# Patient Record
Sex: MALE | Race: WHITE | NOT HISPANIC OR LATINO | Employment: PART TIME | URBAN - METROPOLITAN AREA
[De-identification: names, ages, dates, MRNs, and addresses within clinical notes are randomized per-mention and may not be internally consistent; named-entity substitution may affect disease eponyms.]

---

## 2017-01-24 ENCOUNTER — OFFICE VISIT (OUTPATIENT)
Dept: URGENT CARE | Facility: URGENT CARE | Age: 62
End: 2017-01-24
Payer: OTHER MISCELLANEOUS

## 2017-01-24 ENCOUNTER — RADIANT APPOINTMENT (OUTPATIENT)
Dept: GENERAL RADIOLOGY | Facility: CLINIC | Age: 62
End: 2017-01-24
Attending: PHYSICIAN ASSISTANT
Payer: OTHER MISCELLANEOUS

## 2017-01-24 VITALS
SYSTOLIC BLOOD PRESSURE: 140 MMHG | DIASTOLIC BLOOD PRESSURE: 80 MMHG | BODY MASS INDEX: 30.55 KG/M2 | WEIGHT: 207 LBS | TEMPERATURE: 97.8 F | HEART RATE: 60 BPM | OXYGEN SATURATION: 98 %

## 2017-01-24 DIAGNOSIS — S61.432A: ICD-10-CM

## 2017-01-24 DIAGNOSIS — S69.92XA INJURY OF LEFT HAND, INITIAL ENCOUNTER: ICD-10-CM

## 2017-01-24 DIAGNOSIS — S69.92XA INJURY OF LEFT HAND, INITIAL ENCOUNTER: Primary | ICD-10-CM

## 2017-01-24 PROCEDURE — 99214 OFFICE O/P EST MOD 30 MIN: CPT | Performed by: PHYSICIAN ASSISTANT

## 2017-01-24 PROCEDURE — 73130 X-RAY EXAM OF HAND: CPT | Mod: LT

## 2017-01-24 RX ORDER — IBUPROFEN 600 MG/1
600 TABLET, FILM COATED ORAL EVERY 6 HOURS PRN
Qty: 30 TABLET | Refills: 1 | Status: SHIPPED | OUTPATIENT
Start: 2017-01-24 | End: 2017-11-16

## 2017-01-24 RX ORDER — CEPHALEXIN 500 MG/1
500 CAPSULE ORAL 3 TIMES DAILY
Qty: 30 CAPSULE | Refills: 0 | Status: SHIPPED | OUTPATIENT
Start: 2017-01-24 | End: 2017-11-16

## 2017-01-24 NOTE — Clinical Note
[address] [address]  [phone]        2017    REPORT OF WORK ABILITY    PATIENT DATA  Employee Name: Ronnie Lagos        : 1955   [id number]  Work related injury: YES  Today's date: 2017  Date of injury: 2017    PROVIDER EVALUATION: Please fill in as needed.  Please give copy to employee for employer.  1. Diagnosis: puncture wound left hand/hand pain  2. Treatment: wound cleaned and dressed in clinic.  Local wound care by patient daily.    3. Medication: Keflex as directed.  Ibuprofen as needed for pain.    NOTE: When ordering a medication, [place] Rules require Work Comp or WC on prescriptions.  4. Return to work date: 17 with the following: activities as tolerated by wound on left hand and wound dressing/compression dressing through 17  DURATION OF LIMITATIONS: as above      RESTRICTIONS: Unlimited unless listed.  Restrictions apply to home and leisure also.  If work within restrictions is not available, the employee is totally disabled.  Provider comments: follow up with Work Comp Clinic should symptoms persist or worsen after 17.  Medical Examiner: Shahla Yusuf      License or registration: [id number]    Next appointment: As needed    CC: Employer, Managed Care Plan/Payor, Patient

## 2017-01-24 NOTE — NURSING NOTE
"Chief Complaint   Patient presents with     Hand Injury     puncture to lt hand at work yesterday,sustained on a screwdriver     Work Comp       Initial /80 mmHg  Pulse 60  Temp(Src) 97.8  F (36.6  C) (Oral)  Wt 207 lb (93.895 kg)  SpO2 98% Estimated body mass index is 30.55 kg/(m^2) as calculated from the following:    Height as of 11/22/16: 5' 9\" (1.753 m).    Weight as of this encounter: 207 lb (93.895 kg).  BP completed using cuff size: regular    "

## 2017-01-24 NOTE — PROGRESS NOTES
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  The injury occurred 1 day(s) ago.   The injury happened while at work. How: as aboveimmediate pain, delayed swelling.  The patient complained of moderate pain  and has had decreased ROM.  Pain exacerbated by movement.  Relieved by nothing.  He treated it initially with washing it and icing it, and covering it with a bandage.  This is the first time this type of injury has occurred to this patient.   Denies any numbness or tingling in the extremity.    Past Medical History   Diagnosis Date     Essential hypertension, benign      abstracted 6/19/02     Unspecified condition of brain      abstracted 6/19/02     GERD (gastroesophageal reflux disease) 7/28/2008     Gastro-oesophageal reflux disease      Unspecified cerebral artery occlusion with cerebral infarction      Squamous cell carcinoma (H)      Patient Active Problem List   Diagnosis     Essential hypertension, benign     Impotence of organic origin     Lumbago     Cervicalgia     CARDIOVASCULAR SCREENING; LDL GOAL LESS THAN 160     Neck pain     Advance care planning     Hallux limitus     Injury of extensor tendon of hand     Right hand pain     Osteoarthritis of finger of right hand     Alcohol use     Gastroesophageal reflux disease without esophagitis     Social History   Substance Use Topics     Smoking status: Never Smoker      Smokeless tobacco: Never Used     Alcohol Use: Yes      Comment: occ       ROS:  CONSTITUTIONAL:NEGATIVE for fever, chills, change in weight  INTEGUMENTARY/SKIN: as per HPI  MUSCULOSKELETAL:  as per HPI  NEURO: negative    EXAM:   /80 mmHg  Pulse 60  Temp(Src) 97.8  F (36.6  C) (Oral)  Wt 207 lb (93.895 kg)  SpO2 98%  Gen: healthy,alert,no distress  Extremity: left hand: puncture wound between thumb and second finger in web space.  Tender. Erythematous with swelling.      There is not compromise to the distal circulation.  SKIN: puncture wound left hand in web space between 1st and 2nd digits.  Erythema and swelling present.    NEURO: Normal strength and tone, sensory exam grossly normal, mentation intact and speech normal    X-RAY was done. No bony involvement as per my interpretation during clinic visit.  Final radiology interpretation pending    (S69.92XA) Injury of left hand, initial encounter  (primary encounter diagnosis)  Comment: wound cleaned and dressed in clinic.  Wound ressing and ace wrap for compression.    PLAN:   Plan: XR Hand Left G/E 3 Views   ibuprofen  (ADVIL/MOTRIN) 600 MG tablet   Local wound care daily.  Leave open to air while sleeping.      (K41.427A) Puncture wound, hand, left, initial encounter  Comment:   Plan: cephALEXin (KEFLEX) 500 MG capsule        Up to date on tetanus.      F/U with work comp clinic should symptoms persist or worsen.  See workability letter.     Patient expresses understanding and agreement with the assessment and plan as above.

## 2017-03-21 ENCOUNTER — TELEPHONE (OUTPATIENT)
Dept: INTERNAL MEDICINE | Facility: CLINIC | Age: 62
End: 2017-03-21

## 2017-03-21 DIAGNOSIS — M79.643 PAIN OF HAND, UNSPECIFIED LATERALITY: Primary | ICD-10-CM

## 2017-03-21 NOTE — TELEPHONE ENCOUNTER
Patient is calling requesting a referral for hand specilaist for his thumb please call patient regarding this phone number [phone]

## 2017-03-24 ENCOUNTER — OFFICE VISIT (OUTPATIENT)
Dept: ORTHOPEDICS | Facility: CLINIC | Age: 62
End: 2017-03-24
Payer: COMMERCIAL

## 2017-03-24 VITALS
HEIGHT: 69 IN | SYSTOLIC BLOOD PRESSURE: 122 MMHG | BODY MASS INDEX: 29.62 KG/M2 | DIASTOLIC BLOOD PRESSURE: 80 MMHG | WEIGHT: 200 LBS

## 2017-03-24 DIAGNOSIS — M19.049 ARTHRITIS OF CARPOMETACARPAL JOINT: Primary | ICD-10-CM

## 2017-03-24 PROCEDURE — 99243 OFF/OP CNSLTJ NEW/EST LOW 30: CPT | Performed by: FAMILY MEDICINE

## 2017-03-24 NOTE — MR AVS SNAPSHOT
After Visit Summary   3/24/2017    Ronnie Lagos    MRN: [id number]           Patient Information     Date Of Birth          1955        Visit Information        Provider Department      3/24/2017 2:20 PM Emmy Gonzalez DO [facility] SP[facility]TS [facility]IC[facility]        Today's Diagnoses     Arthritis of carpometacarpal joint    -  1      Care Instructions    Thank you for allowing us to participate in your care today.  Please find below your visit diagnosis and the plan going forward.    1. Arthritis of carpometacarpal joint      Activity modification as discussed  Hand therapy: [facility] - [phone]  Dispensed CMC brace  If the above does not control your pain can consider cortisone injection int the future  Ok to use as needed Ibuprofen    Follow up after 4-6 visits of hand therapy  or sooner if needed. Call direct clinic number [[phone]] at any time with questions or concerns.    Emmy Gonzalez DO [facility]  Website: [email or web address]  Twitter: [email or web address] ([email or web address])          Follow-ups after your visit        Additional Services     [facility] PT, HAND, AND CHIROPRACTIC REFERRAL       **This order will print in the [facility] Scheduling Office**    Physical Therapy, Hand Therapy and Chiropractic Care are available through:    *[facility]  *[facility]  *[facility]    Call one number to schedule at any of the above locations: [phone].    Your provider has referred you to: Hand Therapy    Indication/Reason for Referral: Thumb pain - CMC  Onset of Illness: see chart  Therapy Orders: Evaluate and Treat  Special Programs: None  Special Request: please provide custom bracing as appropriate    Nash Cao     Additional Comments for the Therapist or Chiropractor:     Please be aware that coverage of these services is subject to the terms and limitations of your health insurance plan.   "Call member services at your health plan with any benefit or coverage questions.      Please bring the following to your appointment:    *Your personal calendar for scheduling future appointments  *Comfortable clothing                  Your next 10 appointments already scheduled     Apr 20, 2017  8:15 AM CDT   [facility]    [address] [id number] [phone]              Who to contact     If you have questions or need follow up information about today's clinic visit or your schedule please contact [facility] SP[facility]TS MEDICINE directly at [phone].  Normal or non-critical lab and imaging results will be communicated to you by MyChart, letter or phone within 4 business days after the clinic has received the results. If you do not hear from us within 7 days, please contact the clinic through [employer] or phone. If you have a critical or abnormal lab result, we will notify you by phone as soon as possible.  Submit refill requests through [employer] or call your pharmacy and they will forward the refill request to us. Please allow 3 business days for your refill to be completed.          Additional Information About Your Visit        [email or web address] Information     [employer] gives you secure access to your electronic health record. If you see a primary care provider, you can also send messages to your care team and make appointments. If you have questions, please call your primary care clinic.  If you do not have a primary care provider, please call [phone] and they will assist you.        Care EveryWhere ID     This is your Care EveryWhere ID. This could be used by other organizations to access your [place] medical records  [id number]        Your Vitals Were     Height BMI (Body Mass Index)                5' 9\" (1.753 m) 29.53 kg/m2           Blood Pressure from Last 3 Encounters: "   03/24/17 122/80   01/24/17 140/80   11/22/16 128/82    Weight from Last 3 Encounters:   03/24/17 200 lb (90.7 kg)   01/24/17 207 lb (93.9 kg)   11/22/16 209 lb 4.8 oz (94.9 kg)              We Performed the Following     [facility] PT, HAND, AND CHIROPRACTIC REFERRAL          Today's Medication Changes          These changes are accurate as of: 3/24/17  2:55 PM.  If you have any questions, ask your nurse or doctor.               Start taking these medicines.        Dose/Directions    order for DME   Used for:  Arthritis of carpometacarpal joint   Started by:  Emmy Gonzalez,         The following items were dispensed: CMC brace left   Quantity:  1 each   Refills:  0            Where to get your medicines      Some of these will need a paper prescription and others can be bought over the counter.  Ask your nurse if you have questions.     Bring a paper prescription for each of these medications     order for DME                Primary Care Provider Office Phone # Fax #    Abdiel Jones [phone]       [facility] [address] [place] [phone]        Thank you!     Thank you for choosing [facility] SPORTS MEDICINE  for your care. Our goal is always to provide you with excellent care. Hearing back from our patients is one way we can continue to improve our services. Please take a few minutes to complete the written survey that you may receive in the mail after your visit with us. Thank you!             Your Updated Medication List - Protect others around you: Learn how to safely use, store and throw away your medicines at www.disposemymeds.org.          This list is accurate as of: 3/24/17  2:55 PM.  Always use your most recent med list.                   Brand Name Dispense Instructions for use    amLODIPine 5 MG tablet    NORVASC    90 tablet    Take 1 tablet (5 mg) by mouth daily       atenolol 50 MG tablet    TENORMIN    90 tablet    Take 1 tablet (50 mg) by mouth  daily       cephALEXin 500 MG capsule    KEFLEX    30 capsule    Take 1 capsule (500 mg) by mouth 3 times daily       ibuprofen 600 MG tablet    ADVIL/MOTRIN    30 tablet    Take 1 tablet (600 mg) by mouth every 6 hours as needed for moderate pain       omeprazole 20 MG CR capsule    priLOSEC    90 capsule    Take 1 capsule (20 mg) by mouth daily       ondansetron 4 MG ODT tab    ZOFRAN ODT    15 tablet    Take 1 tablet (4 mg) by mouth every 6 hours as needed for nausea       order for DME     1 each    The following items were dispensed: CMC brace left       tadalafil 20 MG tablet    CIALIS    5 tablet    Take 1 tablet by mouth. TAKEN AT LEAST 30 MINUTES BEFORE INTERCOURSE

## 2017-03-24 NOTE — PATIENT INSTRUCTIONS
Thank you for allowing us to participate in your care today.  Please find below your visit diagnosis and the plan going forward.    1. Arthritis of carpometacarpal joint      Activity modification as discussed  Hand therapy: [facility] - [phone]  Dispensed CMC brace  If the above does not control your pain can consider cortisone injection int the future  Ok to use as needed Ibuprofen    Follow up after 4-6 visits of hand therapy  or sooner if needed. Call direct clinic number [[phone]] at any time with questions or concerns.    Emmy Gonzalez DO CAQSM  [facility]  Website: [email or web address]  Twitter: [email or web address]

## 2017-03-24 NOTE — Clinical Note
Ronnie Hernandez Dr. saw me at [facility] on Mar 24, 2017.  Please refer to the visit note at your convenience and feel free to contact me should you have any questions.  Thank you for the consult. Sincerely,  Emmy Gonzalez DO, [facility]

## 2017-03-24 NOTE — NURSING NOTE
"Chief Complaint   Patient presents with     Musculoskeletal Problem       Initial /80  Ht 5' 9\" (1.753 m)  Wt 200 lb (90.7 kg)  BMI 29.53 kg/m2 Estimated body mass index is 29.53 kg/(m^2) as calculated from the following:    Height as of this encounter: 5' 9\" (1.753 m).    Weight as of this encounter: 200 lb (90.7 kg).  Medication Reconciliation: complete     John Petit ATC    "

## 2017-03-24 NOTE — PROGRESS NOTES
"ASSESSMENT & PLAN    ICD-10-CM    1. Arthritis of carpometacarpal joint M19.90 [facility], HAND, AND CHIROPRACTIC REFERRAL     order for DME   Reviewed xrays taken previously - mild CMC narrowing.  No triggering on exam.  Hand therapy: [facility] - [phone]  Dispensed CMC brace  If the above does not control your pain can consider cortisone injection in the future  Ok to use as needed Ibuprofen    Follow up after 4-6 visits of hand therapy  or sooner if needed. Call direct clinic number [[phone]] at any time with questions or concerns. Instructed to call the office if the condition evolves or worsens.    -----    SUBJECTIVE  Ronnie Lagos is a/an 61 year old right hand dominant male who is seen in consultation at the request of Dr. Jones for evaluation of left thumb pain. He points to the CMC, MCP area as his location of pain. The patient is seen by themselves.    Onset: summer 2016. Reports insidious onset without acute precipitating event. Possibly repetitive use  Worsened by: gloves, reading newspaper  Better with: rest  Quality:  Short duration sharp pains, with some dull achy pain  Pain Scale (maximum/current)/10: 8/10 / 2/10  Treatments tried: ice, heat and ibuprofen  Orthopedic history: YES - Date: 1/2017 puncture wound to web space between thumb and index finger  Relevant surgical history: NO  Patient Social History: works as maintenance at apartments building    Patient's past medical, surgical, social, and family histories were reviewed today and no changes are noted.    REVIEW OF SYSTEMS:  10 point ROS is negative other than symptoms noted above in HPI, Past Medical History or as stated below  Constitutional: NEGATIVE for fever, chills, change in weight  Skin: NEGATIVE for worrisome rashes, moles or lesions  GI/: NEGATIVE for bowel or bladder changes  Neuro: NEGATIVE for weakness, dizziness or paresthesias    OBJECTIVE:  /80  Ht 5' 9\" (1.753 m)  Wt 200 lb (90.7 kg) "  BMI 29.53 kg/m2   General: healthy, alert and in no distress  HEENT: no scleral icterus or conjunctival erythema  Skin: no suspicious lesions or rash. No jaundice.  CV: regular rhythm by palpation  Resp: normal respiratory effort without conversational dyspnea   Psych: normal mood and affect  Gait: normal steady gait with appropriate coordination and balance  Neuro: normal light touch sensory exam of the bilateral hands.  Motor strength as noted below  MSK:  LEFT HAND  Inspection:    No swelling or obvious deformity or asymmetry  Palpation:   Tender over CMC  Range of Motion:    Limited with thumb opposition/abduction 2/2 pain  Strength:     limited by pain  Special Tests:    Positive: CMC grind    Negative: palpable triggering at 1st MCP    Independent visualization of the below image:  XR HAND LT G/E 3 VW 1/24/2017 10:22 AM      HISTORY: Trauma.         IMPRESSION: Negative left hand.     ELIESER INTERIANO MD    My comments: mild CMC joint narrowing.    Patient's conditions were thoroughly discussed during today's visit with greater than 50% of the visit spent counseling the patient with total time spent face-to-face with the patient being 15 minutes.    mEmy Gonzalez DO [facility]

## 2017-04-20 ENCOUNTER — OFFICE VISIT (OUTPATIENT)
Dept: DERMATOLOGY | Facility: CLINIC | Age: 62
End: 2017-04-20
Payer: COMMERCIAL

## 2017-04-20 VITALS — SYSTOLIC BLOOD PRESSURE: 160 MMHG | OXYGEN SATURATION: 98 % | DIASTOLIC BLOOD PRESSURE: 92 MMHG | HEART RATE: 55 BPM

## 2017-04-20 DIAGNOSIS — C44.42 SQUAMOUS CELL CARCINOMA, SCALP/NECK: ICD-10-CM

## 2017-04-20 DIAGNOSIS — L81.4 LENTIGO: ICD-10-CM

## 2017-04-20 DIAGNOSIS — L57.0 AK (ACTINIC KERATOSIS): ICD-10-CM

## 2017-04-20 DIAGNOSIS — C44.619 BASAL CELL CARCINOMA OF LEFT SHOULDER: ICD-10-CM

## 2017-04-20 DIAGNOSIS — L82.1 SK (SEBORRHEIC KERATOSIS): ICD-10-CM

## 2017-04-20 DIAGNOSIS — Z85.828 HISTORY OF SKIN CANCER: Primary | ICD-10-CM

## 2017-04-20 PROCEDURE — 11100 HC BIOPSY SKIN/SUBQ/MUC MEM, SINGLE LESION: CPT | Mod: 59 | Performed by: DERMATOLOGY

## 2017-04-20 PROCEDURE — 17000 DESTRUCT PREMALG LESION: CPT | Mod: 59 | Performed by: DERMATOLOGY

## 2017-04-20 PROCEDURE — 17311 MOHS 1 STAGE H/N/HF/G: CPT | Performed by: DERMATOLOGY

## 2017-04-20 PROCEDURE — 17003 DESTRUCT PREMALG LES 2-14: CPT | Performed by: DERMATOLOGY

## 2017-04-20 PROCEDURE — 88331 PATH CONSLTJ SURG 1 BLK 1SPC: CPT | Mod: 59 | Performed by: DERMATOLOGY

## 2017-04-20 PROCEDURE — 11602 EXC TR-EXT MAL+MARG 1.1-2 CM: CPT | Mod: 59 | Performed by: DERMATOLOGY

## 2017-04-20 PROCEDURE — 99215 OFFICE O/P EST HI 40 MIN: CPT | Mod: 25 | Performed by: DERMATOLOGY

## 2017-04-20 PROCEDURE — 11101 HC BIOPSY SKIN/SUBQ/MUC MEM, EACH ADDTL LESION: CPT | Performed by: DERMATOLOGY

## 2017-04-20 NOTE — MR AVS SNAPSHOT
After Visit Summary   2017    Ronnie Lagos    MRN: [id number]           Patient Information     Date Of Birth          1955        Visit Information        Provider Department      2017 8:15 AM Burton Park MD [facility]        Care Instructions    Open Wound Care     for _scalp and shoulder____________        ? No strenuous activity for 48 hours    ? Take Tylenol as needed for discomfort.                                                .         ? Do not drink alcoholic beverages for 48 hours.    ? Keep the pressure bandage in place for 24 hours. If the bandage becomes blood tinged or loose, reinforce it with gauze and tape.        (Refer to the reverse side of this page for management of bleeding).    ? Remove bandage in 24 hours and begin wound care as follows:     1. Clean area with tap water using a Q tip or gauze pad, (shower / bathe normally)  2. Dry wound with Q tip or gauze pad  3. Apply Aquaphor, Vaseline, Polysporin or Bacitracin Ointment with a Q tip    Do NOT use Neosporin Ointment *  4. Cover the wound with a band-aid or nonstick gauze pad and paper tape.  5. Repeat wound care once a day until wound is completely healed.    It is an old wives tale that a wound heals better when it is exposed to air and allowed to dry out. The wound will heal faster with a better cosmetic result if it is kept moist with ointment and covered with a bandage.  Do not let the wound dry out.      Supplies Needed:                Qtips or gauze pads                Polysporin or Bacitracin Ointment                Bandaids or nonstick gauze pads and paper tape    Wound care kits and brown paper tape are available for purchase at   the pharmacy.    BLEEDIN. Use tightly rolled up gauze or cloth to apply direct pressure over the bandage for 20   minutes.  2. Reapply pressure for an additional 20 minutes if necessary  3. Call the office or go to the nearest  emergency room if pressure fails to stop the bleeding.  4. Use additional gauze and tape to maintain pressure once the bleeding has stopped.  5. Begin wound care 24 hours after surgery as directed.                  WOUND HEALING    1. One week after surgery a pink / red halo will form around the outside of the wound.   This is new skin.  2. The center of the wound will appear yellowish white and produce some drainage.  3. The pink halo will slowly migrate in toward the center of the wound until the wound is covered with new shiny pink skin.  4. There will be no more drainage when the wound is completely healed.  5. It will take six months to one year for the redness to fade.  6. The scar may be itchy, tight and sensitive to extreme temperatures for a year after the surgery.  7. Massaging the area several times a day for several minutes after the wound is completely healed will help the scar soften and normalize faster. Begin massage only after healing is complete.      In case of emergency call: Dr Park: [phone]     [facility]: [phone]    [facility]: [phone]          Follow-ups after your visit        Who to contact     If you have questions or need follow up information about today's clinic visit or your schedule please contact [facility] directly at [phone].  Normal or non-critical lab and imaging results will be communicated to you by MyChart, letter or phone within 4 business days after the clinic has received the results. If you do not hear from us within 7 days, please contact the clinic through MyChart or phone. If you have a critical or abnormal lab result, we will notify you by phone as soon as possible.  Submit refill requests through [employer] or call your pharmacy and they will forward the refill request to us. Please allow 3 business days for your refill to be completed.          Additional Information About Your Visit        [employer]  Information     [employer] gives you secure access to your electronic health record. If you see a primary care provider, you can also send messages to your care team and make appointments. If you have questions, please call your primary care clinic.  If you do not have a primary care provider, please call [phone] and they will assist you.        Care EveryWhere ID     This is your Care EveryWhere ID. This could be used by other organizations to access your [place] medical records  [id number]        Your Vitals Were     Pulse Pulse Oximetry                55 98%           Blood Pressure from Last 3 Encounters:   04/20/17 (!) 160/92   03/24/17 122/80   01/24/17 140/80    Weight from Last 3 Encounters:   03/24/17 90.7 kg (200 lb)   01/24/17 93.9 kg (207 lb)   11/22/16 94.9 kg (209 lb 4.8 oz)              Today, you had the following     No orders found for display       Primary Care Provider Office Phone # Fax #    Abdiel Jones [phone]       [facility] [address] [place] [phone]        Thank you!     Thank you for choosing [facility]  for your care. Our goal is always to provide you with excellent care. Hearing back from our patients is one way we can continue to improve our services. Please take a few minutes to complete the written survey that you may receive in the mail after your visit with us. Thank you!             Your Updated Medication List - Protect others around you: Learn how to safely use, store and throw away your medicines at www.disposemymeds.org.          This list is accurate as of: 4/20/17  9:11 AM.  Always use your most recent med list.                   Brand Name Dispense Instructions for use    amLODIPine 5 MG tablet    NORVASC    90 tablet    Take 1 tablet (5 mg) by mouth daily       atenolol 50 MG tablet    TENORMIN    90 tablet    Take 1 tablet (50 mg) by mouth daily       cephALEXin 500 MG capsule    KEFLEX    30  capsule    Take 1 capsule (500 mg) by mouth 3 times daily       ibuprofen 600 MG tablet    ADVIL/MOTRIN    30 tablet    Take 1 tablet (600 mg) by mouth every 6 hours as needed for moderate pain       omeprazole 20 MG CR capsule    priLOSEC    90 capsule    Take 1 capsule (20 mg) by mouth daily       ondansetron 4 MG ODT tab    ZOFRAN ODT    15 tablet    Take 1 tablet (4 mg) by mouth every 6 hours as needed for nausea       order for DME     1 each    The following items were dispensed: CMC brace left       tadalafil 20 MG tablet    CIALIS    5 tablet    Take 1 tablet by mouth. TAKEN AT LEAST 30 MINUTES BEFORE INTERCOURSE

## 2017-04-20 NOTE — NURSING NOTE
"Initial BP (!) 160/92  Pulse 55  SpO2 98% Estimated body mass index is 29.53 kg/(m^2) as calculated from the following:    Height as of 3/24/17: 1.753 m (5' 9\").    Weight as of 3/24/17: 90.7 kg (200 lb). .      "

## 2017-11-08 DIAGNOSIS — I10 ESSENTIAL HYPERTENSION, BENIGN: ICD-10-CM

## 2017-11-08 DIAGNOSIS — K21.9 GASTROESOPHAGEAL REFLUX DISEASE WITHOUT ESOPHAGITIS: ICD-10-CM

## 2017-11-08 RX ORDER — AMLODIPINE BESYLATE 5 MG/1
TABLET ORAL
Qty: 90 TABLET | Refills: 2 | OUTPATIENT
Start: 2017-11-08

## 2017-11-08 RX ORDER — ATENOLOL 50 MG/1
TABLET ORAL
Qty: 90 TABLET | Refills: 2 | OUTPATIENT
Start: 2017-11-08

## 2017-11-09 RX ORDER — AMLODIPINE BESYLATE 5 MG/1
5 TABLET ORAL DAILY
Qty: 90 TABLET | Refills: 3 | Status: SHIPPED | OUTPATIENT
Start: 2017-11-09 | End: 2017-11-16

## 2017-11-09 RX ORDER — ATENOLOL 50 MG/1
50 TABLET ORAL DAILY
Qty: 90 TABLET | Refills: 3 | Status: SHIPPED | OUTPATIENT
Start: 2017-11-09 | End: 2018-11-03

## 2017-11-09 NOTE — TELEPHONE ENCOUNTER
Patient informed, appointment made for next Thursday. Requesting meds to get him threw until then, says he has 4 days worth left

## 2017-11-16 ENCOUNTER — OFFICE VISIT (OUTPATIENT)
Dept: INTERNAL MEDICINE | Facility: CLINIC | Age: 62
End: 2017-11-16
Payer: COMMERCIAL

## 2017-11-16 VITALS
HEIGHT: 69 IN | WEIGHT: 210.8 LBS | BODY MASS INDEX: 31.22 KG/M2 | SYSTOLIC BLOOD PRESSURE: 132 MMHG | TEMPERATURE: 97.6 F | HEART RATE: 57 BPM | OXYGEN SATURATION: 98 % | DIASTOLIC BLOOD PRESSURE: 80 MMHG

## 2017-11-16 DIAGNOSIS — Z71.89 ADVANCE CARE PLANNING: ICD-10-CM

## 2017-11-16 DIAGNOSIS — Z12.5 SPECIAL SCREENING FOR MALIGNANT NEOPLASM OF PROSTATE: ICD-10-CM

## 2017-11-16 DIAGNOSIS — I10 ESSENTIAL HYPERTENSION, BENIGN: Primary | ICD-10-CM

## 2017-11-16 DIAGNOSIS — Z11.59 ENCOUNTER FOR HCV SCREENING TEST FOR LOW RISK PATIENT: ICD-10-CM

## 2017-11-16 DIAGNOSIS — Z13.6 CARDIOVASCULAR SCREENING; LDL GOAL LESS THAN 160: ICD-10-CM

## 2017-11-16 PROCEDURE — 99213 OFFICE O/P EST LOW 20 MIN: CPT | Performed by: INTERNAL MEDICINE

## 2017-11-16 RX ORDER — AMLODIPINE BESYLATE 5 MG/1
1 TABLET ORAL DAILY
Refills: 3 | COMMUNITY
Start: 2017-11-14 | End: 2018-11-29

## 2017-11-16 NOTE — NURSING NOTE
"Chief Complaint   Patient presents with     Recheck Medication       Initial /80  Pulse 57  Temp 97.6  F (36.4  C) (Oral)  Ht 5' 9\" (1.753 m)  Wt 210 lb 12.8 oz (95.6 kg)  SpO2 98%  BMI 31.13 kg/m2 Estimated body mass index is 31.13 kg/(m^2) as calculated from the following:    Height as of this encounter: 5' 9\" (1.753 m).    Weight as of this encounter: 210 lb 12.8 oz (95.6 kg).  Medication Reconciliation: complete   Laura Amador, MISTY      "

## 2017-11-16 NOTE — MR AVS SNAPSHOT
After Visit Summary   11/16/2017    Ronnie Lagos    MRN: [id number]           Patient Information     Date Of Birth          1955        Visit Information        Provider Department      11/16/2017 2:40 PM Abdiel Jones MD [facility]        Today's Diagnoses     Essential hypertension, benign    -  1    CARDIOVASCULAR SCREENING; LDL GOAL LESS THAN 160        Special screening for malignant neoplasm of prostate        Encounter for HCV screening test for low risk patient        Advance care planning           Follow-ups after your visit        Your next 10 appointments already scheduled     Feb 01, 2018  9:00 AM CST   Return Visit with Burton Park MD   [facility] ([facility])    [address] [address] [phone]              Future tests that were ordered for you today     Open Future Orders        Priority Expected Expires Ordered    Comprehensive metabolic panel Routine 11/16/2017 12/31/2017 11/16/2017    Lipid Profile Routine 11/16/2017 12/31/2017 11/16/2017    PSA, screen Routine 11/16/2017 12/31/2017 11/16/2017    Hepatitis C antibody Routine 11/16/2017 12/31/2017 11/16/2017            Who to contact     If you have questions or need follow up information about today's clinic visit or your schedule please contact [facility] directly at [phone].  Normal or non-critical lab and imaging results will be communicated to you by MyChart, letter or phone within 4 business days after the clinic has received the results. If you do not hear from us within 7 days, please contact the clinic through MyChart or phone. If you have a critical or abnormal lab result, we will notify you by phone as soon as possible.  Submit refill requests through [employer] or call your pharmacy and they will forward the refill request to us. Please allow 3 business days for your  "refill to be completed.          Additional Information About Your Visit        MyChart Information     [employer] gives you secure access to your electronic health record. If you see a primary care provider, you can also send messages to your care team and make appointments. If you have questions, please call your primary care clinic.  If you do not have a primary care provider, please call [phone] and they will assist you.        Care EveryWhere ID     This is your Care EveryWhere ID. This could be used by other organizations to access your [place] medical records  [id number]        Your Vitals Were     Pulse Temperature Height Pulse Oximetry BMI (Body Mass Index)       57 97.6  F (36.4  C) (Oral) 5' 9\" (1.753 m) 98% 31.13 kg/m2        Blood Pressure from Last 3 Encounters:   11/16/17 132/80   04/20/17 (!) 160/92   03/24/17 122/80    Weight from Last 3 Encounters:   11/16/17 210 lb 12.8 oz (95.6 kg)   03/24/17 200 lb (90.7 kg)   01/24/17 207 lb (93.9 kg)               Primary Care Provider Office Phone # Fax #    Abdiel Jones [phone]       [address] [facility] [address]        Equal Access to Services     PHILL LLANOS AH: Hadii aad ku hadasho Soomaali, waaxda luqadaha, qaybta kaalmada adeegyada, waxay idiin hayaan adeeg kharash la'conchitan ah. So [facility] [phone].    ATENCIÓN: Si habla español, tiene a rosen disposición servicios gratuitos de asistencia lingüística. Llame al [phone].    We comply with applicable federal civil rights laws and [place] laws. We do not discriminate on the basis of race, color, national origin, age, disability, sex, sexual orientation, or gender identity.            Thank you!     Thank you for choosing [facility]  for your care. Our goal is always to provide you with excellent care. Hearing back from our patients is one way we can continue to improve our services. Please take a few minutes to complete the written survey that " you may receive in the mail after your visit with us. Thank you!             Your Updated Medication List - Protect others around you: Learn how to safely use, store and throw away your medicines at www.disposemymeds.org.          This list is accurate as of: 11/16/17  2:59 PM.  Always use your most recent med list.                   Brand Name Dispense Instructions for use Diagnosis    amLODIPine 5 MG tablet    NORVASC     Take 1 tablet by mouth daily        atenolol 50 MG tablet    TENORMIN    90 tablet    Take 1 tablet (50 mg) by mouth daily    Essential hypertension, benign       omeprazole 20 MG CR capsule    priLOSEC    90 capsule    Take 1 capsule (20 mg) by mouth daily    Gastroesophageal reflux disease without esophagitis

## 2017-11-16 NOTE — PROGRESS NOTES
SUBJECTIVE:   Ronnie Lagos is a 62 year old male who presents to clinic today for the following health issues:    Hypertension Follow-up      Outpatient blood pressures are not being checked.    Low Salt Diet: low salt    Amount of exercise or physical activity: None    Problems taking medications regularly: No    Medication side effects: none    Diet: regular (no restrictions)      Problem list and histories reviewed & adjusted, as indicated.  Additional history: as documented    Patient Active Problem List   Diagnosis     Essential hypertension, benign     Impotence of organic origin     Lumbago     Cervicalgia     CARDIOVASCULAR SCREENING; LDL GOAL LESS THAN 160     Neck pain     Advance care planning     Hallux limitus     Injury of extensor tendon of hand     Right hand pain     Osteoarthritis of finger of right hand     Alcohol use     Gastroesophageal reflux disease without esophagitis     Past Surgical History:   Procedure Laterality Date     ARTHRODESIS FOOT  2/5/2013    Procedure: ARTHRODESIS FOOT;  LEFT FIRST METATARSOPHALANGEAL JOINT ARTHRODESIS ;  Surgeon: Burton Loera DPM;  Location: [facility]     ELBOW SURGERY       LAPAROSCOPIC CHOLECYSTECTOMY N/A 5/1/2015    Procedure: LAPAROSCOPIC CHOLECYSTECTOMY;  Surgeon: Damien Galindo MD;  Location: [facility]     ORTHOPEDIC SURGERY      foot surgery , elbow, shoulder     SHOULDER SURGERY         Social History   Substance Use Topics     Smoking status: Never Smoker     Smokeless tobacco: Never Used     Alcohol use Yes      Comment: occ     Family History   Problem Relation Age of Onset     Hypertension Mother      Cancer - colorectal Mother      Hypertension Father      Melanoma No family hx of          Current Outpatient Prescriptions   Medication Sig Dispense Refill     amLODIPine (NORVASC) 5 MG tablet Take 1 tablet by mouth daily  3     atenolol (TENORMIN) 50 MG tablet Take 1 tablet (50 mg) by mouth daily 90 tablet 3     omeprazole (PRILOSEC) 20  "MG CR capsule Take 1 capsule (20 mg) by mouth daily 90 capsule 3     Allergies   Allergen Reactions     No Known Drug Allergies      BP Readings from Last 3 Encounters:   11/16/17 132/80   04/20/17 (!) 160/92   03/24/17 122/80    Wt Readings from Last 3 Encounters:   11/16/17 210 lb 12.8 oz (95.6 kg)   03/24/17 200 lb (90.7 kg)   01/24/17 207 lb (93.9 kg)            Reviewed and updated as needed this visit by clinical staffTobacco  Allergies  Problems  Med Hx  Surg Hx  Fam Hx  Soc Hx      Reviewed and updated as needed this visit by Provider         ROS:  C: NEGATIVE for fever, chills, change in weight  I: NEGATIVE for worrisome rashes, moles or lesions  E/M: NEGATIVE for ear, mouth and throat problems  R: NEGATIVE for significant cough or SOB  CV: NEGATIVE for chest pain, palpitations or peripheral edema  GI: NEGATIVE for nausea, abdominal pain, heartburn, or change in bowel habits  : NEGATIVE for frequency, dysuria, or hematuria  H: NEGATIVE for bleeding problems  P: NEGATIVE for changes in mood or affect    OBJECTIVE:                                                    /80  Pulse 57  Temp 97.6  F (36.4  C) (Oral)  Ht 5' 9\" (1.753 m)  Wt 210 lb 12.8 oz (95.6 kg)  SpO2 98%  BMI 31.13 kg/m2  Body mass index is 31.13 kg/(m^2).  GENERAL:  alert and no distress  EYES: Eyes grossly normal to inspection, extraocular movements - intact, and PERRL  HENT: ear canals- normal; TMs- normal; Nose- normal; Mouth- no ulcers, no lesions  NECK: no tenderness, no adenopathy, no asymmetry, no masses, no stiffness; thyroid- normal to palpation  RESP: lungs clear to auscultation - no rales, no rhonchi, no wheezes  CV: regular rates and rhythm, normal S1 S2, no S3 or S4 and no click or rub   MS: extremities- no gross deformities noted  Mild OA changes thumbs  PSYCH: Alert and oriented times 3; speech- coherent , normal rate and volume; able to articulate logical thoughts, able to abstract reason, no tangential " thoughts, no hallucinations or delusions, affect- normal     ASSESSMENT/PLAN:                                                      (I10) Essential hypertension, benign  (primary encounter diagnosis)  Comment: stable on therapy  Plan: Comprehensive metabolic panel            (Z13.6) CARDIOVASCULAR SCREENING; LDL GOAL LESS THAN 160  Comment: labs as fasting ordered  Plan: Comprehensive metabolic panel, Lipid Profile            (Z12.5) Special screening for malignant neoplasm of prostate  Comment: as screening  Plan: PSA, screen            (Z11.59) Encounter for HCV screening test for low risk patient  Comment: per screening guidelines   Plan: Hepatitis C antibody            (Z71.89) Advance care planning  Comment: advised  Plan:         See Patient Instructions    Abdiel Jones MD  [facility]    THE MEDICATION LIST HAS BEEN FULLY RECONCILED BY THE M.D. AND THE NURSING STAFF.

## 2017-11-28 DIAGNOSIS — Z12.5 SPECIAL SCREENING FOR MALIGNANT NEOPLASM OF PROSTATE: ICD-10-CM

## 2017-11-28 DIAGNOSIS — Z11.59 ENCOUNTER FOR HCV SCREENING TEST FOR LOW RISK PATIENT: ICD-10-CM

## 2017-11-28 DIAGNOSIS — I10 ESSENTIAL HYPERTENSION, BENIGN: ICD-10-CM

## 2017-11-28 DIAGNOSIS — Z13.6 CARDIOVASCULAR SCREENING; LDL GOAL LESS THAN 160: ICD-10-CM

## 2017-11-28 LAB
ALBUMIN SERPL-MCNC: 4.1 G/DL (ref 3.4–5)
ALP SERPL-CCNC: 87 U/L (ref 40–150)
ALT SERPL W P-5'-P-CCNC: 87 U/L (ref 0–70)
ANION GAP SERPL CALCULATED.3IONS-SCNC: 5 MMOL/L (ref 3–14)
AST SERPL W P-5'-P-CCNC: 44 U/L (ref 0–45)
BILIRUB SERPL-MCNC: 1.3 MG/DL (ref 0.2–1.3)
BUN SERPL-MCNC: 15 MG/DL (ref 7–30)
CALCIUM SERPL-MCNC: 9.1 MG/DL (ref 8.5–10.1)
CHLORIDE SERPL-SCNC: 104 MMOL/L (ref 94–109)
CHOLEST SERPL-MCNC: 164 MG/DL
CO2 SERPL-SCNC: 29 MMOL/L (ref 20–32)
CREAT SERPL-MCNC: 1.06 MG/DL (ref 0.66–1.25)
GFR SERPL CREATININE-BSD FRML MDRD: 71 ML/MIN/1.7M2
GLUCOSE SERPL-MCNC: 102 MG/DL (ref 70–99)
HCV AB SERPL QL IA: NONREACTIVE
HDLC SERPL-MCNC: 61 MG/DL
LDLC SERPL CALC-MCNC: 71 MG/DL
NONHDLC SERPL-MCNC: 103 MG/DL
POTASSIUM SERPL-SCNC: 4 MMOL/L (ref 3.4–5.3)
PROT SERPL-MCNC: 7.4 G/DL (ref 6.8–8.8)
PSA SERPL-ACNC: 0.96 UG/L (ref 0–4)
SODIUM SERPL-SCNC: 138 MMOL/L (ref 133–144)
TRIGL SERPL-MCNC: 161 MG/DL

## 2017-11-28 PROCEDURE — 80053 COMPREHEN METABOLIC PANEL: CPT | Performed by: INTERNAL MEDICINE

## 2017-11-28 PROCEDURE — G0103 PSA SCREENING: HCPCS | Performed by: INTERNAL MEDICINE

## 2017-11-28 PROCEDURE — 86803 HEPATITIS C AB TEST: CPT | Performed by: INTERNAL MEDICINE

## 2017-11-28 PROCEDURE — 36415 COLL VENOUS BLD VENIPUNCTURE: CPT | Performed by: INTERNAL MEDICINE

## 2017-11-28 PROCEDURE — 80061 LIPID PANEL: CPT | Performed by: INTERNAL MEDICINE

## 2017-12-27 ENCOUNTER — TELEPHONE (OUTPATIENT)
Dept: INTERNAL MEDICINE | Facility: CLINIC | Age: 62
End: 2017-12-27

## 2017-12-27 ENCOUNTER — APPOINTMENT (OUTPATIENT)
Dept: ULTRASOUND IMAGING | Facility: CLINIC | Age: 62
End: 2017-12-27
Attending: EMERGENCY MEDICINE
Payer: COMMERCIAL

## 2017-12-27 ENCOUNTER — HOSPITAL ENCOUNTER (EMERGENCY)
Facility: CLINIC | Age: 62
Discharge: HOME | End: 2017-12-27
Attending: EMERGENCY MEDICINE | Admitting: EMERGENCY MEDICINE
Payer: COMMERCIAL

## 2017-12-27 VITALS
OXYGEN SATURATION: 97 % | RESPIRATION RATE: 16 BRPM | DIASTOLIC BLOOD PRESSURE: 87 MMHG | TEMPERATURE: 97.4 F | HEIGHT: 69 IN | WEIGHT: 205 LBS | BODY MASS INDEX: 30.36 KG/M2 | SYSTOLIC BLOOD PRESSURE: 144 MMHG

## 2017-12-27 DIAGNOSIS — Z13.6 CARDIOVASCULAR SCREENING; LDL GOAL LESS THAN 160: ICD-10-CM

## 2017-12-27 DIAGNOSIS — B17.9 ACUTE HEPATITIS: ICD-10-CM

## 2017-12-27 DIAGNOSIS — R79.89 ABNORMAL LIVER FUNCTION TEST: Primary | ICD-10-CM

## 2017-12-27 LAB
ALBUMIN SERPL-MCNC: 3.7 G/DL (ref 3.4–5)
ALP SERPL-CCNC: 116 U/L (ref 40–150)
ALT SERPL W P-5'-P-CCNC: 539 U/L (ref 0–70)
ANION GAP SERPL CALCULATED.3IONS-SCNC: 6 MMOL/L (ref 3–14)
AST SERPL W P-5'-P-CCNC: 371 U/L (ref 0–45)
BASOPHILS # BLD AUTO: 0 10E9/L (ref 0–0.2)
BASOPHILS NFR BLD AUTO: 0.2 %
BILIRUB DIRECT SERPL-MCNC: 1 MG/DL (ref 0–0.2)
BILIRUB SERPL-MCNC: 2.3 MG/DL (ref 0.2–1.3)
BUN SERPL-MCNC: 14 MG/DL (ref 7–30)
CALCIUM SERPL-MCNC: 9 MG/DL (ref 8.5–10.1)
CHLORIDE SERPL-SCNC: 101 MMOL/L (ref 94–109)
CO2 SERPL-SCNC: 27 MMOL/L (ref 20–32)
CREAT SERPL-MCNC: 0.97 MG/DL (ref 0.66–1.25)
DIFFERENTIAL METHOD BLD: ABNORMAL
EOSINOPHIL # BLD AUTO: 0.1 10E9/L (ref 0–0.7)
EOSINOPHIL NFR BLD AUTO: 2.1 %
ERYTHROCYTE [DISTWIDTH] IN BLOOD BY AUTOMATED COUNT: 12.8 % (ref 10–15)
GFR SERPL CREATININE-BSD FRML MDRD: 79 ML/MIN/1.7M2
GLUCOSE SERPL-MCNC: 96 MG/DL (ref 70–99)
HCT VFR BLD AUTO: 45.2 % (ref 40–53)
HGB BLD-MCNC: 16.2 G/DL (ref 13.3–17.7)
IMM GRANULOCYTES # BLD: 0 10E9/L (ref 0–0.4)
IMM GRANULOCYTES NFR BLD: 0.2 %
INR PPP: 0.93 (ref 0.86–1.14)
LIPASE SERPL-CCNC: 179 U/L (ref 73–393)
LYMPHOCYTES # BLD AUTO: 0.5 10E9/L (ref 0.8–5.3)
LYMPHOCYTES NFR BLD AUTO: 11.9 %
MCH RBC QN AUTO: 32.5 PG (ref 26.5–33)
MCHC RBC AUTO-ENTMCNC: 35.8 G/DL (ref 31.5–36.5)
MCV RBC AUTO: 91 FL (ref 78–100)
MONOCYTES # BLD AUTO: 0.5 10E9/L (ref 0–1.3)
MONOCYTES NFR BLD AUTO: 10.7 %
NEUTROPHILS # BLD AUTO: 3.3 10E9/L (ref 1.6–8.3)
NEUTROPHILS NFR BLD AUTO: 74.9 %
NRBC # BLD AUTO: 0 10*3/UL
NRBC BLD AUTO-RTO: 0 /100
PLATELET # BLD AUTO: 142 10E9/L (ref 150–450)
POTASSIUM SERPL-SCNC: 4.2 MMOL/L (ref 3.4–5.3)
PROT SERPL-MCNC: 7.2 G/DL (ref 6.8–8.8)
RBC # BLD AUTO: 4.99 10E12/L (ref 4.4–5.9)
SODIUM SERPL-SCNC: 134 MMOL/L (ref 133–144)
WBC # BLD AUTO: 4.4 10E9/L (ref 4–11)

## 2017-12-27 PROCEDURE — 96360 HYDRATION IV INFUSION INIT: CPT

## 2017-12-27 PROCEDURE — 80048 BASIC METABOLIC PNL TOTAL CA: CPT | Performed by: EMERGENCY MEDICINE

## 2017-12-27 PROCEDURE — 25000128 H RX IP 250 OP 636: Performed by: EMERGENCY MEDICINE

## 2017-12-27 PROCEDURE — 85025 COMPLETE CBC W/AUTO DIFF WBC: CPT | Performed by: EMERGENCY MEDICINE

## 2017-12-27 PROCEDURE — 76705 ECHO EXAM OF ABDOMEN: CPT

## 2017-12-27 PROCEDURE — 86708 HEPATITIS A ANTIBODY: CPT | Performed by: EMERGENCY MEDICINE

## 2017-12-27 PROCEDURE — 86709 HEPATITIS A IGM ANTIBODY: CPT | Performed by: EMERGENCY MEDICINE

## 2017-12-27 PROCEDURE — 85610 PROTHROMBIN TIME: CPT | Performed by: EMERGENCY MEDICINE

## 2017-12-27 PROCEDURE — 80076 HEPATIC FUNCTION PANEL: CPT | Performed by: INTERNAL MEDICINE

## 2017-12-27 PROCEDURE — 83690 ASSAY OF LIPASE: CPT | Performed by: EMERGENCY MEDICINE

## 2017-12-27 PROCEDURE — 99284 EMERGENCY DEPT VISIT MOD MDM: CPT | Mod: 25

## 2017-12-27 PROCEDURE — 87340 HEPATITIS B SURFACE AG IA: CPT | Performed by: EMERGENCY MEDICINE

## 2017-12-27 PROCEDURE — 36415 COLL VENOUS BLD VENIPUNCTURE: CPT | Performed by: INTERNAL MEDICINE

## 2017-12-27 RX ORDER — ONDANSETRON 2 MG/ML
4 INJECTION INTRAMUSCULAR; INTRAVENOUS ONCE
Status: DISCONTINUED | OUTPATIENT
Start: 2017-12-27 | End: 2017-12-27 | Stop reason: HOSPADM

## 2017-12-27 RX ORDER — ONDANSETRON 4 MG/1
4 TABLET, ORALLY DISINTEGRATING ORAL EVERY 4 HOURS PRN
Qty: 15 TABLET | Refills: 0 | Status: SHIPPED | OUTPATIENT
Start: 2017-12-27 | End: 2018-06-07

## 2017-12-27 RX ADMIN — SODIUM CHLORIDE 1000 ML: 9 INJECTION, SOLUTION INTRAVENOUS at 14:25

## 2017-12-27 ASSESSMENT — ENCOUNTER SYMPTOMS
COUGH: 0
VOMITING: 1
ABDOMINAL PAIN: 1
SORE THROAT: 1
CHILLS: 1
NAUSEA: 1
SHORTNESS OF BREATH: 0
HEADACHES: 1
BACK PAIN: 1
DIARRHEA: 0
FEVER: 0

## 2017-12-27 NOTE — ED PROVIDER NOTES
"  History     Chief Complaint:  abnormal labs (elevated transaminase in clinic)    HPI   Ronnie Lagos is a 62 year old male who presents per PCP's recommendation for evaluation of abnormal labs. The patient had routine yearly checkup one month ago at which time he had labs drawn showing slightly elevated ALT at 87, as well as hypertriglyceridemia, negative hep C, and normal PSA. This was rechecked in clinic earlier today showing elevated transaminase, as noted below. PCP called patient and recommended he present for evaluation. The patient reports some nausea, vomiting, subjective chills, \"aching\" epigastric abdominal pain, and mild sore throat starting on [place] day (two days ago). He also notes 5-7 days of \"dark\" urine and mild diffuse headache. No measured fevers, diarrhea, pruritus, jaundice, rash, leg pain or swelling, chest pain, or dyspnea. He also endorses some mild chronic low back pain due to frequent lifting at work (), for which he has taken small quantities of ibuprofen. No recent tylenol abuse/overuse. These symptoms differ from his typical GERD and post-cholecystectomy pains with eating greasy/fatty foods. No further complaints voiced. Of note, patient's father  of liver cancer and also had prostate cancer.    Regarding his alcohol use, patient states, \"I probably drink too much beer... I like beer.\" He states he only drinks on days he does not work, \"about 4/day on weekdays and up to 8/day on weekends.\"      Performed in clinic earlier today:  LFTs: d-bili 1.0 (H), t-bili 2.3 (H),  (H),  (H), o/w WNL    Performed in clinic on 18:  CMP: d-bili 1.3 (H), ALT 87 (H), AST 44 (WNL), gluc 102 (H), o/w WNL  Lipid panel: triglycerides 161 (H), o/w WNL  Hepatitis C: negative  PSA: 0.96 (WNL)       Allergies:  no known drug allergies     Medications:    Amlodipine  Atenolol  omeprazole     Past Medical History:    Hypertension   GERD  Cerebral artery occlusion " "with cerebral infarction   Lumbago  Osteoarthritis     Past Surgical History:    Cholecystectomy, 2015  Orthopedic surgeries     Family History:    Ca, prostate (father)  Ca, liver (father  of)  Ca, colorectal (mother)  Hypertension (parents    Social History:  Passive tobacco exposure. Occasional drinker.  Marital Status:   [2]    Review of Systems   Constitutional: Positive for chills (subjective, resolved). Negative for fever.   HENT: Positive for sore throat.    Respiratory: Negative for cough and shortness of breath.    Cardiovascular: Negative for chest pain and leg swelling.   Gastrointestinal: Positive for abdominal pain, nausea and vomiting. Negative for diarrhea.   Musculoskeletal: Positive for back pain. Negative for gait problem.   Skin: Negative.    Neurological: Positive for headaches.   All other systems reviewed and are negative.        Physical Exam     Patient Vitals for the past 24 hrs:   BP Temp Temp src Heart Rate Resp SpO2 Height Weight   17 1605 144/87 - - 59 16 97 % - -   17 1522 141/79 - - 61 - 96 % - -   17 1402 (!) 157/103 97.4  F (36.3  C) Oral 67 18 97 % 1.753 m (5' 9\") 93 kg (205 lb)        Physical Exam  Nursing note and vitals reviewed.  Constitutional:  Appears well-developed and well-nourished.   HENT:   Head:    Atraumatic.   Mouth/Throat:   Oropharynx is clear and moist. No oropharyngeal exudate.   Eyes:    Pupils are equal, round, and reactive to light.   Neck:    Normal range of motion. Neck supple.      No tracheal deviation present. No thyromegaly present.   Cardiovascular:  Normal rate, regular rhythm, no murmur   Pulmonary/Chest: Breath sounds are clear and equal without wheezes or crackles.  Abdominal:   Soft. Bowel sounds are normal. Exhibits no distension and no mass. There is no tenderness. No hepatomegaly.     There is no rebound and no guarding.   Musculoskeletal:  Exhibits no edema.   Lymphadenopathy:  No cervical adenopathy. "   Neurological:   Alert and oriented to person, place, and time.   Skin:    Skin is warm and dry. No rash noted. No pallor.      Emergency Department Course   Imaging:  Radiographic findings were communicated with the patient and family who voiced understanding of the findings.  US Abdomen limited RUQ:  1. Liver parenchyma is hyperechoic, suggesting hepatic steatosis.  2. Postoperative changes of cholecystectomy. Results per Radiology.     Laboratory:  Laboratory findings were communicated with the patient and family who voiced understanding of the findings.  Lipase: 179 (WNL)   CBC w/diff: Plt 142 (L), ALC 0.5 (L), o/w WNL (WBC 4.4, Hgb 16.2)  BMP: specimen slightly hemolyzed, o/w WNL (Cr 0.97)  INR: 0.93   Hepatitis A Antibody IgG: pending  Hepatitis B surface antigen: pending  Hepatitis B surface jennifer immune: pending    Interventions:  1425: Normal Saline 0.9% 1L, IV     Emergency Department Course:  Past medical records, nursing notes, and vitals reviewed.   1405: I performed an exam of the patient as documented above.    Peripheral IV access established. Blood drawn and sent. The above labs were obtained. Results above.  The patient was given the above interventions with improvement.  1555: Discussed the case with Dr. Sorenson of Gastroenterology  1600: I rechecked patient. Clinical findings and plan explained to the Patient and spouse. Patient discharged home with instructions regarding supportive care, medications, and reasons to return as well as the importance of close follow-up were reviewed.     Impression & Plan    Medical Decision Making:  I found patient to have acute hepatitis of unclear etiology. I feel that this is likely alcohol-related or due to viral hepatitis, so viral hepatitis studies were sent and I consulted Dr. Sorenson of Gastroenterology who will see patient tomorrow or the next day in the afternoon for follow up, and follow up on the lab testing. His ultrasound was performed and showed signs  of a fatty liver but no sign of liver cancer or biliary obstruction. He appears well appearing here and I felt he could be safely discharged to home for outpatient management. He was instructed on sign and symptoms for which to return.    Diagnosis:    ICD-10-CM    1. Acute hepatitis B17.9        Disposition:  discharged to home    Discharge Medications:  New Prescriptions    ONDANSETRON (ZOFRAN ODT) 4 MG ODT TAB    Take 1 tablet (4 mg) by mouth every 4 hours as needed for nausea         Jt Wiggins  12/27/2017    EMERGENCY DEPARTMENT  I, Jt Wiggins, am serving as a scribe at 2:05 PM on 12/27/2017 to document services personally performed by Ladan Montes De Oca MD based on my observations and the provider's statements to me.         Ladan Montes De Oca MD  12/27/17 [id number]

## 2017-12-27 NOTE — ED AVS SNAPSHOT
Emergency Department    [address] [place] [address]    Phone:  [phone]    Fax:  [phone]                                       Ronnie Lagos   MRN: [id number]    Department:   Emergency Department   Date of Visit:  12/27/2017           After Visit Summary Signature Page     I have received my discharge instructions, and my questions have been answered. I have discussed any challenges I see with this plan with the nurse or doctor.    ..........................................................................................................................................  Patient/Patient Representative Signature      ..........................................................................................................................................  Patient Representative Print Name and Relationship to Patient    ..................................................               ................................................  Date                                            Time    ..........................................................................................................................................  Reviewed by Signature/Title    ...................................................              ..............................................  Date                                                            Time

## 2017-12-27 NOTE — TELEPHONE ENCOUNTER
I called and informed Mr. Lagos about the results of his liver function tests which have dramatically increased since his last blood check 4 weeks ago.  Arabella tells me that he has had increasing difficulty with back pain nausea upset stomach dehydration and dark urine for the last 5-7 days.  States his symptoms today are slightly worse than they have been and is just not overall feeling well.  Based on the dramatic change in his liver function profile with his ALT and AST ratio as well as a slightly elevated bilirubin I recommended that he seek care in the emergency room for potential imaging studies and further assessment to rule out any acute hepatic source for his symptoms.  I discussed this with the patient in depth he is agreeable and he will proceed to the emergency room for assessment

## 2017-12-27 NOTE — ED AVS SNAPSHOT
Emergency Department    [address] [facility] [address]    Phone:  [phone]    Fax:  [phone]                                       Ronnie Lagos   MRN: [id number]    Department:   Emergency Department   Date of Visit:  12/27/2017           Patient Information     Date Of Birth          1955        Your diagnoses for this visit were:     Acute hepatitis        You were seen by Ladan Montes De Oca MD.      Follow-up Information     Follow up with Christiano Sorenson MD.    Specialty:  Gastroenterology    Why:  call to schedule an appointment for tomorrow afternoon or the next day for follow-up    Contact information:    [facility]  [address] [place] [address] [place] [address] [phone]          Follow up with  Emergency Department.    Specialty:  EMERGENCY MEDICINE    Why:  As needed, If symptoms worsen or for fever or recurrent vomiting.    Contact information:    [address] [facility] [address] [phone]        Discharge Instructions       Stop drinking alcohol.    Do not take Acetaminophen/Tylenol/Ibuprofen/Advil    Discharge References/Attachments     HEPATITIS, CAUSE UNKNOWN (TEST PENDING) (ENGLISH)      Future Appointments        Provider Department Dept Phone Center    12/28/2017 3:40 PM Abdiel Jones MD [facility] [phone]     2/1/2018 9:00 AM Burton Park MD [facility] [phone]       24 Hour Appointment Hotline       To make an appointment at any [facility], call [phone] ([phone]). If you don't have a family doctor or clinic, we will help you find one. [facility] are conveniently located to serve the needs of you and your family.             Review of your medicines      START taking        Dose / Directions Last dose taken    ondansetron 4 MG ODT tab   Commonly known as:  ZOFRAN ODT   Dose:  4 mg   Quantity:  15 tablet        Take 1  tablet (4 mg) by mouth every 4 hours as needed for nausea   Refills:  0          Our records show that you are taking the medicines listed below. If these are incorrect, please call your family doctor or clinic.        Dose / Directions Last dose taken    amLODIPine 5 MG tablet   Commonly known as:  NORVASC   Dose:  1 tablet        Take 1 tablet by mouth daily   Refills:  3        atenolol 50 MG tablet   Commonly known as:  TENORMIN   Dose:  50 mg   Quantity:  90 tablet        Take 1 tablet (50 mg) by mouth daily   Refills:  3        omeprazole 20 MG CR capsule   Commonly known as:  priLOSEC   Dose:  20 mg   Quantity:  90 capsule        Take 1 capsule (20 mg) by mouth daily   Refills:  3                Prescriptions were sent or printed at these locations (1 Prescription)                   Other Prescriptions                Printed at Department/Unit printer (1 of 1)         ondansetron (ZOFRAN ODT) 4 MG ODT tab                Procedures and tests performed during your visit     Abdomen US, limited (RUQ only)    Basic metabolic panel    CBC with platelets + differential    Hepatitis A Antibody IgG    Hepatitis A antibody IgM    Hepatitis B surface jennifer immune s    Hepatitis B surface antigen    INR    Lipase      Orders Needing Specimen Collection     None      Pending Results     Date and Time Order Name Status Description    12/27/2017 1421 Hepatitis A antibody IgM In process     12/27/2017 1420 Hepatitis A Antibody IgG In process     12/27/2017 1420 Hepatitis B surface antigen In process             Pending Culture Results     No orders found from 12/25/2017 to 12/28/2017.            Pending Results Instructions     If you had any lab results that were not finalized at the time of your Discharge, you can call the ED Lab Result RN at [phone]. You will be contacted by this team for any positive Lab results or changes in treatment. The nurses are available 7 days a week from 10A to 6:30P.  You can leave a  message 24 hours per day and they will return your call.        Test Results From Your Hospital Stay        12/27/2017  3:47 PM      Narrative     ULTRASOUND ABDOMEN LIMITED   12/27/2017 3:19 PM     HISTORY: Check for biliary obstruction or liver mass or abscess or  hepatitis, elevated liver enzymes new from 1 month ago, and abdominal  discomfort and nausea.     COMPARISON: 4/18/2015.    FINDINGS: The liver parenchyma is hyperechoic, suggesting hepatic  steatosis. Postoperative changes of cholecystectomy. No intra or  extrahepatic bile duct dilatation. Common hepatic duct is normal in  diameter. Limited evaluation of the right kidney is unremarkable.  Pancreas is partially obscured by overlying bowel gas, but appears  normal where seen. The abdominal aorta and IVC are of normal caliber  where visualized.        Impression     IMPRESSION:   1. Liver parenchyma is hyperechoic, suggesting hepatic steatosis.  2. Postoperative changes of cholecystectomy.    WARD MAY,          12/27/2017  2:58 PM      Component Results     Component Value Ref Range & Units Status    Lipase 179 73 - 393 U/L Final         12/27/2017  2:34 PM         12/27/2017  2:34 PM         12/27/2017  2:42 PM      Component Results     Component Value Ref Range & Units Status    WBC 4.4 4.0 - 11.0 10e9/L Final    RBC Count 4.99 4.4 - 5.9 10e12/L Final    Hemoglobin 16.2 13.3 - 17.7 g/dL Final    Hematocrit 45.2 40.0 - 53.0 % Final    MCV 91 78 - 100 fl Final    MCH 32.5 26.5 - 33.0 pg Final    MCHC 35.8 31.5 - 36.5 g/dL Final    RDW 12.8 10.0 - 15.0 % Final    Platelet Count 142 (L) 150 - 450 10e9/L Final    Diff Method Automated Method  Final    % Neutrophils 74.9 % Final    % Lymphocytes 11.9 % Final    % Monocytes 10.7 % Final    % Eosinophils 2.1 % Final    % Basophils 0.2 % Final    % Immature Granulocytes 0.2 % Final    Nucleated RBCs 0 0 /100 Final    Absolute Neutrophil 3.3 1.6 - 8.3 10e9/L Final    Absolute Lymphocytes 0.5 (L) 0.8 -  5.3 10e9/L Final    Absolute Monocytes 0.5 0.0 - 1.3 10e9/L Final    Absolute Eosinophils 0.1 0.0 - 0.7 10e9/L Final    Absolute Basophils 0.0 0.0 - 0.2 10e9/L Final    Abs Immature Granulocytes 0.0 0 - 0.4 10e9/L Final    Absolute Nucleated RBC 0.0  Final         12/27/2017  2:58 PM      Component Results     Component Value Ref Range & Units Status    Sodium 134 133 - 144 mmol/L Final    Potassium 4.2 3.4 - 5.3 mmol/L Final    Specimen slightly hemolyzed, potassium may be falsely elevated    Chloride 101 94 - 109 mmol/L Final    Carbon Dioxide 27 20 - 32 mmol/L Final    Anion Gap 6 3 - 14 mmol/L Final    Glucose 96 70 - 99 mg/dL Final    Urea Nitrogen 14 7 - 30 mg/dL Final    Creatinine 0.97 0.66 - 1.25 mg/dL Final    GFR Estimate 79 >60 mL/min/1.7m2 Final    Non  GFR Calc    GFR Estimate If Black >90 >60 mL/min/1.7m2 Final    African American GFR Calc    Calcium 9.0 8.5 - 10.1 mg/dL Final         12/27/2017  3:09 PM      Component Results     Component Value Ref Range & Units Status    INR 0.93 0.86 - 1.14 Final         12/27/2017  4:09 PM                Clinical Quality Measure: Blood Pressure Screening     Your blood pressure was checked while you were in the emergency department today. The last reading we obtained was  BP: 144/87 . Please read the guidelines below about what these numbers mean and what you should do about them.  If your systolic blood pressure (the top number) is less than 120 and your diastolic blood pressure (the bottom number) is less than 80, then your blood pressure is normal. There is nothing more that you need to do about it.  If your systolic blood pressure (the top number) is 120-139 or your diastolic blood pressure (the bottom number) is 80-89, your blood pressure may be higher than it should be. You should have your blood pressure rechecked within a year by a primary care provider.  If your systolic blood pressure (the top number) is 140 or greater or your diastolic  blood pressure (the bottom number) is 90 or greater, you may have high blood pressure. High blood pressure is treatable, but if left untreated over time it can put you at risk for heart attack, stroke, or kidney failure. You should have your blood pressure rechecked by a primary care provider within the next 4 weeks.  If your provider in the emergency department today gave you specific instructions to follow-up with your doctor or provider even sooner than that, you should follow that instruction and not wait for up to 4 weeks for your follow-up visit.        Thank you for choosing [facility]       Thank you for choosing [facility] for your care. Our goal is always to provide you with excellent care. Hearing back from our patients is one way we can continue to improve our services. Please take a few minutes to complete the written survey that you may receive in the mail after you visit with us. Thank you!        [employer] Information     [employer] gives you secure access to your electronic health record. If you see a primary care provider, you can also send messages to your care team and make appointments. If you have questions, please call your primary care clinic.  If you do not have a primary care provider, please call [phone] and they will assist you.        Care EveryWhere ID     This is your Care EveryWhere ID. This could be used by other organizations to access your [facility] medical records  [id number]        Equal Access to Services     PHILL LLANOS : Isaac De La Torre, waaxda luqadaha, qaybta kaalmada adekatey, jaden rivera. So [facility] [place] [phone].    ATENCIÓN: Si habla español, tiene a rosen disposición servicios gratuitos de asistencia lingüística. Llame al [phone].    We comply with applicable federal civil rights laws and [place] laws. We do not discriminate on the basis of race, color, national origin, age, disability, sex, sexual orientation, or gender  identity.            After Visit Summary       This is your record. Keep this with you and show to your community pharmacist(s) and doctor(s) at your next visit.

## 2017-12-28 LAB
HAV IGG SER QL IA: NONREACTIVE
HAV IGM SERPL QL IA: NONREACTIVE
HBV SURFACE AG SERPL QL IA: NONREACTIVE

## 2018-01-12 ENCOUNTER — HOSPITAL ENCOUNTER (EMERGENCY)
Facility: CLINIC | Age: 63
Discharge: HOME | End: 2018-01-12
Attending: EMERGENCY MEDICINE | Admitting: EMERGENCY MEDICINE
Payer: COMMERCIAL

## 2018-01-12 ENCOUNTER — APPOINTMENT (OUTPATIENT)
Dept: GENERAL RADIOLOGY | Facility: CLINIC | Age: 63
End: 2018-01-12
Attending: EMERGENCY MEDICINE
Payer: COMMERCIAL

## 2018-01-12 ENCOUNTER — APPOINTMENT (OUTPATIENT)
Dept: ULTRASOUND IMAGING | Facility: CLINIC | Age: 63
End: 2018-01-12
Attending: EMERGENCY MEDICINE
Payer: COMMERCIAL

## 2018-01-12 VITALS
TEMPERATURE: 97.1 F | DIASTOLIC BLOOD PRESSURE: 77 MMHG | HEIGHT: 69 IN | OXYGEN SATURATION: 100 % | RESPIRATION RATE: 20 BRPM | SYSTOLIC BLOOD PRESSURE: 140 MMHG | WEIGHT: 205 LBS | BODY MASS INDEX: 30.36 KG/M2

## 2018-01-12 DIAGNOSIS — R10.13 EPIGASTRIC PAIN: ICD-10-CM

## 2018-01-12 DIAGNOSIS — R74.8 ELEVATED LIVER ENZYMES: ICD-10-CM

## 2018-01-12 LAB
ALBUMIN SERPL-MCNC: 3.8 G/DL (ref 3.4–5)
ALP SERPL-CCNC: 183 U/L (ref 40–150)
ALT SERPL W P-5'-P-CCNC: 556 U/L (ref 0–70)
ANION GAP SERPL CALCULATED.3IONS-SCNC: 6 MMOL/L (ref 3–14)
AST SERPL W P-5'-P-CCNC: 1030 U/L (ref 0–45)
BASOPHILS # BLD AUTO: 0 10E9/L (ref 0–0.2)
BASOPHILS NFR BLD AUTO: 0.2 %
BILIRUB SERPL-MCNC: 1.8 MG/DL (ref 0.2–1.3)
BUN SERPL-MCNC: 14 MG/DL (ref 7–30)
CALCIUM SERPL-MCNC: 8.6 MG/DL (ref 8.5–10.1)
CHLORIDE SERPL-SCNC: 102 MMOL/L (ref 94–109)
CO2 SERPL-SCNC: 30 MMOL/L (ref 20–32)
CREAT SERPL-MCNC: 1 MG/DL (ref 0.66–1.25)
DIFFERENTIAL METHOD BLD: ABNORMAL
EOSINOPHIL # BLD AUTO: 0.1 10E9/L (ref 0–0.7)
EOSINOPHIL NFR BLD AUTO: 0.8 %
ERYTHROCYTE [DISTWIDTH] IN BLOOD BY AUTOMATED COUNT: 12.2 % (ref 10–15)
GFR SERPL CREATININE-BSD FRML MDRD: 76 ML/MIN/1.7M2
GLUCOSE SERPL-MCNC: 127 MG/DL (ref 70–99)
HCT VFR BLD AUTO: 42 % (ref 40–53)
HGB BLD-MCNC: 15 G/DL (ref 13.3–17.7)
IMM GRANULOCYTES # BLD: 0 10E9/L (ref 0–0.4)
IMM GRANULOCYTES NFR BLD: 0.2 %
LIPASE SERPL-CCNC: 243 U/L (ref 73–393)
LYMPHOCYTES # BLD AUTO: 0.5 10E9/L (ref 0.8–5.3)
LYMPHOCYTES NFR BLD AUTO: 8.1 %
MCH RBC QN AUTO: 31.9 PG (ref 26.5–33)
MCHC RBC AUTO-ENTMCNC: 35.7 G/DL (ref 31.5–36.5)
MCV RBC AUTO: 89 FL (ref 78–100)
MONOCYTES # BLD AUTO: 0.3 10E9/L (ref 0–1.3)
MONOCYTES NFR BLD AUTO: 4.1 %
NEUTROPHILS # BLD AUTO: 5.2 10E9/L (ref 1.6–8.3)
NEUTROPHILS NFR BLD AUTO: 86.6 %
NRBC # BLD AUTO: 0 10*3/UL
NRBC BLD AUTO-RTO: 0 /100
PLATELET # BLD AUTO: 191 10E9/L (ref 150–450)
POTASSIUM SERPL-SCNC: 3.6 MMOL/L (ref 3.4–5.3)
PROT SERPL-MCNC: 6.9 G/DL (ref 6.8–8.8)
RBC # BLD AUTO: 4.7 10E12/L (ref 4.4–5.9)
SODIUM SERPL-SCNC: 138 MMOL/L (ref 133–144)
TROPONIN I SERPL-MCNC: <0.015 UG/L (ref 0–0.04)
WBC # BLD AUTO: 6 10E9/L (ref 4–11)

## 2018-01-12 PROCEDURE — 99285 EMERGENCY DEPT VISIT HI MDM: CPT | Mod: 25

## 2018-01-12 PROCEDURE — 96374 THER/PROPH/DIAG INJ IV PUSH: CPT

## 2018-01-12 PROCEDURE — 76705 ECHO EXAM OF ABDOMEN: CPT

## 2018-01-12 PROCEDURE — 25000125 ZZHC RX 250: Performed by: EMERGENCY MEDICINE

## 2018-01-12 PROCEDURE — 71046 X-RAY EXAM CHEST 2 VIEWS: CPT

## 2018-01-12 PROCEDURE — 93005 ELECTROCARDIOGRAM TRACING: CPT

## 2018-01-12 PROCEDURE — 80053 COMPREHEN METABOLIC PANEL: CPT | Performed by: EMERGENCY MEDICINE

## 2018-01-12 PROCEDURE — 83690 ASSAY OF LIPASE: CPT | Performed by: EMERGENCY MEDICINE

## 2018-01-12 PROCEDURE — 25000128 H RX IP 250 OP 636: Performed by: EMERGENCY MEDICINE

## 2018-01-12 PROCEDURE — 25000132 ZZH RX MED GY IP 250 OP 250 PS 637: Performed by: EMERGENCY MEDICINE

## 2018-01-12 PROCEDURE — 85025 COMPLETE CBC W/AUTO DIFF WBC: CPT | Performed by: EMERGENCY MEDICINE

## 2018-01-12 PROCEDURE — 84484 ASSAY OF TROPONIN QUANT: CPT | Performed by: EMERGENCY MEDICINE

## 2018-01-12 RX ORDER — ONDANSETRON 2 MG/ML
4 INJECTION INTRAMUSCULAR; INTRAVENOUS ONCE
Status: COMPLETED | OUTPATIENT
Start: 2018-01-12 | End: 2018-01-12

## 2018-01-12 RX ADMIN — LIDOCAINE HYDROCHLORIDE 30 ML: 20 SOLUTION ORAL; TOPICAL at 04:50

## 2018-01-12 RX ADMIN — ONDANSETRON 4 MG: 2 INJECTION INTRAMUSCULAR; INTRAVENOUS at 04:24

## 2018-01-12 ASSESSMENT — ENCOUNTER SYMPTOMS
BACK PAIN: 1
NAUSEA: 1
VOMITING: 1
ABDOMINAL PAIN: 1

## 2018-01-12 NOTE — DISCHARGE INSTRUCTIONS
Epigastric Pain (Uncertain Cause)     Epigastric pain can be a sign of disease in the upper abdomen. Common causes include:    Acid reflux (stomach acid flowing up into the esophagus)    Gastritis (irritation of the stomach lining)    Peptic Ulcer Disease    Inflammation of the pancreas    Gallstone    Infection in the gallbladder  Pain may be dull or burning. It may spread upward to the chest or to the back. There may be other symptoms such as belching, bloating, cramps or hunger pains. There may be weight loss or poor appetite, nausea or vomiting.  Since the diagnosis of your pain is not certain yet, further tests may sometimes be needed. Sometimes the doctor will treat you for the most likely condition to see if there is improvement before doing further tests.  Home care  Medicines    Antacids help neutralize the normal acids in your stomach. Examples are Maalox, Mylanta, Rolaids, and Tums. If you don t like the liquid, you can also try a chewable one. You may find one works better than another for you. Overuse can cause diarrhea or constipation.    Acid blockers (H2 blockers) decrease acid production. Examples are cimetidine (Tagamet), famotidine (Pepcid) and ranitidine (Zantac).    Acid inhibitors (PPIs) decrease acid production in a different way than the blockers. You may find they work better, but can take a little longer to take effect.  Examples are omeprazole (Prilosec), lansoprazole (Prevacid), pantoprazole (Protonix), rabeprazole (Aciphex), and esomeprazole (Nexium).    Take an antacid 30-60 minutes after eating and at bedtime, but not at the same time as an acid blocker.    Try not to take NSAIDs. Aspirin may also cause problems, but if taking it for your heart or other medical reasons, talk to your doctor before stopping it; you do not want to cause a worse problem, like a heart attack or stroke.  Diet    If certain foods seem to cause your spasm, try to avoid them.     Eat slowly and chew food well  before swallowing. Symptoms of gastritis can be worsened by certain foods. Limit or avoid fatty, fried, and spicy foods, as well as coffee, chocolate, mint, and foods with high acid content such as tomatoes and citrus fruit and juices (orange, grapefruit, lemon).    Avoid alcohol, caffeine, and tobacco, which can delay healing and worsen your problem.    Try eating smaller meals with snacks in between  Follow-up care  Follow up with your healthcare provider or as advised.  When to seek medical advice  Call your healthcare provider right away if any of the following occur:    Stomach pain worsens or moves to the right lower part of the abdomen    Chest pain appears, or if it worsens or spreads to the chest, back, neck, shoulder, or arm    Frequent vomiting (can t keep down liquids)    Blood in the stool or vomit (red or black color)    Feeling weak or dizzy, fainting, or having trouble breathing    Fever of 100.4 F (38 C) or higher, or as directed by your healthcare provider    Abdominal swelling  Date Last Reviewed: 9/25/2015 2000-2017 [employer]. [address] [place] [address], [place] [address]. All rights reserved. This information is not intended as a substitute for professional medical care. Always follow your healthcare professional's instructions.

## 2018-01-12 NOTE — ED NOTES
DATE:  1/12/2018   TIME OF RECEIPT FROM LAB:  0455  LAB TEST:  ALT  LAB VALUE:  556  LAB TEST:  AST   LAB VALUE: 1,030  RESULTS GIVEN WITH READ-BACK TO (PROVIDER):  Dr Juarez  TIME LAB VALUE REPORTED TO PROVIDER:   0450

## 2018-01-12 NOTE — ED AVS SNAPSHOT
Emergency Department    [address] [facility] [address]    Phone:  [phone]    Fax:  [phone]                                       Ronnie Lagos   MRN: [id number]    Department:   Emergency Department   Date of Visit:  1/12/2018           Patient Information     Date Of Birth          1955        Your diagnoses for this visit were:     Epigastric pain     Elevated liver enzymes        You were seen by Hunter Juarez MD.      Follow-up Information     Follow up with Christiano Sorenson MD Today.    Specialty:  Gastroenterology    Contact information:    [facility]  [address] [place] [id number] [phone]          Discharge Instructions         Epigastric Pain (Uncertain Cause)     Epigastric pain can be a sign of disease in the upper abdomen. Common causes include:    Acid reflux (stomach acid flowing up into the esophagus)    Gastritis (irritation of the stomach lining)    Peptic Ulcer Disease    Inflammation of the pancreas    Gallstone    Infection in the gallbladder  Pain may be dull or burning. It may spread upward to the chest or to the back. There may be other symptoms such as belching, bloating, cramps or hunger pains. There may be weight loss or poor appetite, nausea or vomiting.  Since the diagnosis of your pain is not certain yet, further tests may sometimes be needed. Sometimes the doctor will treat you for the most likely condition to see if there is improvement before doing further tests.  Home care  Medicines    Antacids help neutralize the normal acids in your stomach. Examples are Maalox, Mylanta, Rolaids, and Tums. If you don t like the liquid, you can also try a chewable one. You may find one works better than another for you. Overuse can cause diarrhea or constipation.    Acid blockers (H2 blockers) decrease acid production. Examples are cimetidine (Tagamet), famotidine (Pepcid) and ranitidine (Zantac).    Acid inhibitors (PPIs)  decrease acid production in a different way than the blockers. You may find they work better, but can take a little longer to take effect.  Examples are omeprazole (Prilosec), lansoprazole (Prevacid), pantoprazole (Protonix), rabeprazole (Aciphex), and esomeprazole (Nexium).    Take an antacid 30-60 minutes after eating and at bedtime, but not at the same time as an acid blocker.    Try not to take NSAIDs. Aspirin may also cause problems, but if taking it for your heart or other medical reasons, talk to your doctor before stopping it; you do not want to cause a worse problem, like a heart attack or stroke.  Diet    If certain foods seem to cause your spasm, try to avoid them.     Eat slowly and chew food well before swallowing. Symptoms of gastritis can be worsened by certain foods. Limit or avoid fatty, fried, and spicy foods, as well as coffee, chocolate, mint, and foods with high acid content such as tomatoes and citrus fruit and juices (orange, grapefruit, lemon).    Avoid alcohol, caffeine, and tobacco, which can delay healing and worsen your problem.    Try eating smaller meals with snacks in between  Follow-up care  Follow up with your healthcare provider or as advised.  When to seek medical advice  Call your healthcare provider right away if any of the following occur:    Stomach pain worsens or moves to the right lower part of the abdomen    Chest pain appears, or if it worsens or spreads to the chest, back, neck, shoulder, or arm    Frequent vomiting (can t keep down liquids)    Blood in the stool or vomit (red or black color)    Feeling weak or dizzy, fainting, or having trouble breathing    Fever of 100.4 F (38 C) or higher, or as directed by your healthcare provider    Abdominal swelling  Date Last Reviewed: 9/25/2015 2000-2017 [employer]. [address] [place] [address], [place] [address]. All rights reserved. This information is not intended as a substitute for professional medical care.  Always follow your healthcare professional's instructions.          Future Appointments        Provider Department Dept Phone Center    2/1/2018 9:00 AM Burton [place] MD [facility] [phone]       24 Hour Appointment Hotline       To make an appointment at any [facility] call [phone] ([phone]). If you don't have a family doctor or clinic, we will help you find one. [facility] are conveniently located to serve the needs of you and your family.             Review of your medicines      Our records show that you are taking the medicines listed below. If these are incorrect, please call your family doctor or clinic.        Dose / Directions Last dose taken    amLODIPine 5 MG tablet   Commonly known as:  NORVASC   Dose:  1 tablet        Take 1 tablet by mouth daily   Refills:  3        atenolol 50 MG tablet   Commonly known as:  TENORMIN   Dose:  50 mg   Quantity:  90 tablet        Take 1 tablet (50 mg) by mouth daily   Refills:  3        IBUPROFEN PO        Refills:  0        omeprazole 20 MG CR capsule   Commonly known as:  priLOSEC   Dose:  20 mg   Quantity:  90 capsule        Take 1 capsule (20 mg) by mouth daily   Refills:  3        ondansetron 4 MG ODT tab   Commonly known as:  ZOFRAN ODT   Dose:  4 mg   Quantity:  15 tablet        Take 1 tablet (4 mg) by mouth every 4 hours as needed for nausea   Refills:  0                Procedures and tests performed during your visit     CBC with platelets differential    Comprehensive metabolic panel    EKG 12-lead, tracing only    Lipase    Troponin I    US Abdomen Limited    XR Chest 2 Views      Orders Needing Specimen Collection     None      Pending Results     Date and Time Order Name Status Description    1/12/2018 0456 US Abdomen Limited Preliminary     1/12/2018 0338 EKG 12-lead, tracing only Preliminary             Pending Culture Results     No orders found from 1/10/2018 to 1/13/2018.             Pending Results Instructions     If you had any lab results that were not finalized at the time of your Discharge, you can call the ED Lab Result RN at [phone]. You will be contacted by this team for any positive Lab results or changes in treatment. The nurses are available 7 days a week from 10A to 6:30P.  You can leave a message 24 hours per day and they will return your call.        Test Results From Your Hospital Stay        1/12/2018  4:28 AM      Component Results     Component Value Ref Range & Units Status    WBC 6.0 4.0 - 11.0 10e9/L Final    RBC Count 4.70 4.4 - 5.9 10e12/L Final    Hemoglobin 15.0 13.3 - 17.7 g/dL Final    Hematocrit 42.0 40.0 - 53.0 % Final    MCV 89 78 - 100 fl Final    MCH 31.9 26.5 - 33.0 pg Final    MCHC 35.7 31.5 - 36.5 g/dL Final    RDW 12.2 10.0 - 15.0 % Final    Platelet Count 191 150 - 450 10e9/L Final    Diff Method Automated Method  Final    % Neutrophils 86.6 % Final    % Lymphocytes 8.1 % Final    % Monocytes 4.1 % Final    % Eosinophils 0.8 % Final    % Basophils 0.2 % Final    % Immature Granulocytes 0.2 % Final    Nucleated RBCs 0 0 /100 Final    Absolute Neutrophil 5.2 1.6 - 8.3 10e9/L Final    Absolute Lymphocytes 0.5 (L) 0.8 - 5.3 10e9/L Final    Absolute Monocytes 0.3 0.0 - 1.3 10e9/L Final    Absolute Eosinophils 0.1 0.0 - 0.7 10e9/L Final    Absolute Basophils 0.0 0.0 - 0.2 10e9/L Final    Abs Immature Granulocytes 0.0 0 - 0.4 10e9/L Final    Absolute Nucleated RBC 0.0  Final         1/12/2018  4:54 AM      Component Results     Component Value Ref Range & Units Status    Sodium 138 133 - 144 mmol/L Final    Potassium 3.6 3.4 - 5.3 mmol/L Final    Chloride 102 94 - 109 mmol/L Final    Carbon Dioxide 30 20 - 32 mmol/L Final    Anion Gap 6 3 - 14 mmol/L Final    Glucose 127 (H) 70 - 99 mg/dL Final    Urea Nitrogen 14 7 - 30 mg/dL Final    Creatinine 1.00 0.66 - 1.25 mg/dL Final    GFR Estimate 76 >60 mL/min/1.7m2 Final    Non  GFR Calc    GFR  Estimate If Black >90 >60 mL/min/1.7m2 Final    African American GFR Calc    Calcium 8.6 8.5 - 10.1 mg/dL Final    Bilirubin Total 1.8 (H) 0.2 - 1.3 mg/dL Final    Albumin 3.8 3.4 - 5.0 g/dL Final    Protein Total 6.9 6.8 - 8.8 g/dL Final    Alkaline Phosphatase 183 (H) 40 - 150 U/L Final     (HH) 0 - 70 U/L Final    Critical Value called to and read back by  SOO SCHILLING IN ED AT 0451 TJ      AST 1030 (HH) 0 - 45 U/L Final    Critical Value called to and read back by  SOO SCHILLING IN ED AT 0451 TJ           1/12/2018  4:42 AM      Component Results     Component Value Ref Range & Units Status    Lipase 243 73 - 393 U/L Final         1/12/2018  4:47 AM      Component Results     Component Value Ref Range & Units Status    Troponin I ES <0.015 0.000 - 0.045 ug/L Final    The 99th percentile for upper reference range is 0.045 ug/L.  Troponin values   in the range of 0.045 - 0.120 ug/L may be associated with risks of adverse   clinical events.           1/12/2018  5:35 AM      Narrative     CHEST 2 VIEWS  1/12/2018 5:15 AM     HISTORY: Chest pain and epigastric pain.    COMPARISON: 4/18/2015.    FINDINGS: The lungs are clear. Normal-sized cardiac silhouette.  Surgical clips in the upper right hemiabdomen likely related to prior  cholecystectomy.        Impression     IMPRESSION: No evidence of active cardiopulmonary disease.    KAY SIMPSON MD         1/12/2018  5:56 AM      Narrative     ULTRASOUND ABDOMEN LIMITED RIGHT UPPER QUADRANT  1/12/2018 5:46 AM     HISTORY: Abdominal pain and elevated transaminases. History of prior  cholecystectomy.    COMPARISON: 12/27/2017.    FINDINGS: Moderate diffuse increased hepatic echogenicity, consistent  with fatty infiltration. No hepatic masses. The gallbladder is not  visualized, consistent with the history of prior cholecystectomy. No  intra- or extrahepatic biliary dilatation. The common duct measures  0.8 cm in diameter. The right kidney has normal size and  echogenicity,  measuring 12.7 cm in length. No right intrarenal collecting system  dilatation, calculi or masses. No free fluid in the upper right  hemiabdomen.        Impression     IMPRESSION:  1. No biliary dilatation.  2. Moderate diffuse fatty infiltration of the liver.  3. Prior cholecystectomy.                Clinical Quality Measure: Blood Pressure Screening     Your blood pressure was checked while you were in the emergency department today. The last reading we obtained was  BP: 140/77 . Please read the guidelines below about what these numbers mean and what you should do about them.  If your systolic blood pressure (the top number) is less than 120 and your diastolic blood pressure (the bottom number) is less than 80, then your blood pressure is normal. There is nothing more that you need to do about it.  If your systolic blood pressure (the top number) is 120-139 or your diastolic blood pressure (the bottom number) is 80-89, your blood pressure may be higher than it should be. You should have your blood pressure rechecked within a year by a primary care provider.  If your systolic blood pressure (the top number) is 140 or greater or your diastolic blood pressure (the bottom number) is 90 or greater, you may have high blood pressure. High blood pressure is treatable, but if left untreated over time it can put you at risk for heart attack, stroke, or kidney failure. You should have your blood pressure rechecked by a primary care provider within the next 4 weeks.  If your provider in the emergency department today gave you specific instructions to follow-up with your doctor or provider even sooner than that, you should follow that instruction and not wait for up to 4 weeks for your follow-up visit.        Thank you for choosing [facility]       Thank you for choosing [facility] for your care. Our goal is always to provide you with excellent care. Hearing back from our patients is one way we can continue to  improve our services. Please take a few minutes to complete the written survey that you may receive in the mail after you visit with us. Thank you!        [email or web address] Information     [facility] gives you secure access to your electronic health record. If you see a primary care provider, you can also send messages to your care team and make appointments. If you have questions, please call your primary care clinic.  If you do not have a primary care provider, please call [phone] and they will assist you.        Care EveryWhere ID     This is your Care EveryWhere ID. This could be used by other organizations to access your [facility] medical records  [id number]        Equal Access to Services     PHILL LLANOS : Isaac De La Torre, chau manuel, jaden luu. So [facility] [phone].    ATENCIÓN: Si habla español, tiene a rosen disposición servicios gratuitos de asistencia lingüística. Llame al [phone].    We comply with applicable federal civil rights laws and [place] laws. We do not discriminate on the basis of race, color, national origin, age, disability, sex, sexual orientation, or gender identity.            After Visit Summary       This is your record. Keep this with you and show to your community pharmacist(s) and doctor(s) at your next visit.

## 2018-01-12 NOTE — ED AVS SNAPSHOT
Emergency Department    [address] [place] [address]    Phone:  [phone]    Fax:  [phone]                                       Ronnie Lagos   MRN: [id number]    Department:   Emergency Department   Date of Visit:  1/12/2018           After Visit Summary Signature Page     I have received my discharge instructions, and my questions have been answered. I have discussed any challenges I see with this plan with the nurse or doctor.    ..........................................................................................................................................  Patient/Patient Representative Signature      ..........................................................................................................................................  Patient Representative Print Name and Relationship to Patient    ..................................................               ................................................  Date                                            Time    ..........................................................................................................................................  Reviewed by Signature/Title    ...................................................              ..............................................  Date                                                            Time

## 2018-01-13 LAB — INTERPRETATION ECG - MUSE: NORMAL

## 2018-01-15 ENCOUNTER — MEDICAL CORRESPONDENCE (OUTPATIENT)
Dept: HEALTH INFORMATION MANAGEMENT | Facility: CLINIC | Age: 63
End: 2018-01-15

## 2018-01-16 DIAGNOSIS — R94.5 LIVER FUNCTION STUDY, ABNORMAL: ICD-10-CM

## 2018-01-16 DIAGNOSIS — R94.5 ABNORMAL RESULTS OF LIVER FUNCTION STUDIES: ICD-10-CM

## 2018-01-16 LAB — INR PPP: 0.93 (ref 0.86–1.14)

## 2018-01-16 PROCEDURE — 82104 ALPHA-1-ANTITRYPSIN PHENO: CPT | Mod: 90 | Performed by: INTERNAL MEDICINE

## 2018-01-16 PROCEDURE — 87340 HEPATITIS B SURFACE AG IA: CPT | Performed by: INTERNAL MEDICINE

## 2018-01-16 PROCEDURE — 86709 HEPATITIS A IGM ANTIBODY: CPT | Performed by: INTERNAL MEDICINE

## 2018-01-16 PROCEDURE — 99000 SPECIMEN HANDLING OFFICE-LAB: CPT | Performed by: INTERNAL MEDICINE

## 2018-01-16 PROCEDURE — 83516 IMMUNOASSAY NONANTIBODY: CPT | Mod: 90 | Performed by: INTERNAL MEDICINE

## 2018-01-16 PROCEDURE — 86038 ANTINUCLEAR ANTIBODIES: CPT | Performed by: INTERNAL MEDICINE

## 2018-01-16 PROCEDURE — 82390 ASSAY OF CERULOPLASMIN: CPT | Performed by: INTERNAL MEDICINE

## 2018-01-16 PROCEDURE — 86803 HEPATITIS C AB TEST: CPT | Performed by: INTERNAL MEDICINE

## 2018-01-16 PROCEDURE — 86706 HEP B SURFACE ANTIBODY: CPT | Performed by: INTERNAL MEDICINE

## 2018-01-16 PROCEDURE — 85610 PROTHROMBIN TIME: CPT | Performed by: INTERNAL MEDICINE

## 2018-01-16 PROCEDURE — 36415 COLL VENOUS BLD VENIPUNCTURE: CPT | Performed by: INTERNAL MEDICINE

## 2018-01-16 PROCEDURE — 82103 ALPHA-1-ANTITRYPSIN TOTAL: CPT | Mod: 90 | Performed by: INTERNAL MEDICINE

## 2018-01-16 NOTE — H&P (VIEW-ONLY)
History     Chief Complaint:  Abdominal Pain       HPI   Ronnie Lagos is a 62 year old male who presents to the emergency department today for evaluation of abdominal pain. Per chart review, the patient was seen and evaluated in the ED on 12/27/2018 for elevated transaminase. He obtained imaging and laboratory studies at that time, see results below. He was subsequently diagnosed with acute hepatitis and discharged to home. Patient returns to the ED today complaining of epigastric pain that radiates through his back. He reports the pain woke him up from sleep 2 hours PTA. Pain is similar to the pain he had prior to his previous cholecystectomy. Of note, patient saw seen by a GI specialist following his ED visit. He had labs drawn at that time showing AST of 73, ALT of 142, and a normal CRP. He is scheduled to have another blood drawn soon. No other concerns voiced at this time.     Imaging:  US Abdomen limited RUQ:  1. Liver parenchyma is hyperechoic, suggesting hepatic steatosis.  2. Postoperative changes of cholecystectomy. Results per Radiology.   Laboratory:  Performed in clinic earlier to ED presentation:  LFTs: d-bili 1.0 (H), t-bili 2.3 (H),  (H),  (H), o/w WNL  ED labs:  Lipase: 179 (WNL)   CBC w/diff: Plt 142 (L), ALC 0.5 (L), o/w WNL (WBC 4.4, Hgb 16.2)  BMP: specimen slightly hemolyzed, o/w WNL (Cr 0.97)  INR: 0.93   Hepatitis A Antibody IgG: Negative   Hepatitis B surface antigen: Negative   Hepatitis B surface jennifer immune: Negative      Allergies:  No Known Drug Allergies    Medications:    amLODIPine (NORVASC) 5 MG tablet  atenolol (TENORMIN) 50 MG tablet  omeprazole (PRILOSEC) 20 MG CR capsule  ondansetron (ZOFRAN ODT) 4 MG ODT tab     Past Medical History:    Essential hypertension, benign   GERD (gastroesophageal reflux disease)   Squamous cell carcinoma   Unspecified cerebral artery occlusion with cerebral infarction     Past Surgical History:    ARTHRODESIS FOOT   ELBOW  "SURGERY   LAPAROSCOPIC CHOLECYSTECTOMY   ORTHOPEDIC SURGERY-foot surgery , elbow, shoulder  SHOULDER SURGERY    Family History:    HTN  Colorectal cancer     Social History:  The patient presents alone.   Smoking Status: Never smoker  Smokeless Tobacco: Never used  Alcohol Use: Yes  Marital Status:   [2]     Review of Systems   Gastrointestinal: Positive for abdominal pain, nausea and vomiting.   Musculoskeletal: Positive for back pain.   All other systems reviewed and are negative.    Physical Exam     Physical Exam  Patient Vitals for the past 24 hrs:   BP Temp Temp src Heart Rate Resp SpO2 Height Weight   01/12/18 0336 163/75 97.1  F (36.2  C) Temporal 80 18 97 % 1.753 m (5' 9\") 93 kg (205 lb)     General: Alert and Interactive.   Head: No signs of trauma.   Mouth/Throat: Oropharynx is clear and moist.   Eyes: Conjunctivae are normal. Pupils are equal, round, and reactive to light.   Neck: Normal range of motion. No nuchal rigidity.   CV: Normal rate and regular rhythm.    Resp: Effort normal and breath sounds normal. No respiratory distress.   GI: Soft. There is no tenderness or guarding.   MSK: Normal range of motion. no edema.   Neuro: The patient is alert and oriented to person, place, and time.  PERRLA, EOMI, strength in upper/lower extremities normal and symmetrical.   Sensation normal. Speech normal.  GCS eye subscore is 4. GCS verbal subscore is 5. GCS motor subscore is 6.   Skin: Skin is warm and dry. No rash noted.   Psych: normal mood and affect. behavior is normal.     Emergency Department Course   ECG:  ECG taken at 0345, ECG read at 0345  Normal sinus rhythm  Minimal voltage criteria for LH, may be normal variant  Borderline ECG  Rate 72 bpm. IN interval 166. QRS duration 96. QT/QTc 370/405. P-R-T axes 45 -5 17.    Imaging:  Radiology findings were communicated with the patient who voiced understanding of the findings.    Chest XR 2 Views:  No evidence of active cardiopulmonary " disease.  Reading per radiology    US Abdomen Limited:  1. No biliary dilatation.  2. Moderate diffuse fatty infiltration of the liver.  3. Prior cholecystectomy.  Reading per radiology    Laboratory:  Laboratory findings were communicated with the patient who voiced understanding of the findings.    CBC: AWNL. (WBC 6.0, HGB 15.0, )     CMP: Glucose: 127(H), Bilirubin: 1.8(H), ALT: 556(HH), AST: 1030(HH), Alkphos: 183(H)WNL (Creatinine: 1.00)    Lipase: 243    Troponin (Collected 0350): <0.015    Interventions:  0424- Zofran 4 mg IV   0450- Xylocaine 30 mL Oral      Emergency Department Course:  Nursing notes and vitals reviewed.  I performed an exam of the patient as documented above.     IV was inserted and blood was drawn for laboratory testing, results above.    The patient was sent for a XR and US while in the emergency department, results above.     5:45 AM: I spoke with Dr. Sorenson of the GI service regarding patient's presentation, findings, and plan of care.     5:46AM: Patient is feeling improved and prefers to go home to follow up with Dr. Sorenson as an outpatient. Dr. Sorenson informed of this.      I discussed the treatment plan with the patient. They expressed understanding of this plan and consented to discharge.     Impression & Plan      Medical Decision Making:  This patient presented to the Emergency Department with epi-gastric abdominal Pain.  The clinical exam today is non-specific and non-focal and non-surgical.  The laboratory testing has returned remarkable for elevated liver enzymes which are continuing to increase when compared to previous values. His Lipase was 243. His did have improvement in condition after the GI cocktail and his pain was less than 3 out of 10.  The exact etiology of the abdominal pain is not clear.  The differential diagnosis of abdominal pain is: Appendicitis, Bowel Obstruction, Ulcer, Ischemia, Cholecystitis, Diverticulitis, Pancreatitis, UTI, Pyelonephritis,  Enteritis/Colitis, AAA amongst many other etiologies.     The history, physical exam, and results detect no life threatening cause at this time, nor do they indicate the patient is currently suffering from one of the previously mentioned conditions.  Unfortunately a clear exam and results, with the exception of LFTs, today do not ensure freedom from a severe disease process in the future-- even within hours, or the possibility that there is a dangerous process currently at work but currently undetected or undiagnosed.  For this reason the patient is advised to seek immediate re-evaluation in the the ED if there is a worsening of the condition but otherwise follow up with Dr. Sorenson as an outpatient. Patient in agreement with this plan.  All questions were answered,  will return for any change in condition, discharged in stable condition.     Diagnosis:    ICD-10-CM    1. Epigastric pain R10.13    2. Elevated liver enzymes R74.8          Disposition:   Findings and plan explained to the Patient. Patient discharged home with instructions regarding supportive care, medications, and reasons to return. The importance of close follow-up was reviewed.     Scribe Disclosure:  Debra GAYLE, am serving as a scribe at 3:48 AM on 1/12/2018 to document services personally performed by Hunter Juarez MD, based on my observations and the provider's statements to me.    1/12/2018    EMERGENCY DEPARTMENT       Hunter Juarez MD  01/12/18 2952

## 2018-01-17 LAB
ANA SER QL IF: NEGATIVE
HAV IGM SERPL QL IA: NONREACTIVE
HBV SURFACE AB SERPL IA-ACNC: 0.37 M[IU]/ML
HBV SURFACE AG SERPL QL IA: NONREACTIVE
HCV AB SERPL QL IA: NONREACTIVE

## 2018-01-18 ENCOUNTER — HOSPITAL ENCOUNTER (OUTPATIENT)
Facility: CLINIC | Age: 63
Discharge: HOME | End: 2018-01-18
Attending: INTERNAL MEDICINE | Admitting: INTERNAL MEDICINE
Payer: COMMERCIAL

## 2018-01-18 ENCOUNTER — SURGERY (OUTPATIENT)
Age: 63
End: 2018-01-18

## 2018-01-18 ENCOUNTER — ANESTHESIA EVENT (OUTPATIENT)
Dept: SURGERY | Facility: CLINIC | Age: 63
End: 2018-01-18
Payer: COMMERCIAL

## 2018-01-18 ENCOUNTER — ANESTHESIA (OUTPATIENT)
Dept: SURGERY | Facility: CLINIC | Age: 63
End: 2018-01-18
Payer: COMMERCIAL

## 2018-01-18 ENCOUNTER — APPOINTMENT (OUTPATIENT)
Dept: GENERAL RADIOLOGY | Facility: CLINIC | Age: 63
End: 2018-01-18
Attending: INTERNAL MEDICINE
Payer: COMMERCIAL

## 2018-01-18 VITALS
TEMPERATURE: 96.7 F | BODY MASS INDEX: 29.98 KG/M2 | DIASTOLIC BLOOD PRESSURE: 75 MMHG | OXYGEN SATURATION: 94 % | RESPIRATION RATE: 14 BRPM | WEIGHT: 203 LBS | SYSTOLIC BLOOD PRESSURE: 135 MMHG

## 2018-01-18 LAB
CERULOPLASMIN SERPL-MCNC: 36 MG/DL (ref 23–53)
ERCP: NORMAL
PLATELET # BLD AUTO: 233 10E9/L (ref 150–450)
SMA IGG SER-ACNC: 11 UNITS (ref 0–19)

## 2018-01-18 PROCEDURE — C1769 GUIDE WIRE: HCPCS | Performed by: INTERNAL MEDICINE

## 2018-01-18 PROCEDURE — 25000128 H RX IP 250 OP 636: Performed by: NURSE ANESTHETIST, CERTIFIED REGISTERED

## 2018-01-18 PROCEDURE — 36000056 ZZH SURGERY LEVEL 3 1ST 30 MIN: Performed by: INTERNAL MEDICINE

## 2018-01-18 PROCEDURE — 74330 X-RAY BILE/PANC ENDOSCOPY: CPT

## 2018-01-18 PROCEDURE — 71000013 ZZH RECOVERY PHASE 1 LEVEL 1 EA ADDTL HR: Performed by: INTERNAL MEDICINE

## 2018-01-18 PROCEDURE — 27210286 ZZH BALLOON ADDITIONAL: Performed by: INTERNAL MEDICINE

## 2018-01-18 PROCEDURE — 37000008 ZZH ANESTHESIA TECHNICAL FEE, 1ST 30 MIN: Performed by: INTERNAL MEDICINE

## 2018-01-18 PROCEDURE — 25000125 ZZHC RX 250: Performed by: NURSE ANESTHETIST, CERTIFIED REGISTERED

## 2018-01-18 PROCEDURE — 85049 AUTOMATED PLATELET COUNT: CPT | Performed by: INTERNAL MEDICINE

## 2018-01-18 PROCEDURE — C1887 CATHETER, GUIDING: HCPCS | Performed by: INTERNAL MEDICINE

## 2018-01-18 PROCEDURE — C2617 STENT, NON-COR, TEM W/O DEL: HCPCS | Performed by: INTERNAL MEDICINE

## 2018-01-18 PROCEDURE — 71000012 ZZH RECOVERY PHASE 1 LEVEL 1 FIRST HR: Performed by: INTERNAL MEDICINE

## 2018-01-18 PROCEDURE — 37000009 ZZH ANESTHESIA TECHNICAL FEE, EACH ADDTL 15 MIN: Performed by: INTERNAL MEDICINE

## 2018-01-18 PROCEDURE — 71000027 ZZH RECOVERY PHASE 2 EACH 15 MINS: Performed by: INTERNAL MEDICINE

## 2018-01-18 PROCEDURE — 36000058 ZZH SURGERY LEVEL 3 EA 15 ADDTL MIN: Performed by: INTERNAL MEDICINE

## 2018-01-18 PROCEDURE — 40000170 ZZH STATISTIC PRE-PROCEDURE ASSESSMENT II: Performed by: INTERNAL MEDICINE

## 2018-01-18 PROCEDURE — 36415 COLL VENOUS BLD VENIPUNCTURE: CPT | Performed by: INTERNAL MEDICINE

## 2018-01-18 DEVICE — IMPLANTABLE DEVICE
Type: IMPLANTABLE DEVICE | Site: BILE DUCT | Status: NON-FUNCTIONAL
Removed: 2018-05-10

## 2018-01-18 RX ORDER — ONDANSETRON 4 MG/1
4 TABLET, ORALLY DISINTEGRATING ORAL EVERY 30 MIN PRN
Status: DISCONTINUED | OUTPATIENT
Start: 2018-01-18 | End: 2018-01-18 | Stop reason: HOSPADM

## 2018-01-18 RX ORDER — INDOMETHACIN 50 MG/1
100 SUPPOSITORY RECTAL
Status: DISCONTINUED | OUTPATIENT
Start: 2018-01-18 | End: 2018-01-18 | Stop reason: HOSPADM

## 2018-01-18 RX ORDER — MEPERIDINE HYDROCHLORIDE 25 MG/ML
12.5 INJECTION INTRAMUSCULAR; INTRAVENOUS; SUBCUTANEOUS
Status: DISCONTINUED | OUTPATIENT
Start: 2018-01-18 | End: 2018-01-18 | Stop reason: HOSPADM

## 2018-01-18 RX ORDER — ONDANSETRON 2 MG/ML
INJECTION INTRAMUSCULAR; INTRAVENOUS PRN
Status: DISCONTINUED | OUTPATIENT
Start: 2018-01-18 | End: 2018-01-18

## 2018-01-18 RX ORDER — LIDOCAINE 40 MG/G
CREAM TOPICAL
Status: DISCONTINUED | OUTPATIENT
Start: 2018-01-18 | End: 2018-01-18 | Stop reason: HOSPADM

## 2018-01-18 RX ORDER — SODIUM CHLORIDE, SODIUM LACTATE, POTASSIUM CHLORIDE, CALCIUM CHLORIDE 600; 310; 30; 20 MG/100ML; MG/100ML; MG/100ML; MG/100ML
INJECTION, SOLUTION INTRAVENOUS CONTINUOUS
Status: DISCONTINUED | OUTPATIENT
Start: 2018-01-18 | End: 2018-01-18 | Stop reason: HOSPADM

## 2018-01-18 RX ORDER — HYDROMORPHONE HYDROCHLORIDE 1 MG/ML
.3-.5 INJECTION, SOLUTION INTRAMUSCULAR; INTRAVENOUS; SUBCUTANEOUS EVERY 10 MIN PRN
Status: DISCONTINUED | OUTPATIENT
Start: 2018-01-18 | End: 2018-01-18 | Stop reason: HOSPADM

## 2018-01-18 RX ORDER — EPHEDRINE SULFATE 50 MG/ML
INJECTION, SOLUTION INTRAMUSCULAR; INTRAVENOUS; SUBCUTANEOUS PRN
Status: DISCONTINUED | OUTPATIENT
Start: 2018-01-18 | End: 2018-01-18

## 2018-01-18 RX ORDER — FENTANYL CITRATE 50 UG/ML
25-50 INJECTION, SOLUTION INTRAMUSCULAR; INTRAVENOUS
Status: DISCONTINUED | OUTPATIENT
Start: 2018-01-18 | End: 2018-01-18 | Stop reason: HOSPADM

## 2018-01-18 RX ORDER — HYDRALAZINE HYDROCHLORIDE 20 MG/ML
2.5-5 INJECTION INTRAMUSCULAR; INTRAVENOUS EVERY 10 MIN PRN
Status: DISCONTINUED | OUTPATIENT
Start: 2018-01-18 | End: 2018-01-18 | Stop reason: HOSPADM

## 2018-01-18 RX ORDER — SODIUM CHLORIDE, SODIUM LACTATE, POTASSIUM CHLORIDE, CALCIUM CHLORIDE 600; 310; 30; 20 MG/100ML; MG/100ML; MG/100ML; MG/100ML
INJECTION, SOLUTION INTRAVENOUS CONTINUOUS PRN
Status: DISCONTINUED | OUTPATIENT
Start: 2018-01-18 | End: 2018-01-18

## 2018-01-18 RX ORDER — FENTANYL CITRATE 50 UG/ML
INJECTION, SOLUTION INTRAMUSCULAR; INTRAVENOUS PRN
Status: DISCONTINUED | OUTPATIENT
Start: 2018-01-18 | End: 2018-01-18

## 2018-01-18 RX ORDER — LABETALOL HYDROCHLORIDE 5 MG/ML
10 INJECTION, SOLUTION INTRAVENOUS
Status: DISCONTINUED | OUTPATIENT
Start: 2018-01-18 | End: 2018-01-18 | Stop reason: HOSPADM

## 2018-01-18 RX ORDER — NALOXONE HYDROCHLORIDE 0.4 MG/ML
.1-.4 INJECTION, SOLUTION INTRAMUSCULAR; INTRAVENOUS; SUBCUTANEOUS
Status: DISCONTINUED | OUTPATIENT
Start: 2018-01-18 | End: 2018-01-18 | Stop reason: HOSPADM

## 2018-01-18 RX ORDER — ONDANSETRON 2 MG/ML
4 INJECTION INTRAMUSCULAR; INTRAVENOUS EVERY 30 MIN PRN
Status: DISCONTINUED | OUTPATIENT
Start: 2018-01-18 | End: 2018-01-18 | Stop reason: HOSPADM

## 2018-01-18 RX ORDER — ALBUTEROL SULFATE 0.83 MG/ML
2.5 SOLUTION RESPIRATORY (INHALATION) EVERY 4 HOURS PRN
Status: DISCONTINUED | OUTPATIENT
Start: 2018-01-18 | End: 2018-01-18 | Stop reason: HOSPADM

## 2018-01-18 RX ORDER — LIDOCAINE HYDROCHLORIDE 20 MG/ML
INJECTION, SOLUTION INFILTRATION; PERINEURAL PRN
Status: DISCONTINUED | OUTPATIENT
Start: 2018-01-18 | End: 2018-01-18

## 2018-01-18 RX ORDER — DEXAMETHASONE SODIUM PHOSPHATE 4 MG/ML
INJECTION, SOLUTION INTRA-ARTICULAR; INTRALESIONAL; INTRAMUSCULAR; INTRAVENOUS; SOFT TISSUE PRN
Status: DISCONTINUED | OUTPATIENT
Start: 2018-01-18 | End: 2018-01-18

## 2018-01-18 RX ORDER — PROPOFOL 10 MG/ML
INJECTION, EMULSION INTRAVENOUS CONTINUOUS PRN
Status: DISCONTINUED | OUTPATIENT
Start: 2018-01-18 | End: 2018-01-18

## 2018-01-18 RX ADMIN — DEXMEDETOMIDINE HYDROCHLORIDE 12 MCG: 100 INJECTION, SOLUTION INTRAVENOUS at 12:01

## 2018-01-18 RX ADMIN — DEXMEDETOMIDINE HYDROCHLORIDE 8 MCG: 100 INJECTION, SOLUTION INTRAVENOUS at 12:26

## 2018-01-18 RX ADMIN — FENTANYL CITRATE 50 MCG: 50 INJECTION, SOLUTION INTRAMUSCULAR; INTRAVENOUS at 12:01

## 2018-01-18 RX ADMIN — Medication 5 MG: at 12:18

## 2018-01-18 RX ADMIN — DEXMEDETOMIDINE HYDROCHLORIDE 8 MCG: 100 INJECTION, SOLUTION INTRAVENOUS at 12:08

## 2018-01-18 RX ADMIN — MIDAZOLAM 1 MG: 1 INJECTION INTRAMUSCULAR; INTRAVENOUS at 12:11

## 2018-01-18 RX ADMIN — Medication 5 MG: at 12:37

## 2018-01-18 RX ADMIN — FENTANYL CITRATE 25 MCG: 50 INJECTION, SOLUTION INTRAMUSCULAR; INTRAVENOUS at 12:24

## 2018-01-18 RX ADMIN — ONDANSETRON 4 MG: 2 INJECTION INTRAMUSCULAR; INTRAVENOUS at 12:31

## 2018-01-18 RX ADMIN — MIDAZOLAM 2 MG: 1 INJECTION INTRAMUSCULAR; INTRAVENOUS at 12:01

## 2018-01-18 RX ADMIN — PROPOFOL 100 MCG/KG/MIN: 10 INJECTION, EMULSION INTRAVENOUS at 12:08

## 2018-01-18 RX ADMIN — DEXAMETHASONE SODIUM PHOSPHATE 4 MG: 4 INJECTION, SOLUTION INTRA-ARTICULAR; INTRALESIONAL; INTRAMUSCULAR; INTRAVENOUS; SOFT TISSUE at 12:12

## 2018-01-18 RX ADMIN — FENTANYL CITRATE 25 MCG: 50 INJECTION, SOLUTION INTRAMUSCULAR; INTRAVENOUS at 12:19

## 2018-01-18 RX ADMIN — LIDOCAINE HYDROCHLORIDE 40 MG: 20 INJECTION, SOLUTION INFILTRATION; PERINEURAL at 12:05

## 2018-01-18 RX ADMIN — LIDOCAINE HYDROCHLORIDE 60 MG: 20 INJECTION, SOLUTION INFILTRATION; PERINEURAL at 12:11

## 2018-01-18 RX ADMIN — Medication 10 MG: at 12:39

## 2018-01-18 RX ADMIN — SODIUM CHLORIDE, POTASSIUM CHLORIDE, SODIUM LACTATE AND CALCIUM CHLORIDE: 600; 310; 30; 20 INJECTION, SOLUTION INTRAVENOUS at 12:00

## 2018-01-18 RX ADMIN — DEXMEDETOMIDINE HYDROCHLORIDE 12 MCG: 100 INJECTION, SOLUTION INTRAVENOUS at 12:17

## 2018-01-18 ASSESSMENT — LIFESTYLE VARIABLES: TOBACCO_USE: 0

## 2018-01-18 ASSESSMENT — ENCOUNTER SYMPTOMS
DYSRHYTHMIAS: 0
SEIZURES: 0

## 2018-01-18 ASSESSMENT — COPD QUESTIONNAIRES: COPD: 0

## 2018-01-18 NOTE — DISCHARGE INSTRUCTIONS
Same Day Surgery Discharge Instructions for  Sedation and General Anesthesia       It's not unusual to feel dizzy, light-headed or faint for up to 24 hours after surgery or while taking pain medication.  If you have these symptoms: sit for a few minutes before standing and have someone assist you when you get up to walk or use the bathroom.      You should rest and relax for the next 24 hours. We recommend you make arrangements to have an adult stay with you for at least 24 hours after your discharge.  Avoid hazardous and strenuous activity.      DO NOT DRIVE any vehicle or operate mechanical equipment for 24 hours following the end of your surgery.  Even though you may feel normal, your reactions may be affected by the medication you have received.      Do not drink alcoholic beverages for 24 hours following surgery.       Slowly progress to your regular diet as you feel able. It's not unusual to feel nauseated and/or vomit after receiving anesthesia.  If you develop these symptoms, drink clear liquids (apple juice, ginger ale, broth, 7-up, etc. ) until you feel better.  If your nausea and vomiting persists for 24 hours, please notify your surgeon.        All narcotic pain medications, along with inactivity and anesthesia, can cause constipation. Drinking plenty of liquids and increasing fiber intake will help.      For any questions of a medical nature, call your surgeon.      Do not make important decisions for 24 hours.      If you had general anesthesia, you may have a sore throat for a couple of days related to the breathing tube used during surgery.  You may use Cepacol lozenges to help with this discomfort.  If it worsens or if you develop a fever, contact your surgeon.       If you feel your pain is not well managed with the pain medications prescribed by your surgeon, please contact your surgeon's office to let them know so they can address your concerns.         ** See report from endoscopy**  Clear  liquids today and advance diet as tolerated tomorrow.    **If you have questions or concerns about your procedure, call   Dr. Sorenson at [phone].**

## 2018-01-18 NOTE — ANESTHESIA POSTPROCEDURE EVALUATION
Patient: Ronnie Lagos    Procedure(s):  ENDOSCOPIC RETROGRADE CHOLANGIOPANCREATOGRAM (ERCP), SPHINCTEROTOMY, STONE REMOVAL, STENT PLACEMENT - Wound Class: II-Clean Contaminated    Diagnosis:STONES  Diagnosis Additional Information: No value filed.    Anesthesia Type:  MAC    Note:  Anesthesia Post Evaluation    Patient location during evaluation: PACU  Patient participation: Able to fully participate in evaluation  Level of consciousness: awake and alert  Pain management: adequate  Airway patency: patent  Cardiovascular status: acceptable  Respiratory status: acceptable  Hydration status: acceptable  PONV: none     Anesthetic complications: None          Last vitals:  Vitals:    01/18/18 1059 01/18/18 1303 01/18/18 1315   BP: 147/72 140/84 123/73   Resp: 16 20 18   Temp: 35.6  C (96  F) 35.9  C (96.7  F)    SpO2:  97% 95%         Electronically Signed By: Elida Moreno MD  January 18, 2018  1:32 PM

## 2018-01-18 NOTE — IP AVS SNAPSHOT
[facility] [place] [address]    [place] [address]    Phone:  [phone]                                       After Visit Summary   1/18/2018    Ronnie Lagos    MRN: [id number]           After Visit Summary Signature Page     I have received my discharge instructions, and my questions have been answered. I have discussed any challenges I see with this plan with the nurse or doctor.    ..........................................................................................................................................  Patient/Patient Representative Signature      ..........................................................................................................................................  Patient Representative Print Name and Relationship to Patient    ..................................................               ................................................  Date                                            Time    ..........................................................................................................................................  Reviewed by Signature/Title    ...................................................              ..............................................  Date                                                            Time

## 2018-01-18 NOTE — IP AVS SNAPSHOT
MRN:[id number]                      After Visit Summary   1/18/2018    Ronnie Lagos    MRN: [id number]           Thank you!     Thank you for choosing [facility] for your care. Our goal is always to provide you with excellent care. Hearing back from our patients is one way we can continue to improve our services. Please take a few minutes to complete the written survey that you may receive in the mail after you visit with us. Thank you!        Patient Information     Date Of Birth          1955        About your hospital stay     You were admitted on:  January 18, 2018 You last received care in the:  [facility] PACU    You were discharged on:  January 18, 2018       Who to Call     For medical emergencies, please call 911.  For non-urgent questions about your medical care, please call your primary care provider or clinic, [phone]  For questions related to your surgery, please call your surgery clinic        Attending Provider     Provider Specialty    Christiano Sorenson MD Gastroenterology       Primary Care Provider Office Phone # Fax #    Abdiel Jones [phone]      Your next 10 appointments already scheduled     Feb 01, 2018  9:00 AM CST   [facility] Visit with Burton Park MD   [facility]    [address] [place] [id number] [phone]              Further instructions from your care team       Same Day Surgery Discharge Instructions for  Sedation and General Anesthesia       It's not unusual to feel dizzy, light-headed or faint for up to 24 hours after surgery or while taking pain medication.  If you have these symptoms: sit for a few minutes before standing and have someone assist you when you get up to walk or use the bathroom.      You should rest and relax for the next 24 hours. We recommend you make arrangements to have an adult stay with you for at least 24  hours after your discharge.  Avoid hazardous and strenuous activity.      DO NOT DRIVE any vehicle or operate mechanical equipment for 24 hours following the end of your surgery.  Even though you may feel normal, your reactions may be affected by the medication you have received.      Do not drink alcoholic beverages for 24 hours following surgery.       Slowly progress to your regular diet as you feel able. It's not unusual to feel nauseated and/or vomit after receiving anesthesia.  If you develop these symptoms, drink clear liquids (apple juice, ginger ale, broth, 7-up, etc. ) until you feel better.  If your nausea and vomiting persists for 24 hours, please notify your surgeon.        All narcotic pain medications, along with inactivity and anesthesia, can cause constipation. Drinking plenty of liquids and increasing fiber intake will help.      For any questions of a medical nature, call your surgeon.      Do not make important decisions for 24 hours.      If you had general anesthesia, you may have a sore throat for a couple of days related to the breathing tube used during surgery.  You may use Cepacol lozenges to help with this discomfort.  If it worsens or if you develop a fever, contact your surgeon.       If you feel your pain is not well managed with the pain medications prescribed by your surgeon, please contact your surgeon's office to let them know so they can address your concerns.         ** See report from endoscopy**    **If you have questions or concerns about your procedure, call   Dr. Sorenson at [phone].**          Pending Results     Date and Time Order Name Status Description    1/18/2018 1258 XR ERCP In process     1/16/2018 1651 F ACTIN EIA WITH REFLEX In process     1/16/2018 1651 ALPHA 1 ANTITRYPSIN In process             Admission Information     Date & Time Provider Department Dept. Phone    1/18/2018 Christiano Sorenson MD [facility] PACU [phone]      Your Vitals Were      Blood Pressure Temperature Respirations Weight Pulse Oximetry BMI (Body Mass Index)    127/74 96.7  F (35.9  C) (Temporal) 16 92.1 kg (203 lb) 93% 29.98 kg/m2      [facility] Information     [facility] gives you secure access to your electronic health record. If you see a primary care provider, you can also send messages to your care team and make appointments. If you have questions, please call your primary care clinic.  If you do not have a primary care provider, please call [phone] and they will assist you.        Care EveryWhere ID     This is your Care EveryWhere ID. This could be used by other organizations to access your [facility] medical records  [id number]        Equal Access to Services     PHILL LLANOS : Isaac De La Torre, chau manuel, cadence cagle, jaden rivera. So [facility] [phone].    ATENCIÓN: Si habla español, tiene a rosen disposición servicios gratuitos de asistencia lingüística. Llame al [phone].    We comply with applicable federal civil rights laws and [place] laws. We do not discriminate on the basis of race, color, national origin, age, disability, sex, sexual orientation, or gender identity.               Review of your medicines      UNREVIEWED medicines. Ask your doctor about these medicines        Dose / Directions    amLODIPine 5 MG tablet   Commonly known as:  NORVASC        Dose:  1 tablet   Take 1 tablet by mouth daily   Refills:  3       atenolol 50 MG tablet   Commonly known as:  TENORMIN   Used for:  Essential hypertension, benign        Dose:  50 mg   Take 1 tablet (50 mg) by mouth daily   Quantity:  90 tablet   Refills:  3       IBUPROFEN PO        Refills:  0       omeprazole 20 MG CR capsule   Commonly known as:  priLOSEC   Used for:  Gastroesophageal reflux disease without esophagitis        Dose:  20 mg   Take 1 capsule (20 mg) by mouth daily   Quantity:  90 capsule   Refills:  3       ondansetron 4 MG ODT tab    Commonly known as:  ZOFRAN ODT        Dose:  4 mg   Take 1 tablet (4 mg) by mouth every 4 hours as needed for nausea   Quantity:  15 tablet   Refills:  0                Protect others around you: Learn how to safely use, store and throw away your medicines at www.disposemymeds.org.             Medication List: This is a list of all your medications and when to take them. Check marks below indicate your daily home schedule. Keep this list as a reference.      Medications           Morning Afternoon Evening Bedtime As Needed    amLODIPine 5 MG tablet   Commonly known as:  NORVASC   Take 1 tablet by mouth daily                                atenolol 50 MG tablet   Commonly known as:  TENORMIN   Take 1 tablet (50 mg) by mouth daily                                IBUPROFEN PO                                omeprazole 20 MG CR capsule   Commonly known as:  priLOSEC   Take 1 capsule (20 mg) by mouth daily                                ondansetron 4 MG ODT tab   Commonly known as:  ZOFRAN ODT   Take 1 tablet (4 mg) by mouth every 4 hours as needed for nausea

## 2018-01-18 NOTE — ANESTHESIA PREPROCEDURE EVALUATION
Procedure: Procedure(s):  ENDOSCOPIC RETROGRADE CHOLANGIOPANCREATOGRAM  Preop diagnosis: STONES    Allergies   Allergen Reactions     No Known Drug Allergies      Past Medical History:   Diagnosis Date     Abdominal pain      Elevated liver enzymes      Essential hypertension, benign     abstracted 6/19/02     Gastro-oesophageal reflux disease      GERD (gastroesophageal reflux disease) 7/28/2008     Squamous cell carcinoma      Unspecified cerebral artery occlusion with cerebral infarction      Unspecified condition of brain     abstracted 6/19/02     Past Surgical History:   Procedure Laterality Date     ARTHRODESIS FOOT  2/5/2013    Procedure: ARTHRODESIS FOOT;  LEFT FIRST METATARSOPHALANGEAL JOINT ARTHRODESIS ;  Surgeon: Burton Loera DPM;  Location: [facility]     LAPAROSCOPIC CHOLECYSTECTOMY N/A 5/1/2015    Procedure: LAPAROSCOPIC CHOLECYSTECTOMY;  Surgeon: Damien Galindo MD;  Location: [facility]     ORTHOPEDIC SURGERY      left elbow, right shoulder     Prior to Admission medications    Medication Sig Start Date End Date Taking? Authorizing Provider   IBUPROFEN PO     Reported, Patient   ondansetron (ZOFRAN ODT) 4 MG ODT tab Take 1 tablet (4 mg) by mouth every 4 hours as needed for nausea 12/27/17   Ladan Montes De Oca MD   amLODIPine (NORVASC) 5 MG tablet Take 1 tablet by mouth daily 11/14/17   Reported, Patient   atenolol (TENORMIN) 50 MG tablet Take 1 tablet (50 mg) by mouth daily 11/9/17   Abdiel Jones MD   omeprazole (PRILOSEC) 20 MG CR capsule Take 1 capsule (20 mg) by mouth daily 11/9/17   Abdiel Jones MD     No current Epic-ordered facility-administered medications on file.      Current Outpatient Prescriptions Ordered in Epic   Medication     IBUPROFEN PO     ondansetron (ZOFRAN ODT) 4 MG ODT tab     amLODIPine (NORVASC) 5 MG tablet     atenolol (TENORMIN) 50 MG tablet     omeprazole (PRILOSEC) 20 MG CR capsule     Wt Readings from Last 1 Encounters:   01/12/18 93 kg (205 lb)     Temp  Readings from Last 1 Encounters:   01/12/18 36.2  C (97.1  F) (Temporal)     BP Readings from Last 6 Encounters:   01/12/18 140/77   12/27/17 144/87   11/16/17 132/80   04/20/17 (!) 160/92   03/24/17 122/80   01/24/17 140/80     Pulse Readings from Last 4 Encounters:   11/16/17 57   04/20/17 55   01/24/17 60   11/22/16 55     Resp Readings from Last 1 Encounters:   01/12/18 20     SpO2 Readings from Last 1 Encounters:   01/12/18 100%     Recent Labs   Lab Test  01/12/18   0350  12/27/17   1421   NA  138  134   POTASSIUM  3.6  4.2   CHLORIDE  102  101   CO2  30  27   ANIONGAP  6  6   GLC  127*  96   BUN  14  14   CR  1.00  0.97   AV  8.6  9.0     Recent Labs   Lab Test  01/12/18   0350  12/27/17   1421   WBC  6.0  4.4   HGB  15.0  16.2   PLT  191  142*     Recent Labs   Lab Test  01/16/18   1648  12/27/17   1421   INR  0.93  0.93      ECG: Sinus rhythm  Minimal voltage criteria for LVH, may be normal variant  Borderline ECG  When compared with ECG of 17-APR-2015 21:23,  No significant change was found    Anesthesia Evaluation     .             ROS/MED HX    ENT/Pulmonary:      (-) tobacco use, asthma, COPD and sleep apnea   Neurologic:     (+)CVA    (-) seizures, TIA and migraines   Cardiovascular:     (+) hypertension----. : . . . :. .      (-) CAD, CASE, arrhythmias, valvular problems/murmurs and dyslipidemia   METS/Exercise Tolerance:     Hematologic:        (-) history of blood clots, anemia and other hematologic disorder   Musculoskeletal:  - neg musculoskeletal ROS      (-) arthritis   GI/Hepatic:     (+) GERD      (-) liver disease   Renal/Genitourinary:      (-) renal disease and nephrolithiasis   Endo:      (-) Type I DM, Type II DM and thyroid disease   Psychiatric:         Infectious Disease:        (-) Recent Fever   Malignancy:         Other:                     Physical Exam  Normal systems: cardiovascular, pulmonary and dental    Airway   Mallampati: II  TM distance: >3 FB  Neck ROM: full    Dental      Cardiovascular   Rhythm and rate: regular and normal  (-) no murmur    Pulmonary    breath sounds clear to auscultation                    Anesthesia Plan      History & Physical Review      ASA Status:  2 .    NPO Status:  > 8 hours    Plan for MAC Reason for MAC:  Deep or markedly invasive procedure (G8)  PONV prophylaxis:  Ondansetron (or other 5HT-3)  Propofol infusion  Precedex, versed, fentanyl as needed.       Postoperative Care  Postoperative pain management:  Multi-modal analgesia.      Consents  Anesthetic plan, risks, benefits and alternatives discussed with:  Patient..                          .

## 2018-01-19 LAB
A1AT PHENOTYP SERPL-IMP: NORMAL
A1AT SERPL-MCNC: 173 MG/DL (ref 90–200)

## 2018-02-01 ENCOUNTER — OFFICE VISIT (OUTPATIENT)
Dept: DERMATOLOGY | Facility: CLINIC | Age: 63
End: 2018-02-01
Payer: COMMERCIAL

## 2018-02-01 VITALS — HEART RATE: 61 BPM | DIASTOLIC BLOOD PRESSURE: 82 MMHG | OXYGEN SATURATION: 96 % | SYSTOLIC BLOOD PRESSURE: 130 MMHG

## 2018-02-01 DIAGNOSIS — L81.4 LENTIGO: ICD-10-CM

## 2018-02-01 DIAGNOSIS — Z85.828 HISTORY OF SKIN CANCER: ICD-10-CM

## 2018-02-01 DIAGNOSIS — D18.00 ANGIOMA: ICD-10-CM

## 2018-02-01 DIAGNOSIS — L57.0 AK (ACTINIC KERATOSIS): Primary | ICD-10-CM

## 2018-02-01 DIAGNOSIS — L82.0 INFLAMED SEBORRHEIC KERATOSIS: ICD-10-CM

## 2018-02-01 DIAGNOSIS — L82.1 SK (SEBORRHEIC KERATOSIS): ICD-10-CM

## 2018-02-01 PROCEDURE — 99213 OFFICE O/P EST LOW 20 MIN: CPT | Mod: 25 | Performed by: DERMATOLOGY

## 2018-02-01 PROCEDURE — 17110 DESTRUCTION B9 LES UP TO 14: CPT | Performed by: DERMATOLOGY

## 2018-02-01 PROCEDURE — 17003 DESTRUCT PREMALG LES 2-14: CPT | Performed by: DERMATOLOGY

## 2018-02-01 PROCEDURE — 17000 DESTRUCT PREMALG LESION: CPT | Mod: 59 | Performed by: DERMATOLOGY

## 2018-02-01 RX ORDER — FLUOROURACIL 50 MG/G
CREAM TOPICAL
Qty: 40 G | Refills: 1 | Status: SHIPPED | OUTPATIENT
Start: 2018-02-01 | End: 2018-11-29

## 2018-02-01 NOTE — NURSING NOTE
"Chief Complaint   Patient presents with     Skin Check       Initial /82  Pulse 61  SpO2 96% Estimated body mass index is 29.98 kg/(m^2) as calculated from the following:    Height as of 1/12/18: 1.753 m (5' 9\").    Weight as of 1/18/18: 92.1 kg (203 lb).  Medication Reconciliation: complete    Laura Santiago MA  "

## 2018-02-01 NOTE — PROGRESS NOTES
Ronnie Lagos is a 62 year old year old male patient here today for f/u hx of non-melanoma skin cancer.  Today he notes rough spot on penis and left cheek and scalp.   .  Patient states this has been present for a while.  Patient reports the following symptoms:  none .  Patient reports the following previous treatments none.  Patient reports the following modifying factors none.  Associated symptoms: none.  Patient has no other skin complaints today.  Remainder of the HPI, Meds, PMH, Allergies, FH, and SH was reviewed in chart.    Pertinent Hx:   Non-melanoma skin cancer   Past Medical History:   Diagnosis Date     Abdominal pain      Elevated liver enzymes      Essential hypertension, benign     abstracted 6/19/02     Gastro-oesophageal reflux disease      GERD (gastroesophageal reflux disease) 7/28/2008     Squamous cell carcinoma      Unspecified cerebral artery occlusion with cerebral infarction      Unspecified condition of brain     abstracted 6/19/02       Past Surgical History:   Procedure Laterality Date     ARTHRODESIS FOOT  2/5/2013    Procedure: ARTHRODESIS FOOT;  LEFT FIRST METATARSOPHALANGEAL JOINT ARTHRODESIS ;  Surgeon: Burton Loera DPM;  Location: [facility]     ENDOSCOPIC RETROGRADE CHOLANGIOPANCREATOGRAM N/A 1/18/2018    Procedure: COMBINED ENDOSCOPIC RETROGRADE CHOLANGIOPANCREATOGRAPHY, SPHINCTEROTOMY;  ENDOSCOPIC RETROGRADE CHOLANGIOPANCREATOGRAM (ERCP), SPHINCTEROTOMY, STONE REMOVAL, STENT PLACEMENT;  Surgeon: Christiano Sorenson MD;  Location:  [facility]     LAPAROSCOPIC CHOLECYSTECTOMY N/A 5/1/2015    Procedure: LAPAROSCOPIC CHOLECYSTECTOMY;  Surgeon: Damien Galindo MD;  Location: [facility]     ORTHOPEDIC SURGERY      left elbow, right shoulder        Family History   Problem Relation Age of Onset     Hypertension Mother      Cancer - colorectal Mother      Hypertension Father      Melanoma No family hx of        Social History     Social History     Marital status:      Spouse  name: N/A     Number of children: N/A     Years of education: N/A     Occupational History      [employer]     Social History Main Topics     Smoking status: Never Smoker     Smokeless tobacco: Never Used     Alcohol use Yes      Comment: occ     Drug use: No     Sexual activity: Yes     Partners: Female     Other Topics Concern     Not on file     Social History Narrative       Outpatient Encounter Prescriptions as of 2/1/2018   Medication Sig Dispense Refill     fluorouracil (EFUDEX) 5 % cream Apply to scalp twice daily for two weeks 40 g 1     IBUPROFEN PO        ondansetron (ZOFRAN ODT) 4 MG ODT tab Take 1 tablet (4 mg) by mouth every 4 hours as needed for nausea 15 tablet 0     amLODIPine (NORVASC) 5 MG tablet Take 1 tablet by mouth daily  3     atenolol (TENORMIN) 50 MG tablet Take 1 tablet (50 mg) by mouth daily 90 tablet 3     omeprazole (PRILOSEC) 20 MG CR capsule Take 1 capsule (20 mg) by mouth daily 90 capsule 3     No facility-administered encounter medications on file as of 2/1/2018.              Review Of Systems  Skin: As above  Eyes: negative  Ears/Nose/Throat: negative  Respiratory: No shortness of breath, dyspnea on exertion, cough, or hemoptysis  Cardiovascular: negative  Gastrointestinal: negative  Genitourinary: negative  Musculoskeletal: negative  Neurologic: negative  Psychiatric: negative  Hematologic/Lymphatic/Immunologic: negative  Endocrine: negative      O:   NAD, WDWN, Alert & Oriented, Mood & Affect wnl, Vitals stable   Here today alone   /82  Pulse 61  SpO2 96%   General appearance normal   Vitals stable   Alert, oriented and in no acute distress      Following lymph nodes palpated: Occipital, Cervical, Supraclavicular no lad   Stuck on papules and brown macules on trunk and ext    Red papules on trunk   Pubis with inflamed seborrheic keratosis    Gritty papules on scalp L SBa nd L temple gritty papule        The remainder of the full exam was unremarkable;  the following areas were examined:  conjunctiva/lids, oral mucosa, neck, peripheral vascular system, abdomen, lymph nodes, digits/nails, eccrine and apocrine glands, scalp/hair, face, neck, chest, abdomen, buttocks, back, RUE, LUE, RLE, LLE       Eyes: Conjunctivae/lids:Normal     ENT: Lips, buccal mucosa, tongue: normal    MSK:Normal    Cardiovascular: peripheral edema none    Pulm: Breathing Normal    Lymph Nodes: No Head and Neck Lymphadenopathy     Neuro/Psych: Orientation:Normal; Mood/Affect:Normal      A/P:  1. Hx of non-melanoma skin cancer, seborrheic keratosis, lentigo, angioma  2. Pubis inflamed seborrheic keratosis    LN2:  Treated with LN2 for 5s for 1-2 cycles. Warned risks of blistering, pain, pigment change, scarring, and incomplete resolution.  Advised patient to return if lesions do not completely resolve.  Wound care sheet given.  3. Actinic keratosis   L sideburn and L temple  LN2:  Treated with LN2 for 5s for 1-2 cycles. Warned risks of blistering, pain, pigment change, scarring, and incomplete resolution.  Advised patient to return if lesions do not completely resolve.  Wound care sheet given.    Efudex twice daily to scalp for two weeks  Irritation discussed with patient     BENIGN LESIONS DISCUSSED WITH PATIENT:  I discussed the specifics of tumor, prognosis, and genetics of benign lesions.  I explained that treatment of these lesions would be purely cosmetic and not medically neccessary.  I discussed with patient different removal options including excision, cautery and /or laser.      Nature and genetics of benign skin lesions dicussed with patient.  Signs and Symptoms of skin cancer discussed with patient.  ABCDEs of melanoma reviewed with patient.  Patient encouraged to perform monthly skin exams.  UV precautions reviewed with patient.  Skin care regimen reviewed with patient: Eliminate harsh soaps, i.e. Dial, zest, irsih spring; Mild soaps such as Cetaphil or Dove sensitive skin, avoid  hot or cold showers, aggressive use of emollients including vanicream, cetaphil or cerave discussed with patient.    Risks of non-melanoma skin cancer discussed with patient   Return to clinic 3 months

## 2018-02-01 NOTE — MR AVS SNAPSHOT
After Visit Summary   2/1/2018    Ronnie Lagos    MRN: [id number]           Patient Information     Date Of Birth          1955        Visit Information        Provider Department      2/1/2018 9:00 AM Burton Park MD [facility]        Today's Diagnoses     AK (actinic keratosis)    -  1    Lentigo        SK (seborrheic keratosis)        Inflamed seborrheic keratosis        History of skin cancer        Angioma           Follow-ups after your visit        Your next 10 appointments already scheduled     May 03, 2018  9:00 AM CDT   [facility] Visit with Burton Park MD   [facility]    [address] [address] [phone]              Who to contact     If you have questions or need follow up information about today's clinic visit or your schedule please contact [facility] directly at [phone].  Normal or non-critical lab and imaging results will be communicated to you by AtomShockwavehart, letter or phone within 4 business days after the clinic has received the results. If you do not hear from us within 7 days, please contact the clinic through [employer] or phone. If you have a critical or abnormal lab result, we will notify you by phone as soon as possible.  Submit refill requests through [employer] or call your pharmacy and they will forward the refill request to us. Please allow 3 business days for your refill to be completed.          Additional Information About Your Visit        MyChart Information     [employer] gives you secure access to your electronic health record. If you see a primary care provider, you can also send messages to your care team and make appointments. If you have questions, please call your primary care clinic.  If you do not have a primary care provider, please call [phone] and they will assist you.        Care EveryWhere  ID     This is your Care EveryWhere ID. This could be used by other organizations to access your [place] medical records  [id number]        Your Vitals Were     Pulse Pulse Oximetry                61 96%           Blood Pressure from Last 3 Encounters:   02/01/18 130/82   01/18/18 135/75   01/12/18 140/77    Weight from Last 3 Encounters:   01/18/18 92.1 kg (203 lb)   01/12/18 93 kg (205 lb)   12/27/17 93 kg (205 lb)              We Performed the Following     DESTRUCT BENIGN LESION, UP TO 14     DESTRUCT PREMALIGNANT LESION, 2-14     DESTRUCT PREMALIGNANT LESION, FIRST          Today's Medication Changes          These changes are accurate as of 2/1/18  9:39 AM.  If you have any questions, ask your nurse or doctor.               Start taking these medicines.        Dose/Directions    fluorouracil 5 % cream   Commonly known as:  EFUDEX   Used for:  AK (actinic keratosis), Lentigo, SK (seborrheic keratosis), Inflamed seborrheic keratosis, History of skin cancer, Angioma   Started by:  Burton Park MD        Apply to scalp twice daily for two weeks   Quantity:  40 g   Refills:  1            Where to get your medicines      These medications were sent to [id number] [place], [place] - [address] [facility]  [address] [place] [address]     Phone:  [phone]     fluorouracil 5 % cream                Primary Care Provider Office Phone # Fax #    Abdiel Jones [phone]       [address] [facility] [address]        Equal Access to Services     PHILL LLANOS : Hadii naseem ku hadasho Soomaali, waaxda luqadaha, qaybta kaalmada adeegyada, jaden iditenisha rivera. So [facility] [phone].    ATENCIÓN: Si habla español, tiene a rosen disposición servicios gratuitos de asistencia lingüística. Llame al [phone].    We comply with applicable federal civil rights laws and [place] laws. We do not discriminate on the basis of race, color, national  origin, age, disability, sex, sexual orientation, or gender identity.            Thank you!     Thank you for choosing [facility]  for your care. Our goal is always to provide you with excellent care. Hearing back from our patients is one way we can continue to improve our services. Please take a few minutes to complete the written survey that you may receive in the mail after your visit with us. Thank you!             Your Updated Medication List - Protect others around you: Learn how to safely use, store and throw away your medicines at www.disposemymeds.org.          This list is accurate as of 2/1/18  9:39 AM.  Always use your most recent med list.                   Brand Name Dispense Instructions for use Diagnosis    amLODIPine 5 MG tablet    NORVASC     Take 1 tablet by mouth daily        atenolol 50 MG tablet    TENORMIN    90 tablet    Take 1 tablet (50 mg) by mouth daily    Essential hypertension, benign       fluorouracil 5 % cream    EFUDEX    40 g    Apply to scalp twice daily for two weeks    AK (actinic keratosis), Lentigo, SK (seborrheic keratosis), Inflamed seborrheic keratosis, History of skin cancer, Angioma       IBUPROFEN PO           omeprazole 20 MG CR capsule    priLOSEC    90 capsule    Take 1 capsule (20 mg) by mouth daily    Gastroesophageal reflux disease without esophagitis       ondansetron 4 MG ODT tab    ZOFRAN ODT    15 tablet    Take 1 tablet (4 mg) by mouth every 4 hours as needed for nausea

## 2018-02-01 NOTE — LETTER
2/1/2018         RE: Ronnie Lagos  [address] [facility] [address]        Dear Colleague,    Thank you for referring your patient, Ronnie Lagos, to the [facility]. Please see a copy of my visit note below.    Ronnie Lagos is a 62 year old year old male patient here today for f/u hx of non-melanoma skin cancer.  Today he notes rough spot on penis and left cheek and scalp.   .  Patient states this has been present for a while.  Patient reports the following symptoms:  none .  Patient reports the following previous treatments none.  Patient reports the following modifying factors none.  Associated symptoms: none.  Patient has no other skin complaints today.  Remainder of the HPI, Meds, PMH, Allergies, FH, and SH was reviewed in chart.    Pertinent Hx:   Non-melanoma skin cancer   Past Medical History:   Diagnosis Date     Abdominal pain      Elevated liver enzymes      Essential hypertension, benign     abstracted 6/19/02     Gastro-oesophageal reflux disease      GERD (gastroesophageal reflux disease) 7/28/2008     Squamous cell carcinoma      Unspecified cerebral artery occlusion with cerebral infarction      Unspecified condition of brain     abstracted 6/19/02       Past Surgical History:   Procedure Laterality Date     ARTHRODESIS FOOT  2/5/2013    Procedure: ARTHRODESIS FOOT;  LEFT FIRST METATARSOPHALANGEAL JOINT ARTHRODESIS ;  Surgeon: Burton Loera DPM;  Location: [facility]     ENDOSCOPIC RETROGRADE CHOLANGIOPANCREATOGRAM N/A 1/18/2018    Procedure: COMBINED ENDOSCOPIC RETROGRADE CHOLANGIOPANCREATOGRAPHY, SPHINCTEROTOMY;  ENDOSCOPIC RETROGRADE CHOLANGIOPANCREATOGRAM (ERCP), SPHINCTEROTOMY, STONE REMOVAL, STENT PLACEMENT;  Surgeon: Christiano Sorenson MD;  Location:  OR     LAPAROSCOPIC CHOLECYSTECTOMY N/A 5/1/2015    Procedure: LAPAROSCOPIC CHOLECYSTECTOMY;  Surgeon: Damien Galindo MD;  Location: [facility]     ORTHOPEDIC SURGERY      left elbow,  right shoulder        Family History   Problem Relation Age of Onset     Hypertension Mother      Cancer - colorectal Mother      Hypertension Father      Melanoma No family hx of        Social History     Social History     Marital status:      Spouse name: N/A     Number of children: N/A     Years of education: N/A     Occupational History      [employer]     Social History Main Topics     Smoking status: Never Smoker     Smokeless tobacco: Never Used     Alcohol use Yes      Comment: occ     Drug use: No     Sexual activity: Yes     Partners: Female     Other Topics Concern     Not on file     Social History Narrative       Outpatient Encounter Prescriptions as of 2/1/2018   Medication Sig Dispense Refill     fluorouracil (EFUDEX) 5 % cream Apply to scalp twice daily for two weeks 40 g 1     IBUPROFEN PO        ondansetron (ZOFRAN ODT) 4 MG ODT tab Take 1 tablet (4 mg) by mouth every 4 hours as needed for nausea 15 tablet 0     amLODIPine (NORVASC) 5 MG tablet Take 1 tablet by mouth daily  3     atenolol (TENORMIN) 50 MG tablet Take 1 tablet (50 mg) by mouth daily 90 tablet 3     omeprazole (PRILOSEC) 20 MG CR capsule Take 1 capsule (20 mg) by mouth daily 90 capsule 3     No facility-administered encounter medications on file as of 2/1/2018.              Review Of Systems  Skin: As above  Eyes: negative  Ears/Nose/Throat: negative  Respiratory: No shortness of breath, dyspnea on exertion, cough, or hemoptysis  Cardiovascular: negative  Gastrointestinal: negative  Genitourinary: negative  Musculoskeletal: negative  Neurologic: negative  Psychiatric: negative  Hematologic/Lymphatic/Immunologic: negative  Endocrine: negative      O:   NAD, WDWN, Alert & Oriented, Mood & Affect wnl, Vitals stable   Here today alone   /82  Pulse 61  SpO2 96%   General appearance normal   Vitals stable   Alert, oriented and in no acute distress      Following lymph nodes palpated: Occipital, Cervical,  Supraclavicular no lad   Stuck on papules and brown macules on trunk and ext    Red papules on trunk   Pubis with inflamed seborrheic keratosis    Gritty papules on scalp L SBa nd L temple gritty papule        The remainder of the full exam was unremarkable; the following areas were examined:  conjunctiva/lids, oral mucosa, neck, peripheral vascular system, abdomen, lymph nodes, digits/nails, eccrine and apocrine glands, scalp/hair, face, neck, chest, abdomen, buttocks, back, RUE, LUE, RLE, LLE       Eyes: Conjunctivae/lids:Normal     ENT: Lips, buccal mucosa, tongue: normal    MSK:Normal    Cardiovascular: peripheral edema none    Pulm: Breathing Normal    Lymph Nodes: No Head and Neck Lymphadenopathy     Neuro/Psych: Orientation:Normal; Mood/Affect:Normal      A/P:  1. Hx of non-melanoma skin cancer, seborrheic keratosis, lentigo, angioma  2. Pubis inflamed seborrheic keratosis    LN2:  Treated with LN2 for 5s for 1-2 cycles. Warned risks of blistering, pain, pigment change, scarring, and incomplete resolution.  Advised patient to return if lesions do not completely resolve.  Wound care sheet given.  3. Actinic keratosis   L sideburn and L temple  LN2:  Treated with LN2 for 5s for 1-2 cycles. Warned risks of blistering, pain, pigment change, scarring, and incomplete resolution.  Advised patient to return if lesions do not completely resolve.  Wound care sheet given.    Efudex twice daily to scalp for two weeks  Irritation discussed with patient     BENIGN LESIONS DISCUSSED WITH PATIENT:  I discussed the specifics of tumor, prognosis, and genetics of benign lesions.  I explained that treatment of these lesions would be purely cosmetic and not medically neccessary.  I discussed with patient different removal options including excision, cautery and /or laser.      Nature and genetics of benign skin lesions dicussed with patient.  Signs and Symptoms of skin cancer discussed with patient.  ABCDEs of melanoma reviewed  with patient.  Patient encouraged to perform monthly skin exams.  UV precautions reviewed with patient.  Skin care regimen reviewed with patient: Eliminate harsh soaps, i.e. Dial, zest, irsih spring; Mild soaps such as Cetaphil or Dove sensitive skin, avoid hot or cold showers, aggressive use of emollients including vanicream, cetaphil or cerave discussed with patient.    Risks of non-melanoma skin cancer discussed with patient   Return to clinic 3 months      Again, thank you for allowing me to participate in the care of your patient.        Sincerely,        Burton Park MD

## 2018-02-15 ENCOUNTER — TRANSFERRED RECORDS (OUTPATIENT)
Dept: HEALTH INFORMATION MANAGEMENT | Facility: CLINIC | Age: 63
End: 2018-02-15

## 2018-04-12 ENCOUNTER — APPOINTMENT (OUTPATIENT)
Dept: GENERAL RADIOLOGY | Facility: CLINIC | Age: 63
End: 2018-04-12
Attending: INTERNAL MEDICINE
Payer: COMMERCIAL

## 2018-04-12 ENCOUNTER — HOSPITAL ENCOUNTER (OUTPATIENT)
Facility: CLINIC | Age: 63
Discharge: HOME | End: 2018-04-12
Attending: INTERNAL MEDICINE | Admitting: INTERNAL MEDICINE
Payer: COMMERCIAL

## 2018-04-12 ENCOUNTER — SURGERY (OUTPATIENT)
Age: 63
End: 2018-04-12

## 2018-04-12 ENCOUNTER — ANESTHESIA EVENT (OUTPATIENT)
Dept: SURGERY | Facility: CLINIC | Age: 63
End: 2018-04-12
Payer: COMMERCIAL

## 2018-04-12 ENCOUNTER — ANESTHESIA (OUTPATIENT)
Dept: SURGERY | Facility: CLINIC | Age: 63
End: 2018-04-12
Payer: COMMERCIAL

## 2018-04-12 VITALS
WEIGHT: 205 LBS | BODY MASS INDEX: 30.36 KG/M2 | TEMPERATURE: 97.8 F | SYSTOLIC BLOOD PRESSURE: 144 MMHG | OXYGEN SATURATION: 92 % | DIASTOLIC BLOOD PRESSURE: 95 MMHG | HEIGHT: 69 IN | RESPIRATION RATE: 12 BRPM

## 2018-04-12 LAB
ERCP: NORMAL
UPPER GI ENDOSCOPY: NORMAL

## 2018-04-12 PROCEDURE — 25000128 H RX IP 250 OP 636: Performed by: NURSE ANESTHETIST, CERTIFIED REGISTERED

## 2018-04-12 PROCEDURE — 36000056 ZZH SURGERY LEVEL 3 1ST 30 MIN: Performed by: INTERNAL MEDICINE

## 2018-04-12 PROCEDURE — 40000170 ZZH STATISTIC PRE-PROCEDURE ASSESSMENT II: Performed by: INTERNAL MEDICINE

## 2018-04-12 PROCEDURE — 25000128 H RX IP 250 OP 636: Performed by: ANESTHESIOLOGY

## 2018-04-12 PROCEDURE — 37000008 ZZH ANESTHESIA TECHNICAL FEE, 1ST 30 MIN: Performed by: INTERNAL MEDICINE

## 2018-04-12 PROCEDURE — 25000128 H RX IP 250 OP 636: Performed by: INTERNAL MEDICINE

## 2018-04-12 PROCEDURE — 71000013 ZZH RECOVERY PHASE 1 LEVEL 1 EA ADDTL HR: Performed by: INTERNAL MEDICINE

## 2018-04-12 PROCEDURE — 71000012 ZZH RECOVERY PHASE 1 LEVEL 1 FIRST HR: Performed by: INTERNAL MEDICINE

## 2018-04-12 PROCEDURE — 25000125 ZZHC RX 250: Performed by: NURSE ANESTHETIST, CERTIFIED REGISTERED

## 2018-04-12 PROCEDURE — 74018 RADEX ABDOMEN 1 VIEW: CPT

## 2018-04-12 PROCEDURE — 36000058 ZZH SURGERY LEVEL 3 EA 15 ADDTL MIN: Performed by: INTERNAL MEDICINE

## 2018-04-12 PROCEDURE — 43235 EGD DIAGNOSTIC BRUSH WASH: CPT | Performed by: INTERNAL MEDICINE

## 2018-04-12 PROCEDURE — 37000009 ZZH ANESTHESIA TECHNICAL FEE, EACH ADDTL 15 MIN: Performed by: INTERNAL MEDICINE

## 2018-04-12 PROCEDURE — 40000277 XR SURGERY CARM FLUORO LESS THAN 5 MIN W STILLS: Mod: TC

## 2018-04-12 PROCEDURE — 40000104 ZZH STATISTIC MODERATE SEDATION < 10 MIN: Performed by: INTERNAL MEDICINE

## 2018-04-12 RX ORDER — FENTANYL CITRATE 50 UG/ML
INJECTION, SOLUTION INTRAMUSCULAR; INTRAVENOUS PRN
Status: DISCONTINUED | OUTPATIENT
Start: 2018-04-12 | End: 2018-04-12

## 2018-04-12 RX ORDER — KETOROLAC TROMETHAMINE 30 MG/ML
30 INJECTION, SOLUTION INTRAMUSCULAR; INTRAVENOUS
Status: CANCELLED | OUTPATIENT
Start: 2018-04-12

## 2018-04-12 RX ORDER — SODIUM CHLORIDE, SODIUM LACTATE, POTASSIUM CHLORIDE, CALCIUM CHLORIDE 600; 310; 30; 20 MG/100ML; MG/100ML; MG/100ML; MG/100ML
INJECTION, SOLUTION INTRAVENOUS CONTINUOUS
Status: CANCELLED | OUTPATIENT
Start: 2018-04-12

## 2018-04-12 RX ORDER — SODIUM CHLORIDE, SODIUM LACTATE, POTASSIUM CHLORIDE, CALCIUM CHLORIDE 600; 310; 30; 20 MG/100ML; MG/100ML; MG/100ML; MG/100ML
INJECTION, SOLUTION INTRAVENOUS CONTINUOUS PRN
Status: DISCONTINUED | OUTPATIENT
Start: 2018-04-12 | End: 2018-04-12

## 2018-04-12 RX ORDER — INDOMETHACIN 50 MG/1
100 SUPPOSITORY RECTAL
Status: DISCONTINUED | OUTPATIENT
Start: 2018-04-12 | End: 2018-04-12 | Stop reason: HOSPADM

## 2018-04-12 RX ORDER — ONDANSETRON 4 MG/1
4 TABLET, ORALLY DISINTEGRATING ORAL EVERY 30 MIN PRN
Status: CANCELLED | OUTPATIENT
Start: 2018-04-12

## 2018-04-12 RX ORDER — FENTANYL CITRATE 50 UG/ML
INJECTION, SOLUTION INTRAMUSCULAR; INTRAVENOUS PRN
Status: DISCONTINUED | OUTPATIENT
Start: 2018-04-12 | End: 2018-04-12 | Stop reason: HOSPADM

## 2018-04-12 RX ORDER — PROPOFOL 10 MG/ML
INJECTION, EMULSION INTRAVENOUS CONTINUOUS PRN
Status: DISCONTINUED | OUTPATIENT
Start: 2018-04-12 | End: 2018-04-12

## 2018-04-12 RX ORDER — LIDOCAINE HYDROCHLORIDE 20 MG/ML
INJECTION, SOLUTION INFILTRATION; PERINEURAL PRN
Status: DISCONTINUED | OUTPATIENT
Start: 2018-04-12 | End: 2018-04-12

## 2018-04-12 RX ORDER — LIDOCAINE 40 MG/G
CREAM TOPICAL
Status: DISCONTINUED | OUTPATIENT
Start: 2018-04-12 | End: 2018-04-12 | Stop reason: HOSPADM

## 2018-04-12 RX ORDER — SODIUM CHLORIDE, SODIUM LACTATE, POTASSIUM CHLORIDE, CALCIUM CHLORIDE 600; 310; 30; 20 MG/100ML; MG/100ML; MG/100ML; MG/100ML
INJECTION, SOLUTION INTRAVENOUS CONTINUOUS
Status: DISCONTINUED | OUTPATIENT
Start: 2018-04-12 | End: 2018-04-30 | Stop reason: HOSPADM

## 2018-04-12 RX ORDER — FENTANYL CITRATE 50 UG/ML
25-50 INJECTION, SOLUTION INTRAMUSCULAR; INTRAVENOUS
Status: CANCELLED | OUTPATIENT
Start: 2018-04-12

## 2018-04-12 RX ORDER — DEXAMETHASONE SODIUM PHOSPHATE 4 MG/ML
4 INJECTION, SOLUTION INTRA-ARTICULAR; INTRALESIONAL; INTRAMUSCULAR; INTRAVENOUS; SOFT TISSUE
Status: CANCELLED | OUTPATIENT
Start: 2018-04-12

## 2018-04-12 RX ORDER — ONDANSETRON 2 MG/ML
4 INJECTION INTRAMUSCULAR; INTRAVENOUS EVERY 30 MIN PRN
Status: CANCELLED | OUTPATIENT
Start: 2018-04-12

## 2018-04-12 RX ORDER — ONDANSETRON 2 MG/ML
INJECTION INTRAMUSCULAR; INTRAVENOUS PRN
Status: DISCONTINUED | OUTPATIENT
Start: 2018-04-12 | End: 2018-04-12

## 2018-04-12 RX ORDER — NALOXONE HYDROCHLORIDE 0.4 MG/ML
.1-.4 INJECTION, SOLUTION INTRAMUSCULAR; INTRAVENOUS; SUBCUTANEOUS
Status: ACTIVE | OUTPATIENT
Start: 2018-04-12 | End: 2018-04-13

## 2018-04-12 RX ORDER — MEPERIDINE HYDROCHLORIDE 25 MG/ML
12.5 INJECTION INTRAMUSCULAR; INTRAVENOUS; SUBCUTANEOUS EVERY 5 MIN PRN
Status: CANCELLED | OUTPATIENT
Start: 2018-04-12

## 2018-04-12 RX ORDER — LABETALOL HYDROCHLORIDE 5 MG/ML
10 INJECTION, SOLUTION INTRAVENOUS
Status: CANCELLED | OUTPATIENT
Start: 2018-04-12

## 2018-04-12 RX ORDER — HYDROMORPHONE HYDROCHLORIDE 1 MG/ML
.3-.5 INJECTION, SOLUTION INTRAMUSCULAR; INTRAVENOUS; SUBCUTANEOUS EVERY 5 MIN PRN
Status: CANCELLED | OUTPATIENT
Start: 2018-04-12

## 2018-04-12 RX ADMIN — SODIUM CHLORIDE, POTASSIUM CHLORIDE, SODIUM LACTATE AND CALCIUM CHLORIDE: 600; 310; 30; 20 INJECTION, SOLUTION INTRAVENOUS at 17:35

## 2018-04-12 RX ADMIN — GLUCAGON HYDROCHLORIDE 0.5 MG: KIT at 15:58

## 2018-04-12 RX ADMIN — MIDAZOLAM 2 MG: 1 INJECTION INTRAMUSCULAR; INTRAVENOUS at 09:35

## 2018-04-12 RX ADMIN — PROPOFOL 75 MCG/KG/MIN: 10 INJECTION, EMULSION INTRAVENOUS at 15:39

## 2018-04-12 RX ADMIN — MIDAZOLAM 2 MG: 1 INJECTION INTRAMUSCULAR; INTRAVENOUS at 15:31

## 2018-04-12 RX ADMIN — DEXMEDETOMIDINE HYDROCHLORIDE 8 MCG: 100 INJECTION, SOLUTION INTRAVENOUS at 15:39

## 2018-04-12 RX ADMIN — FENTANYL CITRATE 100 MCG: 50 INJECTION, SOLUTION INTRAMUSCULAR; INTRAVENOUS at 09:33

## 2018-04-12 RX ADMIN — GLUCAGON HYDROCHLORIDE 0.5 MG: KIT at 15:55

## 2018-04-12 RX ADMIN — DEXMEDETOMIDINE HYDROCHLORIDE 8 MCG: 100 INJECTION, SOLUTION INTRAVENOUS at 15:31

## 2018-04-12 RX ADMIN — FENTANYL CITRATE 25 MCG: 50 INJECTION, SOLUTION INTRAMUSCULAR; INTRAVENOUS at 15:39

## 2018-04-12 RX ADMIN — ONDANSETRON 4 MG: 2 INJECTION INTRAMUSCULAR; INTRAVENOUS at 15:57

## 2018-04-12 RX ADMIN — LIDOCAINE HYDROCHLORIDE 80 MG: 20 INJECTION, SOLUTION INFILTRATION; PERINEURAL at 15:39

## 2018-04-12 RX ADMIN — SODIUM CHLORIDE, POTASSIUM CHLORIDE, SODIUM LACTATE AND CALCIUM CHLORIDE: 600; 310; 30; 20 INJECTION, SOLUTION INTRAVENOUS at 15:20

## 2018-04-12 RX ADMIN — MIDAZOLAM 2 MG: 1 INJECTION INTRAMUSCULAR; INTRAVENOUS at 09:31

## 2018-04-12 ASSESSMENT — LIFESTYLE VARIABLES: TOBACCO_USE: 0

## 2018-04-12 ASSESSMENT — ENCOUNTER SYMPTOMS
SEIZURES: 0
DYSRHYTHMIAS: 0

## 2018-04-12 ASSESSMENT — COPD QUESTIONNAIRES: COPD: 0

## 2018-04-12 NOTE — OR NURSING
ERCP note:  Sedation and monitoring per anesthesia.  Unable to retrieve stent.  See MD notes for details.  No specimens obtained.

## 2018-04-12 NOTE — ANESTHESIA CARE TRANSFER NOTE
Patient: Ronnie Lagos    Procedure(s):  ENDOSCOPIC RETROGRADE CHOLANIOPANCREALOGRAPHY AND ATTEMPTED STENT REMOVAL. - Wound Class: II-Clean Contaminated    Diagnosis: .  Diagnosis Additional Information: No value filed.    Anesthesia Type:   MAC     Note:  Airway :Nasal Cannula  Patient transferred to:PACU  Comments: At end of procedure, spontaneous respirations, patient alert to voice, able to follow commands. Oxygen via nasal cannula at 2 liters per minute to PACU. Oxygen tubing connected to wall O2 in PACU, SpO2, NiBP, and EKG monitors and alarms on and functioning, report on patient's clinical status given to PACU RN, RN questions answered.Handoff Report: Identifed the Patient, Identified the Reponsible Provider, Reviewed the pertinent medical history, Discussed the surgical course, Reviewed Intra-OP anesthesia mangement and issues during anesthesia, Set expectations for post-procedure period and Allowed opportunity for questions and acknowledgement of understanding      Vitals: (Last set prior to Anesthesia Care Transfer)    CRNA VITALS  4/12/2018 1540 - 4/12/2018 1618      4/12/2018             Pulse: 59    SpO2: 96 %    Resp Rate (set): 10                Electronically Signed By: VERONICA Irving CRNA  April 12, 2018  4:18 PM

## 2018-04-12 NOTE — ANESTHESIA POSTPROCEDURE EVALUATION
Patient: oRnnie Lagos    Procedure(s):  ENDOSCOPIC RETROGRADE CHOLANIOPANCREATOGRAPHY AND ATTEMPTED STENT REMOVAL. - Wound Class: II-Clean Contaminated    Diagnosis:.  Diagnosis Additional Information: No value filed.    Anesthesia Type:  MAC    Note:  Anesthesia Post Evaluation    Patient location during evaluation: PACU  Patient participation: Able to fully participate in evaluation  Level of consciousness: awake and alert  Pain management: adequate  Airway patency: patent  Cardiovascular status: acceptable  Respiratory status: acceptable and unassisted  Hydration status: acceptable  PONV: none             Last vitals:  Vitals:    04/12/18 1030 04/12/18 1035 04/12/18 1615   BP: 135/81 (!) 129/93 148/77   Resp: 16 18 9   Temp:   36.6  C (97.8  F)   SpO2: 96% 95%          Electronically Signed By: Duane Ramirez MD  April 12, 2018  4:34 PM

## 2018-04-12 NOTE — ANESTHESIA PREPROCEDURE EVALUATION
Anesthesia Evaluation     . Pt has had prior anesthetic. Type: MAC    No history of anesthetic complications          ROS/MED HX    ENT/Pulmonary:  - neg pulmonary ROS    (-) tobacco use, asthma, COPD and sleep apnea   Neurologic:     (+)CVA    (-) seizures, TIA and migraines   Cardiovascular:     (+) hypertension----. : . . . :. .      (-) CAD, CASE, arrhythmias, valvular problems/murmurs and dyslipidemia   METS/Exercise Tolerance:  >4 METS   Hematologic:  - neg hematologic  ROS      (-) history of blood clots, anemia and other hematologic disorder   Musculoskeletal:  - neg musculoskeletal ROS      (-) arthritis   GI/Hepatic:     (+) GERD Asymptomatic on medication,      (-) liver disease   Renal/Genitourinary:  - ROS Renal section negative    (-) renal disease and nephrolithiasis   Endo:  - neg endo ROS    (-) Type I DM, Type II DM and thyroid disease   Psychiatric:         Infectious Disease:  - neg infectious disease ROS      (-) Recent Fever   Malignancy:      - no malignancy   Other:    - neg other ROS                 Physical Exam  Normal systems: cardiovascular and pulmonary    Airway   Mallampati: II  TM distance: >3 FB  Neck ROM: full    Dental   (+) caps    Cardiovascular       Pulmonary                     Anesthesia Plan      History & Physical Review  History and physical reviewed and following examination; no interval change.    ASA Status:  2 .    NPO Status:  > 8 hours    Plan for MAC Reason for MAC:  Other - see comments  PONV prophylaxis:  Ondansetron (or other 5HT-3) and Dexamethasone or Solumedrol       Postoperative Care  Postoperative pain management:  Multi-modal analgesia.      Consents  Anesthetic plan, risks, benefits and alternatives discussed with:  Patient..                          .

## 2018-05-10 ENCOUNTER — APPOINTMENT (OUTPATIENT)
Dept: GENERAL RADIOLOGY | Facility: CLINIC | Age: 63
End: 2018-05-10
Attending: INTERNAL MEDICINE
Payer: COMMERCIAL

## 2018-05-10 ENCOUNTER — SURGERY (OUTPATIENT)
Age: 63
End: 2018-05-10

## 2018-05-10 ENCOUNTER — ANESTHESIA (OUTPATIENT)
Dept: SURGERY | Facility: CLINIC | Age: 63
End: 2018-05-10
Payer: COMMERCIAL

## 2018-05-10 ENCOUNTER — HOSPITAL ENCOUNTER (OUTPATIENT)
Facility: CLINIC | Age: 63
Discharge: HOME | End: 2018-05-10
Attending: INTERNAL MEDICINE | Admitting: INTERNAL MEDICINE
Payer: COMMERCIAL

## 2018-05-10 ENCOUNTER — ANESTHESIA EVENT (OUTPATIENT)
Dept: SURGERY | Facility: CLINIC | Age: 63
End: 2018-05-10
Payer: COMMERCIAL

## 2018-05-10 VITALS
BODY MASS INDEX: 31.67 KG/M2 | SYSTOLIC BLOOD PRESSURE: 148 MMHG | WEIGHT: 213.8 LBS | DIASTOLIC BLOOD PRESSURE: 87 MMHG | OXYGEN SATURATION: 95 % | RESPIRATION RATE: 17 BRPM | TEMPERATURE: 97.5 F | HEART RATE: 55 BPM | HEIGHT: 69 IN

## 2018-05-10 LAB — ERCP: NORMAL

## 2018-05-10 PROCEDURE — 71000013 ZZH RECOVERY PHASE 1 LEVEL 1 EA ADDTL HR: Performed by: INTERNAL MEDICINE

## 2018-05-10 PROCEDURE — 36000052 ZZH SURGERY LEVEL 2 EA 15 ADDTL MIN: Performed by: INTERNAL MEDICINE

## 2018-05-10 PROCEDURE — 74330 X-RAY BILE/PANC ENDOSCOPY: CPT

## 2018-05-10 PROCEDURE — 25000125 ZZHC RX 250: Performed by: NURSE ANESTHETIST, CERTIFIED REGISTERED

## 2018-05-10 PROCEDURE — 37000009 ZZH ANESTHESIA TECHNICAL FEE, EACH ADDTL 15 MIN: Performed by: INTERNAL MEDICINE

## 2018-05-10 PROCEDURE — 25000128 H RX IP 250 OP 636: Performed by: NURSE ANESTHETIST, CERTIFIED REGISTERED

## 2018-05-10 PROCEDURE — 36000050 ZZH SURGERY LEVEL 2 1ST 30 MIN: Performed by: INTERNAL MEDICINE

## 2018-05-10 PROCEDURE — 40000170 ZZH STATISTIC PRE-PROCEDURE ASSESSMENT II: Performed by: INTERNAL MEDICINE

## 2018-05-10 PROCEDURE — 37000008 ZZH ANESTHESIA TECHNICAL FEE, 1ST 30 MIN: Performed by: INTERNAL MEDICINE

## 2018-05-10 PROCEDURE — 71000027 ZZH RECOVERY PHASE 2 EACH 15 MINS: Performed by: INTERNAL MEDICINE

## 2018-05-10 PROCEDURE — 25000566 ZZH SEVOFLURANE, EA 15 MIN: Performed by: INTERNAL MEDICINE

## 2018-05-10 PROCEDURE — 71000012 ZZH RECOVERY PHASE 1 LEVEL 1 FIRST HR: Performed by: INTERNAL MEDICINE

## 2018-05-10 RX ORDER — SODIUM CHLORIDE, SODIUM LACTATE, POTASSIUM CHLORIDE, CALCIUM CHLORIDE 600; 310; 30; 20 MG/100ML; MG/100ML; MG/100ML; MG/100ML
INJECTION, SOLUTION INTRAVENOUS CONTINUOUS PRN
Status: DISCONTINUED | OUTPATIENT
Start: 2018-05-10 | End: 2018-05-10

## 2018-05-10 RX ORDER — SODIUM CHLORIDE, SODIUM LACTATE, POTASSIUM CHLORIDE, CALCIUM CHLORIDE 600; 310; 30; 20 MG/100ML; MG/100ML; MG/100ML; MG/100ML
INJECTION, SOLUTION INTRAVENOUS CONTINUOUS
Status: DISCONTINUED | OUTPATIENT
Start: 2018-05-10 | End: 2018-05-10 | Stop reason: HOSPADM

## 2018-05-10 RX ORDER — NALOXONE HYDROCHLORIDE 0.4 MG/ML
.1-.4 INJECTION, SOLUTION INTRAMUSCULAR; INTRAVENOUS; SUBCUTANEOUS
Status: DISCONTINUED | OUTPATIENT
Start: 2018-05-10 | End: 2018-05-10 | Stop reason: HOSPADM

## 2018-05-10 RX ORDER — DEXAMETHASONE SODIUM PHOSPHATE 4 MG/ML
INJECTION, SOLUTION INTRA-ARTICULAR; INTRALESIONAL; INTRAMUSCULAR; INTRAVENOUS; SOFT TISSUE PRN
Status: DISCONTINUED | OUTPATIENT
Start: 2018-05-10 | End: 2018-05-10

## 2018-05-10 RX ORDER — FENTANYL CITRATE 50 UG/ML
25-50 INJECTION, SOLUTION INTRAMUSCULAR; INTRAVENOUS
Status: DISCONTINUED | OUTPATIENT
Start: 2018-05-10 | End: 2018-05-10 | Stop reason: HOSPADM

## 2018-05-10 RX ORDER — LIDOCAINE 40 MG/G
CREAM TOPICAL
Status: DISCONTINUED | OUTPATIENT
Start: 2018-05-10 | End: 2018-05-10 | Stop reason: HOSPADM

## 2018-05-10 RX ORDER — HYDROMORPHONE HYDROCHLORIDE 1 MG/ML
.3-.5 INJECTION, SOLUTION INTRAMUSCULAR; INTRAVENOUS; SUBCUTANEOUS EVERY 10 MIN PRN
Status: DISCONTINUED | OUTPATIENT
Start: 2018-05-10 | End: 2018-05-10 | Stop reason: HOSPADM

## 2018-05-10 RX ORDER — ONDANSETRON 2 MG/ML
4 INJECTION INTRAMUSCULAR; INTRAVENOUS EVERY 30 MIN PRN
Status: DISCONTINUED | OUTPATIENT
Start: 2018-05-10 | End: 2018-05-10 | Stop reason: HOSPADM

## 2018-05-10 RX ORDER — PROPOFOL 10 MG/ML
INJECTION, EMULSION INTRAVENOUS PRN
Status: DISCONTINUED | OUTPATIENT
Start: 2018-05-10 | End: 2018-05-10

## 2018-05-10 RX ORDER — FENTANYL CITRATE 50 UG/ML
INJECTION, SOLUTION INTRAMUSCULAR; INTRAVENOUS PRN
Status: DISCONTINUED | OUTPATIENT
Start: 2018-05-10 | End: 2018-05-10

## 2018-05-10 RX ORDER — GLYCOPYRROLATE 0.2 MG/ML
INJECTION, SOLUTION INTRAMUSCULAR; INTRAVENOUS PRN
Status: DISCONTINUED | OUTPATIENT
Start: 2018-05-10 | End: 2018-05-10

## 2018-05-10 RX ORDER — MEPERIDINE HYDROCHLORIDE 25 MG/ML
12.5 INJECTION INTRAMUSCULAR; INTRAVENOUS; SUBCUTANEOUS
Status: DISCONTINUED | OUTPATIENT
Start: 2018-05-10 | End: 2018-05-10 | Stop reason: HOSPADM

## 2018-05-10 RX ORDER — FLUMAZENIL 0.1 MG/ML
0.2 INJECTION, SOLUTION INTRAVENOUS
Status: DISCONTINUED | OUTPATIENT
Start: 2018-05-10 | End: 2018-05-10 | Stop reason: HOSPADM

## 2018-05-10 RX ORDER — ONDANSETRON 2 MG/ML
INJECTION INTRAMUSCULAR; INTRAVENOUS PRN
Status: DISCONTINUED | OUTPATIENT
Start: 2018-05-10 | End: 2018-05-10

## 2018-05-10 RX ORDER — INDOMETHACIN 50 MG/1
100 SUPPOSITORY RECTAL
Status: DISCONTINUED | OUTPATIENT
Start: 2018-05-10 | End: 2018-05-10 | Stop reason: HOSPADM

## 2018-05-10 RX ORDER — ONDANSETRON 4 MG/1
4 TABLET, ORALLY DISINTEGRATING ORAL EVERY 30 MIN PRN
Status: DISCONTINUED | OUTPATIENT
Start: 2018-05-10 | End: 2018-05-10 | Stop reason: HOSPADM

## 2018-05-10 RX ORDER — LIDOCAINE HYDROCHLORIDE 20 MG/ML
INJECTION, SOLUTION INFILTRATION; PERINEURAL PRN
Status: DISCONTINUED | OUTPATIENT
Start: 2018-05-10 | End: 2018-05-10

## 2018-05-10 RX ADMIN — DEXMEDETOMIDINE HYDROCHLORIDE 8 MCG: 100 INJECTION, SOLUTION INTRAVENOUS at 12:21

## 2018-05-10 RX ADMIN — FENTANYL CITRATE 100 MCG: 50 INJECTION, SOLUTION INTRAMUSCULAR; INTRAVENOUS at 12:18

## 2018-05-10 RX ADMIN — PROPOFOL 30 MG: 10 INJECTION, EMULSION INTRAVENOUS at 12:44

## 2018-05-10 RX ADMIN — GLYCOPYRROLATE 0.2 MG: 0.2 INJECTION, SOLUTION INTRAMUSCULAR; INTRAVENOUS at 12:30

## 2018-05-10 RX ADMIN — SUCCINYLCHOLINE CHLORIDE 100 MG: 20 INJECTION, SOLUTION INTRAMUSCULAR; INTRAVENOUS; PARENTERAL at 12:18

## 2018-05-10 RX ADMIN — DEXAMETHASONE SODIUM PHOSPHATE 4 MG: 4 INJECTION, SOLUTION INTRA-ARTICULAR; INTRALESIONAL; INTRAMUSCULAR; INTRAVENOUS; SOFT TISSUE at 12:28

## 2018-05-10 RX ADMIN — ONDANSETRON 4 MG: 2 INJECTION INTRAMUSCULAR; INTRAVENOUS at 12:27

## 2018-05-10 RX ADMIN — LIDOCAINE HYDROCHLORIDE 80 MG: 20 INJECTION, SOLUTION INFILTRATION; PERINEURAL at 12:18

## 2018-05-10 RX ADMIN — DEXMEDETOMIDINE HYDROCHLORIDE 12 MCG: 100 INJECTION, SOLUTION INTRAVENOUS at 12:29

## 2018-05-10 RX ADMIN — PROPOFOL 200 MG: 10 INJECTION, EMULSION INTRAVENOUS at 12:18

## 2018-05-10 RX ADMIN — SODIUM CHLORIDE, POTASSIUM CHLORIDE, SODIUM LACTATE AND CALCIUM CHLORIDE: 600; 310; 30; 20 INJECTION, SOLUTION INTRAVENOUS at 12:09

## 2018-05-10 RX ADMIN — PROPOFOL 100 MG: 10 INJECTION, EMULSION INTRAVENOUS at 12:29

## 2018-05-10 ASSESSMENT — ENCOUNTER SYMPTOMS
DYSRHYTHMIAS: 0
SEIZURES: 0

## 2018-05-10 ASSESSMENT — COPD QUESTIONNAIRES: COPD: 0

## 2018-05-10 ASSESSMENT — LIFESTYLE VARIABLES: TOBACCO_USE: 0

## 2018-05-10 NOTE — ANESTHESIA POSTPROCEDURE EVALUATION
Patient: Ronnie Lagos    Procedure(s):  ENDOSCOPIC RETROGRADE CHOLANGIOPANCREATOGRAPHY AND STENT REMOVAL - Wound Class: II-Clean Contaminated    Diagnosis:ABDOMINAL PAIN AND COMMON BILE DUCT STONE  Diagnosis Additional Information: No value filed.    Anesthesia Type:  General    Note:  Anesthesia Post Evaluation    Patient location during evaluation: bedside  Patient participation: Able to fully participate in evaluation  Level of consciousness: awake  Pain management: adequate  Airway patency: patent  Cardiovascular status: acceptable  Respiratory status: acceptable  Hydration status: acceptable  PONV: none     Anesthetic complications: None    Comments: No anesthetic complications noted.         Last vitals:  Vitals:    05/10/18 1345 05/10/18 1400 05/10/18 1457   BP: 121/69 118/73 148/87   Pulse:   55   Resp: 10 17    Temp: 36.2  C (97.2  F) 36.4  C (97.5  F)    SpO2: 97% 93% 95%         Electronically Signed By: Víctor Bates DO, DO  May 10, 2018  3:57 PM

## 2018-05-10 NOTE — ANESTHESIA PREPROCEDURE EVALUATION
Anesthesia Evaluation     . Pt has had prior anesthetic. Type: MAC    No history of anesthetic complications          ROS/MED HX    ENT/Pulmonary:  - neg pulmonary ROS    (-) tobacco use, asthma, COPD and sleep apnea   Neurologic:     (+)CVA    (-) seizures, TIA and migraines   Cardiovascular:     (+) hypertension----. : . . . :. .      (-) CAD, CASE, arrhythmias, valvular problems/murmurs and dyslipidemia   METS/Exercise Tolerance:  >4 METS   Hematologic:  - neg hematologic  ROS      (-) history of blood clots, anemia and other hematologic disorder   Musculoskeletal:  - neg musculoskeletal ROS      (-) arthritis   GI/Hepatic:     (+) GERD Asymptomatic on medication,      (-) liver disease   Renal/Genitourinary:  - ROS Renal section negative    (-) renal disease and nephrolithiasis   Endo:  - neg endo ROS    (-) Type I DM, Type II DM and thyroid disease   Psychiatric:         Infectious Disease:  - neg infectious disease ROS      (-) Recent Fever   Malignancy:      - no malignancy   Other:    - neg other ROS                 Physical Exam  Normal systems: cardiovascular and pulmonary    Airway   Mallampati: II  TM distance: >3 FB  Neck ROM: full    Dental   (+) caps    Cardiovascular       Pulmonary                         Anesthesia Plan      History & Physical Review  History and physical reviewed and following examination; no interval change.    ASA Status:  2 .    NPO Status:  > 8 hours    Plan for General, RSI and ETT with Intravenous induction. Maintenance will be Balanced.    PONV prophylaxis:  Ondansetron (or other 5HT-3) and Dexamethasone or Solumedrol       Postoperative Care  Postoperative pain management:  Multi-modal analgesia.      Consents  Anesthetic plan, risks, benefits and alternatives discussed with:  Patient..                          .

## 2018-05-10 NOTE — IP AVS SNAPSHOT
MRN:[id number]                      After Visit Summary   5/10/2018    Ronnie Lagos    MRN: [id number]           Thank you!     Thank you for choosing [facility] for your care. Our goal is always to provide you with excellent care. Hearing back from our patients is one way we can continue to improve our services. Please take a few minutes to complete the written survey that you may receive in the mail after you visit with us. Thank you!        Patient Information     Date Of Birth          1955        About your hospital stay     You were admitted on:  May 10, 2018 You last received care in the:  [facility] PACU    You were discharged on:  May 10, 2018       Who to Call     For medical emergencies, please call 911.  For non-urgent questions about your medical care, please call your primary care provider or clinic, [phone]  For questions related to your surgery, please call your surgery clinic        Attending Provider     Provider Specialty    Christiano Sorenson MD Gastroenterology       Primary Care Provider Office Phone # Fax #    Abdiel Jones [phone]      After Care Instructions     Discharge Instructions       Resume pre procedure diet            Discharge Instructions       Restart home medications.                  Your next 10 appointments already scheduled     Jun 07, 2018  1:15 PM CDT   Return Visit with Burton Park MD   [facility] ([facility]    [address] [place] [id number] [phone]              Further instructions from your care team       Same Day Surgery Discharge Instructions for  Sedation and General Anesthesia       It's not unusual to feel dizzy, light-headed or faint for up to 24 hours after surgery or while taking pain medication.  If you have these symptoms: sit for a few minutes before standing and have someone assist you when you get up to walk  or use the bathroom.      You should rest and relax for the next 24 hours. We recommend you make arrangements to have an adult stay with you for at least 24 hours after your discharge.  Avoid hazardous and strenuous activity.      DO NOT DRIVE any vehicle or operate mechanical equipment for 24 hours following the end of your surgery.  Even though you may feel normal, your reactions may be affected by the medication you have received.      Do not drink alcoholic beverages for 24 hours following surgery.       Slowly progress to your regular diet as you feel able. It's not unusual to feel nauseated and/or vomit after receiving anesthesia.  If you develop these symptoms, drink clear liquids (apple juice, ginger ale, broth, 7-up, etc. ) until you feel better.  If your nausea and vomiting persists for 24 hours, please notify your surgeon.        All narcotic pain medications, along with inactivity and anesthesia, can cause constipation. Drinking plenty of liquids and increasing fiber intake will help.      For any questions of a medical nature, call your surgeon.      Do not make important decisions for 24 hours.      If you had general anesthesia, you may have a sore throat for a couple of days related to the breathing tube used during surgery.  You may use Cepacol lozenges to help with this discomfort.  If it worsens or if you develop a fever, contact your surgeon.       If you feel your pain is not well managed with the pain medications prescribed by your surgeon, please contact your surgeon's office to let them know so they can address your concerns.     **If you have questions or concerns about your procedure, call   Dr. Sorenson at [phone].**          Pending Results     Date and Time Order Name Status Description    5/10/2018 1304 XR ERCP In process             Admission Information     Date & Time Provider Department Dept. Phone    5/10/2018 Christiano Sorenson MD [facility] PACU [phone]     "  Your Vitals Were     Blood Pressure Pulse Temperature Respirations Height Weight    118/73 59 97.5  F (36.4  C) 17 1.753 m (5' 9\") 97 kg (213 lb 12.8 oz)    Pulse Oximetry BMI (Body Mass Index)                93% 31.57 kg/m2          MyChart Information     [employer] gives you secure access to your electronic health record. If you see a primary care provider, you can also send messages to your care team and make appointments. If you have questions, please call your primary care clinic.  If you do not have a primary care provider, please call [phone] and they will assist you.        Care EveryWhere ID     This is your Care EveryWhere ID. This could be used by other organizations to access your [facility] medical records  [id number]        Equal Access to Services     PHILL LLANOS : Isaac De La Torre, chau manuel, cadence cagle, jaden rivera. So [facility] [phone].    ATENCIÓN: Si habla español, tiene a rosen disposición servicios gratuitos de asistencia lingüística. Llame al [phone].    We comply with applicable federal civil rights laws and [place] laws. We do not discriminate on the basis of race, color, national origin, age, disability, sex, sexual orientation, or gender identity.               Review of your medicines      UNREVIEWED medicines. Ask your doctor about these medicines        Dose / Directions    amLODIPine 5 MG tablet   Commonly known as:  NORVASC        Dose:  1 tablet   Take 1 tablet by mouth daily   Refills:  3       atenolol 50 MG tablet   Commonly known as:  TENORMIN   Used for:  Essential hypertension, benign        Dose:  50 mg   Take 1 tablet (50 mg) by mouth daily   Quantity:  90 tablet   Refills:  3       fluorouracil 5 % cream   Commonly known as:  EFUDEX   Used for:  AK (actinic keratosis), Lentigo, SK (seborrheic keratosis), Inflamed seborrheic keratosis, History of skin cancer, Angioma        Apply to scalp twice daily " for two weeks   Quantity:  40 g   Refills:  1       IBUPROFEN PO        Refills:  0       omeprazole 20 MG CR capsule   Commonly known as:  priLOSEC   Used for:  Gastroesophageal reflux disease without esophagitis        Dose:  20 mg   Take 1 capsule (20 mg) by mouth daily   Quantity:  90 capsule   Refills:  3       ondansetron 4 MG ODT tab   Commonly known as:  ZOFRAN ODT        Dose:  4 mg   Take 1 tablet (4 mg) by mouth every 4 hours as needed for nausea   Quantity:  15 tablet   Refills:  0                Protect others around you: Learn how to safely use, store and throw away your medicines at www.disposemymeds.org.             Medication List: This is a list of all your medications and when to take them. Check marks below indicate your daily home schedule. Keep this list as a reference.      Medications           Morning Afternoon Evening Bedtime As Needed    amLODIPine 5 MG tablet   Commonly known as:  NORVASC   Take 1 tablet by mouth daily                                atenolol 50 MG tablet   Commonly known as:  TENORMIN   Take 1 tablet (50 mg) by mouth daily                                fluorouracil 5 % cream   Commonly known as:  EFUDEX   Apply to scalp twice daily for two weeks                                IBUPROFEN PO                                omeprazole 20 MG CR capsule   Commonly known as:  priLOSEC   Take 1 capsule (20 mg) by mouth daily                                ondansetron 4 MG ODT tab   Commonly known as:  ZOFRAN ODT   Take 1 tablet (4 mg) by mouth every 4 hours as needed for nausea                                          More Information        Discharge Instructions for ERCP (Endoscopic Retrograde Cholangiopancreatography)  You had a procedure known as an ERCP. Your healthcare provider performed the ERCP to look at your bile or pancreatic ducts, and to locate and treat blockages in the ducts. This procedure is used to diagnose diseases of the pancreas, bile ducts, and pancreatic  duct, liver, and gallbladder. Here s what you need to do after your ERCP.  Home care    Don t take aspirin or any other blood-thinning medicines (anticoagulants) until your healthcare provider says it s OK.    Your healthcare provider may prescribe an antibiotic, depending on what was done during the ERCP.    You may have a sore throat for 1 to 2 days after the procedure. Use lozenges or gargle with salt water for your sore throat. If you're not better in a few days, call your healthcare provider.    Rest, drink fluids, and eat light meals. If you feel bloated or have too much gas, use a heating pad on your belly to help reduce the discomfort. This should help you feel better. But if it doesn't, call your healthcare provider.    Don t drink alcohol for 2 days after the procedure.    Follow your healthcare provider's advice about when to return to your normal routine.  Follow-up care  Make a follow-up appointment as directed by our staff.  When to seek medical care  Call your healthcare provider right away if you have any of the following:    Black or tarry stools    Chest pain or severe belly or abdominal pain    Fever of 100.4 F (38 C) or higher, or as directed by your healthcare provider    Trouble swallowing or throat pain that gets worse     Upset stomach (nausea) and vomiting   Date Last Reviewed: 11/1/2017 2000-2017 [employer]. [address] [place] [address], [place] [address]. All rights reserved. This information is not intended as a substitute for professional medical care. Always follow your healthcare professional's instructions.

## 2018-05-10 NOTE — DISCHARGE INSTRUCTIONS
Same Day Surgery Discharge Instructions for  Sedation and General Anesthesia       It's not unusual to feel dizzy, light-headed or faint for up to 24 hours after surgery or while taking pain medication.  If you have these symptoms: sit for a few minutes before standing and have someone assist you when you get up to walk or use the bathroom.      You should rest and relax for the next 24 hours. We recommend you make arrangements to have an adult stay with you for at least 24 hours after your discharge.  Avoid hazardous and strenuous activity.      DO NOT DRIVE any vehicle or operate mechanical equipment for 24 hours following the end of your surgery.  Even though you may feel normal, your reactions may be affected by the medication you have received.      Do not drink alcoholic beverages for 24 hours following surgery.       Slowly progress to your regular diet as you feel able. It's not unusual to feel nauseated and/or vomit after receiving anesthesia.  If you develop these symptoms, drink clear liquids (apple juice, ginger ale, broth, 7-up, etc. ) until you feel better.  If your nausea and vomiting persists for 24 hours, please notify your surgeon.        All narcotic pain medications, along with inactivity and anesthesia, can cause constipation. Drinking plenty of liquids and increasing fiber intake will help.      For any questions of a medical nature, call your surgeon.      Do not make important decisions for 24 hours.      If you had general anesthesia, you may have a sore throat for a couple of days related to the breathing tube used during surgery.  You may use Cepacol lozenges to help with this discomfort.  If it worsens or if you develop a fever, contact your surgeon.       If you feel your pain is not well managed with the pain medications prescribed by your surgeon, please contact your surgeon's office to let them know so they can address your concerns.     **If you have questions or concerns about your  procedure, call   Dr. Sorenson at [phone].**

## 2018-05-10 NOTE — ANESTHESIA CARE TRANSFER NOTE
Patient: Ronnie Lagos    Procedure(s):  ENDOSCOPIC RETROGRADE CHOLANGIOPANCREATOGRAPHY AND STENT REMOVAL - Wound Class: II-Clean Contaminated    Diagnosis: ABDOMINAL PAIN AND COMMON BILE DUCT STONE  Diagnosis Additional Information: No value filed.    Anesthesia Type:   General     Note:  Airway :Nasal Cannula  Patient transferred to:Phase II  Comments:   Transferred to  RN. Patient awake and verbal. Spontaneous res. Monitors and alarms on. VSS. Report given.      Vitals: (Last set prior to Anesthesia Care Transfer)    CRNA VITALS  5/10/2018 1231 - 5/10/2018 1307      5/10/2018             Pulse: 70    SpO2: 90 %    Resp Rate (observed): 24                Electronically Signed By: VERONICA Valerio CRNA  May 10, 2018  1:07 PM

## 2018-05-10 NOTE — IP AVS SNAPSHOT
[place] [address]    [place] [address]    Phone:  [phone]                                       After Visit Summary   5/10/2018    Ronnie Lagos    MRN: [id number]           After Visit Summary Signature Page     I have received my discharge instructions, and my questions have been answered. I have discussed any challenges I see with this plan with the nurse or doctor.    ..........................................................................................................................................  Patient/Patient Representative Signature      ..........................................................................................................................................  Patient Representative Print Name and Relationship to Patient    ..................................................               ................................................  Date                                            Time    ..........................................................................................................................................  Reviewed by Signature/Title    ...................................................              ..............................................  Date                                                            Time

## 2018-06-07 ENCOUNTER — OFFICE VISIT (OUTPATIENT)
Dept: DERMATOLOGY | Facility: CLINIC | Age: 63
End: 2018-06-07
Payer: COMMERCIAL

## 2018-06-07 VITALS — OXYGEN SATURATION: 95 % | HEART RATE: 63 BPM | SYSTOLIC BLOOD PRESSURE: 136 MMHG | DIASTOLIC BLOOD PRESSURE: 84 MMHG

## 2018-06-07 DIAGNOSIS — L81.4 LENTIGO: ICD-10-CM

## 2018-06-07 DIAGNOSIS — L82.1 SK (SEBORRHEIC KERATOSIS): ICD-10-CM

## 2018-06-07 DIAGNOSIS — L82.0 INFLAMED SEBORRHEIC KERATOSIS: ICD-10-CM

## 2018-06-07 DIAGNOSIS — Z87.2 HISTORY OF ACTINIC KERATOSES: ICD-10-CM

## 2018-06-07 DIAGNOSIS — Z85.828 HISTORY OF SKIN CANCER: Primary | ICD-10-CM

## 2018-06-07 PROCEDURE — 99213 OFFICE O/P EST LOW 20 MIN: CPT | Mod: 25 | Performed by: DERMATOLOGY

## 2018-06-07 PROCEDURE — 17110 DESTRUCTION B9 LES UP TO 14: CPT | Performed by: DERMATOLOGY

## 2018-06-07 NOTE — LETTER
6/7/2018         RE: Ronnie Lagos  [address] [place] [address]        Dear Colleague,    Thank you for referring your patient, Ronnie Lagos, to the [facility]. Please see a copy of my visit note below.    Ronnie Lagos is a 62 year old year old male patient here today for f/u hx of non-melanoma skin cancer.   He got a good response with efudex on scalp.  He states spot almost gone on pubis.  Patient reports the following symptoms:  none .  Patient reports the following previous treatments none.  Patient reports the following modifying factors none.  Associated symptoms: none.  Patient has no other skin complaints today.  Remainder of the HPI, Meds, PMH, Allergies, FH, and SH was reviewed in chart.    Pertinent Hx:   Non-melanoma skin cancer   Past Medical History:   Diagnosis Date     Abdominal pain      Calculus of bile duct      Elevated liver enzymes      Essential hypertension, benign     abstracted 6/19/02     Gastro-oesophageal reflux disease      GERD (gastroesophageal reflux disease) 7/28/2008     Liver disease     elevated liver enzymes     Squamous cell carcinoma      Unspecified cerebral artery occlusion with cerebral infarction      Unspecified condition of brain     abstracted 6/19/02       Past Surgical History:   Procedure Laterality Date     ARTHRODESIS FOOT  2/5/2013    Procedure: ARTHRODESIS FOOT;  LEFT FIRST METATARSOPHALANGEAL JOINT ARTHRODESIS ;  Surgeon: Burton Loera DPM;  Location:  [facility]     ENDOSCOPIC RETROGRADE CHOLANGIOPANCREATOGRAM N/A 1/18/2018    Procedure: COMBINED ENDOSCOPIC RETROGRADE CHOLANGIOPANCREATOGRAPHY, SPHINCTEROTOMY;  ENDOSCOPIC RETROGRADE CHOLANGIOPANCREATOGRAM (ERCP), SPHINCTEROTOMY, STONE REMOVAL, STENT PLACEMENT;  Surgeon: Christiano Sorenson MD;  Location:  OR     ENDOSCOPIC RETROGRADE CHOLANGIOPANCREATOGRAM N/A 4/12/2018    Procedure: ENDOSCOPIC RETROGRADE CHOLANGIOPANCREATOGRAM;  ENDOSCOPIC RETROGRADE  CHOLANIOPANCREATOGRAPHY AND ATTEMPTED STENT REMOVAL.;  Surgeon: Christiano Sorenson MD;  Location:  OR     ENDOSCOPIC RETROGRADE CHOLANGIOPANCREATOGRAM N/A 5/10/2018    Procedure: COMBINED ENDOSCOPIC RETROGRADE CHOLANGIOPANCREATOGRAPHY, REMOVE FOREIGN BODY OR STENT/TUBE;  ENDOSCOPIC RETROGRADE CHOLANGIOPANCREATOGRAPHY AND STENT REMOVAL;  Surgeon: Christiano Sorenson MD;  Location:  OR     ESOPHAGOSCOPY, GASTROSCOPY, DUODENOSCOPY (EGD), COMBINED N/A 4/12/2018    Procedure: COMBINED ESOPHAGOSCOPY, GASTROSCOPY, DUODENOSCOPY (EGD);  EGD with stent removal;  Surgeon: Christiano Sorenson MD;  Location:  GI     LAPAROSCOPIC CHOLECYSTECTOMY N/A 5/1/2015    Procedure: LAPAROSCOPIC CHOLECYSTECTOMY;  Surgeon: Damien Galindo MD;  Location:  [facility]     ORTHOPEDIC SURGERY      left elbow, right shoulder        Family History   Problem Relation Age of Onset     Hypertension Mother      Cancer - colorectal Mother      Hypertension Father      Melanoma No family hx of        Social History     Social History     Marital status:      Spouse name: N/A     Number of children: N/A     Years of education: N/A     Occupational History      [employer]     Social History Main Topics     Smoking status: Never Smoker     Smokeless tobacco: Never Used     Alcohol use Yes      Comment: 1/day     Drug use: No     Sexual activity: Yes     Partners: Female     Other Topics Concern     Not on file     Social History Narrative       Outpatient Encounter Prescriptions as of 6/7/2018   Medication Sig Dispense Refill     amLODIPine (NORVASC) 5 MG tablet Take 1 tablet by mouth daily  3     atenolol (TENORMIN) 50 MG tablet Take 1 tablet (50 mg) by mouth daily 90 tablet 3     fluorouracil (EFUDEX) 5 % cream Apply to scalp twice daily for two weeks 40 g 1     IBUPROFEN PO        omeprazole (PRILOSEC) 20 MG CR capsule Take 1 capsule (20 mg) by mouth daily 90 capsule 3     ondansetron (ZOFRAN ODT) 4 MG ODT tab  Take 1 tablet (4 mg) by mouth every 4 hours as needed for nausea 15 tablet 0     No facility-administered encounter medications on file as of 6/7/2018.              Review Of Systems  Skin: As above  Eyes: negative  Ears/Nose/Throat: negative  Respiratory: No shortness of breath, dyspnea on exertion, cough, or hemoptysis  Cardiovascular: negative  Gastrointestinal: negative  Genitourinary: negative  Musculoskeletal: negative  Neurologic: negative  Psychiatric: negative  Hematologic/Lymphatic/Immunologic: negative  Endocrine: negative      O:   NAD, WDWN, Alert & Oriented, Mood & Affect wnl, Vitals stable   Here today alone   There were no vitals taken for this visit.   General appearance normal   Vitals stable   Alert, oriented and in no acute distress     Scalp clear  L pubis inflamed seborrheic keratosis    Stuck on papules and brown macules on trunk and ext         The remainder of expanded problem focused exam was unremarkable; the following areas were examined:  scalp/hair, conjunctiva/lids, face, neck, lips, chest, digits/nails, RUE, LUE.      Eyes: Conjunctivae/lids:Normal     ENT: Lips, buccal mucosa, tongue: normal    MSK:Normal    Cardiovascular: peripheral edema none    Pulm: Breathing Normal    Lymph Nodes: No Head and Neck Lymphadenopathy     Neuro/Psych: Orientation:Normal; Mood/Affect:Normal      A/P:  1. L pubis inflamed seborrheic keratosis  LN2:  Treated with LN2 for 5s for 1-2 cycles. Warned risks of blistering, pain, pigment change, scarring, and incomplete resolution.  Advised patient to return if lesions do not completely resolve.  Wound care sheet given.  2. Seborrheic keratosis, lentigo , hx of non-melanoma skin cancer   BENIGN LESIONS DISCUSSED WITH PATIENT:  I discussed the specifics of tumor, prognosis, and genetics of benign lesions.  I explained that treatment of these lesions would be purely cosmetic and not medically neccessary.  I discussed with patient different removal options  including excision, cautery and /or laser.      Nature and genetics of benign skin lesions dicussed with patient.  Signs and Symptoms of skin cancer discussed with patient.  Patient encouraged to perform monthly skin exams.  UV precautions reviewed with patient.  Patient to follow up with Primary Care provider regarding elevated blood pressure.  Skin care regimen reviewed with patient: Eliminate harsh soaps, i.e. Dial, zest, irsih spring; Mild soaps such as Cetaphil or Dove sensitive skin, avoid hot or cold showers, aggressive use of emollients including vanicream, cetaphil or cerave discussed with patient.   Risks of non-melanoma skin cancer discussed with patient   Return to clinic 12 months      Again, thank you for allowing me to participate in the care of your patient.        Sincerely,        Burton Park MD

## 2018-06-07 NOTE — PROGRESS NOTES
Ronnie Lagos is a 62 year old year old male patient here today for f/u hx of non-melanoma skin cancer.   He got a good response with efudex on scalp.  He states spot almost gone on pubis.  Patient reports the following symptoms:  none .  Patient reports the following previous treatments none.  Patient reports the following modifying factors none.  Associated symptoms: none.  Patient has no other skin complaints today.  Remainder of the HPI, Meds, PMH, Allergies, FH, and SH was reviewed in chart.    Pertinent Hx:   Non-melanoma skin cancer   Past Medical History:   Diagnosis Date     Abdominal pain      Calculus of bile duct      Elevated liver enzymes      Essential hypertension, benign     abstracted 6/19/02     Gastro-oesophageal reflux disease      GERD (gastroesophageal reflux disease) 7/28/2008     Liver disease     elevated liver enzymes     Squamous cell carcinoma      Unspecified cerebral artery occlusion with cerebral infarction      Unspecified condition of brain     abstracted 6/19/02       Past Surgical History:   Procedure Laterality Date     ARTHRODESIS FOOT  2/5/2013    Procedure: ARTHRODESIS FOOT;  LEFT FIRST METATARSOPHALANGEAL JOINT ARTHRODESIS ;  Surgeon: Burton Loera DPM;  Location: [facility]     ENDOSCOPIC RETROGRADE CHOLANGIOPANCREATOGRAM N/A 1/18/2018    Procedure: COMBINED ENDOSCOPIC RETROGRADE CHOLANGIOPANCREATOGRAPHY, SPHINCTEROTOMY;  ENDOSCOPIC RETROGRADE CHOLANGIOPANCREATOGRAM (ERCP), SPHINCTEROTOMY, STONE REMOVAL, STENT PLACEMENT;  Surgeon: Christiano Sorenson MD;  Location:  OR     ENDOSCOPIC RETROGRADE CHOLANGIOPANCREATOGRAM N/A 4/12/2018    Procedure: ENDOSCOPIC RETROGRADE CHOLANGIOPANCREATOGRAM;  ENDOSCOPIC RETROGRADE CHOLANIOPANCREATOGRAPHY AND ATTEMPTED STENT REMOVAL.;  Surgeon: Christiano Sorenson MD;  Location:  OR     ENDOSCOPIC RETROGRADE CHOLANGIOPANCREATOGRAM N/A 5/10/2018    Procedure: COMBINED ENDOSCOPIC RETROGRADE CHOLANGIOPANCREATOGRAPHY, REMOVE FOREIGN  BODY OR STENT/TUBE;  ENDOSCOPIC RETROGRADE CHOLANGIOPANCREATOGRAPHY AND STENT REMOVAL;  Surgeon: Christiano Sorenson MD;  Location:  OR     ESOPHAGOSCOPY, GASTROSCOPY, DUODENOSCOPY (EGD), COMBINED N/A 4/12/2018    Procedure: COMBINED ESOPHAGOSCOPY, GASTROSCOPY, DUODENOSCOPY (EGD);  EGD with stent removal;  Surgeon: Christiano Sorenson MD;  Location:  GI     LAPAROSCOPIC CHOLECYSTECTOMY N/A 5/1/2015    Procedure: LAPAROSCOPIC CHOLECYSTECTOMY;  Surgeon: Damien Galindo MD;  Location:  [facility]     ORTHOPEDIC SURGERY      left elbow, right shoulder        Family History   Problem Relation Age of Onset     Hypertension Mother      Cancer - colorectal Mother      Hypertension Father      Melanoma No family hx of        Social History     Social History     Marital status:      Spouse name: N/A     Number of children: N/A     Years of education: N/A     Occupational History      [employer]     Social History Main Topics     Smoking status: Never Smoker     Smokeless tobacco: Never Used     Alcohol use Yes      Comment: 1/day     Drug use: No     Sexual activity: Yes     Partners: Female     Other Topics Concern     Not on file     Social History Narrative       Outpatient Encounter Prescriptions as of 6/7/2018   Medication Sig Dispense Refill     amLODIPine (NORVASC) 5 MG tablet Take 1 tablet by mouth daily  3     atenolol (TENORMIN) 50 MG tablet Take 1 tablet (50 mg) by mouth daily 90 tablet 3     fluorouracil (EFUDEX) 5 % cream Apply to scalp twice daily for two weeks 40 g 1     IBUPROFEN PO        omeprazole (PRILOSEC) 20 MG CR capsule Take 1 capsule (20 mg) by mouth daily 90 capsule 3     ondansetron (ZOFRAN ODT) 4 MG ODT tab Take 1 tablet (4 mg) by mouth every 4 hours as needed for nausea 15 tablet 0     No facility-administered encounter medications on file as of 6/7/2018.              Review Of Systems  Skin: As above  Eyes: negative  Ears/Nose/Throat: negative  Respiratory:  No shortness of breath, dyspnea on exertion, cough, or hemoptysis  Cardiovascular: negative  Gastrointestinal: negative  Genitourinary: negative  Musculoskeletal: negative  Neurologic: negative  Psychiatric: negative  Hematologic/Lymphatic/Immunologic: negative  Endocrine: negative      O:   NAD, WDWN, Alert & Oriented, Mood & Affect wnl, Vitals stable   Here today alone   There were no vitals taken for this visit.   General appearance normal   Vitals stable   Alert, oriented and in no acute distress     Scalp clear  L pubis inflamed seborrheic keratosis    Stuck on papules and brown macules on trunk and ext         The remainder of expanded problem focused exam was unremarkable; the following areas were examined:  scalp/hair, conjunctiva/lids, face, neck, lips, chest, digits/nails, RUE, LUE.      Eyes: Conjunctivae/lids:Normal     ENT: Lips, buccal mucosa, tongue: normal    MSK:Normal    Cardiovascular: peripheral edema none    Pulm: Breathing Normal    Lymph Nodes: No Head and Neck Lymphadenopathy     Neuro/Psych: Orientation:Normal; Mood/Affect:Normal      A/P:  1. L pubis inflamed seborrheic keratosis  LN2:  Treated with LN2 for 5s for 1-2 cycles. Warned risks of blistering, pain, pigment change, scarring, and incomplete resolution.  Advised patient to return if lesions do not completely resolve.  Wound care sheet given.  2. Seborrheic keratosis, lentigo , hx of non-melanoma skin cancer   BENIGN LESIONS DISCUSSED WITH PATIENT:  I discussed the specifics of tumor, prognosis, and genetics of benign lesions.  I explained that treatment of these lesions would be purely cosmetic and not medically neccessary.  I discussed with patient different removal options including excision, cautery and /or laser.      Nature and genetics of benign skin lesions dicussed with patient.  Signs and Symptoms of skin cancer discussed with patient.  Patient encouraged to perform monthly skin exams.  UV precautions reviewed with  patient.  Patient to follow up with Primary Care provider regarding elevated blood pressure.  Skin care regimen reviewed with patient: Eliminate harsh soaps, i.e. Dial, zest, irsih spring; Mild soaps such as Cetaphil or Dove sensitive skin, avoid hot or cold showers, aggressive use of emollients including vanicream, cetaphil or cerave discussed with patient.   Risks of non-melanoma skin cancer discussed with patient   Return to clinic 12 months

## 2018-06-07 NOTE — MR AVS SNAPSHOT
After Visit Summary   6/7/2018    Ronnie Lagos    MRN: [id number]           Patient Information     Date Of Birth          1955        Visit Information        Provider Department      6/7/2018 1:15 PM Burton Park MD [facility]        Today's Diagnoses     History of skin cancer    -  1    History of actinic keratoses        Lentigo        SK (seborrheic keratosis)          Care Instructions    WOUND CARE INSTRUCTIONS   FOR CRYOSURGERY   This area treated with liquid nitrogen will form a blister. You do not need to bandage the area until after the blister forms and breaks (which may be a few days). When the blister breaks, begin daily dressing changes as follows:   1) Clean and dry the area with tap water using clean Q-tip or sterile gauze pad.   2) Apply Polysporin ointment or Bacitracin ointment over entire wound. Do NOT use Neosporin ointment.   3) Cover the wound with a band-aid or sterile non-stick gauze pad and micropore paper tape.   REPEAT THESE INSTRUCTIONS AT LEAST ONCE A DAY UNTIL THE WOUND HAS COMPLETELY HEALED.   It is an old wives tale that a wound heals better when it is exposed to air and allowed to dry out. The wound will heal faster with a better cosmetic result if it is kept moist with ointment and covered with a bandage.   Do not let the wound dry out.   IMPORTANT INFORMATION ON REVERSE SIDE   Supplies Needed:   *Cotton tipped applicators (Q-tips)   *Polysporin ointment or Bacitracin ointment (NOT NEOSPORIN)   *Band-aids, or non stick gauze pads and micropore paper tape   PATIENT INFORMATION   During the healing process you will notice a number of changes. All wounds develop a small halo of redness surrounding the wound. This means healing is occurring. Severe itching with extensive redness usually indicates sensitivity to the ointment or bandage tape used to dress the wound. You should call our office if this develops.   Swelling and/or  discoloration around your surgical site is common, particularly when performed around the eye.   All wounds normally drain. The larger the wound the more drainage there will be. After 7-10 days, you will notice the wound beginning to shrink and new skin will begin to grow. The wound is healed when you can see skin has formed over the entire area. A healed wound has a healthy, shiny look to the surface and is red to dark pink in color to normalize. Wounds may take approximately 4-6 weeks to heal. Larger wounds may take 6-8 weeks. After the wound is healed you may discontinue dressing changes.   You may experience a sensation of tightness as your wound heals. This is normal and will gradually subside.   Your healed wound may be sensitive to temperature changes. This sensitivity improves with time, but if you re having a lot of discomfort, try to avoid temperature extremes.   Patients frequently experience itching after their wound appears to have healed because of the continue healing under the skin. Plain Vaseline will help relieve the itching.                 Follow-ups after your visit        Who to contact     If you have questions or need follow up information about today's clinic visit or your schedule please contact [facility] directly at [phone].  Normal or non-critical lab and imaging results will be communicated to you by MyChart, letter or phone within 4 business days after the clinic has received the results. If you do not hear from us within 7 days, please contact the clinic through KongZhonghart or phone. If you have a critical or abnormal lab result, we will notify you by phone as soon as possible.  Submit refill requests through Shop[employer] or call your pharmacy and they will forward the refill request to us. Please allow 3 business days for your refill to be completed.          Additional Information About Your Visit        Shopcliq Information     [employer] gives you secure access to  your electronic health record. If you see a primary care provider, you can also send messages to your care team and make appointments. If you have questions, please call your primary care clinic.  If you do not have a primary care provider, please call [phone] and they will assist you.        Care EveryWhere ID     This is your Care EveryWhere ID. This could be used by other organizations to access your [place] medical records  [id number]        Your Vitals Were     Pulse Pulse Oximetry                63 95%           Blood Pressure from Last 3 Encounters:   06/07/18 136/84   05/10/18 148/87   04/12/18 (!) 144/95    Weight from Last 3 Encounters:   05/10/18 97 kg (213 lb 12.8 oz)   04/12/18 93 kg (205 lb)   01/18/18 92.1 kg (203 lb)              Today, you had the following     No orders found for display       Primary Care Provider Office Phone # Fax #    Abdiel Jones [phone]       [address] [place] [id number]        Equal Access to Services     [facility]: Hadii aad ku hadasho Soomaali, waaxda luqadaha, qaybta kaalmada adeegyada, waxay garry hayana wade . So [facility] [phone].    ATENCIÓN: Si habla español, tiene a rosen disposición servicios gratuitos de asistencia lingüística. Llame al [phone].    We comply with applicable federal civil rights laws and [place] laws. We do not discriminate on the basis of race, color, national origin, age, disability, sex, sexual orientation, or gender identity.            Thank you!     Thank you for choosing [facility]  for your care. Our goal is always to provide you with excellent care. Hearing back from our patients is one way we can continue to improve our services. Please take a few minutes to complete the written survey that you may receive in the mail after your visit with us. Thank you!             Your Updated Medication List - Protect others around you: Learn how to safely  use, store and throw away your medicines at www.disposemymeds.org.          This list is accurate as of 6/7/18  1:25 PM.  Always use your most recent med list.                   Brand Name Dispense Instructions for use Diagnosis    amLODIPine 5 MG tablet    NORVASC     Take 1 tablet by mouth daily        atenolol 50 MG tablet    TENORMIN    90 tablet    Take 1 tablet (50 mg) by mouth daily    Essential hypertension, benign       fluorouracil 5 % cream    EFUDEX    40 g    Apply to scalp twice daily for two weeks    AK (actinic keratosis), Lentigo, SK (seborrheic keratosis), Inflamed seborrheic keratosis, History of skin cancer, Angioma       IBUPROFEN PO           omeprazole 20 MG CR capsule    priLOSEC    90 capsule    Take 1 capsule (20 mg) by mouth daily    Gastroesophageal reflux disease without esophagitis

## 2018-10-29 ENCOUNTER — TELEPHONE (OUTPATIENT)
Dept: INTERNAL MEDICINE | Facility: CLINIC | Age: 63
End: 2018-10-29

## 2018-10-29 DIAGNOSIS — Z12.11 ENCOUNTER FOR SCREENING COLONOSCOPY: Primary | ICD-10-CM

## 2018-10-29 NOTE — TELEPHONE ENCOUNTER
Left message for patient to call back.  Is there a specific clinic he is hoping to be seen at?  Is patient experiencing any symptoms?

## 2018-10-29 NOTE — TELEPHONE ENCOUNTER
Pt is Due for colonoscopy in dec. His last one was 5 years ago. And he Wants to get it done before the end of the year because he has met his insurance deductible. Last colonoscopy was done at [facility] and Pt would like to go there again. Pt is not having any symptoms-just needs a routine screening. Order is pending for PCP to review and sign.

## 2018-11-03 DIAGNOSIS — I10 ESSENTIAL HYPERTENSION, BENIGN: ICD-10-CM

## 2018-11-03 DIAGNOSIS — K21.9 GASTROESOPHAGEAL REFLUX DISEASE WITHOUT ESOPHAGITIS: ICD-10-CM

## 2018-11-03 NOTE — TELEPHONE ENCOUNTER
"Requested Prescriptions   Pending Prescriptions Disp Refills     atenolol (TENORMIN) 50 MG tablet [Pharmacy Med Name: Atenolol Oral Tablet 50 MG] 90 tablet 2    Last Written Prescription Date:  11/9/2017  Last Fill Quantity: 90,  # refills: 3   Last office visit: 11/16/2017 with prescribing provider:  11/16/2017   Future Office Visit:     Sig: TAKE ONE TABLET BY MOUTH ONE TIME DAILY    Beta-Blockers Protocol Passed    11/3/2018  7:00 AM       Passed - Blood pressure under 140/90 in past 12 months    BP Readings from Last 3 Encounters:   06/07/18 136/84   05/10/18 148/87   04/12/18 (!) 144/95                Passed - Patient is age 6 or older       Passed - Recent (12 mo) or future (30 days) visit within the authorizing provider's specialty    Patient had office visit in the last 12 months or has a visit in the next 30 days with authorizing provider or within the authorizing provider's specialty.  See \"Patient Info\" tab in [email or web address], or \"Choose Columns\" in Meds & Orders section of the refill encounter.              omeprazole (PRILOSEC) 20 MG CR capsule [Pharmacy Med Name: Omeprazole Oral Capsule Delayed Release 20 MG] 90 capsule 2    Last Written Prescription Date:  11/9/2017  Last Fill Quantity: 90,  # refills: 3   Last office visit: 11/16/2017 with prescribing provider:  11/16/2017   Future Office Visit:     Sig: TAKE ONE CAPSULE BY MOUTH ONE TIME DAILY    PPI Protocol Passed    11/3/2018  7:00 AM       Passed - Not on Clopidogrel (unless Pantoprazole ordered)       Passed - No diagnosis of osteoporosis on record       Passed - Recent (12 mo) or future (30 days) visit within the authorizing provider's specialty    Patient had office visit in the last 12 months or has a visit in the next 30 days with authorizing provider or within the authorizing provider's specialty.  See \"Patient Info\" tab in [email or web address], or \"Choose Columns\" in Meds & Orders section of the refill encounter.             Passed - Patient is age 18 or " "older        amLODIPine (NORVASC) 5 MG tablet [Pharmacy Med Name: AmLODIPine Besylate Oral Tablet 5 MG] 90 tablet 2    Last Written Prescription Date:  11/14/2017  Last Fill Quantity: -,  # refills: 3   Last office visit: 11/16/2017 with prescribing provider:  11/16/2017   Future Office Visit:     Sig: TAKE ONE TABLET BY MOUTH ONE TIME DAILY    Calcium Channel Blockers Protocol  Passed    11/3/2018  7:00 AM       Passed - Blood pressure under 140/90 in past 12 months    BP Readings from Last 3 Encounters:   06/07/18 136/84   05/10/18 148/87   04/12/18 (!) 144/95                Passed - Recent (12 mo) or future (30 days) visit within the authorizing provider's specialty    Patient had office visit in the last 12 months or has a visit in the next 30 days with authorizing provider or within the authorizing provider's specialty.  See \"Patient Info\" tab in inbasket, or \"Choose Columns\" in Meds & Orders section of the refill encounter.             Passed - Patient is age 18 or older       Passed - Normal serum creatinine on file in past 12 months    Recent Labs   Lab Test  01/12/18   0350   CR  1.00               "

## 2018-11-03 NOTE — LETTER
[facility]  [address] [id number] [phone]            Ronnie Lagos  [address]  [place] [address]        November 6, 2018    Dear Ronnie,    While refilling your prescription today, we noticed that you are due for an appointment with your provider.  We will refill your prescription for 30 days, but a follow-up appointment must be made before any additional refills can be approved.     Taking care of your health is important to us and we look forward to seeing you in the near future.  Please call us at [phone] or [phone] (or use User Replay) to schedule an appointment.     Please disregard this notice if you have already made an appointment.    Sincerely,        [facility]

## 2018-11-06 RX ORDER — AMLODIPINE BESYLATE 5 MG/1
TABLET ORAL
Qty: 90 TABLET | Refills: 0 | Status: SHIPPED | OUTPATIENT
Start: 2018-11-06 | End: 2020-01-30

## 2018-11-06 RX ORDER — ATENOLOL 50 MG/1
TABLET ORAL
Qty: 90 TABLET | Refills: 0 | Status: SHIPPED | OUTPATIENT
Start: 2018-11-06 | End: 2018-11-29

## 2018-11-29 ENCOUNTER — OFFICE VISIT (OUTPATIENT)
Dept: INTERNAL MEDICINE | Facility: CLINIC | Age: 63
End: 2018-11-29
Payer: COMMERCIAL

## 2018-11-29 VITALS
SYSTOLIC BLOOD PRESSURE: 128 MMHG | HEIGHT: 69 IN | RESPIRATION RATE: 14 BRPM | TEMPERATURE: 97.5 F | BODY MASS INDEX: 31.73 KG/M2 | DIASTOLIC BLOOD PRESSURE: 80 MMHG | HEART RATE: 74 BPM | WEIGHT: 214.2 LBS | OXYGEN SATURATION: 96 %

## 2018-11-29 DIAGNOSIS — Z12.5 SCREENING PSA (PROSTATE SPECIFIC ANTIGEN): Primary | ICD-10-CM

## 2018-11-29 DIAGNOSIS — K21.9 GASTROESOPHAGEAL REFLUX DISEASE WITHOUT ESOPHAGITIS: ICD-10-CM

## 2018-11-29 DIAGNOSIS — I10 ESSENTIAL HYPERTENSION, BENIGN: ICD-10-CM

## 2018-11-29 DIAGNOSIS — Z11.4 SCREENING FOR HIV (HUMAN IMMUNODEFICIENCY VIRUS): ICD-10-CM

## 2018-11-29 DIAGNOSIS — Z12.11 SCREEN FOR COLON CANCER: ICD-10-CM

## 2018-11-29 DIAGNOSIS — Z13.6 CARDIOVASCULAR SCREENING; LDL GOAL LESS THAN 160: ICD-10-CM

## 2018-11-29 LAB
ALBUMIN SERPL-MCNC: 3.9 G/DL (ref 3.4–5)
ALP SERPL-CCNC: 51 U/L (ref 40–150)
ALT SERPL W P-5'-P-CCNC: 71 U/L (ref 0–70)
ANION GAP SERPL CALCULATED.3IONS-SCNC: 6 MMOL/L (ref 3–14)
AST SERPL W P-5'-P-CCNC: 63 U/L (ref 0–45)
BILIRUB SERPL-MCNC: 1 MG/DL (ref 0.2–1.3)
BUN SERPL-MCNC: 16 MG/DL (ref 7–30)
CALCIUM SERPL-MCNC: 9.1 MG/DL (ref 8.5–10.1)
CHLORIDE SERPL-SCNC: 105 MMOL/L (ref 94–109)
CHOLEST SERPL-MCNC: 167 MG/DL
CO2 SERPL-SCNC: 27 MMOL/L (ref 20–32)
CREAT SERPL-MCNC: 0.98 MG/DL (ref 0.66–1.25)
GFR SERPL CREATININE-BSD FRML MDRD: 78 ML/MIN/1.7M2
GLUCOSE SERPL-MCNC: 99 MG/DL (ref 70–99)
HDLC SERPL-MCNC: 52 MG/DL
LDLC SERPL CALC-MCNC: 65 MG/DL
NONHDLC SERPL-MCNC: 115 MG/DL
POTASSIUM SERPL-SCNC: 4.1 MMOL/L (ref 3.4–5.3)
PROT SERPL-MCNC: 7.3 G/DL (ref 6.8–8.8)
PSA SERPL-ACNC: 1.72 UG/L (ref 0–4)
SODIUM SERPL-SCNC: 138 MMOL/L (ref 133–144)
TRIGL SERPL-MCNC: 249 MG/DL

## 2018-11-29 PROCEDURE — 80053 COMPREHEN METABOLIC PANEL: CPT | Performed by: INTERNAL MEDICINE

## 2018-11-29 PROCEDURE — 99213 OFFICE O/P EST LOW 20 MIN: CPT | Performed by: INTERNAL MEDICINE

## 2018-11-29 PROCEDURE — 36415 COLL VENOUS BLD VENIPUNCTURE: CPT | Performed by: INTERNAL MEDICINE

## 2018-11-29 PROCEDURE — G0103 PSA SCREENING: HCPCS | Performed by: INTERNAL MEDICINE

## 2018-11-29 PROCEDURE — 80061 LIPID PANEL: CPT | Performed by: INTERNAL MEDICINE

## 2018-11-29 RX ORDER — ATENOLOL 50 MG/1
50 TABLET ORAL DAILY
Qty: 90 TABLET | Refills: 3 | Status: SHIPPED | OUTPATIENT
Start: 2018-11-29 | End: 2020-01-30

## 2018-11-29 RX ORDER — AMLODIPINE BESYLATE 5 MG/1
5 TABLET ORAL DAILY
Qty: 90 TABLET | Refills: 3 | Status: CANCELLED | OUTPATIENT
Start: 2018-11-29

## 2018-11-29 RX ORDER — AMLODIPINE BESYLATE 5 MG/1
5 TABLET ORAL DAILY
Qty: 90 TABLET | Refills: 3 | Status: SHIPPED | OUTPATIENT
Start: 2018-11-29 | End: 2020-01-30

## 2018-11-29 NOTE — PROGRESS NOTES
SUBJECTIVE:   Ronnie Lagos is a 63 year old male who presents to clinic today for the following health issues:    Hypertension Follow-up      Outpatient blood pressures are not being checked.    Low Salt Diet: low salt      Amount of exercise or physical activity: None    Problems taking medications regularly: No    Medication side effects: none    Diet: low salt    Problem list and histories reviewed & adjusted, as indicated.  Additional history: as documented    Patient Active Problem List   Diagnosis     Essential hypertension, benign     Impotence of organic origin     Lumbago     Cervicalgia     CARDIOVASCULAR SCREENING; LDL GOAL LESS THAN 160     Neck pain     Advance care planning     Hallux limitus     Injury of extensor tendon of hand     Right hand pain     Osteoarthritis of finger of right hand     Alcohol use     Gastroesophageal reflux disease without esophagitis     Past Surgical History:   Procedure Laterality Date     ARTHRODESIS FOOT  2/5/2013    Procedure: ARTHRODESIS FOOT;  LEFT FIRST METATARSOPHALANGEAL JOINT ARTHRODESIS ;  Surgeon: Burton Loera DPM;  Location: [facility]     ENDOSCOPIC RETROGRADE CHOLANGIOPANCREATOGRAM N/A 1/18/2018    Procedure: COMBINED ENDOSCOPIC RETROGRADE CHOLANGIOPANCREATOGRAPHY, SPHINCTEROTOMY;  ENDOSCOPIC RETROGRADE CHOLANGIOPANCREATOGRAM (ERCP), SPHINCTEROTOMY, STONE REMOVAL, STENT PLACEMENT;  Surgeon: Christiano Sorenson MD;  Location:  OR     ENDOSCOPIC RETROGRADE CHOLANGIOPANCREATOGRAM N/A 4/12/2018    Procedure: ENDOSCOPIC RETROGRADE CHOLANGIOPANCREATOGRAM;  ENDOSCOPIC RETROGRADE CHOLANIOPANCREATOGRAPHY AND ATTEMPTED STENT REMOVAL.;  Surgeon: Christiano Sorenson MD;  Location:  OR     ENDOSCOPIC RETROGRADE CHOLANGIOPANCREATOGRAM N/A 5/10/2018    Procedure: COMBINED ENDOSCOPIC RETROGRADE CHOLANGIOPANCREATOGRAPHY, REMOVE FOREIGN BODY OR STENT/TUBE;  ENDOSCOPIC RETROGRADE CHOLANGIOPANCREATOGRAPHY AND STENT REMOVAL;  Surgeon: Christiano Sorenson MD;   Location:  OR     ESOPHAGOSCOPY, GASTROSCOPY, DUODENOSCOPY (EGD), COMBINED N/A 4/12/2018    Procedure: COMBINED ESOPHAGOSCOPY, GASTROSCOPY, DUODENOSCOPY (EGD);  EGD with stent removal;  Surgeon: Christiano Sorenson MD;  Location:  GI     LAPAROSCOPIC CHOLECYSTECTOMY N/A 5/1/2015    Procedure: LAPAROSCOPIC CHOLECYSTECTOMY;  Surgeon: Damien Galindo MD;  Location:  [facility]     ORTHOPEDIC SURGERY      left elbow, right shoulder       Social History   Substance Use Topics     Smoking status: Never Smoker     Smokeless tobacco: Never Used     Alcohol use Yes      Comment: 1/day     Family History   Problem Relation Age of Onset     Hypertension Mother      Cancer - colorectal Mother      Hypertension Father      Melanoma No family hx of          Current Outpatient Prescriptions   Medication Sig Dispense Refill     amLODIPine (NORVASC) 5 MG tablet Take 1 tablet (5 mg) by mouth daily 90 tablet 3     amLODIPine (NORVASC) 5 MG tablet TAKE ONE TABLET BY MOUTH ONE TIME DAILY  90 tablet 0     atenolol (TENORMIN) 50 MG tablet Take 1 tablet (50 mg) by mouth daily 90 tablet 3     omeprazole (PRILOSEC) 20 MG DR capsule Take 1 capsule (20 mg) by mouth daily 90 capsule 3     IBUPROFEN PO        [DISCONTINUED] amLODIPine (NORVASC) 5 MG tablet Take 1 tablet by mouth daily  3     [DISCONTINUED] atenolol (TENORMIN) 50 MG tablet TAKE ONE TABLET BY MOUTH ONE TIME DAILY  90 tablet 0     No Known Allergies  BP Readings from Last 3 Encounters:   11/29/18 128/80   06/07/18 136/84   05/10/18 148/87    Wt Readings from Last 3 Encounters:   11/29/18 214 lb 3.2 oz (97.2 kg)   05/10/18 213 lb 12.8 oz (97 kg)   04/12/18 205 lb (93 kg)           Reviewed and updated as needed this visit by clinical staff  Tobacco  Allergies  Meds  Problems  Med Hx  Surg Hx  Fam Hx  Soc Hx        Reviewed and updated as needed this visit by Provider       ROS:  CONSTITUTIONAL: NEGATIVE for fever, chills, change in weight  ENT/MOUTH: NEGATIVE for  "ear, mouth and throat problems  RESP: NEGATIVE for significant cough or SOB  CV: NEGATIVE for chest pain, palpitations or peripheral edema  GI: NEGATIVE for nausea, abdominal pain, heartburn, or change in bowel habits  : NEGATIVE for frequency, dysuria, or hematuria  MUSCULOSKELETAL: NEGATIVE for significant arthralgias or myalgia  NEURO: NEGATIVE for weakness, dizziness or paresthesias  HEME: NEGATIVE for bleeding problems  PSYCHIATRIC: NEGATIVE for changes in mood or affect    OBJECTIVE:                                                    /80  Pulse 74  Temp 97.5  F (36.4  C) (Oral)  Resp 14  Ht 5' 9\" (1.753 m)  Wt 214 lb 3.2 oz (97.2 kg)  SpO2 96%  BMI 31.63 kg/m2  Body mass index is 31.63 kg/(m^2).  GENERAL: healthy, alert and no distress  EYES: Eyes grossly normal to inspection, extraocular movements - intact, and PERRL  HENT: ear canals- normal; TMs- normal; Nose- normal; Mouth- no ulcers, no lesions  NECK: no tenderness, no adenopathy, no asymmetry, no masses, no stiffness; thyroid- normal to palpation  RESP: lungs clear to auscultation - no rales, no rhonchi, no wheezes  CV: regular rates and rhythm, normal S1 S2, no S3 or S4 and  no click or rub   MS: extremities- no gross deformities noted, no edema  PSYCH: Alert and oriented times 3; speech- coherent , normal rate and volume; able to articulate logical thoughts, able to abstract reason, no tangential thoughts, no hallucinations or delusions, affect- normal     ASSESSMENT/PLAN:                                                      (Z12.5) Screening PSA (prostate specific antigen)  (primary encounter diagnosis)  Comment: Ordered as routine screen  Plan: PSA, screen            (I10) Essential hypertension, benign  Comment: Stable on current therapy continue with medical managed  Plan: atenolol (TENORMIN) 50 MG tablet, amLODIPine         (NORVASC) 5 MG tablet, Comprehensive metabolic         panel            (K21.9) Gastroesophageal reflux " disease without esophagitis  Comment: Refilled per request and stable on current therapy  Plan: omeprazole (PRILOSEC) 20 MG DR capsule            (Z12.11) Screen for colon cancer  Comment: Colonoscopy is scheduled upcoming.  Plan:     (Z11.4) Screening for HIV (human immunodeficiency virus)  Comment: Offered as routine screening but declined  Plan:     (Z13.6) CARDIOVASCULAR SCREENING; LDL GOAL LESS THAN 160  Comment: Labs ordered as fasting per routine  Plan: Lipid Profile              See Patient Instructions    Abdiel Jones MD  [facility]

## 2018-11-29 NOTE — LETTER
[facility]  [address] [address]  [phone]      11/29/2018       Ronnie Lagos  [address]  [facility] [address]        Dear Ronnie,    I am pleased to inform you that your routine blood work including your sodium, potassium, calcium and kidney function tests are all normal.    Your triglyceride level is slightly high and could be treated more aggressively.  Please follow-up with me to discuss your further options if you are interested.    In addition, your PSA or prostate screening antigen is normal but is just slightly higher than your baseline and should be repeated in 6 months for reassurance of stability.    Your liver function tests are still slightly abnormal but improved and should be rechecked here in the clinic in one month with a follow-up visit with me.  I will look forward to seeing you at that time and please call to make an appointment.    Sincerely,      Abdiel Jones MD  Internal Medicine

## 2018-11-29 NOTE — MR AVS SNAPSHOT
After Visit Summary   11/29/2018    Ronnie Lagos    MRN: [id number]           Patient Information     Date Of Birth          1955        Visit Information        Provider Department      11/29/2018 7:20 AM Abdiel Jones MD [facility]        Today's Diagnoses     Screening PSA (prostate specific antigen)    -  1    Essential hypertension, benign        Gastroesophageal reflux disease without esophagitis        Screen for colon cancer        Screening for HIV (human immunodeficiency virus)        CARDIOVASCULAR SCREENING; LDL GOAL LESS THAN 160           Follow-ups after your visit        Follow-up notes from your care team     Return if symptoms worsen or fail to improve, for f/u with routine sub-specialist, Lab Work appointment.      Your next 10 appointments already scheduled     Dec 07, 2018   Procedure with Blayne Mora MD   [facility] ([facility])    [address]  [place] [address] [phone]           [facility] is located at [address]              Who to contact     If you have questions or need follow up information about today's clinic visit or your schedule please contact [facility] directly at [phone].  Normal or non-critical lab and imaging results will be communicated to you by MyChart, letter or phone within 4 business days after the clinic has received the results. If you do not hear from us within 7 days, please contact the clinic through MyChart or phone. If you have a critical or abnormal lab result, we will notify you by phone as soon as possible.  Submit refill requests through [employer] or call your pharmacy and they will forward the refill request to us. Please allow 3 business days for your refill to be completed.          Additional Information About Your Visit        MyChart Information     [employer] gives you secure access to your  "electronic health record. If you see a primary care provider, you can also send messages to your care team and make appointments. If you have questions, please call your primary care clinic.  If you do not have a primary care provider, please call [phone] and they will assist you.        Care EveryWhere ID     This is your Care EveryWhere ID. This could be used by other organizations to access your [place] medical records  [id number]        Your Vitals Were     Pulse Temperature Respirations Height Pulse Oximetry BMI (Body Mass Index)    74 97.5  F (36.4  C) (Oral) 14 5' 9\" (1.753 m) 96% 31.63 kg/m2       Blood Pressure from Last 3 Encounters:   11/29/18 128/80   06/07/18 136/84   05/10/18 148/87    Weight from Last 3 Encounters:   11/29/18 214 lb 3.2 oz (97.2 kg)   05/10/18 213 lb 12.8 oz (97 kg)   04/12/18 205 lb (93 kg)              We Performed the Following     Comprehensive metabolic panel     Lipid Profile     PSA, screen          Today's Medication Changes          These changes are accurate as of 11/29/18  7:39 AM.  If you have any questions, ask your nurse or doctor.               These medicines have changed or have updated prescriptions.        Dose/Directions    atenolol 50 MG tablet   Commonly known as:  TENORMIN   This may have changed:  See the new instructions.   Used for:  Essential hypertension, benign   Changed by:  Abdiel Jones MD        Dose:  50 mg   Take 1 tablet (50 mg) by mouth daily   Quantity:  90 tablet   Refills:  3       omeprazole 20 MG DR capsule   Commonly known as:  priLOSEC   This may have changed:  See the new instructions.   Used for:  Gastroesophageal reflux disease without esophagitis   Changed by:  Abdiel Jones MD        Dose:  20 mg   Take 1 capsule (20 mg) by mouth daily   Quantity:  90 capsule   Refills:  3            Where to get your medicines      These medications were sent to [id number] - [facility] [address] [place] [facility] [id number]     Phone:  [phone]     amLODIPine 5 MG tablet    atenolol 50 MG tablet    omeprazole 20 MG DR capsule                Primary Care Provider Office Phone # Fax #    Abdiel Jones [phone] [address] [facility] [address]        Equal Access to Services     [facility]: Hadii aad ku hadasho Soomaali, waaxda luqadaha, qaybta kaalmada adeegyada, waxay idiin hayaan adeeg kharash la'aan . So [facility] [place] [phone].    ATENCIÓN: Si habla español, tiene a rosen disposición servicios gratuitos de asistencia lingüística. Llame al [phone].    We comply with applicable federal civil rights laws and [place] laws. We do not discriminate on the basis of race, color, national origin, age, disability, sex, sexual orientation, or gender identity.            Thank you!     Thank you for choosing [facility]  for your care. Our goal is always to provide you with excellent care. Hearing back from our patients is one way we can continue to improve our services. Please take a few minutes to complete the written survey that you may receive in the mail after your visit with us. Thank you!             Your Updated Medication List - Protect others around you: Learn how to safely use, store and throw away your medicines at www.disposemymeds.org.          This list is accurate as of 11/29/18  7:39 AM.  Always use your most recent med list.                   Brand Name Dispense Instructions for use Diagnosis    * amLODIPine 5 MG tablet    NORVASC    90 tablet    TAKE ONE TABLET BY MOUTH ONE TIME DAILY    Essential hypertension, benign       * amLODIPine 5 MG tablet    NORVASC    90 tablet    Take 1 tablet (5 mg) by mouth daily    Essential hypertension, benign       atenolol 50 MG tablet    TENORMIN    90 tablet    Take 1 tablet (50 mg) by mouth daily    Essential hypertension, benign       IBUPROFEN PO           omeprazole 20 MG DR capsule     priLOSEC    90 capsule    Take 1 capsule (20 mg) by mouth daily    Gastroesophageal reflux disease without esophagitis       * Notice:  This list has 2 medication(s) that are the same as other medications prescribed for you. Read the directions carefully, and ask your doctor or other care provider to review them with you.

## 2018-12-07 ENCOUNTER — APPOINTMENT (OUTPATIENT)
Dept: SURGERY | Facility: PHYSICIAN GROUP | Age: 63
End: 2018-12-07
Payer: COMMERCIAL

## 2018-12-07 ENCOUNTER — HOSPITAL ENCOUNTER (OUTPATIENT)
Facility: CLINIC | Age: 63
Discharge: HOME | End: 2018-12-07
Attending: SURGERY | Admitting: SURGERY
Payer: COMMERCIAL

## 2018-12-07 VITALS
HEART RATE: 65 BPM | RESPIRATION RATE: 12 BRPM | OXYGEN SATURATION: 94 % | SYSTOLIC BLOOD PRESSURE: 142 MMHG | DIASTOLIC BLOOD PRESSURE: 88 MMHG

## 2018-12-07 LAB — COLONOSCOPY: NORMAL

## 2018-12-07 PROCEDURE — 88305 TISSUE EXAM BY PATHOLOGIST: CPT | Performed by: SURGERY

## 2018-12-07 PROCEDURE — 25000128 H RX IP 250 OP 636: Performed by: SURGERY

## 2018-12-07 PROCEDURE — 45380 COLONOSCOPY AND BIOPSY: CPT | Performed by: SURGERY

## 2018-12-07 PROCEDURE — G0500 MOD SEDAT ENDO SERVICE >5YRS: HCPCS | Performed by: SURGERY

## 2018-12-07 PROCEDURE — 99153 MOD SED SAME PHYS/QHP EA: CPT

## 2018-12-07 PROCEDURE — 88305 TISSUE EXAM BY PATHOLOGIST: CPT | Mod: 26 | Performed by: SURGERY

## 2018-12-07 PROCEDURE — 99152 MOD SED SAME PHYS/QHP 5/>YRS: CPT | Mod: 59 | Performed by: SURGERY

## 2018-12-07 PROCEDURE — 45380 COLONOSCOPY AND BIOPSY: CPT | Mod: PT | Performed by: SURGERY

## 2018-12-07 PROCEDURE — 99153 MOD SED SAME PHYS/QHP EA: CPT | Mod: 59 | Performed by: SURGERY

## 2018-12-07 RX ORDER — ONDANSETRON 2 MG/ML
4 INJECTION INTRAMUSCULAR; INTRAVENOUS
Status: DISCONTINUED | OUTPATIENT
Start: 2018-12-07 | End: 2018-12-07 | Stop reason: HOSPADM

## 2018-12-07 RX ORDER — LIDOCAINE 40 MG/G
CREAM TOPICAL
Status: DISCONTINUED | OUTPATIENT
Start: 2018-12-07 | End: 2018-12-07 | Stop reason: HOSPADM

## 2018-12-07 RX ORDER — FENTANYL CITRATE 50 UG/ML
INJECTION, SOLUTION INTRAMUSCULAR; INTRAVENOUS PRN
Status: DISCONTINUED | OUTPATIENT
Start: 2018-12-07 | End: 2018-12-07 | Stop reason: HOSPADM

## 2018-12-10 LAB — COPATH REPORT: NORMAL

## 2018-12-12 ENCOUNTER — TELEPHONE (OUTPATIENT)
Dept: SURGERY | Facility: CLINIC | Age: 63
End: 2018-12-12

## 2018-12-12 NOTE — TELEPHONE ENCOUNTER
Called patient to discuss pathology from colonoscopy.    -tubular adenoma  -no evidence of malignancy  -repeat colonoscopy in 5 years to assure no new polyps are developing.    Left message for patient with this information.  Encouraged him to call with any questions or concerns.  Letter sent to him in the mail.    Call back number provided.    Monica Hardy RN

## 2019-02-06 ENCOUNTER — OFFICE VISIT (OUTPATIENT)
Dept: URGENT CARE | Facility: URGENT CARE | Age: 64
End: 2019-02-06
Payer: OTHER MISCELLANEOUS

## 2019-02-06 VITALS
OXYGEN SATURATION: 94 % | BODY MASS INDEX: 32.09 KG/M2 | HEART RATE: 61 BPM | TEMPERATURE: 97.9 F | DIASTOLIC BLOOD PRESSURE: 80 MMHG | WEIGHT: 217.3 LBS | SYSTOLIC BLOOD PRESSURE: 130 MMHG

## 2019-02-06 DIAGNOSIS — S19.9XXA NECK INJURY, INITIAL ENCOUNTER: Primary | ICD-10-CM

## 2019-02-06 DIAGNOSIS — S39.92XA BACK INJURY, INITIAL ENCOUNTER: ICD-10-CM

## 2019-02-06 PROCEDURE — 99214 OFFICE O/P EST MOD 30 MIN: CPT | Performed by: FAMILY MEDICINE

## 2019-02-06 RX ORDER — HYDROCODONE BITARTRATE AND ACETAMINOPHEN 5; 325 MG/1; MG/1
1 TABLET ORAL EVERY 6 HOURS PRN
Qty: 8 TABLET | Refills: 0 | Status: SHIPPED | OUTPATIENT
Start: 2019-02-06 | End: 2019-02-08

## 2019-02-06 RX ORDER — METHOCARBAMOL 750 MG/1
750 TABLET, FILM COATED ORAL 4 TIMES DAILY
Qty: 28 TABLET | Refills: 0 | Status: SHIPPED | OUTPATIENT
Start: 2019-02-06 | End: 2019-02-13

## 2019-02-06 RX ORDER — NAPROXEN 500 MG/1
500 TABLET ORAL 2 TIMES DAILY WITH MEALS
Qty: 14 TABLET | Refills: 0 | Status: SHIPPED | OUTPATIENT
Start: 2019-02-06 | End: 2019-02-13

## 2019-02-06 NOTE — PROGRESS NOTES
SUBJECTIVE:  Chief Complaint   Patient presents with     Work Comp     neck pain back pain. hip pain knee pain. ach all over body.      Urgent Care   .libby presents with a chief complaint of bilateral back and neck.  The injury occurred 1 day ago.   The injury happened while while walking.   How: fall  immediate pain  The patient complained of moderate pain and has had decreased ROM.    Pain exacerbated by movement    He treated it initially with no therapy.   This is the first time this type of injury has occurred to this patient.     Past Medical History:   Diagnosis Date     Abdominal pain      Basal cell carcinoma      Calculus of bile duct      Elevated liver enzymes      Essential hypertension, benign     abstracted 6/19/02     Gastro-oesophageal reflux disease      GERD (gastroesophageal reflux disease) 7/28/2008     Liver disease     elevated liver enzymes     Squamous cell carcinoma      Unspecified cerebral artery occlusion with cerebral infarction      Unspecified condition of brain     abstracted 6/19/02       Past Surgical History:   Procedure Laterality Date     ARTHRODESIS FOOT  2/5/2013    Procedure: ARTHRODESIS FOOT;  LEFT FIRST METATARSOPHALANGEAL JOINT ARTHRODESIS ;  Surgeon: Burton Loera DPM;  [facility]:  [facility]     COLONOSCOPY N/A 12/7/2018    Procedure: COMBINED COLONOSCOPY, SINGLE OR MULTIPLE BIOPSY/POLYPECTOMY BY BIOPSY;  Surgeon: Blayne Mora MD;  Location:  GI     ENDOSCOPIC RETROGRADE CHOLANGIOPANCREATOGRAM N/A 1/18/2018    Procedure: COMBINED ENDOSCOPIC RETROGRADE CHOLANGIOPANCREATOGRAPHY, SPHINCTEROTOMY;  ENDOSCOPIC RETROGRADE CHOLANGIOPANCREATOGRAM (ERCP), SPHINCTEROTOMY, STONE REMOVAL, STENT PLACEMENT;  Surgeon: Christiano Sorenson MD;  Location:  OR     ENDOSCOPIC RETROGRADE CHOLANGIOPANCREATOGRAM N/A 4/12/2018    Procedure: ENDOSCOPIC RETROGRADE CHOLANGIOPANCREATOGRAM;  ENDOSCOPIC RETROGRADE CHOLANIOPANCREATOGRAPHY AND ATTEMPTED STENT REMOVAL.;  Surgeon: Yas  Christiano Castellanos MD;  Location:  OR     ENDOSCOPIC RETROGRADE CHOLANGIOPANCREATOGRAM N/A 5/10/2018    Procedure: COMBINED ENDOSCOPIC RETROGRADE CHOLANGIOPANCREATOGRAPHY, REMOVE FOREIGN BODY OR STENT/TUBE;  ENDOSCOPIC RETROGRADE CHOLANGIOPANCREATOGRAPHY AND STENT REMOVAL;  Surgeon: Christiano Sorenson MD;  Location:  OR     ESOPHAGOSCOPY, GASTROSCOPY, DUODENOSCOPY (EGD), COMBINED N/A 4/12/2018    Procedure: COMBINED ESOPHAGOSCOPY, GASTROSCOPY, DUODENOSCOPY (EGD);  EGD with stent removal;  Surgeon: Christiano Sorenson MD;  Location:  GI     LAPAROSCOPIC CHOLECYSTECTOMY N/A 5/1/2015    Procedure: LAPAROSCOPIC CHOLECYSTECTOMY;  Surgeon: Damien Galindo MD;  Location:  [facility]     ORTHOPEDIC SURGERY      left elbow, right shoulder       Family History   Problem Relation Age of Onset     Hypertension Mother      Cancer - colorectal Mother      Hypertension Father      Melanoma No family hx of        Social History     Tobacco Use     Smoking status: Never Smoker     Smokeless tobacco: Never Used   Substance Use Topics     Alcohol use: Yes     Comment: 1/day      No Known Allergies    ROSINTEGUMENTARY/SKIN: NEGATIVE for open wound/bleeding and NEGATIVE for bruising  MUSCULOSKELETAL: NEGATIVE for joint swelling, paresthesias, radicular pain  NEURO: NEGATIVE for numbness, paresthesias, weakness    EXAM: /80   Pulse 61   Temp 97.9  F (36.6  C) (Tympanic)   Wt 98.6 kg (217 lb 4.8 oz)   SpO2 94%   BMI 32.09 kg/m  Gen: healthy,alert,no distress  Extremity: back and neck has pain with palpation and rom.   There is not compromise to the distal circulation.  Pulses are +2 and CRT is brisk.GENERAL APPEARANCE: healthy, alert and no distress  EXTREMITIES: peripheral pulses normal  SKIN: no suspicious lesions or rashes  NEURO: Normal strength and tone, sensory exam grossly normal, mentation intact and speech normal      ICD-10-CM    1. Neck injury, initial encounter S19.9XXA methocarbamol (ROBAXIN) 750 MG  tablet     naproxen (NAPROSYN) 500 MG tablet     HYDROcodone-acetaminophen (NORCO) 5-325 MG tablet   2. Back injury, initial encounter S39.92XA methocarbamol (ROBAXIN) 750 MG tablet     naproxen (NAPROSYN) 500 MG tablet     HYDROcodone-acetaminophen (NORCO) 5-325 MG tablet       RICE

## 2019-02-06 NOTE — LETTER
[employer] [facility]  [address] [address]  [phone]        2019    REPORT OF WORK ABILITY    PATIENT DATA  Employee Name: Ronnie Lagos        : 1955   [id number]  Work related injury: YES  Today's date: 2019  Date of injury: 2019     PROVIDER EVALUATION: Please fill in as needed.  Please give copy to employee for employer.  1. Diagnosis: Low back pain/strain  Neck strain  2. Treatment: ice/heat  3. Medication: Naprosyn/Robaxin/Vicodin  NOTE: When ordering a medication, [place] Rules require Work Comp or WC on prescriptions.  4. Return to work date: when improved      RESTRICTIONS: Unlimited unless listed.  Restrictions apply to home and leisure also.  If work within restrictions is not available, the employee is totally disabled.  Provider comments: none  Medical Examiner: Abdiel Whittaker      License or registration: [id number]    Next appointment: f/u Work Comp if needs recheck    CC: Employer, Managed Care Plan/Payor, Patient

## 2019-06-19 ENCOUNTER — OFFICE VISIT (OUTPATIENT)
Dept: DERMATOLOGY | Facility: CLINIC | Age: 64
End: 2019-06-19
Payer: COMMERCIAL

## 2019-06-19 VITALS — HEART RATE: 67 BPM | OXYGEN SATURATION: 93 % | DIASTOLIC BLOOD PRESSURE: 83 MMHG | SYSTOLIC BLOOD PRESSURE: 138 MMHG

## 2019-06-19 DIAGNOSIS — Z85.828 HISTORY OF SCC (SQUAMOUS CELL CARCINOMA) OF SKIN: ICD-10-CM

## 2019-06-19 DIAGNOSIS — D22.9 MULTIPLE NEVI: ICD-10-CM

## 2019-06-19 DIAGNOSIS — D48.5 NEOPLASM OF UNCERTAIN BEHAVIOR OF SKIN: ICD-10-CM

## 2019-06-19 DIAGNOSIS — L81.4 LENTIGINES: ICD-10-CM

## 2019-06-19 DIAGNOSIS — Z85.828 HISTORY OF BASAL CELL CARCINOMA (BCC): Primary | ICD-10-CM

## 2019-06-19 DIAGNOSIS — D18.01 CHERRY ANGIOMA: ICD-10-CM

## 2019-06-19 DIAGNOSIS — L82.1 SEBORRHEIC KERATOSES: ICD-10-CM

## 2019-06-19 PROCEDURE — 17003 DESTRUCT PREMALG LES 2-14: CPT | Performed by: PHYSICIAN ASSISTANT

## 2019-06-19 PROCEDURE — 99214 OFFICE O/P EST MOD 30 MIN: CPT | Mod: 25 | Performed by: PHYSICIAN ASSISTANT

## 2019-06-19 PROCEDURE — 17000 DESTRUCT PREMALG LESION: CPT | Performed by: PHYSICIAN ASSISTANT

## 2019-06-19 NOTE — PROGRESS NOTES
HPI:  Ronnie Lagos is a 64 year old male patient here today for scaly spots on temples .  Patient states this has been present for a while.  Patient reports the following symptoms: scaly and pink .  Patient reports the following previous treatments: cryo, pdt,to aks on face and and efudex to scalp .  Patient reports the following modifying factors: none.  Associated symptoms: none.  Patient has no other skin complaints today.  Remainder of the HPI, Meds, PMH, Allergies, FH, and SH was reviewed in chart.    Pertinent Hx:   SCC on scalp and BCC on left shoulder  Past Medical History:   Diagnosis Date     Abdominal pain      Basal cell carcinoma      Calculus of bile duct      Elevated liver enzymes      Essential hypertension, benign     abstracted 6/19/02     Gastro-oesophageal reflux disease      GERD (gastroesophageal reflux disease) 7/28/2008     Liver disease     elevated liver enzymes     Squamous cell carcinoma      Unspecified cerebral artery occlusion with cerebral infarction      Unspecified condition of brain     abstracted 6/19/02       Past Surgical History:   Procedure Laterality Date     ARTHRODESIS FOOT  2/5/2013    Procedure: ARTHRODESIS FOOT;  LEFT FIRST METATARSOPHALANGEAL JOINT ARTHRODESIS ;  Surgeon: Burton Leora DPM;  [facility]:  [facility]     COLONOSCOPY N/A 12/7/2018    Procedure: COMBINED COLONOSCOPY, SINGLE OR MULTIPLE BIOPSY/POLYPECTOMY BY BIOPSY;  Surgeon: Blayne Mora MD;  Location:  GI     ENDOSCOPIC RETROGRADE CHOLANGIOPANCREATOGRAM N/A 1/18/2018    Procedure: COMBINED ENDOSCOPIC RETROGRADE CHOLANGIOPANCREATOGRAPHY, SPHINCTEROTOMY;  ENDOSCOPIC RETROGRADE CHOLANGIOPANCREATOGRAM (ERCP), SPHINCTEROTOMY, STONE REMOVAL, STENT PLACEMENT;  Surgeon: Christiano Sorenson MD;  Location:  OR     ENDOSCOPIC RETROGRADE CHOLANGIOPANCREATOGRAM N/A 4/12/2018    Procedure: ENDOSCOPIC RETROGRADE CHOLANGIOPANCREATOGRAM;  ENDOSCOPIC RETROGRADE CHOLANIOPANCREATOGRAPHY AND ATTEMPTED STENT  REMOVAL.;  Surgeon: Christiano Sorenson MD;  Location:  OR     ENDOSCOPIC RETROGRADE CHOLANGIOPANCREATOGRAM N/A 5/10/2018    Procedure: COMBINED ENDOSCOPIC RETROGRADE CHOLANGIOPANCREATOGRAPHY, REMOVE FOREIGN BODY OR STENT/TUBE;  ENDOSCOPIC RETROGRADE CHOLANGIOPANCREATOGRAPHY AND STENT REMOVAL;  Surgeon: Christiano Sorenson MD;  Location:  OR     ESOPHAGOSCOPY, GASTROSCOPY, DUODENOSCOPY (EGD), COMBINED N/A 4/12/2018    Procedure: COMBINED ESOPHAGOSCOPY, GASTROSCOPY, DUODENOSCOPY (EGD);  EGD with stent removal;  Surgeon: Christiano Sorenson MD;  Location:  GI     LAPAROSCOPIC CHOLECYSTECTOMY N/A 5/1/2015    Procedure: LAPAROSCOPIC CHOLECYSTECTOMY;  Surgeon: Damien Galindo MD;  Location:  [facility]     ORTHOPEDIC SURGERY      left elbow, right shoulder        Family History   Problem Relation Age of Onset     Hypertension Mother      Cancer - colorectal Mother      Hypertension Father      Melanoma No family hx of        Social History     Socioeconomic History     Marital status:      Spouse name: Not on file     Number of children: Not on file     Years of education: Not on file     Highest education level: Not on file   Occupational History     Employer: [employer]   Social Needs     Financial resource strain: Not on file     Food insecurity:     Worry: Not on file     Inability: Not on file     Transportation needs:     Medical: Not on file     Non-medical: Not on file   Tobacco Use     Smoking status: Never Smoker     Smokeless tobacco: Never Used   Substance and Sexual Activity     Alcohol use: Yes     Comment: 1/day     Drug use: No     Sexual activity: Yes     Partners: Female   Lifestyle     Physical activity:     Days per week: Not on file     Minutes per session: Not on file     Stress: Not on file   Relationships     Social connections:     Talks on phone: Not on file     Gets together: Not on file     Attends Gnosticism service: Not on file     Active member of  club or organization: Not on file     Attends meetings of clubs or organizations: Not on file     Relationship status: Not on file     Intimate partner violence:     Fear of current or ex partner: Not on file     Emotionally abused: Not on file     Physically abused: Not on file     Forced sexual activity: Not on file   Other Topics Concern     Parent/sibling w/ CABG, MI or angioplasty before 65F 55M? Not Asked   Social History Narrative     Not on file       Outpatient Encounter Medications as of 2019   Medication Sig Dispense Refill     amLODIPine (NORVASC) 5 MG tablet Take 1 tablet (5 mg) by mouth daily 90 tablet 3     amLODIPine (NORVASC) 5 MG tablet TAKE ONE TABLET BY MOUTH ONE TIME DAILY  90 tablet 0     atenolol (TENORMIN) 50 MG tablet Take 1 tablet (50 mg) by mouth daily 90 tablet 3     omeprazole (PRILOSEC) 20 MG DR capsule Take 1 capsule (20 mg) by mouth daily 90 capsule 3     [] HYDROcodone-acetaminophen (NORCO) 5-325 MG tablet Take 1 tablet by mouth every 6 hours as needed for pain 8 tablet 0     IBUPROFEN PO        [] methocarbamol (ROBAXIN) 750 MG tablet Take 1 tablet (750 mg) by mouth 4 times daily for 7 days 28 tablet 0     [] naproxen (NAPROSYN) 500 MG tablet Take 1 tablet (500 mg) by mouth 2 times daily (with meals) for 7 days 14 tablet 0     No facility-administered encounter medications on file as of 2019.        Review Of Systems:  Skin: As above  Eyes: negative  Ears/Nose/Throat: negative  Respiratory: No shortness of breath, dyspnea on exertion, cough, or hemoptysis  Cardiovascular: negative  Gastrointestinal: negative  Genitourinary: negative  Musculoskeletal: negative  Neurologic: negative  Psychiatric: negative  Hematologic/Lymphatic/Immunologic: negative  Endocrine: negative      Objective:     /83   Pulse 67   SpO2 93%   Eyes: Conjunctivae/lids: Normal   ENT: Lips:  Normal  MSK: Normal  Cardiovascular: Peripheral edema none  Pulm: Breathing  Normal  Neuro/Psych: Orientation: Normal; Mood/Affect: Normal, NAD, WDWN  Pt accompanied by: self  Following areas examined: Scalp, face, eyelids, lips, neck, chest, abdomen, back, buttocks, and R&L upper and lower extremities.   Linares skin type:ii   Findings:  Red smooth well-defined macules on trunk and extremities.  Brown, stuck-on scaly appearing papules on trunk and extremities.  Well circumscribed macules with symmetric color distribution on trunk and extremities.  Tan WD smooth macules on face, neck, trunk, and extremities.  Smooth hypopigmented patches on scalp and left shoulder  Pink scaly macule/s on temples, upper back, and scalp x 8  Rhytides, hypo/hyperpigmentation, and atrophy  Pink hemecrusted papule on left chest    Assessment and Plan:     1) Cherry angiomas, Seborrheic keratoses, Benign nevi, Lentigines     I discussed the specifics of tumor, prognosis, and genetics of benign lesions.  I explained that treatment of these lesions would be purely cosmetic and not medically neccessary.  I discussed with patient different removal options including excision, cryotherapy, cautery and /or laser.  Lesion may recur and/or may not completely resolve. May need additional treatment.     2) Actinic keratoses x 8 and solar elastosis    LN2 for 5 seconds x 2. Discussed AE include hypopigmentation (white spot) and recurrence. Follow up in 2-3 months to recheck lesions. There is a 0.025%-20% chance that AKs can develop into a SCC.   Treatment options include LN2 vs PDT vs Efudex. Pt elected LN2    3) Neoplasm of uncertain behavior     bcc vs isk  Recommended biopsy today. Pt defer. Disc would like to rule out a skin cancer.   Follow up in 3-4 weeks.   4) history of scc and bcc  No evidence of recurence  Signs and Symptoms of non-melanoma skin cancer and ABCDEs of melanoma reviewed with patient. Patient encouraged to perform monthly self skin exams and educated on how to perform them. UV precautions reviewed  with patient. Patient was asked about new or changing moles/lesions on body.   Wear a sunscreen with at least SPF 30 on your face, ears, neck and V of the chest daily. Wear sunscreen on other areas of the body if those areas are exposed to the sun throughout the day. Sunscreens can contain physical and/or chemical blockers. Physical blockers are less likely to clog pores, these include zinc oxide and titanium dioxide. Reapply every two hour and after swimming. Sunscreen examples include Neutrogena, CeraVe, Blue Lizard, Elta MD and many others.    Proper skin care from [facility]:    -Eliminate harsh soaps as they strip the natural oils from the skin, often resulting in dry itchy skin ( i.e. Dial, Zest, María Spring)  -Use mild soaps such as Cetaphil or Dove Sensitive Skin in the shower. You do not need to use soap on arms, legs, and trunk every time you shower unless visibly soiled.   -Avoid hot or cold showers.  -After showering, lightly dry off and apply moisturizing within 2-3 minutes. This will help trap moisture in the skin.   -Aggressive use of a moisturizer at least 1-2 times a day to the entire body (including -Vanicream, Cetaphil, Aquaphor or Cerave) and moisturize hands after every washing.  -We recommend using moisturizers that come in a tub that needs to be scooped out, not a pump. This has more of an oil base. It will hold moisture in your skin much better than a water base moisturizer. The above recommended are non-pore clogging.               Follow up in 3-4 weeks to recheck chest, 2-3 months to treat aks, and yearly FBE.

## 2019-06-19 NOTE — LETTER
6/19/2019         RE: Ronnie Lagos  [address]  [facility] [address]        Dear Colleague,    Thank you for referring your patient, Ronnie Lagos, to the [facility]. Please see a copy of my visit note below.    HPI:  Ronnie Lagos is a 64 year old male patient here today for scaly spots on temples .  Patient states this has been present for a while.  Patient reports the following symptoms: scaly and pink .  Patient reports the following previous treatments: cryo, pdt,to aks on face and and efudex to scalp .  Patient reports the following modifying factors: none.  Associated symptoms: none.  Patient has no other skin complaints today.  Remainder of the HPI, Meds, PMH, Allergies, FH, and SH was reviewed in chart.    Pertinent Hx:   SCC on scalp and BCC on left shoulder  Past Medical History:   Diagnosis Date     Abdominal pain      Basal cell carcinoma      Calculus of bile duct      Elevated liver enzymes      Essential hypertension, benign     abstracted 6/19/02     Gastro-oesophageal reflux disease      GERD (gastroesophageal reflux disease) 7/28/2008     Liver disease     elevated liver enzymes     Squamous cell carcinoma      Unspecified cerebral artery occlusion with cerebral infarction      Unspecified condition of brain     abstracted 6/19/02       Past Surgical History:   Procedure Laterality Date     ARTHRODESIS FOOT  2/5/2013    Procedure: ARTHRODESIS FOOT;  LEFT FIRST METATARSOPHALANGEAL JOINT ARTHRODESIS ;  Surgeon: Burton Loera DPM;  Location:  [facility]     COLONOSCOPY N/A 12/7/2018    Procedure: COMBINED COLONOSCOPY, SINGLE OR MULTIPLE BIOPSY/POLYPECTOMY BY BIOPSY;  Surgeon: Blayne Mora MD;  Location:  GI     ENDOSCOPIC RETROGRADE CHOLANGIOPANCREATOGRAM N/A 1/18/2018    Procedure: COMBINED ENDOSCOPIC RETROGRADE CHOLANGIOPANCREATOGRAPHY, SPHINCTEROTOMY;  ENDOSCOPIC RETROGRADE CHOLANGIOPANCREATOGRAM (ERCP), SPHINCTEROTOMY, STONE REMOVAL,  STENT PLACEMENT;  Surgeon: Christiano Sorenson MD;  Location:  OR     ENDOSCOPIC RETROGRADE CHOLANGIOPANCREATOGRAM N/A 4/12/2018    Procedure: ENDOSCOPIC RETROGRADE CHOLANGIOPANCREATOGRAM;  ENDOSCOPIC RETROGRADE CHOLANIOPANCREATOGRAPHY AND ATTEMPTED STENT REMOVAL.;  Surgeon: Christiano Sorenson MD;  Location:  OR     ENDOSCOPIC RETROGRADE CHOLANGIOPANCREATOGRAM N/A 5/10/2018    Procedure: COMBINED ENDOSCOPIC RETROGRADE CHOLANGIOPANCREATOGRAPHY, REMOVE FOREIGN BODY OR STENT/TUBE;  ENDOSCOPIC RETROGRADE CHOLANGIOPANCREATOGRAPHY AND STENT REMOVAL;  Surgeon: Christiano Sorenson MD;  Location:  OR     ESOPHAGOSCOPY, GASTROSCOPY, DUODENOSCOPY (EGD), COMBINED N/A 4/12/2018    Procedure: COMBINED ESOPHAGOSCOPY, GASTROSCOPY, DUODENOSCOPY (EGD);  EGD with stent removal;  Surgeon: Christiano Sorenson MD;  Location:  GI     LAPAROSCOPIC CHOLECYSTECTOMY N/A 5/1/2015    Procedure: LAPAROSCOPIC CHOLECYSTECTOMY;  Surgeon: Damien Galindo MD;  Location:  [facility]     ORTHOPEDIC SURGERY      left elbow, right shoulder        Family History   Problem Relation Age of Onset     Hypertension Mother      Cancer - colorectal Mother      Hypertension Father      Melanoma No family hx of        Social History     Socioeconomic History     Marital status:      Spouse name: Not on file     Number of children: Not on file     Years of education: Not on file     Highest education level: Not on file   Occupational History     Employer: [employer]   Social Needs     Financial resource strain: Not on file     Food insecurity:     Worry: Not on file     Inability: Not on file     Transportation needs:     Medical: Not on file     Non-medical: Not on file   Tobacco Use     Smoking status: Never Smoker     Smokeless tobacco: Never Used   Substance and Sexual Activity     Alcohol use: Yes     Comment: 1/day     Drug use: No     Sexual activity: Yes     Partners: Female   Lifestyle     Physical  activity:     Days per week: Not on file     Minutes per session: Not on file     Stress: Not on file   Relationships     Social connections:     Talks on phone: Not on file     Gets together: Not on file     Attends Yazidism service: Not on file     Active member of club or organization: Not on file     Attends meetings of clubs or organizations: Not on file     Relationship status: Not on file     Intimate partner violence:     Fear of current or ex partner: Not on file     Emotionally abused: Not on file     Physically abused: Not on file     Forced sexual activity: Not on file   Other Topics Concern     Parent/sibling w/ CABG, MI or angioplasty before 65F 55M? Not Asked   Social History Narrative     Not on file       Outpatient Encounter Medications as of 2019   Medication Sig Dispense Refill     amLODIPine (NORVASC) 5 MG tablet Take 1 tablet (5 mg) by mouth daily 90 tablet 3     amLODIPine (NORVASC) 5 MG tablet TAKE ONE TABLET BY MOUTH ONE TIME DAILY  90 tablet 0     atenolol (TENORMIN) 50 MG tablet Take 1 tablet (50 mg) by mouth daily 90 tablet 3     omeprazole (PRILOSEC) 20 MG DR capsule Take 1 capsule (20 mg) by mouth daily 90 capsule 3     [] HYDROcodone-acetaminophen (NORCO) 5-325 MG tablet Take 1 tablet by mouth every 6 hours as needed for pain 8 tablet 0     IBUPROFEN PO        [] methocarbamol (ROBAXIN) 750 MG tablet Take 1 tablet (750 mg) by mouth 4 times daily for 7 days 28 tablet 0     [] naproxen (NAPROSYN) 500 MG tablet Take 1 tablet (500 mg) by mouth 2 times daily (with meals) for 7 days 14 tablet 0     No facility-administered encounter medications on file as of 2019.        Review Of Systems:  Skin: As above  Eyes: negative  Ears/Nose/Throat: negative  Respiratory: No shortness of breath, dyspnea on exertion, cough, or hemoptysis  Cardiovascular: negative  Gastrointestinal: negative  Genitourinary: negative  Musculoskeletal: negative  Neurologic:  negative  Psychiatric: negative  Hematologic/Lymphatic/Immunologic: negative  Endocrine: negative      Objective:     /83   Pulse 67   SpO2 93%   Eyes: Conjunctivae/lids: Normal   ENT: Lips:  Normal  MSK: Normal  Cardiovascular: Peripheral edema none  Pulm: Breathing Normal  Neuro/Psych: Orientation: Normal; Mood/Affect: Normal, NAD, WDWN  Pt accompanied by: self  Following areas examined: Scalp, face, eyelids, lips, neck, chest, abdomen, back, buttocks, and R&L upper and lower extremities.   Linares skin type:ii   Findings:  Red smooth well-defined macules on trunk and extremities.  Brown, stuck-on scaly appearing papules on trunk and extremities.  Well circumscribed macules with symmetric color distribution on trunk and extremities.  Tan WD smooth macules on face, neck, trunk, and extremities.  Smooth hypopigmented patches on scalp and left shoulder  Pink scaly macule/s on temples, upper back, and scalp x 8  Rhytides, hypo/hyperpigmentation, and atrophy  Pink hemecrusted papule on left chest    Assessment and Plan:     1) Cherry angiomas, Seborrheic keratoses, Benign nevi, Lentigines     I discussed the specifics of tumor, prognosis, and genetics of benign lesions.  I explained that treatment of these lesions would be purely cosmetic and not medically neccessary.  I discussed with patient different removal options including excision, cryotherapy, cautery and /or laser.  Lesion may recur and/or may not completely resolve. May need additional treatment.     2) Actinic keratoses x 8 and solar elastosis    LN2 for 5 seconds x 2. Discussed AE include hypopigmentation (white spot) and recurrence. Follow up in 2-3 months to recheck lesions. There is a 0.025%-20% chance that AKs can develop into a SCC.   Treatment options include LN2 vs PDT vs Efudex. Pt elected LN2    3) Neoplasm of uncertain behavior     bcc vs isk  Recommended biopsy today. Pt defer. Disc would like to rule out a skin cancer.   Follow up in  3-4 weeks.   4) history of scc and bcc  No evidence of recurence  Signs and Symptoms of non-melanoma skin cancer and ABCDEs of melanoma reviewed with patient. Patient encouraged to perform monthly self skin exams and educated on how to perform them. UV precautions reviewed with patient. Patient was asked about new or changing moles/lesions on body.   Wear a sunscreen with at least SPF 30 on your face, ears, neck and V of the chest daily. Wear sunscreen on other areas of the body if those areas are exposed to the sun throughout the day. Sunscreens can contain physical and/or chemical blockers. Physical blockers are less likely to clog pores, these include zinc oxide and titanium dioxide. Reapply every two hour and after swimming. Sunscreen examples include Neutrogena, CeraVe, Blue Lizard, Elta MD and many others.    Proper skin care from [facility]:    -Eliminate harsh soaps as they strip the natural oils from the skin, often resulting in dry itchy skin ( i.e. Dial, Zest, María Spring)  -Use mild soaps such as Cetaphil or Dove Sensitive Skin in the shower. You do not need to use soap on arms, legs, and trunk every time you shower unless visibly soiled.   -Avoid hot or cold showers.  -After showering, lightly dry off and apply moisturizing within 2-3 minutes. This will help trap moisture in the skin.   -Aggressive use of a moisturizer at least 1-2 times a day to the entire body (including -Vanicream, Cetaphil, Aquaphor or Cerave) and moisturize hands after every washing.  -We recommend using moisturizers that come in a tub that needs to be scooped out, not a pump. This has more of an oil base. It will hold moisture in your skin much better than a water base moisturizer. The above recommended are non-pore clogging.               Follow up in 3-4 weeks to recheck chest, 2-3 months to treat aks, and yearly FBE.      Again, thank you for allowing me to participate in the care of your patient.         Sincerely,        Zenaida Brown PA-C

## 2019-08-15 ENCOUNTER — TRANSFERRED RECORDS (OUTPATIENT)
Dept: HEALTH INFORMATION MANAGEMENT | Facility: CLINIC | Age: 64
End: 2019-08-15

## 2019-08-21 ENCOUNTER — OFFICE VISIT (OUTPATIENT)
Dept: DERMATOLOGY | Facility: CLINIC | Age: 64
End: 2019-08-21
Payer: COMMERCIAL

## 2019-08-21 VITALS — HEART RATE: 66 BPM | OXYGEN SATURATION: 96 % | SYSTOLIC BLOOD PRESSURE: 141 MMHG | DIASTOLIC BLOOD PRESSURE: 86 MMHG

## 2019-08-21 DIAGNOSIS — D48.5 NEOPLASM OF UNCERTAIN BEHAVIOR OF SKIN: Primary | ICD-10-CM

## 2019-08-21 DIAGNOSIS — Z85.828 HISTORY OF NONMELANOMA SKIN CANCER: ICD-10-CM

## 2019-08-21 DIAGNOSIS — L57.8 SOLAR ELASTOSIS: ICD-10-CM

## 2019-08-21 DIAGNOSIS — L81.4 LENTIGINES: ICD-10-CM

## 2019-08-21 DIAGNOSIS — L57.0 ACTINIC KERATOSIS: ICD-10-CM

## 2019-08-21 PROCEDURE — 99213 OFFICE O/P EST LOW 20 MIN: CPT | Mod: 25 | Performed by: PHYSICIAN ASSISTANT

## 2019-08-21 PROCEDURE — 11102 TANGNTL BX SKIN SINGLE LES: CPT | Performed by: PHYSICIAN ASSISTANT

## 2019-08-21 PROCEDURE — 88305 TISSUE EXAM BY PATHOLOGIST: CPT | Mod: TC | Performed by: PHYSICIAN ASSISTANT

## 2019-08-21 PROCEDURE — 17000 DESTRUCT PREMALG LESION: CPT | Mod: 59 | Performed by: PHYSICIAN ASSISTANT

## 2019-08-21 NOTE — LETTER
8/21/2019         RE: Ronnie Lagos  [address]  [facility] [address]        Dear Colleague,    Thank you for referring your patient, Ronnie Lagos, to the [facility]. Please see a copy of my visit note below.    HPI:  Ronnie Lagos is a 64 year old male patient here today for recheck aks on face, scalp, and back .  Patient states this has been present for a while.  Patient reports the following symptoms: improvement .  Patient reports the following previous treatments: ln2, pdt, to aks on face efudex to scalp.  Patient reports the following modifying factors: none.  Associated symptoms: none.  Had a spot on chest at last office visit that has since resolved-concern for bcc. Patient has no other skin complaints today.  Remainder of the HPI, Meds, PMH, Allergies, FH, and SH was reviewed in chart.    Pertinent Hx:   SCC on scalp and BCC on left shoulder  Past Medical History:   Diagnosis Date     Abdominal pain      Basal cell carcinoma      Calculus of bile duct      Elevated liver enzymes      Essential hypertension, benign     abstracted 6/19/02     Gastro-oesophageal reflux disease      GERD (gastroesophageal reflux disease) 7/28/2008     Liver disease     elevated liver enzymes     Squamous cell carcinoma      Unspecified cerebral artery occlusion with cerebral infarction      Unspecified condition of brain     abstracted 6/19/02       Past Surgical History:   Procedure Laterality Date     ARTHRODESIS FOOT  2/5/2013    Procedure: ARTHRODESIS FOOT;  LEFT FIRST METATARSOPHALANGEAL JOINT ARTHRODESIS ;  Surgeon: Burton Loera DPM;  Location:  [facility]     COLONOSCOPY N/A 12/7/2018    Procedure: COMBINED COLONOSCOPY, SINGLE OR MULTIPLE BIOPSY/POLYPECTOMY BY BIOPSY;  Surgeon: Blayne Mora MD;  Location:  GI     ENDOSCOPIC RETROGRADE CHOLANGIOPANCREATOGRAM N/A 1/18/2018    Procedure: COMBINED ENDOSCOPIC RETROGRADE CHOLANGIOPANCREATOGRAPHY, SPHINCTEROTOMY;   ENDOSCOPIC RETROGRADE CHOLANGIOPANCREATOGRAM (ERCP), SPHINCTEROTOMY, STONE REMOVAL, STENT PLACEMENT;  Surgeon: Christiano Sorenson MD;  Location:  OR     ENDOSCOPIC RETROGRADE CHOLANGIOPANCREATOGRAM N/A 4/12/2018    Procedure: ENDOSCOPIC RETROGRADE CHOLANGIOPANCREATOGRAM;  ENDOSCOPIC RETROGRADE CHOLANIOPANCREATOGRAPHY AND ATTEMPTED STENT REMOVAL.;  Surgeon: Christiano Sorenson MD;  Location:  OR     ENDOSCOPIC RETROGRADE CHOLANGIOPANCREATOGRAM N/A 5/10/2018    Procedure: COMBINED ENDOSCOPIC RETROGRADE CHOLANGIOPANCREATOGRAPHY, REMOVE FOREIGN BODY OR STENT/TUBE;  ENDOSCOPIC RETROGRADE CHOLANGIOPANCREATOGRAPHY AND STENT REMOVAL;  Surgeon: Christiano Sorenson MD;  Location:  OR     ESOPHAGOSCOPY, GASTROSCOPY, DUODENOSCOPY (EGD), COMBINED N/A 4/12/2018    Procedure: COMBINED ESOPHAGOSCOPY, GASTROSCOPY, DUODENOSCOPY (EGD);  EGD with stent removal;  Surgeon: Christiano Sorenson MD;  Location:  GI     LAPAROSCOPIC CHOLECYSTECTOMY N/A 5/1/2015    Procedure: LAPAROSCOPIC CHOLECYSTECTOMY;  Surgeon: Damien Galindo MD;  Location:  [facility]     ORTHOPEDIC SURGERY      left elbow, right shoulder        Family History   Problem Relation Age of Onset     Hypertension Mother      Cancer - colorectal Mother      Hypertension Father      Melanoma No family hx of        Social History     Socioeconomic History     Marital status:      Spouse name: Not on file     Number of children: Not on file     Years of education: Not on file     Highest education level: Not on file   Occupational History     Employer: [employer]   Social Needs     Financial resource strain: Not on file     Food insecurity:     Worry: Not on file     Inability: Not on file     Transportation needs:     Medical: Not on file     Non-medical: Not on file   Tobacco Use     Smoking status: Never Smoker     Smokeless tobacco: Never Used   Substance and Sexual Activity     Alcohol use: Yes     Comment: 1/day     Drug  use: No     Sexual activity: Yes     Partners: Female   Lifestyle     Physical activity:     Days per week: Not on file     Minutes per session: Not on file     Stress: Not on file   Relationships     Social connections:     Talks on phone: Not on file     Gets together: Not on file     Attends Adventism service: Not on file     Active member of club or organization: Not on file     Attends meetings of clubs or organizations: Not on file     Relationship status: Not on file     Intimate partner violence:     Fear of current or ex partner: Not on file     Emotionally abused: Not on file     Physically abused: Not on file     Forced sexual activity: Not on file   Other Topics Concern     Parent/sibling w/ CABG, MI or angioplasty before 65F 55M? Not Asked   Social History Narrative     Not on file       Outpatient Encounter Medications as of 8/21/2019   Medication Sig Dispense Refill     amLODIPine (NORVASC) 5 MG tablet Take 1 tablet (5 mg) by mouth daily 90 tablet 3     amLODIPine (NORVASC) 5 MG tablet TAKE ONE TABLET BY MOUTH ONE TIME DAILY  90 tablet 0     atenolol (TENORMIN) 50 MG tablet Take 1 tablet (50 mg) by mouth daily 90 tablet 3     IBUPROFEN PO        omeprazole (PRILOSEC) 20 MG DR capsule Take 1 capsule (20 mg) by mouth daily 90 capsule 3     No facility-administered encounter medications on file as of 8/21/2019.        Review Of Systems:.  Skin: As above  Eyes: negative  Ears/Nose/Throat: negative  Respiratory: No shortness of breath, dyspnea on exertion, cough, or hemoptysis  Cardiovascular: negative  Gastrointestinal: negative  Genitourinary: negative  Musculoskeletal: negative  Neurologic: negative  Psychiatric: negative  Hematologic/Lymphatic/Immunologic: negative  Endocrine: negative      Objective:     BP (!) 141/86   Pulse 66   SpO2 96%   Eyes: Conjunctivae/lids: Normal   ENT: Lips:  Normal  MSK: Normal  Cardiovascular: Peripheral edema none  Pulm: Breathing Normal  Neuro/Psych: Orientation:  Normal; Mood/Affect: Normal, NAD, WDWN  Pt accompanied by: self  Following areas examined: face, chest, back, neck, scalp  Linares skin type:ii   Findings:  Smooth tan macule on bilateral lateral 2nd distal digit  Pink scaly macule/s on scalp, temples, cheeks  Light brown/pink smooth macule x 1 on upper back  Rhytides, hypo/hyperpigmentation, and atrophy  Pink thickened scaly papule on left dorsal hand 0.3cm  Assessment and Plan:  1)  lentigines  Watch and monitor  I discussed the specifics of tumor, prognosis, and genetics of benign lesions.  I explained that treatment of these lesions would be purely cosmetic and not medically neccessary.  I discussed with patient different removal options. Some of these options include extraction, excision, cryotherapy, cautery and /or laser.  Lesion may recur and/or may not completely resolve. May need additional treatment.       2) Neoplasm of uncertain behavior on left dorsal hand 0.3cm  HAK vs SCC  TANGENTIAL BIOPSY:  After consent, anesthesia with LEC and prep, tangential biopsy performed.  No complications and routine wound care.  May grow back and will get a scar. Based on lesion type may need to completely remove lesion. Patient will be notified in 7-10 days of results. Wound care directions given.    3) actinic keratoses and solar elastosis    There is a risk of AKs developing into a SCC.   Treatment options include LN2 vs PDT vs Efudex. Pt elected LN2 and Efudex     LN2 x1 on back: Treated with LN2 for 5s for 1-2 cycles. Warned risks of blistering, pain, pigment change, scarring, and incomplete resolution.  Advised patient to return if lesions do not completely resolve within 2-3 months.  Wound care sheet given.      5 Fluorouracil (Efudex)     Apply a pea-sized amount two times a day for 2 weeks to face and scalp.    Efudex is a topical chemotherapy medication. Do not use if you are pregnant or have a hypersensitive to to the drug, class, or any components of the  medication. Very rare to have any systemic absorption.    Common reactions include and are not limited to:  Application site reaction, pruritis, pain, bleeding, edema, stinging, burning, scaling, crusting, erythema, photosensitivity, and allergic contact dermatitis. Do not let children get into medication and do not let pets eat medication.       Follow up in yearly FBE. 2-3 months after stopping efudex      Again, thank you for allowing me to participate in the care of your patient.        Sincerely,        Zenaida Brown PA-C

## 2019-08-21 NOTE — PATIENT INSTRUCTIONS
Wound Care Instructions     FOR SUPERFICIAL WOUNDS     [facility] [phone]    [facility] [phone]          AFTER 24 HOURS YOU SHOULD REMOVE THE BANDAGE AND BEGIN DAILY DRESSING CHANGES AS FOLLOWS:     1) Remove Dressing.     2) Clean and dry the area with tap water using a Q-tip or sterile gauze pad.     3) Apply Vaseline, Aquaphor, Polysporin ointment or Bacitracin ointment over entire wound.  Do NOT use Neosporin ointment.     4) Cover the wound with a band-aid, or a sterile non-stick gauze pad and micropore paper tape      REPEAT THESE INSTRUCTIONS AT LEAST ONCE A DAY UNTIL THE WOUND HAS COMPLETELY HEALED.    It is an old wives tale that a wound heals better when it is exposed to air and allowed to dry out. The wound will heal faster with a better cosmetic result if it is kept moist with ointment and covered with a bandage.    **Do not let the wound dry out.**      Supplies Needed:      *Cotton tipped applicators (Q-tips)    *Polysporin Ointment or Bacitracin Ointment (NOT NEOSPORIN)    *Band-aids or non-stick gauze pads and micropore paper tape.      PATIENT INFORMATION:    During the healing process you will notice a number of changes. All wounds develop a small halo of redness surrounding the wound.  This means healing is occurring. Severe itching with extensive redness usually indicates sensitivity to the ointment or bandage tape used to dress the wound.  You should call our office if this develops.      Swelling  and/or discoloration around your surgical site is common, particularly when performed around the eye.    All wounds normally drain.  The larger the wound the more drainage there will be.  After 7-10 days, you will notice the wound beginning to shrink and new skin will begin to grow.  The wound is healed when you can see skin has formed over the entire area.  A healed wound has a healthy, shiny look to the surface and is red to dark pink in color to  normalize.  Wounds may take approximately 4-6 weeks to heal.  Larger wounds may take 6-8 weeks.  After the wound is healed you may discontinue dressing changes.    You may experience a sensation of tightness as your wound heals. This is normal and will gradually subside.    Your healed wound may be sensitive to temperature changes. This sensitivity improves with time, but if you re having a lot of discomfort, try to avoid temperature extremes.    Patients frequently experience itching after their wound appears to have healed because of the continue healing under the skin.  Plain Vaseline will help relieve the itching.        POSSIBLE COMPLICATIONS    BLEEDIN. Leave the bandage in place.  2. Use tightly rolled up gauze or a cloth to apply direct pressure over the bandage for 30  minutes.  3. Reapply pressure for an additional 30 minutes if necessary  4. Use additional gauze and tape to maintain pressure once the bleeding has stopped.    WOUND CARE INSTRUCTIONS  FOR CRYOSURGERY        This area treated with liquid nitrogen will form a blister. You do not need to bandage the area until after the blister forms and breaks (which may be a few days).  When the blister breaks, begin daily dressing changes as follows:    1) Clean and dry the area with tap water using clean Q-tip or sterile gauze pad.    2) Apply Polysporin ointment or Bacitracin ointment over entire wound.  Do NOT use Neosporin ointment.    3) Cover the wound with a band-aid or sterile non-stick gauze pad and micropore paper tape.      REPEAT THESE INSTRUCTIONS AT LEAST ONCE A DAY UNTIL THE WOUND HAS COMPLETELY HEALED.        It is an old wives tale that a wound heals better when it is exposed to air and allowed to dry out. The wound will heal faster with a better cosmetic result if it is kept moist with ointment and covered with a bandage.  Do not let the wound dry out.      Supplies Needed:     *Cotton tipped applicators (Q-tips)   *Polysporin  ointment or Bacitracin ointment (NOT NEOSPORIN)   *Band-aids, or non stick gauze pads and micropore paper tape    PATIENT INFORMATION    During the healing process you will notice a number of changes. All wounds develop a small halo of redness surrounding the wound.  This means healing is occurring. Severe itching with extensive redness usually indicates sensitivity to the ointment or bandage tape used to dress the wound.  You should call our office if this develops.      Swelling and/or discoloration around your surgical site is common, particularly when performed around the eye.    All wounds normally drain.  The larger the wound the more drainage there will be.  After 7-10 days, you will notice the wound beginning to shrink and new skin will begin to grow.  The wound is healed when you can see skin has formed over the entire area.  A healed wound has a healthy, shiny look to the surface and is red to dark pink in color to normalize.  Wounds may take approximately 4-6 weeks to heal.  Larger wounds may take 6-8 weeks.  After the wound is healed you may discontinue dressing changes.    You may experience a sensation of tightness as your wound heals. This is normal and will gradually subside.    Your healed wound may be sensitive to temperature changes. This sensitivity improves with time, but if you re having a lot of discomfort, try to avoid temperature extremes.    Patients frequently experience itching after their wound appears to have healed because of the continue healing under the skin.  Plain Vaseline will help relieve the itching.             5 Fluorouracil (Efudex)     Apply a pea-sized amount two times a day for 2 weeks to face and scalp.    Efudex is a topical chemotherapy medication. Do not use if you are pregnant or have a hypersensitive to to the drug, class, or any components of the medication. Very rare to have any systemic absorption.    Common reactions include and are not limited to:  Application  site reaction, pruritis, pain, bleeding, edema, stinging, burning, scaling, crusting, erythema, photosensitivity, and allergic contact dermatitis. Do not let children get into medication and do not let pets eat medication.     Any questions or concerns please call or follow up in clinic.

## 2019-08-21 NOTE — PROGRESS NOTES
HPI:  Ronnie Lagos is a 64 year old male patient here today for recheck aks on face, scalp, and back .  Patient states this has been present for a while.  Patient reports the following symptoms: improvement .  Patient reports the following previous treatments: ln2, pdt, to aks on face efudex to scalp.  Patient reports the following modifying factors: none.  Associated symptoms: none.  Had a spot on chest at last office visit that has since resolved-concern for bcc. Patient has no other skin complaints today.  Remainder of the HPI, Meds, PMH, Allergies, FH, and SH was reviewed in chart.    Pertinent Hx:   SCC on scalp and BCC on left shoulder  Past Medical History:   Diagnosis Date     Abdominal pain      Basal cell carcinoma      Calculus of bile duct      Elevated liver enzymes      Essential hypertension, benign     abstracted 6/19/02     Gastro-oesophageal reflux disease      GERD (gastroesophageal reflux disease) 7/28/2008     Liver disease     elevated liver enzymes     Squamous cell carcinoma      Unspecified cerebral artery occlusion with cerebral infarction      Unspecified condition of brain     abstracted 6/19/02       Past Surgical History:   Procedure Laterality Date     ARTHRODESIS FOOT  2/5/2013    Procedure: ARTHRODESIS FOOT;  LEFT FIRST METATARSOPHALANGEAL JOINT ARTHRODESIS ;  Surgeon: Burton Loera DPM;  Location:  [facility]     COLONOSCOPY N/A 12/7/2018    Procedure: COMBINED COLONOSCOPY, SINGLE OR MULTIPLE BIOPSY/POLYPECTOMY BY BIOPSY;  Surgeon: Blayne Mora MD;  Location:  [facility]     ENDOSCOPIC RETROGRADE CHOLANGIOPANCREATOGRAM N/A 1/18/2018    Procedure: COMBINED ENDOSCOPIC RETROGRADE CHOLANGIOPANCREATOGRAPHY, SPHINCTEROTOMY;  ENDOSCOPIC RETROGRADE CHOLANGIOPANCREATOGRAM (ERCP), SPHINCTEROTOMY, STONE REMOVAL, STENT PLACEMENT;  Surgeon: Christiano Sorenson MD;  Location:  OR     ENDOSCOPIC RETROGRADE CHOLANGIOPANCREATOGRAM N/A 4/12/2018    Procedure: ENDOSCOPIC RETROGRADE  CHOLANGIOPANCREATOGRAM;  ENDOSCOPIC RETROGRADE CHOLANIOPANCREATOGRAPHY AND ATTEMPTED STENT REMOVAL.;  Surgeon: Christiano Sorenson MD;  Location:  OR     ENDOSCOPIC RETROGRADE CHOLANGIOPANCREATOGRAM N/A 5/10/2018    Procedure: COMBINED ENDOSCOPIC RETROGRADE CHOLANGIOPANCREATOGRAPHY, REMOVE FOREIGN BODY OR STENT/TUBE;  ENDOSCOPIC RETROGRADE CHOLANGIOPANCREATOGRAPHY AND STENT REMOVAL;  Surgeon: Christiano Sorenson MD;  Location:  OR     ESOPHAGOSCOPY, GASTROSCOPY, DUODENOSCOPY (EGD), COMBINED N/A 4/12/2018    Procedure: COMBINED ESOPHAGOSCOPY, GASTROSCOPY, DUODENOSCOPY (EGD);  EGD with stent removal;  Surgeon: Christiano Sorenson MD;  Location:  GI     LAPAROSCOPIC CHOLECYSTECTOMY N/A 5/1/2015    Procedure: LAPAROSCOPIC CHOLECYSTECTOMY;  Surgeon: Damien Galindo MD;  Location:  [facility]     ORTHOPEDIC SURGERY      left elbow, right shoulder        Family History   Problem Relation Age of Onset     Hypertension Mother      Cancer - colorectal Mother      Hypertension Father      Melanoma No family hx of        Social History     Socioeconomic History     Marital status:      Spouse name: Not on file     Number of children: Not on file     Years of education: Not on file     Highest education level: Not on file   Occupational History     Employer: [employer]   Social Needs     Financial resource strain: Not on file     Food insecurity:     Worry: Not on file     Inability: Not on file     Transportation needs:     Medical: Not on file     Non-medical: Not on file   Tobacco Use     Smoking status: Never Smoker     Smokeless tobacco: Never Used   Substance and Sexual Activity     Alcohol use: Yes     Comment: 1/day     Drug use: No     Sexual activity: Yes     Partners: Female   Lifestyle     Physical activity:     Days per week: Not on file     Minutes per session: Not on file     Stress: Not on file   Relationships     Social connections:     Talks on phone: Not on file      Gets together: Not on file     Attends Buddhism service: Not on file     Active member of club or organization: Not on file     Attends meetings of clubs or organizations: Not on file     Relationship status: Not on file     Intimate partner violence:     Fear of current or ex partner: Not on file     Emotionally abused: Not on file     Physically abused: Not on file     Forced sexual activity: Not on file   Other Topics Concern     Parent/sibling w/ CABG, MI or angioplasty before 65F 55M? Not Asked   Social History Narrative     Not on file       Outpatient Encounter Medications as of 8/21/2019   Medication Sig Dispense Refill     amLODIPine (NORVASC) 5 MG tablet Take 1 tablet (5 mg) by mouth daily 90 tablet 3     amLODIPine (NORVASC) 5 MG tablet TAKE ONE TABLET BY MOUTH ONE TIME DAILY  90 tablet 0     atenolol (TENORMIN) 50 MG tablet Take 1 tablet (50 mg) by mouth daily 90 tablet 3     IBUPROFEN PO        omeprazole (PRILOSEC) 20 MG DR capsule Take 1 capsule (20 mg) by mouth daily 90 capsule 3     No facility-administered encounter medications on file as of 8/21/2019.        Review Of Systems:.  Skin: As above  Eyes: negative  Ears/Nose/Throat: negative  Respiratory: No shortness of breath, dyspnea on exertion, cough, or hemoptysis  Cardiovascular: negative  Gastrointestinal: negative  Genitourinary: negative  Musculoskeletal: negative  Neurologic: negative  Psychiatric: negative  Hematologic/Lymphatic/Immunologic: negative  Endocrine: negative      Objective:     BP (!) 141/86   Pulse 66   SpO2 96%   Eyes: Conjunctivae/lids: Normal   ENT: Lips:  Normal  MSK: Normal  Cardiovascular: Peripheral edema none  Pulm: Breathing Normal  Neuro/Psych: Orientation: Normal; Mood/Affect: Normal, NAD, WDWN  Pt accompanied by: self  Following areas examined: face, chest, back, neck, scalp, shoulders  Linares skin type:ii   Findings:  Smooth tan macule on bilateral lateral 2nd distal digit  Pink scaly macule/s on scalp,  temples, cheeks  Light brown/pink smooth macule x 1 on upper back  Rhytides, hypo/hyperpigmentation, and atrophy  Pink thickened scaly papule on left dorsal hand 0.3cm  Smooth hypopigmented patch on scalp and left shoulder  Assessment and Plan:  1)  lentigines  Watch and monitor  I discussed the specifics of tumor, prognosis, and genetics of benign lesions.  I explained that treatment of these lesions would be purely cosmetic and not medically neccessary.  I discussed with patient different removal options. Some of these options include extraction, excision, cryotherapy, cautery and /or laser.  Lesion may recur and/or may not completely resolve. May need additional treatment.       2) Neoplasm of uncertain behavior on left dorsal hand 0.3cm  HAK vs SCC  TANGENTIAL BIOPSY:  After consent, anesthesia with LEC and prep, tangential biopsy performed.  No complications and routine wound care.  May grow back and will get a scar. Based on lesion type may need to completely remove lesion. Patient will be notified in 7-10 days of results. Wound care directions given.    3) actinic keratoses and solar elastosis    There is a risk of AKs developing into a SCC.   Treatment options include LN2 vs PDT vs Efudex. Pt elected LN2 and Efudex     LN2 x1 on back: Treated with LN2 for 5s for 1-2 cycles. Warned risks of blistering, pain, pigment change, scarring, and incomplete resolution.  Advised patient to return if lesions do not completely resolve within 2-3 months.  Wound care sheet given.      5 Fluorouracil (Efudex)     Apply a pea-sized amount two times a day for 2 weeks to face and scalp.    Efudex is a topical chemotherapy medication. Do not use if you are pregnant or have a hypersensitive to to the drug, class, or any components of the medication. Very rare to have any systemic absorption.    Common reactions include and are not limited to:  Application site reaction, pruritis, pain, bleeding, edema, stinging, burning,  scaling, crusting, erythema, photosensitivity, and allergic contact dermatitis. Do not let children get into medication and do not let pets eat medication.     4) history of NMSC  No evidence of recurrence  Signs and Symptoms of non-melanoma skin cancer and ABCDEs of melanoma reviewed with patient. Patient encouraged to perform monthly self skin exams and educated on how to perform them. UV precautions reviewed with patient. Patient was asked about new or changing moles/lesions on body.   Wear a sunscreen with at least SPF 30 on your face, ears, neck and V of the chest daily. Wear sunscreen on other areas of the body if those areas are exposed to the sun throughout the day. Sunscreens can contain physical and/or chemical blockers. Physical blockers are less likely to clog pores, these include zinc oxide and titanium dioxide. Reapply every two hour and after swimming. Sunscreen examples include Neutrogena, CeraVe, Blue Lizard, Elta MD and many others.    Ronnie to follow up with Primary Care provider regarding elevated blood pressure.      Follow up in yearly FBE. 2-3 months after stopping efudex

## 2019-08-26 ENCOUNTER — TELEPHONE (OUTPATIENT)
Dept: DERMATOLOGY | Facility: CLINIC | Age: 64
End: 2019-08-26

## 2019-08-26 LAB — COPATH REPORT: NORMAL

## 2019-08-26 NOTE — TELEPHONE ENCOUNTER
----- Message from Zenaida Brown PA-C sent at 8/26/2019 12:20 PM CDT -----  Left hand    actnic keratoses    Begin efudex BID x 2 weeks to the area.

## 2019-08-26 NOTE — TELEPHONE ENCOUNTER
Called and spoke to patient. Educated patient on biopsy results- AK. Advised patient to begin efudex BID x2 weeks to the affected areas. Patient voiced understanding.    BEAN Juarez-BSN-PHN  [facility]  [phone]

## 2019-09-16 ENCOUNTER — TRANSFERRED RECORDS (OUTPATIENT)
Dept: HEALTH INFORMATION MANAGEMENT | Facility: CLINIC | Age: 64
End: 2019-09-16

## 2019-09-29 ENCOUNTER — HEALTH MAINTENANCE LETTER (OUTPATIENT)
Age: 64
End: 2019-09-29

## 2019-11-25 ENCOUNTER — OFFICE VISIT (OUTPATIENT)
Dept: DERMATOLOGY | Facility: CLINIC | Age: 64
End: 2019-11-25
Payer: COMMERCIAL

## 2019-11-25 VITALS — OXYGEN SATURATION: 94 % | HEART RATE: 65 BPM | DIASTOLIC BLOOD PRESSURE: 93 MMHG | SYSTOLIC BLOOD PRESSURE: 150 MMHG

## 2019-11-25 DIAGNOSIS — Z85.828 HISTORY OF NONMELANOMA SKIN CANCER: ICD-10-CM

## 2019-11-25 DIAGNOSIS — L57.8 SOLAR ELASTOSIS: Primary | ICD-10-CM

## 2019-11-25 DIAGNOSIS — L57.0 ACTINIC KERATOSES: ICD-10-CM

## 2019-11-25 PROCEDURE — 99213 OFFICE O/P EST LOW 20 MIN: CPT | Mod: 25 | Performed by: PHYSICIAN ASSISTANT

## 2019-11-25 PROCEDURE — 17110 DESTRUCTION B9 LES UP TO 14: CPT | Performed by: PHYSICIAN ASSISTANT

## 2019-11-25 NOTE — PATIENT INSTRUCTIONS
WOUND CARE INSTRUCTIONS  FOR CRYOSURGERY        This area treated with liquid nitrogen will form a blister. You do not need to bandage the area until after the blister forms and breaks (which may be a few days).  When the blister breaks, begin daily dressing changes as follows:    1) Clean and dry the area with tap water using clean Q-tip or sterile gauze pad.    2) Apply Polysporin ointment or Bacitracin ointment over entire wound.  Do NOT use Neosporin ointment.    3) Cover the wound with a band-aid or sterile non-stick gauze pad and micropore paper tape.      REPEAT THESE INSTRUCTIONS AT LEAST ONCE A DAY UNTIL THE WOUND HAS COMPLETELY HEALED.        It is an old wives tale that a wound heals better when it is exposed to air and allowed to dry out. The wound will heal faster with a better cosmetic result if it is kept moist with ointment and covered with a bandage.  Do not let the wound dry out.      Supplies Needed:     *Cotton tipped applicators (Q-tips)   *Polysporin ointment or Bacitracin ointment (NOT NEOSPORIN)   *Band-aids, or non stick gauze pads and micropore paper tape    PATIENT INFORMATION    During the healing process you will notice a number of changes. All wounds develop a small halo of redness surrounding the wound.  This means healing is occurring. Severe itching with extensive redness usually indicates sensitivity to the ointment or bandage tape used to dress the wound.  You should call our office if this develops.      Swelling and/or discoloration around your surgical site is common, particularly when performed around the eye.    All wounds normally drain.  The larger the wound the more drainage there will be.  After 7-10 days, you will notice the wound beginning to shrink and new skin will begin to grow.  The wound is healed when you can see skin has formed over the entire area.  A healed wound has a healthy, shiny look to the surface and is red to dark pink in color to normalize.  Wounds may  take approximately 4-6 weeks to heal.  Larger wounds may take 6-8 weeks.  After the wound is healed you may discontinue dressing changes.    You may experience a sensation of tightness as your wound heals. This is normal and will gradually subside.    Your healed wound may be sensitive to temperature changes. This sensitivity improves with time, but if you re having a lot of discomfort, try to avoid temperature extremes.    Patients frequently experience itching after their wound appears to have healed because of the continue healing under the skin.  Plain Vaseline will help relieve the itching.         Proper skin care from [facility]:    -Eliminate harsh soaps as they strip the natural oils from the skin, often resulting in dry itchy skin ( i.e. Dial, Zest, [place] Spring)  -Use mild soaps such as Cetaphil or Dove Sensitive Skin in the shower. You do not need to use soap on arms, legs, and trunk every time you shower unless visibly soiled.   -Avoid hot or cold showers.  -After showering, lightly dry off and apply moisturizing within 2-3 minutes. This will help trap moisture in the skin.   -Aggressive use of a moisturizer at least 1-2 times a day to the entire body (including -Vanicream, Cetaphil, Aquaphor or Cerave) and moisturize hands after every washing.  -We recommend using moisturizers that come in a tub that needs to be scooped out, not a pump. This has more of an oil base. It will hold moisture in your skin much better than a water base moisturizer. The above recommended are non-pore clogging.      Wear a sunscreen with at least SPF 30 on your face, ears, neck and V of the chest daily. Wear sunscreen on other areas of the body if those areas are exposed to the sun throughout the day. Sunscreens can contain physical and/or chemical blockers. Physical blockers are less likely to clog pores, these include zinc oxide and titanium dioxide. Reapply every two hour and after swimming. Sunscreen examples  include Neutrogena, CeraVe, Blue Lizard, Elta MD and many others.    UV radiation  UVA radiation remains constant throughout the day and throughout the year. It is a longer wavelength than UVB and therefore penetrates deeper into the skin leading to immediate and delayed tanning, photoaging, and skin cancer. 70-80% of UVA and UVB radiation occurs between the hours of 10am-2pm.  UVB radiation  UVB radiation causes the most harmful effects and is more significant during the summer months. However, snow and ice can reflect UVB radiation leading to skin damage during the winter months as well. UVB radiation is responsible for tanning, burning, inflammation, delayed erythema (pinkness), pigmentation (brown spots), and skin cancer.     5 Fluorouracil (Efudex)     Apply a pea-sized amount two times a day for 3 weeks to scalp and upper forehead.  Apply 1/2 pea sized amount on back of hands ( not palms) and spot on left ear 2x a day for 2 weeks.       Efudex is a topical chemotherapy medication. Do not use if you are pregnant or have a hypersensitive to to the drug, class, or any components of the medication. Very rare to have any systemic absorption.    Common reactions include and are not limited to:  Application site reaction, pruritis, pain, bleeding, edema, stinging, burning, scaling, crusting, erythema, photosensitivity, and allergic contact dermatitis. Do not let children get into medication and do not let pets eat medication.     Any questions or concerns please call or follow up in clinic.     Follow up in spring 2020 for recheck aks and full body exam.

## 2019-11-25 NOTE — PROGRESS NOTES
HPI:  Ronnie Lagos is a 64 year old male patient here today for recheck aks on face, scalp, and back . recently finished 2 weeks of efudex to face and scalp. LOV ln2 to ak on back. Great improvement.  Patient states this has been present for a while.  Patient reports the following symptoms: improvement .  Patient reports the following previous treatments: ln2, pdt, to aks on face efudex to scalp in the past.   Patient reports the following modifying factors: none.  Associated symptoms: none. Patient has no other skin complaints today.  Remainder of the HPI, Meds, PMH, Allergies, FH, and SH was reviewed in chart.    Pertinent Hx:   SCC on scalp and BCC on left shoulder  Past Medical History:   Diagnosis Date     Abdominal pain      Basal cell carcinoma      Calculus of bile duct      Elevated liver enzymes      Essential hypertension, benign     abstracted 6/19/02     Gastro-oesophageal reflux disease      GERD (gastroesophageal reflux disease) 7/28/2008     Liver disease     elevated liver enzymes     Squamous cell carcinoma      Unspecified cerebral artery occlusion with cerebral infarction      Unspecified condition of brain     abstracted 6/19/02       Past Surgical History:   Procedure Laterality Date     ARTHRODESIS FOOT  2/5/2013    Procedure: ARTHRODESIS FOOT;  LEFT FIRST METATARSOPHALANGEAL JOINT ARTHRODESIS ;  Surgeon: Burton Loera DPM;  [facility]     COLONOSCOPY N/A 12/7/2018    Procedure: COMBINED COLONOSCOPY, SINGLE OR MULTIPLE BIOPSY/POLYPECTOMY BY BIOPSY;  Surgeon: Blayne Mora MD;  Location:  [facility]     ENDOSCOPIC RETROGRADE CHOLANGIOPANCREATOGRAM N/A 1/18/2018    Procedure: COMBINED ENDOSCOPIC RETROGRADE CHOLANGIOPANCREATOGRAPHY, SPHINCTEROTOMY;  ENDOSCOPIC RETROGRADE CHOLANGIOPANCREATOGRAM (ERCP), SPHINCTEROTOMY, STONE REMOVAL, STENT PLACEMENT;  Surgeon: Christiano Sorenson MD;  Location:  OR     ENDOSCOPIC RETROGRADE CHOLANGIOPANCREATOGRAM N/A 4/12/2018    Procedure:  ENDOSCOPIC RETROGRADE CHOLANGIOPANCREATOGRAM;  ENDOSCOPIC RETROGRADE CHOLANIOPANCREATOGRAPHY AND ATTEMPTED STENT REMOVAL.;  Surgeon: Christiano Sorenson MD;  Location:  OR     ENDOSCOPIC RETROGRADE CHOLANGIOPANCREATOGRAM N/A 5/10/2018    Procedure: COMBINED ENDOSCOPIC RETROGRADE CHOLANGIOPANCREATOGRAPHY, REMOVE FOREIGN BODY OR STENT/TUBE;  ENDOSCOPIC RETROGRADE CHOLANGIOPANCREATOGRAPHY AND STENT REMOVAL;  Surgeon: Christiano Sorenson MD;  Location:  OR     ESOPHAGOSCOPY, GASTROSCOPY, DUODENOSCOPY (EGD), COMBINED N/A 4/12/2018    Procedure: COMBINED ESOPHAGOSCOPY, GASTROSCOPY, DUODENOSCOPY (EGD);  EGD with stent removal;  Surgeon: Christiano Sorenson MD;  Location:  GI     LAPAROSCOPIC CHOLECYSTECTOMY N/A 5/1/2015    Procedure: LAPAROSCOPIC CHOLECYSTECTOMY;  Surgeon: Damien Galindo MD;  [facility]:  [facility]     ORTHOPEDIC SURGERY      left elbow, right shoulder        Family History   Problem Relation Age of Onset     Hypertension Mother      Cancer - colorectal Mother      Hypertension Father      Melanoma No family hx of        Social History     Socioeconomic History     Marital status:      Spouse name: Not on file     Number of children: Not on file     Years of education: Not on file     Highest education level: Not on file   Occupational History     Employer: [employer]   Social Needs     Financial resource strain: Not on file     Food insecurity:     Worry: Not on file     Inability: Not on file     Transportation needs:     Medical: Not on file     Non-medical: Not on file   Tobacco Use     Smoking status: Never Smoker     Smokeless tobacco: Never Used   Substance and Sexual Activity     Alcohol use: Yes     Comment: 1/day     Drug use: No     Sexual activity: Yes     Partners: Female   Lifestyle     Physical activity:     Days per week: Not on file     Minutes per session: Not on file     Stress: Not on file   Relationships     Social connections:     Talks on  phone: Not on file     Gets together: Not on file     Attends Yarsanism service: Not on file     Active member of club or organization: Not on file     Attends meetings of clubs or organizations: Not on file     Relationship status: Not on file     Intimate partner violence:     Fear of current or ex partner: Not on file     Emotionally abused: Not on file     Physically abused: Not on file     Forced sexual activity: Not on file   Other Topics Concern     Parent/sibling w/ CABG, MI or angioplasty before 65F 55M? Not Asked   Social History Narrative     Not on file       Outpatient Encounter Medications as of 11/25/2019   Medication Sig Dispense Refill     amLODIPine (NORVASC) 5 MG tablet Take 1 tablet (5 mg) by mouth daily 90 tablet 3     amLODIPine (NORVASC) 5 MG tablet TAKE ONE TABLET BY MOUTH ONE TIME DAILY  90 tablet 0     atenolol (TENORMIN) 50 MG tablet Take 1 tablet (50 mg) by mouth daily 90 tablet 3     IBUPROFEN PO        omeprazole (PRILOSEC) 20 MG DR capsule Take 1 capsule (20 mg) by mouth daily 90 capsule 3     No facility-administered encounter medications on file as of 11/25/2019.        Review Of Systems:  Skin: As above  Eyes: negative  Ears/Nose/Throat: negative  Respiratory: No shortness of breath, dyspnea on exertion, cough, or hemoptysis  Cardiovascular: negative  Gastrointestinal: negative  Genitourinary: negative  Musculoskeletal: negative  Neurologic: negative  Psychiatric: negative  Hematologic/Lymphatic/Immunologic: negative  Endocrine: negative      Objective:     BP (!) 142/87   Pulse 65   SpO2 94%   Eyes: Conjunctivae/lids: Normal   ENT: Lips:  Normal  MSK: Normal  Cardiovascular: Peripheral edema none  Pulm: Breathing Normal  Neuro/Psych: Orientation: Normal; Mood/Affect: Normal, NAD, WDWN  Pt accompanied by: self  Following areas examined: face, neck, chest, back, hands  Linares skin type:ii   Findings:  Rhytides, hypo/hyperpigmentation, and atrophy  Pink scaly macule/s on scalp  x 11, left helix, left lower lip x 1, left and right dorsal hands  Pink and brown scaly macule on right upper back x 1    Assessment and Plan:  1) Actinic keratoses of scalp, dorsal hands, and left helix and solar elastosis  Treatment options include LN2 vs PDT vs Efudex. Pt elected Efudex and LN2    Actinic keratosis -Biopsied on left lower lip 10/9/2014  Recurred about a month ago.   LN2: Treated with LN2 for 5s for 1-2 cycles. Warned risks of blistering, pain, pigment change, scarring, and incomplete resolution.  Advised patient to return if lesions do not completely resolve within 2-3 months.  Wound care sheet given.    5 Fluorouracil (Efudex)     Apply a pea-sized amount two times a day for 3 weeks to scalp and upper forehead.  Apply 1/2 pea sized amount on back of hands ( not palms) and spot on left ear 2x a day for 2 weeks.     Efudex is a topical chemotherapy medication. Do not use if you are pregnant or have a hypersensitive to to the drug, class, or any components of the medication. Very rare to have any systemic absorption.    Common reactions include and are not limited to:  Application site reaction, pruritis, pain, bleeding, edema, stinging, burning, scaling, crusting, erythema, photosensitivity, and allergic contact dermatitis. Do not let children get into medication and do not let pets eat medication.     2) ak vs scc  Disc biopsy. Pt defers.   LN2: Treated with LN2 for 5s for 1-2 cycles. Warned risks of blistering, pain, pigment change, scarring, and incomplete resolution.  Advised patient to return if lesions do not completely resolve within 2-3 months.  Wound care sheet given.    3) history of NMSC  Signs and Symptoms of non-melanoma skin cancer and ABCDEs. Patient was asked about new or changing moles/lesions on body.   Wear a sunscreen with at least SPF 30 on your face, ears, neck and V of the chest daily. Wear sunscreen on other areas of the body if those areas are exposed to the sun throughout  the day. Sunscreens can contain physical and/or chemical blockers. Physical blockers are less likely to clog pores, these include zinc oxide and titanium dioxide. Reapply every two hour and after swimming. Sunscreen examples include Neutrogena, CeraVe, Blue Lizard, Elta MD and many others.      Ronnie to follow up with Primary Care provider regarding elevated blood pressure.    Follow up in spring 2020 for recheck aks and full body exam.

## 2019-11-25 NOTE — LETTER
11/25/2019         RE: Ronnie Lagos  [address]  [facility] [address]        Dear Colleague,    [facility] Please see a copy of my visit note below.    HPI:  Ronnie Lagos is a 64 year old male patient here today for recheck aks on face, scalp, and back .  recently finished 2 weeks of efudex to face and scalp. LOV ln2 to ak on back. Great improvement.  Patient states this has been present for a while.  Patient reports the following symptoms: improvement .  Patient reports the following previous treatments: ln2, pdt, to aks on face efudex to scalp in the past.   Patient reports the following modifying factors: none.  Associated symptoms: none. Patient has no other skin complaints today.  Remainder of the HPI, Meds, PMH, Allergies, FH, and SH was reviewed in chart.    Pertinent Hx:   SCC on scalp and BCC on left shoulder  Past Medical History:   Diagnosis Date     Abdominal pain      Basal cell carcinoma      Calculus of bile duct      Elevated liver enzymes      Essential hypertension, benign     abstracted 6/19/02     Gastro-oesophageal reflux disease      GERD (gastroesophageal reflux disease) 7/28/2008     Liver disease     elevated liver enzymes     Squamous cell carcinoma      Unspecified cerebral artery occlusion with cerebral infarction      Unspecified condition of brain     abstracted 6/19/02       Past Surgical History:   Procedure Laterality Date     ARTHRODESIS FOOT  2/5/2013    Procedure: ARTHRODESIS FOOT;  LEFT FIRST METATARSOPHALANGEAL JOINT ARTHRODESIS ;  Surgeon: Burton Loera DPM;  [facility]     COLONOSCOPY N/A 12/7/2018    Procedure: COMBINED COLONOSCOPY, SINGLE OR MULTIPLE BIOPSY/POLYPECTOMY BY BIOPSY;  Surgeon: Balyne Mora MD;  Location:  [facility]     ENDOSCOPIC RETROGRADE CHOLANGIOPANCREATOGRAM N/A 1/18/2018    Procedure: COMBINED ENDOSCOPIC RETROGRADE  CHOLANGIOPANCREATOGRAPHY, SPHINCTEROTOMY;  ENDOSCOPIC RETROGRADE CHOLANGIOPANCREATOGRAM (ERCP), SPHINCTEROTOMY, STONE REMOVAL, STENT PLACEMENT;  Surgeon: Christiano Sorenson MD;  Location:  OR     ENDOSCOPIC RETROGRADE CHOLANGIOPANCREATOGRAM N/A 4/12/2018    Procedure: ENDOSCOPIC RETROGRADE CHOLANGIOPANCREATOGRAM;  ENDOSCOPIC RETROGRADE CHOLANIOPANCREATOGRAPHY AND ATTEMPTED STENT REMOVAL.;  Surgeon: Christiano Sorenson MD;  Location:  OR     ENDOSCOPIC RETROGRADE CHOLANGIOPANCREATOGRAM N/A 5/10/2018    Procedure: COMBINED ENDOSCOPIC RETROGRADE CHOLANGIOPANCREATOGRAPHY, REMOVE FOREIGN BODY OR STENT/TUBE;  ENDOSCOPIC RETROGRADE CHOLANGIOPANCREATOGRAPHY AND STENT REMOVAL;  Surgeon: Christiano Sorenson MD;  Location:  OR     ESOPHAGOSCOPY, GASTROSCOPY, DUODENOSCOPY (EGD), COMBINED N/A 4/12/2018    Procedure: COMBINED ESOPHAGOSCOPY, GASTROSCOPY, DUODENOSCOPY (EGD);  EGD with stent removal;  Surgeon: Christiano Sorenson MD;  Location:  GI     LAPAROSCOPIC CHOLECYSTECTOMY N/A 5/1/2015    Procedure: LAPAROSCOPIC CHOLECYSTECTOMY;  Surgeon: Damien Galindo MD;  [facility]:  [facility]     ORTHOPEDIC SURGERY      left elbow, right shoulder        Family History   Problem Relation Age of Onset     Hypertension Mother      Cancer - colorectal Mother      Hypertension Father      Melanoma No family hx of        Social History     Socioeconomic History     Marital status:      Spouse name: Not on file     Number of children: Not on file     Years of education: Not on file     Highest education level: Not on file   Occupational History     Employer: [employer]   Social Needs     Financial resource strain: Not on file     Food insecurity:     Worry: Not on file     Inability: Not on file     Transportation needs:     Medical: Not on file     Non-medical: Not on file   Tobacco Use     Smoking status: Never Smoker     Smokeless tobacco: Never Used   Substance and Sexual Activity      Alcohol use: Yes     Comment: 1/day     Drug use: No     Sexual activity: Yes     Partners: Female   Lifestyle     Physical activity:     Days per week: Not on file     Minutes per session: Not on file     Stress: Not on file   Relationships     Social connections:     Talks on phone: Not on file     Gets together: Not on file     Attends Rastafarian service: Not on file     Active member of club or organization: Not on file     Attends meetings of clubs or organizations: Not on file     Relationship status: Not on file     Intimate partner violence:     Fear of current or ex partner: Not on file     Emotionally abused: Not on file     Physically abused: Not on file     Forced sexual activity: Not on file   Other Topics Concern     Parent/sibling w/ CABG, MI or angioplasty before 65F 55M? Not Asked   Social History Narrative     Not on file       Outpatient Encounter Medications as of 11/25/2019   Medication Sig Dispense Refill     amLODIPine (NORVASC) 5 MG tablet Take 1 tablet (5 mg) by mouth daily 90 tablet 3     amLODIPine (NORVASC) 5 MG tablet TAKE ONE TABLET BY MOUTH ONE TIME DAILY  90 tablet 0     atenolol (TENORMIN) 50 MG tablet Take 1 tablet (50 mg) by mouth daily 90 tablet 3     IBUPROFEN PO        omeprazole (PRILOSEC) 20 MG DR capsule Take 1 capsule (20 mg) by mouth daily 90 capsule 3     No facility-administered encounter medications on file as of 11/25/2019.        Review Of Systems:  Skin: As above  Eyes: negative  Ears/Nose/Throat: negative  Respiratory: No shortness of breath, dyspnea on exertion, cough, or hemoptysis  Cardiovascular: negative  Gastrointestinal: negative  Genitourinary: negative  Musculoskeletal: negative  Neurologic: negative  Psychiatric: negative  Hematologic/Lymphatic/Immunologic: negative  Endocrine: negative      Objective:     BP (!) 142/87   Pulse 65   SpO2 94%   Eyes: Conjunctivae/lids: Normal   ENT: Lips:  Normal  MSK: Normal  Cardiovascular: Peripheral edema none  Pulm:  Breathing Normal  Neuro/Psych: Orientation: Normal; Mood/Affect: Normal, NAD, WDWN  Pt accompanied by: self  Following areas examined: face, neck, chest, back, hands  Linares skin type:ii   Findings:  Rhytides, hypo/hyperpigmentation, and atrophy  Pink scaly macule/s on scalp x 11, left helix, left lower lip x 1, left and right dorsal hands  Pink and brown scaly macule on right upper back x 1    Assessment and Plan:  1) Actinic keratoses of scalp, dorsal hands, and left helix and solar elastosis  Treatment options include LN2 vs PDT vs Efudex. Pt elected Efudex and LN2    Actinic keratosis -Biopsied on left lower lip 10/9/2014  Recurred about a month ago.   LN2: Treated with LN2 for 5s for 1-2 cycles. Warned risks of blistering, pain, pigment change, scarring, and incomplete resolution.  Advised patient to return if lesions do not completely resolve within 2-3 months.  Wound care sheet given.    5 Fluorouracil (Efudex)     Apply a pea-sized amount two times a day for 3 weeks to scalp and upper forehead.  Apply 1/2 pea sized amount on back of hands ( not palms) and spot on left ear 2x a day for 2 weeks.     Efudex is a topical chemotherapy medication. Do not use if you are pregnant or have a hypersensitive to to the drug, class, or any components of the medication. Very rare to have any systemic absorption.    Common reactions include and are not limited to:  Application site reaction, pruritis, pain, bleeding, edema, stinging, burning, scaling, crusting, erythema, photosensitivity, and allergic contact dermatitis. Do not let children get into medication and do not let pets eat medication.     2) ak vs scc  Disc biopsy. Pt defers.   LN2: Treated with LN2 for 5s for 1-2 cycles. Warned risks of blistering, pain, pigment change, scarring, and incomplete resolution.  Advised patient to return if lesions do not completely resolve within 2-3 months.  Wound care sheet given.    3) history of NMSC  Signs and Symptoms of  non-melanoma skin cancer and ABCDEs. Patient was asked about new or changing moles/lesions on body.   Wear a sunscreen with at least SPF 30 on your face, ears, neck and V of the chest daily. Wear sunscreen on other areas of the body if those areas are exposed to the sun throughout the day. Sunscreens can contain physical and/or chemical blockers. Physical blockers are less likely to clog pores, these include zinc oxide and titanium dioxide. Reapply every two hour and after swimming. Sunscreen examples include Neutrogena, CeraVe, Blue Lizard, Elta MD and many others.        Follow up in spring 2020 for recheck aks and full body exam.           Again, thank you for allowing me to participate in the care of your patient.        Sincerely,        Zenaida Brown PA-C

## 2019-12-23 ENCOUNTER — TRANSFERRED RECORDS (OUTPATIENT)
Dept: HEALTH INFORMATION MANAGEMENT | Facility: CLINIC | Age: 64
End: 2019-12-23

## 2020-01-30 ENCOUNTER — OFFICE VISIT (OUTPATIENT)
Dept: INTERNAL MEDICINE | Facility: CLINIC | Age: 65
End: 2020-01-30
Payer: COMMERCIAL

## 2020-01-30 VITALS
TEMPERATURE: 97.4 F | HEIGHT: 69 IN | OXYGEN SATURATION: 97 % | SYSTOLIC BLOOD PRESSURE: 130 MMHG | WEIGHT: 217.1 LBS | HEART RATE: 60 BPM | RESPIRATION RATE: 15 BRPM | DIASTOLIC BLOOD PRESSURE: 80 MMHG | BODY MASS INDEX: 32.15 KG/M2

## 2020-01-30 DIAGNOSIS — K21.9 GASTROESOPHAGEAL REFLUX DISEASE WITHOUT ESOPHAGITIS: ICD-10-CM

## 2020-01-30 DIAGNOSIS — Z13.6 CARDIOVASCULAR SCREENING; LDL GOAL LESS THAN 160: ICD-10-CM

## 2020-01-30 DIAGNOSIS — I10 ESSENTIAL HYPERTENSION, BENIGN: Primary | ICD-10-CM

## 2020-01-30 DIAGNOSIS — Z12.5 SCREENING PSA (PROSTATE SPECIFIC ANTIGEN): ICD-10-CM

## 2020-01-30 LAB
ALBUMIN SERPL-MCNC: 3.7 G/DL (ref 3.4–5)
ALP SERPL-CCNC: 53 U/L (ref 40–150)
ALT SERPL W P-5'-P-CCNC: 110 U/L (ref 0–70)
ANION GAP SERPL CALCULATED.3IONS-SCNC: 5 MMOL/L (ref 3–14)
AST SERPL W P-5'-P-CCNC: 108 U/L (ref 0–45)
BILIRUB SERPL-MCNC: 1.6 MG/DL (ref 0.2–1.3)
BUN SERPL-MCNC: 11 MG/DL (ref 7–30)
CALCIUM SERPL-MCNC: 9.6 MG/DL (ref 8.5–10.1)
CHLORIDE SERPL-SCNC: 103 MMOL/L (ref 94–109)
CHOLEST SERPL-MCNC: 199 MG/DL
CO2 SERPL-SCNC: 29 MMOL/L (ref 20–32)
CREAT SERPL-MCNC: 0.97 MG/DL (ref 0.66–1.25)
GFR SERPL CREATININE-BSD FRML MDRD: 83 ML/MIN/{1.73_M2}
GLUCOSE SERPL-MCNC: 111 MG/DL (ref 70–99)
HDLC SERPL-MCNC: 66 MG/DL
HGB BLD-MCNC: 17.2 G/DL (ref 13.3–17.7)
LDLC SERPL CALC-MCNC: 107 MG/DL
NONHDLC SERPL-MCNC: 133 MG/DL
POTASSIUM SERPL-SCNC: 3.9 MMOL/L (ref 3.4–5.3)
PROT SERPL-MCNC: 7.4 G/DL (ref 6.8–8.8)
PSA SERPL-ACNC: 2.08 UG/L (ref 0–4)
SODIUM SERPL-SCNC: 137 MMOL/L (ref 133–144)
TRIGL SERPL-MCNC: 128 MG/DL

## 2020-01-30 PROCEDURE — 99214 OFFICE O/P EST MOD 30 MIN: CPT | Performed by: INTERNAL MEDICINE

## 2020-01-30 PROCEDURE — 36415 COLL VENOUS BLD VENIPUNCTURE: CPT | Performed by: INTERNAL MEDICINE

## 2020-01-30 PROCEDURE — 85018 HEMOGLOBIN: CPT | Performed by: INTERNAL MEDICINE

## 2020-01-30 PROCEDURE — G0103 PSA SCREENING: HCPCS | Performed by: INTERNAL MEDICINE

## 2020-01-30 PROCEDURE — 80053 COMPREHEN METABOLIC PANEL: CPT | Performed by: INTERNAL MEDICINE

## 2020-01-30 PROCEDURE — 80061 LIPID PANEL: CPT | Performed by: INTERNAL MEDICINE

## 2020-01-30 RX ORDER — AMLODIPINE BESYLATE 5 MG/1
5 TABLET ORAL DAILY
Qty: 90 TABLET | Refills: 3 | Status: SHIPPED | OUTPATIENT
Start: 2020-01-30 | End: 2021-01-21

## 2020-01-30 RX ORDER — ATENOLOL 50 MG/1
50 TABLET ORAL DAILY
Qty: 90 TABLET | Refills: 3 | Status: SHIPPED | OUTPATIENT
Start: 2020-01-30 | End: 2021-01-21

## 2020-01-30 ASSESSMENT — MIFFLIN-ST. JEOR: SCORE: 1770.14

## 2020-01-30 NOTE — LETTER
[facility]  [address] [address]  [phone]      1/30/2020       Ronnie Lagos  [address]  [facility] [address]        Dear Ronnie,    I am pleased to inform you that your routine blood work including your hemoglobin, sodium, potassium, calcium and kidney function tests are all normal.    Your blood sugar function test remains slightly abnormal and elevated and should be rechecked here in the clinic in 6 months with a follow-up visit with me, fasting.  I will look forward to seeing you at that time and please call to make an appointment.  In the meantime, please work on your diet limiting your carbohydrates and getting some good, regular exercise.    Your liver function tests are also slowly on the rise again should probably be rechecked here in the clinic with a follow-up with me in about a month.    Your PSA or prostate-specific antigen test remains normal but is again just slightly higher than when last checked.  I would suggest you may benefit from repeating this in 6 months for reassurance.    Sincerely,      Abdiel Jones MD  Internal Medicine

## 2020-01-30 NOTE — PROGRESS NOTES
Subjective     Ronnie Lagos is a 64 year old male who presents to clinic today for the following health issues:    HPI   Hypertension Follow-up      Do you check your blood pressure regularly outside of the clinic? Yes     Are you following a low salt diet? Yes    Are your blood pressures ever more than 140 on the top number (systolic) OR more   than 90 on the bottom number (diastolic), for example 140/90? Yes      How many servings of fruits and vegetables do you eat daily?  0-1    On average, how many sweetened beverages do you drink each day (Examples: soda, juice, sweet tea, etc.  Do NOT count diet or artificially sweetened beverages)?   0    How many days per week do you exercise enough to make your heart beat faster? N/A     How many minutes a day do you exercise enough to make your heart beat faster? N/A    How many days per week do you miss taking your medication? 0      Other concerns:  1. Getting over URI- has runny nose, congestion, episode of L ear pain yesterday.     Patient Active Problem List   Diagnosis     Essential hypertension, benign     Impotence of organic origin     Lumbago     Cervicalgia     CARDIOVASCULAR SCREENING; LDL GOAL LESS THAN 160     Neck pain     Advance care planning     Hallux limitus     Injury of extensor tendon of hand     Right hand pain     Osteoarthritis of finger of right hand     Alcohol use     Gastroesophageal reflux disease without esophagitis     Past Surgical History:   Procedure Laterality Date     ARTHRODESIS FOOT  2/5/2013    Procedure: ARTHRODESIS FOOT;  LEFT FIRST METATARSOPHALANGEAL JOINT ARTHRODESIS ;  Surgeon: Burton Loera DPM;  [facility]     COLONOSCOPY N/A 12/7/2018    Procedure: COMBINED COLONOSCOPY, SINGLE OR MULTIPLE BIOPSY/POLYPECTOMY BY BIOPSY;  Surgeon: Blayne Mora MD;  Location:  [facility]     ENDOSCOPIC RETROGRADE CHOLANGIOPANCREATOGRAM N/A 1/18/2018    Procedure: COMBINED ENDOSCOPIC RETROGRADE CHOLANGIOPANCREATOGRAPHY,  SPHINCTEROTOMY;  ENDOSCOPIC RETROGRADE CHOLANGIOPANCREATOGRAM (ERCP), SPHINCTEROTOMY, STONE REMOVAL, STENT PLACEMENT;  Surgeon: Christiano Sorenson MD;  Location:  OR     ENDOSCOPIC RETROGRADE CHOLANGIOPANCREATOGRAM N/A 4/12/2018    Procedure: ENDOSCOPIC RETROGRADE CHOLANGIOPANCREATOGRAM;  ENDOSCOPIC RETROGRADE CHOLANIOPANCREATOGRAPHY AND ATTEMPTED STENT REMOVAL.;  Surgeon: Christiano Sorenson MD;  Location:  OR     ENDOSCOPIC RETROGRADE CHOLANGIOPANCREATOGRAM N/A 5/10/2018    Procedure: COMBINED ENDOSCOPIC RETROGRADE CHOLANGIOPANCREATOGRAPHY, REMOVE FOREIGN BODY OR STENT/TUBE;  ENDOSCOPIC RETROGRADE CHOLANGIOPANCREATOGRAPHY AND STENT REMOVAL;  Surgeon: Christiano Sorenson MD;  Location:  OR     ESOPHAGOSCOPY, GASTROSCOPY, DUODENOSCOPY (EGD), COMBINED N/A 4/12/2018    Procedure: COMBINED ESOPHAGOSCOPY, GASTROSCOPY, DUODENOSCOPY (EGD);  EGD with stent removal;  Surgeon: Christiano Sorenson MD;  Location:  GI     LAPAROSCOPIC CHOLECYSTECTOMY N/A 5/1/2015    Procedure: LAPAROSCOPIC CHOLECYSTECTOMY;  Surgeon: Damien Galindo MD;  [facility]:  [facility]     ORTHOPEDIC SURGERY      left elbow, right shoulder       Social History     Tobacco Use     Smoking status: Never Smoker     Smokeless tobacco: Never Used   Substance Use Topics     Alcohol use: Yes     Comment: 1/day     Family History   Problem Relation Age of Onset     Hypertension Mother      Cancer - colorectal Mother      Hypertension Father      Melanoma No family hx of          Current Outpatient Medications   Medication Sig Dispense Refill     amLODIPine (NORVASC) 5 MG tablet Take 1 tablet (5 mg) by mouth daily 90 tablet 3     amLODIPine (NORVASC) 5 MG tablet TAKE ONE TABLET BY MOUTH ONE TIME DAILY  90 tablet 0     atenolol (TENORMIN) 50 MG tablet Take 1 tablet (50 mg) by mouth daily 90 tablet 3     IBUPROFEN PO        omeprazole (PRILOSEC) 20 MG DR capsule Take 1 capsule (20 mg) by mouth daily 90 capsule 3     No Known Allergies  BP  "Readings from Last 3 Encounters:   11/25/19 (!) 150/93   08/21/19 (!) 141/86   06/19/19 138/83    Wt Readings from Last 3 Encounters:   02/06/19 98.6 kg (217 lb 4.8 oz)   11/29/18 97.2 kg (214 lb 3.2 oz)   05/10/18 97 kg (213 lb 12.8 oz)           Reviewed and updated as needed this visit by Provider         Review of Systems   ROS COMP: CONSTITUTIONAL: NEGATIVE for fever, chills, change in weight  EYES: NEGATIVE for vision changes or irritation  ENT/MOUTH: NEGATIVE for ear, mouth and throat problems  RESP: NEGATIVE for significant cough or SOB  CV: NEGATIVE for chest pain, palpitations or peripheral edema  GI: NEGATIVE for nausea, abdominal pain, heartburn, or change in bowel habits  : NEGATIVE for frequency, dysuria, or hematuria  MUSCULOSKELETAL: NEGATIVE for significant arthralgias or myalgia  NEURO: NEGATIVE for weakness, dizziness or paresthesias  ENDOCRINE: NEGATIVE for temperature intolerance, skin/hair changes  HEME: NEGATIVE for bleeding problems  PSYCHIATRIC: NEGATIVE for changes in mood or affect      Objective    /80   Pulse 60   Temp 97.4  F (36.3  C) (Oral)   Resp 15   Ht 1.753 m (5' 9\")   Wt 98.5 kg (217 lb 1.6 oz)   SpO2 97%   BMI 32.06 kg/m    Body mass index is 32.06 kg/m .  Physical Exam   GENERAL:  alert and no distress  EYES: Eyes grossly normal to inspection, PERRL and conjunctivae and sclerae normal  HENT: ear canals and TM's normal, nose and mouth without ulcers or lesions  NECK: no adenopathy, no asymmetry, masses, or scars and thyroid normal to palpation  RESP: lungs clear to auscultation - no rales, rhonchi or wheezes  CV: regular rate and rhythm, normal S1 S2, no S3 or S4, no click or rub, no peripheral edema and peripheral pulses strong  MS: no gross musculoskeletal defects noted, no edema  NEURO: Normal strength and tone, mentation intact and speech normal  PSYCH: mentation appears normal, affect normal/bright        Assessment & Plan     1. Essential hypertension, " benign  Stable on therapy continue with diet and exercise and recheck blood pressure in 6 months  - amLODIPine (NORVASC) 5 MG tablet; Take 1 tablet (5 mg) by mouth daily  Dispense: 90 tablet; Refill: 3  - atenolol (TENORMIN) 50 MG tablet; Take 1 tablet (50 mg) by mouth daily  Dispense: 90 tablet; Refill: 3  - Hemoglobin  - Comprehensive metabolic panel    2. CARDIOVASCULAR SCREENING; LDL GOAL LESS THAN 160  Labs ordered as fasting per routine.  - Lipid Profile    3. Gastroesophageal reflux disease without esophagitis  Stable with as needed use of proton pump inhibitor.  - omeprazole (PRILOSEC) 20 MG DR capsule; Take 1 capsule (20 mg) by mouth daily  Dispense: 90 capsule; Refill: 3    4. Screening PSA (prostate specific antigen)  It is routine screening based on age.  - PSA, screen     See Patient Instructions    Return in about 6 months (around 7/30/2020) for BP Recheck.    Abdiel Jones MD  [facility]

## 2020-02-20 ENCOUNTER — TRANSFERRED RECORDS (OUTPATIENT)
Dept: HEALTH INFORMATION MANAGEMENT | Facility: CLINIC | Age: 65
End: 2020-02-20

## 2020-05-27 ENCOUNTER — OFFICE VISIT (OUTPATIENT)
Dept: DERMATOLOGY | Facility: CLINIC | Age: 65
End: 2020-05-27
Payer: COMMERCIAL

## 2020-05-27 VITALS — DIASTOLIC BLOOD PRESSURE: 80 MMHG | OXYGEN SATURATION: 96 % | SYSTOLIC BLOOD PRESSURE: 151 MMHG | HEART RATE: 53 BPM

## 2020-05-27 DIAGNOSIS — D22.9 ATYPICAL NEVUS: ICD-10-CM

## 2020-05-27 DIAGNOSIS — D18.01 CHERRY ANGIOMA: ICD-10-CM

## 2020-05-27 DIAGNOSIS — L82.1 SEBORRHEIC KERATOSES: Primary | ICD-10-CM

## 2020-05-27 DIAGNOSIS — L57.8 SOLAR ELASTOSIS: ICD-10-CM

## 2020-05-27 DIAGNOSIS — L81.4 LENTIGINES: ICD-10-CM

## 2020-05-27 DIAGNOSIS — Z85.828 HX OF NONMELANOMA SKIN CANCER: ICD-10-CM

## 2020-05-27 DIAGNOSIS — L57.0 ACTINIC KERATOSES: ICD-10-CM

## 2020-05-27 DIAGNOSIS — D22.9 MULTIPLE BENIGN NEVI: ICD-10-CM

## 2020-05-27 PROCEDURE — 17003 DESTRUCT PREMALG LES 2-14: CPT | Performed by: PHYSICIAN ASSISTANT

## 2020-05-27 PROCEDURE — 17000 DESTRUCT PREMALG LESION: CPT | Performed by: PHYSICIAN ASSISTANT

## 2020-05-27 PROCEDURE — 99214 OFFICE O/P EST MOD 30 MIN: CPT | Mod: 25 | Performed by: PHYSICIAN ASSISTANT

## 2020-05-27 NOTE — LETTER
5/27/2020         RE: Ronnie Lagos  [address]  [facility] [id number]        Dear Colleague,    Thank you for referring your patient, Ronnie Lagos, [place] [facility] Please see a copy of my visit note below.    HPI:  Ronnie Lagos is a 64 year old male patient here today for FBE. Has some scaly spots on legs and scalp .  Patient states this has been present for a while.  Patient reports the following symptoms: irritated .  Patient reports the following previous treatments: ln2  Patient reports the following modifying factors: none.  Associated symptoms: none.  History of aks treated with ln2 and efudex with great improvement. Patient has no other skin complaints today.  Remainder of the HPI, Meds, PMH, Allergies, FH, and SH was reviewed in chart.    Pertinent Hx:   SCC on scalp and BCC on left shoulder  Past Medical History:   Diagnosis Date     Abdominal pain      Basal cell carcinoma      Calculus of bile duct      Elevated liver enzymes      Essential hypertension, benign     abstracted 6/19/02     Gastro-oesophageal reflux disease      GERD (gastroesophageal reflux disease) 7/28/2008     Liver disease     elevated liver enzymes     Squamous cell carcinoma      Unspecified cerebral artery occlusion with cerebral infarction      Unspecified condition of brain     abstracted 6/19/02       Past Surgical History:   Procedure Laterality Date     ARTHRODESIS FOOT  2/5/2013    Procedure: ARTHRODESIS FOOT;  LEFT FIRST METATARSOPHALANGEAL JOINT ARTHRODESIS ;  Surgeon: Burton Loera DPM;  [facility]     COLONOSCOPY N/A 12/7/2018    Procedure: COMBINED COLONOSCOPY, SINGLE OR MULTIPLE BIOPSY/POLYPECTOMY BY BIOPSY;  Surgeon: Blayne Mora MD;  Location:  [facility]     ENDOSCOPIC RETROGRADE CHOLANGIOPANCREATOGRAM N/A 1/18/2018    Procedure: COMBINED ENDOSCOPIC RETROGRADE CHOLANGIOPANCREATOGRAPHY, SPHINCTEROTOMY;  ENDOSCOPIC RETROGRADE CHOLANGIOPANCREATOGRAM  (ERCP), SPHINCTEROTOMY, STONE REMOVAL, STENT PLACEMENT;  Surgeon: Christiano Sorenson MD;  Location:  OR     ENDOSCOPIC RETROGRADE CHOLANGIOPANCREATOGRAM N/A 4/12/2018    Procedure: ENDOSCOPIC RETROGRADE CHOLANGIOPANCREATOGRAM;  ENDOSCOPIC RETROGRADE CHOLANIOPANCREATOGRAPHY AND ATTEMPTED STENT REMOVAL.;  Surgeon: Christiano Sorenson MD;  Location:  OR     ENDOSCOPIC RETROGRADE CHOLANGIOPANCREATOGRAM N/A 5/10/2018    Procedure: COMBINED ENDOSCOPIC RETROGRADE CHOLANGIOPANCREATOGRAPHY, REMOVE FOREIGN BODY OR STENT/TUBE;  ENDOSCOPIC RETROGRADE CHOLANGIOPANCREATOGRAPHY AND STENT REMOVAL;  Surgeon: Christiano Sorenson MD;  Location:  OR     ESOPHAGOSCOPY, GASTROSCOPY, DUODENOSCOPY (EGD), COMBINED N/A 4/12/2018    Procedure: COMBINED ESOPHAGOSCOPY, GASTROSCOPY, DUODENOSCOPY (EGD);  EGD with stent removal;  Surgeon: Christiano Sorenson MD;  Location:  [facility]     LAPAROSCOPIC CHOLECYSTECTOMY N/A 5/1/2015    Procedure: LAPAROSCOPIC CHOLECYSTECTOMY;  Surgeon: Damien Galindo MD;  [facility]:  [facility]     ORTHOPEDIC SURGERY      left elbow, right shoulder        Family History   Problem Relation Age of Onset     Hypertension Mother      Cancer - colorectal Mother      Skin Cancer Mother      Hypertension Father      Melanoma No family hx of        Social History     Socioeconomic History     Marital status:      Spouse name: Not on file     Number of children: Not on file     Years of education: Not on file     Highest education level: Not on file   Occupational History     Employer: [employer]   Social Needs     Financial resource strain: Not on file     Food insecurity     Worry: Not on file     Inability: Not on file     Transportation needs     Medical: Not on file     Non-medical: Not on file   Tobacco Use     Smoking status: Never Smoker     Smokeless tobacco: Never Used   Substance and Sexual Activity     Alcohol use: Yes     Comment: 1/day     Drug use: No     Sexual  activity: Yes     Partners: Female   Lifestyle     Physical activity     Days per week: Not on file     Minutes per session: Not on file     Stress: Not on file   Relationships     Social connections     Talks on phone: Not on file     Gets together: Not on file     Attends Religion service: Not on file     Active member of club or organization: Not on file     Attends meetings of clubs or organizations: Not on file     Relationship status: Not on file     Intimate partner violence     Fear of current or ex partner: Not on file     Emotionally abused: Not on file     Physically abused: Not on file     Forced sexual activity: Not on file   Other Topics Concern     Parent/sibling w/ CABG, MI or angioplasty before 65F 55M? Not Asked   Social History Narrative     Not on file       Outpatient Encounter Medications as of 5/27/2020   Medication Sig Dispense Refill     amLODIPine (NORVASC) 5 MG tablet Take 1 tablet (5 mg) by mouth daily 90 tablet 3     atenolol (TENORMIN) 50 MG tablet Take 1 tablet (50 mg) by mouth daily 90 tablet 3     IBUPROFEN PO        omeprazole (PRILOSEC) 20 MG DR capsule Take 1 capsule (20 mg) by mouth daily 90 capsule 3     No facility-administered encounter medications on file as of 5/27/2020.        Review Of Systems:  Skin: scaly splts  Eyes: negative  Ears/Nose/Throat: negative  Respiratory: No shortness of breath, dyspnea on exertion, cough, or hemoptysis  Cardiovascular: negative  Gastrointestinal: negative  Genitourinary: negative  Musculoskeletal: negative  Neurologic: negative  Psychiatric: negative  Hematologic/Lymphatic/Immunologic: negative  Endocrine: negative      Objective:     BP (!) 150/81   Pulse 53   SpO2 96%   Eyes: Conjunctivae/lids: Normal   ENT: Lips:  Normal  MSK: Normal  Cardiovascular: Peripheral edema none  Pulm: Breathing Normal  Neuro/Psych: Orientation: A/O x 3. Normal; Mood/Affect: Normal, NAD, WDWN  Pt accompanied by: self  Following areas examined: Scalp, face,  eyelids, lips, neck, chest, abdomen, back, buttocks, and R&L upper and lower extremities. Pt defers exam of groin, buttock, hips and genitals.   Linares skin type:i   Findings:  Red smooth well-defined macules on trunk and extremities.  Brown, stuck-on scaly appearing papules on trunk and extremities.  Well circumscribed macules with symmetric color distribution on trunk and extremities.  Tan WD smooth macules on face, neck, trunk, and extremities.  Pink gritty macule/s x 2 on vertex of scalp  Rhytides, hypo/hyperpigmentation, and atrophy  Dark brown and red smooth macule with symmetry throughout on left flank      Assessment and Plan:     1) Cherry angiomas, Seborrheic keratoses, Benign nevi, Lentigines, atypical nevus  Watch and monitor nevus on left flank-per pt has had for years with no change     I discussed the specifics of tumor, prognosis, and genetics of benign lesions.  I explained that treatment of these lesions would be purely cosmetic and not medically neccessary.  I discussed with patient different removal options including excision, cryotherapy, cautery and /or laser.  Lesion may recur and/or may not completely resolve. May need additional treatment.     2) Actinic keratoses x 2 and solar elastosis  Great improvement of aks on hands, ears, scalp    LN2 for 5 seconds x 2. Discussed AE include hypopigmentation (white spot) and recurrence. Follow up in 2-3 months to recheck lesions. There is a risk of AKs developing into a SCC.   Treatment options include LN2 vs PDT vs Efudex. Pt elected LN2  If no resolution please restart efudex bid x 3 weeks to aa on scalp.   Efudex is a topical chemotherapy medication. Do not use if you are pregnant or have a hypersensitive to to the drug, class, or any components of the medication. Very rare to have any systemic absorption.    Common reactions include and are not limited to:  Application site reaction, pruritis, pain, bleeding, edema, stinging, burning, scaling,  crusting, erythema, photosensitivity, and allergic contact dermatitis. Do not let children get into medication and do not let pets eat medication.     Any questions or concerns please call or follow up in clinic.     3) history of skin cancer  SCC on scalp and BCC on left shoulder  No evidence of recurrence  Signs and Symptoms of non-melanoma skin cancer and ABCDEs of melanoma reviewed with patient. Patient encouraged to perform monthly self skin exams and educated on how to perform them. UV precautions reviewed with patient. Patient was asked about new or changing moles/lesions on body.   Wear a sunscreen with at least SPF 30 on your face, ears, neck and V of the chest daily. Wear sunscreen on other areas of the body if those areas are exposed to the sun throughout the day. Sunscreens can contain physical and/or chemical blockers. Physical blockers are less likely to clog pores, these include zinc oxide and titanium dioxide. Reapply every two hour and after swimming. Sunscreen examples include Neutrogena, CeraVe, Blue Lizard, Elta MD and many others.    Proper skin care from [facility]:    -Eliminate harsh soaps as they strip the natural oils from the skin, often resulting in dry itchy skin ( i.e. Dial, Zest, [place]ander Spring)  -Use mild soaps such as Cetaphil or Dove Sensitive Skin in the shower. You do not need to use soap on arms, legs, and trunk every time you shower unless visibly soiled.   -Avoid hot or cold showers.  -After showering, lightly dry off and apply moisturizing within 2-3 minutes. This will help trap moisture in the skin.   -Aggressive use of a moisturizer at least 1-2 times a day to the entire body (including -Vanicream, Cetaphil, Aquaphor or Cerave) and moisturize hands after every washing.  -We recommend using moisturizers that come in a tub that needs to be scooped out, not a pump. This has more of an oil base. It will hold moisture in your skin much better than a water base  moisturizer. The above recommended are non-pore clogging.               Follow up in yearly FBE      Pt's BP was still elevated after second reading. Pt denied any symptoms such as headache, dizziness, or nausea. Instructed Pt that if any symptoms start to go to urgent care.    Олег Watson, MISTY     Again, thank you for allowing me to participate in the care of your patient.        Sincerely,        Zenaida Brown PA-C

## 2020-05-27 NOTE — PROGRESS NOTES
HPI:  Ronnie Lagos is a 64 year old male patient here today for FBE. Has some scaly spots on legs and scalp .  Patient states this has been present for a while.  Patient reports the following symptoms: irritated .  Patient reports the following previous treatments: ln2  Patient reports the following modifying factors: none.  Associated symptoms: none.  History of aks treated with ln2 and efudex with great improvement. Patient has no other skin complaints today.  Remainder of the HPI, Meds, PMH, Allergies, FH, and SH was reviewed in chart.    Pertinent Hx:   SCC on scalp and BCC on left shoulder  Past Medical History:   Diagnosis Date     Abdominal pain      Basal cell carcinoma      Calculus of bile duct      Elevated liver enzymes      Essential hypertension, benign     abstracted 6/19/02     Gastro-oesophageal reflux disease      GERD (gastroesophageal reflux disease) 7/28/2008     Liver disease     elevated liver enzymes     Squamous cell carcinoma      Unspecified cerebral artery occlusion with cerebral infarction      Unspecified condition of brain     abstracted 6/19/02       Past Surgical History:   Procedure Laterality Date     ARTHRODESIS FOOT  2/5/2013    Procedure: ARTHRODESIS FOOT;  LEFT FIRST METATARSOPHALANGEAL JOINT ARTHRODESIS ;  Surgeon: Burton Loera DPM;  Location:  [facility]     COLONOSCOPY N/A 12/7/2018    Procedure: COMBINED COLONOSCOPY, SINGLE OR MULTIPLE BIOPSY/POLYPECTOMY BY BIOPSY;  Surgeon: Blayne Mora MD;  Location:  [facility]     ENDOSCOPIC RETROGRADE CHOLANGIOPANCREATOGRAM N/A 1/18/2018    Procedure: COMBINED ENDOSCOPIC RETROGRADE CHOLANGIOPANCREATOGRAPHY, SPHINCTEROTOMY;  ENDOSCOPIC RETROGRADE CHOLANGIOPANCREATOGRAM (ERCP), SPHINCTEROTOMY, STONE REMOVAL, STENT PLACEMENT;  Surgeon: Christiano Sorenson MD;  Location:  OR     ENDOSCOPIC RETROGRADE CHOLANGIOPANCREATOGRAM N/A 4/12/2018    Procedure: ENDOSCOPIC RETROGRADE CHOLANGIOPANCREATOGRAM;  ENDOSCOPIC RETROGRADE  CHOLANIOPANCREATOGRAPHY AND ATTEMPTED STENT REMOVAL.;  Surgeon: Christiano Sorenson MD;  Location:  OR     ENDOSCOPIC RETROGRADE CHOLANGIOPANCREATOGRAM N/A 5/10/2018    Procedure: COMBINED ENDOSCOPIC RETROGRADE CHOLANGIOPANCREATOGRAPHY, REMOVE FOREIGN BODY OR STENT/TUBE;  ENDOSCOPIC RETROGRADE CHOLANGIOPANCREATOGRAPHY AND STENT REMOVAL;  Surgeon: Christiano Sorenson MD;  Location:  OR     ESOPHAGOSCOPY, GASTROSCOPY, DUODENOSCOPY (EGD), COMBINED N/A 4/12/2018    Procedure: COMBINED ESOPHAGOSCOPY, GASTROSCOPY, DUODENOSCOPY (EGD);  EGD with stent removal;  Surgeon: Christiano Sorenson MD;  Location:  GI     LAPAROSCOPIC CHOLECYSTECTOMY N/A 5/1/2015    Procedure: LAPAROSCOPIC CHOLECYSTECTOMY;  Surgeon: Damien Galindo MD;  Location:  [facility]     ORTHOPEDIC SURGERY      left elbow, right shoulder        Family History   Problem Relation Age of Onset     Hypertension Mother      Cancer - colorectal Mother      Skin Cancer Mother      Hypertension Father      Melanoma No family hx of        Social History     Socioeconomic History     Marital status:      Spouse name: Not on file     Number of children: Not on file     Years of education: Not on file     Highest education level: Not on file   Occupational History     Employer: [employer]   Social Needs     Financial resource strain: Not on file     Food insecurity     Worry: Not on file     Inability: Not on file     Transportation needs     Medical: Not on file     Non-medical: Not on file   Tobacco Use     Smoking status: Never Smoker     Smokeless tobacco: Never Used   Substance and Sexual Activity     Alcohol use: Yes     Comment: 1/day     Drug use: No     Sexual activity: Yes     Partners: Female   Lifestyle     Physical activity     Days per week: Not on file     Minutes per session: Not on file     Stress: Not on file   Relationships     Social connections     Talks on phone: Not on file     Gets together: Not on file      Attends Bahai service: Not on file     Active member of club or organization: Not on file     Attends meetings of clubs or organizations: Not on file     Relationship status: Not on file     Intimate partner violence     Fear of current or ex partner: Not on file     Emotionally abused: Not on file     Physically abused: Not on file     Forced sexual activity: Not on file   Other Topics Concern     Parent/sibling w/ CABG, MI or angioplasty before 65F 55M? Not Asked   Social History Narrative     Not on file       Outpatient Encounter Medications as of 5/27/2020   Medication Sig Dispense Refill     amLODIPine (NORVASC) 5 MG tablet Take 1 tablet (5 mg) by mouth daily 90 tablet 3     atenolol (TENORMIN) 50 MG tablet Take 1 tablet (50 mg) by mouth daily 90 tablet 3     IBUPROFEN PO        omeprazole (PRILOSEC) 20 MG DR capsule Take 1 capsule (20 mg) by mouth daily 90 capsule 3     No facility-administered encounter medications on file as of 5/27/2020.        Review Of Systems:  Skin: scaly splts  Eyes: negative  Ears/Nose/Throat: negative  Respiratory: No shortness of breath, dyspnea on exertion, cough, or hemoptysis  Cardiovascular: negative  Gastrointestinal: negative  Genitourinary: negative  Musculoskeletal: negative  Neurologic: negative  Psychiatric: negative  Hematologic/Lymphatic/Immunologic: negative  Endocrine: negative      Objective:     BP (!) 150/81   Pulse 53   SpO2 96%   Eyes: Conjunctivae/lids: Normal   ENT: Lips:  Normal  MSK: Normal  Cardiovascular: Peripheral edema none  Pulm: Breathing Normal  Neuro/Psych: Orientation: A/O x 3. Normal; Mood/Affect: Normal, NAD, WDWN  Pt accompanied by: self  Following areas examined: Scalp, face, eyelids, lips, neck, chest, abdomen, back, buttocks, and R&L upper and lower extremities. Pt defers exam of groin, buttock, hips and genitals.   Linares skin type:i   Findings:  Red smooth well-defined macules on trunk and extremities.  Brown, stuck-on scaly  appearing papules on trunk and extremities.  Well circumscribed macules with symmetric color distribution on trunk and extremities.  Tan WD smooth macules on face, neck, trunk, and extremities.  Pink gritty macule/s x 2 on vertex of scalp  Rhytides, hypo/hyperpigmentation, and atrophy  Dark brown and red smooth macule with symmetry throughout on left flank      Assessment and Plan:     1) Cherry angiomas, Seborrheic keratoses, Benign nevi, Lentigines, atypical nevus  Watch and monitor nevus on left flank-per pt has had for years with no change     I discussed the specifics of tumor, prognosis, and genetics of benign lesions.  I explained that treatment of these lesions would be purely cosmetic and not medically neccessary.  I discussed with patient different removal options including excision, cryotherapy, cautery and /or laser.  Lesion may recur and/or may not completely resolve. May need additional treatment.     2) Actinic keratoses x 2 and solar elastosis  Great improvement of aks on hands, ears, scalp    LN2 for 5 seconds x 2. Discussed AE include hypopigmentation (white spot) and recurrence. Follow up in 2-3 months to recheck lesions. There is a risk of AKs developing into a SCC.   Treatment options include LN2 vs PDT vs Efudex. Pt elected LN2  If no resolution please restart efudex bid x 3 weeks to aa on scalp.   Efudex is a topical chemotherapy medication. Do not use if you are pregnant or have a hypersensitive to to the drug, class, or any components of the medication. Very rare to have any systemic absorption.    Common reactions include and are not limited to:  Application site reaction, pruritis, pain, bleeding, edema, stinging, burning, scaling, crusting, erythema, photosensitivity, and allergic contact dermatitis. Do not let children get into medication and do not let pets eat medication.     Any questions or concerns please call or follow up in clinic.     3) history of skin cancer  SCC on scalp and  BCC on left shoulder  No evidence of recurrence  Signs and Symptoms of non-melanoma skin cancer and ABCDEs of melanoma reviewed with patient. Patient encouraged to perform monthly self skin exams and educated on how to perform them. UV precautions reviewed with patient. Patient was asked about new or changing moles/lesions on body.   Wear a sunscreen with at least SPF 30 on your face, ears, neck and V of the chest daily. Wear sunscreen on other areas of the body if those areas are exposed to the sun throughout the day. Sunscreens can contain physical and/or chemical blockers. Physical blockers are less likely to clog pores, these include zinc oxide and titanium dioxide. Reapply every two hour and after swimming. Sunscreen examples include Neutrogena, CeraVe, Blue Lizard, Elta MD and many others.    Proper skin care from [facility]:    -Eliminate harsh soaps as they strip the natural oils from the skin, often resulting in dry itchy skin ( i.e. Dial, Zest, [place] Spring)  -Use mild soaps such as Cetaphil or Dove Sensitive Skin in the shower. You do not need to use soap on arms, legs, and trunk every time you shower unless visibly soiled.   -Avoid hot or cold showers.  -After showering, lightly dry off and apply moisturizing within 2-3 minutes. This will help trap moisture in the skin.   -Aggressive use of a moisturizer at least 1-2 times a day to the entire body (including -Vanicream, Cetaphil, Aquaphor or Cerave) and moisturize hands after every washing.  -We recommend using moisturizers that come in a tub that needs to be scooped out, not a pump. This has more of an oil base. It will hold moisture in your skin much better than a water base moisturizer. The above recommended are non-pore clogging.     Ronnie to follow up with Primary Care provider regarding elevated blood pressure.            Follow up in yearly FBE

## 2020-05-27 NOTE — PATIENT INSTRUCTIONS
Proper skin care from [facility]:    -Eliminate harsh soaps as they strip the natural oils from the skin, often resulting in dry itchy skin ( i.e. Dial, Zest, [place] Spring)  -Use mild soaps such as Cetaphil or Dove Sensitive Skin in the shower. You do not need to use soap on arms, legs, and trunk every time you shower unless visibly soiled.   -Avoid hot or cold showers.  -After showering, lightly dry off and apply moisturizing within 2-3 minutes. This will help trap moisture in the skin.   -Aggressive use of a moisturizer at least 1-2 times a day to the entire body (including -Vanicream, Cetaphil, Aquaphor or Cerave) and moisturize hands after every washing.  -We recommend using moisturizers that come in a tub that needs to be scooped out, not a pump. This has more of an oil base. It will hold moisture in your skin much better than a water base moisturizer. The above recommended are non-pore clogging.      Wear a sunscreen with at least SPF 30 on your face, ears, neck and V of the chest daily. Wear sunscreen on other areas of the body if those areas are exposed to the sun throughout the day. Sunscreens can contain physical and/or chemical blockers. Physical blockers are less likely to clog pores, these include zinc oxide and titanium dioxide. Reapply every two hour and after swimming. Sunscreen examples include Neutrogena, CeraVe, Blue Lizard, Elta MD and many others.    UV radiation  UVA radiation remains constant throughout the day and throughout the year. It is a longer wavelength than UVB and therefore penetrates deeper into the skin leading to immediate and delayed tanning, photoaging, and skin cancer. 70-80% of UVA and UVB radiation occurs between the hours of 10am-2pm.  UVB radiation  UVB radiation causes the most harmful effects and is more significant during the summer months. However, snow and ice can reflect UVB radiation leading to skin damage during the winter months as well. UVB radiation is  responsible for tanning, burning, inflammation, delayed erythema (pinkness), pigmentation (brown spots), and skin cancer.   WOUND CARE INSTRUCTIONS  FOR CRYOSURGERY        This area treated with liquid nitrogen will form a blister. You do not need to bandage the area until after the blister forms and breaks (which may be a few days).  When the blister breaks, begin daily dressing changes as follows:    1) Clean and dry the area with tap water using clean Q-tip or sterile gauze pad.    2) Apply Polysporin ointment or Bacitracin ointment over entire wound.  Do NOT use Neosporin ointment.    3) Cover the wound with a band-aid or sterile non-stick gauze pad and micropore paper tape.      REPEAT THESE INSTRUCTIONS AT LEAST ONCE A DAY UNTIL THE WOUND HAS COMPLETELY HEALED.        It is an old wives tale that a wound heals better when it is exposed to air and allowed to dry out. The wound will heal faster with a better cosmetic result if it is kept moist with ointment and covered with a bandage.  Do not let the wound dry out.      Supplies Needed:     *Cotton tipped applicators (Q-tips)   *Polysporin ointment or Bacitracin ointment (NOT NEOSPORIN)   *Band-aids, or non stick gauze pads and micropore paper tape    PATIENT INFORMATION    During the healing process you will notice a number of changes. All wounds develop a small halo of redness surrounding the wound.  This means healing is occurring. Severe itching with extensive redness usually indicates sensitivity to the ointment or bandage tape used to dress the wound.  You should call our office if this develops.      Swelling and/or discoloration around your surgical site is common, particularly when performed around the eye.    All wounds normally drain.  The larger the wound the more drainage there will be.  After 7-10 days, you will notice the wound beginning to shrink and new skin will begin to grow.  The wound is healed when you can see skin has formed over the  entire area.  A healed wound has a healthy, shiny look to the surface and is red to dark pink in color to normalize.  Wounds may take approximately 4-6 weeks to heal.  Larger wounds may take 6-8 weeks.  After the wound is healed you may discontinue dressing changes.    You may experience a sensation of tightness as your wound heals. This is normal and will gradually subside.    Your healed wound may be sensitive to temperature changes. This sensitivity improves with time, but if you re having a lot of discomfort, try to avoid temperature extremes.    Patients frequently experience itching after their wound appears to have healed because of the continue healing under the skin.  Plain Vaseline will help relieve the itching.

## 2020-05-27 NOTE — PROGRESS NOTES
Pt's BP was still elevated after second reading. Pt denied any symptoms such as headache, dizziness, or nausea. Instructed Pt that if any symptoms start to go to urgent care.    Олег Watson CMA

## 2020-07-15 ENCOUNTER — VIRTUAL VISIT (OUTPATIENT)
Dept: FAMILY MEDICINE | Facility: OTHER | Age: 65
End: 2020-07-15
Payer: COMMERCIAL

## 2020-07-15 PROCEDURE — 99421 OL DIG E/M SVC 5-10 MIN: CPT | Performed by: PHYSICIAN ASSISTANT

## 2020-07-15 NOTE — PROGRESS NOTES
"Date: 07/15/2020 09:44:30  Clinician: Padmini Brooks  Clinician NPI: [id number]  Patient: tracy coombs  Patient : 1955  Patient Address: [address] [place] [address]  Patient Phone: [phone]  Visit Protocol: URI  Patient Summary:  tracy is a 65 year old ( : 1955 ) male who initiated a Visit for COVID-19 (Coronavirus) evaluation and screening. When asked the question \"Please sign me up to receive news, health information and promotions from [employer].\", tracy responded \"No\".    tracy states his symptoms started gradually 3-4 days ago.   His symptoms consist of malaise, a sore throat, facial pain or pressure, and a cough.   Symptom details     Cough: tracy coughs a few times an hour and his cough is not more bothersome at night. Phlegm comes into his throat when he coughs. He does not believe his cough is caused by post-nasal drip. The color of the phlegm is clear.     Sore throat: tracy reports having moderate throat pain (4-6 on a 10 point pain scale), does not have exudate on his tonsils, and can swallow liquids. He is not sure if the lymph nodes in his neck are enlarged. A rash has not appeared on the skin since the sore throat started.     Facial pain or pressure: The facial pain or pressure feels worse when bending over or leaning forward.      tracy denies having wheezing, nausea, teeth pain, ageusia, diarrhea, vomiting, rhinitis, ear pain, headache, chills, myalgias, anosmia, fever, and nasal congestion. He also denies having recent facial or sinus surgery in the past 60 days, taking antibiotic medication in the past month, and double sickening (worsening symptoms after initial improvement). He is not experiencing dyspnea.   Precipitating events  tracy is not sure if he has been exposed to someone with strep throat. He has not recently been exposed to someone with influenza. tracy has been in close contact with the following high risk individuals: adults 65 or older.   " Pertinent COVID-19 (Coronavirus) information  In the past 14 days, tracy has not worked in a congregate living setting.   He does not work or volunteer as healthcare worker or a  and does not work or volunteer in a healthcare facility.   tracy also has not lived in a congregate living setting in the past 14 days. He does not live with a healthcare worker.   tracy has not had a close contact with a laboratory-confirmed COVID-19 patient within 14 days of symptom onset.   Pertinent medical history  tracy does not need a return to work/school note.   Weight: 210 lbs   tracy does not smoke or use smokeless tobacco.   Weight: 210 lbs    MEDICATIONS: atenolol oral, amlodipine-benazepril oral, omeprazole oral, ALLERGIES: NKDA  Clinician Response:  Dear tracy,   Your symptoms show that you may have coronavirus (COVID-19). This illness can cause fever, cough and trouble breathing. Many people get a mild case and get better on their own. Some people can get very sick.  What should I do?  We would like to test you for this virus.   1. Please call [phone] to schedule your visit. Explain that you were referred by [facility] to have a COVID-19 test. Be ready to share your [facility] visit ID number.  The following will serve as your written order for this COVID Test, ordered by me, for the indication of suspected COVID [Z20.828]: The test will be ordered in [employer], our electronic health record, after you are scheduled. It will show as ordered and authorized by Jorge Muller MD.  Order: COVID-19 (Coronavirus) PCR for SYMPTOMATIC testing from [facility].      2. When it's time for your COVID test:  Stay at least 6 feet away from others. (If someone will drive you to your test, stay in the backseat, as far away from the  as you can.)   Cover your mouth and nose with a mask, tissue or washcloth.  Go straight to the testing site. Don't make any stops on the way there or back.      3.Starting now: Stay home and away from  "others (self-isolate) until:   You've had no fever---and no medicine that reduces fever---for 3 full days (72 hours). And...   Your other symptoms have gotten better. For example, your cough or breathing has improved. And...   At least 10 days have passed since your symptoms started.       During this time, don't leave the house except for testing or medical care.   Stay in your own room, even for meals. Use your own bathroom if you can.   Stay away from others in your home. No hugging, kissing or shaking hands. No visitors.  Don't go to work, school or anywhere else.    Clean \"high touch\" surfaces often (doorknobs, counters, handles, etc.). Use a household cleaning spray or wipes. You'll find a full list of  on the EPA website: www.epa.gov/pesticide-registration/list-n-disinfectants-use-against-sars-cov-2.   Cover your mouth and nose with a mask, tissue or washcloth to avoid spreading germs.  Wash your hands and face often. Use soap and water.  Caregivers in these groups are at risk for severe illness due to COVID-19:  o People 65 years and older  o People who live in a nursing home or long-term care facility  o People with chronic disease (lung, heart, cancer, diabetes, kidney, liver, immunologic)  o People who have a weakened immune system, including those who:   Are in cancer treatment  Take medicine that weakens the immune system, such as corticosteroids  Had a bone marrow or organ transplant  Have an immune deficiency  Have poorly controlled HIV or AIDS  Are obese (body mass index of 40 or higher)  Smoke regularly   o Caregivers should wear gloves while washing dishes, handling laundry and cleaning bedrooms and bathrooms.  o Use caution when washing and drying laundry: Don't shake dirty laundry, and use the warmest water setting that you can.  o For more tips, go to www.cdc.gov/coronavirus/2019-ncov/downloads/10Things.pdf.    4.Sign up for [employer]. We know it's scary to hear that you might have " COVID-19. We want to track your symptoms to make sure you're okay over the next 2 weeks. Please look for an email from [employer]---this is a free, online program that we'll use to keep in touch. To sign up, follow the link in the email. Learn more at [email or web address]  How can I take care of myself?   Get lots of rest. Drink extra fluids (unless a doctor has told you not to).   Take Tylenol (acetaminophen) for fever or pain. If you have liver or kidney problems, ask your family doctor if it's okay to take Tylenol.   Adults can take either:    650 mg (two 325 mg pills) every 4 to 6 hours, or...   1,000 mg (two 500 mg pills) every 8 hours as needed.    Note: Don't take more than 3,000 mg in one day. Acetaminophen is found in many medicines (both prescribed and over-the-counter medicines). Read all labels to be sure you don't take too much.   For children, check the Tylenol bottle for the right dose. The dose is based on the child's age or weight.    If you have other health problems (like cancer, heart failure, an organ transplant or severe kidney disease): Call your specialty clinic if you don't feel better in the next 2 days.       Know when to call 911. Emergency warning signs include:    Trouble breathing or shortness of breath Pain or pressure in the chest that doesn't go away Feeling confused like you haven't felt before, or not being able to wake up Bluish-colored lips or face.  Where can I get more information?    [facility] -- About COVID-19: [email or web address]   CDC -- What to Do If You're Sick: www.cdc.gov/coronavirus/2019-ncov/about/steps-when-sick.html   CDC -- Ending Home Isolation: www.cdc.gov/coronavirus/2019-ncov/hcp/disposition-in-home-patients.html   CDC -- Caring for Someone: www.cdc.gov/coronavirus/2019-ncov/if-you-are-sick/care-for-someone.html   [facility] -- Interim Guidance for Hospital Discharge to Home:  www.health.[facility].[place]/diseases/coronavirus/hcp/hospdischarge.pdf   [facility] clinical trials (COVID-19 research studies): [email or web address]/umn-[email or web address]-trials    Below are the COVID-19 hotlines at the [facility] ([facility]). Interpreters are available.    For health questions: Call 749-011-1976 or 1-800.712.4890 (7 a.m. to 7 p.m.) For questions about schools and childcare: Call 902-861-7260 or 1-767.812.9452 (7 a.m. to 7 p.m.)    Diagnosis: Cough  Diagnosis ICD: R05

## 2020-07-17 DIAGNOSIS — Z20.822 COVID-19 RULED OUT: Primary | ICD-10-CM

## 2020-07-17 PROCEDURE — U0003 INFECTIOUS AGENT DETECTION BY NUCLEIC ACID (DNA OR RNA); SEVERE ACUTE RESPIRATORY SYNDROME CORONAVIRUS 2 (SARS-COV-2) (CORONAVIRUS DISEASE [COVID-19]), AMPLIFIED PROBE TECHNIQUE, MAKING USE OF HIGH THROUGHPUT TECHNOLOGIES AS DESCRIBED BY CMS-2020-01-R: HCPCS | Performed by: FAMILY MEDICINE

## 2020-07-17 NOTE — LETTER
July 21, 2020        Ronnie Lagos  [address]  [facility] [address]    This letter provides a written record that you were tested for COVID-19 on 7/17/20.       Your result was negative. This means that we didn t find the virus that causes COVID-19 in your sample. A test may show negative when you do actually have the virus. This can happen when the virus is in the early stages of infection, before you feel illness symptoms.    If you have symptoms   Stay home and away from others (self-isolate) until you meet ALL of the guidelines below:    You ve had no fever--and no medicine that reduces fever--for 3 full days (72 hours). And      Your other symptoms have gotten better. For example, your cough or breathing has improved. And     At least 10 days have passed since your symptoms started.    During this time:    Stay home. Don t go to work, school or anywhere else.     Stay in your own room, including for meals. Use your own bathroom if you can.    Stay away from others in your home. No hugging, kissing or shaking hands. No visitors.    Clean  high touch  surfaces often (doorknobs, counters, handles, etc.). Use a household cleaning spray or wipes. You can find a full list on the EPA website at www.epa.gov/pesticide-registration/list-n-disinfectants-use-against-sars-cov-2.    Cover your mouth and nose with a mask, tissue or washcloth to avoid spreading germs.    Wash your hands and face often with soap and water.    Going back to work  Check with your employer for any guidelines to follow for going back to work.    Employers: This document serves as formal notice that your employee tested negative for COVID-19, as of the testing date shown above.

## 2020-07-18 LAB
SARS-COV-2 RNA SPEC QL NAA+PROBE: NOT DETECTED
SPECIMEN SOURCE: NORMAL

## 2020-08-10 ENCOUNTER — ANCILLARY PROCEDURE (OUTPATIENT)
Dept: GENERAL RADIOLOGY | Facility: CLINIC | Age: 65
End: 2020-08-10
Attending: FAMILY MEDICINE
Payer: OTHER MISCELLANEOUS

## 2020-08-10 ENCOUNTER — OFFICE VISIT (OUTPATIENT)
Dept: URGENT CARE | Facility: URGENT CARE | Age: 65
End: 2020-08-10
Payer: OTHER MISCELLANEOUS

## 2020-08-10 VITALS
TEMPERATURE: 98.2 F | BODY MASS INDEX: 32.05 KG/M2 | OXYGEN SATURATION: 98 % | RESPIRATION RATE: 20 BRPM | DIASTOLIC BLOOD PRESSURE: 87 MMHG | WEIGHT: 217 LBS | SYSTOLIC BLOOD PRESSURE: 170 MMHG | HEART RATE: 61 BPM

## 2020-08-10 DIAGNOSIS — S39.92XA BACK INJURY, INITIAL ENCOUNTER: ICD-10-CM

## 2020-08-10 DIAGNOSIS — S38.1XXA: ICD-10-CM

## 2020-08-10 DIAGNOSIS — S22.41XA CLOSED FRACTURE OF MULTIPLE RIBS OF RIGHT SIDE, INITIAL ENCOUNTER: Primary | ICD-10-CM

## 2020-08-10 PROCEDURE — 72170 X-RAY EXAM OF PELVIS: CPT

## 2020-08-10 PROCEDURE — 99214 OFFICE O/P EST MOD 30 MIN: CPT | Performed by: FAMILY MEDICINE

## 2020-08-10 PROCEDURE — 71101 X-RAY EXAM UNILAT RIBS/CHEST: CPT | Mod: RT

## 2020-08-10 RX ORDER — OXYCODONE AND ACETAMINOPHEN 5; 325 MG/1; MG/1
1 TABLET ORAL EVERY 6 HOURS PRN
Qty: 20 TABLET | Refills: 0 | Status: SHIPPED | OUTPATIENT
Start: 2020-08-10 | End: 2020-08-18

## 2020-08-10 NOTE — PROGRESS NOTES
SUBJECTIVE:  Chief Complaint   Patient presents with     Musculoskeletal Problem     Fell on ice at work( [employer]) about 3;30 today in aft.- having pain right torso  area, right ribs, right hip also since happened.   .zact presents with a chief complaint of right back and rib.  The injury occurred hours ago.   The injury happened while at work.   How: trauma: fall immediate pain  The patient complained of moderate pain and has had decreased ROM.    Pain exacerbated by movement    He treated it initially with no therapy.   This is the first time this type of injury has occurred to this patient.     Past Medical History:   Diagnosis Date     Abdominal pain      Basal cell carcinoma      Calculus of bile duct      Elevated liver enzymes      Essential hypertension, benign     abstracted 6/19/02     Gastro-oesophageal reflux disease      GERD (gastroesophageal reflux disease) 7/28/2008     Liver disease     elevated liver enzymes     Squamous cell carcinoma      Unspecified cerebral artery occlusion with cerebral infarction      Unspecified condition of brain     abstracted 6/19/02     No Known Allergies  Social History     Tobacco Use     Smoking status: Never Smoker     Smokeless tobacco: Never Used   Substance Use Topics     Alcohol use: Yes     Comment: 1/day       ROSINTEGUMENTARY/SKIN: NEGATIVE for open wound/bleeding and NEGATIVE for bruising    EXAM: BP (!) 170/87   Pulse 61   Temp 98.2  F (36.8  C)   Resp 20   Wt 98.4 kg (217 lb)   SpO2 98%   BMI 32.05 kg/m  Gen: healthy,alert,no distress  Extremity: rt rib has pain with palpation and rom.   There is not compromise to the distal circulation.  Pulses are +2 and CRT is brisk.GENERAL APPEARANCE: healthy, alert and no distress  NECK: supple, non-tender to palpation, FROM   CHEST: clear to auscultation  CV: regular rate and rhythm  EXTREMITIES: peripheral pulses normal  SKIN: no suspicious lesions or rashes  NEURO: Normal strength and tone, sensory  exam grossly normal, mentation intact and speech normal    X-RAY with fx      ICD-10-CM    1. Closed fracture of multiple ribs of right side, initial encounter  S22.41XA oxyCODONE-acetaminophen (PERCOCET) 5-325 MG tablet   2. Back injury, initial encounter  S39.92XA XR Ribs & Chest Rt 3vw   3. Pelvis, crush injury, initial encounter  S38.1XXA XR Pelvis 1/2 Views     RICE

## 2020-08-10 NOTE — PATIENT INSTRUCTIONS
Patient Education     Rib Fracture (Broken Rib)     A chest x-ray may be done.   Your ribs are curved bones in your chest. They help protect your lungs and expand and contract when you breathe. Children's ribs bend easily and can often withstand a blow or fall. But adult ribs are more likely to break (fracture) under stress. Even coughing or a hard sneeze can fracture a rib.  When to go to the Emergency Room (ER)  Although they can be painful, most rib fractures aren't serious. But they often make it hard to cough or breathe deeply. Get medical care right away if you have:    Trouble breathing.    Nausea, vomiting, or stomach pain with a sore or bruised rib.    Pain that worsens over time.    An injury to the chest or stomach.  What to expect in the ER  Here is what will happen in the ER:     A healthcare provider will ask about your injury and examine you carefully.    An X-ray of your chest will likely be taken to show any major damage to ribs and lungs. But ribs can have small breaks that don't show up on X-rays, even though they still hurt.    You may be given medicine to ease your discomfort.    In rare cases, rib fractures can cause a lung to collapse or lead to bleeding in the chest. In these cases, a tube will be inserted into the chest to reinflate the lung or drain the blood.  Follow-up  You are likely to heal in 6 to 8 weeks. Most rib fractures heal on their own with no lasting effects. Call your healthcare provider right away if you notice any of these symptoms:    Increased chest pain    Shortness of breath    Fever or chills    Coughing up blood  Date Last Reviewed: 4/1/2018 2000-2019 [employer]. [address] [place] [address], [place] [address]. All rights reserved. This information is not intended as a substitute for professional medical care. Always follow your healthcare professional's instructions.

## 2020-08-11 ENCOUNTER — NURSE TRIAGE (OUTPATIENT)
Dept: NURSING | Facility: CLINIC | Age: 65
End: 2020-08-11

## 2020-08-11 NOTE — TELEPHONE ENCOUNTER
He was seen yesterday urgent care.  Fell yesterday on ice at his work.  Now needs workability papers, or restrictions to be sent to his place of employment .      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    Fax to Keri in [phone]    Her Office phone [phone]    Patients cell [phone], or [employer] if you have any questions.    Michelle Eldridge RN  [facility] Nurse Advisor

## 2020-08-18 ENCOUNTER — OFFICE VISIT (OUTPATIENT)
Dept: INTERNAL MEDICINE | Facility: CLINIC | Age: 65
End: 2020-08-18
Payer: OTHER MISCELLANEOUS

## 2020-08-18 VITALS
RESPIRATION RATE: 15 BRPM | TEMPERATURE: 98.3 F | HEART RATE: 63 BPM | BODY MASS INDEX: 32.01 KG/M2 | WEIGHT: 216.1 LBS | HEIGHT: 69 IN | OXYGEN SATURATION: 97 % | DIASTOLIC BLOOD PRESSURE: 78 MMHG | SYSTOLIC BLOOD PRESSURE: 140 MMHG

## 2020-08-18 DIAGNOSIS — S22.41XD CLOSED FRACTURE OF MULTIPLE RIBS OF RIGHT SIDE WITH ROUTINE HEALING, SUBSEQUENT ENCOUNTER: Primary | ICD-10-CM

## 2020-08-18 PROCEDURE — 99214 OFFICE O/P EST MOD 30 MIN: CPT | Performed by: INTERNAL MEDICINE

## 2020-08-18 RX ORDER — OXYCODONE AND ACETAMINOPHEN 5; 325 MG/1; MG/1
1 TABLET ORAL EVERY 6 HOURS PRN
Qty: 20 TABLET | Refills: 0 | Status: SHIPPED | OUTPATIENT
Start: 2020-08-18 | End: 2020-08-31

## 2020-08-18 ASSESSMENT — MIFFLIN-ST. JEOR: SCORE: 1760.6

## 2020-08-18 NOTE — PROGRESS NOTES
Subjective     Ronnie Lagos is a 65 year old male who presents to clinic today for the following health issues:    HPI       ED/UC Followup:    Facility:  [facility]   Date of visit: 8/10/20  Reason for visit: Closed fracture of multiple ribs, back injury, pelvis crush injury   Current Status: Stable. Slightly improved, using ibuprofen as needed. Patient would like to discuss work-ability/ restriction letter for his employer     Letter to be sent to [facility] Fax [phone]        Patient Active Problem List   Diagnosis     Essential hypertension, benign     Impotence of organic origin     Lumbago     Cervicalgia     CARDIOVASCULAR SCREENING; LDL GOAL LESS THAN 160     Neck pain     Advance care planning     Hallux limitus     Injury of extensor tendon of hand     Right hand pain     Osteoarthritis of finger of right hand     Alcohol use     Gastroesophageal reflux disease without esophagitis     Past Surgical History:   Procedure Laterality Date     ARTHRODESIS FOOT  2/5/2013    Procedure: ARTHRODESIS FOOT;  LEFT FIRST METATARSOPHALANGEAL JOINT ARTHRODESIS ;  Surgeon: Burton Loera DPM;  Location:  [facility]     COLONOSCOPY N/A 12/7/2018    Procedure: COMBINED COLONOSCOPY, SINGLE OR MULTIPLE BIOPSY/POLYPECTOMY BY BIOPSY;  Surgeon: Blayne Mora MD;  Location:  GI     ENDOSCOPIC RETROGRADE CHOLANGIOPANCREATOGRAM N/A 1/18/2018    Procedure: COMBINED ENDOSCOPIC RETROGRADE CHOLANGIOPANCREATOGRAPHY, SPHINCTEROTOMY;  ENDOSCOPIC RETROGRADE CHOLANGIOPANCREATOGRAM (ERCP), SPHINCTEROTOMY, STONE REMOVAL, STENT PLACEMENT;  Surgeon: Christiano Sorenson MD;  Location:  OR     ENDOSCOPIC RETROGRADE CHOLANGIOPANCREATOGRAM N/A 4/12/2018    Procedure: ENDOSCOPIC RETROGRADE CHOLANGIOPANCREATOGRAM;  ENDOSCOPIC RETROGRADE CHOLANIOPANCREATOGRAPHY AND ATTEMPTED STENT REMOVAL.;  Surgeon: Christiano Sorenson MD;  Location:  OR     ENDOSCOPIC RETROGRADE CHOLANGIOPANCREATOGRAM N/A 5/10/2018    Procedure:  COMBINED ENDOSCOPIC RETROGRADE CHOLANGIOPANCREATOGRAPHY, REMOVE FOREIGN BODY OR STENT/TUBE;  ENDOSCOPIC RETROGRADE CHOLANGIOPANCREATOGRAPHY AND STENT REMOVAL;  Surgeon: Christiano Sorenson MD;  Location:  OR     ESOPHAGOSCOPY, GASTROSCOPY, DUODENOSCOPY (EGD), COMBINED N/A 4/12/2018    Procedure: COMBINED ESOPHAGOSCOPY, GASTROSCOPY, DUODENOSCOPY (EGD);  EGD with stent removal;  Surgeon: Christiano Sorenson MD;  Location:  GI     LAPAROSCOPIC CHOLECYSTECTOMY N/A 5/1/2015    Procedure: LAPAROSCOPIC CHOLECYSTECTOMY;  Surgeon: Damien Galindo MD;  Location:  [facility]     ORTHOPEDIC SURGERY      left elbow, right shoulder       Social History     Tobacco Use     Smoking status: Never Smoker     Smokeless tobacco: Never Used   Substance Use Topics     Alcohol use: Yes     Comment: 1/day     Family History   Problem Relation Age of Onset     Hypertension Mother      Cancer - colorectal Mother      Skin Cancer Mother      Hypertension Father      Melanoma No family hx of          Current Outpatient Medications   Medication Sig Dispense Refill     amLODIPine (NORVASC) 5 MG tablet Take 1 tablet (5 mg) by mouth daily 90 tablet 3     atenolol (TENORMIN) 50 MG tablet Take 1 tablet (50 mg) by mouth daily 90 tablet 3     IBUPROFEN PO Take by mouth every 8 hours as needed        omeprazole (PRILOSEC) 20 MG DR capsule Take 1 capsule (20 mg) by mouth daily 90 capsule 3     No Known Allergies  BP Readings from Last 3 Encounters:   08/18/20 (!) 160/88   08/10/20 (!) 170/87   05/27/20 (!) 151/80    Wt Readings from Last 3 Encounters:   08/18/20 98 kg (216 lb 1.6 oz)   08/10/20 98.4 kg (217 lb)   01/30/20 98.5 kg (217 lb 1.6 oz)         Reviewed and updated as needed this visit by Provider       Review of Systems   CONSTITUTIONAL: NEGATIVE for fever, chills, change in weight  EYES: NEGATIVE for vision changes or irritation  ENT/MOUTH: NEGATIVE for ear, mouth and throat problems  RESP: NEGATIVE for significant cough or  "SOB  GI: NEGATIVE for nausea, abdominal pain, heartburn, or change in bowel habits  : NEGATIVE for frequency, dysuria, or hematuria  NEURO: NEGATIVE for weakness, dizziness or paresthesias  HEME: NEGATIVE for bleeding problems  PSYCHIATRIC: NEGATIVE for changes in mood or affect      Objective    BP (!) 140/78   Pulse 63   Temp 98.3  F (36.8  C) (Temporal)   Resp 15   Ht 1.753 m (5' 9\")   Wt 98 kg (216 lb 1.6 oz)   SpO2 97%   BMI 31.91 kg/m    There is no height or weight on file to calculate BMI.  Physical Exam   GENERAL: healthy, alert and no distress  EYES: Eyes grossly normal to inspection, PERRL and conjunctivae and sclerae normal  HENT: ear canals and TM's normal, nose and mouth without ulcers or lesions  NECK: no adenopathy, no asymmetry, masses, or scars and thyroid normal to palpation  RESP: lungs clear to auscultation - no rales, rhonchi or wheezes  CV: regular rate and rhythm, normal S1 S2, no S3 or S4, no click or rub, no peripheral edema and peripheral pulses strong  Right sided chest wall pain 3+/5  MS: no gross musculoskeletal defects noted  NEURO: Normal strength and tone, mentation intact and speech normal  PSYCH: mentation appears normal, affect normal/bright          Assessment & Plan     1. Closed fracture of multiple ribs of right side with routine healing, subsequent encounter  Supportive care discussed, letter for work restrictions done through 9/1/2020  - oxyCODONE-acetaminophen (PERCOCET) 5-325 MG tablet; Take 1 tablet by mouth every 6 hours as needed for pain  Dispense: 20 tablet; Refill: 0     BMI:   Estimated body mass index is 31.91 kg/m  as calculated from the following:    Height as of this encounter: 1.753 m (5' 9\").    Weight as of this encounter: 98 kg (216 lb 1.6 oz).       See Patient Instructions    Return in about 2 weeks (around 9/1/2020) for if symptoms recur or worsen.    Abdiel Jones MD  [facility]  "

## 2020-08-18 NOTE — LETTER
[facility]  [address] [address]  [phone]      8/18/2020       Ronnie Lagos  [address]  [facility] [address]      To Whom it May Concern:    Ronnie Lagos missed work due to recent rib fracture injury.  Please excuse him from work during this time frame through 9/1/20.  Ronnie may not return to work without restriction until that time.    Please contact me for questions or concerns.    Sincerely,       Abdiel Jones MD  [facility] INTERNAL MEDICINE

## 2020-08-31 ENCOUNTER — OFFICE VISIT (OUTPATIENT)
Dept: INTERNAL MEDICINE | Facility: CLINIC | Age: 65
End: 2020-08-31
Payer: OTHER MISCELLANEOUS

## 2020-08-31 VITALS
RESPIRATION RATE: 13 BRPM | DIASTOLIC BLOOD PRESSURE: 78 MMHG | OXYGEN SATURATION: 98 % | SYSTOLIC BLOOD PRESSURE: 120 MMHG | HEART RATE: 78 BPM

## 2020-08-31 DIAGNOSIS — I10 ESSENTIAL HYPERTENSION, BENIGN: ICD-10-CM

## 2020-08-31 DIAGNOSIS — S22.41XD CLOSED FRACTURE OF MULTIPLE RIBS OF RIGHT SIDE WITH ROUTINE HEALING, SUBSEQUENT ENCOUNTER: Primary | ICD-10-CM

## 2020-08-31 PROCEDURE — 99214 OFFICE O/P EST MOD 30 MIN: CPT | Performed by: INTERNAL MEDICINE

## 2020-08-31 RX ORDER — OXYCODONE AND ACETAMINOPHEN 5; 325 MG/1; MG/1
1 TABLET ORAL EVERY 6 HOURS PRN
Qty: 10 TABLET | Refills: 0 | Status: SHIPPED | OUTPATIENT
Start: 2020-08-31 | End: 2021-01-28

## 2020-08-31 NOTE — LETTER
[facility]  [address] [address]  [phone]      8/31/2020       Ronnie Lagos  [address]  [facility] [address]      To Whom it May Concern:    Ronnie Lagos missed work due to recent illness/injury.  Please excuse him from work during this time frame.  Ronnie may not return to work without restriction until 9/15/20.    Please contact me for questions or concerns.    Sincerely,     Abdiel Jones MD  [facility] INTERNAL MEDICINE

## 2020-08-31 NOTE — PROGRESS NOTES
Subjective     Ronnie Lagos is a 65 year old male who presents to clinic today for the following health issues:    HPI     ED/UC Followup:     Facility:  [facility]   Date of visit: 8/10/20  Reason for visit: Closed fracture of multiple ribs, back injury, pelvis crush injury   Current Status: Stable. Slightly improved, using ibuprofen as needed. Patient would like to discuss work-ability/ restriction letter for his employer    Slightly better but still slow to go.    Review of Systems   CONSTITUTIONAL: NEGATIVE for fever, chills, change in weight  ENT/MOUTH: NEGATIVE for ear, mouth and throat problems  RESP: NEGATIVE for significant cough   GI: NEGATIVE for nausea, abdominal pain, heartburn, or change in bowel habits  : NEGATIVE for frequency, dysuria, or hematuria  MUSCULOSKELETAL: NEGATIVE for significant arthralgias or myalgia  NEURO: NEGATIVE for weakness, dizziness or paresthesias  ENDOCRINE: NEGATIVE for temperature intolerance, skin/hair changes  HEME: NEGATIVE for bleeding problems  PSYCHIATRIC: NEGATIVE for changes in mood or affect      Objective    /78 (BP Location: Left arm, Patient Position: Sitting)   Pulse 78   Resp 13   SpO2 98%   There is no height or weight on file to calculate BMI.  Physical Exam   GENERAL: healthy, alert and no distress  EYES: Eyes grossly normal to inspection, PERRL and conjunctivae and sclerae normal  HENT: ear canals and TM's normal, nose and mouth without ulcers or lesions  NECK: no adenopathy, no asymmetry, masses, or scars and thyroid normal to palpation  RESP: lungs clear to auscultation - no rales, rhonchi or wheezes  CV: regular rates and rhythm, normal S1 S2, no S3 or S4, no click or rub, peripheral pulses strong and no peripheral edema  + right sided lateral chest wall pain  NEURO: Normal strength and tone, mentation intact and speech normal  PSYCH: mentation appears normal, affect normal/bright        Assessment & Plan     Closed fracture of multiple ribs  of right side with routine healing, subsequent encounter  Slow continued improvement, discussed with patient   - oxyCODONE-acetaminophen (PERCOCET) 5-325 MG tablet; Take 1 tablet by mouth every 6 hours as needed for pain  Work excuse written for 9/15/20 RTW date    Essential hypertension, benign  Stable on therapy as noted    See Patient Instructions    Return for if symptoms recur or worsen.    Abdiel Jones MD  [facility]

## 2020-09-14 ENCOUNTER — OFFICE VISIT (OUTPATIENT)
Dept: INTERNAL MEDICINE | Facility: CLINIC | Age: 65
End: 2020-09-14
Payer: OTHER MISCELLANEOUS

## 2020-09-14 VITALS
WEIGHT: 218 LBS | RESPIRATION RATE: 15 BRPM | TEMPERATURE: 97.4 F | HEART RATE: 67 BPM | DIASTOLIC BLOOD PRESSURE: 80 MMHG | SYSTOLIC BLOOD PRESSURE: 128 MMHG | BODY MASS INDEX: 32.19 KG/M2 | OXYGEN SATURATION: 97 %

## 2020-09-14 DIAGNOSIS — I10 ESSENTIAL HYPERTENSION, BENIGN: ICD-10-CM

## 2020-09-14 DIAGNOSIS — S22.49XD CLOSED FRACTURE OF MULTIPLE RIBS WITH ROUTINE HEALING, UNSPECIFIED LATERALITY, SUBSEQUENT ENCOUNTER: Primary | ICD-10-CM

## 2020-09-14 PROCEDURE — 99213 OFFICE O/P EST LOW 20 MIN: CPT | Performed by: INTERNAL MEDICINE

## 2020-09-14 NOTE — LETTER
[facility]  [address] [address]  [phone]      9/14/2020       Ronnie Lagos  [address]  [facility] [address]      To Whom it May Concern:    Ronnie Lagos missed work due to recent rib injury.  Please excuse him from work during this time frame.  Ronnie may not return to work without restriction until 9/29/20.    Please contact me for questions or concerns.    Sincerely,     Abdiel Jones MD  [facility] INTERNAL MEDICINE

## 2020-09-14 NOTE — PROGRESS NOTES
Subjective     Ronnie Lagos is a 65 year old male who presents to clinic today for the following health issues:    HPI     Discuss return to work following closed fracture of multiple ribs. Work excuse previously written for 9/15/20 RTW date   He is here today to follow-up on his ability to return to work.  A return to work note was given pending his status for tomorrow.  He is still having some mild chest wall pain post to his multiple closed rib fractures as noted previously.  His prior chest x-ray has been reviewed.    Review of Systems   CONSTITUTIONAL: NEGATIVE for fever, chills, change in weight  ENT/MOUTH: NEGATIVE for ear, mouth and throat problems  RESP: NEGATIVE for significant cough or SOB  GI: NEGATIVE for nausea, abdominal pain, heartburn, or change in bowel habits  : NEGATIVE for frequency, dysuria, or hematuria  MUSCULOSKELETAL: NEGATIVE for significant arthralgias or myalgia  NEURO: NEGATIVE for weakness, dizziness or paresthesias  ENDOCRINE: NEGATIVE for temperature intolerance, skin/hair changes  HEME: NEGATIVE for bleeding problems  PSYCHIATRIC: NEGATIVE for changes in mood or affect      Objective    /80   Pulse 67   Temp 97.4  F (36.3  C) (Temporal)   Resp 15   Wt 98.9 kg (218 lb)   SpO2 97%   BMI 32.19 kg/m    Body mass index is 32.19 kg/m .  Physical Exam   GENERAL: alert and no distress  EYES: Eyes grossly normal to inspection, PERRL and conjunctivae and sclerae normal  HENT: ear canals and TM's normal, nose and mouth without ulcers or lesions  NECK: no adenopathy, no asymmetry, masses, or scars and thyroid normal to palpation  RESP: lungs clear to auscultation - no rales, rhonchi or wheezes  CV: regular rate and rhythm, normal S1 S2, no S3 or S4, no click or rub, no peripheral edema and peripheral pulses strong  MS: no gross musculoskeletal defects noted, no edema  NEURO: Normal strength and tone, mentation intact and speech normal  PSYCH: mentation appears normal, affect  normal/bright        Assessment & Plan     Closed fracture of multiple ribs with routine healing, unspecified laterality, subsequent encounter  Improved clinical course, RTW 9/29/20, letter done    Essential hypertension, benign  Stable on therapy    See Patient Instructions    Return for if symptoms recur or worsen.    Abdiel Jones MD  [facility]        Ronnie Lagos  [address] [facility] [address]      To Whom it May Concern:    Ronnie Lagos missed work due to recent illness/injury.  Please excuse him from work during this time frame.  Ronnie may not return to work without restriction until 9/29/20.    Please contact me for questions or concerns.    Sincerely,     Abdiel Jones MD  [facility] INTERNAL MEDICINE

## 2020-10-05 ENCOUNTER — TELEPHONE (OUTPATIENT)
Dept: INTERNAL MEDICINE | Facility: CLINIC | Age: 65
End: 2020-10-05

## 2020-10-05 NOTE — TELEPHONE ENCOUNTER
Reason for Call:  Form, our goal is to have forms completed with 72 hours, however, some forms may require a visit or additional information.    Type of letter, form or note:  worker's compensation    Who is the form from?: [facility] (if other please explain)    Where did the form come from: form was mailed in    What clinic location was the form placed at?: Internal Medicine    Where the form was placed: Pcp's Folder    What number is listed as a contact on the form?: [phone] Estefania Amador       Additional comments:     Call taken on 10/5/2020 at 11:38 AM by Erica Salguero

## 2021-01-14 ENCOUNTER — HEALTH MAINTENANCE LETTER (OUTPATIENT)
Age: 66
End: 2021-01-14

## 2021-01-20 DIAGNOSIS — K21.9 GASTROESOPHAGEAL REFLUX DISEASE WITHOUT ESOPHAGITIS: ICD-10-CM

## 2021-01-20 DIAGNOSIS — I10 ESSENTIAL HYPERTENSION, BENIGN: ICD-10-CM

## 2021-01-20 NOTE — LETTER
January 27, 2021    Ronnie Lagos  [address]  [facility] [address]        Dear Ronnie,    While refilling your prescription today, we noticed that you are due to have a IN PERSON visit with your Provider.  We will refill your prescription for 90 days, but that appointment must be made before any additional refills can be approved.     Taking care of your health is important to us and we look forward to seeing you in the near future.  Please call us at [phone] or [phone] (or use [employer]) to schedule.  Please disregard this notice if you have already made an appointment.        Sincerely,          [facility] Team

## 2021-01-21 RX ORDER — ATENOLOL 50 MG/1
50 TABLET ORAL DAILY
Qty: 90 TABLET | Refills: 0 | Status: SHIPPED | OUTPATIENT
Start: 2021-01-21 | End: 2021-01-28

## 2021-01-21 RX ORDER — AMLODIPINE BESYLATE 5 MG/1
5 TABLET ORAL DAILY
Qty: 90 TABLET | Refills: 0 | Status: SHIPPED | OUTPATIENT
Start: 2021-01-21 | End: 2021-01-28

## 2021-01-28 ENCOUNTER — OFFICE VISIT (OUTPATIENT)
Dept: INTERNAL MEDICINE | Facility: CLINIC | Age: 66
End: 2021-01-28
Payer: COMMERCIAL

## 2021-01-28 VITALS
DIASTOLIC BLOOD PRESSURE: 82 MMHG | TEMPERATURE: 97.6 F | HEIGHT: 69 IN | RESPIRATION RATE: 14 BRPM | SYSTOLIC BLOOD PRESSURE: 130 MMHG | HEART RATE: 63 BPM | WEIGHT: 217.6 LBS | OXYGEN SATURATION: 98 % | BODY MASS INDEX: 32.23 KG/M2

## 2021-01-28 DIAGNOSIS — Z12.5 SCREENING PSA (PROSTATE SPECIFIC ANTIGEN): ICD-10-CM

## 2021-01-28 DIAGNOSIS — K21.9 GASTROESOPHAGEAL REFLUX DISEASE WITHOUT ESOPHAGITIS: ICD-10-CM

## 2021-01-28 DIAGNOSIS — Z13.6 CARDIOVASCULAR SCREENING; LDL GOAL LESS THAN 160: ICD-10-CM

## 2021-01-28 DIAGNOSIS — I10 ESSENTIAL HYPERTENSION, BENIGN: Primary | ICD-10-CM

## 2021-01-28 DIAGNOSIS — M79.645 PAIN OF LEFT THUMB: ICD-10-CM

## 2021-01-28 PROCEDURE — 99214 OFFICE O/P EST MOD 30 MIN: CPT | Performed by: INTERNAL MEDICINE

## 2021-01-28 RX ORDER — ATENOLOL 50 MG/1
50 TABLET ORAL DAILY
Qty: 90 TABLET | Refills: 3 | Status: SHIPPED | OUTPATIENT
Start: 2021-01-28 | End: 2022-04-27

## 2021-01-28 RX ORDER — AMLODIPINE BESYLATE 5 MG/1
5 TABLET ORAL DAILY
Qty: 90 TABLET | Refills: 3 | Status: SHIPPED | OUTPATIENT
Start: 2021-01-28 | End: 2022-04-27

## 2021-01-28 ASSESSMENT — MIFFLIN-ST. JEOR: SCORE: 1767.41

## 2021-01-28 NOTE — PROGRESS NOTES
Assessment & Plan     Essential hypertension, benign  Stable on therapy and continue with current medical management as ordered.  Routine labs recommended  - amLODIPine (NORVASC) 5 MG tablet; Take 1 tablet (5 mg) by mouth daily  - atenolol (TENORMIN) 50 MG tablet; Take 1 tablet (50 mg) by mouth daily  - Hemoglobin; Future  - Comprehensive metabolic panel; Future    Gastroesophageal reflux disease without esophagitis  Refilled per patient request and continue with 1 tablet daily  - omeprazole (PRILOSEC) 20 MG DR capsule; Take 1 capsule (20 mg) by mouth daily    Screening PSA (prostate specific antigen)  Ordered as routine screening as labs do  - PSA, screen; Future    CARDIOVASCULAR SCREENING; LDL GOAL LESS THAN 160  Lipid panel ordered patient will schedule fasting lab appointment  - Lipid Profile; Future    Pain of left thumb  Mild DJD concerns left thumb.  Consider orthopedic assessment for potential injection therapy  - Orthopedic & Spine  Referral; Future    Discussed also with patient issues of prior rib fractures and healing rate.  Patient with slow continued improvement    30 minutes spent on the date of the encounter doing chart review, review of test results, interpretation of tests, patient visit and documentation        See Patient Instructions    Return in about 2 weeks (around 2/11/2021) for Lab Work appointment, f/u Orthopedics.    Abdiel Jones MD  [facility]    Blue Trujillo is a 65 year old who presents to clinic today for the following health issues     HPI       Hypertension Follow-up      Do you check your blood pressure regularly outside of the clinic? No     Are you following a low salt diet? No    Are your blood pressures ever more than 140 on the top number (systolic) OR more   than 90 on the bottom number (diastolic), for example 140/90? No      How many servings of fruits and vegetables do you eat daily?  0-1    On average, how many  "sweetened beverages do you drink each day (Examples: soda, juice, sweet tea, etc.  Do NOT count diet or artificially sweetened beverages)?   1    How many days per week do you exercise enough to make your heart beat faster? N/a     How many minutes a day do you exercise enough to make your heart beat faster? N/a     How many days per week do you miss taking your medication? 0    Other concerns:  1. Residual right side rib discomfort since fractures in August - discussed and reviewed XRAYs done prior    Review of Systems   CONSTITUTIONAL: NEGATIVE for fever, chills, change in weight  EYES: NEGATIVE for vision changes or irritation  ENT/MOUTH: NEGATIVE for ear, mouth and throat problems  RESP: NEGATIVE for significant cough or SOB  CV: NEGATIVE for chest pain, palpitations or peripheral edema  GI: NEGATIVE for nausea, abdominal pain, heartburn, or change in bowel habits  : NEGATIVE for frequency, dysuria, or hematuria  MUSCULOSKELETAL: + left thumb pain noted of significance  NEURO: NEGATIVE for weakness, dizziness or paresthesias  HEME: NEGATIVE for bleeding problems  PSYCHIATRIC: NEGATIVE for changes in mood or affect      Objective    /82   Pulse 63   Temp 97.6  F (36.4  C) (Temporal)   Resp 14   Ht 1.753 m (5' 9\")   Wt 98.7 kg (217 lb 9.6 oz)   SpO2 98%   BMI 32.13 kg/m    Body mass index is 32.13 kg/m .  Physical Exam   GENERAL: healthy, alert and no distress  EYES: Eyes grossly normal to inspection, PERRL and conjunctivae and sclerae normal  HENT: ear canals and TM's normal, nose and mouth without ulcers or lesions  NECK: no adenopathy, no asymmetry, masses, or scars and thyroid normal to palpation  Mild DJD changes left thumb base  RESP: lungs clear to auscultation - no rales, rhonchi or wheezes  CV: regular rate and rhythm, normal S1 S2, no S3 or S4, no click or rub, no peripheral edema and peripheral pulses strong  MS: no gross musculoskeletal defects notedwith mild DJD changes left thumb " base  NEURO: Normal strength and tone, mentation intact and speech normal  PSYCH: mentation appears normal, affect normal/bright

## 2021-02-03 ENCOUNTER — OFFICE VISIT (OUTPATIENT)
Dept: ORTHOPEDICS | Facility: CLINIC | Age: 66
End: 2021-02-03
Attending: INTERNAL MEDICINE
Payer: COMMERCIAL

## 2021-02-03 ENCOUNTER — ANCILLARY PROCEDURE (OUTPATIENT)
Dept: GENERAL RADIOLOGY | Facility: CLINIC | Age: 66
End: 2021-02-03
Attending: ORTHOPAEDIC SURGERY
Payer: COMMERCIAL

## 2021-02-03 VITALS
HEIGHT: 69 IN | SYSTOLIC BLOOD PRESSURE: 120 MMHG | WEIGHT: 217 LBS | BODY MASS INDEX: 32.14 KG/M2 | DIASTOLIC BLOOD PRESSURE: 82 MMHG

## 2021-02-03 DIAGNOSIS — M79.645 PAIN OF LEFT THUMB: ICD-10-CM

## 2021-02-03 PROCEDURE — 73130 X-RAY EXAM OF HAND: CPT | Mod: LT | Performed by: ORTHOPAEDIC SURGERY

## 2021-02-03 PROCEDURE — 99203 OFFICE O/P NEW LOW 30 MIN: CPT | Performed by: ORTHOPAEDIC SURGERY

## 2021-02-03 ASSESSMENT — MIFFLIN-ST. JEOR: SCORE: 1764.69

## 2021-02-03 NOTE — PROGRESS NOTES
HISTORY OF PRESENT ILLNESS:    Ronnie Lagos is a 65 year old male who is seen in consultation at the request of Dr. Jones for left thumb pain. Onset 3 years. Patient is right hand dominant.  Currently working parttime working, ice arena maintenance.     Present symptoms: left thumb pain located at the MPC joint. Pain comes and goes.  Increased pain with gripping, grasping and moving the thumb. No catching or locking.  Pain does not wake him at nighttime.   Treatments tried to this point: comfort cool splint, ice, occasional use of ibuprofen.   Orthopedic PMH: left CMC arthritis     Past Medical History:   Diagnosis Date     Abdominal pain      Basal cell carcinoma      Calculus of bile duct      Elevated liver enzymes      Essential hypertension, benign     abstracted 6/19/02     Gastro-oesophageal reflux disease      GERD (gastroesophageal reflux disease) 7/28/2008     Liver disease     elevated liver enzymes     Squamous cell carcinoma      Unspecified cerebral artery occlusion with cerebral infarction      Unspecified condition of brain     abstracted 6/19/02       Past Surgical History:   Procedure Laterality Date     ARTHRODESIS FOOT  2/5/2013    Procedure: ARTHRODESIS FOOT;  LEFT FIRST METATARSOPHALANGEAL JOINT ARTHRODESIS ;  Surgeon: Burton Loera DPM;  [facility]  [facility]     COLONOSCOPY N/A 12/7/2018    Procedure: COMBINED COLONOSCOPY, SINGLE OR MULTIPLE BIOPSY/POLYPECTOMY BY BIOPSY;  Surgeon: Blayne Mora MD;  Location:  [facility]     ENDOSCOPIC RETROGRADE CHOLANGIOPANCREATOGRAM N/A 1/18/2018    Procedure: COMBINED ENDOSCOPIC RETROGRADE CHOLANGIOPANCREATOGRAPHY, SPHINCTEROTOMY;  ENDOSCOPIC RETROGRADE CHOLANGIOPANCREATOGRAM (ERCP), SPHINCTEROTOMY, STONE REMOVAL, STENT PLACEMENT;  Surgeon: Christiano Sorenson MD;  Location:  OR     ENDOSCOPIC RETROGRADE CHOLANGIOPANCREATOGRAM N/A 4/12/2018    Procedure: ENDOSCOPIC RETROGRADE CHOLANGIOPANCREATOGRAM;  ENDOSCOPIC RETROGRADE  CHOLANIOPANCREATOGRAPHY AND ATTEMPTED STENT REMOVAL.;  Surgeon: Christiano Sorenson MD;  Location:  OR     ENDOSCOPIC RETROGRADE CHOLANGIOPANCREATOGRAM N/A 5/10/2018    Procedure: COMBINED ENDOSCOPIC RETROGRADE CHOLANGIOPANCREATOGRAPHY, REMOVE FOREIGN BODY OR STENT/TUBE;  ENDOSCOPIC RETROGRADE CHOLANGIOPANCREATOGRAPHY AND STENT REMOVAL;  Surgeon: Christiano Sorenson MD;  Location:  OR     ESOPHAGOSCOPY, GASTROSCOPY, DUODENOSCOPY (EGD), COMBINED N/A 4/12/2018    Procedure: COMBINED ESOPHAGOSCOPY, GASTROSCOPY, DUODENOSCOPY (EGD);  EGD with stent removal;  Surgeon: Christiano Sorenson MD;  Location:  GI     LAPAROSCOPIC CHOLECYSTECTOMY N/A 5/1/2015    Procedure: LAPAROSCOPIC CHOLECYSTECTOMY;  Surgeon: Damien Galindo MD;  Location:  [facility]     ORTHOPEDIC SURGERY      left elbow, right shoulder       Family History   Problem Relation Age of Onset     Hypertension Mother      Cancer - colorectal Mother      Skin Cancer Mother      Hypertension Father      Melanoma No family hx of        Social History     Socioeconomic History     Marital status:      Spouse name: Not on file     Number of children: Not on file     Years of education: Not on file     Highest education level: Not on file   Occupational History     Employer: [employer]   Social Needs     Financial resource strain: Not on file     Food insecurity     Worry: Not on file     Inability: Not on file     Transportation needs     Medical: Not on file     Non-medical: Not on file   Tobacco Use     Smoking status: Never Smoker     Smokeless tobacco: Never Used   Substance and Sexual Activity     Alcohol use: Yes     Comment: 1/day     Drug use: No     Sexual activity: Yes     Partners: Female   Lifestyle     Physical activity     Days per week: Not on file     Minutes per session: Not on file     Stress: Not on file   Relationships     Social connections     Talks on phone: Not on file     Gets together: Not on file  "    Attends Synagogue service: Not on file     Active member of club or organization: Not on file     Attends meetings of clubs or organizations: Not on file     Relationship status: Not on file     Intimate partner violence     Fear of current or ex partner: Not on file     Emotionally abused: Not on file     Physically abused: Not on file     Forced sexual activity: Not on file   Other Topics Concern     Parent/sibling w/ CABG, MI or angioplasty before 65F 55M? Not Asked   Social History Narrative     Not on file       Current Outpatient Medications   Medication Sig Dispense Refill     amLODIPine (NORVASC) 5 MG tablet Take 1 tablet (5 mg) by mouth daily 90 tablet 3     atenolol (TENORMIN) 50 MG tablet Take 1 tablet (50 mg) by mouth daily 90 tablet 3     IBUPROFEN PO Take by mouth every 8 hours as needed        omeprazole (PRILOSEC) 20 MG DR capsule Take 1 capsule (20 mg) by mouth daily 90 capsule 3       No Known Allergies    REVIEW OF SYSTEMS:  CONSTITUTIONAL:  NEGATIVE for fever, chills, change in weight  INTEGUMENTARY/SKIN:  NEGATIVE for worrisome rashes, moles or lesions  EYES:  NEGATIVE for vision changes or irritation  ENT/MOUTH:  NEGATIVE for ear, mouth and throat problems  RESP:  NEGATIVE for significant cough or SOB  BREAST:  NEGATIVE for masses, tenderness or discharge  CV:  hypertension  GI:  GERD  :  Negative   MUSCULOSKELETAL:  See HPI above  NEURO:  NEGATIVE for weakness, dizziness or paresthesias  ENDOCRINE:  NEGATIVE for temperature intolerance, skin/hair changes  HEME/ALLERGY/IMMUNE:  NEGATIVE for bleeding problems  PSYCHIATRIC:  NEGATIVE for changes in mood or affect      PHYSICAL EXAM:  /82 (BP Location: Right arm, Patient Position: Chair, Cuff Size: Adult Regular)   Ht 1.753 m (5' 9\")   Wt 98.4 kg (217 lb)   BMI 32.05 kg/m    Body mass index is 32.05 kg/m .   GENERAL APPEARANCE: healthy, alert and no distress   HEENT: No apparent thyroid megaly. Clear sclera with normal ocular " movement  RESPIRATORY: No labored breathing  SKIN: no suspicious lesions or rashes  NEURO: Normal strength and tone, mentation intact and speech normal  VASCULAR: Good pulses, and capillary refill   LYMPH: no lymphadenopathy   PSYCH:  mentation appears normal and affect normal/bright    MUSCULOSKELETAL:  Not in acute distress  Normal gait  Symmetrically appearing hands  Slight lack of abduction ability of the fingers with slight cupping of the palms  Prominence at the basal joint of the thumbs  Circumduction maneuver causes pain and crepitus on the left  On the right only pain without crepitus  Grossly pinching and  strength is within normal limits  Very mild tenderness at the first dorsal compartment of the wrist  No catching or locking of the thumb flexor tendon  Sensation is grossly intact circulation is intact       ASSESSMENT:  Progressive worsening thumb CMC joint DJD, left  Minimally symptomatic right thumb CMC joint DJD    PLAN:  We had multiple previous x-rays of the hands.  2013 x-rays of the right hand were noted to be unremarkable.  The x-rays of the left hand from 2017 showed minimal thumb basal joint DJD.  Today's x-rays were visualized and compared to the previous x-rays.  He has a significant subluxation of the first metacarpal and significant joint space narrowing at the metacarpal carpal joint of the thumb.  Scaphotrapezial joint is well maintained.    The nature of osteoarthritis at the thumb basal joint was explained.  Treatment options were discussed.  He is not interested in cortisone injection at this point but wants to consider possible surgical intervention which we went over in great detail.    The different choices for surgical intervention were explained.  I indicated to him that I would recommend cable fixation first before we consider interposition grafting with flexor carpi radialis tendon.    Potential risks were informed.  He decided to go home and discuss the situation with his  wife and will get back to us if he decided to go ahead with the surgery.    Imaging Interpretation:   Advanced left thumb CMC joint DJD.    Nathanael Chaves MD  Department of Orthopedic Surgery        Disclaimer: This note consists of symbols derived from keyboarding, dictation and/or voice recognition software. As a result, there may be errors in the script that have gone undetected. Please consider this when interpreting information found in this chart.

## 2021-02-03 NOTE — LETTER
2/3/2021         RE: Ronnie Lagos  [address] [place]  [place] [address]        Dear Colleague,    Thank you for referring your patient, Ronnie Lagos, to the [facility] [place]. Please see a copy of my visit note below.    HISTORY OF PRESENT ILLNESS:    Ronnie Lagos is a 65 year old male who is seen in consultation at the request of Dr. Jones for left thumb pain. Onset 3 years. Patient is right hand dominant.  Currently working parttime working, ice arena maintenance.     Present symptoms: left thumb pain located at the MPC joint. Pain comes and goes.  Increased pain with gripping, grasping and moving the thumb. No catching or locking.  Pain does not wake him at nighttime.   Treatments tried to this point: comfort cool splint, ice, occasional use of ibuprofen.   Orthopedic PMH: left CMC arthritis     Past Medical History:   Diagnosis Date     Abdominal pain      Basal cell carcinoma      Calculus of bile duct      Elevated liver enzymes      Essential hypertension, benign     abstracted 6/19/02     Gastro-oesophageal reflux disease      GERD (gastroesophageal reflux disease) 7/28/2008     Liver disease     elevated liver enzymes     Squamous cell carcinoma      Unspecified cerebral artery occlusion with cerebral infarction      Unspecified condition of brain     abstracted 6/19/02       Past Surgical History:   Procedure Laterality Date     ARTHRODESIS FOOT  2/5/2013    Procedure: ARTHRODESIS FOOT;  LEFT FIRST METATARSOPHALANGEAL JOINT ARTHRODESIS ;  Surgeon: Burton Loera DPM;  [facility]     COLONOSCOPY N/A 12/7/2018    Procedure: COMBINED COLONOSCOPY, SINGLE OR MULTIPLE BIOPSY/POLYPECTOMY BY BIOPSY;  Surgeon: Blayne Mora MD;  Location:  GI     ENDOSCOPIC RETROGRADE CHOLANGIOPANCREATOGRAM N/A 1/18/2018    Procedure: COMBINED ENDOSCOPIC RETROGRADE CHOLANGIOPANCREATOGRAPHY, SPHINCTEROTOMY;  ENDOSCOPIC RETROGRADE CHOLANGIOPANCREATOGRAM (ERCP),  SPHINCTEROTOMY, STONE REMOVAL, STENT PLACEMENT;  Surgeon: Christiano Sorenson MD;  Location:  OR     ENDOSCOPIC RETROGRADE CHOLANGIOPANCREATOGRAM N/A 4/12/2018    Procedure: ENDOSCOPIC RETROGRADE CHOLANGIOPANCREATOGRAM;  ENDOSCOPIC RETROGRADE CHOLANIOPANCREATOGRAPHY AND ATTEMPTED STENT REMOVAL.;  Surgeon: Christiano Sorenson MD;  Location:  OR     ENDOSCOPIC RETROGRADE CHOLANGIOPANCREATOGRAM N/A 5/10/2018    Procedure: COMBINED ENDOSCOPIC RETROGRADE CHOLANGIOPANCREATOGRAPHY, REMOVE FOREIGN BODY OR STENT/TUBE;  ENDOSCOPIC RETROGRADE CHOLANGIOPANCREATOGRAPHY AND STENT REMOVAL;  Surgeon: Christiano Sorenson MD;  Location:  OR     ESOPHAGOSCOPY, GASTROSCOPY, DUODENOSCOPY (EGD), COMBINED N/A 4/12/2018    Procedure: COMBINED ESOPHAGOSCOPY, GASTROSCOPY, DUODENOSCOPY (EGD);  EGD with stent removal;  Surgeon: Christiano Sorenson MD;  Location:  GI     LAPAROSCOPIC CHOLECYSTECTOMY N/A 5/1/2015    Procedure: LAPAROSCOPIC CHOLECYSTECTOMY;  Surgeon: Damien Galindo MD;  Location:  [facility]     ORTHOPEDIC SURGERY      left elbow, right shoulder       Family History   Problem Relation Age of Onset     Hypertension Mother      Cancer - colorectal Mother      Skin Cancer Mother      Hypertension Father      Melanoma No family hx of        Social History     Socioeconomic History     Marital status:      Spouse name: Not on file     Number of children: Not on file     Years of education: Not on file     Highest education level: Not on file   Occupational History     Employer: [employer]   Social Needs     Financial resource strain: Not on file     Food insecurity     Worry: Not on file     Inability: Not on file     Transportation needs     Medical: Not on file     Non-medical: Not on file   Tobacco Use     Smoking status: Never Smoker     Smokeless tobacco: Never Used   Substance and Sexual Activity     Alcohol use: Yes     Comment: 1/day     Drug use: No     Sexual activity: Yes      Partners: Female   Lifestyle     Physical activity     Days per week: Not on file     Minutes per session: Not on file     Stress: Not on file   Relationships     Social connections     Talks on phone: Not on file     Gets together: Not on file     Attends Mormon service: Not on file     Active member of club or organization: Not on file     Attends meetings of clubs or organizations: Not on file     Relationship status: Not on file     Intimate partner violence     Fear of current or ex partner: Not on file     Emotionally abused: Not on file     Physically abused: Not on file     Forced sexual activity: Not on file   Other Topics Concern     Parent/sibling w/ CABG, MI or angioplasty before 65F 55M? Not Asked   Social History Narrative     Not on file       Current Outpatient Medications   Medication Sig Dispense Refill     amLODIPine (NORVASC) 5 MG tablet Take 1 tablet (5 mg) by mouth daily 90 tablet 3     atenolol (TENORMIN) 50 MG tablet Take 1 tablet (50 mg) by mouth daily 90 tablet 3     IBUPROFEN PO Take by mouth every 8 hours as needed        omeprazole (PRILOSEC) 20 MG DR capsule Take 1 capsule (20 mg) by mouth daily 90 capsule 3       No Known Allergies    REVIEW OF SYSTEMS:  CONSTITUTIONAL:  NEGATIVE for fever, chills, change in weight  INTEGUMENTARY/SKIN:  NEGATIVE for worrisome rashes, moles or lesions  EYES:  NEGATIVE for vision changes or irritation  ENT/MOUTH:  NEGATIVE for ear, mouth and throat problems  RESP:  NEGATIVE for significant cough or SOB  BREAST:  NEGATIVE for masses, tenderness or discharge  CV:  hypertension  GI:  GERD  :  Negative   MUSCULOSKELETAL:  See HPI above  NEURO:  NEGATIVE for weakness, dizziness or paresthesias  ENDOCRINE:  NEGATIVE for temperature intolerance, skin/hair changes  HEME/ALLERGY/IMMUNE:  NEGATIVE for bleeding problems  PSYCHIATRIC:  NEGATIVE for changes in mood or affect      PHYSICAL EXAM:  /82 (BP Location: Right arm, Patient Position: Chair, Cuff  "Size: Adult Regular)   Ht 1.753 m (5' 9\")   Wt 98.4 kg (217 lb)   BMI 32.05 kg/m    Body mass index is 32.05 kg/m .   GENERAL APPEARANCE: healthy, alert and no distress   HEENT: No apparent thyroid megaly. Clear sclera with normal ocular movement  RESPIRATORY: No labored breathing  SKIN: no suspicious lesions or rashes  NEURO: Normal strength and tone, mentation intact and speech normal  VASCULAR: Good pulses, and capillary refill   LYMPH: no lymphadenopathy   PSYCH:  mentation appears normal and affect normal/bright    MUSCULOSKELETAL:  Not in acute distress  Normal gait  Symmetrically appearing hands  Slight lack of abduction ability of the fingers with slight cupping of the palms  Prominence at the basal joint of the thumbs  Circumduction maneuver causes pain and crepitus on the left  On the right only pain without crepitus  Grossly pinching and  strength is within normal limits  Very mild tenderness at the first dorsal compartment of the wrist  No catching or locking of the thumb flexor tendon  Sensation is grossly intact circulation is intact       ASSESSMENT:  Progressive worsening thumb CMC joint DJD, left  Minimally symptomatic right thumb CMC joint DJD    PLAN:  We had multiple previous x-rays of the hands.  2013 x-rays of the right hand were noted to be unremarkable.  The x-rays of the left hand from 2017 showed minimal thumb basal joint DJD.  Today's x-rays were visualized and compared to the previous x-rays.  He has a significant subluxation of the first metacarpal and significant joint space narrowing at the metacarpal carpal joint of the thumb.  Scaphotrapezial joint is well maintained.    The nature of osteoarthritis at the thumb basal joint was explained.  Treatment options were discussed.  He is not interested in cortisone injection at this point but wants to consider possible surgical intervention which we went over in great detail.    The different choices for surgical intervention were " explained.  I indicated to him that I would recommend cable fixation first before we consider interposition grafting with flexor carpi radialis tendon.    Potential risks were informed.  He decided to go home and discuss the situation with his wife and will get back to us if he decided to go ahead with the surgery.    Imaging Interpretation:   Advanced left thumb CMC joint DJD.    Nathanael Chaves MD  Department of Orthopedic Surgery        Disclaimer: This note consists of symbols derived from keyboarding, dictation and/or voice recognition software. As a result, there may be errors in the script that have gone undetected. Please consider this when interpreting information found in this chart.        Again, thank you for allowing me to participate in the care of your patient.        Sincerely,        Nathanael Chaves MD

## 2021-04-06 DIAGNOSIS — Z12.5 SCREENING PSA (PROSTATE SPECIFIC ANTIGEN): ICD-10-CM

## 2021-04-06 DIAGNOSIS — Z13.6 CARDIOVASCULAR SCREENING; LDL GOAL LESS THAN 160: ICD-10-CM

## 2021-04-06 DIAGNOSIS — I10 ESSENTIAL HYPERTENSION, BENIGN: ICD-10-CM

## 2021-04-06 LAB
ALBUMIN SERPL-MCNC: 3.7 G/DL (ref 3.4–5)
ALP SERPL-CCNC: 60 U/L (ref 40–150)
ALT SERPL W P-5'-P-CCNC: 70 U/L (ref 0–70)
ANION GAP SERPL CALCULATED.3IONS-SCNC: 1 MMOL/L (ref 3–14)
AST SERPL W P-5'-P-CCNC: 66 U/L (ref 0–45)
BILIRUB SERPL-MCNC: 2.3 MG/DL (ref 0.2–1.3)
BUN SERPL-MCNC: 8 MG/DL (ref 7–30)
CALCIUM SERPL-MCNC: 9.3 MG/DL (ref 8.5–10.1)
CHLORIDE SERPL-SCNC: 101 MMOL/L (ref 94–109)
CHOLEST SERPL-MCNC: 181 MG/DL
CO2 SERPL-SCNC: 31 MMOL/L (ref 20–32)
CREAT SERPL-MCNC: 0.92 MG/DL (ref 0.66–1.25)
GFR SERPL CREATININE-BSD FRML MDRD: 87 ML/MIN/{1.73_M2}
GLUCOSE SERPL-MCNC: 110 MG/DL (ref 70–99)
HDLC SERPL-MCNC: 88 MG/DL
HGB BLD-MCNC: 16.7 G/DL (ref 13.3–17.7)
LDLC SERPL CALC-MCNC: 77 MG/DL
NONHDLC SERPL-MCNC: 93 MG/DL
POTASSIUM SERPL-SCNC: 4.4 MMOL/L (ref 3.4–5.3)
PROT SERPL-MCNC: 7.3 G/DL (ref 6.8–8.8)
PSA SERPL-ACNC: 1.51 UG/L (ref 0–4)
SODIUM SERPL-SCNC: 133 MMOL/L (ref 133–144)
TRIGL SERPL-MCNC: 80 MG/DL

## 2021-04-06 PROCEDURE — 36415 COLL VENOUS BLD VENIPUNCTURE: CPT | Performed by: INTERNAL MEDICINE

## 2021-04-06 PROCEDURE — 85018 HEMOGLOBIN: CPT | Performed by: INTERNAL MEDICINE

## 2021-04-06 PROCEDURE — 80061 LIPID PANEL: CPT | Performed by: INTERNAL MEDICINE

## 2021-04-06 PROCEDURE — G0103 PSA SCREENING: HCPCS | Performed by: INTERNAL MEDICINE

## 2021-04-06 PROCEDURE — 80053 COMPREHEN METABOLIC PANEL: CPT | Performed by: INTERNAL MEDICINE

## 2021-06-15 ENCOUNTER — OFFICE VISIT (OUTPATIENT)
Dept: URGENT CARE | Facility: URGENT CARE | Age: 66
End: 2021-06-15
Payer: COMMERCIAL

## 2021-06-15 VITALS
OXYGEN SATURATION: 97 % | TEMPERATURE: 98.4 F | HEART RATE: 56 BPM | RESPIRATION RATE: 16 BRPM | WEIGHT: 214 LBS | BODY MASS INDEX: 31.6 KG/M2 | DIASTOLIC BLOOD PRESSURE: 85 MMHG | SYSTOLIC BLOOD PRESSURE: 159 MMHG

## 2021-06-15 DIAGNOSIS — M62.830 BACK MUSCLE SPASM: Primary | ICD-10-CM

## 2021-06-15 PROCEDURE — 99214 OFFICE O/P EST MOD 30 MIN: CPT | Performed by: PHYSICIAN ASSISTANT

## 2021-06-15 RX ORDER — TIZANIDINE 2 MG/1
2 TABLET ORAL 3 TIMES DAILY
Qty: 30 TABLET | Refills: 0 | Status: SHIPPED | OUTPATIENT
Start: 2021-06-15 | End: 2022-05-10

## 2021-06-15 RX ORDER — NAPROXEN 500 MG/1
500 TABLET ORAL 2 TIMES DAILY WITH MEALS
Qty: 60 TABLET | Refills: 0 | Status: SHIPPED | OUTPATIENT
Start: 2021-06-15 | End: 2022-05-10

## 2021-06-15 NOTE — PROGRESS NOTES
Patient presents with:  Urgent Care: lower back spasm pain that started yesterday morning. No known injury.      Differential Diagnosis:  Back Pain: myofascial low back strain and degenerative disc disease versus ?    (M62.830) Back muscle spasm  (primary encounter diagnosis)  Comment:   Plan: naproxen (NAPROSYN) 500 MG tablet, tiZANidine         (ZANAFLEX) 2 MG tablet, acetaminophen-codeine         (TYLENOL #3) 300-30 MG tablet        See handout on back spasm.    F/U with PCP should symptoms persist or worsen.      Patient expresses understanding and agreement with the assessment and plan as above.              SUBJECTIVE:   Ronnie Lagos is a 66 year old male who presents today with bilateral low back pain/spasms, onset yesterday morning.  Denies any particular injury, however he was power washing the boat on Saturday and getting up and down out of the boat quite a bit.        Pain worse with certain movements.      Denies any urinary symptoms.  Denies any abdominal pain.      Last BM was yesterday and was normal.      Has tried ibuprofen, ice, heat, to no avail.      Past Medical History:   Diagnosis Date     Abdominal pain      Basal cell carcinoma      Calculus of bile duct      Elevated liver enzymes      Essential hypertension, benign     abstracted 6/19/02     Gastro-oesophageal reflux disease      GERD (gastroesophageal reflux disease) 7/28/2008     Liver disease     elevated liver enzymes     Squamous cell carcinoma      Unspecified cerebral artery occlusion with cerebral infarction      Unspecified condition of brain     abstracted 6/19/02         Current Outpatient Medications   Medication Sig Dispense Refill     Multiple Vitamins-Iron (DAILY-JOHNNIE/IRON/BETA-CAROTENE) TABS TAKE 1 TABLET BY MOUTH DAILY. (Patient not taking: Reported on 10/19/2020) 30 tablet 7     Social History     Tobacco Use     Smoking status: Never Smoker     Smokeless tobacco: Never Used   Substance Use Topics     Alcohol use: Not  on file     Family History   Problem Relation Age of Onset     Diabetes Mother      Diabetes Father          ROS:    10 point ROS of systems including Constitutional, Eyes, Respiratory, Cardiovascular, Gastroenterology, Genitourinary, Integumentary, Muscularskeletal, Psychiatric ,neurological were all negative except for pertinent positives noted in my HPI       OBJECTIVE:  BP (!) 159/85 (BP Location: Left arm, Patient Position: Sitting, Cuff Size: Adult Regular)   Pulse 56   Temp 98.4  F (36.9  C) (Temporal)   Resp 16   Wt 97.1 kg (214 lb)   SpO2 97%   BMI 31.60 kg/m    Physical Exam:  GENERAL APPEARANCE: healthy, alert and no distress  RESP: lungs clear to auscultation - no rales, rhonchi or wheezes  CV: regular rates and rhythm, normal S1 S2, no murmur noted  ABDOMEN:  soft, nontender, no HSM or masses and bowel sounds normal  NEURO: Normal strength and tone, sensory exam grossly normal,  normal speech and mentation  SKIN: no suspicious lesions or rashes  BACK: no vertebral tenderness.  Paraspinous muscle tenderness in lumbar region bilaterally.  Negative straight leg raise.

## 2021-06-15 NOTE — PATIENT INSTRUCTIONS
Patient Education     Back Spasm (No Trauma)    Spasm of the back muscles can occur after a sudden forceful twisting or bending such as in a car accident. A spasm can also happen after a simple awkward movement, or after lifting something heavy with poor body positioning. In any case, muscle spasm adds to the pain. Sleeping in an awkward position or on a poor quality mattress can also cause this. Some people respond to emotional stress by tensing the muscles of their back.  Pain that continues may need further assessment or other types of treatment such as physical therapy.  You don't always need X-rays for the first assessment of back pain, unless you had a physical injury such as from a car accident or fall. If your pain continues and doesn't respond to medical treatment, X-rays and other tests may then be done.   Home care    As soon as possible, start sitting or walking again. This will help prevent problems from a long bed rest. These problems include muscle weakness, worsening back stiffness and pain, and blood clots in the legs.    When in bed, try to find a position of comfort. A firm mattress is best. Try lying flat on your back with pillows under your knees. You can also try lying on your side with your knees bent up toward your chest and a pillow between your knees.    Don't sit for long periods. Also limit car rides and travel. This puts more stress on the lower back than standing or walking.     During the first 24 to 72 hours after an injury or flare-up, put an ice pack on the painful area for 20 minutes, then remove it for 20 minutes. Do this over a period of 60 to 90 minutes, or several times a day. This will reduce swelling and pain. Always wrap ice packs in a thin towel.    You can start with ice, then switch to heat. Heat from a hot shower, hot bath, or heating pad reduces pain and works well for muscle spasms. Put heat on the painful area for 20 minutes, then remove it for 20 minutes. Do this  over a period of 60 to 90 minutes, or several times a day. Don't sleep on a heating pad. It can burn or damage skin.    Alternate using ice and heat.    Be aware of safe lifting methods. don't lift anything over 15 pounds until all the pain is gone.  Gentle stretching will help your back heal faster. Do this simple routine 2 to 3 times a day until your back is feeling better.    Lie on your back with your knees bent and both feet on the ground.    Slowly raise your left knee to your chest as you flatten your lower back against the floor. Hold for 20 to 30 seconds.    Relax and repeat the exercise with your right knee.    Do 2 to 3 of these exercises for each leg.    Repeat, hugging both knees to your chest at the same time.    Don't bounce, but use a gentle pull.  Medicines  Talk with your doctor before using medicine, especially if you have other medical problems or are taking other medicines.  You may use over-the-counter medicines such as acetaminophen, ibuprofen, or naprosyn to control pain, unless your healthcare provider prescribed another pain medicine. Talk with your healthcare provider if you have a chronic condition such as diabetes, liver or kidney disease, stomach ulcer, or digestive bleeding, or are taking blood thinners.  Be careful if you are given prescription pain medicine, opioids, or medicine for muscle spasm. They can cause drowsiness, and affect your coordination, reflexes, and judgment. Don't drive or operate heavy machinery when taking these medicines. Take pain medicine only as prescribed by your healthcare provider.  Follow-up care  Follow up with your doctor, or as advised. You may need physical therapy or more tests.  If X-rays were taken, they may be reviewed by a radiologist. You will be told of any new findings that may affect your care.  Call   Call if any of these occur:    Trouble breathing    Confusion    Drowsiness or trouble awakening    Fainting or loss of consciousness    Rapid  or very slow heart rate    Loss of bowel or bladder control  When to seek medical advice  Call your healthcare provider right away if any of these occur:    Pain becomes worse or spreads to your legs    Weakness or numbness in one or both legs    Numbness in the groin or genital area    Fever of 100.4 F (38 C) or higher , or as directed by your healthcare provider    Chills    Burning or pain when passing urine  [employer] last reviewed this educational content on 11/1/2018 2000-2021 [employer]. All rights reserved. This information is not intended as a substitute for professional medical care. Always follow your healthcare professional's instructions.

## 2021-08-29 ENCOUNTER — HEALTH MAINTENANCE LETTER (OUTPATIENT)
Age: 66
End: 2021-08-29

## 2021-09-22 ENCOUNTER — TELEPHONE (OUTPATIENT)
Dept: DERMATOLOGY | Facility: CLINIC | Age: 66
End: 2021-09-22

## 2021-09-22 NOTE — TELEPHONE ENCOUNTER
[facility] Call Center    Phone Message    May a detailed message be left on voicemail: yes     Reason for Call: Appointment Intake    Referring Provider Name: NA  Diagnosis and/or Symptoms: Pt is in Jury duty and is scheduled for 09/23. Pt is asked by  to see about rescheduling. Pt tried to reschedule and their is no openings until Dec. Can someone call Pt to discuss what to do? He is concern about the spots. Pt will have phone muted during court and has a lunch period from 12 - 1:30 today 09/22. If Pt does not answer leave a detail message. Pt can not go on "[employer] at the time only use phone. Thank you    Action Taken: Message routed to:  [facility] Derm    Travel Screening: Not Applicable

## 2021-09-22 NOTE — TELEPHONE ENCOUNTER
Patient called back.    Patient on jury duty and needed to reschedule 9/23/21 appointment- FSE/lesion on scalp.    Rescheduled appointment for 10/21/21.    Patient voiced understanding.    BEAN Juarez-BSN-N  [facility]  [phone]

## 2021-09-22 NOTE — TELEPHONE ENCOUNTER
Called and LM for patient to call back.    Larry RN-BSN-N  [facility]  [phone]

## 2021-10-21 ENCOUNTER — OFFICE VISIT (OUTPATIENT)
Dept: DERMATOLOGY | Facility: CLINIC | Age: 66
End: 2021-10-21
Payer: COMMERCIAL

## 2021-10-21 VITALS — DIASTOLIC BLOOD PRESSURE: 81 MMHG | OXYGEN SATURATION: 96 % | SYSTOLIC BLOOD PRESSURE: 141 MMHG | HEART RATE: 67 BPM

## 2021-10-21 DIAGNOSIS — L57.0 AK (ACTINIC KERATOSIS): ICD-10-CM

## 2021-10-21 DIAGNOSIS — L81.4 LENTIGO: ICD-10-CM

## 2021-10-21 DIAGNOSIS — D18.01 ANGIOMA OF SKIN: ICD-10-CM

## 2021-10-21 DIAGNOSIS — Z85.828 HISTORY OF SKIN CANCER: Primary | ICD-10-CM

## 2021-10-21 DIAGNOSIS — D23.9 DERMAL NEVUS: ICD-10-CM

## 2021-10-21 DIAGNOSIS — L82.1 SEBORRHEIC KERATOSIS: ICD-10-CM

## 2021-10-21 PROCEDURE — 99213 OFFICE O/P EST LOW 20 MIN: CPT | Mod: 25 | Performed by: DERMATOLOGY

## 2021-10-21 PROCEDURE — 17000 DESTRUCT PREMALG LESION: CPT | Performed by: DERMATOLOGY

## 2021-10-21 PROCEDURE — 17003 DESTRUCT PREMALG LES 2-14: CPT | Performed by: DERMATOLOGY

## 2021-10-21 NOTE — LETTER
10/21/2021         RE: Ronnie Lagos  [address]  [place] [facility] [place] [address]        Dear Colleague,    Thank you for referring your patient, Ronnie Lagos, to the [facility]. Please see a copy of my visit note below.    Ronnie Lagos is an extremely pleasant 66 year old year old male patient here today for rough spots on face and scalp and hand.   .   Patient states this has been present for a while.  Patient reports the following symptoms:  scale.  Patient reports the following previous treatments none.  These treatments did not work.  Patient reports the following modifying factors none.  Associated symptoms: none.  Patient has no other skin complaints today.  Remainder of the HPI, Meds, PMH, Allergies, FH, and SH was reviewed in chart.      Past Medical History:   Diagnosis Date     Abdominal pain      Basal cell carcinoma      Calculus of bile duct      Elevated liver enzymes      Essential hypertension, benign     abstracted 6/19/02     Gastro-oesophageal reflux disease      GERD (gastroesophageal reflux disease) 7/28/2008     Liver disease     elevated liver enzymes     Squamous cell carcinoma      Unspecified cerebral artery occlusion with cerebral infarction      Unspecified condition of brain     abstracted 6/19/02       Past Surgical History:   Procedure Laterality Date     ARTHRODESIS FOOT  2/5/2013    Procedure: ARTHRODESIS FOOT;  LEFT FIRST METATARSOPHALANGEAL JOINT ARTHRODESIS ;  Surgeon: Burton Loera DPM;  [facility]  [facility]     COLONOSCOPY N/A 12/7/2018    Procedure: COMBINED COLONOSCOPY, SINGLE OR MULTIPLE BIOPSY/POLYPECTOMY BY BIOPSY;  Surgeon: Blayne Mora MD;  Location:  GI     ENDOSCOPIC RETROGRADE CHOLANGIOPANCREATOGRAM N/A 1/18/2018    Procedure: COMBINED ENDOSCOPIC RETROGRADE CHOLANGIOPANCREATOGRAPHY, SPHINCTEROTOMY;  ENDOSCOPIC RETROGRADE CHOLANGIOPANCREATOGRAM (ERCP), SPHINCTEROTOMY, STONE REMOVAL, STENT PLACEMENT;  Surgeon:  Christiano Sorenson MD;  Location:  OR     ENDOSCOPIC RETROGRADE CHOLANGIOPANCREATOGRAM N/A 4/12/2018    Procedure: ENDOSCOPIC RETROGRADE CHOLANGIOPANCREATOGRAM;  ENDOSCOPIC RETROGRADE CHOLANIOPANCREATOGRAPHY AND ATTEMPTED STENT REMOVAL.;  Surgeon: Christiano Sorenson MD;  Location:  OR     ENDOSCOPIC RETROGRADE CHOLANGIOPANCREATOGRAM N/A 5/10/2018    Procedure: COMBINED ENDOSCOPIC RETROGRADE CHOLANGIOPANCREATOGRAPHY, REMOVE FOREIGN BODY OR STENT/TUBE;  ENDOSCOPIC RETROGRADE CHOLANGIOPANCREATOGRAPHY AND STENT REMOVAL;  Surgeon: Christiano Sorenson MD;  Location:  OR     ESOPHAGOSCOPY, GASTROSCOPY, DUODENOSCOPY (EGD), COMBINED N/A 4/12/2018    Procedure: COMBINED ESOPHAGOSCOPY, GASTROSCOPY, DUODENOSCOPY (EGD);  EGD with stent removal;  Surgeon: Christiano Sorenson MD;  Location:  GI     LAPAROSCOPIC CHOLECYSTECTOMY N/A 5/1/2015    Procedure: LAPAROSCOPIC CHOLECYSTECTOMY;  Surgeon: Damien Galindo MD;  [facility]:  [facility]     ORTHOPEDIC SURGERY      left elbow, right shoulder        Family History   Problem Relation Age of Onset     Hypertension Mother      Cancer - colorectal Mother      Skin Cancer Mother      Hypertension Father      Melanoma No family hx of        Social History     Socioeconomic History     Marital status:      Spouse name: Not on file     Number of children: Not on file     Years of education: Not on file     Highest education level: Not on file   Occupational History     Employer: [employer]   Tobacco Use     Smoking status: Never Smoker     Smokeless tobacco: Never Used   Substance and Sexual Activity     Alcohol use: Yes     Comment: 1/day     Drug use: No     Sexual activity: Yes     Partners: Female   Other Topics Concern     Parent/sibling w/ CABG, MI or angioplasty before 65F 55M? Not Asked   Social History Narrative     Not on file     Social Determinants of Health     Financial Resource Strain:      Difficulty of Paying Living Expenses:     Food Insecurity:      Worried About Running Out of Food in the Last Year:      Ran Out of Food in the Last Year:    Transportation Needs:      Lack of Transportation (Medical):      Lack of Transportation (Non-Medical):    Physical Activity:      Days of Exercise per Week:      Minutes of Exercise per Session:    Stress:      Feeling of Stress :    Social Connections:      Frequency of Communication with Friends and Family:      Frequency of Social Gatherings with Friends and Family:      Attends [employer] Services:      Active Member of Clubs or Organizations:      Attends Club or Organization Meetings:      Marital Status:    Intimate Partner Violence:      Fear of Current or Ex-Partner:      Emotionally Abused:      Physically Abused:      Sexually Abused:        Outpatient Encounter Medications as of 10/21/2021   Medication Sig Dispense Refill     amLODIPine (NORVASC) 5 MG tablet Take 1 tablet (5 mg) by mouth daily 90 tablet 3     atenolol (TENORMIN) 50 MG tablet Take 1 tablet (50 mg) by mouth daily 90 tablet 3     omeprazole (PRILOSEC) 20 MG DR capsule Take 1 capsule (20 mg) by mouth daily 90 capsule 3     IBUPROFEN PO Take 400 mg by mouth every 8 hours as needed  (Patient not taking: Reported on 10/21/2021)       naproxen (NAPROSYN) 500 MG tablet Take 1 tablet (500 mg) by mouth 2 times daily (with meals) (Patient not taking: Reported on 10/21/2021) 60 tablet 0     tiZANidine (ZANAFLEX) 2 MG tablet Take 1 tablet (2 mg) by mouth 3 times daily (Patient not taking: Reported on 10/21/2021) 30 tablet 0     No facility-administered encounter medications on file as of 10/21/2021.             O:   NAD, WDWN, Alert & Oriented, Mood & Affect wnl, Vitals stable   Here today alone   BP (!) 141/81   Pulse 67   SpO2 96%    General appearance normal   Vitals stable   Alert, oriented and in no acute distress      Following lymph nodes palpated: Occipital, Cervical, Supraclavicular no lad   Scalp gritty scaly papule , L  hand x3 gritty scaly papule , L temple x3 gritty scaly papule , R temple x1       Stuck on papules and brown macules on trunk and ext   Red papules on trunk  Flesh colored papules on trunk     The remainder of the full exam was normal; the following areas were examined:  conjunctiva/lids, oral mucosa, neck, peripheral vascular system, abdomen, lymph nodes, digits/nails, eccrine and apocrine glands, scalp/hair, face, neck, chest, abdomen, buttocks, back, RUE, LUE, RLE, LLE       Eyes: Conjunctivae/lids:Normal     ENT: Lips, buccal mucosa, tongue: normal    MSK:Normal    Cardiovascular: peripheral edema none    Pulm: Breathing Normal    Lymph Nodes: No Head and Neck Lymphadenopathy     Neuro/Psych: Orientation:Alert and Orientedx3 ; Mood/Affect:normal       A/P:  1. Actinic keratosis   Scalp gritty scaly papule , L hand x3 gritty scaly papule , L temple x3 gritty scaly papule , R temple x1  LN2:  Treated with LN2 for 5s for 1-2 cycles. Warned risks of blistering, pain, pigment change, scarring, and incomplete resolution.  Advised patient to return if lesions do not completely resolve.  Wound care sheet given.  2. Seborrheic keratosis, lentigo, angioma, dermal nevus, xh of non-melanoma skin cancer   It was a pleasure speaking to Ronnie Lagos today.  Previous clinic notes and pertinent laboratory tests were reviewed prior to Ronnie Lagos's visit.  Nature and genetics of benign skin lesions dicussed with patient.  Signs and Symptoms of skin cancer discussed with patient.  Patient encouraged to perform monthly skin exams.  UV precautions reviewed with patient.  Risks of non-melanoma skin cancer discussed with patient   Return to clinic 12 months        Again, thank you for allowing me to participate in the care of your patient.        Sincerely,        Burton Park MD

## 2021-10-21 NOTE — PROGRESS NOTES
Ronnie Lagos is an extremely pleasant 66 year old year old male patient here today for rough spots on face and scalp and hand.   .   Patient states this has been present for a while.  Patient reports the following symptoms:  scale.  Patient reports the following previous treatments none.  These treatments did not work.  Patient reports the following modifying factors none.  Associated symptoms: none.  Patient has no other skin complaints today.  Remainder of the HPI, Meds, PMH, Allergies, FH, and SH was reviewed in chart.      Past Medical History:   Diagnosis Date     Abdominal pain      Basal cell carcinoma      Calculus of bile duct      Elevated liver enzymes      Essential hypertension, benign     abstracted 6/19/02     Gastro-oesophageal reflux disease      GERD (gastroesophageal reflux disease) 7/28/2008     Liver disease     elevated liver enzymes     Squamous cell carcinoma      Unspecified cerebral artery occlusion with cerebral infarction      Unspecified condition of brain     abstracted 6/19/02       Past Surgical History:   Procedure Laterality Date     ARTHRODESIS FOOT  2/5/2013    Procedure: ARTHRODESIS FOOT;  LEFT FIRST METATARSOPHALANGEAL JOINT ARTHRODESIS ;  Surgeon: Burton Loera DPM;  [facility]  [facility]     COLONOSCOPY N/A 12/7/2018    Procedure: COMBINED COLONOSCOPY, SINGLE OR MULTIPLE BIOPSY/POLYPECTOMY BY BIOPSY;  Surgeon: Blayne Mora MD;  Location:  GI     ENDOSCOPIC RETROGRADE CHOLANGIOPANCREATOGRAM N/A 1/18/2018    Procedure: COMBINED ENDOSCOPIC RETROGRADE CHOLANGIOPANCREATOGRAPHY, SPHINCTEROTOMY;  ENDOSCOPIC RETROGRADE CHOLANGIOPANCREATOGRAM (ERCP), SPHINCTEROTOMY, STONE REMOVAL, STENT PLACEMENT;  Surgeon: Christiano Sorenson MD;  Location:  OR     ENDOSCOPIC RETROGRADE CHOLANGIOPANCREATOGRAM N/A 4/12/2018    Procedure: ENDOSCOPIC RETROGRADE CHOLANGIOPANCREATOGRAM;  ENDOSCOPIC RETROGRADE CHOLANIOPANCREATOGRAPHY AND ATTEMPTED STENT REMOVAL.;  Surgeon: Christiano Sorenson  MD Emanuel;  Location:  OR     ENDOSCOPIC RETROGRADE CHOLANGIOPANCREATOGRAM N/A 5/10/2018    Procedure: COMBINED ENDOSCOPIC RETROGRADE CHOLANGIOPANCREATOGRAPHY, REMOVE FOREIGN BODY OR STENT/TUBE;  ENDOSCOPIC RETROGRADE CHOLANGIOPANCREATOGRAPHY AND STENT REMOVAL;  Surgeon: Christiano Sorenson MD;  Location:  OR     ESOPHAGOSCOPY, GASTROSCOPY, DUODENOSCOPY (EGD), COMBINED N/A 4/12/2018    Procedure: COMBINED ESOPHAGOSCOPY, GASTROSCOPY, DUODENOSCOPY (EGD);  EGD with stent removal;  Surgeon: Christiano Sorenson MD;  Location:  GI     LAPAROSCOPIC CHOLECYSTECTOMY N/A 5/1/2015    Procedure: LAPAROSCOPIC CHOLECYSTECTOMY;  Surgeon: Damien Galindo MD;  Location:  [facility]     ORTHOPEDIC SURGERY      left elbow, right shoulder        Family History   Problem Relation Age of Onset     Hypertension Mother      Cancer - colorectal Mother      Skin Cancer Mother      Hypertension Father      Melanoma No family hx of        Social History     Socioeconomic History     Marital status:      Spouse name: Not on file     Number of children: Not on file     Years of education: Not on file     Highest education level: Not on file   Occupational History     Employer: [employer]   Tobacco Use     Smoking status: Never Smoker     Smokeless tobacco: Never Used   Substance and Sexual Activity     Alcohol use: Yes     Comment: 1/day     Drug use: No     Sexual activity: Yes     Partners: Female   Other Topics Concern     Parent/sibling w/ CABG, MI or angioplasty before 65F 55M? Not Asked   Social History Narrative     Not on file     Social Determinants of Health     Financial Resource Strain:      Difficulty of Paying Living Expenses:    Food Insecurity:      Worried About Running Out of Food in the Last Year:      Ran Out of Food in the Last Year:    Transportation Needs:      Lack of Transportation (Medical):      Lack of Transportation (Non-Medical):    Physical Activity:      Days of Exercise  per Week:      Minutes of Exercise per Session:    Stress:      Feeling of Stress :    Social Connections:      Frequency of Communication with Friends and Family:      Frequency of Social Gatherings with Friends and Family:      Attends [employer] Services:      Active Member of Clubs or Organizations:      Attends Club or Organization Meetings:      Marital Status:    Intimate Partner Violence:      Fear of Current or Ex-Partner:      Emotionally Abused:      Physically Abused:      Sexually Abused:        Outpatient Encounter Medications as of 10/21/2021   Medication Sig Dispense Refill     amLODIPine (NORVASC) 5 MG tablet Take 1 tablet (5 mg) by mouth daily 90 tablet 3     atenolol (TENORMIN) 50 MG tablet Take 1 tablet (50 mg) by mouth daily 90 tablet 3     omeprazole (PRILOSEC) 20 MG DR capsule Take 1 capsule (20 mg) by mouth daily 90 capsule 3     IBUPROFEN PO Take 400 mg by mouth every 8 hours as needed  (Patient not taking: Reported on 10/21/2021)       naproxen (NAPROSYN) 500 MG tablet Take 1 tablet (500 mg) by mouth 2 times daily (with meals) (Patient not taking: Reported on 10/21/2021) 60 tablet 0     tiZANidine (ZANAFLEX) 2 MG tablet Take 1 tablet (2 mg) by mouth 3 times daily (Patient not taking: Reported on 10/21/2021) 30 tablet 0     No facility-administered encounter medications on file as of 10/21/2021.             O:   NAD, WDWN, Alert & Oriented, Mood & Affect wnl, Vitals stable   Here today alone   BP (!) 141/81   Pulse 67   SpO2 96%    General appearance normal   Vitals stable   Alert, oriented and in no acute distress      Following lymph nodes palpated: Occipital, Cervical, Supraclavicular no lad   Scalp gritty scaly papule , L hand x3 gritty scaly papule , L temple x3 gritty scaly papule , R temple x1       Stuck on papules and brown macules on trunk and ext   Red papules on trunk  Flesh colored papules on trunk     The remainder of the full exam was normal; the following areas were  examined:  conjunctiva/lids, oral mucosa, neck, peripheral vascular system, abdomen, lymph nodes, digits/nails, eccrine and apocrine glands, scalp/hair, face, neck, chest, abdomen, buttocks, back, RUE, LUE, RLE, LLE       Eyes: Conjunctivae/lids:Normal     ENT: Lips, buccal mucosa, tongue: normal    MSK:Normal    Cardiovascular: peripheral edema none    Pulm: Breathing Normal    Lymph Nodes: No Head and Neck Lymphadenopathy     Neuro/Psych: Orientation:Alert and Orientedx3 ; Mood/Affect:normal       A/P:  1. Actinic keratosis   Scalp gritty scaly papule , L hand x3 gritty scaly papule , L temple x3 gritty scaly papule , R temple x1  LN2:  Treated with LN2 for 5s for 1-2 cycles. Warned risks of blistering, pain, pigment change, scarring, and incomplete resolution.  Advised patient to return if lesions do not completely resolve.  Wound care sheet given.  2. Seborrheic keratosis, lentigo, angioma, dermal nevus, xh of non-melanoma skin cancer   It was a pleasure speaking to Ronnie Lagos today.  Previous clinic notes and pertinent laboratory tests were reviewed prior to Ronnie Lagos's visit.  Nature and genetics of benign skin lesions dicussed with patient.  Signs and Symptoms of skin cancer discussed with patient.  Patient encouraged to perform monthly skin exams.  UV precautions reviewed with patient.  Risks of non-melanoma skin cancer discussed with patient   Return to clinic 12 months

## 2022-04-19 DIAGNOSIS — I10 ESSENTIAL HYPERTENSION, BENIGN: ICD-10-CM

## 2022-04-19 DIAGNOSIS — K21.9 GASTROESOPHAGEAL REFLUX DISEASE WITHOUT ESOPHAGITIS: ICD-10-CM

## 2022-04-20 NOTE — TELEPHONE ENCOUNTER
Routing refill request to provider for review/approval because:  Labs out of range:    BP Readings from Last 3 Encounters:   10/21/21 (!) 141/81   06/15/21 (!) 159/85   02/03/21 120/82       Labs not current:    Creatinine   Date Value Ref Range Status   04/06/2021 0.92 0.66 - 1.25 mg/dL Final       Patient has scheduled appt 5/10/22       Helen Salas RN

## 2022-04-21 RX ORDER — ATENOLOL 50 MG/1
50 TABLET ORAL DAILY
Qty: 90 TABLET | Refills: 0 | OUTPATIENT
Start: 2022-04-21

## 2022-04-21 RX ORDER — AMLODIPINE BESYLATE 5 MG/1
5 TABLET ORAL DAILY
Qty: 90 TABLET | Refills: 0 | OUTPATIENT
Start: 2022-04-21

## 2022-04-26 DIAGNOSIS — I10 ESSENTIAL HYPERTENSION, BENIGN: ICD-10-CM

## 2022-04-26 DIAGNOSIS — K21.9 GASTROESOPHAGEAL REFLUX DISEASE WITHOUT ESOPHAGITIS: ICD-10-CM

## 2022-04-27 RX ORDER — ATENOLOL 50 MG/1
50 TABLET ORAL DAILY
Qty: 30 TABLET | Refills: 0 | Status: SHIPPED | OUTPATIENT
Start: 2022-04-27 | End: 2022-05-10

## 2022-04-27 RX ORDER — AMLODIPINE BESYLATE 5 MG/1
5 TABLET ORAL DAILY
Qty: 30 TABLET | Refills: 0 | Status: SHIPPED | OUTPATIENT
Start: 2022-04-27 | End: 2022-05-10

## 2022-04-27 NOTE — TELEPHONE ENCOUNTER
Routing refill request to provider for review/approval because:  Failed protocol due to:   BP Readings from Last 3 Encounters:   10/21/21 (!) 141/81   06/15/21 (!) 159/85   02/03/21 120/82     Creatinine   Date Value Ref Range Status   04/06/2021 0.92 0.66 - 1.25 mg/dL Final     Pt requesting huyen refills until his appt on 5/10/22. Meds pended for one month.  Quynh Art RN

## 2022-05-10 ENCOUNTER — OFFICE VISIT (OUTPATIENT)
Dept: INTERNAL MEDICINE | Facility: CLINIC | Age: 67
End: 2022-05-10
Payer: COMMERCIAL

## 2022-05-10 VITALS
SYSTOLIC BLOOD PRESSURE: 136 MMHG | OXYGEN SATURATION: 95 % | WEIGHT: 216 LBS | BODY MASS INDEX: 31.99 KG/M2 | HEART RATE: 61 BPM | RESPIRATION RATE: 15 BRPM | DIASTOLIC BLOOD PRESSURE: 70 MMHG | HEIGHT: 69 IN | TEMPERATURE: 98.1 F

## 2022-05-10 DIAGNOSIS — K21.9 GASTROESOPHAGEAL REFLUX DISEASE WITHOUT ESOPHAGITIS: ICD-10-CM

## 2022-05-10 DIAGNOSIS — I10 ESSENTIAL HYPERTENSION, BENIGN: ICD-10-CM

## 2022-05-10 DIAGNOSIS — Z13.6 ENCOUNTER FOR ABDOMINAL AORTIC ANEURYSM (AAA) SCREENING: ICD-10-CM

## 2022-05-10 DIAGNOSIS — Z13.6 CARDIOVASCULAR SCREENING; LDL GOAL LESS THAN 160: ICD-10-CM

## 2022-05-10 DIAGNOSIS — Z00.00 ENCOUNTER FOR INITIAL ANNUAL WELLNESS VISIT IN MEDICARE PATIENT: Primary | ICD-10-CM

## 2022-05-10 DIAGNOSIS — Z12.11 COLON CANCER SCREENING: ICD-10-CM

## 2022-05-10 DIAGNOSIS — M25.512 LEFT SHOULDER PAIN, UNSPECIFIED CHRONICITY: ICD-10-CM

## 2022-05-10 DIAGNOSIS — Z12.5 SCREENING PSA (PROSTATE SPECIFIC ANTIGEN): ICD-10-CM

## 2022-05-10 LAB
ALBUMIN SERPL-MCNC: 4 G/DL (ref 3.4–5)
ALP SERPL-CCNC: 57 U/L (ref 40–150)
ALT SERPL W P-5'-P-CCNC: 72 U/L (ref 0–70)
ANION GAP SERPL CALCULATED.3IONS-SCNC: 4 MMOL/L (ref 3–14)
AST SERPL W P-5'-P-CCNC: 57 U/L (ref 0–45)
BILIRUB SERPL-MCNC: 2.8 MG/DL (ref 0.2–1.3)
BUN SERPL-MCNC: 10 MG/DL (ref 7–30)
CALCIUM SERPL-MCNC: 9.2 MG/DL (ref 8.5–10.1)
CHLORIDE BLD-SCNC: 99 MMOL/L (ref 94–109)
CHOLEST SERPL-MCNC: 169 MG/DL
CO2 SERPL-SCNC: 28 MMOL/L (ref 20–32)
CREAT SERPL-MCNC: 0.83 MG/DL (ref 0.66–1.25)
ERYTHROCYTE [DISTWIDTH] IN BLOOD BY AUTOMATED COUNT: 11.9 % (ref 10–15)
FASTING STATUS PATIENT QL REPORTED: YES
GFR SERPL CREATININE-BSD FRML MDRD: >90 ML/MIN/1.73M2
GLUCOSE BLD-MCNC: 112 MG/DL (ref 70–99)
HCT VFR BLD AUTO: 47.7 % (ref 40–53)
HDLC SERPL-MCNC: 90 MG/DL
HGB BLD-MCNC: 16.8 G/DL (ref 13.3–17.7)
LDLC SERPL CALC-MCNC: 65 MG/DL
MCH RBC QN AUTO: 32.2 PG (ref 26.5–33)
MCHC RBC AUTO-ENTMCNC: 35.2 G/DL (ref 31.5–36.5)
MCV RBC AUTO: 92 FL (ref 78–100)
NONHDLC SERPL-MCNC: 79 MG/DL
PLATELET # BLD AUTO: 165 10E3/UL (ref 150–450)
POTASSIUM BLD-SCNC: 4.6 MMOL/L (ref 3.4–5.3)
PROT SERPL-MCNC: 7.8 G/DL (ref 6.8–8.8)
PSA SERPL-MCNC: 3.44 UG/L (ref 0–4)
RBC # BLD AUTO: 5.21 10E6/UL (ref 4.4–5.9)
SODIUM SERPL-SCNC: 131 MMOL/L (ref 133–144)
TRIGL SERPL-MCNC: 71 MG/DL
WBC # BLD AUTO: 4.5 10E3/UL (ref 4–11)

## 2022-05-10 PROCEDURE — 80053 COMPREHEN METABOLIC PANEL: CPT | Performed by: INTERNAL MEDICINE

## 2022-05-10 PROCEDURE — G0402 INITIAL PREVENTIVE EXAM: HCPCS | Performed by: INTERNAL MEDICINE

## 2022-05-10 PROCEDURE — G0103 PSA SCREENING: HCPCS | Performed by: INTERNAL MEDICINE

## 2022-05-10 PROCEDURE — 85027 COMPLETE CBC AUTOMATED: CPT | Performed by: INTERNAL MEDICINE

## 2022-05-10 PROCEDURE — 99214 OFFICE O/P EST MOD 30 MIN: CPT | Mod: 25 | Performed by: INTERNAL MEDICINE

## 2022-05-10 PROCEDURE — 80061 LIPID PANEL: CPT | Performed by: INTERNAL MEDICINE

## 2022-05-10 PROCEDURE — 36415 COLL VENOUS BLD VENIPUNCTURE: CPT | Performed by: INTERNAL MEDICINE

## 2022-05-10 RX ORDER — AMLODIPINE BESYLATE 5 MG/1
5 TABLET ORAL DAILY
Qty: 90 TABLET | Refills: 3 | Status: SHIPPED | OUTPATIENT
Start: 2022-05-10 | End: 2023-05-11

## 2022-05-10 RX ORDER — ATENOLOL 50 MG/1
50 TABLET ORAL DAILY
Qty: 90 TABLET | Refills: 3 | Status: SHIPPED | OUTPATIENT
Start: 2022-05-10 | End: 2023-05-11

## 2022-05-10 ASSESSMENT — ACTIVITIES OF DAILY LIVING (ADL): CURRENT_FUNCTION: NO ASSISTANCE NEEDED

## 2022-05-10 NOTE — PROGRESS NOTES
"SUBJECTIVE:   Ronnie Lagos is a 66 year old male who presents for Preventive Visit.    Patient has been advised of split billing requirements and indicates understanding: Yes   Are you in the first 12 months of your Medicare coverage?  Yes,  Visual Acuity:  Right Eye: 20/20   Left Eye: 20/20  Both Eyes: 20/16- with corrective lens     Healthy Habits:     In general, how would you rate your overall health?  Good    Frequency of exercise:  2-3 days/week    Duration of exercise:  Other    Do you usually eat at least 4 servings of fruit and vegetables a day, include whole grains    & fiber and avoid regularly eating high fat or \"junk\" foods?  No    Taking medications regularly:  Yes    Medication side effects:  None    Ability to successfully perform activities of daily living:  No assistance needed    Home Safety:  No safety concerns identified    Hearing Impairment:  No hearing concerns    In the past 6 months, have you been bothered by leaking of urine?  No    In general, how would you rate your overall mental or emotional health?  Good      PHQ-2 Total Score: 0    Additional concerns today:  Yes    Do you feel safe in your environment? Yes    Have you ever done Advance Care Planning? (For example, a Health Directive, POLST, or a discussion with a medical provider or your loved ones about your wishes): No, advance care planning information given to patient to review.  Patient declined advance care planning discussion at this time.       Fall risk:  low    Cognitive Screening   1) Repeat 3 items (Leader, Season, Table)    2) Clock draw: NORMAL  3) 3 item recall: Recalls 3 objects  Results: 3 items recalled: COGNITIVE IMPAIRMENT LESS LIKELY    Mini-CogTM Copyright WARNER Azar. Licensed by the author for use in [facility]; reprinted with permission (ray@.[facility]). All rights reserved.      Do you have sleep apnea, excessive snoring or daytime drowsiness?: no    Reviewed and updated as needed this visit by " clinical staff                    Reviewed and updated as needed this visit by Provider                   Social History     Tobacco Use     Smoking status: Never Smoker     Smokeless tobacco: Never Used   Substance Use Topics     Alcohol use: Yes     Comment: 1/day         No flowsheet data found.      PROBLEMS TO ADD ON...  1. Left shoulder pain and radiates into neck, steadily getting worse and waking him at night     Current providers sharing in care for this patient include:   Patient Care Team:  Abdiel Jones MD as PCP - General  Abdiel Jones MD as Assigned PCP  Nathanael Chaves MD as Assigned Musculoskeletal Provider  Burton Park MD as MD (Dermatology)  Burton Park MD as Assigned Surgical Provider    The following health maintenance items are reviewed in Epic and correct as of today:  Health Maintenance Due   Topic Date Due     ANNUAL REVIEW OF HM ORDERS  Never done     MEDICARE ANNUAL WELLNESS VISIT  06/09/2021     FALL RISK ASSESSMENT  Never done     Pneumococcal Vaccine: 65+ Years (1 - PCV) Never done     AORTIC ANEURYSM SCREENING (SYSTEM ASSIGNED)  06/09/2021     PHQ-2 (once per calendar year)  01/01/2022     COVID-19 Vaccine (4 - Booster for Moderna series) 03/16/2022     BMP  04/06/2022       Immunization History   Administered Date(s) Administered     COVID-19,PF,Moderna 03/18/2021, 04/21/2021, 11/16/2021     Flu 65+ Years 10/28/2018     Influenza (IIV3) PF 11/01/2001, 01/11/2013, 09/30/2013, 11/02/2014, 01/08/2016, 10/01/2016, 10/01/2017, 09/11/2020     Influenza Quad, Recombinant, pf(RIV4) (Flublok) 09/27/2019     TD (ADULT, 7+) 05/15/2001     TDAP Vaccine (Adacel) 01/24/2013     Zoster vaccine recombinant adjuvanted (SHINGRIX) 03/22/2019, 06/04/2019              Review of Systems  CONSTITUTIONAL: NEGATIVE for fever, chills, change in weight  EYES: NEGATIVE for vision changes or irritation  ENT/MOUTH: NEGATIVE for ear, mouth and throat problems  RESP: NEGATIVE for  "significant cough or SOB  CV: NEGATIVE for chest pain, palpitations or peripheral edema  GI: NEGATIVE for nausea, abdominal pain, heartburn, or change in bowel habits  : NEGATIVE for frequency, dysuria, or hematuria  NEURO: NEGATIVE for weakness, dizziness or paresthesias  HEME: NEGATIVE for bleeding problems  PSYCHIATRIC: NEGATIVE for changes in mood or affect    OBJECTIVE:   /70   Pulse 61   Temp 98.1  F (36.7  C) (Temporal)   Resp 15   Ht 1.753 m (5' 9\")   Wt 98 kg (216 lb)   SpO2 95%   BMI 31.90 kg/m   Estimated body mass index is 31.9 kg/m  as calculated from the following:    Height as of this encounter: 1.753 m (5' 9\").    Weight as of this encounter: 98 kg (216 lb).     Physical Exam  GENERAL: healthy, alert and no distress  EYES: Eyes grossly normal to inspection, PERRL and conjunctivae and sclerae normal  HENT: ear canals and TM's normal, nose and mouth without ulcers or lesions  NECK: no adenopathy, no asymmetry, masses, or scars and thyroid normal to palpation  RESP: lungs clear to auscultation - no rales, rhonchi or wheezes  CV: regular rate and rhythm, normal S1 S2, no S3 or S4, no murmur, click or rub, no peripheral edema and peripheral pulses strong  ABDOMEN: soft, nontender, no hepatosplenomegaly, no masses and bowel sounds normal  RECTAL:  declined  MS: no gross musculoskeletal defects noted, no edema, ROM left shoulder relatively intact  NEURO: Normal strength and tone, mentation intact and speech normal  PSYCH: mentation appears normal, affect normal/bright    ASSESSMENT / PLAN:   (Z00.00) Encounter for initial annual wellness visit in Medicare patient  (primary encounter diagnosis)  Comment: Advised updated pneumococcal vaccine which patient will do at his pharmacy  Plan: CBC with platelets, Comprehensive metabolic         panel, Lipid Profile        Routine lab work as ordered    (I10) Essential hypertension, benign  Comment: Stable and continue with current medical " "management  Plan: Comprehensive metabolic panel, amLODIPine         (NORVASC) 5 MG tablet, atenolol (TENORMIN) 50         MG tablet            (K21.9) Gastroesophageal reflux disease without esophagitis  Comment: Refilled per patient request and continue with dietary control  Plan: omeprazole (PRILOSEC) 20 MG DR capsule            (Z12.5) Screening PSA (prostate specific antigen)  Comment: Ordered as routine screening, patient declined digital exam  Plan: Prostate Specific Antigen Screen            (Z12.11) Colon cancer screening  Comment: Prior colonoscopy reviewed and recommend annual FIT testing  Plan: Fecal colorectal cancer screen FIT            (Z13.6) CARDIOVASCULAR SCREENING; LDL GOAL LESS THAN 160  Comment: Labs ordered as fasting per routine  Plan: Lipid Profile            (Z13.6) Encounter for abdominal aortic aneurysm (AAA) screening  Comment: Ordered as routine screening based on age  Plan: US Aorta Medicare AAA Screening            (M25.512) Left shoulder pain, unspecified chronicity  Comment: Suggest starting with imaging for reassurance of bony abnormality.  Continue with rehab and if no response consider orthopedic assessment for potential injection therapy  Plan: Orthopedic  Referral, XR Shoulder Left        G/E 3 Views, Physical Therapy Referral,         OFFICE/OUTPT VISIT,EST,LEVL III              Patient has been advised of split billing requirements and indicates understanding: Yes    COUNSELING:  Reviewed preventive health counseling, as reflected in patient instructions       Regular exercise       Healthy diet/nutrition    Estimated body mass index is 31.6 kg/m  as calculated from the following:    Height as of 2/3/21: 1.753 m (5' 9\").    Weight as of 6/15/21: 97.1 kg (214 lb).        He reports that he has never smoked. He has never used smokeless tobacco.      Appropriate preventive services were discussed with this patient, including applicable screening as appropriate for " cardiovascular disease, diabetes, osteopenia/osteoporosis, and glaucoma.  As appropriate for age/gender, discussed screening for colorectal cancer, prostate cancer, breast cancer, and cervical cancer. Checklist reviewing preventive services available has been given to the patient.    Reviewed patients plan of care and provided an AVS. The Basic Care Plan (routine screening as documented in Health Maintenance) for Ronnie meets the Care Plan requirement. This Care Plan has been established and reviewed with the Patient.    Counseling Resources:  ATP IV Guidelines  Pooled Cohorts Equation Calculator  Breast Cancer Risk Calculator  Breast Cancer: Medication to Reduce Risk  FRAX Risk Assessment  ICSI Preventive Guidelines  Dietary Guidelines for Americans, 2010  USDA's MyPlate  ASA Prophylaxis  Lung CA Screening    Abdiel Jones MD  [facility]    Identified Health Risks:

## 2022-10-15 ENCOUNTER — HEALTH MAINTENANCE LETTER (OUTPATIENT)
Age: 67
End: 2022-10-15

## 2022-10-24 ENCOUNTER — OFFICE VISIT (OUTPATIENT)
Dept: DERMATOLOGY | Facility: CLINIC | Age: 67
End: 2022-10-24
Payer: COMMERCIAL

## 2022-10-24 DIAGNOSIS — L57.0 ACTINIC KERATOSIS: Primary | ICD-10-CM

## 2022-10-24 DIAGNOSIS — L57.8 ACTINIC SKIN DAMAGE: ICD-10-CM

## 2022-10-24 DIAGNOSIS — L81.4 LENTIGINES: ICD-10-CM

## 2022-10-24 PROCEDURE — 17003 DESTRUCT PREMALG LES 2-14: CPT | Performed by: STUDENT IN AN ORGANIZED HEALTH CARE EDUCATION/TRAINING PROGRAM

## 2022-10-24 PROCEDURE — 99213 OFFICE O/P EST LOW 20 MIN: CPT | Mod: 25 | Performed by: STUDENT IN AN ORGANIZED HEALTH CARE EDUCATION/TRAINING PROGRAM

## 2022-10-24 PROCEDURE — 17000 DESTRUCT PREMALG LESION: CPT | Performed by: STUDENT IN AN ORGANIZED HEALTH CARE EDUCATION/TRAINING PROGRAM

## 2022-10-24 NOTE — LETTER
10/24/2022         RE: Ronnie Lagos  [address]  [place] [address]        Dear Colleague,    Thank you for referring your patient, Ronnie Lagos, to the [facility]. Please see a copy of my visit note below.    [facility] Dermatology Note    Encounter Date: Oct 24, 2022    Dermatology Problem List:    Impression/Plan:  Ronnie was seen today for derm problem.    Diagnoses and all orders for this visit:    Actinic keratosis  -     DESTRUCT PREMALIGNANT LESION, FIRST  -     DESTRUCT PREMALIGNANT LESION, 2-14  - ln2x7 face and scalp     Actinic skin damage  - discussed sunprotective behaviors, clothing, and the use of sunscreen    Lentigines  - benign        Cryotherapy procedure note: After verbal consent and discussion of risks and benefits including but no limited to dyspigmentation/scar, blister, and pain, 7 aks on face and scalp was(were) treated with 1-2mm freeze border for 2 cycles with liquid nitrogen. Post cryotherapy instructions were provided.       Follow-up in 1 year.       Staff Involved:  Staff Only    Perico Murrieta MD   of Dermatology  Department of Dermatology  [facility]      CC:   Chief Complaint   Patient presents with     Derm Problem     Fbs       History of Present Illness:  Mr. Ronnie Lagos is a 67 year old male who presents as a return patient.    Scaly spots on scalp he'd like examined     Labs:      Physical exam:  Vitals: There were no vitals taken for this visit.  GEN: well developed, well-nourished, in no acute distress, in a pleasant mood.     SKIN: Linares phototype 2  - Full skin, which includes the head/face, both arms, chest, back, abdomen,both legs, genitalia and/or groin buttocks, digits and/or nails, was examined.  - gritty pink papules on face  - Flat brown macules and patches in a sun exposed areas on face and extremities  - No other lesions of  concern on areas examined.     Past Medical History:   Past Medical History:   Diagnosis Date     Abdominal pain      Basal cell carcinoma      Calculus of bile duct      Elevated liver enzymes      Essential hypertension, benign     abstracted 6/19/02     Gastro-oesophageal reflux disease      GERD (gastroesophageal reflux disease) 7/28/2008     Liver disease     elevated liver enzymes     Squamous cell carcinoma      Unspecified cerebral artery occlusion with cerebral infarction      Unspecified condition of brain     abstracted 6/19/02     Past Surgical History:   Procedure Laterality Date     ARTHRODESIS FOOT  2/5/2013    Procedure: ARTHRODESIS FOOT;  LEFT FIRST METATARSOPHALANGEAL JOINT ARTHRODESIS ;  Surgeon: Burton Loera DPM;  Location: [facility]     COLONOSCOPY N/A 12/7/2018    Procedure: COMBINED COLONOSCOPY, SINGLE OR MULTIPLE BIOPSY/POLYPECTOMY BY BIOPSY;  Surgeon: Blayne Mora MD;  Location:  GI     ENDOSCOPIC RETROGRADE CHOLANGIOPANCREATOGRAM N/A 1/18/2018    Procedure: COMBINED ENDOSCOPIC RETROGRADE CHOLANGIOPANCREATOGRAPHY, SPHINCTEROTOMY;  ENDOSCOPIC RETROGRADE CHOLANGIOPANCREATOGRAM (ERCP), SPHINCTEROTOMY, STONE REMOVAL, STENT PLACEMENT;  Surgeon: Christiano Sorenson MD;  Location:  OR     ENDOSCOPIC RETROGRADE CHOLANGIOPANCREATOGRAM N/A 4/12/2018    Procedure: ENDOSCOPIC RETROGRADE CHOLANGIOPANCREATOGRAM;  ENDOSCOPIC RETROGRADE CHOLANIOPANCREATOGRAPHY AND ATTEMPTED STENT REMOVAL.;  Surgeon: Christiano Sorenson MD;  Location:  OR     ENDOSCOPIC RETROGRADE CHOLANGIOPANCREATOGRAM N/A 5/10/2018    Procedure: COMBINED ENDOSCOPIC RETROGRADE CHOLANGIOPANCREATOGRAPHY, REMOVE FOREIGN BODY OR STENT/TUBE;  ENDOSCOPIC RETROGRADE CHOLANGIOPANCREATOGRAPHY AND STENT REMOVAL;  Surgeon: Christiano Sorenson MD;  Location:  OR     ESOPHAGOSCOPY, GASTROSCOPY, DUODENOSCOPY (EGD), COMBINED N/A 4/12/2018    Procedure: COMBINED ESOPHAGOSCOPY, GASTROSCOPY, DUODENOSCOPY (EGD);  EGD with stent  removal;  Surgeon: Christiano Sorenson MD;  Location:  GI     LAPAROSCOPIC CHOLECYSTECTOMY N/A 5/1/2015    Procedure: LAPAROSCOPIC CHOLECYSTECTOMY;  Surgeon: Damien Galindo MD;  Location:  SD     ORTHOPEDIC SURGERY      left elbow, right shoulder       Social History:   reports that he has never smoked. He has never used smokeless tobacco. He reports current alcohol use. He reports that he does not use drugs.    Family History:  Family History   Problem Relation Age of Onset     Hypertension Mother      Cancer - colorectal Mother      Skin Cancer Mother      Hypertension Father      Melanoma No family hx of        Medications:  Current Outpatient Medications   Medication Sig Dispense Refill     amLODIPine (NORVASC) 5 MG tablet Take 1 tablet (5 mg) by mouth daily 90 tablet 3     atenolol (TENORMIN) 50 MG tablet Take 1 tablet (50 mg) by mouth daily 90 tablet 3     omeprazole (PRILOSEC) 20 MG DR capsule Take 1 capsule (20 mg) by mouth daily 90 capsule 3     No Known Allergies                Again, thank you for allowing me to participate in the care of your patient.        Sincerely,        Perico Murrieta MD

## 2022-10-24 NOTE — PROGRESS NOTES
[facility] Dermatology Note    Encounter Date: Oct 24, 2022    Dermatology Problem List:    Impression/Plan:  Ronnie was seen today for derm problem.    Diagnoses and all orders for this visit:    Actinic keratosis  -     DESTRUCT PREMALIGNANT LESION, FIRST  -     DESTRUCT PREMALIGNANT LESION, 2-14  - ln2x7 face and scalp     Actinic skin damage  - discussed sunprotective behaviors, clothing, and the use of sunscreen    Lentigines  - benign        Cryotherapy procedure note: After verbal consent and discussion of risks and benefits including but no limited to dyspigmentation/scar, blister, and pain, 7 aks on face and scalp was(were) treated with 1-2mm freeze border for 2 cycles with liquid nitrogen. Post cryotherapy instructions were provided.       Follow-up in 1 year.       Staff Involved:  Staff Only    Perico Murrieta MD   of Dermatology  Department of Dermatology  [facility]      CC:   Chief Complaint   Patient presents with     Derm Problem     [facility]       History of Present Illness:  Mr. Ronnie Lagos is a 67 year old male who presents as a return patient.    Scaly spots on scalp he'd like examined     Labs:      Physical exam:  Vitals: There were no vitals taken for this visit.  GEN: well developed, well-nourished, in no acute distress, in a pleasant mood.     SKIN: Linares phototype 2  - Full skin, which includes the head/face, both arms, chest, back, abdomen,both legs, genitalia and/or groin buttocks, digits and/or nails, was examined.  - gritty pink papules on face  - Flat brown macules and patches in a sun exposed areas on face and extremities  - No other lesions of concern on areas examined.     Past Medical History:   Past Medical History:   Diagnosis Date     Abdominal pain      Basal cell carcinoma      Calculus of bile duct      Elevated liver enzymes      Essential hypertension, benign     abstracted 6/19/02      Gastro-oesophageal reflux disease      GERD (gastroesophageal reflux disease) 7/28/2008     Liver disease     elevated liver enzymes     Squamous cell carcinoma      Unspecified cerebral artery occlusion with cerebral infarction      Unspecified condition of brain     abstracted 6/19/02     Past Surgical History:   Procedure Laterality Date     ARTHRODESIS FOOT  2/5/2013    Procedure: ARTHRODESIS FOOT;  LEFT FIRST METATARSOPHALANGEAL JOINT ARTHRODESIS ;  Surgeon: Burton Loera DPM;  Location: [facility]     COLONOSCOPY N/A 12/7/2018    Procedure: COMBINED COLONOSCOPY, SINGLE OR MULTIPLE BIOPSY/POLYPECTOMY BY BIOPSY;  Surgeon: Blayne Mora MD;  Location:  GI     ENDOSCOPIC RETROGRADE CHOLANGIOPANCREATOGRAM N/A 1/18/2018    Procedure: COMBINED ENDOSCOPIC RETROGRADE CHOLANGIOPANCREATOGRAPHY, SPHINCTEROTOMY;  ENDOSCOPIC RETROGRADE CHOLANGIOPANCREATOGRAM (ERCP), SPHINCTEROTOMY, STONE REMOVAL, STENT PLACEMENT;  Surgeon: Christiano Sorenson MD;  Location:  OR     ENDOSCOPIC RETROGRADE CHOLANGIOPANCREATOGRAM N/A 4/12/2018    Procedure: ENDOSCOPIC RETROGRADE CHOLANGIOPANCREATOGRAM;  ENDOSCOPIC RETROGRADE CHOLANIOPANCREATOGRAPHY AND ATTEMPTED STENT REMOVAL.;  Surgeon: Christiano Sorenson MD;  Location:  OR     ENDOSCOPIC RETROGRADE CHOLANGIOPANCREATOGRAM N/A 5/10/2018    Procedure: COMBINED ENDOSCOPIC RETROGRADE CHOLANGIOPANCREATOGRAPHY, REMOVE FOREIGN BODY OR STENT/TUBE;  ENDOSCOPIC RETROGRADE CHOLANGIOPANCREATOGRAPHY AND STENT REMOVAL;  Surgeon: Christiano Sorenson MD;  Location:  OR     ESOPHAGOSCOPY, GASTROSCOPY, DUODENOSCOPY (EGD), COMBINED N/A 4/12/2018    Procedure: COMBINED ESOPHAGOSCOPY, GASTROSCOPY, DUODENOSCOPY (EGD);  EGD with stent removal;  Surgeon: Christiano Sorenson MD;  Location: [facility]     LAPAROSCOPIC CHOLECYSTECTOMY N/A 5/1/2015    Procedure: LAPAROSCOPIC CHOLECYSTECTOMY;  Surgeon: Damien Galindo MD;  Location: [facility]     ORTHOPEDIC SURGERY      left elbow, right  shoulder       Social History:   reports that he has never smoked. He has never used smokeless tobacco. He reports current alcohol use. He reports that he does not use drugs.    Family History:  Family History   Problem Relation Age of Onset     Hypertension Mother      Cancer - colorectal Mother      Skin Cancer Mother      Hypertension Father      Melanoma No family hx of        Medications:  Current Outpatient Medications   Medication Sig Dispense Refill     amLODIPine (NORVASC) 5 MG tablet Take 1 tablet (5 mg) by mouth daily 90 tablet 3     atenolol (TENORMIN) 50 MG tablet Take 1 tablet (50 mg) by mouth daily 90 tablet 3     omeprazole (PRILOSEC) 20 MG DR capsule Take 1 capsule (20 mg) by mouth daily 90 capsule 3     No Known Allergies

## 2023-05-11 ENCOUNTER — OFFICE VISIT (OUTPATIENT)
Dept: INTERNAL MEDICINE | Facility: CLINIC | Age: 68
End: 2023-05-11
Payer: COMMERCIAL

## 2023-05-11 VITALS
WEIGHT: 217.7 LBS | SYSTOLIC BLOOD PRESSURE: 136 MMHG | DIASTOLIC BLOOD PRESSURE: 74 MMHG | RESPIRATION RATE: 15 BRPM | BODY MASS INDEX: 32.24 KG/M2 | TEMPERATURE: 97.5 F | HEART RATE: 59 BPM | HEIGHT: 69 IN | OXYGEN SATURATION: 98 %

## 2023-05-11 DIAGNOSIS — Z12.11 COLON CANCER SCREENING: ICD-10-CM

## 2023-05-11 DIAGNOSIS — K21.9 GASTROESOPHAGEAL REFLUX DISEASE WITHOUT ESOPHAGITIS: ICD-10-CM

## 2023-05-11 DIAGNOSIS — Z13.6 CARDIOVASCULAR SCREENING; LDL GOAL LESS THAN 160: ICD-10-CM

## 2023-05-11 DIAGNOSIS — I10 ESSENTIAL HYPERTENSION, BENIGN: ICD-10-CM

## 2023-05-11 DIAGNOSIS — M25.512 ACUTE PAIN OF LEFT SHOULDER: ICD-10-CM

## 2023-05-11 DIAGNOSIS — Z23 NEED FOR DIPHTHERIA-TETANUS-PERTUSSIS (TDAP) VACCINE: ICD-10-CM

## 2023-05-11 DIAGNOSIS — Z12.5 SCREENING PSA (PROSTATE SPECIFIC ANTIGEN): ICD-10-CM

## 2023-05-11 DIAGNOSIS — Z00.00 ENCOUNTER FOR SUBSEQUENT ANNUAL WELLNESS VISIT IN MEDICARE PATIENT: Primary | ICD-10-CM

## 2023-05-11 DIAGNOSIS — Z13.6 ENCOUNTER FOR ABDOMINAL AORTIC ANEURYSM (AAA) SCREENING: ICD-10-CM

## 2023-05-11 LAB
ALBUMIN SERPL BCG-MCNC: 4.6 G/DL (ref 3.5–5.2)
ALP SERPL-CCNC: 55 U/L (ref 40–129)
ALT SERPL W P-5'-P-CCNC: 70 U/L (ref 10–50)
ANION GAP SERPL CALCULATED.3IONS-SCNC: 12 MMOL/L (ref 7–15)
AST SERPL W P-5'-P-CCNC: 70 U/L (ref 10–50)
BILIRUB SERPL-MCNC: 2.1 MG/DL
BUN SERPL-MCNC: 11.5 MG/DL (ref 8–23)
CALCIUM SERPL-MCNC: 9.6 MG/DL (ref 8.8–10.2)
CHLORIDE SERPL-SCNC: 100 MMOL/L (ref 98–107)
CHOLEST SERPL-MCNC: 179 MG/DL
CREAT SERPL-MCNC: 1.03 MG/DL (ref 0.67–1.17)
DEPRECATED HCO3 PLAS-SCNC: 26 MMOL/L (ref 22–29)
ERYTHROCYTE [DISTWIDTH] IN BLOOD BY AUTOMATED COUNT: 11.8 % (ref 10–15)
GFR SERPL CREATININE-BSD FRML MDRD: 80 ML/MIN/1.73M2
GLUCOSE SERPL-MCNC: 111 MG/DL (ref 70–99)
HCT VFR BLD AUTO: 47.5 % (ref 40–53)
HDLC SERPL-MCNC: 78 MG/DL
HGB BLD-MCNC: 17 G/DL (ref 13.3–17.7)
LDLC SERPL CALC-MCNC: 85 MG/DL
MCH RBC QN AUTO: 32.3 PG (ref 26.5–33)
MCHC RBC AUTO-ENTMCNC: 35.8 G/DL (ref 31.5–36.5)
MCV RBC AUTO: 90 FL (ref 78–100)
NONHDLC SERPL-MCNC: 101 MG/DL
PLATELET # BLD AUTO: 189 10E3/UL (ref 150–450)
POTASSIUM SERPL-SCNC: 4.4 MMOL/L (ref 3.4–5.3)
PROT SERPL-MCNC: 7.3 G/DL (ref 6.4–8.3)
PSA SERPL DL<=0.01 NG/ML-MCNC: 8.08 NG/ML (ref 0–4.5)
RBC # BLD AUTO: 5.26 10E6/UL (ref 4.4–5.9)
SODIUM SERPL-SCNC: 138 MMOL/L (ref 136–145)
TRIGL SERPL-MCNC: 81 MG/DL
WBC # BLD AUTO: 5.3 10E3/UL (ref 4–11)

## 2023-05-11 PROCEDURE — 99213 OFFICE O/P EST LOW 20 MIN: CPT | Mod: 25 | Performed by: INTERNAL MEDICINE

## 2023-05-11 PROCEDURE — 80061 LIPID PANEL: CPT | Performed by: INTERNAL MEDICINE

## 2023-05-11 PROCEDURE — G0438 PPPS, INITIAL VISIT: HCPCS | Performed by: INTERNAL MEDICINE

## 2023-05-11 PROCEDURE — 80053 COMPREHEN METABOLIC PANEL: CPT | Performed by: INTERNAL MEDICINE

## 2023-05-11 PROCEDURE — 85027 COMPLETE CBC AUTOMATED: CPT | Performed by: INTERNAL MEDICINE

## 2023-05-11 PROCEDURE — 36415 COLL VENOUS BLD VENIPUNCTURE: CPT | Performed by: INTERNAL MEDICINE

## 2023-05-11 PROCEDURE — G0103 PSA SCREENING: HCPCS | Performed by: INTERNAL MEDICINE

## 2023-05-11 RX ORDER — ATENOLOL 50 MG/1
50 TABLET ORAL DAILY
Qty: 90 TABLET | Refills: 3 | Status: SHIPPED | OUTPATIENT
Start: 2023-05-11 | End: 2024-05-17

## 2023-05-11 RX ORDER — AMLODIPINE BESYLATE 5 MG/1
5 TABLET ORAL DAILY
Qty: 90 TABLET | Refills: 3 | Status: SHIPPED | OUTPATIENT
Start: 2023-05-11 | End: 2024-05-17

## 2023-05-11 ASSESSMENT — ACTIVITIES OF DAILY LIVING (ADL): CURRENT_FUNCTION: NO ASSISTANCE NEEDED

## 2023-05-11 NOTE — PROGRESS NOTES
"SUBJECTIVE:   Ronnie is a 67 year old who presents for Preventive Visit.  Patient has been advised of split billing requirements and indicates understanding: Yes  Are you in the first 12 months of your Medicare coverage?  No    Healthy Habits:     In general, how would you rate your overall health?  Good    Frequency of exercise:  2-3 days/week    Duration of exercise:  15-30 minutes    Do you usually eat at least 4 servings of fruit and vegetables a day, include whole grains    & fiber and avoid regularly eating high fat or \"junk\" foods?  Yes    Taking medications regularly:  Yes    Medication side effects:  None    Ability to successfully perform activities of daily living:  No assistance needed    Home Safety:  No safety concerns identified    Hearing Impairment:  Difficulty following a conversation in a noisy restaurant or crowded room    In the past 6 months, have you been bothered by leaking of urine?  No    In general, how would you rate your overall mental or emotional health?  Good      PHQ-2 Total Score: 0    Additional concerns today:  Yes      Have you ever done Advance Care Planning? (For example, a Health Directive, POLST, or a discussion with a medical provider or your loved ones about your wishes): No, advance care planning information given to patient to review.  Advanced care planning was discussed at today's visit.       Fall risk  Fallen 2 or more times in the past year?: No  Any fall with injury in the past year?: No    Cognitive Screening   1) Repeat 3 items (Leader, Season, Table)    2) Clock draw: NORMAL  3) 3 item recall: Recalls 2 objects   Results: NORMAL clock, 1-2 items recalled: COGNITIVE IMPAIRMENT LESS LIKELY    Mini-CogTM Copyright WARNER Azar. Licensed by the author for use in [facility]; reprinted with permission (ray@.[facility]). All rights reserved.        Reviewed and updated as needed this visit by clinical staff                  Reviewed and updated as needed this visit " by Provider                 Social History     Tobacco Use     Smoking status: Never     Smokeless tobacco: Never   Vaping Use     Vaping status: Not on file   Substance Use Topics     Alcohol use: Yes     Comment: 1/day           5/11/2023    10:23 AM   Alcohol Use   Prescreen: >3 drinks/day or >7 drinks/week? Yes   AUDIT SCORE  7     Do you have a current opioid prescription? No  Do you use any other controlled substances or medications that are not prescribed by a provider? None      PROBLEMS TO ADD ON...  1. Left shoulder pain that radiates into upper arm, began last night after working in garden     Current providers sharing in care for this patient include:   Patient Care Team:  Abdiel Jones MD as PCP - General  Abdiel Jones MD as Assigned PCP  Burton Park MD as MD (Dermatology)  Perico Murrieta MD as MD (Dermatology)  Perico Murrieta MD as Assigned Surgical Provider    The following health maintenance items are reviewed in Epic and correct as of today:  Health Maintenance   Topic Date Due     Pneumococcal Vaccine: 65+ Years (1 - PCV) Never done     AORTIC ANEURYSM SCREENING (SYSTEM ASSIGNED)  06/09/2021     PHQ-2 (once per calendar year)  01/01/2023     DTAP/TDAP/TD IMMUNIZATION (2 - Td or Tdap) 01/24/2023     BMP  05/10/2023     ANNUAL REVIEW OF HM ORDERS  05/10/2023     FALL RISK ASSESSMENT  05/10/2023     MEDICARE ANNUAL WELLNESS VISIT  05/10/2023     COLORECTAL CANCER SCREENING  12/07/2023     LIPID  05/10/2027     ADVANCE CARE PLANNING  05/10/2027     HEPATITIS C SCREENING  Completed     INFLUENZA VACCINE  Completed     ZOSTER IMMUNIZATION  Completed     COVID-19 Vaccine  Completed     IPV IMMUNIZATION  Aged Out     MENINGITIS IMMUNIZATION  Aged Out     Immunization History   Administered Date(s) Administered     COVID-19 Bivalent 12+ (Pfizer) 09/21/2022     COVID-19 Monovalent 18+ (Moderna) 03/18/2021, 04/21/2021, 11/16/2021, 04/19/2022     Flu 65+ Years 10/28/2018     Influenza  "(IIV3) PF 11/01/2001, 01/11/2013, 09/30/2013, 11/02/2014, 01/08/2016, 10/01/2016, 10/01/2017, 09/11/2020     Influenza Vaccine 50-64 or 18-64 w/egg allergy (Flublok) 09/27/2019     Influenza Vaccine 65+ (Fluzone HD) 09/15/2021     TD,PF 7+ (Tenivac) 05/15/2001     TDAP Vaccine (Adacel) 01/24/2013     Zoster recombinant adjuvanted (SHINGRIX) 03/22/2019, 06/04/2019       Review of Systems  CONSTITUTIONAL: NEGATIVE for fever, chills, change in weight  EYES: NEGATIVE for vision changes or irritation  ENT/MOUTH: NEGATIVE for ear, mouth and throat problems  RESP: NEGATIVE for significant cough or SOB  CV: NEGATIVE for chest pain, palpitations or peripheral edema  GI: NEGATIVE for nausea, abdominal pain, heartburn, or change in bowel habits  : NEGATIVE for frequency, dysuria, or hematuria  NEURO: NEGATIVE for weakness, dizziness or paresthesias  HEME: NEGATIVE for bleeding problems  PSYCHIATRIC: NEGATIVE for changes in mood or affect    OBJECTIVE:   /74   Pulse 59   Temp 97.5  F (36.4  C) (Temporal)   Resp 15   Ht 1.753 m (5' 9\")   Wt 98.7 kg (217 lb 11.2 oz)   SpO2 98%   BMI 32.15 kg/m   Estimated body mass index is 32.15 kg/m  as calculated from the following:    Height as of this encounter: 1.753 m (5' 9\").    Weight as of this encounter: 98.7 kg (217 lb 11.2 oz).     Physical Exam  GENERAL: alert and no distress  EYES: Eyes grossly normal to inspection, PERRL and conjunctivae and sclerae normal  HENT: ear canals and TM's normal, nose and mouth without ulcers or lesions  NECK: no adenopathy, no asymmetry, masses, or scars and thyroid normal to palpation  RESP: lungs clear to auscultation - no rales, rhonchi or wheezes  CV: regular rate and rhythm, normal S1 S2, no S3 or S4, no murmur, click or rub  ABDOMEN: soft, nontender, no hepatosplenomegaly, no masses and bowel sounds normal  MS: Reduced range of motion to left shoulder with internal and external rotation  NEURO: No focal changes  PSYCH: mentation " appears normal, affect normal/bright    ASSESSMENT / PLAN:   (Z00.00) Encounter for subsequent annual wellness visit in Medicare patient  (primary encounter diagnosis)  Comment: Advised patient consider updating pneumococcal vaccine and tetanus booster when due.  We also discussed COVID boosters.  He will do these through his pharmacy  Plan: CBC with platelets, Comprehensive metabolic         panel, Lipid Profile            (I10) Essential hypertension, benign  Comment: Stable on therapy continue with current medical management.  Medication refill  Plan: amLODIPine (NORVASC) 5 MG tablet, atenolol         (TENORMIN) 50 MG tablet, Comprehensive         metabolic panel            (K21.9) Gastroesophageal reflux disease without esophagitis  Comment: Refilled PPI therapy for ongoing  Plan: omeprazole (PRILOSEC) 20 MG DR capsule            (Z13.6) CARDIOVASCULAR SCREENING; LDL GOAL LESS THAN 160  Comment: Labs ordered as fasting per routine  Plan: Lipid Profile            (Z23) Need for diphtheria-tetanus-pertussis (Tdap) vaccine  Comment: Advised updated booster as directed  Plan:     (Z12.11) Colon cancer screening  Comment: Discussed and reviewed colonoscopy which is due at the end of this year.  Patient will schedule as direct  Plan: Fecal colorectal cancer screen FIT            (Z12.5) Screening PSA (prostate specific antigen)  Comment: Did as routine screening.  Plan: Prostate Specific Antigen Screen            (Z13.6) Encounter for abdominal aortic aneurysm (AAA) screening  Comment: Ultrasound of abdominal aorta ordered  Plan: US Aorta Medicare AAA Screening            (M25.512) Acute pain of left shoulder  Comment: Discussed options available suggest physical therapy as well as orthopedic referral.  Suspect acute musculotendinous injury  Plan: OFFICE/OUTPT VISIT,EST,LEVL IV, Physical         Therapy Referral, Orthopedic  Referral              Patient has been advised of split billing requirements and  indicates understanding: Yes      COUNSELING:  Reviewed preventive health counseling, as reflected in patient instructions       Regular exercise       Healthy diet/nutrition        He reports that he has never smoked. He has never used smokeless tobacco.      Appropriate preventive services were discussed with this patient, including applicable screening as appropriate for cardiovascular disease, diabetes, osteopenia/osteoporosis, and glaucoma.  As appropriate for age/gender, discussed screening for colorectal cancer, prostate cancer, breast cancer, and cervical cancer. Checklist reviewing preventive services available has been given to the patient.    Reviewed patients plan of care and provided an AVS. The Basic Care Plan (routine screening as documented in Health Maintenance) for Ronnie meets the Care Plan requirement. This Care Plan has been established and reviewed with the Patient.    Abdiel Jones MD  [facility]

## 2023-05-11 NOTE — LETTER
May 16, 2023      Ronnie MENDOZA Yolis  [address] [facility] [address]        Dear ,    I am pleased to inform you that your basic panel including your routine electrolytes, sodium, potassium, calcium and kidney function test have returned normal.     Your blood sugar function tests are slightly abnormal and elevated and should be rechecked here in the clinic in 3 months with a follow-up visit with me, fasting.  I will look forward to seeing you at that time and please call to make an appointment.  In the meantime, please work on your diet limiting your carbohydrates and getting some good, regular exercise.     Your liver function tests are also slightly abnormal but stable and should be rechecked here in the clinic in 3 months with a follow-up visit with me.  I will look forward to seeing you at that time and please call to make an appointment.     Your cholesterol looks good and I would not change anything at this point but would repeat your labs in 12 months.     Your PSA test is elevated higher than what I would consider to be normal and I would ask you follow-up to discuss this further.       Resulted Orders   CBC with platelets   Result Value Ref Range    WBC Count 5.3 4.0 - 11.0 10e3/uL    RBC Count 5.26 4.40 - 5.90 10e6/uL    Hemoglobin 17.0 13.3 - 17.7 g/dL    Hematocrit 47.5 40.0 - 53.0 %    MCV 90 78 - 100 fL    MCH 32.3 26.5 - 33.0 pg    MCHC 35.8 31.5 - 36.5 g/dL    RDW 11.8 10.0 - 15.0 %    Platelet Count 189 150 - 450 10e3/uL   Comprehensive metabolic panel   Result Value Ref Range    Sodium 138 136 - 145 mmol/L    Potassium 4.4 3.4 - 5.3 mmol/L    Chloride 100 98 - 107 mmol/L    Carbon Dioxide (CO2) 26 22 - 29 mmol/L    Anion Gap 12 7 - 15 mmol/L    Urea Nitrogen 11.5 8.0 - 23.0 mg/dL    Creatinine 1.03 0.67 - 1.17 mg/dL    Calcium 9.6 8.8 - 10.2 mg/dL    Glucose 111 (H) 70 - 99 mg/dL    Alkaline Phosphatase 55 40 - 129 U/L    AST 70 (H) 10 - 50 U/L    ALT 70 (H) 10 - 50 U/L     Protein Total 7.3 6.4 - 8.3 g/dL    Albumin 4.6 3.5 - 5.2 g/dL    Bilirubin Total 2.1 (H) <=1.2 mg/dL    GFR Estimate 80 >60 mL/min/1.73m2      Comment:      eGFR calculated using 2021 CKD-EPI equation.   Lipid Profile   Result Value Ref Range    Cholesterol 179 <200 mg/dL    Triglycerides 81 <150 mg/dL    Direct Measure HDL 78 >=40 mg/dL    LDL Cholesterol Calculated 85 <=100 mg/dL    Non HDL Cholesterol 101 <130 mg/dL    Narrative    Cholesterol  Desirable:  <200 mg/dL    Triglycerides  Normal:  Less than 150 mg/dL  Borderline High:  150-199 mg/dL  High:  200-499 mg/dL  Very High:  Greater than or equal to 500 mg/dL    Direct Measure HDL  Female:  Greater than or equal to 50 mg/dL   Male:  Greater than or equal to 40 mg/dL    LDL Cholesterol  Desirable:  <100mg/dL  Above Desirable:  100-129 mg/dL   Borderline High:  130-159 mg/dL   High:  160-189 mg/dL   Very High:  >= 190 mg/dL    Non HDL Cholesterol  Desirable:  130 mg/dL  Above Desirable:  130-159 mg/dL  Borderline High:  160-189 mg/dL  High:  190-219 mg/dL  Very High:  Greater than or equal to 220 mg/dL   Prostate Specific Antigen Screen   Result Value Ref Range    Prostate Specific Antigen Screen 8.08 (H) 0.00 - 4.50 ng/mL    Narrative    This result is obtained using the Roche Elecsys total PSA method on the freda e801 immunoassay analyzer. Results obtained with different assay methods or kits cannot be used interchangeably.       If you have any questions or concerns, please call the clinic at the number listed above.       Sincerely,      Abdiel Jones MD

## 2023-05-22 ENCOUNTER — TELEPHONE (OUTPATIENT)
Dept: INTERNAL MEDICINE | Facility: CLINIC | Age: 68
End: 2023-05-22
Payer: COMMERCIAL

## 2023-05-22 DIAGNOSIS — R97.20 ELEVATED PROSTATE SPECIFIC ANTIGEN (PSA): Primary | ICD-10-CM

## 2023-05-22 NOTE — TELEPHONE ENCOUNTER
Called and discussed with patient issues in regards to lab work.  Liver function tests have remained stable.  Continuing to watch along with blood sugar and recheck in 6 months.  Noted change in PSA from baseline.  Suggest urology assessment and patient agreeable.  Referral sent

## 2023-05-24 ENCOUNTER — TRANSFERRED RECORDS (OUTPATIENT)
Dept: HEALTH INFORMATION MANAGEMENT | Facility: CLINIC | Age: 68
End: 2023-05-24

## 2023-06-01 NOTE — TELEPHONE ENCOUNTER
MEDICAL RECORDS REQUEST   [facility]  Urology Clinic  [address] [place] [address]  PHONE: [phone]  Fax: [phone]        FUTURE VISIT INFORMATION                                                   Mynor Miguel Yoder, : 1966 scheduled for future visit at [facility]    APPOINTMENT INFORMATION:    Date: 06/15/2023    Provider:  Sophia Ramirez MD    Reason for Visit/Diagnosis:  elevated PSA    REFERRAL INFORMATION:    Referring provider:  Abdiel Jones MD in  INTERNAL MEDICINE    RECORDS REQUESTED FOR VISIT                                                     NOTES  STATUS/DETAILS   OFFICE NOTE from referring provider  yes, 2023, 05/10/2022 -- Abdiel Jones MD in  INTERNAL MEDICINE   MEDICATION LIST  yes   LABS     PSA  yes     PRE-VISIT CHECKLIST      Joint diagnostic appointment coordinated correctly          (ensure right order & amount of time) Yes   RECORD COLLECTION COMPLETE Yes

## 2023-06-15 ENCOUNTER — OFFICE VISIT (OUTPATIENT)
Dept: ONCOLOGY | Facility: CLINIC | Age: 68
End: 2023-06-15
Attending: UROLOGY
Payer: COMMERCIAL

## 2023-06-15 ENCOUNTER — PRE VISIT (OUTPATIENT)
Dept: ONCOLOGY | Facility: CLINIC | Age: 68
End: 2023-06-15

## 2023-06-15 VITALS
DIASTOLIC BLOOD PRESSURE: 92 MMHG | OXYGEN SATURATION: 94 % | SYSTOLIC BLOOD PRESSURE: 193 MMHG | HEART RATE: 62 BPM | HEIGHT: 69 IN | RESPIRATION RATE: 16 BRPM | WEIGHT: 216.2 LBS | BODY MASS INDEX: 32.02 KG/M2 | TEMPERATURE: 96.9 F

## 2023-06-15 DIAGNOSIS — R97.20 ELEVATED PROSTATE SPECIFIC ANTIGEN (PSA): ICD-10-CM

## 2023-06-15 PROCEDURE — 99204 OFFICE O/P NEW MOD 45 MIN: CPT | Performed by: UROLOGY

## 2023-06-15 PROCEDURE — G0463 HOSPITAL OUTPT CLINIC VISIT: HCPCS | Performed by: UROLOGY

## 2023-06-15 ASSESSMENT — PAIN SCALES - GENERAL: PAINLEVEL: NO PAIN (0)

## 2023-06-15 NOTE — PROGRESS NOTES
"Oncology Rooming Note    Reyna 15, 2023 11:39 AM   Ronnie Lagos is a 68 year old male who presents for:    Chief Complaint   Patient presents with     Oncology Clinic Visit     Initial Vitals: Resp 16   Ht 1.753 m (5' 9\")   Wt 98.1 kg (216 lb 3.2 oz)   BMI 31.93 kg/m   Estimated body mass index is 31.93 kg/m  as calculated from the following:    Height as of this encounter: 1.753 m (5' 9\").    Weight as of this encounter: 98.1 kg (216 lb 3.2 oz). Body surface area is 2.19 meters squared.  No Pain (0) Comment: Data Unavailable   No LMP for male patient.  Allergies reviewed: Yes  Medications reviewed: Yes    Medications: Medication refills not needed today.  Pharmacy name entered into [employer]: [id number] - [facility] [address]    Clinical concerns: Darren Cesar                Chief Complaint:   Elevated PSA          Consult or Referral:     Mr. Ronnie Lagos is a 68 year old male seen in consultation from Dr. Jones.         History of Present Illness:    Ronnie Lagos is a very pleasant 68 year old male who presents with elevated PSA. He denies  lower urinary symptoms. He reports family history of prostate cancer in his father which was managed by prostatectomy which was apparently complicated by wound/abdominal infection requiring ex lap.  He eventually  of metastatic liver cancer.         Past Medical History:     Past Medical History:   Diagnosis Date     Abdominal pain      Basal cell carcinoma      Calculus of bile duct      Elevated liver enzymes      Essential hypertension, benign     abstracted 02     Gastro-oesophageal reflux disease      GERD (gastroesophageal reflux disease) 2008     Liver disease     elevated liver enzymes     Squamous cell carcinoma      Unspecified cerebral artery occlusion with cerebral infarction      Unspecified condition of brain     abstracted 02            Past Surgical History:     Past Surgical History: "   Procedure Laterality Date     ARTHRODESIS FOOT  2/5/2013    Procedure: ARTHRODESIS FOOT;  LEFT FIRST METATARSOPHALANGEAL JOINT ARTHRODESIS ;  Surgeon: Burton Loera DPM;  Location: [facility]     COLONOSCOPY N/A 12/7/2018    Procedure: COMBINED COLONOSCOPY, SINGLE OR MULTIPLE BIOPSY/POLYPECTOMY BY BIOPSY;  Surgeon: Blayne Mora MD;  Location: [facility]     ENDOSCOPIC RETROGRADE CHOLANGIOPANCREATOGRAM N/A 1/18/2018    Procedure: COMBINED ENDOSCOPIC RETROGRADE CHOLANGIOPANCREATOGRAPHY, SPHINCTEROTOMY;  ENDOSCOPIC RETROGRADE CHOLANGIOPANCREATOGRAM (ERCP), SPHINCTEROTOMY, STONE REMOVAL, STENT PLACEMENT;  Surgeon: Christiano Sorenson MD;  Location:  OR     ENDOSCOPIC RETROGRADE CHOLANGIOPANCREATOGRAM N/A 4/12/2018    Procedure: ENDOSCOPIC RETROGRADE CHOLANGIOPANCREATOGRAM;  ENDOSCOPIC RETROGRADE CHOLANIOPANCREATOGRAPHY AND ATTEMPTED STENT REMOVAL.;  Surgeon: Christiano Sorenson MD;  Location:  OR     ENDOSCOPIC RETROGRADE CHOLANGIOPANCREATOGRAM N/A 5/10/2018    Procedure: COMBINED ENDOSCOPIC RETROGRADE CHOLANGIOPANCREATOGRAPHY, REMOVE FOREIGN BODY OR STENT/TUBE;  ENDOSCOPIC RETROGRADE CHOLANGIOPANCREATOGRAPHY AND STENT REMOVAL;  Surgeon: Christiano Sorenson MD;  Location:  OR     ESOPHAGOSCOPY, GASTROSCOPY, DUODENOSCOPY (EGD), COMBINED N/A 4/12/2018    Procedure: COMBINED ESOPHAGOSCOPY, GASTROSCOPY, DUODENOSCOPY (EGD);  EGD with stent removal;  Surgeon: Christiano Sorenson MD;  Location: [facility]     LAPAROSCOPIC CHOLECYSTECTOMY N/A 5/1/2015    Procedure: LAPAROSCOPIC CHOLECYSTECTOMY;  Surgeon: Damien Galindo MD;  Location: [facility]     ORTHOPEDIC SURGERY      left elbow, right shoulder            Medications     Current Outpatient Medications   Medication     amLODIPine (NORVASC) 5 MG tablet     atenolol (TENORMIN) 50 MG tablet     omeprazole (PRILOSEC) 20 MG DR capsule     No current facility-administered medications for this visit.            Family History:     Family History   Problem  "Relation Age of Onset     Hypertension Mother      Cancer - colorectal Mother      Skin Cancer Mother      Hypertension Father      Melanoma No family hx of             Social History:     Social History     Socioeconomic History     Marital status:      Spouse name: Not on file     Number of children: Not on file     Years of education: Not on file     Highest education level: Not on file   Occupational History     Employer: [employer]   Tobacco Use     Smoking status: Never     Smokeless tobacco: Never   Vaping Use     Vaping status: Never Used   Substance and Sexual Activity     Alcohol use: Yes     Comment: 1/day     Drug use: No     Sexual activity: Yes     Partners: Female   Other Topics Concern     Parent/sibling w/ CABG, MI or angioplasty before 65F 55M? Not Asked   Social History Narrative     Not on file     Social Determinants of Health     Financial Resource Strain: Not on file   Food Insecurity: Not on file   Transportation Needs: Not on file   Physical Activity: Not on file   Stress: Not on file   Social Connections: Not on file   Intimate Partner Violence: Not At Risk (6/15/2023)    Humiliation, Afraid, Rape, and Kick questionnaire      Fear of Current or Ex-Partner: No      Emotionally Abused: No      Physically Abused: No      Sexually Abused: No   Housing Stability: Not on file            Allergies:   Patient has no known allergies.         Review of Systems     10 point ROS of systems including Constitutional, Eyes, Respiratory, Cardiovascular, Gastroenterology, Genitourinary, Integumentary, Muscularskeletal, Psychiatric were all negative except for pertinent positives noted in my HPI.          Physical Exam:     Patient is a 68 year old  male Body mass index is 31.93 kg/m .,  Vitals: Blood pressure (!) 193/92, pulse 62, temperature 96.9  F (36.1  C), temperature source Oral, resp. rate 16, height 1.753 m (5' 9\"), weight 98.1 kg (216 lb 3.2 oz), SpO2 94 %.  General " Appearance Adult: Alert, no acute distress, oriented  HENT: throat/mouth:normal, good dentition  Neck: No adenopathy,masses or thyromegaly  Lungs: no respiratory distress, or pursed lip breathing  Heart: No obvious jugular venous distension present  Abdomen: soft, nontender, no organomegaly or masses  Lymphatics: No cervical or supraclavicular adenopathy  Musculoskeltal: extremities normal, no peripheral edema  Skin: no suspicious lesions or rashes  Neuro: Alert, oriented, speech and mentation normal  Psych: affect and mood normal  Gait: Normal  : Deferred      Labs and Pathology:    I reviewed all applicable laboratory and pathology data and went over findings with patient  Significant for rising PSA    Lab Results   Component Value Date/Time    PSA 8.08 (H) 05/11/2023 10:39 AM    PSA 3.44 05/10/2022 11:35 AM    PSA 1.51 04/06/2021 09:23 AM    PSA 2.08 01/30/2020 08:31 AM    PSA 1.72 11/29/2018 07:42 AM    PSA 0.96 11/28/2017 07:42 AM    PSA 0.66 11/22/2016 07:16 AM    PSA 0.82 09/23/2015 09:03 AM    PSA 0.89 01/28/2013 08:28 AM    PSA 0.85 01/31/2012 12:15 PM    PSA 0.73 01/31/2011 08:17 AM    PSA 0.87 01/19/2010 07:41 AM               Imaging:    No relevant imaging to review today          Assessment and Plan:     Assessment:  68-year-old male with elevated PSA and family history of prostate cancer    We had a long discussion about the utility of PSA screening.  We talked about the Pros and Cons.  We discussed the findings of the PLCO and EORTC studies.  We discussed the results of the [place] trial of treatment vs. observation in clinically detected prostate cancer as well as the results of the PIVOT trial in the context of screen-detected cancer.  We discussed the recommendations by the AUA, and USPSTF. We also discussed the significant (2-6%) and rising risk of biopsy associated sepsis.      We also discussed the emerging role of MRI in the diagnosis of prostate cancer and the potential for performing  MRI-Ultrasound Fusion biopsy if suspicious lesions are noted. I would recommend a regular biopsy for him given if his MRI comes back unremarkable.      Plan:  Schedule for prostate MRI   Fusion versus regular prostate biopsy based on MRI results     45 total minutes spent on the date of the encounter including direct interaction with the patient, performing chart review, documentation and further activities as noted above.        Sophia Ramirez MD   Department of Urology   [facility]

## 2023-06-15 NOTE — LETTER
"    6/15/2023         RE: Ronnie Lagos  [address] [facility] [address]        Dear Colleague,    Thank you for referring your patient, Ronnie Lagos, to the [facility]. Please see a copy of my visit note below.    Oncology Rooming Note    Reyna 15, 2023 11:39 AM   Ronnie Lagos is a 68 year old male who presents for:    Chief Complaint   Patient presents with     Oncology Clinic Visit     Initial Vitals: Resp 16   Ht 1.753 m (5' 9\")   Wt 98.1 kg (216 lb 3.2 oz)   BMI 31.93 kg/m   Estimated body mass index is 31.93 kg/m  as calculated from the following:    Height as of this encounter: 1.753 m (5' 9\").    Weight as of this encounter: 98.1 kg (216 lb 3.2 oz). Body surface area is 2.19 meters squared.  No Pain (0) Comment: Data Unavailable   No LMP for male patient.  Allergies reviewed: Yes  Medications reviewed: Yes    Medications: Medication refills not needed today.  Pharmacy name entered into [employer] Text: [id number] - [facility] [address]    Clinical concerns: Darren Cesar                Chief Complaint:   Elevated PSA          Consult or Referral:     Mr. Ronnie Lagos is a 68 year old male seen in consultation from Dr. Jones.         History of Present Illness:    Ronnie Lagos is a very pleasant 68 year old male who presents with elevated PSA. He denies  lower urinary symptoms. He reports family history of prostate cancer in his father which was managed by prostatectomy which was apparently complicated by wound/abdominal infection requiring ex lap.  He eventually  of metastatic liver cancer.         Past Medical History:     Past Medical History:   Diagnosis Date     Abdominal pain      Basal cell carcinoma      Calculus of bile duct      Elevated liver enzymes      Essential hypertension, benign     abstracted 02     Gastro-oesophageal reflux disease      GERD (gastroesophageal reflux disease) 2008     " Liver disease     elevated liver enzymes     Squamous cell carcinoma      Unspecified cerebral artery occlusion with cerebral infarction      Unspecified condition of brain     abstracted 6/19/02            Past Surgical History:     Past Surgical History:   Procedure Laterality Date     ARTHRODESIS FOOT  2/5/2013    Procedure: ARTHRODESIS FOOT;  LEFT FIRST METATARSOPHALANGEAL JOINT ARTHRODESIS ;  Surgeon: Burton Loera DPM;  Location: [facility]     COLONOSCOPY N/A 12/7/2018    Procedure: COMBINED COLONOSCOPY, SINGLE OR MULTIPLE BIOPSY/POLYPECTOMY BY BIOPSY;  Surgeon: Blayne Mora MD;  Location:  GI     ENDOSCOPIC RETROGRADE CHOLANGIOPANCREATOGRAM N/A 1/18/2018    Procedure: COMBINED ENDOSCOPIC RETROGRADE CHOLANGIOPANCREATOGRAPHY, SPHINCTEROTOMY;  ENDOSCOPIC RETROGRADE CHOLANGIOPANCREATOGRAM (ERCP), SPHINCTEROTOMY, STONE REMOVAL, STENT PLACEMENT;  Surgeon: Christiano Sorenson MD;  Location:  OR     ENDOSCOPIC RETROGRADE CHOLANGIOPANCREATOGRAM N/A 4/12/2018    Procedure: ENDOSCOPIC RETROGRADE CHOLANGIOPANCREATOGRAM;  ENDOSCOPIC RETROGRADE CHOLANIOPANCREATOGRAPHY AND ATTEMPTED STENT REMOVAL.;  Surgeon: Christiano Sorenson MD;  Location:  OR     ENDOSCOPIC RETROGRADE CHOLANGIOPANCREATOGRAM N/A 5/10/2018    Procedure: COMBINED ENDOSCOPIC RETROGRADE CHOLANGIOPANCREATOGRAPHY, REMOVE FOREIGN BODY OR STENT/TUBE;  ENDOSCOPIC RETROGRADE CHOLANGIOPANCREATOGRAPHY AND STENT REMOVAL;  Surgeon: Christiano Sorenson MD;  Location:  OR     ESOPHAGOSCOPY, GASTROSCOPY, DUODENOSCOPY (EGD), COMBINED N/A 4/12/2018    Procedure: COMBINED ESOPHAGOSCOPY, GASTROSCOPY, DUODENOSCOPY (EGD);  EGD with stent removal;  Surgeon: Christiano Sorenson MD;  Location: [facility]     LAPAROSCOPIC CHOLECYSTECTOMY N/A 5/1/2015    Procedure: LAPAROSCOPIC CHOLECYSTECTOMY;  Surgeon: Damien Galindo MD;  Location: [facility]     ORTHOPEDIC SURGERY      left elbow, right shoulder            Medications     Current Outpatient  Medications   Medication     amLODIPine (NORVASC) 5 MG tablet     atenolol (TENORMIN) 50 MG tablet     omeprazole (PRILOSEC) 20 MG DR capsule     No current facility-administered medications for this visit.            Family History:     Family History   Problem Relation Age of Onset     Hypertension Mother      Cancer - colorectal Mother      Skin Cancer Mother      Hypertension Father      Melanoma No family hx of             Social History:     Social History     Socioeconomic History     Marital status:      Spouse name: Not on file     Number of children: Not on file     Years of education: Not on file     Highest education level: Not on file   Occupational History     Employer: [employer]   Tobacco Use     Smoking status: Never     Smokeless tobacco: Never   Vaping Use     Vaping status: Never Used   Substance and Sexual Activity     Alcohol use: Yes     Comment: 1/day     Drug use: No     Sexual activity: Yes     Partners: Female   Other Topics Concern     Parent/sibling w/ CABG, MI or angioplasty before 65F 55M? Not Asked   Social History Narrative     Not on file     Social Determinants of Health     Financial Resource Strain: Not on file   Food Insecurity: Not on file   Transportation Needs: Not on file   Physical Activity: Not on file   Stress: Not on file   Social Connections: Not on file   Intimate Partner Violence: Not At Risk (6/15/2023)    Humiliation, Afraid, Rape, and Kick questionnaire      Fear of Current or Ex-Partner: No      Emotionally Abused: No      Physically Abused: No      Sexually Abused: No   Housing Stability: Not on file            Allergies:   Patient has no known allergies.         Review of Systems     10 point ROS of systems including Constitutional, Eyes, Respiratory, Cardiovascular, Gastroenterology, Genitourinary, Integumentary, Muscularskeletal, Psychiatric were all negative except for pertinent positives noted in my HPI.          Physical Exam:  "    Patient is a 68 year old  male Body mass index is 31.93 kg/m .,  Vitals: Blood pressure (!) 193/92, pulse 62, temperature 96.9  F (36.1  C), temperature source Oral, resp. rate 16, height 1.753 m (5' 9\"), weight 98.1 kg (216 lb 3.2 oz), SpO2 94 %.  General Appearance Adult: Alert, no acute distress, oriented  HENT: throat/mouth:normal, good dentition  Neck: No adenopathy,masses or thyromegaly  Lungs: no respiratory distress, or pursed lip breathing  Heart: No obvious jugular venous distension present  Abdomen: soft, nontender, no organomegaly or masses  Lymphatics: No cervical or supraclavicular adenopathy  Musculoskeltal: extremities normal, no peripheral edema  Skin: no suspicious lesions or rashes  Neuro: Alert, oriented, speech and mentation normal  Psych: affect and mood normal  Gait: Normal  : Deferred      Labs and Pathology:    I reviewed all applicable laboratory and pathology data and went over findings with patient  Significant for rising PSA    Lab Results   Component Value Date/Time    PSA 8.08 (H) 05/11/2023 10:39 AM    PSA 3.44 05/10/2022 11:35 AM    PSA 1.51 04/06/2021 09:23 AM    PSA 2.08 01/30/2020 08:31 AM    PSA 1.72 11/29/2018 07:42 AM    PSA 0.96 11/28/2017 07:42 AM    PSA 0.66 11/22/2016 07:16 AM    PSA 0.82 09/23/2015 09:03 AM    PSA 0.89 01/28/2013 08:28 AM    PSA 0.85 01/31/2012 12:15 PM    PSA 0.73 01/31/2011 08:17 AM    PSA 0.87 01/19/2010 07:41 AM               Imaging:    No relevant imaging to review today          Assessment and Plan:     Assessment:  68-year-old male with elevated PSA and family history of prostate cancer    We had a long discussion about the utility of PSA screening.  We talked about the Pros and Cons.  We discussed the findings of the PLCO and EORTC studies.  We discussed the results of the [place] trial of treatment vs. observation in clinically detected prostate cancer as well as the results of the PIVOT trial in the context of screen-detected cancer.  " We discussed the recommendations by the AUA, and USPSTF. We also discussed the significant (2-6%) and rising risk of biopsy associated sepsis.      We also discussed the emerging role of MRI in the diagnosis of prostate cancer and the potential for performing MRI-Ultrasound Fusion biopsy if suspicious lesions are noted. I would recommend a regular biopsy for him given if his MRI comes back unremarkable.      Plan:  Schedule for prostate MRI   Fusion versus regular prostate biopsy based on MRI results     45 total minutes spent on the date of the encounter including direct interaction with the patient, performing chart review, documentation and further activities as noted above.        Sophia Ramirez MD   Department of Urology   [facility]         Again, thank you for allowing me to participate in the care of your patient.        Sincerely,        Sophia Ramirez MD

## 2023-06-29 ENCOUNTER — TRANSFERRED RECORDS (OUTPATIENT)
Dept: HEALTH INFORMATION MANAGEMENT | Facility: CLINIC | Age: 68
End: 2023-06-29
Payer: COMMERCIAL

## 2023-07-13 ENCOUNTER — PATIENT OUTREACH (OUTPATIENT)
Dept: ONCOLOGY | Facility: CLINIC | Age: 68
End: 2023-07-13
Payer: COMMERCIAL

## 2023-07-13 NOTE — PROGRESS NOTES
Patient calling to see if Dr. Ramirez has reviewed MR prostate done by [facility] [facility]. Writer informed patient report is in system but we were still waiting on images to be sent to [facility] system.     Writer reached out to [place] [facility] and these were pushed over at 9:11 am 7/13/23. Will update Dr. Ramirez to review for follow up recommendations.       Parish Barbosa, RN, BSN.  RN Care Coordinator     [facility]   [phone]

## 2023-07-14 NOTE — PROGRESS NOTES
Writer spoke with pt regarding MRI results and  recommendations moving forward. Writer explained to patient the lesion noted on the scan in the prostate carries a 60% risk of prostate cancer and will need to be biopsied to rule out cancer or confirm diagnosis. Patient understands results and will wait for scheduling to call to set up biopsy.     Pt had no further questions or concerns at this time. Will continue to follow as needed.     Parish Barbosa RN, BSN.  RN Care Coordinator     [facility]   [phone]

## 2023-08-22 ENCOUNTER — PRE VISIT (OUTPATIENT)
Dept: UROLOGY | Facility: CLINIC | Age: 68
End: 2023-08-22

## 2023-08-22 ENCOUNTER — HOSPITAL ENCOUNTER (INPATIENT)
Dept: GENERAL RADIOLOGY | Facility: CLINIC | Age: 68
Discharge: HOME | End: 2023-08-22
Attending: UROLOGY
Payer: COMMERCIAL

## 2023-08-22 DIAGNOSIS — R97.20 ELEVATED PROSTATE SPECIFIC ANTIGEN (PSA): Primary | ICD-10-CM

## 2023-08-22 DIAGNOSIS — R97.20 ELEVATED PROSTATE SPECIFIC ANTIGEN (PSA): ICD-10-CM

## 2023-08-22 PROCEDURE — 76377 3D RENDER W/INTRP POSTPROCES: CPT | Mod: 26 | Performed by: RADIOLOGY

## 2023-08-22 PROCEDURE — 72197 MRI PELVIS W/O & W/DYE: CPT | Mod: 26 | Performed by: RADIOLOGY

## 2023-08-22 RX ORDER — GENTAMICIN 40 MG/ML
80 INJECTION, SOLUTION INTRAMUSCULAR; INTRAVENOUS ONCE
Status: DISCONTINUED | OUTPATIENT
Start: 2023-08-30 | End: 2023-11-04

## 2023-08-22 RX ORDER — CIPROFLOXACIN 500 MG/1
500 TABLET, FILM COATED ORAL ONCE
Status: CANCELLED | OUTPATIENT
Start: 2023-08-22 | End: 2023-08-22

## 2023-08-22 RX ORDER — CIPROFLOXACIN 500 MG/1
500 TABLET, FILM COATED ORAL 2 TIMES DAILY
Qty: 6 TABLET | Refills: 0 | Status: SHIPPED | OUTPATIENT
Start: 2023-08-22 | End: 2023-08-25

## 2023-08-22 NOTE — CONFIDENTIAL NOTE
Reason for visit: fusion bx     Relevant information: elevated psa, pirads 4, 1 lesion     Records/imaging/labs/orders: MR outside read in process    At Rooming: verify procedure prep, give gent      Called patient to go over prep for biopsy including:    No blood thinning medications for 7 days before procedure, including aspirin, anticoagulants, ibuprofen and fish oil.     prophylactic antibiotics sent to primary pharmacy listed in chart:   -take the day before, the day of and the day after the procedure, one tablet, two times daily.    Complete a Fleets enema approximately 2 hours before the scheduled procedure.    Do not come to the appointment fasted.     Erendira Vargas  8/22/2023  10:13 AM

## 2023-08-24 DIAGNOSIS — R97.20 ELEVATED PROSTATE SPECIFIC ANTIGEN (PSA): ICD-10-CM

## 2023-08-28 ENCOUNTER — TELEPHONE (OUTPATIENT)
Dept: UROLOGY | Facility: CLINIC | Age: 68
End: 2023-08-28
Payer: COMMERCIAL

## 2023-08-28 NOTE — TELEPHONE ENCOUNTER
DATE: 07/01/2022    PREOPERATIVE DIAGNOSIS: breast cancer    POSTOPERATIVE DIAGNOSIS: same    PROCEDURE PERFORMED: right subclavian MediPort placement.     ATTENDING SURGEON: Beau Quiles    ANESTHESIA: Monitored anesthesia care plus local.     ESTIMATED BLOOD LOSS: 10 mL     FINDINGS: A right subclavian  port placed with tip at junction of superior   vena cava and right atrium.     SPECIMEN: None.     DRAINS: None.     COMPLICATIONS: None.        OPERATIVE PROCEDURE: The patient was identified in Preoperative Holding,   brought back to the Operating Room, and placed supine on the operating table   and padded appropriately. Monitors were applied. Monitored anesthesia care   was initiated. The left chest was prepped and draped in the standard sterile   surgical fashion. A timeout was performed and all team members present agreed   this was the correct procedure on the correct patient. We also confirmed   administration of appropriate preoperative antibiotics.     After administration of appropriate local anesthesia, the right subclavian vein was cannulated on the  with a hollow-bore needle. A guidewire was placed and confirmed to be in correct position by fluoroscopy. An appropriate area for the pocket was chosen, and the incision was anesthetized with lidocaine. A 4 cm transverse skin incision was made. Subcutaneous tissue was divided with Bovie electrocautery.  The   catheter was measured for length appropriately and cut. Additional local was   administered for the pocket for the port and then the port pocket was created   with a mixture of Bovie electrocautery and blunt dissection. The port was tunneled from the incision to the cannulation site with the tunneling device. The port was secured to the investing pectoral fascia with two 2-0 vicryl sutures. Under fluoroscopic guidance, the split sheath and dilator were placed over the guidewire, and the guidewire and dilator were then removed. The catheter was placed  Spoke to pt. Pt thought he was having an MRI done during biopsy. Discussed what to expect through the procedure and reassured no MRI is needed additional to what he had done. He states he has a sore left shoulder needing surgery and will not be able to tolerate laying on that side too long. Sending this update to the team so they are aware. Responded to the rest of pt's questions in regards to the procedure, prep, and post care. Pt verbalized understanding.  Will call clinic back as needed.     Maria Guadalupe Callahan RN MSN     down the split sheath and the sheath was split and peeled away. Fluoroscopy confirmed appropriate position of the catheter, which aspirated and flushed easily. Heparinized saline was instilled in the catheter. The skin incision was closed in layers with deep buried interrupted 3-0 Vicryl and  running subcuticular 4-0 Monocryl. The cannulation incision was closed with a buried interrupted 4-0 Monocryl stitch. A sterile dressing was applied. The patient was transported to the Recovery Room in stable condition. All sponge, instrument and needle counts were correct at the end of the procedure. I was present and scrubbed for the entire case.

## 2023-08-28 NOTE — TELEPHONE ENCOUNTER
Health Call Center    Phone Message    May a detailed message be left on voicemail: yes     Reason for Call: Other: Patient has prostate biopsy on Wednesday, 8/30/23.  Per patient, he would like to know how long the biopsy will take as he has a dentist appt at 1:30 p.m.  Patient has had a set back with his left shoulder and does not like to be inside the MRI machine.  Per patient, he might needs some meds to by in MRI machine w/his shoulder, he does not want to reschedule his appt.  Shoulder surgery is for 10/5/23     Action Taken: Message routed to:  [facility] Urology    Travel Screening: Not Applicable

## 2023-08-30 ENCOUNTER — OFFICE VISIT (OUTPATIENT)
Dept: UROLOGY | Facility: CLINIC | Age: 68
End: 2023-08-30
Payer: COMMERCIAL

## 2023-08-30 VITALS
HEART RATE: 92 BPM | SYSTOLIC BLOOD PRESSURE: 174 MMHG | WEIGHT: 212.8 LBS | DIASTOLIC BLOOD PRESSURE: 100 MMHG | HEIGHT: 69 IN | BODY MASS INDEX: 31.52 KG/M2

## 2023-08-30 DIAGNOSIS — R97.20 ELEVATED PROSTATE SPECIFIC ANTIGEN (PSA): Primary | ICD-10-CM

## 2023-08-30 PROCEDURE — G0416 PROSTATE BIOPSY, ANY MTHD: HCPCS | Mod: 26 | Performed by: PATHOLOGY

## 2023-08-30 PROCEDURE — 55700 PR BIOPSY OF PROSTATE,NEEDLE/PUNCH: CPT | Performed by: UROLOGY

## 2023-08-30 PROCEDURE — 76872 US TRANSRECTAL: CPT | Performed by: UROLOGY

## 2023-08-30 PROCEDURE — 76999 ECHO EXAMINATION PROCEDURE: CPT | Performed by: UROLOGY

## 2023-08-30 PROCEDURE — 88305 TISSUE EXAM BY PATHOLOGIST: CPT | Mod: TC | Performed by: UROLOGY

## 2023-08-30 ASSESSMENT — PAIN SCALES - GENERAL: PAINLEVEL: NO PAIN (0)

## 2023-08-30 NOTE — PROGRESS NOTES
PREPROCEDURE DIAGNOSIS: Elevated PSA   POSTPROCEDURE DIAGNOSIS: Elevated PSA  PROCEDURE: Transrectal ultrasound sizing and transrectal ultrasound guided prostatic needle biopsy, including MRI fusion targeting  for Ronnie Lagos who is a 68 year old male.   SURGEON: Sophia Ramirez MD   ANESTHESIA: 5 mL of 1% periprostatic block bilaterally   We previously obtained an MRI of the prostate and identified all PIRADS 3-5 lesions for targeting      DESCRIPTION OF PROCEDURE: The procedure, the outcome, the anesthesia, and the risks were discussed with the patient. Informed consent was obtained and signed and a timeout was completed prior to the procedure. Digital rectal examination was performed with the below findings noted. Anesthesia was administered as noted above and the transrectal ultrasound probe was inserted, sizing was performed, and the below findings were noted. 2 core biopsies were taken from each of the MRI targets, and 12 core biopsies were taken as described below. The probe was then withdrawn. Patient tolerated the procedure well.   FINDINGS: Digital rectal exam reveals normal prostate. US images were obtained and then fused with the MRI images.  The fused images were then used to guide the biopsy of the targeted lesions with 2 cores taken of each lesion.  We then performed random biopsies.  12 cores were taken with 6 on each side, base, mid and apex.      Sophia Ramirez MD   Department of Urology   [facility]

## 2023-08-30 NOTE — PATIENT INSTRUCTIONS
[place] [facility]  Precautions Following a Prostate Biopsy    There are four conditions that you should watch for after a prostate biopsy:    Excessive pain  Bleeding irregularities (passing numerous  dime sized  clots or if your urine looks like cranberry juice)  Fever of 100 degrees or more  If you are unable to urinate      If any of these occur, call the Urology Clinic during normal business hours (M-F, 8:00-4:30) at [phone].  If you experience a problem after normal business hours, call our 24-hour phone number at [phone] and ask for the Urology Resident on call to be paged.    If you experience any discomfort following the biopsy, you may take Tylenol.  DO NOT TAKE ASPIRIN unless specified by your physician.   If the discomfort becomes severe or uncontrolled by medication, contact the Urology Clinic or Urology Resident (after normal business hours).    Do not be alarmed if you have some blood in your stool, in your urine, or ejaculate (semen).  This occurrence is normal and may last up to three (3) or four (4) days, usually intermittently.  Blood in the ejaculate (semen) may last several weeks, up to about a dozen ejaculations.  The blood in your ejaculate may appear as brown streaks, blood tinged, and immediately following a biopsy, it may appear bright red.    If you run a fever above 100 degrees, call the Urology Clinic or Urology Resident (after normal business hours) immediately.  If you are unable to reach your physician or the Resident on call, go to the nearest emergency room.  Explain that you have had a transrectal biopsy of your prostate and what problems you are experiencing.      You should attempt to urinate following your biopsy before you leave the clinic.  If you are unable to urinate four (4) to six (6) hours after you leave the clinic, you will need to contact the Urology Clinic or the Resident on call.  If you are unable to reach your physician or the  Resident on call, go to the nearest emergency room.            If you have any questions or concerns after your biopsy, feel free to contact the Urology Clinic at [phone] during M-F, 8:00-5pm business hours.  If you need to speak with someone after normal business hours, call [phone] and ask for the Resident on call to be paged.

## 2023-08-30 NOTE — LETTER
8/30/2023       RE: Ronnie Lagos  [address]  [facility] [address]     Dear Colleague,    Thank you for referring your patient, Ronnie Lagos, to the [facility] [place] [facility]. Please see a copy of my visit note below.    PREPROCEDURE DIAGNOSIS: Elevated PSA   POSTPROCEDURE DIAGNOSIS: Elevated PSA  PROCEDURE: Transrectal ultrasound sizing and transrectal ultrasound guided prostatic needle biopsy, including MRI fusion targeting  for Ronnie Lagos who is a 68 year old male.   SURGEON: Sophia Ramirez MD   ANESTHESIA: 5 mL of 1% periprostatic block bilaterally   We previously obtained an MRI of the prostate and identified all PIRADS 3-5 lesions for targeting      DESCRIPTION OF PROCEDURE: The procedure, the outcome, the anesthesia, and the risks were discussed with the patient. Informed consent was obtained and signed and a timeout was completed prior to the procedure. Digital rectal examination was performed with the below findings noted. Anesthesia was administered as noted above and the transrectal ultrasound probe was inserted, sizing was performed, and the below findings were noted. 2 core biopsies were taken from each of the MRI targets, and 12 core biopsies were taken as described below. The probe was then withdrawn. Patient tolerated the procedure well.   FINDINGS: Digital rectal exam reveals normal prostate. US images were obtained and then fused with the MRI images.  The fused images were then used to guide the biopsy of the targeted lesions with 2 cores taken of each lesion.  We then performed random biopsies.  12 cores were taken with 6 on each side, base, mid and apex.      Sophia Ramirez MD   Department of Urology   [facility]

## 2023-08-30 NOTE — NURSING NOTE
"Chief Complaint   Patient presents with    Follow Up     Biopsy       Blood pressure (!) 174/100, pulse 92, height 1.753 m (5' 9\"), weight 96.5 kg (212 lb 12.8 oz). Body mass index is 31.43 kg/m .    Patient Active Problem List   Diagnosis    Essential hypertension, benign    Impotence of organic origin    Lumbago    Cervicalgia    CARDIOVASCULAR SCREENING; LDL GOAL LESS THAN 160    Neck pain    Advance care planning    Hallux limitus    Injury of extensor tendon of hand    Right hand pain    Osteoarthritis of finger of right hand    Alcohol use    Gastroesophageal reflux disease without esophagitis       No Known Allergies    Current Outpatient Medications   Medication Sig Dispense Refill    amLODIPine (NORVASC) 5 MG tablet Take 1 tablet (5 mg) by mouth daily 90 tablet 3    atenolol (TENORMIN) 50 MG tablet Take 1 tablet (50 mg) by mouth daily 90 tablet 3    omeprazole (PRILOSEC) 20 MG DR capsule Take 1 capsule (20 mg) by mouth daily 90 capsule 3       Social History     Tobacco Use    Smoking status: Never    Smokeless tobacco: Never   Vaping Use    Vaping Use: Never used   Substance Use Topics    Alcohol use: Yes     Comment: 1/day    Drug use: Elle Wynn  8/30/2023  11:15 AM     "

## 2023-08-31 LAB
PATH REPORT.COMMENTS IMP SPEC: ABNORMAL
PATH REPORT.COMMENTS IMP SPEC: YES
PATH REPORT.FINAL DX SPEC: ABNORMAL
PATH REPORT.GROSS SPEC: ABNORMAL
PATH REPORT.MICROSCOPIC SPEC OTHER STN: ABNORMAL
PATH REPORT.RELEVANT HX SPEC: ABNORMAL
PHOTO IMAGE: ABNORMAL

## 2023-09-04 RX ORDER — CIPROFLOXACIN 500 MG/1
500 TABLET, FILM COATED ORAL 2 TIMES DAILY
Qty: 6 TABLET | Refills: 0 | OUTPATIENT
Start: 2023-09-04 | End: 2023-09-07

## 2023-09-04 NOTE — TELEPHONE ENCOUNTER
Ciprofloxacin HCl Oral Tablet 500 MG    Take 1 tablet (500 mg) by mouth 2 times daily for 3 days   Last Written Prescription Date:  8/22/23- 8/25/23  Last Fill Quantity: 6,   # refills: 0  Last Office Visit : 8/30/23 ( biopsy)  Future Office visit:  9/6/23     Drug not active on patient's medication list. Medication was for  MRI Fusion Prostate Bx  prep.  BX done 8/30/23  Pharmacy contacted by phone/ LM

## 2023-09-06 ENCOUNTER — VIRTUAL VISIT (OUTPATIENT)
Dept: UROLOGY | Facility: CLINIC | Age: 68
End: 2023-09-06
Payer: COMMERCIAL

## 2023-09-06 ENCOUNTER — TELEPHONE (OUTPATIENT)
Dept: UROLOGY | Facility: CLINIC | Age: 68
End: 2023-09-06

## 2023-09-06 VITALS — WEIGHT: 212 LBS | BODY MASS INDEX: 31.4 KG/M2 | HEIGHT: 69 IN

## 2023-09-06 DIAGNOSIS — C61 PROSTATE CANCER (H): Primary | ICD-10-CM

## 2023-09-06 PROCEDURE — 99214 OFFICE O/P EST MOD 30 MIN: CPT | Mod: 93 | Performed by: UROLOGY

## 2023-09-06 RX ORDER — CEFAZOLIN SODIUM 2 G/50ML
2 SOLUTION INTRAVENOUS SEE ADMIN INSTRUCTIONS
Status: CANCELLED | OUTPATIENT
Start: 2023-09-06

## 2023-09-06 RX ORDER — HEPARIN SODIUM 5000 [USP'U]/.5ML
5000 INJECTION, SOLUTION INTRAVENOUS; SUBCUTANEOUS
Status: CANCELLED | OUTPATIENT
Start: 2023-09-06

## 2023-09-06 RX ORDER — CEFAZOLIN SODIUM 2 G/50ML
2 SOLUTION INTRAVENOUS
Status: CANCELLED | OUTPATIENT
Start: 2023-09-06

## 2023-09-06 ASSESSMENT — PAIN SCALES - GENERAL: PAINLEVEL: NO PAIN (0)

## 2023-09-06 NOTE — PROGRESS NOTES
Virtual Visit Details    Type of service:  Telephone Visit   Phone call duration: 40 minutes    Urology Clinic     HPI  Ronnie Lagos is a 68 year old male with newly diagnosed prostate cancer, here for follow-up after his prostate biopsy.      The patient denies any major issues after the biopsy     No changes to health, hospitalizations or new diagnoses in the interim    PHYSICAL EXAM    No vitals were obtained or physical exam was done today due to virtual visit.      Labs  Lab Results   Component Value Date    WBC 5.3 05/11/2023    WBC 6.0 01/12/2018     Lab Results   Component Value Date    RBC 5.26 05/11/2023    RBC 4.70 01/12/2018     Lab Results   Component Value Date    HGB 17.0 05/11/2023    HGB 16.7 04/06/2021     Lab Results   Component Value Date    HCT 47.5 05/11/2023    HCT 42.0 01/12/2018     No components found for: MCT  Lab Results   Component Value Date    MCV 90 05/11/2023    MCV 89 01/12/2018     Lab Results   Component Value Date    MCH 32.3 05/11/2023    MCH 31.9 01/12/2018     Lab Results   Component Value Date    MCHC 35.8 05/11/2023    MCHC 35.7 01/12/2018     Lab Results   Component Value Date    RDW 11.8 05/11/2023    RDW 12.2 01/12/2018     Lab Results   Component Value Date     05/11/2023     01/18/2018        Last Comprehensive Metabolic Panel:  Sodium   Date Value Ref Range Status   05/11/2023 138 136 - 145 mmol/L Final   04/06/2021 133 133 - 144 mmol/L Final     Potassium   Date Value Ref Range Status   05/11/2023 4.4 3.4 - 5.3 mmol/L Final   05/10/2022 4.6 3.4 - 5.3 mmol/L Final   04/06/2021 4.4 3.4 - 5.3 mmol/L Final     Chloride   Date Value Ref Range Status   05/11/2023 100 98 - 107 mmol/L Final   05/10/2022 99 94 - 109 mmol/L Final   04/06/2021 101 94 - 109 mmol/L Final     Carbon Dioxide   Date Value Ref Range Status   04/06/2021 31 20 - 32 mmol/L Final     Carbon Dioxide (CO2)   Date Value Ref Range Status   05/11/2023 26 22 - 29 mmol/L Final   05/10/2022 28 20  - 32 mmol/L Final     Anion Gap   Date Value Ref Range Status   05/11/2023 12 7 - 15 mmol/L Final   05/10/2022 4 3 - 14 mmol/L Final   04/06/2021 1 (L) 3 - 14 mmol/L Final     Glucose   Date Value Ref Range Status   05/11/2023 111 (H) 70 - 99 mg/dL Final   05/10/2022 112 (H) 70 - 99 mg/dL Final   04/06/2021 110 (H) 70 - 99 mg/dL Final     Urea Nitrogen   Date Value Ref Range Status   05/11/2023 11.5 8.0 - 23.0 mg/dL Final   05/10/2022 10 7 - 30 mg/dL Final   04/06/2021 8 7 - 30 mg/dL Final     Creatinine   Date Value Ref Range Status   05/11/2023 1.03 0.67 - 1.17 mg/dL Final   04/06/2021 0.92 0.66 - 1.25 mg/dL Final     GFR Estimate   Date Value Ref Range Status   05/11/2023 80 >60 mL/min/1.73m2 Final     Comment:     eGFR calculated using 2021 CKD-EPI equation.   04/06/2021 87 >60 mL/min/[1.73_m2] Final     Comment:     Non  GFR Calc  Starting 12/18/2018, serum creatinine based estimated GFR (eGFR) will be   calculated using the Chronic Kidney Disease Epidemiology Collaboration   (CKD-EPI) equation.       Calcium   Date Value Ref Range Status   05/11/2023 9.6 8.8 - 10.2 mg/dL Final   04/06/2021 9.3 8.5 - 10.1 mg/dL Final     Bilirubin Total   Date Value Ref Range Status   05/11/2023 2.1 (H) <=1.2 mg/dL Final   04/06/2021 2.3 (H) 0.2 - 1.3 mg/dL Final     Alkaline Phosphatase   Date Value Ref Range Status   05/11/2023 55 40 - 129 U/L Final   04/06/2021 60 40 - 150 U/L Final     ALT   Date Value Ref Range Status   05/11/2023 70 (H) 10 - 50 U/L Final   04/06/2021 70 0 - 70 U/L Final     AST   Date Value Ref Range Status   05/11/2023 70 (H) 10 - 50 U/L Final   04/06/2021 66 (H) 0 - 45 U/L Final       PSA   Date Value Ref Range Status   04/06/2021 1.51 0 - 4 ug/L Final     Comment:     Assay Method:  Chemiluminescence using Siemens Vista analyzer   01/30/2020 2.08 0 - 4 ug/L Final     Comment:     Assay Method:  Chemiluminescence using Siemens Vista analyzer   11/29/2018 1.72 0 - 4 ug/L Final      Comment:     Assay Method:  Chemiluminescence using Siemens Vista analyzer   11/28/2017 0.96 0 - 4 ug/L Final     Comment:     Assay Method:  Chemiluminescence using Siemens Vista analyzer   11/22/2016 0.66 0 - 4 ug/L Final     Comment:     Assay Method:  Chemiluminescence using Siemens Vista analyzer   09/23/2015 0.82 0 - 4 ug/L Final   01/28/2013 0.89 0 - 4 ug/L Final   01/31/2012 0.85 0 - 4 ug/L Final   01/31/2011 0.73 0 - 4 ug/L Final   01/19/2010 0.87 0 - 4 ug/L Final     Prostate Specific Antigen Screen   Date Value Ref Range Status   05/11/2023 8.08 (H) 0.00 - 4.50 ng/mL Final   05/10/2022 3.44 0.00 - 4.00 ug/L Final        Surgical pathology  Final Diagnosis   A(A). Target 1:  -Prostatic adenocarcinoma, grade group 2 ([place] score 3+4), pattern 4: 40%  Involving 2 of 2 cores, 70% of the tissue                                            B(B). Left base prostate:  -Prostatic adenocarcinoma, grade group 2 (Pia score 3+4), pattern 4: 30%  Involving 2 of 2 cores, 25% of the tissue     C(C). Left mid prostate:  -Prostatic adenocarcinoma, grade group 3 ([place] score 4+3), pattern 4: 60%  Involving 2 of 2 cores, discontinuously 80% of the tissue     D(D). Left apex prostate:  -Prostatic adenocarcinoma, grade group 2 (Pia score 3+4), pattern 4: 40%  Involving 2 of 2 cores, 80% of the tissue     E(E). Right base prostate:  -Prostatic adenocarcinoma, grade group 3 ([place] score 4+3), pattern 4: 70%  Involving 2 of 2 cores, 80% of the tissue     F(F). Right mid prostate:  -Prostatic adenocarcinoma, grade group 3 ([place] score 4+3), pattern 4: 60%  Involving 2 of 2 cores, 90% of the tissue     G(G). Right apex prostate:  -Prostatic adenocarcinoma, grade group 3 ([place]rey score 4+3), pattern 4: 60%  Involving 2 of 2 cores,  80% of the tissue     Imaging   MR prostate 8/22/2023     Size: 32 grams  Hemorrhage: Absent  Peripheral zone: Heterogeneous on T2-weighted images. Suspicious  lesions as detailed  below.  Transition zone: Enlarged with BPH changes. Transition zone nodules  which are circumscribed or mostly encapsulated without diffusion  restriction.  PI-RADS 2.  No highly suspicious nodules.     Lesion(s) in rank order of severity (highest score- to lowest score,  then by size)      Lesion 1:  Location: Right apex peripheral zone at the 6-8 o'clock position  relative to the urethra. Series 4 image 26.  Size: 14 mm  T2 description: Focal rounded T2 hypointensity  T2 numerical assessment: 4  DWI description: Markedly hypointense on ADC map and mildly  hyperintense on diffusion-weighted images  DWI numerical assessment: 3  DCE assessment: Positive    Prostate margin: Capsular abutment>15 mm with smooth contour  Lesion overall PI-RADS category: 4     Neurovascular bundles: No neurovascular bundle involvement by  malignancy.  Seminal vesicles: No seminal vesicle involvement by malignancy.  Lymph nodes: No lymph node involvement  Bones: No suspicious lesions. Small left hip joint effusion.  Other pelvic organs: No additional findings. Right gluteus medius  intramuscular lipoma.                                                                         IMPRESSION:  1. Based on the most suspicious abnormality, this exam is  characterized as PIRADS 4 - Clinically significant cancer is likely to  be present.  The most suspicious abnormality is located at the  posterior right apical peripheral zone and there is long segment  capsular abutment indicating moderate suspicion of minimal  extraprostatic extension.  2. No suspicious adenopathy or evidence of pelvic metastases.    ASSESSMENT AND PLAN  68 year old male with unfavorable intermediate risk prostate cancer     We had an extensive discussion about the significance of localized, prostate cancer. We discussed the options for treatment of a localized prostate cancer including active surveillance, brachytherapy, external beam radiation therapy, and surgical removal.       We discussed the relative merits of robotic assisted radical prostatectomy. We also discussed the advantages and disadvantages and roles of open surgery vs. laparoscopic (and Da Joey assisted) surgery. The anticipated post-operative course was explained, including an anticipated 1-2 day hospital stay. Catheter will remain in place 10-14 days.  The risks, benefits, alternatives, and personnel involved in the procedure were discussed. Specifically, we discussed that risks include but are not limited to anesthetic complications including stroke, MI, DVT/PE, as well as risk of bleeding, bowel injury, infection, lymphocele, urine leak and other potentially unforseen complications. Also discussed the risk of bladder neck contracture and other urinary symptoms after procedure. In addition, we discussed risk of urinary incontinence and erectile dysfunction following the procedure. Finally, we discussed risk for adverse pathologic features, including positive surgical margins and potential for post-surgical radiation, hormone ablation and long-term need for PSA monitoring,.  All questions were answered in detail.     We discussed that there was no clear evidence of advantage between surgery and radiation with regard to risk of recurrence. We discussed option for discussion with radiation oncology, but patient would prefer to defer this at this time.    Active surveillance could be offered to patients with intermediate-risk prostate cancer. However, there is a need for regular monitoring and the high possibility of discontinuation as a result of a higher rate of progression. Available data indicate that 5-year survival rates between intermediate- and low-risk patients do not differ but 10-year survival rates are worse (risk of progression, metastasis and death ~ 50% higher at 10 years).       We will also need bone scan to complete the staging       Plan   Bone scan for staging   Schedule for RALP   Patient to discuss  timing of his shoulder surgery with his wife and his orthopedic surgeon       30 total minutes spent on the date of the encounter including direct interaction with the patient, performing chart review, documentation and further activities as noted above    Sophia Ramirez MD   Department of Urology   [facility]

## 2023-09-06 NOTE — LETTER
9/6/2023       RE: Ronnie Lagos  [address]  [facility] [address]     Dear Colleague,    Thank you for referring your patient, Ronnie Lagos, to the [facility]OLOG[facility] [place] at [facility]. Please see a copy of my visit note below.    Virtual Visit Details    Type of service:  Telephone Visit   Phone call duration: 40 minutes    Urology Clinic     HPI  Ronnie Lagos is a 68 year old male with newly diagnosed prostate cancer, here for follow-up after his prostate biopsy.      The patient denies any major issues after the biopsy     No changes to health, hospitalizations or new diagnoses in the interim    PHYSICAL EXAM    No vitals were obtained or physical exam was done today due to virtual visit.      Labs  Lab Results   Component Value Date    WBC 5.3 05/11/2023    WBC 6.0 01/12/2018     Lab Results   Component Value Date    RBC 5.26 05/11/2023    RBC 4.70 01/12/2018     Lab Results   Component Value Date    HGB 17.0 05/11/2023    HGB 16.7 04/06/2021     Lab Results   Component Value Date    HCT 47.5 05/11/2023    HCT 42.0 01/12/2018     No components found for: MCT  Lab Results   Component Value Date    MCV 90 05/11/2023    MCV 89 01/12/2018     Lab Results   Component Value Date    MCH 32.3 05/11/2023    MCH 31.9 01/12/2018     Lab Results   Component Value Date    MCHC 35.8 05/11/2023    MCHC 35.7 01/12/2018     Lab Results   Component Value Date    RDW 11.8 05/11/2023    RDW 12.2 01/12/2018     Lab Results   Component Value Date     05/11/2023     01/18/2018        Last Comprehensive Metabolic Panel:  Sodium   Date Value Ref Range Status   05/11/2023 138 136 - 145 mmol/L Final   04/06/2021 133 133 - 144 mmol/L Final     Potassium   Date Value Ref Range Status   05/11/2023 4.4 3.4 - 5.3 mmol/L Final   05/10/2022 4.6 3.4 - 5.3 mmol/L Final   04/06/2021 4.4 3.4 - 5.3 mmol/L Final     Chloride   Date Value Ref Range  Status   05/11/2023 100 98 - 107 mmol/L Final   05/10/2022 99 94 - 109 mmol/L Final   04/06/2021 101 94 - 109 mmol/L Final     Carbon Dioxide   Date Value Ref Range Status   04/06/2021 31 20 - 32 mmol/L Final     Carbon Dioxide (CO2)   Date Value Ref Range Status   05/11/2023 26 22 - 29 mmol/L Final   05/10/2022 28 20 - 32 mmol/L Final     Anion Gap   Date Value Ref Range Status   05/11/2023 12 7 - 15 mmol/L Final   05/10/2022 4 3 - 14 mmol/L Final   04/06/2021 1 (L) 3 - 14 mmol/L Final     Glucose   Date Value Ref Range Status   05/11/2023 111 (H) 70 - 99 mg/dL Final   05/10/2022 112 (H) 70 - 99 mg/dL Final   04/06/2021 110 (H) 70 - 99 mg/dL Final     Urea Nitrogen   Date Value Ref Range Status   05/11/2023 11.5 8.0 - 23.0 mg/dL Final   05/10/2022 10 7 - 30 mg/dL Final   04/06/2021 8 7 - 30 mg/dL Final     Creatinine   Date Value Ref Range Status   05/11/2023 1.03 0.67 - 1.17 mg/dL Final   04/06/2021 0.92 0.66 - 1.25 mg/dL Final     GFR Estimate   Date Value Ref Range Status   05/11/2023 80 >60 mL/min/1.73m2 Final     Comment:     eGFR calculated using 2021 CKD-EPI equation.   04/06/2021 87 >60 mL/min/[1.73_m2] Final     Comment:     Non  GFR Calc  Starting 12/18/2018, serum creatinine based estimated GFR (eGFR) will be   calculated using the Chronic Kidney Disease Epidemiology Collaboration   (CKD-EPI) equation.       Calcium   Date Value Ref Range Status   05/11/2023 9.6 8.8 - 10.2 mg/dL Final   04/06/2021 9.3 8.5 - 10.1 mg/dL Final     Bilirubin Total   Date Value Ref Range Status   05/11/2023 2.1 (H) <=1.2 mg/dL Final   04/06/2021 2.3 (H) 0.2 - 1.3 mg/dL Final     Alkaline Phosphatase   Date Value Ref Range Status   05/11/2023 55 40 - 129 U/L Final   04/06/2021 60 40 - 150 U/L Final     ALT   Date Value Ref Range Status   05/11/2023 70 (H) 10 - 50 U/L Final   04/06/2021 70 0 - 70 U/L Final     AST   Date Value Ref Range Status   05/11/2023 70 (H) 10 - 50 U/L Final   04/06/2021 66 (H) 0 - 45 U/L  Final       PSA   Date Value Ref Range Status   04/06/2021 1.51 0 - 4 ug/L Final     Comment:     Assay Method:  Chemiluminescence using Siemens Vista analyzer   01/30/2020 2.08 0 - 4 ug/L Final     Comment:     Assay Method:  Chemiluminescence using Siemens Vista analyzer   11/29/2018 1.72 0 - 4 ug/L Final     Comment:     Assay Method:  Chemiluminescence using Siemens Vista analyzer   11/28/2017 0.96 0 - 4 ug/L Final     Comment:     Assay Method:  Chemiluminescence using Siemens Vista analyzer   11/22/2016 0.66 0 - 4 ug/L Final     Comment:     Assay Method:  Chemiluminescence using Siemens Vista analyzer   09/23/2015 0.82 0 - 4 ug/L Final   01/28/2013 0.89 0 - 4 ug/L Final   01/31/2012 0.85 0 - 4 ug/L Final   01/31/2011 0.73 0 - 4 ug/L Final   01/19/2010 0.87 0 - 4 ug/L Final     Prostate Specific Antigen Screen   Date Value Ref Range Status   05/11/2023 8.08 (H) 0.00 - 4.50 ng/mL Final   05/10/2022 3.44 0.00 - 4.00 ug/L Final        Surgical pathology  Final Diagnosis   A(A). Target 1:  -Prostatic adenocarcinoma, grade group 2 ([place] score 3+4), pattern 4: 40%  Involving 2 of 2 cores, 70% of the tissue                                            B(B). Left base prostate:  -Prostatic adenocarcinoma, grade group 2 ([place] score 3+4), pattern 4: 30%  Involving 2 of 2 cores, 25% of the tissue     C(C). Left mid prostate:  -Prostatic adenocarcinoma, grade group 3 ([place] score 4+3), pattern 4: 60%  Involving 2 of 2 cores, discontinuously 80% of the tissue     D(D). Left apex prostate:  -Prostatic adenocarcinoma, grade group 2 ([place] score 3+4), pattern 4: 40%  Involving 2 of 2 cores, 80% of the tissue     E(E). Right base prostate:  -Prostatic adenocarcinoma, grade group 3 ([place] score 4+3), pattern 4: 70%  Involving 2 of 2 cores, 80% of the tissue     F(F). Right mid prostate:  -Prostatic adenocarcinoma, grade group 3 ([place] score 4+3), pattern 4: 60%  Involving 2 of 2 cores, 90% of the tissue     G(G).  Right apex prostate:  -Prostatic adenocarcinoma, grade group 3 (Pia score 4+3), pattern 4: 60%  Involving 2 of 2 cores,  80% of the tissue     Imaging   MR prostate 8/22/2023     Size: 32 grams  Hemorrhage: Absent  Peripheral zone: Heterogeneous on T2-weighted images. Suspicious  lesions as detailed below.  Transition zone: Enlarged with BPH changes. Transition zone nodules  which are circumscribed or mostly encapsulated without diffusion  restriction.  PI-RADS 2.  No highly suspicious nodules.     Lesion(s) in rank order of severity (highest score- to lowest score,  then by size)      Lesion 1:  Location: Right apex peripheral zone at the 6-8 o'clock position  relative to the urethra. Series 4 image 26.  Size: 14 mm  T2 description: Focal rounded T2 hypointensity  T2 numerical assessment: 4  DWI description: Markedly hypointense on ADC map and mildly  hyperintense on diffusion-weighted images  DWI numerical assessment: 3  DCE assessment: Positive    Prostate margin: Capsular abutment>15 mm with smooth contour  Lesion overall PI-RADS category: 4     Neurovascular bundles: No neurovascular bundle involvement by  malignancy.  Seminal vesicles: No seminal vesicle involvement by malignancy.  Lymph nodes: No lymph node involvement  Bones: No suspicious lesions. Small left hip joint effusion.  Other pelvic organs: No additional findings. Right gluteus medius  intramuscular lipoma.                                                                         IMPRESSION:  1. Based on the most suspicious abnormality, this exam is  characterized as PIRADS 4 - Clinically significant cancer is likely to  be present.  The most suspicious abnormality is located at the  posterior right apical peripheral zone and there is long segment  capsular abutment indicating moderate suspicion of minimal  extraprostatic extension.  2. No suspicious adenopathy or evidence of pelvic metastases.    ASSESSMENT AND PLAN  68 year old male with  unfavorable intermediate risk prostate cancer     We had an extensive discussion about the significance of localized, prostate cancer. We discussed the options for treatment of a localized prostate cancer including active surveillance, brachytherapy, external beam radiation therapy, and surgical removal.      We discussed the relative merits of robotic assisted radical prostatectomy. We also discussed the advantages and disadvantages and roles of open surgery vs. laparoscopic (and Da Joey assisted) surgery. The anticipated post-operative course was explained, including an anticipated 1-2 day hospital stay. Catheter will remain in place 10-14 days.  The risks, benefits, alternatives, and personnel involved in the procedure were discussed. Specifically, we discussed that risks include but are not limited to anesthetic complications including stroke, MI, DVT/PE, as well as risk of bleeding, bowel injury, infection, lymphocele, urine leak and other potentially unforseen complications. Also discussed the risk of bladder neck contracture and other urinary symptoms after procedure. In addition, we discussed risk of urinary incontinence and erectile dysfunction following the procedure. Finally, we discussed risk for adverse pathologic features, including positive surgical margins and potential for post-surgical radiation, hormone ablation and long-term need for PSA monitoring,.  All questions were answered in detail.     We discussed that there was no clear evidence of advantage between surgery and radiation with regard to risk of recurrence. We discussed option for discussion with radiation oncology, but patient would prefer to defer this at this time.    Active surveillance could be offered to patients with intermediate-risk prostate cancer. However, there is a need for regular monitoring and the high possibility of discontinuation as a result of a higher rate of progression. Available data indicate that 5-year survival  rates between intermediate- and low-risk patients do not differ but 10-year survival rates are worse (risk of progression, metastasis and death ~ 50% higher at 10 years).       We will also need bone scan to complete the staging       Plan   Bone scan for staging   Schedule for RALP   Patient to discuss timing of his shoulder surgery with his wife and his orthopedic surgeon       30 total minutes spent on the date of the encounter including direct interaction with the patient, performing chart review, documentation and further activities as noted above    Sophia Ramirez MD   Department of Urology   [facility]

## 2023-09-06 NOTE — NURSING NOTE
Is the patient currently in the state of [place]? YES    Visit mode:TELEPHONE    If the visit is dropped, the patient can be reconnected by: VIDEO VISIT: Text to cell phone:   Telephone Information:   Mobile [phone]       Will anyone else be joining the visit? NO  (If patient encounters technical issues they should call [phone])    How would you like to obtain your AVS? MyChart    Are changes needed to the allergy or medication list? No    Reason for visit: Follow Up    Rebecca POLANCO

## 2023-09-06 NOTE — TELEPHONE ENCOUNTER
Called patient to schedule PROSTATECTOMY, ROBOT-ASSISTED, WITH PELVIC LYMPHADENECTOMY (N/A)  with Dr. Ramirez. Patient said he wants to talk to his wife before scheduling. Provided urology scheduling line for patient to call back tomorrow    Susanna Bahena Perioperative Coordinator 9/6/2023 at 3:45 PM

## 2023-09-07 ENCOUNTER — PATIENT OUTREACH (OUTPATIENT)
Dept: UROLOGY | Facility: CLINIC | Age: 68
End: 2023-09-07

## 2023-09-07 DIAGNOSIS — C61 PROSTATE CANCER (H): Primary | ICD-10-CM

## 2023-09-07 NOTE — TELEPHONE ENCOUNTER
Pt reached out to writer stating that he received a call from surgery scheduling.     Pt notes that he was supposed to have shoulder surgery on 10/5 but would like to have prostate surgery before shoulder surgery. Pt states that he has a call out to his ortho team for rescheduling. Writer informed him that with positioning during prostate surgery, it would be several months post shoulder surgery that he would be able to have urology procedure. Pt expressed understanding.     Pt will reach out once he knows a plan with his ortho team.     Will continue to follow as needed.

## 2023-09-07 NOTE — TELEPHONE ENCOUNTER
Scheduled surgery with Dr. Ramirez on 11/3    Spoke with: Patient    Surgery is located at [facility] OR    Patient will be seen for their H&P by:    P[facility] on 10/17 at 10am - Provided address & floor number. Patient is aware that they will receive their start time for surgery at this appointment    Anesthesia type: General      Requested Imaging required for surgery: Bone Scan before surgery - provided patient imaging phone number as he did not want to be transferred.    Patient is scheduled for their 10 days post op on 11/15 at 330pm    Patient will receive their packet via mail per their preference - Confirmed address on file is up to date    Additional comments: FAINA Bahena on 9/7/2023 at 9:23 AM

## 2023-09-08 NOTE — TELEPHONE ENCOUNTER
FUTURE VISIT INFORMATION      SURGERY INFORMATION:  Date: 11/3/23  Location:  or  Surgeon:  Sophia Ramirez MD   Anesthesia Type:  general  Procedure: PROSTATECTOMY, ROBOT-ASSISTED, WITH PELVIC LYMPHADENECTOMY   Consult: virtual visit 23    RECORDS REQUESTED FROM:       Primary Care Provider: Abdiel Jones MD - [facility]    Pertinent Medical History: hypertension    Most recent EKG+ [id number]

## 2023-09-13 NOTE — TELEPHONE ENCOUNTER
Patient previously had shoulder surgery scheduled prior to surgery with Dr. Ramirez however patient was informed that shoulder surgery needed to be rescheduled past his surgery with James on 11/03/23.  Patient has since rescheduled shoulder surgery 3 weeks following 11/03/23 and asked for a call back to confirm that timeframe will be okay with recovery.  Patient is also scheduled for a post op appointment with James on 11/15/23 at [facility] [place] and patient wants to see if possible to be seen for post op at [facility].    Eileen Cunha on 9/13/2023 at 10:38 AM

## 2023-09-14 ENCOUNTER — PATIENT OUTREACH (OUTPATIENT)
Dept: UROLOGY | Facility: CLINIC | Age: 68
End: 2023-09-14
Payer: COMMERCIAL

## 2023-09-14 NOTE — TELEPHONE ENCOUNTER
Writer reached out to inform pt that per provider, he is okay to have scheduled shoulder surgery 3 weeks post op from prostate surgery. Pt expressed understanding.     Pt also [place] [facility] [place] rescheduled appointment; pt agreeable to date and time.     Will continue to follow as needed.

## 2023-10-02 ENCOUNTER — HOSPITAL ENCOUNTER (OUTPATIENT)
Dept: NUCLEAR MEDICINE | Facility: CLINIC | Age: 68
Setting detail: NUCLEAR MEDICINE
Discharge: HOME | End: 2023-10-02
Attending: UROLOGY
Payer: COMMERCIAL

## 2023-10-02 DIAGNOSIS — C61 PROSTATE CANCER (H): ICD-10-CM

## 2023-10-02 PROCEDURE — 343N000001 HC RX 343: Performed by: UROLOGY

## 2023-10-02 PROCEDURE — 78306 BONE IMAGING WHOLE BODY: CPT

## 2023-10-02 PROCEDURE — A9561 TC99M OXIDRONATE: HCPCS | Performed by: UROLOGY

## 2023-10-02 RX ADMIN — Medication 26 MILLICURIE: at 09:57

## 2023-10-17 ENCOUNTER — PATIENT OUTREACH (OUTPATIENT)
Dept: UROLOGY | Facility: CLINIC | Age: 68
End: 2023-10-17
Payer: COMMERCIAL

## 2023-10-17 NOTE — TELEPHONE ENCOUNTER
Pt reached out to writer stating that he has his PAC visit scheduled for 10/19 and he woke up with a cold yesterday morning. Pt states he took a covid test that was negative.     Writer informed pt that he could keep the appt as scheduled but he should plan to wear a mask while in the clinic; pt expressed understanding. Pt notes that if he isn't showing any improvement by Thursday, he will reach out to the writer to assist with rescheduling PAC appt.     Will continue to follow as needed

## 2023-10-18 LAB
ABO/RH(D): NORMAL
ANTIBODY SCREEN: NEGATIVE
SPECIMEN EXPIRATION DATE: NORMAL

## 2023-10-19 ENCOUNTER — LAB (OUTPATIENT)
Dept: LAB | Facility: CLINIC | Age: 68
End: 2023-10-19
Payer: COMMERCIAL

## 2023-10-19 ENCOUNTER — OFFICE VISIT (OUTPATIENT)
Dept: SURGERY | Facility: CLINIC | Age: 68
End: 2023-10-19
Payer: COMMERCIAL

## 2023-10-19 ENCOUNTER — ANESTHESIA EVENT (OUTPATIENT)
Dept: SURGERY | Facility: CLINIC | Age: 68
End: 2023-10-19
Payer: COMMERCIAL

## 2023-10-19 ENCOUNTER — PRE VISIT (OUTPATIENT)
Dept: SURGERY | Facility: CLINIC | Age: 68
End: 2023-10-19

## 2023-10-19 VITALS
HEART RATE: 63 BPM | TEMPERATURE: 97.5 F | WEIGHT: 212 LBS | RESPIRATION RATE: 16 BRPM | BODY MASS INDEX: 31.4 KG/M2 | DIASTOLIC BLOOD PRESSURE: 84 MMHG | OXYGEN SATURATION: 96 % | SYSTOLIC BLOOD PRESSURE: 156 MMHG | HEIGHT: 69 IN

## 2023-10-19 DIAGNOSIS — Z01.818 PRE-OP EVALUATION: Primary | ICD-10-CM

## 2023-10-19 DIAGNOSIS — C61 PROSTATE CANCER (H): ICD-10-CM

## 2023-10-19 DIAGNOSIS — Z01.818 PRE-OP EVALUATION: ICD-10-CM

## 2023-10-19 LAB
ALBUMIN SERPL BCG-MCNC: 4.7 G/DL (ref 3.5–5.2)
ALP SERPL-CCNC: 72 U/L (ref 40–129)
ALT SERPL W P-5'-P-CCNC: 79 U/L (ref 0–70)
ANION GAP SERPL CALCULATED.3IONS-SCNC: 10 MMOL/L (ref 7–15)
AST SERPL W P-5'-P-CCNC: 71 U/L (ref 0–45)
BASO+EOS+MONOS # BLD AUTO: ABNORMAL 10*3/UL
BASO+EOS+MONOS NFR BLD AUTO: ABNORMAL %
BASOPHILS # BLD AUTO: 0.1 10E3/UL (ref 0–0.2)
BASOPHILS NFR BLD AUTO: 1 %
BILIRUB SERPL-MCNC: 2.1 MG/DL
BUN SERPL-MCNC: 10.1 MG/DL (ref 8–23)
CALCIUM SERPL-MCNC: 9.8 MG/DL (ref 8.8–10.2)
CHLORIDE SERPL-SCNC: 96 MMOL/L (ref 98–107)
CREAT SERPL-MCNC: 0.93 MG/DL (ref 0.67–1.17)
DEPRECATED HCO3 PLAS-SCNC: 28 MMOL/L (ref 22–29)
EGFRCR SERPLBLD CKD-EPI 2021: 90 ML/MIN/1.73M2
EOSINOPHIL # BLD AUTO: 0.3 10E3/UL (ref 0–0.7)
EOSINOPHIL NFR BLD AUTO: 4 %
ERYTHROCYTE [DISTWIDTH] IN BLOOD BY AUTOMATED COUNT: 11.6 % (ref 10–15)
GLUCOSE SERPL-MCNC: 107 MG/DL (ref 70–99)
HCT VFR BLD AUTO: 49.6 % (ref 40–53)
HGB BLD-MCNC: 17.8 G/DL (ref 13.3–17.7)
IMM GRANULOCYTES # BLD: 0 10E3/UL
IMM GRANULOCYTES NFR BLD: 0 %
LYMPHOCYTES # BLD AUTO: 1.2 10E3/UL (ref 0.8–5.3)
LYMPHOCYTES NFR BLD AUTO: 16 %
MCH RBC QN AUTO: 32.3 PG (ref 26.5–33)
MCHC RBC AUTO-ENTMCNC: 35.9 G/DL (ref 31.5–36.5)
MCV RBC AUTO: 90 FL (ref 78–100)
MONOCYTES # BLD AUTO: 0.8 10E3/UL (ref 0–1.3)
MONOCYTES NFR BLD AUTO: 10 %
NEUTROPHILS # BLD AUTO: 5.1 10E3/UL (ref 1.6–8.3)
NEUTROPHILS NFR BLD AUTO: 69 %
NRBC # BLD AUTO: 0 10E3/UL
NRBC BLD AUTO-RTO: 0 /100
PLATELET # BLD AUTO: 206 10E3/UL (ref 150–450)
POTASSIUM SERPL-SCNC: 4.6 MMOL/L (ref 3.4–5.3)
PROT SERPL-MCNC: 8 G/DL (ref 6.4–8.3)
RBC # BLD AUTO: 5.51 10E6/UL (ref 4.4–5.9)
SODIUM SERPL-SCNC: 134 MMOL/L (ref 135–145)
WBC # BLD AUTO: 7.4 10E3/UL (ref 4–11)

## 2023-10-19 PROCEDURE — 86901 BLOOD TYPING SEROLOGIC RH(D): CPT | Performed by: PHYSICIAN ASSISTANT

## 2023-10-19 PROCEDURE — 99000 SPECIMEN HANDLING OFFICE-LAB: CPT | Performed by: PATHOLOGY

## 2023-10-19 PROCEDURE — 80053 COMPREHEN METABOLIC PANEL: CPT | Performed by: PATHOLOGY

## 2023-10-19 PROCEDURE — 36415 COLL VENOUS BLD VENIPUNCTURE: CPT | Performed by: PATHOLOGY

## 2023-10-19 PROCEDURE — 87088 URINE BACTERIA CULTURE: CPT | Performed by: UROLOGY

## 2023-10-19 PROCEDURE — 85025 COMPLETE CBC W/AUTO DIFF WBC: CPT | Performed by: PATHOLOGY

## 2023-10-19 PROCEDURE — 99203 OFFICE O/P NEW LOW 30 MIN: CPT | Performed by: PHYSICIAN ASSISTANT

## 2023-10-19 PROCEDURE — 86850 RBC ANTIBODY SCREEN: CPT | Performed by: PHYSICIAN ASSISTANT

## 2023-10-19 ASSESSMENT — PAIN SCALES - GENERAL: PAINLEVEL: MILD PAIN (3)

## 2023-10-19 ASSESSMENT — LIFESTYLE VARIABLES: TOBACCO_USE: 0

## 2023-10-19 ASSESSMENT — ENCOUNTER SYMPTOMS: SEIZURES: 0

## 2023-10-19 NOTE — PATIENT INSTRUCTIONS
Name:  Ronnie Lagos   MRN:  [id number]   :  1955   Today's Date:  10/19/2023         You were seen today for a pre-operative assessment in the:    Pre-operative Anesthesia Assessment Center(PAC)  [facility]  [address] [place] [id number]  phone [phone]      You will be receiving a call with location, date, arrival time and diet instructions from Preadmission Nursing at your surgical site:    -[facility]: [phone]   -[facility]: [phone]  -[facility]: [phone]  -[facility]: [phone]  -[facility]: [phone]  -[facility]: [phone]        Anesthesia recommendations for medications:    Hold Aspirin for 7 days before procedure.  Hold Multivitamins for 7 days before procedure.   Hold Herbal medications and Supplements for 7 days before procedure.  Hold Ibuprofen for 1 day before procedure.   Hold Naproxen for 4 days before procedure.     No alcohol or cannabis products for 24 hours before your procedure     Please DO NOT take the following medications the day of procedure:  None    Please take these medications the day of procedure:  Amlodipine, Atenolol, Prilosec.         How do I prepare myself?  - Please take 2 showers (one the night prior to surgery and one the morning of surgery) using Scrubcare or Hibiclens soap.    Use this soap only from the neck to your toes.     Leave the soap on your skin for one minute--then rinse thoroughly.      You may use your own shampoo and conditioner. No other hair products.   - Please remove all jewelry and body piercings.  - No lotions, deodorants or fragrance.  - No makeup or fingernail polish.   - Bring your ID and insurance card.    -If you have a Deep Brain Stimulator, a Spinal Cord Stimulator, or any implanted Neuro Device, you must bring the remote to your appointment                 For further questions regarding your surgery please call your surgeon's  office.

## 2023-10-19 NOTE — H&P
Pre-Operative H & P     CC:  Preoperative exam to assess for increased cardiopulmonary risk while undergoing surgery and anesthesia.    Date of Encounter: 10/19/2023  Primary Care Physician:  Abdiel Jones     Reason for visit:   Encounter Diagnoses   Name Primary?    Pre-op evaluation Yes    Prostate cancer (H)        HPI  Ronnie Lagos is a 68 year old male who presents for pre-operative H & P in preparation for  Procedure Information       Case: [id number] Date/Time: 11/03/23 0730    Procedure: PROSTATECTOMY, ROBOT-ASSISTED, WITH PELVIC LYMPHADENECTOMY (Pelvis)    Anesthesia type: General    Diagnosis: Prostate cancer (H) [C61]    Pre-op diagnosis: Prostate cancer (H) [C61]    Location:  OR [address] OR    Providers: Sophia Ramirez MD            Patient is being evaluated for comorbid conditions of HTN, cerebral artery occlusion with infarction, obesity, GERD.    He was found to have an elevated PSA during his annual physical exam in May of this year. He completed a consultation with urology in June and a MRI of the prostate was obtained. This showed a nodule in the right posterolateral peripheral zone. He was then seen in follow up in August and underwent biopsy of the prostate. This was positive for prostate cancer. He was again seen by urology in September where treatment options were discussed and a plan was made to proceed with surgery as scheduled above.    History is obtained from the patient and chart review    Hx of abnormal bleeding or anti-platelet use: None      Past Medical History  Past Medical History:   Diagnosis Date    Abdominal pain     Basal cell carcinoma     Calculus of bile duct     Elevated liver enzymes     Essential hypertension, benign     abstracted 6/19/02    Gastro-oesophageal reflux disease     GERD (gastroesophageal reflux disease) 7/28/2008    Liver disease     elevated liver enzymes    Squamous cell carcinoma     Unspecified cerebral artery occlusion with cerebral infarction      Unspecified condition of brain     abstracted 6/19/02       Past Surgical History  Past Surgical History:   Procedure Laterality Date    ARTHRODESIS FOOT  2/5/2013    Procedure: ARTHRODESIS FOOT;  LEFT FIRST METATARSOPHALANGEAL JOINT ARTHRODESIS ;  Surgeon: Burton Loera DPM;  Location: [facility]    COLONOSCOPY N/A 12/7/2018    Procedure: COMBINED COLONOSCOPY, SINGLE OR MULTIPLE BIOPSY/POLYPECTOMY BY BIOPSY;  Surgeon: Blayne Mora MD;  Location:  GI    ENDOSCOPIC RETROGRADE CHOLANGIOPANCREATOGRAM N/A 1/18/2018    Procedure: COMBINED ENDOSCOPIC RETROGRADE CHOLANGIOPANCREATOGRAPHY, SPHINCTEROTOMY;  ENDOSCOPIC RETROGRADE CHOLANGIOPANCREATOGRAM (ERCP), SPHINCTEROTOMY, STONE REMOVAL, STENT PLACEMENT;  Surgeon: Christiano Sorenson MD;  Location:  OR    ENDOSCOPIC RETROGRADE CHOLANGIOPANCREATOGRAM N/A 4/12/2018    Procedure: ENDOSCOPIC RETROGRADE CHOLANGIOPANCREATOGRAM;  ENDOSCOPIC RETROGRADE CHOLANIOPANCREATOGRAPHY AND ATTEMPTED STENT REMOVAL.;  Surgeon: Christiano Sorenson MD;  Location:  OR    ENDOSCOPIC RETROGRADE CHOLANGIOPANCREATOGRAM N/A 5/10/2018    Procedure: COMBINED ENDOSCOPIC RETROGRADE CHOLANGIOPANCREATOGRAPHY, REMOVE FOREIGN BODY OR STENT/TUBE;  ENDOSCOPIC RETROGRADE CHOLANGIOPANCREATOGRAPHY AND STENT REMOVAL;  Surgeon: Christiano Sorenson MD;  Location:  OR    ESOPHAGOSCOPY, GASTROSCOPY, DUODENOSCOPY (EGD), COMBINED N/A 4/12/2018    Procedure: COMBINED ESOPHAGOSCOPY, GASTROSCOPY, DUODENOSCOPY (EGD);  EGD with stent removal;  Surgeon: Christiano Sorenson MD;  Location: [facility]    LAPAROSCOPIC CHOLECYSTECTOMY N/A 5/1/2015    Procedure: LAPAROSCOPIC CHOLECYSTECTOMY;  Surgeon: Damien Galindo MD;  Location: [facility]    ORTHOPEDIC SURGERY      left elbow, right shoulder       Prior to Admission Medications  Current Outpatient Medications   Medication Sig Dispense Refill    amLODIPine (NORVASC) 5 MG tablet Take 1 tablet (5 mg) by mouth daily (Patient taking differently: Take 5  mg by mouth every morning) 90 tablet 3    atenolol (TENORMIN) 50 MG tablet Take 1 tablet (50 mg) by mouth daily (Patient taking differently: Take 50 mg by mouth every morning) 90 tablet 3    melatonin 5 MG tablet Take 5 mg by mouth nightly as needed for sleep      omeprazole (PRILOSEC) 20 MG DR capsule Take 1 capsule (20 mg) by mouth daily (Patient taking differently: Take 20 mg by mouth every morning) 90 capsule 3       Allergies  No Known Allergies    Social History  Social History     Socioeconomic History    Marital status:      Spouse name: Not on file    Number of children: Not on file    Years of education: Not on file    Highest education level: Not on file   Occupational History     Employer: [employer]   Tobacco Use    Smoking status: Never    Smokeless tobacco: Never   Vaping Use    Vaping Use: Never used   Substance and Sexual Activity    Alcohol use: Yes     Alcohol/week: 2.0 standard drinks of alcohol     Types: 2 Standard drinks or equivalent per week     Comment: 3-4 times a week, light beer    Drug use: Never    Sexual activity: Yes     Partners: Female   Other Topics Concern    Parent/sibling w/ CABG, MI or angioplasty before 65F 55M? Not Asked   Social History Narrative    Not on file     Social Determinants of Health     Financial Resource Strain: Not on file   Food Insecurity: Not on file   Transportation Needs: Not on file   Physical Activity: Not on file   Stress: Not on file   Social Connections: Not on file   Interpersonal Safety: Not At Risk (6/15/2023)    Humiliation, Afraid, Rape, and Kick questionnaire     Fear of Current or Ex-Partner: No     Emotionally Abused: No     Physically Abused: No     Sexually Abused: No   Housing Stability: Not on file       Family History  Family History   Problem Relation Age of Onset    Hypertension Mother     Cancer - colorectal Mother     Skin Cancer Mother     Hypertension Father     Melanoma No family hx of     Anesthesia  Reaction No family hx of     Venous thrombosis No family hx of     Bleeding Disorder No family hx of        Review of Systems  The complete review of systems is negative other than noted in the HPI or here.   Anesthesia Evaluation   Pt has had prior anesthetic.     No history of anesthetic complications       ROS/MED HX  ENT/Pulmonary:     (+)     JESSE risk factors,  hypertension,  daytime somnolence,                    recent URI, resolved,      (-) tobacco use and asthma   Neurologic: Comment: Cerebral artery occlusion with infarction 1992, sports related injury    (+)    no peripheral neuropathy                         (-) no seizures and migraines   Cardiovascular: Comment: Denies chest pain/pressure, CASE, orthopnea, dizziness, palpitations, edema.      (+)  hypertension- -   -  - -                                 Previous cardiac testing   Echo: Date: Results:    Stress Test:  Date: 2008 Results:  Interpretation Summary   This was a normal stress echocardiogram.   The study was technically difficult.   This test indicates a low probability of severe occlusive coronary artery   disease.   Images in this study were not ideal, but there is no evidence of ischemia.     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 Able to ascend 2 flights of stairs. Does own yard work.    Hematologic:  - neg hematologic  ROS     Musculoskeletal: Comment: Bilateral shoulder pain  Arthritis in neck  History of toe fusion  (+)  arthritis,             GI/Hepatic: Comment: History of cholelithiasis s/p cholecystectomy  History of choledocholithiasis s/p ERCP    (+) GERD, Asymptomatic on medication,           liver disease (fatty liver disease),       Renal/Genitourinary:  - neg Renal ROS     Endo:     (+)               Obesity,    (-) Type II DM and thyroid disease   Psychiatric/Substance Use:     (+) psychiatric  "history other (comment) (Insomnia)       Infectious Disease:  - neg infectious disease ROS     Malignancy:   (+) Malignancy, History of Prostate and Skin.Prostate CA Active status post.  Skin CA Remission status post Surgery.      Other:  - neg other ROS          BP (!) 156/84 (BP Location: Right arm, Patient Position: Sitting, Cuff Size: Adult Large)   Pulse 63   Temp 97.5  F (36.4  C) (Oral)   Resp 16   Ht 1.753 m (5' 9\")   Wt 96.2 kg (212 lb)   SpO2 96%   BMI 31.31 kg/m      Physical Exam   Constitutional: Pleasant, well-appearing male, no apparent distress, and appears stated age.  Eyes: Pupils equal, round and reactive to light, extra ocular muscles intact, sclera clear, conjunctiva normal.  HENT: Normocephalic and atraumatic, oral pharynx with moist mucus membranes, poor dentition. No goiter appreciated.   Respiratory: Clear to auscultation bilaterally, no crackles or wheezing.  Cardiovascular: Regular rate and rhythm, normal S1 and S2, and no murmur noted.  Carotids +2, no bruits. No edema. Palpable pulses to radial  DP and PT arteries.   GI: Normal bowel sounds, soft, non-distended, non-tender, no masses palpated, no hepatosplenomegaly.  Lymph/Hematologic: No cervical lymphadenopathy and no supraclavicular lymphadenopathy.  Genitourinary:  Deferred  Skin: Warm and dry.  No rashes on exposed skin   Musculoskeletal: Limited ROM of neck. There is no redness, warmth, or swelling of the visible joints. Gross motor strength is normal.    Neurologic: Awake, alert, oriented to name, place and time. Cranial nerves II-XII are grossly intact. Gait is normal.   Neuropsychiatric: Calm, cooperative. Normal affect.       Prior Labs/Diagnostic Studies   All labs and imaging personally reviewed     EKG/ stress test - if available please see in ROS above   No results found.    The patient's records and results personally reviewed by this provider.     Outside records reviewed from: Care Everywhere    LAB/DIAGNOSTIC " STUDIES TODAY:  T&S    Assessment  Ronnie Lagos is a 68 year old male seen as a PAC referral for risk assessment and optimization for anesthesia.    Plan/Recommendations  Pt will be optimized for the proposed procedure.  See below for details on the assessment, risk, and preoperative recommendations    NEUROLOGY  - History of cerebral artery occlusion with infarction in 1992 after a sports injury    -Post Op delirium risk factors:  No risk identified    HEENT  - Patient has limited neck extension secondary to arthritis.  Airway will need to be reassessed day of surgery.  Mallampati: II  TM: > 3    CARDIAC  - No history of CAD and Afib  - Hypertension  Managed with amlodipine and atenolol. Blood pressure elevated at today's exam - initially 162/100 then 156/84 on recheck at the end of the visit.  Recommended checking over the next two weeks and if remains >160/90, instructed to follow up with PCP for further evaluation and treatment. Message sent to PCP to see if adjustments can be made to patient's BP medications prior to surgery.     [ADDENDUM] PAC RNCC spoke with patient this week, he did turn in his BP readings to his PCP but has not yet heard back about next steps. He did not make a copy of these records for himself but BP remains elevated around 150-160/70-80. With time sensitive nature of cancer surgery, would recommend proceeding as scheduled and having patient follow-up with PCP after surgery for further management.  Love Fuentes PA-C on 11/2/2023 at 2:15 PM    - METS (Metabolic Equivalents)  Patient performs 4 or more METS exercise without symptoms            Total Score: 0      RCRI-Low risk: Class 2 0.9% complication rate            Total Score: 1    RCRI: Cerebrovascular Disease         PULMONARY    JESSE Medium Risk            Total Score: 4    JESSE: Often tired    JESSE: Hypertension    JESES: Over 50 ys old    JESSE: Male      - Denies asthma or inhaler use  - Tobacco History    History   Smoking Status     "Never   Smokeless Tobacco    Never       GI  - GERD  Controlled on medications: Proton Pump Inhibitor  PONV Medium Risk  Total Score: 2           1 AN PONV: Patient is not a current smoker    1 AN PONV: Intended Post Op Opioids      - History of cholelithiasis s/p cholecystectomy  - History of choledocholithiasis s/p ERCP    /RENAL  - Baseline Creatinine  1.03    ENDOCRINE    - BMI: Estimated body mass index is 31.31 kg/m  as calculated from the following:    Height as of this encounter: 1.753 m (5' 9\").    Weight as of this encounter: 96.2 kg (212 lb).  Obesity (BMI >30)  - No history of Diabetes Mellitus    HEME  VTE High Risk 3%            Total Score: 8    VTE: Greater than 59 yrs old    VTE: Male    VTE: Current cancer      - No history of abnormal bleeding or antiplatelet use.  - A type and screen has been ordered for this patient    MSK  - Patient reports bilateral shoulder pain and neck pain. Recommend consideration for careful positioning to limit patient discomfort.      PSYCH  - Insomnia. PRN melatonin.     Different anesthesia methods/types have been discussed with the patient, but they are aware that the final plan will be decided by the assigned anesthesia provider on the date of service.    The patient is optimized for their procedure. AVS with information on surgery time/arrival time, meds and NPO status given by nursing staff. No further diagnostic testing indicated.      On the day of service:     Prep time: 10 minutes  Visit time: 15 minutes  Documentation time: 10 minutes  ------------------------------------------  Total time: 35 minutes      Love Fuentes PA-C  Preoperative Assessment Center  [facility]  Clinic and Surgery Center  Phone: [phone]  Fax: [phone]    "

## 2023-10-21 LAB — BACTERIA UR CULT: ABNORMAL

## 2023-10-22 DIAGNOSIS — N30.00 ACUTE CYSTITIS WITHOUT HEMATURIA: Primary | ICD-10-CM

## 2023-10-22 RX ORDER — NITROFURANTOIN 25; 75 MG/1; MG/1
100 CAPSULE ORAL 2 TIMES DAILY
Qty: 20 CAPSULE | Refills: 0 | Status: SHIPPED | OUTPATIENT
Start: 2023-10-22 | End: 2023-11-09

## 2023-10-23 ENCOUNTER — PATIENT OUTREACH (OUTPATIENT)
Dept: UROLOGY | Facility: CLINIC | Age: 68
End: 2023-10-23
Payer: COMMERCIAL

## 2023-10-23 DIAGNOSIS — N39.0 UTI (URINARY TRACT INFECTION): Primary | ICD-10-CM

## 2023-10-23 NOTE — TELEPHONE ENCOUNTER
Writer reached out to pt at the request of the provider to inform him of the need to start course of abx with a repeat UA 1 week prior to surgery.     Pt stated he was not aware of the antibiotics as he generally receives a phone call when they are ready; pt will follow up.     Labs for UA have been ordered. Pt will schedule at [facility] lab. Writer indicated that the repeat lab needs to be scheduled for 10/27.     Will continue to follow as needed

## 2023-10-27 ENCOUNTER — APPOINTMENT (OUTPATIENT)
Dept: LAB | Facility: CLINIC | Age: 68
End: 2023-10-27
Payer: COMMERCIAL

## 2023-10-27 LAB
ALBUMIN UR-MCNC: NEGATIVE MG/DL
APPEARANCE UR: CLEAR
BACTERIA #/AREA URNS HPF: ABNORMAL /HPF
BILIRUB UR QL STRIP: NEGATIVE
COLOR UR AUTO: YELLOW
GLUCOSE UR STRIP-MCNC: NEGATIVE MG/DL
HGB UR QL STRIP: NEGATIVE
KETONES UR STRIP-MCNC: NEGATIVE MG/DL
LEUKOCYTE ESTERASE UR QL STRIP: NEGATIVE
NITRATE UR QL: NEGATIVE
PH UR STRIP: 6 [PH] (ref 5–7)
RBC #/AREA URNS AUTO: ABNORMAL /HPF
SP GR UR STRIP: 1.01 (ref 1–1.03)
SQUAMOUS #/AREA URNS AUTO: ABNORMAL /LPF
UROBILINOGEN UR STRIP-ACNC: 0.2 E.U./DL
WBC #/AREA URNS AUTO: ABNORMAL /HPF

## 2023-10-27 PROCEDURE — 87086 URINE CULTURE/COLONY COUNT: CPT | Performed by: UROLOGY

## 2023-10-27 PROCEDURE — 81001 URINALYSIS AUTO W/SCOPE: CPT

## 2023-10-27 PROCEDURE — 99000 SPECIMEN HANDLING OFFICE-LAB: CPT | Performed by: PATHOLOGY

## 2023-10-28 LAB — BACTERIA UR CULT: NO GROWTH

## 2023-10-30 ENCOUNTER — OFFICE VISIT (OUTPATIENT)
Dept: DERMATOLOGY | Facility: CLINIC | Age: 68
End: 2023-10-30
Payer: COMMERCIAL

## 2023-10-30 DIAGNOSIS — L81.4 LENTIGINES: ICD-10-CM

## 2023-10-30 DIAGNOSIS — L57.8 ACTINIC SKIN DAMAGE: ICD-10-CM

## 2023-10-30 DIAGNOSIS — D22.9 MULTIPLE BENIGN NEVI: ICD-10-CM

## 2023-10-30 DIAGNOSIS — L57.0 ACTINIC KERATOSIS: Primary | ICD-10-CM

## 2023-10-30 PROCEDURE — 99214 OFFICE O/P EST MOD 30 MIN: CPT | Mod: 25 | Performed by: STUDENT IN AN ORGANIZED HEALTH CARE EDUCATION/TRAINING PROGRAM

## 2023-10-30 PROCEDURE — 17004 DESTROY PREMAL LESIONS 15/>: CPT | Performed by: STUDENT IN AN ORGANIZED HEALTH CARE EDUCATION/TRAINING PROGRAM

## 2023-10-30 RX ORDER — FLUOROURACIL 50 MG/G
CREAM TOPICAL 2 TIMES DAILY
Qty: 40 G | Refills: 3 | Status: SHIPPED | OUTPATIENT
Start: 2023-10-30 | End: 2024-04-29

## 2023-10-30 NOTE — PROGRESS NOTES
[facility] Dermatology Note    Encounter Date: Oct 30, 2023    Dermatology Problem List:    ______________________________________    Impression/Plan:  Ronnie was seen today for derm problem.    Diagnoses and all orders for this visit:    Actinic keratosis (chronic flaring)  -     fluorouracil (EFUDEX) 5 % external cream; Apply topically 2 times daily Using an amount sufficient to cover the affected areas on the scalp and temple  -     DESTRUCT PREMALIGNANT LESION, 15 OR MORE  -17 AK's on face and scalp frozen today  - After 1 week and start applying Efudex twice daily for total of 14 days  - Discussed proper use and side effects and the importance of sun protection during this treatment.    Multiple benign nevi  Lentigines  Actinic skin damage  - Reviewed the compounding benefits of incremental changes to sun protective clothing behaviors including increased frequency of sunscreen and sun protective clothing like broad brimmed hats and longsleeved UPF containing clothing        Cryotherapy procedure note: After verbal consent and discussion of risks and benefits including but no limited to dyspigmentation/scar, blister, and pain, 17 aks on face and scalp was(were) treated with 1-2mm freeze border for 2 cycles with liquid nitrogen. Post cryotherapy instructions were provided.     Follow-up in 6 mo .       Staff Involved:  Staff Only    Perico Murrieta MD   of Dermatology  Department of Dermatology  [facility]      CC:   Chief Complaint   Patient presents with    Derm Problem     Skin check        History of Present Illness:  Mr. Ronnie Lagos is a 68 year old male who presents as a return patient.    Pt presents today for concerns about spots on trunk and extremities. Has some scaly spots on hands and scalp     Labs:      Physical exam:  Vitals: There were no vitals taken for this visit.  GEN: well developed, well-nourished, in no acute distress,  in a pleasant mood.     SKIN: Linares phototype 1  - Full skin, which includes the head/face, both arms, chest, back, abdomen,both legs, genitalia and/or groin buttocks, digits and/or nails, was examined.  - Flat brown macules and patches in a sun exposed areas on face and extremities  - scattered brown papules on trunk and extremities   - gritty pink papules no scalp   - No other lesions of concern on areas examined.     Past Medical History:   Past Medical History:   Diagnosis Date    Abdominal pain     Basal cell carcinoma     Calculus of bile duct     Elevated liver enzymes     Essential hypertension, benign     abstracted 6/19/02    Gastro-oesophageal reflux disease     GERD (gastroesophageal reflux disease) 7/28/2008    Liver disease     elevated liver enzymes    Squamous cell carcinoma     Unspecified cerebral artery occlusion with cerebral infarction     Unspecified condition of brain     abstracted 6/19/02     Past Surgical History:   Procedure Laterality Date    ARTHRODESIS FOOT  2/5/2013    Procedure: ARTHRODESIS FOOT;  LEFT FIRST METATARSOPHALANGEAL JOINT ARTHRODESIS ;  Surgeon: Burton Loera DPM;  Location: [facility]    COLONOSCOPY N/A 12/7/2018    Procedure: COMBINED COLONOSCOPY, SINGLE OR MULTIPLE BIOPSY/POLYPECTOMY BY BIOPSY;  Surgeon: Blayne Mora MD;  Location:  GI    ENDOSCOPIC RETROGRADE CHOLANGIOPANCREATOGRAM N/A 1/18/2018    Procedure: COMBINED ENDOSCOPIC RETROGRADE CHOLANGIOPANCREATOGRAPHY, SPHINCTEROTOMY;  ENDOSCOPIC RETROGRADE CHOLANGIOPANCREATOGRAM (ERCP), SPHINCTEROTOMY, STONE REMOVAL, STENT PLACEMENT;  Surgeon: Christaino Sorenson MD;  Location:  OR    ENDOSCOPIC RETROGRADE CHOLANGIOPANCREATOGRAM N/A 4/12/2018    Procedure: ENDOSCOPIC RETROGRADE CHOLANGIOPANCREATOGRAM;  ENDOSCOPIC RETROGRADE CHOLANIOPANCREATOGRAPHY AND ATTEMPTED STENT REMOVAL.;  Surgeon: Christiano Sorenson MD;  Location:  OR    ENDOSCOPIC RETROGRADE CHOLANGIOPANCREATOGRAM N/A 5/10/2018     Procedure: COMBINED ENDOSCOPIC RETROGRADE CHOLANGIOPANCREATOGRAPHY, REMOVE FOREIGN BODY OR STENT/TUBE;  ENDOSCOPIC RETROGRADE CHOLANGIOPANCREATOGRAPHY AND STENT REMOVAL;  Surgeon: Christiano Sorenson MD;  Location:  OR    ESOPHAGOSCOPY, GASTROSCOPY, DUODENOSCOPY (EGD), COMBINED N/A 4/12/2018    Procedure: COMBINED ESOPHAGOSCOPY, GASTROSCOPY, DUODENOSCOPY (EGD);  EGD with stent removal;  Surgeon: Christiano Sorenson MD;  Location:  GI    LAPAROSCOPIC CHOLECYSTECTOMY N/A 5/1/2015    Procedure: LAPAROSCOPIC CHOLECYSTECTOMY;  Surgeon: Damien Galindo MD;  Location:  [facility]    ORTHOPEDIC SURGERY      left elbow, right shoulder       Social History:   reports that he has never smoked. He has never used smokeless tobacco. He reports current alcohol use of about 2.0 standard drinks of alcohol per week. He reports that he does not use drugs.    Family History:  Family History   Problem Relation Age of Onset    Hypertension Mother     Cancer - colorectal Mother     Skin Cancer Mother     Hypertension Father     Melanoma No family hx of     Anesthesia Reaction No family hx of     Venous thrombosis No family hx of     Bleeding Disorder No family hx of        Medications:  Current Outpatient Medications   Medication Sig Dispense Refill    amLODIPine (NORVASC) 5 MG tablet Take 1 tablet (5 mg) by mouth daily (Patient taking differently: Take 5 mg by mouth every morning) 90 tablet 3    atenolol (TENORMIN) 50 MG tablet Take 1 tablet (50 mg) by mouth daily (Patient taking differently: Take 50 mg by mouth every morning) 90 tablet 3    fluorouracil (EFUDEX) 5 % external cream Apply topically 2 times daily Using an amount sufficient to cover the affected areas on the scalp and temple 40 g 3    melatonin 5 MG tablet Take 5 mg by mouth nightly as needed for sleep      nitroFURantoin macrocrystal-monohydrate (MACROBID) 100 MG capsule Take 1 capsule (100 mg) by mouth 2 times daily 20 capsule 0    omeprazole (PRILOSEC) 20 MG  DR capsule Take 1 capsule (20 mg) by mouth daily (Patient taking differently: Take 20 mg by mouth every morning) 90 capsule 3     No Known Allergies

## 2023-10-30 NOTE — LETTER
10/30/2023         RE: Ronnie Lagos  [address]  [facility] [address]        Dear Colleague,    Thank you for referring your patient, Ronnie Lagos, to the [facility]. Please see a copy of my visit note below.    [facility] Dermatology Note    Encounter Date: Oct 30, 2023    Dermatology Problem List:    ______________________________________    Impression/Plan:  Ronnie was seen today for derm problem.    Diagnoses and all orders for this visit:    Actinic keratosis (chronic flaring)  -     fluorouracil (EFUDEX) 5 % external cream; Apply topically 2 times daily Using an amount sufficient to cover the affected areas on the scalp and temple  -     DESTRUCT PREMALIGNANT LESION, 15 OR MORE  -17 AK's on face and scalp frozen today  - After 1 week and start applying Efudex twice daily for total of 14 days  - Discussed proper use and side effects and the importance of sun protection during this treatment.    Multiple benign nevi  Lentigines  Actinic skin damage  - Reviewed the compounding benefits of incremental changes to sun protective clothing behaviors including increased frequency of sunscreen and sun protective clothing like broad brimmed hats and longsleeved UPF containing clothing        Cryotherapy procedure note: After verbal consent and discussion of risks and benefits including but no limited to dyspigmentation/scar, blister, and pain, 17 aks on face and scalp was(were) treated with 1-2mm freeze border for 2 cycles with liquid nitrogen. Post cryotherapy instructions were provided.     Follow-up in 6 mo .       Staff Involved:  Staff Only    Perico Murrieta MD   of Dermatology  Department of Dermatology  [facility]      CC:   Chief Complaint   Patient presents with     Derm Problem     Skin check        History of Present Illness:  Mr. Ronnie Lagos is a 68 year old male who presents as a  return patient.    Pt presents today for concerns about spots on trunk and extremities. Has some scaly spots on hands and scalp     Labs:      Physical exam:  Vitals: There were no vitals taken for this visit.  GEN: well developed, well-nourished, in no acute distress, in a pleasant mood.     SKIN: Linares phototype 1  - Full skin, which includes the head/face, both arms, chest, back, abdomen,both legs, genitalia and/or groin buttocks, digits and/or nails, was examined.  - Flat brown macules and patches in a sun exposed areas on face and extremities  - scattered brown papules on trunk and extremities   - gritty pink papules no scalp   - No other lesions of concern on areas examined.     Past Medical History:   Past Medical History:   Diagnosis Date     Abdominal pain      Basal cell carcinoma      Calculus of bile duct      Elevated liver enzymes      Essential hypertension, benign     abstracted 6/19/02     Gastro-oesophageal reflux disease      GERD (gastroesophageal reflux disease) 7/28/2008     Liver disease     elevated liver enzymes     Squamous cell carcinoma      Unspecified cerebral artery occlusion with cerebral infarction      Unspecified condition of brain     abstracted 6/19/02     Past Surgical History:   Procedure Laterality Date     ARTHRODESIS FOOT  2/5/2013    Procedure: ARTHRODESIS FOOT;  LEFT FIRST METATARSOPHALANGEAL JOINT ARTHRODESIS ;  Surgeon: Burton Loera DPM;  Location:  [facility]     COLONOSCOPY N/A 12/7/2018    Procedure: COMBINED COLONOSCOPY, SINGLE OR MULTIPLE BIOPSY/POLYPECTOMY BY BIOPSY;  Surgeon: Blayne Mora MD;  Location:  GI     ENDOSCOPIC RETROGRADE CHOLANGIOPANCREATOGRAM N/A 1/18/2018    Procedure: COMBINED ENDOSCOPIC RETROGRADE CHOLANGIOPANCREATOGRAPHY, SPHINCTEROTOMY;  ENDOSCOPIC RETROGRADE CHOLANGIOPANCREATOGRAM (ERCP), SPHINCTEROTOMY, STONE REMOVAL, STENT PLACEMENT;  Surgeon: Christiano Sorenson MD;  Location:  OR     ENDOSCOPIC RETROGRADE  CHOLANGIOPANCREATOGRAM N/A 4/12/2018    Procedure: ENDOSCOPIC RETROGRADE CHOLANGIOPANCREATOGRAM;  ENDOSCOPIC RETROGRADE CHOLANIOPANCREATOGRAPHY AND ATTEMPTED STENT REMOVAL.;  Surgeon: Christiano Sorenson MD;  Location:  OR     ENDOSCOPIC RETROGRADE CHOLANGIOPANCREATOGRAM N/A 5/10/2018    Procedure: COMBINED ENDOSCOPIC RETROGRADE CHOLANGIOPANCREATOGRAPHY, REMOVE FOREIGN BODY OR STENT/TUBE;  ENDOSCOPIC RETROGRADE CHOLANGIOPANCREATOGRAPHY AND STENT REMOVAL;  Surgeon: Christiano Sorenson MD;  Location:  OR     ESOPHAGOSCOPY, GASTROSCOPY, DUODENOSCOPY (EGD), COMBINED N/A 4/12/2018    Procedure: COMBINED ESOPHAGOSCOPY, GASTROSCOPY, DUODENOSCOPY (EGD);  EGD with stent removal;  Surgeon: Christiano Sorenson MD;  Location:  GI     LAPAROSCOPIC CHOLECYSTECTOMY N/A 5/1/2015    Procedure: LAPAROSCOPIC CHOLECYSTECTOMY;  Surgeon: Damien Galindo MD;  Location:  [facility]     ORTHOPEDIC SURGERY      left elbow, right shoulder       Social History:   reports that he has never smoked. He has never used smokeless tobacco. He reports current alcohol use of about 2.0 standard drinks of alcohol per week. He reports that he does not use drugs.    Family History:  Family History   Problem Relation Age of Onset     Hypertension Mother      Cancer - colorectal Mother      Skin Cancer Mother      Hypertension Father      Melanoma No family hx of      Anesthesia Reaction No family hx of      Venous thrombosis No family hx of      Bleeding Disorder No family hx of        Medications:  Current Outpatient Medications   Medication Sig Dispense Refill     amLODIPine (NORVASC) 5 MG tablet Take 1 tablet (5 mg) by mouth daily (Patient taking differently: Take 5 mg by mouth every morning) 90 tablet 3     atenolol (TENORMIN) 50 MG tablet Take 1 tablet (50 mg) by mouth daily (Patient taking differently: Take 50 mg by mouth every morning) 90 tablet 3     fluorouracil (EFUDEX) 5 % external cream Apply topically 2 times daily Using an  amount sufficient to cover the affected areas on the scalp and temple 40 g 3     melatonin 5 MG tablet Take 5 mg by mouth nightly as needed for sleep       nitroFURantoin macrocrystal-monohydrate (MACROBID) 100 MG capsule Take 1 capsule (100 mg) by mouth 2 times daily 20 capsule 0     omeprazole (PRILOSEC) 20 MG DR capsule Take 1 capsule (20 mg) by mouth daily (Patient taking differently: Take 20 mg by mouth every morning) 90 capsule 3     No Known Allergies              Again, thank you for allowing me to participate in the care of your patient.        Sincerely,        Perico Murrieta MD

## 2023-10-30 NOTE — PATIENT INSTRUCTIONS
Use efudex twice daily for 2 weeks. Strictly avoid sun exposure during this treatment period    5-Fluorouracil Topical Cream (Efudex , Carac , Fluoroplex )    What is the cream used for?  Sun damage and skin pre-cancers (actinic keratoses)  Specific types of skin cancers that are only in the top layer of the skin    How do I use this cream?  Use cream on skin as instructed.  Treatment usually lasts from two to four weeks for actinic keratoses; somewhat longer for specific skin cancers.  Do not use this cream inside the eyes, nose or mouth.  Apply a thin layer to affected area, gently rub in.  Wash your hands after using the cream.  You can use moisturizer and/or sunscreen two hours after you apply the cream.  Use a sunscreen with an SPF of at least 30 every day and wear protective clothing.    What should I avoid while using this cream?  Do not use if you are or may become pregnant.  Stay out of the sun as much as you can; do not use sunlamps or tanning beds.   Do not cover treated areas with a dressing.  Keep the cream away from children and pets.  It will harm them if eaten.     What can I expect when using this cream?  Skin irritation during and for up to two or more weeks after treatment is finished  Redness  Dryness  Burning  Open areas  Mild swelling  Regular use of a moisturizer will help with healing and improve irritation.  Do not use antibiotic ointments.    Who should I call with questions?  [facility]: [phone]   [facility]: [phone]  For urgent needs outside of business hours call the [facility] at [phone] and ask for the dermatology resident on call      CRYOTHERAPY    What is it?  Use of a very cold liquid, such as liquid nitrogen, to freeze and destroy abnormal skin cells that need to be removed    What should I expect?  Tenderness and redness  A small blister that might grow and fill with dark purple blood.  More  than one treatment may be needed if the lesions do not go away.    How do I care for the treated area?  Gently wash the area with your hands when bathing.  Use a thin layer of VaselineÆ to help with healing.   The area should heal within 7-10 days.  Do not use an antibiotic or NeosporinÆ ointment.   You may take acetaminophen (TylenolÆ) for pain.     Call your Doctor if you have:  Severe pain  Signs of infection (warmth, redness, cloudy yellow drainage, and or a bad smell)  Questions or concerns

## 2023-11-02 ENCOUNTER — TELEPHONE (OUTPATIENT)
Dept: INTERNAL MEDICINE | Facility: CLINIC | Age: 68
End: 2023-11-02
Payer: COMMERCIAL

## 2023-11-02 RX ORDER — IBUPROFEN 200 MG
200-400 TABLET ORAL EVERY 4 HOURS PRN
COMMUNITY
End: 2023-11-21

## 2023-11-02 NOTE — TELEPHONE ENCOUNTER
I have had no knowledge of the patient's upcoming surgical procedure as it appears a preoperative surgery assessment was already performed by another provider on October 19 thus usually issues in regards to blood pressure and preoperative recommendations are addressed at that time.  Suggest that questions in regards to preoperative management due to timing of surgery being tomorrow be directed to the provider who recently saw the patient as I have not seen the patient for about 6 months.

## 2023-11-02 NOTE — TELEPHONE ENCOUNTER
Louisa Jones please see reply from Love Fuentes below       Thanks for your message Bairon. Below is the communication Dr. Jones and I had two weeks ago regarding Ronnie's BP. My understanding is that the patient dropped off a record of his BP readings sometime last week. We typically do not make adjustments to patient's long term medications in PAC.    Thanks,  Love        RE: Hypertension, surgery 11/3  Received: 2 weeks ago  Abdiel Jones MD Bakke, Lvoe Hollins, TALON  I would be happy to see him at any time.  Perhaps you can touch base with him and have him record his blood pressures over the next 3 to 4 days and then report them to me to discuss medication options.    Dr. Jones       Previous Messages       I did find patients BP readings they were dropped off but not given to you and sent for scanning they can be found under the media tab from today 7:37am     Please advise

## 2023-11-02 NOTE — TELEPHONE ENCOUNTER
I have not seen patient for almost 6 months.  Obviously with his surgery being scheduled tomorrow it is challenging to try to make significant changes having not seen him in the clinic.  I did locate his blood pressures that were scanned into the chart and not reviewed.  Our only option at this point would probably be to have him start taking his amlodipine 5 mg tablets but dose 2 tablets in the a.m. instead of 1 for a total of 10 mg and continue with his atenolol.  He could also take an additional amlodipine today to start the 10 mg dosing.  Dependent on the time of his surgery in the morning he could dose these medications in the early a.m. prior to the procedure in order to help his blood pressure be under better control unfortunately I do not know the details of his surgery as the preop was not performed by me.  If this is not adequate to urology patient will have to set up an appointment and potentially delay his surgery.

## 2023-11-02 NOTE — TELEPHONE ENCOUNTER
"TO PCP    BEAN Estevez calling stating, \"He has a prostectomy surgery tomorrow. In order to give the final clearance, we are wondering if Dr. Littlejohn has a plan for the blood pressure. The patient dropped them off with the primary last week and we are just wondering if there is a plan before surgery?\" She states they did the pre-op in their office last week and noticed a high BP and asked patient to take for one week and report to PCP.    Please advise.     Lo Michel RN on 11/2/2023 at 9:36 AM    "

## 2023-11-02 NOTE — PROGRESS NOTES
PTA medications updated by Medication Scribe prior to surgery via phone call with patient (last doses completed by Nurse)     Medication history sources: Patient, Surescripts, and H&P  In the past week, patient estimated taking medication this percent of the time: Greater than 90%      Significant changes made to the medication list:  None      Additional medication history information:   None    Medication reconciliation completed by provider prior to medication history? No    Time spent in this activity: 28 minutes    The information provided in this note is only as accurate as the sources available at the time of update(s)      Prior to Admission medications    Medication Sig Last Dose Taking? Auth Provider Long Term End Date   amLODIPine (NORVASC) 5 MG tablet Take 1 tablet (5 mg) by mouth daily  Patient taking differently: Take 5 mg by mouth every morning  at AM Yes Abdiel Jones MD Yes    atenolol (TENORMIN) 50 MG tablet Take 1 tablet (50 mg) by mouth daily  at AM Yes Abdiel Jones MD Yes    ibuprofen (ADVIL/MOTRIN) 200 MG tablet Take 200-400 mg by mouth every 4 hours as needed for pain 10/29/2023 at prn Yes Reported, Patient     melatonin 5 MG tablet Take 5 mg by mouth nightly as needed for sleep 10/31/2023 at PM Yes Reported, Patient     nitroFURantoin macrocrystal-monohydrate (MACROBID) 100 MG capsule Take 1 capsule (100 mg) by mouth 2 times daily 11/2/2023 at PM Yes Sophia Ramirez MD     omeprazole (PRILOSEC) 20 MG DR capsule Take 1 capsule (20 mg) by mouth daily  at AM Yes Abdiel Jones MD     fluorouracil (EFUDEX) 5 % external cream Apply topically 2 times daily Using an amount sufficient to cover the affected areas on the scalp and temple has not started  Perico Murrieta MD

## 2023-11-02 NOTE — TELEPHONE ENCOUNTER
"Patient Contact    Attempt # 1    Was call answered?  Yes.  Pt verified name and date of birth.     Relayed provider message. Pt verbalizes understanding and agrees to plan of care, pt able to verbalize med dose change teach back correctly.  Pt reporting \"white coat syndrome\". Instructed pt to check BP prior to taking additional dose of amlodipine and contact the clinic with any questions. Pt will follow up with surgery next.   "

## 2023-11-03 ENCOUNTER — HOSPITAL ENCOUNTER (OUTPATIENT)
Facility: CLINIC | Age: 68
Discharge: HOME | End: 2023-11-04
Attending: UROLOGY | Admitting: UROLOGY
Payer: COMMERCIAL

## 2023-11-03 ENCOUNTER — ANESTHESIA (OUTPATIENT)
Dept: SURGERY | Facility: CLINIC | Age: 68
End: 2023-11-03
Payer: COMMERCIAL

## 2023-11-03 DIAGNOSIS — C61 PROSTATE CANCER (H): Primary | ICD-10-CM

## 2023-11-03 PROBLEM — Z98.890 POST-OPERATIVE STATE: Status: ACTIVE | Noted: 2023-11-03

## 2023-11-03 LAB
ABO/RH(D): NORMAL
ANTIBODY SCREEN: NEGATIVE
GLUCOSE SERPL-MCNC: 110 MG/DL (ref 70–99)
POTASSIUM SERPL-SCNC: 4 MMOL/L (ref 3.4–5.3)
SPECIMEN EXPIRATION DATE: NORMAL

## 2023-11-03 PROCEDURE — 258N000003 HC RX IP 258 OP 636: Performed by: NURSE ANESTHETIST, CERTIFIED REGISTERED

## 2023-11-03 PROCEDURE — 82947 ASSAY GLUCOSE BLOOD QUANT: CPT | Performed by: ANESTHESIOLOGY

## 2023-11-03 PROCEDURE — 38571 LAPAROSCOPY LYMPHADENECTOMY: CPT | Mod: GC | Performed by: UROLOGY

## 2023-11-03 PROCEDURE — 999N000141 HC STATISTIC PRE-PROCEDURE NURSING ASSESSMENT: Performed by: UROLOGY

## 2023-11-03 PROCEDURE — 88304 TISSUE EXAM BY PATHOLOGIST: CPT | Mod: TC | Performed by: UROLOGY

## 2023-11-03 PROCEDURE — 360N000080 HC SURGERY LEVEL 7, PER MIN: Performed by: UROLOGY

## 2023-11-03 PROCEDURE — 258N000001 HC RX 258: Performed by: UROLOGY

## 2023-11-03 PROCEDURE — 250N000013 HC RX MED GY IP 250 OP 250 PS 637: Performed by: STUDENT IN AN ORGANIZED HEALTH CARE EDUCATION/TRAINING PROGRAM

## 2023-11-03 PROCEDURE — 250N000013 HC RX MED GY IP 250 OP 250 PS 637: Performed by: UROLOGY

## 2023-11-03 PROCEDURE — 55866 LAPS SURG PRST8ECT RPBIC RAD: CPT | Mod: GC | Performed by: UROLOGY

## 2023-11-03 PROCEDURE — 370N000017 HC ANESTHESIA TECHNICAL FEE, PER MIN: Performed by: UROLOGY

## 2023-11-03 PROCEDURE — 258N000003 HC RX IP 258 OP 636: Performed by: ANESTHESIOLOGY

## 2023-11-03 PROCEDURE — 250N000011 HC RX IP 250 OP 636: Mod: JZ | Performed by: UROLOGY

## 2023-11-03 PROCEDURE — 84132 ASSAY OF SERUM POTASSIUM: CPT | Performed by: ANESTHESIOLOGY

## 2023-11-03 PROCEDURE — 36415 COLL VENOUS BLD VENIPUNCTURE: CPT | Performed by: ANESTHESIOLOGY

## 2023-11-03 PROCEDURE — 250N000009 HC RX 250: Performed by: UROLOGY

## 2023-11-03 PROCEDURE — 250N000011 HC RX IP 250 OP 636: Mod: JZ | Performed by: NURSE ANESTHETIST, CERTIFIED REGISTERED

## 2023-11-03 PROCEDURE — 250N000013 HC RX MED GY IP 250 OP 250 PS 637: Performed by: ANESTHESIOLOGY

## 2023-11-03 PROCEDURE — 272N000001 HC OR GENERAL SUPPLY STERILE: Performed by: UROLOGY

## 2023-11-03 PROCEDURE — 250N000009 HC RX 250: Performed by: NURSE ANESTHETIST, CERTIFIED REGISTERED

## 2023-11-03 PROCEDURE — 250N000011 HC RX IP 250 OP 636: Performed by: UROLOGY

## 2023-11-03 PROCEDURE — 258N000003 HC RX IP 258 OP 636: Performed by: UROLOGY

## 2023-11-03 PROCEDURE — 710N000009 HC RECOVERY PHASE 1, LEVEL 1, PER MIN: Performed by: UROLOGY

## 2023-11-03 PROCEDURE — 250N000011 HC RX IP 250 OP 636: Mod: JZ | Performed by: ANESTHESIOLOGY

## 2023-11-03 PROCEDURE — 86901 BLOOD TYPING SEROLOGIC RH(D): CPT | Performed by: ANESTHESIOLOGY

## 2023-11-03 RX ORDER — NALOXONE HYDROCHLORIDE 0.4 MG/ML
0.4 INJECTION, SOLUTION INTRAMUSCULAR; INTRAVENOUS; SUBCUTANEOUS
Status: DISCONTINUED | OUTPATIENT
Start: 2023-11-03 | End: 2023-11-04 | Stop reason: HOSPADM

## 2023-11-03 RX ORDER — PANTOPRAZOLE SODIUM 40 MG/1
40 TABLET, DELAYED RELEASE ORAL DAILY
Status: DISCONTINUED | OUTPATIENT
Start: 2023-11-04 | End: 2023-11-04 | Stop reason: HOSPADM

## 2023-11-03 RX ORDER — PROPOFOL 10 MG/ML
INJECTION, EMULSION INTRAVENOUS CONTINUOUS PRN
Status: DISCONTINUED | OUTPATIENT
Start: 2023-11-03 | End: 2023-11-03

## 2023-11-03 RX ORDER — ACETAMINOPHEN 500 MG
1000 TABLET ORAL ONCE
Status: COMPLETED | OUTPATIENT
Start: 2023-11-03 | End: 2023-11-03

## 2023-11-03 RX ORDER — ONDANSETRON 2 MG/ML
4 INJECTION INTRAMUSCULAR; INTRAVENOUS EVERY 6 HOURS PRN
Status: DISCONTINUED | OUTPATIENT
Start: 2023-11-03 | End: 2023-11-04 | Stop reason: HOSPADM

## 2023-11-03 RX ORDER — ONDANSETRON 4 MG/1
4 TABLET, ORALLY DISINTEGRATING ORAL EVERY 6 HOURS PRN
Status: DISCONTINUED | OUTPATIENT
Start: 2023-11-03 | End: 2023-11-04 | Stop reason: HOSPADM

## 2023-11-03 RX ORDER — HYDROMORPHONE HCL IN WATER/PF 6 MG/30 ML
0.2 PATIENT CONTROLLED ANALGESIA SYRINGE INTRAVENOUS EVERY 5 MIN PRN
Status: DISCONTINUED | OUTPATIENT
Start: 2023-11-03 | End: 2023-11-03 | Stop reason: HOSPADM

## 2023-11-03 RX ORDER — ONDANSETRON 2 MG/ML
4 INJECTION INTRAMUSCULAR; INTRAVENOUS EVERY 30 MIN PRN
Status: DISCONTINUED | OUTPATIENT
Start: 2023-11-03 | End: 2023-11-03 | Stop reason: HOSPADM

## 2023-11-03 RX ORDER — DEXAMETHASONE SODIUM PHOSPHATE 4 MG/ML
INJECTION, SOLUTION INTRA-ARTICULAR; INTRALESIONAL; INTRAMUSCULAR; INTRAVENOUS; SOFT TISSUE PRN
Status: DISCONTINUED | OUTPATIENT
Start: 2023-11-03 | End: 2023-11-03

## 2023-11-03 RX ORDER — HYDROMORPHONE HCL IN WATER/PF 6 MG/30 ML
0.4 PATIENT CONTROLLED ANALGESIA SYRINGE INTRAVENOUS EVERY 5 MIN PRN
Status: DISCONTINUED | OUTPATIENT
Start: 2023-11-03 | End: 2023-11-03 | Stop reason: HOSPADM

## 2023-11-03 RX ORDER — ONDANSETRON 2 MG/ML
INJECTION INTRAMUSCULAR; INTRAVENOUS PRN
Status: DISCONTINUED | OUTPATIENT
Start: 2023-11-03 | End: 2023-11-03

## 2023-11-03 RX ORDER — AMOXICILLIN 250 MG
1 CAPSULE ORAL 2 TIMES DAILY
Qty: 30 TABLET | Refills: 0 | Status: SHIPPED | OUTPATIENT
Start: 2023-11-03 | End: 2023-11-21

## 2023-11-03 RX ORDER — CEFAZOLIN SODIUM/WATER 2 G/20 ML
2 SYRINGE (ML) INTRAVENOUS SEE ADMIN INSTRUCTIONS
Status: DISCONTINUED | OUTPATIENT
Start: 2023-11-03 | End: 2023-11-03 | Stop reason: HOSPADM

## 2023-11-03 RX ORDER — AMOXICILLIN 250 MG
1 CAPSULE ORAL 2 TIMES DAILY
Status: DISCONTINUED | OUTPATIENT
Start: 2023-11-03 | End: 2023-11-04 | Stop reason: HOSPADM

## 2023-11-03 RX ORDER — OXYCODONE HYDROCHLORIDE 5 MG/1
10 TABLET ORAL EVERY 4 HOURS PRN
Status: DISCONTINUED | OUTPATIENT
Start: 2023-11-03 | End: 2023-11-04 | Stop reason: HOSPADM

## 2023-11-03 RX ORDER — SODIUM CHLORIDE, SODIUM LACTATE, POTASSIUM CHLORIDE, CALCIUM CHLORIDE 600; 310; 30; 20 MG/100ML; MG/100ML; MG/100ML; MG/100ML
INJECTION, SOLUTION INTRAVENOUS CONTINUOUS PRN
Status: DISCONTINUED | OUTPATIENT
Start: 2023-11-03 | End: 2023-11-03

## 2023-11-03 RX ORDER — ONDANSETRON 4 MG/1
4 TABLET, ORALLY DISINTEGRATING ORAL EVERY 30 MIN PRN
Status: DISCONTINUED | OUTPATIENT
Start: 2023-11-03 | End: 2023-11-03 | Stop reason: HOSPADM

## 2023-11-03 RX ORDER — EPHEDRINE SULFATE 50 MG/ML
INJECTION, SOLUTION INTRAMUSCULAR; INTRAVENOUS; SUBCUTANEOUS PRN
Status: DISCONTINUED | OUTPATIENT
Start: 2023-11-03 | End: 2023-11-03

## 2023-11-03 RX ORDER — FENTANYL CITRATE 50 UG/ML
INJECTION, SOLUTION INTRAMUSCULAR; INTRAVENOUS PRN
Status: DISCONTINUED | OUTPATIENT
Start: 2023-11-03 | End: 2023-11-03

## 2023-11-03 RX ORDER — CEFAZOLIN SODIUM/WATER 2 G/20 ML
2 SYRINGE (ML) INTRAVENOUS
Status: COMPLETED | OUTPATIENT
Start: 2023-11-03 | End: 2023-11-03

## 2023-11-03 RX ORDER — LIDOCAINE 40 MG/G
CREAM TOPICAL
Status: DISCONTINUED | OUTPATIENT
Start: 2023-11-03 | End: 2023-11-04 | Stop reason: HOSPADM

## 2023-11-03 RX ORDER — HYDROMORPHONE HCL IN WATER/PF 6 MG/30 ML
0.2 PATIENT CONTROLLED ANALGESIA SYRINGE INTRAVENOUS
Status: DISCONTINUED | OUTPATIENT
Start: 2023-11-03 | End: 2023-11-04 | Stop reason: HOSPADM

## 2023-11-03 RX ORDER — PROPOFOL 10 MG/ML
INJECTION, EMULSION INTRAVENOUS PRN
Status: DISCONTINUED | OUTPATIENT
Start: 2023-11-03 | End: 2023-11-03

## 2023-11-03 RX ORDER — OXYCODONE HYDROCHLORIDE 5 MG/1
5 TABLET ORAL EVERY 4 HOURS PRN
Status: DISCONTINUED | OUTPATIENT
Start: 2023-11-03 | End: 2023-11-04 | Stop reason: HOSPADM

## 2023-11-03 RX ORDER — ACETAMINOPHEN 325 MG/1
975 TABLET ORAL 3 TIMES DAILY
Status: DISCONTINUED | OUTPATIENT
Start: 2023-11-03 | End: 2023-11-04 | Stop reason: HOSPADM

## 2023-11-03 RX ORDER — HYDROMORPHONE HCL IN WATER/PF 6 MG/30 ML
0.4 PATIENT CONTROLLED ANALGESIA SYRINGE INTRAVENOUS
Status: DISCONTINUED | OUTPATIENT
Start: 2023-11-03 | End: 2023-11-04 | Stop reason: HOSPADM

## 2023-11-03 RX ORDER — MAGNESIUM HYDROXIDE 1200 MG/15ML
LIQUID ORAL PRN
Status: DISCONTINUED | OUTPATIENT
Start: 2023-11-03 | End: 2023-11-03 | Stop reason: HOSPADM

## 2023-11-03 RX ORDER — NALOXONE HYDROCHLORIDE 0.4 MG/ML
0.2 INJECTION, SOLUTION INTRAMUSCULAR; INTRAVENOUS; SUBCUTANEOUS
Status: DISCONTINUED | OUTPATIENT
Start: 2023-11-03 | End: 2023-11-04 | Stop reason: HOSPADM

## 2023-11-03 RX ORDER — SODIUM CHLORIDE 9 MG/ML
INJECTION, SOLUTION INTRAVENOUS CONTINUOUS
Status: DISCONTINUED | OUTPATIENT
Start: 2023-11-03 | End: 2023-11-04 | Stop reason: HOSPADM

## 2023-11-03 RX ORDER — ATENOLOL 50 MG/1
50 TABLET ORAL DAILY
Status: DISCONTINUED | OUTPATIENT
Start: 2023-11-04 | End: 2023-11-04 | Stop reason: HOSPADM

## 2023-11-03 RX ORDER — SODIUM CHLORIDE, SODIUM LACTATE, POTASSIUM CHLORIDE, CALCIUM CHLORIDE 600; 310; 30; 20 MG/100ML; MG/100ML; MG/100ML; MG/100ML
INJECTION, SOLUTION INTRAVENOUS CONTINUOUS
Status: DISCONTINUED | OUTPATIENT
Start: 2023-11-03 | End: 2023-11-03 | Stop reason: HOSPADM

## 2023-11-03 RX ORDER — LIDOCAINE HYDROCHLORIDE 20 MG/ML
INJECTION, SOLUTION INFILTRATION; PERINEURAL PRN
Status: DISCONTINUED | OUTPATIENT
Start: 2023-11-03 | End: 2023-11-03

## 2023-11-03 RX ORDER — FENTANYL CITRATE 0.05 MG/ML
50 INJECTION, SOLUTION INTRAMUSCULAR; INTRAVENOUS EVERY 5 MIN PRN
Status: DISCONTINUED | OUTPATIENT
Start: 2023-11-03 | End: 2023-11-03 | Stop reason: HOSPADM

## 2023-11-03 RX ORDER — HEPARIN SODIUM 5000 [USP'U]/.5ML
5000 INJECTION, SOLUTION INTRAVENOUS; SUBCUTANEOUS
Status: COMPLETED | OUTPATIENT
Start: 2023-11-03 | End: 2023-11-03

## 2023-11-03 RX ORDER — OXYCODONE HYDROCHLORIDE 5 MG/1
5 TABLET ORAL EVERY 6 HOURS PRN
Qty: 12 TABLET | Refills: 0 | Status: SHIPPED | OUTPATIENT
Start: 2023-11-03 | End: 2023-11-06

## 2023-11-03 RX ORDER — BUPIVACAINE HYDROCHLORIDE 2.5 MG/ML
INJECTION, SOLUTION INFILTRATION; PERINEURAL PRN
Status: DISCONTINUED | OUTPATIENT
Start: 2023-11-03 | End: 2023-11-03 | Stop reason: HOSPADM

## 2023-11-03 RX ORDER — OXYBUTYNIN CHLORIDE 5 MG/1
5 TABLET, EXTENDED RELEASE ORAL AT BEDTIME
Status: DISCONTINUED | OUTPATIENT
Start: 2023-11-03 | End: 2023-11-04 | Stop reason: HOSPADM

## 2023-11-03 RX ORDER — POLYETHYLENE GLYCOL 3350 17 G/17G
17 POWDER, FOR SOLUTION ORAL DAILY
Status: DISCONTINUED | OUTPATIENT
Start: 2023-11-04 | End: 2023-11-04 | Stop reason: HOSPADM

## 2023-11-03 RX ORDER — FENTANYL CITRATE 0.05 MG/ML
25 INJECTION, SOLUTION INTRAMUSCULAR; INTRAVENOUS EVERY 5 MIN PRN
Status: DISCONTINUED | OUTPATIENT
Start: 2023-11-03 | End: 2023-11-03 | Stop reason: HOSPADM

## 2023-11-03 RX ADMIN — ROCURONIUM BROMIDE 50 MG: 50 INJECTION, SOLUTION INTRAVENOUS at 07:41

## 2023-11-03 RX ADMIN — ACETAMINOPHEN 1000 MG: 500 TABLET, FILM COATED ORAL at 07:25

## 2023-11-03 RX ADMIN — SODIUM CHLORIDE, POTASSIUM CHLORIDE, SODIUM LACTATE AND CALCIUM CHLORIDE: 600; 310; 30; 20 INJECTION, SOLUTION INTRAVENOUS at 07:30

## 2023-11-03 RX ADMIN — SODIUM CHLORIDE, POTASSIUM CHLORIDE, SODIUM LACTATE AND CALCIUM CHLORIDE: 600; 310; 30; 20 INJECTION, SOLUTION INTRAVENOUS at 11:41

## 2023-11-03 RX ADMIN — ROCURONIUM BROMIDE 10 MG: 50 INJECTION, SOLUTION INTRAVENOUS at 10:17

## 2023-11-03 RX ADMIN — PROPOFOL 200 MG: 10 INJECTION, EMULSION INTRAVENOUS at 07:40

## 2023-11-03 RX ADMIN — ROCURONIUM BROMIDE 20 MG: 50 INJECTION, SOLUTION INTRAVENOUS at 08:06

## 2023-11-03 RX ADMIN — ROCURONIUM BROMIDE 10 MG: 50 INJECTION, SOLUTION INTRAVENOUS at 09:52

## 2023-11-03 RX ADMIN — FENTANYL CITRATE 100 MCG: 50 INJECTION INTRAMUSCULAR; INTRAVENOUS at 07:30

## 2023-11-03 RX ADMIN — HYDROMORPHONE HYDROCHLORIDE 0.2 MG: 0.2 INJECTION, SOLUTION INTRAMUSCULAR; INTRAVENOUS; SUBCUTANEOUS at 13:07

## 2023-11-03 RX ADMIN — ROCURONIUM BROMIDE 20 MG: 50 INJECTION, SOLUTION INTRAVENOUS at 08:37

## 2023-11-03 RX ADMIN — Medication 5 MG: at 09:33

## 2023-11-03 RX ADMIN — PHENYLEPHRINE HYDROCHLORIDE 100 MCG: 10 INJECTION INTRAVENOUS at 09:52

## 2023-11-03 RX ADMIN — HEPARIN SODIUM 5000 UNITS: 5000 INJECTION, SOLUTION INTRAVENOUS; SUBCUTANEOUS at 06:32

## 2023-11-03 RX ADMIN — MIDAZOLAM 2 MG: 1 INJECTION INTRAMUSCULAR; INTRAVENOUS at 07:30

## 2023-11-03 RX ADMIN — PROPOFOL 120 MCG/KG/MIN: 10 INJECTION, EMULSION INTRAVENOUS at 11:19

## 2023-11-03 RX ADMIN — HYDROMORPHONE HYDROCHLORIDE 0.5 MG: 1 INJECTION, SOLUTION INTRAMUSCULAR; INTRAVENOUS; SUBCUTANEOUS at 10:04

## 2023-11-03 RX ADMIN — HYDROMORPHONE HYDROCHLORIDE 0.2 MG: 0.2 INJECTION, SOLUTION INTRAMUSCULAR; INTRAVENOUS; SUBCUTANEOUS at 14:51

## 2023-11-03 RX ADMIN — FENTANYL CITRATE 50 MCG: 50 INJECTION, SOLUTION INTRAMUSCULAR; INTRAVENOUS at 12:20

## 2023-11-03 RX ADMIN — PHENYLEPHRINE HYDROCHLORIDE 100 MCG: 10 INJECTION INTRAVENOUS at 08:13

## 2023-11-03 RX ADMIN — PHENYLEPHRINE HYDROCHLORIDE 100 MCG: 10 INJECTION INTRAVENOUS at 09:08

## 2023-11-03 RX ADMIN — PROPOFOL 150 MCG/KG/MIN: 10 INJECTION, EMULSION INTRAVENOUS at 07:42

## 2023-11-03 RX ADMIN — HYDROMORPHONE HYDROCHLORIDE 0.2 MG: 0.2 INJECTION, SOLUTION INTRAMUSCULAR; INTRAVENOUS; SUBCUTANEOUS at 17:01

## 2023-11-03 RX ADMIN — ACETAMINOPHEN 975 MG: 325 TABLET, FILM COATED ORAL at 16:43

## 2023-11-03 RX ADMIN — PHENYLEPHRINE HYDROCHLORIDE 100 MCG: 10 INJECTION INTRAVENOUS at 08:18

## 2023-11-03 RX ADMIN — Medication 2 G: at 07:45

## 2023-11-03 RX ADMIN — LIDOCAINE HYDROCHLORIDE 100 MG: 20 INJECTION, SOLUTION INFILTRATION; PERINEURAL at 07:40

## 2023-11-03 RX ADMIN — Medication 10 MG: at 08:13

## 2023-11-03 RX ADMIN — PHENYLEPHRINE HYDROCHLORIDE 100 MCG: 10 INJECTION INTRAVENOUS at 08:38

## 2023-11-03 RX ADMIN — Medication 5 MG: at 08:34

## 2023-11-03 RX ADMIN — SODIUM CHLORIDE, POTASSIUM CHLORIDE, SODIUM LACTATE AND CALCIUM CHLORIDE: 600; 310; 30; 20 INJECTION, SOLUTION INTRAVENOUS at 06:31

## 2023-11-03 RX ADMIN — SUGAMMADEX 200 MG: 100 INJECTION, SOLUTION INTRAVENOUS at 11:38

## 2023-11-03 RX ADMIN — Medication 2 G: at 11:45

## 2023-11-03 RX ADMIN — ROCURONIUM BROMIDE 10 MG: 50 INJECTION, SOLUTION INTRAVENOUS at 11:21

## 2023-11-03 RX ADMIN — OXYCODONE HYDROCHLORIDE 10 MG: 5 TABLET ORAL at 20:14

## 2023-11-03 RX ADMIN — ROCURONIUM BROMIDE 10 MG: 50 INJECTION, SOLUTION INTRAVENOUS at 09:06

## 2023-11-03 RX ADMIN — ROCURONIUM BROMIDE 10 MG: 50 INJECTION, SOLUTION INTRAVENOUS at 09:31

## 2023-11-03 RX ADMIN — SODIUM CHLORIDE: 9 INJECTION, SOLUTION INTRAVENOUS at 14:46

## 2023-11-03 RX ADMIN — SODIUM CHLORIDE: 9 INJECTION, SOLUTION INTRAVENOUS at 23:11

## 2023-11-03 RX ADMIN — PHENYLEPHRINE HYDROCHLORIDE 100 MCG: 10 INJECTION INTRAVENOUS at 10:21

## 2023-11-03 RX ADMIN — Medication 5 MG: at 10:17

## 2023-11-03 RX ADMIN — ONDANSETRON 4 MG: 2 INJECTION INTRAMUSCULAR; INTRAVENOUS at 08:10

## 2023-11-03 RX ADMIN — OXYBUTYNIN CHLORIDE 5 MG: 5 TABLET, EXTENDED RELEASE ORAL at 17:01

## 2023-11-03 RX ADMIN — DEXAMETHASONE SODIUM PHOSPHATE 4 MG: 4 INJECTION, SOLUTION INTRA-ARTICULAR; INTRALESIONAL; INTRAMUSCULAR; INTRAVENOUS; SOFT TISSUE at 08:10

## 2023-11-03 RX ADMIN — PHENYLEPHRINE HYDROCHLORIDE 100 MCG: 10 INJECTION INTRAVENOUS at 10:10

## 2023-11-03 RX ADMIN — ACETAMINOPHEN 975 MG: 325 TABLET, FILM COATED ORAL at 22:31

## 2023-11-03 RX ADMIN — ROCURONIUM BROMIDE 10 MG: 50 INJECTION, SOLUTION INTRAVENOUS at 10:45

## 2023-11-03 ASSESSMENT — ACTIVITIES OF DAILY LIVING (ADL)
ADLS_ACUITY_SCORE: 20
ADLS_ACUITY_SCORE: 22
ADLS_ACUITY_SCORE: 20

## 2023-11-03 ASSESSMENT — LIFESTYLE VARIABLES: TOBACCO_USE: 0

## 2023-11-03 ASSESSMENT — ENCOUNTER SYMPTOMS: SEIZURES: 0

## 2023-11-03 NOTE — ANESTHESIA POSTPROCEDURE EVALUATION
Patient: Ronnie Lagos    Procedure: Procedure(s):  PROSTATECTOMY, ROBOT-ASSISTED, WITH PELVIC LYMPHADENECTOMY       Anesthesia Type:  General    Note:  Disposition: Inpatient   Postop Pain Control: Uneventful            Sign Out: Well controlled pain   PONV: No   Neuro/Psych: Uneventful            Sign Out: Acceptable/Baseline neuro status   Airway/Respiratory: Uneventful            Sign Out: Acceptable/Baseline resp. status   CV/Hemodynamics: Uneventful            Sign Out: Acceptable CV status   Other NRE: NONE   DID A NON-ROUTINE EVENT OCCUR? No           Last vitals:  Vitals Value Taken Time   /83 11/03/23 1315   Temp 36.2  C (97.2  F) 11/03/23 1157   Pulse 70 11/03/23 1325   Resp 17 11/03/23 1325   SpO2 95 % 11/03/23 1330   Vitals shown include unfiled device data.    Electronically Signed By: Sukumar Arellano MD  November 3, 2023  3:33 PM

## 2023-11-03 NOTE — PROGRESS NOTES
Received patient from PACU at approximately 1.45 pm. VSS on 3 L3 port sites and one small midline WNL, no drainage observed , SHARRON on L side of abdomen with minimal bright red output, Mendoza with light pink output,  Dilaudid .2 x 1 for abd pain rated at 5/10. Main concern of the patient right now is the urge to void. Bladder scanned at 7 ml.

## 2023-11-03 NOTE — ANESTHESIA PROCEDURE NOTES
Airway       Patient location during procedure: OR       Procedure Start/Stop Times: 11/3/2023 7:43 AM  Staff -        CRNA: Liliana Sotelo APRN CRNA       Performed By: CRNA  Consent for Airway        Urgency: elective  Indications and Patient Condition       Indications for airway management: kaushik-procedural       Induction type:intravenous       Mask difficulty assessment: 1 - vent by mask    Final Airway Details       Final airway type: endotracheal airway       Successful airway: ETT - single  Endotracheal Airway Details        ETT size (mm): 7.0       Successful intubation technique: direct laryngoscopy       DL Blade Type: Slaughter 2       Grade View of Cords: 1       Adjucts: stylet       Position: Center       Measured from: gums/teeth       Secured at (cm): 23       Bite block used: None    Post intubation assessment        Placement verified by: capnometry and equal breath sounds        Number of attempts at approach: 1       Secured with: plastic tape       Ease of procedure: easy       Dentition: Intact and Unchanged    Medication(s) Administered   Medication Administration Time: 11/3/2023 7:43 AM

## 2023-11-03 NOTE — PLAN OF CARE
1200-0255 shift. Vitals stable. A&O. C/o abdominal discomfort- improved w/ IV Dilaudid x1. C/o bladder spasms/urge to void- improved w/ Oxybutynin. Dangled and stood at bedside w/ 1 assist. Tolerating liquids so far. Mendoza patent w/ darell out put. R abd SHARRON w/ bloody out put. Abd incision sites x4 w/ liquid bandage. Discharge pending post op recovery.

## 2023-11-03 NOTE — ANESTHESIA PREPROCEDURE EVALUATION
Anesthesia Pre-Procedure Evaluation    Patient: Ronnie Lagos   MRN: [id number] : 1955        Procedure : Procedure(s):  PROSTATECTOMY, ROBOT-ASSISTED, WITH PELVIC LYMPHADENECTOMY          Past Medical History:   Diagnosis Date    Abdominal pain     Basal cell carcinoma     Calculus of bile duct     Elevated liver enzymes     Essential hypertension, benign     abstracted 02    Gastro-oesophageal reflux disease     GERD (gastroesophageal reflux disease) 2008    Liver disease     elevated liver enzymes    Squamous cell carcinoma     Unspecified cerebral artery occlusion with cerebral infarction     Unspecified condition of brain     abstracted 02      Past Surgical History:   Procedure Laterality Date    ARTHRODESIS FOOT  2013    Procedure: ARTHRODESIS FOOT;  LEFT FIRST METATARSOPHALANGEAL JOINT ARTHRODESIS ;  Surgeon: Burton Loera DPM;  Location: [facility]    COLONOSCOPY N/A 2018    Procedure: COMBINED COLONOSCOPY, SINGLE OR MULTIPLE BIOPSY/POLYPECTOMY BY BIOPSY;  Surgeon: Blayne Mora MD;  Location:  GI    ENDOSCOPIC RETROGRADE CHOLANGIOPANCREATOGRAM N/A 2018    Procedure: COMBINED ENDOSCOPIC RETROGRADE CHOLANGIOPANCREATOGRAPHY, SPHINCTEROTOMY;  ENDOSCOPIC RETROGRADE CHOLANGIOPANCREATOGRAM (ERCP), SPHINCTEROTOMY, STONE REMOVAL, STENT PLACEMENT;  Surgeon: Christiano Sorenson MD;  Location:  OR    ENDOSCOPIC RETROGRADE CHOLANGIOPANCREATOGRAM N/A 2018    Procedure: ENDOSCOPIC RETROGRADE CHOLANGIOPANCREATOGRAM;  ENDOSCOPIC RETROGRADE CHOLANIOPANCREATOGRAPHY AND ATTEMPTED STENT REMOVAL.;  Surgeon: Christiano Sorenson MD;  Location:  OR    ENDOSCOPIC RETROGRADE CHOLANGIOPANCREATOGRAM N/A 5/10/2018    Procedure: COMBINED ENDOSCOPIC RETROGRADE CHOLANGIOPANCREATOGRAPHY, REMOVE FOREIGN BODY OR STENT/TUBE;  ENDOSCOPIC RETROGRADE CHOLANGIOPANCREATOGRAPHY AND STENT REMOVAL;  Surgeon: Christiano Sorenson MD;  Location:  OR    ESOPHAGOSCOPY, GASTROSCOPY,  DUODENOSCOPY (EGD), COMBINED N/A 4/12/2018    Procedure: COMBINED ESOPHAGOSCOPY, GASTROSCOPY, DUODENOSCOPY (EGD);  EGD with stent removal;  Surgeon: Christiano Sorenson MD;  Location:  GI    LAPAROSCOPIC CHOLECYSTECTOMY N/A 5/1/2015    Procedure: LAPAROSCOPIC CHOLECYSTECTOMY;  Surgeon: Damien Galindo MD;  Location: [facility]    ORTHOPEDIC SURGERY      left elbow, right shoulder      No Known Allergies   Social History     Tobacco Use    Smoking status: Never    Smokeless tobacco: Never   Substance Use Topics    Alcohol use: Yes     Alcohol/week: 2.0 standard drinks of alcohol     Types: 2 Standard drinks or equivalent per week     Comment: 3-4 times a week, light beer      Wt Readings from Last 1 Encounters:   11/03/23 97.1 kg (214 lb 1.6 oz)        Anesthesia Evaluation   Pt has had prior anesthetic.     No history of anesthetic complications       ROS/MED HX  ENT/Pulmonary:     (+)     JESSE risk factors,  hypertension,  daytime somnolence,                    recent URI, resolved,      (-) tobacco use and asthma   Neurologic: Comment: Cerebral artery occlusion with infarction 1992, sports related injury    (+)    no peripheral neuropathy      CVA, date: 1992,                  (-) no seizures and migraines   Cardiovascular: Comment: Denies chest pain/pressure, CASE, orthopnea, dizziness, palpitations, edema.      (+)  hypertension- -   -  - -                                 Previous cardiac testing   Echo: Date: Results:    Stress Test:  Date: 2008 Results:  Interpretation Summary   This was a normal stress echocardiogram.   The study was technically difficult.   This test indicates a low probability of severe occlusive coronary artery   disease.   Images in this study were not ideal, but there is no evidence of ischemia.     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 Able to ascend 2 flights of  "stairs. Does own yard work.    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  Skin CA Remission status post Surgery.      Other:  - neg other ROS          Physical Exam    Airway        Mallampati: II   TM distance: > 3 FB   Neck ROM: full   Mouth opening: > 3 cm    Respiratory Devices and Support         Dental  no notable dental history     (+) Modest Abnormalities - crowns, retainers, 1 or 2 missing teeth      Cardiovascular          Rhythm and rate: regular and normal     Pulmonary   pulmonary exam normal                OUTSIDE LABS:  CBC:   Lab Results   Component Value Date    WBC 7.4 10/19/2023    WBC 5.3 05/11/2023    HGB 17.8 (H) 10/19/2023    HGB 17.0 05/11/2023    HCT 49.6 10/19/2023    HCT 47.5 05/11/2023     10/19/2023     05/11/2023     BMP:   Lab Results   Component Value Date     (L) 10/19/2023     05/11/2023    POTASSIUM 4.6 10/19/2023    POTASSIUM 4.4 05/11/2023    CHLORIDE 96 (L) 10/19/2023    CHLORIDE 100 05/11/2023    CO2 28 10/19/2023    CO2 26 05/11/2023    BUN 10.1 10/19/2023    BUN 11.5 05/11/2023    CR 0.93 10/19/2023    CR 1.03 05/11/2023     (H) 10/19/2023     (H) 05/11/2023     COAGS:   Lab Results   Component Value Date    INR 0.93 01/16/2018     POC: No results found for: \"BGM\", \"HCG\", \"HCGS\"  HEPATIC:   Lab Results "   Component Value Date    ALBUMIN 4.7 10/19/2023    PROTTOTAL 8.0 10/19/2023    ALT 79 (H) 10/19/2023    AST 71 (H) 10/19/2023    ALKPHOS 72 10/19/2023    BILITOTAL 2.1 (H) 10/19/2023     OTHER:   Lab Results   Component Value Date    AV 9.8 10/19/2023    LIPASE 243 01/12/2018    AMYLASE 60 12/16/2014    SED 7 09/30/2013       Anesthesia Plan    ASA Status:  2    NPO Status:  NPO Appropriate    Anesthesia Type: General.     - Airway: ETT   Induction: Intravenous, Propofol.   Maintenance: Balanced.        Consents    Anesthesia Plan(s) and associated risks, benefits, and realistic alternatives discussed. Questions answered and patient/representative(s) expressed understanding.     - Discussed:     - Discussed with:  Patient            Postoperative Care    Pain management: IV analgesics, Multi-modal analgesia.   PONV prophylaxis: Ondansetron (or other 5HT-3), Dexamethasone or Solumedrol, Background Propofol Infusion     Comments:                Sukumar Arellano MD

## 2023-11-03 NOTE — ANESTHESIA CARE TRANSFER NOTE
Patient: Ronnie Lagos    Procedure: Procedure(s):  PROSTATECTOMY, ROBOT-ASSISTED, WITH PELVIC LYMPHADENECTOMY       Diagnosis: Prostate cancer (H) [C61]  Diagnosis Additional Information: No value filed.    Anesthesia Type:   General     Note:    Oropharynx: oropharynx clear of all foreign objects  Level of Consciousness: awake  Oxygen Supplementation: face mask  Level of Supplemental Oxygen (L/min / FiO2): 8  Independent Airway: airway patency satisfactory and stable    Vital Signs Stable: post-procedure vital signs reviewed and stable  Report to RN Given: handoff report given  Patient transferred to: PACU    Handoff Report: Identifed the Patient, Identified the Reponsible Provider, Reviewed the pertinent medical history, Discussed the surgical course, Reviewed Intra-OP anesthesia mangement and issues during anesthesia, Set expectations for post-procedure period and Allowed opportunity for questions and acknowledgement of understanding    Vitals:  Vitals Value Taken Time   /81 11/03/23 1157   Temp     Pulse 72 11/03/23 1159   Resp 24 11/03/23 1159   SpO2 97 % 11/03/23 1159   Vitals shown include unfiled device data.    Electronically Signed By: VERONICA Ng CRNA  November 3, 2023  12:01 PM

## 2023-11-03 NOTE — OR NURSING
"Pt in and out of sleep s/p anesthesia and IV fentanyl given in PACU, but pt continues to repeat \"I feel like I have to pee\". Pt reoriented and explained that he has a moses catheter in place draining his bladder, prostate was removed, and CO2 gas all give the feeling of abd pressure and feeling like he has to pee. Pt's moses is draining bloody to now like cherry color urine in moses bag.     Pt sleepy, but easily awaken. Pt was offered ice chips, but declined. Denies N/V.  "

## 2023-11-03 NOTE — OR NURSING
RAFFAELE Arellano gave OK for pt to leave PACU for room 2209. Aware pt feels like he has to pee due to his surgery and moses cath. Pt tolerating ice chips.

## 2023-11-03 NOTE — OP NOTE
OPERATIVE REPORT  DATE OF PROCEDURE: 11/3/23  PRE-PROCEDURE DIAGNOSIS: Prostate cancer.   POST-PROCEDURE DIAGNOSIS: Prostate cancer.   PROCEDURES PERFORMED:   1) Robotic-assisted laparoscopic radical prostatectomy  2) Bilateral pelvic lymphadenectomy  SURGEON: Sophia Ramirez MD - Present for the entire procedure  ASSISTANT: Noe Sma MD  SECOND ASSISTANT: Candido Larry MD  ANESTHESIA: General with endotracheal intubation.   INDICATIONS FOR PROCEDURE: 68M with a history of [place] 4+3 = 7 adenocarcinoma of the prostate. He has been counseled regarding treatment options and presents to undergo surgery.   Findings: Prostate and seminal vesicles removed without complication. Lymph nodes grossly negative. Water-tight anastomosis. Full bilateral nerve sparing. No evidence of extracapsular extension.   Specimens:    Prostate and seminal vesicles  Periprostatic fat  Right pelvic lymph nodes  Left pelvic lymph nodes  Estimated blood loss: 75 ml  DESCRIPTION OF PROCEDURE: After the patient was brought to the operating room and induction of general anesthesia, he was prepped and draped in the supine position.  All pressure points were padded.  He was prepped and draped in the usual sterile fashion for transperitoneal pelvic laparoscopy.  Initial access was obtained using a 1.5 cm supraumbilical incision through which we deployed a Veress needle to obtain pneumoperitoneum to a pressure of 20 mmHg.  Subsequently we placed laparoscopic and robotic ports so that there was a total of three robotic 8 mm ports, one 12 mm Air seal port. Alvin-Geraldo device was used to preplace 0-Vicryl fascial stitch at the airseal port. At this point, we docked the robot and initially mobilized some adhesions of the sigmoid colon out of the pelvic cavity.  We then dropped the bladder from the anterior abdominal wall to enter the retropubic space of Retzius.  The pubic arch was defined all the way around to the anterior surface of the prostate  and the anterior surface was defatted to expose the prostatovesical junction. This was sent off to pathology. Subsequently, the endopelvic fascia was incised laterally from the base to the apex.   Subsequently we divided the anterior bladder neck to enter the lumen of the bladder neck. I then incised the posterior bladder neck and entered the retrovesical space.  The dissection was deepened to identify the vasa deferentia and the seminal vesicles on either side.  The vas was freed up and divided and the seminal vesicles were also freed up from the surrounding tissues.  I then incised the posterior layer of Denonvilliers fascia to enter the prerectal space.  Both the lateral pedicles were now sequentially clipped and divided nicely preserving the neurovascular complex bilaterally.  After the posterior dissection was completed, I divided the anterior dorsal venous complex and then the membranous urethra was divided and the rectourethralis was also divided as well.  The prostate was then completely freed and placed in the EndoCatch bag. We used a 3-0 V-loc stitch to over sew the deep vein complex. Resection bed was then irrigated and hemostasis was obtained.    At this point, we performed standard bilateral pelvic lymph node dissection.  All the lymphoid tissue in the space was sent to Pathology separately as left and right pelvic lymph nodes.  At this point, we performed a Cliff stitch to approximate the rectourethralis to the posterior detrusor and performed a running urethrovesical anastomosis using two combined V-Lock sutures.  The vesicourethra anastomosis was performed with two running 3-0 V-loc sutures starting posteriorly and coming up anteriorly on either side. After completing the anastomosis, an indwelling 18-Wallisian Mendoza catheter was placed and the anastomosis was found to be completely watertight after 100 cc was instilled into the bladder.   At this point, we placed a Richard drain lateral to the bladder  and laparoscopic exit was completed in the usual fashion without any incidents or complications. Prostate and Svs were then removed through the camera port and the incision was then closed with a 0-Vicryl suture. The preplaced fascial stitch at the Airseal port was tied down. The 8 mm port skin incisions were closed with 4-0 Monocryl sutures.  Skin glue was applied.  The patient was awakened, extubated, and taken to the recovery room in stable condition.  The estimated blood loss was 75 cc.   The patient tolerated the procedure extremely well.  Sponge, needle, and instrument counts were correct x2. He did not receive any blood transfusions, was awakened and taken to the recovery room in stable condition.   Noe Sam MD  Urology Resident

## 2023-11-04 VITALS
HEIGHT: 69 IN | OXYGEN SATURATION: 95 % | TEMPERATURE: 97.9 F | DIASTOLIC BLOOD PRESSURE: 82 MMHG | BODY MASS INDEX: 31.71 KG/M2 | HEART RATE: 65 BPM | SYSTOLIC BLOOD PRESSURE: 150 MMHG | WEIGHT: 214.1 LBS | RESPIRATION RATE: 17 BRPM

## 2023-11-04 PROBLEM — Z98.890 POST-OPERATIVE STATE: Status: RESOLVED | Noted: 2023-11-03 | Resolved: 2023-11-04

## 2023-11-04 LAB
ANION GAP SERPL CALCULATED.3IONS-SCNC: 9 MMOL/L (ref 7–15)
BUN SERPL-MCNC: 8.4 MG/DL (ref 8–23)
CALCIUM SERPL-MCNC: 9.2 MG/DL (ref 8.8–10.2)
CHLORIDE SERPL-SCNC: 100 MMOL/L (ref 98–107)
CREAT SERPL-MCNC: 0.85 MG/DL (ref 0.67–1.17)
DEPRECATED HCO3 PLAS-SCNC: 27 MMOL/L (ref 22–29)
EGFRCR SERPLBLD CKD-EPI 2021: >90 ML/MIN/1.73M2
ERYTHROCYTE [DISTWIDTH] IN BLOOD BY AUTOMATED COUNT: 11.3 % (ref 10–15)
GLUCOSE SERPL-MCNC: 100 MG/DL (ref 70–99)
HCT VFR BLD AUTO: 41.3 % (ref 40–53)
HGB BLD-MCNC: 14.3 G/DL (ref 13.3–17.7)
MCH RBC QN AUTO: 32 PG (ref 26.5–33)
MCHC RBC AUTO-ENTMCNC: 34.6 G/DL (ref 31.5–36.5)
MCV RBC AUTO: 92 FL (ref 78–100)
PLATELET # BLD AUTO: 201 10E3/UL (ref 150–450)
POTASSIUM SERPL-SCNC: 4.7 MMOL/L (ref 3.4–5.3)
RBC # BLD AUTO: 4.47 10E6/UL (ref 4.4–5.9)
SODIUM SERPL-SCNC: 136 MMOL/L (ref 135–145)
WBC # BLD AUTO: 11.1 10E3/UL (ref 4–11)

## 2023-11-04 PROCEDURE — 36415 COLL VENOUS BLD VENIPUNCTURE: CPT | Performed by: UROLOGY

## 2023-11-04 PROCEDURE — 250N000013 HC RX MED GY IP 250 OP 250 PS 637: Performed by: UROLOGY

## 2023-11-04 PROCEDURE — 85014 HEMATOCRIT: CPT | Performed by: UROLOGY

## 2023-11-04 PROCEDURE — 80048 BASIC METABOLIC PNL TOTAL CA: CPT | Performed by: UROLOGY

## 2023-11-04 RX ADMIN — PANTOPRAZOLE SODIUM 40 MG: 40 TABLET, DELAYED RELEASE ORAL at 08:54

## 2023-11-04 RX ADMIN — OXYCODONE HYDROCHLORIDE 10 MG: 5 TABLET ORAL at 10:14

## 2023-11-04 RX ADMIN — ACETAMINOPHEN 975 MG: 325 TABLET, FILM COATED ORAL at 08:53

## 2023-11-04 RX ADMIN — OXYCODONE HYDROCHLORIDE 10 MG: 5 TABLET ORAL at 02:25

## 2023-11-04 RX ADMIN — DOCUSATE SODIUM 50 MG AND SENNOSIDES 8.6 MG 1 TABLET: 8.6; 5 TABLET, FILM COATED ORAL at 08:53

## 2023-11-04 RX ADMIN — ATENOLOL 50 MG: 50 TABLET ORAL at 08:54

## 2023-11-04 ASSESSMENT — ACTIVITIES OF DAILY LIVING (ADL)
ADLS_ACUITY_SCORE: 22

## 2023-11-04 NOTE — DISCHARGE SUMMARY
[facility] Discharge Summary    Ronnie Lagos MRN# [id number]   Age: 68 year old YOB: 1955     Date of Admission:  11/3/2023  Date of Discharge::  11/4/2023  Admitting Physician:  Sophia Ramirez MD  Discharge Physician:  Luis Carrington MD             Admission Diagnoses:   Prostate cancer (H) [C61]  Post-operative state [Z98.890]          Discharge Diagnosis:     Prostate cancer          Procedures:     Procedure(s): 1) Robotic-assisted laparoscopic radical prostatectomy  2) Bilateral pelvic lymphadenectomy       No procedures performed during this admission           Medications Prior to Admission:     Facility-Administered Medications Prior to Admission   Medication Dose Route Frequency Provider Last Rate Last Admin    [DISCONTINUED] gentamicin (GARAMYCIN) injection 80 mg  80 mg Intramuscular Once Sophia Ramirez MD         Medications Prior to Admission   Medication Sig Dispense Refill Last Dose    amLODIPine (NORVASC) 5 MG tablet Take 1 tablet (5 mg) by mouth daily (Patient taking differently: Take 5 mg by mouth every morning) 90 tablet 3  at AM    atenolol (TENORMIN) 50 MG tablet Take 1 tablet (50 mg) by mouth daily 90 tablet 3  at AM    ibuprofen (ADVIL/MOTRIN) 200 MG tablet Take 200-400 mg by mouth every 4 hours as needed for pain   10/29/2023 at prn    melatonin 5 MG tablet Take 5 mg by mouth nightly as needed for sleep   10/31/2023 at PM    nitroFURantoin macrocrystal-monohydrate (MACROBID) 100 MG capsule Take 1 capsule (100 mg) by mouth 2 times daily 20 capsule 0 11/2/2023 at PM    omeprazole (PRILOSEC) 20 MG DR capsule Take 1 capsule (20 mg) by mouth daily 90 capsule 3  at AM    fluorouracil (EFUDEX) 5 % external cream Apply topically 2 times daily Using an amount sufficient to cover the affected areas on the scalp and temple 40 g 3 has not started             Discharge Medications:     Current Discharge Medication List        START taking these medications    Details    oxyCODONE (ROXICODONE) 5 MG tablet Take 1 tablet (5 mg) by mouth every 6 hours as needed for pain  Qty: 12 tablet, Refills: 0    Associated Diagnoses: Prostate cancer (H)      senna-docusate (SENOKOT-S/PERICOLACE) 8.6-50 MG tablet Take 1 tablet by mouth 2 times daily Take while on oral narcotics to prevent or treat constipation.  Qty: 30 tablet, Refills: 0    Comments: While taking narcotics  Associated Diagnoses: Prostate cancer (H)           CONTINUE these medications which have NOT CHANGED    Details   amLODIPine (NORVASC) 5 MG tablet Take 1 tablet (5 mg) by mouth daily  Qty: 90 tablet, Refills: 3    Associated Diagnoses: Essential hypertension, benign      atenolol (TENORMIN) 50 MG tablet Take 1 tablet (50 mg) by mouth daily  Qty: 90 tablet, Refills: 3    Associated Diagnoses: Essential hypertension, benign      ibuprofen (ADVIL/MOTRIN) 200 MG tablet Take 200-400 mg by mouth every 4 hours as needed for pain      melatonin 5 MG tablet Take 5 mg by mouth nightly as needed for sleep      nitroFURantoin macrocrystal-monohydrate (MACROBID) 100 MG capsule Take 1 capsule (100 mg) by mouth 2 times daily  Qty: 20 capsule, Refills: 0    Associated Diagnoses: Acute cystitis without hematuria      omeprazole (PRILOSEC) 20 MG DR capsule Take 1 capsule (20 mg) by mouth daily  Qty: 90 capsule, Refills: 3    Associated Diagnoses: Gastroesophageal reflux disease without esophagitis      fluorouracil (EFUDEX) 5 % external cream Apply topically 2 times daily Using an amount sufficient to cover the affected areas on the scalp and temple  Qty: 40 g, Refills: 3    Associated Diagnoses: Actinic keratosis                   Consultations:   No consultations were requested during this admission          Brief History of Illness:   Reason for admission requiring a surgical or invasive procedure:   Prostate cancer   The patient underwent the following procedure(s):   RALP + BPLND   There were no immediate complications during this  procedure.    Please refer to the full operative summary for details.             Hospital Course:   The patient's hospital course was unremarkable.  He recovered as anticipated and experienced no post-operative complications. SHARRON drain was removed and he was discharged home with Mendoza catheter in place          Discharge Instructions and Follow-Up:     Discharge diet: Regular   Discharge activity: No heavy lifting, pushing, pulling for 6 week(s)   Discharge follow-up: Follow up with Dr. Ramirez in 11/16/2023   Wound care: Keep wound clean and dry           Discharge Disposition:     Discharged to home      Attestation:  I have reviewed today's vital signs, notes, medications, labs and imaging.  Amount of time performed on this discharge summary: 15 minutes.    Luis Carrington MD

## 2023-11-04 NOTE — PLAN OF CARE
Patient discharging home with family. AVS gone over with patient in room and prescriptions given to patient. SHARRON drain and PIV removed. Mendoza teaching done with demonstrated understanding. All questions answered.

## 2023-11-04 NOTE — PROGRESS NOTES
[facility] Urology Post-Op / Progress Note         Assessment and Plan:    Assessment:   Post-operative day #1  1) Robotic-assisted laparoscopic radical prostatectomy  2) Bilateral pelvic lymphadenectomy     Doing well, no acute issues. Tolerating clear liquids      Plan:   ADAT  AM labs pending  Likely drain out and discharge home later today if pain controlled, tolerating a diet, and ambulating well    Luis Carrington MD   [facility]rology  [phone] clinic phone             Interval History:   No acute events. Poor pain control last night but last 6 hours much better. Feeling well. Tolerating sips w/o nausea or vomiting          Significant Problems:      Past Medical History:   Diagnosis Date    Abdominal pain     Basal cell carcinoma     Calculus of bile duct     Elevated liver enzymes     Essential hypertension, benign     abstracted 6/19/02    Gastro-oesophageal reflux disease     GERD (gastroesophageal reflux disease) 7/28/2008    Liver disease     elevated liver enzymes    Squamous cell carcinoma     Unspecified cerebral artery occlusion with cerebral infarction     Unspecified condition of brain     abstracted 6/19/02             Review of Systems:    The patient denies any chest pain, shortness of breath, excessive pain, fever, chills, purulent drainage from the wound, nausea or vomiting.          Medications:   All medications related to the patient's surgery have been reviewed  Current Facility-Administered Medications   Medication    acetaminophen (TYLENOL) tablet 975 mg    atenolol (TENORMIN) tablet 50 mg    HYDROmorphone (DILAUDID) injection 0.2 mg    Or    HYDROmorphone (DILAUDID) injection 0.4 mg    lidocaine (LMX4) cream    lidocaine 1 % 0.1-1 mL    naloxone (NARCAN) injection 0.2 mg    Or    naloxone (NARCAN) injection 0.4 mg    Or    naloxone (NARCAN) injection 0.2 mg    Or    naloxone (NARCAN) injection 0.4 mg    ondansetron (ZOFRAN ODT) ODT tab 4 mg    Or    ondansetron (ZOFRAN)  injection 4 mg    oxyBUTYnin ER (DITROPAN XL) 24 hr tablet 5 mg    oxyCODONE (ROXICODONE) tablet 5 mg    Or    oxyCODONE (ROXICODONE) tablet 10 mg    pantoprazole (PROTONIX) EC tablet 40 mg    polyethylene glycol (MIRALAX) Packet 17 g    senna-docusate (SENOKOT-S/PERICOLACE) 8.6-50 MG per tablet 1 tablet    sodium chloride (PF) 0.9% PF flush 3 mL    sodium chloride (PF) 0.9% PF flush 3 mL    sodium chloride 0.9 % infusion             Physical Exam:   All vitals stable  Temp: 98  F (36.7  C) Temp src: Oral BP: (!) 144/79 Pulse: 85   Resp: 17 SpO2: 95 % O2 Device: None (Room air) Oxygen Delivery: 1 LPM  NAD  Nonlabored breathing on o2 nc  Awake, alert  CARDENAS  Abd soft, nt, nd  Incisions c/d/I  HSARRON serosang, 110 ml since yesterday with 80 ml charted overnight  Mendoza clear yellow          Data:   All laboratory data related to this surgery reviewed  No results found for this or any previous visit (from the past 24 hour(s)).  No imaging ordered    Luis Carrington MD

## 2023-11-04 NOTE — PLAN OF CARE
Pt is AOx4, VSS on RA, pain managed with oxy and tylenol, lap sites wnl, abdomen soft, tender, BS hypo, SHARRON wnl, Mendoza clear yellow urine, adequate output, tolerating diet, ambulated in samuels SBA. Possible discharge today.

## 2023-11-07 ENCOUNTER — PATIENT OUTREACH (OUTPATIENT)
Dept: UROLOGY | Facility: CLINIC | Age: 68
End: 2023-11-07
Payer: COMMERCIAL

## 2023-11-07 LAB
PATH REPORT.COMMENTS IMP SPEC: ABNORMAL
PATH REPORT.COMMENTS IMP SPEC: ABNORMAL
PATH REPORT.COMMENTS IMP SPEC: YES
PATH REPORT.FINAL DX SPEC: ABNORMAL
PATH REPORT.GROSS SPEC: ABNORMAL
PATH REPORT.MICROSCOPIC SPEC OTHER STN: ABNORMAL
PATH REPORT.RELEVANT HX SPEC: ABNORMAL
PATHOLOGY SYNOPTIC REPORT: ABNORMAL
PHOTO IMAGE: ABNORMAL

## 2023-11-07 PROCEDURE — 88305 TISSUE EXAM BY PATHOLOGIST: CPT | Mod: 26 | Performed by: PATHOLOGY

## 2023-11-07 PROCEDURE — 88309 TISSUE EXAM BY PATHOLOGIST: CPT | Mod: 26 | Performed by: PATHOLOGY

## 2023-11-07 PROCEDURE — 88304 TISSUE EXAM BY PATHOLOGIST: CPT | Mod: 26 | Performed by: PATHOLOGY

## 2023-11-07 NOTE — TELEPHONE ENCOUNTER
Pt reached out to writer questioning if he could have a sooner appt as he would really like to have his catheter removed.     Writer will discuss with the provider if pt can do 11/9 vs 11/16. Pt also stating that he has had some trouble with having a BM. Pt noted that he finally had a BM yesterday and this morning; some pushing noted.     Writer encouraged pt to stay well hydrated, use stool softeners, and wean off oxycodone as tolerated. Writer encouraged pt to use tylenol for pain control as needed.     Writer will reschedule per providers recommendations. Pt agreeable to plan.    Will continue to follow as needed.

## 2023-11-08 ENCOUNTER — PATIENT OUTREACH (OUTPATIENT)
Dept: UROLOGY | Facility: CLINIC | Age: 68
End: 2023-11-08
Payer: COMMERCIAL

## 2023-11-08 NOTE — TELEPHONE ENCOUNTER
Writer reached out to pt to inform him that the provider is okay with a sooner appt.     Pt has been scheduled for 11/9 at 12:00. Pt agreeable to date and time.     Will continue to follow as needed

## 2023-11-09 ENCOUNTER — OFFICE VISIT (OUTPATIENT)
Dept: ONCOLOGY | Facility: CLINIC | Age: 68
End: 2023-11-09
Attending: UROLOGY
Payer: COMMERCIAL

## 2023-11-09 ENCOUNTER — PATIENT OUTREACH (OUTPATIENT)
Dept: ONCOLOGY | Facility: CLINIC | Age: 68
End: 2023-11-09

## 2023-11-09 VITALS
BODY MASS INDEX: 30.8 KG/M2 | RESPIRATION RATE: 18 BRPM | TEMPERATURE: 98.3 F | HEART RATE: 63 BPM | WEIGHT: 208.6 LBS | OXYGEN SATURATION: 97 % | SYSTOLIC BLOOD PRESSURE: 152 MMHG | DIASTOLIC BLOOD PRESSURE: 83 MMHG

## 2023-11-09 DIAGNOSIS — C61 PROSTATE CANCER (H): Primary | ICD-10-CM

## 2023-11-09 PROCEDURE — G0463 HOSPITAL OUTPT CLINIC VISIT: HCPCS | Performed by: UROLOGY

## 2023-11-09 PROCEDURE — 99214 OFFICE O/P EST MOD 30 MIN: CPT | Mod: 24 | Performed by: UROLOGY

## 2023-11-09 RX ORDER — ACETAMINOPHEN 325 MG/1
325-650 TABLET ORAL EVERY 6 HOURS PRN
COMMUNITY
End: 2024-04-29

## 2023-11-09 ASSESSMENT — PAIN SCALES - GENERAL: PAINLEVEL: MILD PAIN (3)

## 2023-11-09 NOTE — PROGRESS NOTES
"Nursing Note:  Ronnie Lagos presents today for catheter removal.  Patient seen by provider today: Yes: .   present during visit today: Not Applicable.    Note: N/A.    Labs:  Not Applicable.     Procedure:  Urology Procedure: catheter removal.    Verified:  Time out included verbal and active participation of all team members: Yes.      Post Procedure:  Patient tolerated the procedure without incident..    Post Pain Assessment: 0 out of 10.    Discharge Plan:   Departure Mode: Ambulatory.    Eileen Finnegan [facility]      Urology Clinic     HPI  Ronnie Lagos is a 68 year old male with history of prostate cancer sp RALP and PLND, here for follow-up.      The patient denies any major issues with recovery after the surgery.     No changes to health, hospitalizations or new diagnoses in the interim    PHYSICAL EXAM  BP (!) 152/83   Pulse 63   Temp 98.3  F (36.8  C) (Tympanic)   Resp 18   Wt 94.6 kg (208 lb 9.6 oz)   SpO2 97%   BMI 30.80 kg/m     Constitutional: AO, pleasant, NAD  Resp: Non-labored breathing on room air  Abd: Soft, NT, ND  GIULIANA:Deferred   Ext WWP     Labs  Lab Results   Component Value Date    WBC 11.1 11/04/2023    WBC 6.0 01/12/2018     Lab Results   Component Value Date    RBC 4.47 11/04/2023    RBC 4.70 01/12/2018     Lab Results   Component Value Date    HGB 14.3 11/04/2023    HGB 16.7 04/06/2021     Lab Results   Component Value Date    HCT 41.3 11/04/2023    HCT 42.0 01/12/2018     No components found for: \"MCT\"  Lab Results   Component Value Date    MCV 92 11/04/2023    MCV 89 01/12/2018     Lab Results   Component Value Date    MCH 32.0 11/04/2023    MCH 31.9 01/12/2018     Lab Results   Component Value Date    MCHC 34.6 11/04/2023    MCHC 35.7 01/12/2018     Lab Results   Component Value Date    RDW 11.3 11/04/2023    RDW 12.2 01/12/2018     Lab Results   Component Value Date     11/04/2023     01/18/2018        Last Comprehensive Metabolic " Panel:  Sodium   Date Value Ref Range Status   11/04/2023 136 135 - 145 mmol/L Final     Comment:     Reference intervals for this test were updated on 09/26/2023 to more accurately reflect our healthy population. There may be differences in the flagging of prior results with similar values performed with this method. Interpretation of those prior results can be made in the context of the updated reference intervals.    04/06/2021 133 133 - 144 mmol/L Final     Potassium   Date Value Ref Range Status   11/04/2023 4.7 3.4 - 5.3 mmol/L Final   05/10/2022 4.6 3.4 - 5.3 mmol/L Final   04/06/2021 4.4 3.4 - 5.3 mmol/L Final     Chloride   Date Value Ref Range Status   11/04/2023 100 98 - 107 mmol/L Final   05/10/2022 99 94 - 109 mmol/L Final   04/06/2021 101 94 - 109 mmol/L Final     Carbon Dioxide   Date Value Ref Range Status   04/06/2021 31 20 - 32 mmol/L Final     Carbon Dioxide (CO2)   Date Value Ref Range Status   11/04/2023 27 22 - 29 mmol/L Final   05/10/2022 28 20 - 32 mmol/L Final     Anion Gap   Date Value Ref Range Status   11/04/2023 9 7 - 15 mmol/L Final   05/10/2022 4 3 - 14 mmol/L Final   04/06/2021 1 (L) 3 - 14 mmol/L Final     Glucose   Date Value Ref Range Status   11/04/2023 100 (H) 70 - 99 mg/dL Final   05/10/2022 112 (H) 70 - 99 mg/dL Final   04/06/2021 110 (H) 70 - 99 mg/dL Final     Urea Nitrogen   Date Value Ref Range Status   11/04/2023 8.4 8.0 - 23.0 mg/dL Final   05/10/2022 10 7 - 30 mg/dL Final   04/06/2021 8 7 - 30 mg/dL Final     Creatinine   Date Value Ref Range Status   11/04/2023 0.85 0.67 - 1.17 mg/dL Final   04/06/2021 0.92 0.66 - 1.25 mg/dL Final     GFR Estimate   Date Value Ref Range Status   11/04/2023 >90 >60 mL/min/1.73m2 Final   04/06/2021 87 >60 mL/min/[1.73_m2] Final     Comment:     Non  GFR Calc  Starting 12/18/2018, serum creatinine based estimated GFR (eGFR) will be   calculated using the Chronic Kidney Disease Epidemiology Collaboration   (CKD-EPI)  equation.       Calcium   Date Value Ref Range Status   11/04/2023 9.2 8.8 - 10.2 mg/dL Final   04/06/2021 9.3 8.5 - 10.1 mg/dL Final     Bilirubin Total   Date Value Ref Range Status   10/19/2023 2.1 (H) <=1.2 mg/dL Final   04/06/2021 2.3 (H) 0.2 - 1.3 mg/dL Final     Alkaline Phosphatase   Date Value Ref Range Status   10/19/2023 72 40 - 129 U/L Final   04/06/2021 60 40 - 150 U/L Final     ALT   Date Value Ref Range Status   10/19/2023 79 (H) 0 - 70 U/L Final     Comment:     Reference intervals for this test were updated on 6/12/2023 to more accurately reflect our healthy population. There may be differences in the flagging of prior results with similar values performed with this method. Interpretation of those prior results can be made in the context of the updated reference intervals.     04/06/2021 70 0 - 70 U/L Final     AST   Date Value Ref Range Status   10/19/2023 71 (H) 0 - 45 U/L Final     Comment:     Reference intervals for this test were updated on 6/12/2023 to more accurately reflect our healthy population. There may be differences in the flagging of prior results with similar values performed with this method. Interpretation of those prior results can be made in the context of the updated reference intervals.   04/06/2021 66 (H) 0 - 45 U/L Final       PSA   Date Value Ref Range Status   04/06/2021 1.51 0 - 4 ug/L Final     Comment:     Assay Method:  Chemiluminescence using Siemens Vista analyzer   01/30/2020 2.08 0 - 4 ug/L Final     Comment:     Assay Method:  Chemiluminescence using Siemens Vista analyzer   11/29/2018 1.72 0 - 4 ug/L Final     Comment:     Assay Method:  Chemiluminescence using Siemens Vista analyzer   11/28/2017 0.96 0 - 4 ug/L Final     Comment:     Assay Method:  Chemiluminescence using Siemens Vista analyzer   11/22/2016 0.66 0 - 4 ug/L Final     Comment:     Assay Method:  Chemiluminescence using Siemens Vista analyzer   09/23/2015 0.82 0 - 4 ug/L Final   01/28/2013 0.89 0 -  4 ug/L Final   01/31/2012 0.85 0 - 4 ug/L Final   01/31/2011 0.73 0 - 4 ug/L Final   01/19/2010 0.87 0 - 4 ug/L Final     Prostate Specific Antigen Screen   Date Value Ref Range Status   05/11/2023 8.08 (H) 0.00 - 4.50 ng/mL Final   05/10/2022 3.44 0.00 - 4.00 ug/L Final      Surgical pathology     A.  Prostate, periprostatic fat, excision-  -Negative for malignancy     B.  Lymph nodes, left pelvic, excision-  -One lymph node negative for metastatic carcinoma (0/1)        C.  Lymph nodes, right pelvic, excision-  -One lymph node negative for metastatic carcinoma (0/1)        D.Prostate, radical prostatectomy-  -Acinar adenocarcinoma, [place]'s grade 4+5 = 9, involving ~50 % of prostate volume, grade group 5 ,pT3a pN0  -Margins are widely positive for malignancy  -Please see detailed tumor synopsis below    Imaging   No new imaging to review     ASSESSMENT AND PLAN  68 year old male with GG4 prostate cancer pT3aN0 with widely positive margins       I told Ronnie that this is a significant upstaging from his biopsy and given the aggressive nature of his disease, I would recommend a referral to radiation oncology for consideration of adjuvant radiation.       Plan   Referral to radiation oncology   Start Ira exercise     30 total minutes spent on the date of the encounter including direct interaction with the patient, performing chart review, documentation and further activities as noted above    Sophia Ramirez MD   Department of Urology   [facility]

## 2023-11-09 NOTE — LETTER
"    11/9/2023         RE: Ronnie Lagos  [address]  [facility] [address]        Dear Colleague,    Thank you for referring your patient, Ronnie Lagos, to the [facility]. Please see a copy of my visit note below.    Nursing Note:  Ronnie Lagos presents today for catheter removal.  Patient seen by provider today: Yes: .   present during visit today: Not Applicable.    Note: N/A.    Labs:  Not Applicable.     Procedure:  Urology Procedure: catheter removal.    Verified:  Time out included verbal and active participation of all team members: Yes.      Post Procedure:  Patient tolerated the procedure without incident..    Post Pain Assessment: 0 out of 10.    Discharge Plan:   Departure Mode: Ambulatory.    Eileen Finnegan, [facility]      Urology Clinic     HPI  Ronnie Lagos is a 68 year old male with history of prostate cancer sp RALP and PLND, here for follow-up.      The patient denies any major issues with recovery after the surgery.     No changes to health, hospitalizations or new diagnoses in the interim    PHYSICAL EXAM  BP (!) 152/83   Pulse 63   Temp 98.3  F (36.8  C) (Tympanic)   Resp 18   Wt 94.6 kg (208 lb 9.6 oz)   SpO2 97%   BMI 30.80 kg/m     Constitutional: AO, pleasant, NAD  Resp: Non-labored breathing on room air  Abd: Soft, NT, ND  GIULIANA:Deferred   Ext WWP     Labs  Lab Results   Component Value Date    WBC 11.1 11/04/2023    WBC 6.0 01/12/2018     Lab Results   Component Value Date    RBC 4.47 11/04/2023    RBC 4.70 01/12/2018     Lab Results   Component Value Date    HGB 14.3 11/04/2023    HGB 16.7 04/06/2021     Lab Results   Component Value Date    HCT 41.3 11/04/2023    HCT 42.0 01/12/2018     No components found for: \"MCT\"  Lab Results   Component Value Date    MCV 92 11/04/2023    MCV 89 01/12/2018     Lab Results   Component Value Date    MCH 32.0 11/04/2023    MCH 31.9 01/12/2018     Lab Results   Component Value Date "    MCHC 34.6 11/04/2023    MCHC 35.7 01/12/2018     Lab Results   Component Value Date    RDW 11.3 11/04/2023    RDW 12.2 01/12/2018     Lab Results   Component Value Date     11/04/2023     01/18/2018        Last Comprehensive Metabolic Panel:  Sodium   Date Value Ref Range Status   11/04/2023 136 135 - 145 mmol/L Final     Comment:     Reference intervals for this test were updated on 09/26/2023 to more accurately reflect our healthy population. There may be differences in the flagging of prior results with similar values performed with this method. Interpretation of those prior results can be made in the context of the updated reference intervals.    04/06/2021 133 133 - 144 mmol/L Final     Potassium   Date Value Ref Range Status   11/04/2023 4.7 3.4 - 5.3 mmol/L Final   05/10/2022 4.6 3.4 - 5.3 mmol/L Final   04/06/2021 4.4 3.4 - 5.3 mmol/L Final     Chloride   Date Value Ref Range Status   11/04/2023 100 98 - 107 mmol/L Final   05/10/2022 99 94 - 109 mmol/L Final   04/06/2021 101 94 - 109 mmol/L Final     Carbon Dioxide   Date Value Ref Range Status   04/06/2021 31 20 - 32 mmol/L Final     Carbon Dioxide (CO2)   Date Value Ref Range Status   11/04/2023 27 22 - 29 mmol/L Final   05/10/2022 28 20 - 32 mmol/L Final     Anion Gap   Date Value Ref Range Status   11/04/2023 9 7 - 15 mmol/L Final   05/10/2022 4 3 - 14 mmol/L Final   04/06/2021 1 (L) 3 - 14 mmol/L Final     Glucose   Date Value Ref Range Status   11/04/2023 100 (H) 70 - 99 mg/dL Final   05/10/2022 112 (H) 70 - 99 mg/dL Final   04/06/2021 110 (H) 70 - 99 mg/dL Final     Urea Nitrogen   Date Value Ref Range Status   11/04/2023 8.4 8.0 - 23.0 mg/dL Final   05/10/2022 10 7 - 30 mg/dL Final   04/06/2021 8 7 - 30 mg/dL Final     Creatinine   Date Value Ref Range Status   11/04/2023 0.85 0.67 - 1.17 mg/dL Final   04/06/2021 0.92 0.66 - 1.25 mg/dL Final     GFR Estimate   Date Value Ref Range Status   11/04/2023 >90 >60 mL/min/1.73m2 Final    04/06/2021 87 >60 mL/min/[1.73_m2] Final     Comment:     Non  GFR Calc  Starting 12/18/2018, serum creatinine based estimated GFR (eGFR) will be   calculated using the Chronic Kidney Disease Epidemiology Collaboration   (CKD-EPI) equation.       Calcium   Date Value Ref Range Status   11/04/2023 9.2 8.8 - 10.2 mg/dL Final   04/06/2021 9.3 8.5 - 10.1 mg/dL Final     Bilirubin Total   Date Value Ref Range Status   10/19/2023 2.1 (H) <=1.2 mg/dL Final   04/06/2021 2.3 (H) 0.2 - 1.3 mg/dL Final     Alkaline Phosphatase   Date Value Ref Range Status   10/19/2023 72 40 - 129 U/L Final   04/06/2021 60 40 - 150 U/L Final     ALT   Date Value Ref Range Status   10/19/2023 79 (H) 0 - 70 U/L Final     Comment:     Reference intervals for this test were updated on 6/12/2023 to more accurately reflect our healthy population. There may be differences in the flagging of prior results with similar values performed with this method. Interpretation of those prior results can be made in the context of the updated reference intervals.     04/06/2021 70 0 - 70 U/L Final     AST   Date Value Ref Range Status   10/19/2023 71 (H) 0 - 45 U/L Final     Comment:     Reference intervals for this test were updated on 6/12/2023 to more accurately reflect our healthy population. There may be differences in the flagging of prior results with similar values performed with this method. Interpretation of those prior results can be made in the context of the updated reference intervals.   04/06/2021 66 (H) 0 - 45 U/L Final       PSA   Date Value Ref Range Status   04/06/2021 1.51 0 - 4 ug/L Final     Comment:     Assay Method:  Chemiluminescence using Siemens Vista analyzer   01/30/2020 2.08 0 - 4 ug/L Final     Comment:     Assay Method:  Chemiluminescence using Siemens Vista analyzer   11/29/2018 1.72 0 - 4 ug/L Final     Comment:     Assay Method:  Chemiluminescence using Siemens Vista analyzer   11/28/2017 0.96 0 - 4 ug/L Final      Comment:     Assay Method:  Chemiluminescence using Siemens Vista analyzer   11/22/2016 0.66 0 - 4 ug/L Final     Comment:     Assay Method:  Chemiluminescence using Siemens Vista analyzer   09/23/2015 0.82 0 - 4 ug/L Final   01/28/2013 0.89 0 - 4 ug/L Final   01/31/2012 0.85 0 - 4 ug/L Final   01/31/2011 0.73 0 - 4 ug/L Final   01/19/2010 0.87 0 - 4 ug/L Final     Prostate Specific Antigen Screen   Date Value Ref Range Status   05/11/2023 8.08 (H) 0.00 - 4.50 ng/mL Final   05/10/2022 3.44 0.00 - 4.00 ug/L Final      Surgical pathology     A.  Prostate, periprostatic fat, excision-  -Negative for malignancy     B.  Lymph nodes, left pelvic, excision-  -One lymph node negative for metastatic carcinoma (0/1)        C.  Lymph nodes, right pelvic, excision-  -One lymph node negative for metastatic carcinoma (0/1)        D.Prostate, radical prostatectomy-  -Acinar adenocarcinoma, [place]'s grade 4+5 = 9, involving ~50 % of prostate volume, grade group 5 ,pT3a pN0  -Margins are widely positive for malignancy  -Please see detailed tumor synopsis below    Imaging   No new imaging to review     ASSESSMENT AND PLAN  68 year old male with GG4 prostate cancer pT3aN0 with widely positive margins       I told Ronnie that this is a significant upstaging from his biopsy and given the aggressive nature of his disease, I would recommend a referral to radiation oncology for consideration of adjuvant radiation.       Plan   Referral to radiation oncology   Start Lupillogel exercise     30 total minutes spent on the date of the encounter including direct interaction with the patient, performing chart review, documentation and further activities as noted above    Sophia Ramirez MD   Department of Urology   [facility]                   Again, thank you for allowing me to participate in the care of your patient.        Sincerely,        Sophia Ramirez MD

## 2023-11-09 NOTE — PROGRESS NOTES
New Patient Radiation Oncology Nurse Navigator Note     Referring provider:   Sophia Ramirez MD  [facility] [phone]        Referred to (specialty): Radiation Oncology    Requested provider (if applicable):   [facility] [place]: [phone]     Date Referral Received:   11/9/23     Evaluation for :   sp prostatectomy 4+5 positive margins, consider adjuvant radiation     Clinical History (per Nurse review of records provided):    Patient of Dr. Ramirez s/p 11/03/2023 prostatectomy - GG4 prostate cancer pT3aN0 with widely positive margins.  Referral to radiation oncology for consideration of adjuvant radiation.      Pertinent urology notes, pathology/labs & imaging bookmarked**        Records Location (Care Everywhere, Media, etc.): Epic     Previous radiation treatment: NONE       I called to discuss radiation oncology referral with Ronnie.  He indicates none of our locations are convenient and he would like to go close to home.  I will inform Dr. Ramirez that patient is seeking radiation in [place] at [facility].  I did encourage Ronnie to call his insurance to inquire if they are covered under his plan.

## 2023-11-09 NOTE — NURSING NOTE
"Oncology Rooming Note    November 9, 2023 12:04 PM   Ronnie Lagos is a 68 year old male who presents for:    Chief Complaint   Patient presents with    Oncology Clinic Visit     Initial Vitals: BP (!) 152/83   Pulse 63   Temp 98.3  F (36.8  C) (Tympanic)   Resp 18   Wt 94.6 kg (208 lb 9.6 oz)   SpO2 97%   BMI 30.80 kg/m   Estimated body mass index is 30.8 kg/m  as calculated from the following:    Height as of 11/3/23: 1.753 m (5' 9\").    Weight as of this encounter: 94.6 kg (208 lb 9.6 oz). Body surface area is 2.15 meters squared.  Mild Pain (3) Comment: Data Unavailable   No LMP for male patient.  Allergies reviewed: Yes  Medications reviewed: Yes    Medications: Medication refills not needed today.  Pharmacy name entered into [employer]: [id number] - [place], [place] - [address] [facility]    Clinical concerns: f/u       Eileen Finnegan CMA              "

## 2023-11-16 ENCOUNTER — TRANSFERRED RECORDS (OUTPATIENT)
Dept: HEALTH INFORMATION MANAGEMENT | Facility: CLINIC | Age: 68
End: 2023-11-16
Payer: COMMERCIAL

## 2023-11-21 ENCOUNTER — OFFICE VISIT (OUTPATIENT)
Dept: INTERNAL MEDICINE | Facility: CLINIC | Age: 68
End: 2023-11-21
Payer: COMMERCIAL

## 2023-11-21 VITALS
OXYGEN SATURATION: 99 % | WEIGHT: 209.9 LBS | RESPIRATION RATE: 14 BRPM | TEMPERATURE: 97.3 F | HEART RATE: 66 BPM | DIASTOLIC BLOOD PRESSURE: 82 MMHG | SYSTOLIC BLOOD PRESSURE: 136 MMHG | BODY MASS INDEX: 31 KG/M2

## 2023-11-21 DIAGNOSIS — C61 PROSTATE CANCER (H): ICD-10-CM

## 2023-11-21 DIAGNOSIS — K21.9 GASTROESOPHAGEAL REFLUX DISEASE WITHOUT ESOPHAGITIS: ICD-10-CM

## 2023-11-21 DIAGNOSIS — Z12.11 SCREEN FOR COLON CANCER: ICD-10-CM

## 2023-11-21 DIAGNOSIS — Z01.818 PRE-OPERATIVE GENERAL PHYSICAL EXAMINATION: Primary | ICD-10-CM

## 2023-11-21 DIAGNOSIS — I10 ESSENTIAL HYPERTENSION, BENIGN: ICD-10-CM

## 2023-11-21 DIAGNOSIS — M25.519 SHOULDER PAIN, UNSPECIFIED CHRONICITY, UNSPECIFIED LATERALITY: ICD-10-CM

## 2023-11-21 DIAGNOSIS — Z13.6 ENCOUNTER FOR ABDOMINAL AORTIC ANEURYSM (AAA) SCREENING: ICD-10-CM

## 2023-11-21 PROCEDURE — 99214 OFFICE O/P EST MOD 30 MIN: CPT | Mod: 25 | Performed by: INTERNAL MEDICINE

## 2023-11-21 PROCEDURE — 93000 ELECTROCARDIOGRAM COMPLETE: CPT | Performed by: INTERNAL MEDICINE

## 2023-11-21 NOTE — PROGRESS NOTES
[address] [address]  Phone: [phone]  Primary Provider: Renata Jones  Pre-op Performing Provider: RENATA JONES    PREOPERATIVE EVALUATION:  Today's date: 11/21/2023    Ronnie is a 68 year old, presenting for the following:  Pre-Op Exam      Surgical Information:  Surgery/Procedure: Left shoulder arthroscopy   Surgery Location: [facility]  Surgeon: Dr. Carbone   Surgery Date: 11/27/23  Time of Surgery: 12:30 pm   Where patient plans to recover: At home with family  Fax number for surgical facility: [phone]    Assessment & Plan     The proposed surgical procedure is considered mild risk.    Pre-operative general physical examination  Surgery to proceed as scheduled.  - EKG 12-lead complete w/read - Clinics    Shoulder pain, unspecified chronicity, unspecified laterality  Routine postoperative course per orthopedics as directed    Essential hypertension, benign  Stable on therapy and discussed a.m. dosing of medicine with patient    Gastroesophageal reflux disease without esophagitis  Continue with current medical management.    Screen for colon cancer  ADVISED COLONOSCOPY FOR ROUTINE PREVENTATIVE CARE.  - Colonoscopy Screening  Referral; Future    Encounter for abdominal aortic aneurysm (AAA) screening  Repeat order placed for abdominal aortic aneurysm screening  - US Aorta Medicare AAA Screening; Future    (C61) Prostate cancer (H)  Comment: Noted as prior history  Plan:      - No identified additional risk factors other than previously addressed    Antiplatelet or Anticoagulation Medication Instructions:   - aspirin: Discontinue aspirin 7-10 days prior to procedure to reduce bleeding risk. It should be resumed postoperatively.     Additional Medication Instructions:  Discussed and dosing of medication and also elimination of traditional anti-inflammatories and other herbal supplements prior to  procedure.    RECOMMENDATION:  APPROVAL GIVEN to proceed with proposed procedure, without further diagnostic evaluation.      Subjective       HPI related to upcoming procedure: Repair left shoulder          11/21/2023     2:09 PM   Preop Questions   1. Have you ever had a heart attack or stroke? YES -    2. Have you ever had surgery on your heart or blood vessels, such as a stent placement, a coronary artery bypass, or surgery on an artery in your head, neck, heart, or legs? No   3. Do you have chest pain with activity? No   4. Do you have a history of  heart failure? No   5. Do you currently have a cold, bronchitis or symptoms of other infection? No   6. Do you have a cough, shortness of breath, or wheezing? No   7. Do you or anyone in your family have previous history of blood clots? No   8. Do you or does anyone in your family have a serious bleeding problem such as prolonged bleeding following surgeries or cuts? No   9. Have you ever had problems with anemia or been told to take iron pills? No   10. Have you had any abnormal blood loss such as black, tarry or bloody stools? No   11. Have you ever had a blood transfusion? No   12. Are you willing to have a blood transfusion if it is medically needed before, during, or after your surgery? Yes   13. Have you or any of your relatives ever had problems with anesthesia? No   14. Do you have sleep apnea, excessive snoring or daytime drowsiness? No   15. Do you have any artifical heart valves or other implanted medical devices like a pacemaker, defibrillator, or continuous glucose monitor? No   16. Do you have artificial joints? No   17. Are you allergic to latex? No       Health Care Directive:  Patient does not have a Health Care Directive or Living Will: Advance Directive received and scanned. Click on Code in the patient header to view.      Status of Chronic Conditions:  See problem list for active medical problems.  Problems all longstanding and stable, except as  noted/documented.  See ROS for pertinent symptoms related to these conditions.    Review of Systems  CONSTITUTIONAL: NEGATIVE for fever, chills, change in weight  EYES: NEGATIVE for vision changes or irritation  ENT/MOUTH: NEGATIVE for ear, mouth and throat problems  RESP: NEGATIVE for significant cough or SOB  CV: NEGATIVE for chest pain, palpitations or peripheral edema  GI: NEGATIVE for nausea, abdominal pain, heartburn, or change in bowel habits  : NEGATIVE for frequency, dysuria, or hematuria  NEURO: NEGATIVE for weakness, dizziness or paresthesias  ENDOCRINE: NEGATIVE for temperature intolerance, skin/hair changes  HEME: NEGATIVE for bleeding problems  PSYCHIATRIC: NEGATIVE for changes in mood or affect    Patient Active Problem List    Diagnosis Date Noted    Gastroesophageal reflux disease without esophagitis 09/23/2015     Priority: Medium    Alcohol use 04/22/2015     Priority: Medium    Osteoarthritis of finger of right hand 12/23/2014     Priority: Medium    Right hand pain 12/15/2014     Priority: Medium    Injury of extensor tendon of hand 10/07/2013     Priority: Medium    Hallux limitus 01/28/2013     Priority: Medium    Advance care planning 01/31/2012     Priority: Medium     Advance Care Planning 11/16/2017: ACP Review of Chart / Resources Provided:  Reviewed chart for advance care plan.  Ronnie MENDOZA Yolis has been provided information and resources to begin or update their advance care plan.  Added by Laura Amador              Neck pain 06/16/2011     Priority: Medium    CARDIOVASCULAR SCREENING; LDL GOAL LESS THAN 160 10/31/2010     Priority: Medium    Lumbago 10/27/2010     Priority: Medium    Cervicalgia 10/27/2010     Priority: Medium    Essential hypertension, benign 11/12/2003     Priority: Medium    Impotence of organic origin 11/12/2003     Priority: Medium      Past Medical History:   Diagnosis Date    Abdominal pain     Basal cell carcinoma     Calculus of bile duct     Elevated  liver enzymes     Essential hypertension, benign     abstracted 6/19/02    Gastro-oesophageal reflux disease     GERD (gastroesophageal reflux disease) 7/28/2008    Liver disease     elevated liver enzymes    Squamous cell carcinoma     Unspecified cerebral artery occlusion with cerebral infarction     Unspecified condition of brain     abstracted 6/19/02     Past Surgical History:   Procedure Laterality Date    ARTHRODESIS FOOT  2/5/2013    Procedure: ARTHRODESIS FOOT;  LEFT FIRST METATARSOPHALANGEAL JOINT ARTHRODESIS ;  Surgeon: Burton Loera DPM;  Location: [facility]    COLONOSCOPY N/A 12/7/2018    Procedure: COMBINED COLONOSCOPY, SINGLE OR MULTIPLE BIOPSY/POLYPECTOMY BY BIOPSY;  Surgeon: Blayne Mora MD;  Location:  GI    DAVINCI PROSTATECTOMY, LYMPHADENECTOMY N/A 11/3/2023    Procedure: PROSTATECTOMY, ROBOT-ASSISTED, WITH PELVIC LYMPHADENECTOMY;  Surgeon: Sophia Ramirez MD;  Location:  OR    ENDOSCOPIC RETROGRADE CHOLANGIOPANCREATOGRAM N/A 1/18/2018    Procedure: COMBINED ENDOSCOPIC RETROGRADE CHOLANGIOPANCREATOGRAPHY, SPHINCTEROTOMY;  ENDOSCOPIC RETROGRADE CHOLANGIOPANCREATOGRAM (ERCP), SPHINCTEROTOMY, STONE REMOVAL, STENT PLACEMENT;  Surgeon: Christiano Sorenson MD;  Location:  OR    ENDOSCOPIC RETROGRADE CHOLANGIOPANCREATOGRAM N/A 4/12/2018    Procedure: ENDOSCOPIC RETROGRADE CHOLANGIOPANCREATOGRAM;  ENDOSCOPIC RETROGRADE CHOLANIOPANCREATOGRAPHY AND ATTEMPTED STENT REMOVAL.;  Surgeon: Christiano Sorenson MD;  Location:  OR    ENDOSCOPIC RETROGRADE CHOLANGIOPANCREATOGRAM N/A 5/10/2018    Procedure: COMBINED ENDOSCOPIC RETROGRADE CHOLANGIOPANCREATOGRAPHY, REMOVE FOREIGN BODY OR STENT/TUBE;  ENDOSCOPIC RETROGRADE CHOLANGIOPANCREATOGRAPHY AND STENT REMOVAL;  Surgeon: Christiano Sorenson MD;  Location:  OR    ESOPHAGOSCOPY, GASTROSCOPY, DUODENOSCOPY (EGD), COMBINED N/A 4/12/2018    Procedure: COMBINED ESOPHAGOSCOPY, GASTROSCOPY, DUODENOSCOPY (EGD);  EGD with stent removal;  Surgeon:  Christiano Sorenson MD;  Location:  GI    LAPAROSCOPIC CHOLECYSTECTOMY N/A 5/1/2015    Procedure: LAPAROSCOPIC CHOLECYSTECTOMY;  Surgeon: Damien Galindo MD;  Location: [facility]    ORTHOPEDIC SURGERY      left elbow, right shoulder     Current Outpatient Medications   Medication Sig Dispense Refill    acetaminophen (TYLENOL) 325 MG tablet Take 325-650 mg by mouth every 6 hours as needed for mild pain      amLODIPine (NORVASC) 5 MG tablet Take 1 tablet (5 mg) by mouth daily (Patient taking differently: Take 5 mg by mouth every morning) 90 tablet 3    atenolol (TENORMIN) 50 MG tablet Take 1 tablet (50 mg) by mouth daily 90 tablet 3    omeprazole (PRILOSEC) 20 MG DR capsule Take 1 capsule (20 mg) by mouth daily 90 capsule 3    fluorouracil (EFUDEX) 5 % external cream Apply topically 2 times daily Using an amount sufficient to cover the affected areas on the scalp and temple (Patient not taking: Reported on 11/21/2023) 40 g 3       No Known Allergies     Social History     Tobacco Use    Smoking status: Never    Smokeless tobacco: Never   Substance Use Topics    Alcohol use: Yes     Alcohol/week: 2.0 standard drinks of alcohol     Types: 2 Standard drinks or equivalent per week     Comment: 3-4 times a week, light beer       History   Drug Use Unknown         Objective     /82 (BP Location: Left arm, Patient Position: Chair)   Pulse 66   Temp 97.3  F (36.3  C)   Resp 14   Wt 95.2 kg (209 lb 14.4 oz)   SpO2 99%   BMI 31.00 kg/m      Physical Exam    GENERAL APPEARANCE: alert and no distress     EYES: EOMI,  PERRL     HENT: ear canals and TM's normal and nose and mouth without ulcers or lesions     RESP: lungs clear to auscultation - no rales, rhonchi or wheezes     CV: regular rates and rhythm, normal S1 S2, no S3 or S4 and no murmur, click or rub     ABDOMEN:  soft, nontender, no HSM or masses and bowel sounds normal     MS: extremities normal- no gross deformities noted, no evidence of  inflammation in joints, FROM in all extremities less left shoulder with decreased ROM internal rotation.     NEURO: No focal changes     PSYCH: mentation appears normal and affect normal/bright    Recent Labs   Lab Test 11/04/23  0652 11/03/23  0608 10/19/23  1055   HGB 14.3  --  17.8*     --  206     --  134*   POTASSIUM 4.7 4.0 4.6   CR 0.85  --  0.93        Diagnostics:  No labs were ordered during this visit.   EKG: EKG demonstrates normal sinus rhythm with a heart rate of 61 bpm.  There is an RS R pattern noted in V1 which is relatively unchanged from prior EKG.  There is also voltage criteria for LVH with nonspecific ST changes again not appreciably changed from prior EKGs.    Revised Cardiac Risk Index (RCRI):  The patient has the following serious cardiovascular risks for perioperative complications:   - No serious cardiac risks = 0 points     RCRI Interpretation: 1 point: Class II (low risk - 0.9% complication rate)       Signed Electronically by: Abdiel Jones MD  Copy of this evaluation report is provided to requesting physician, Dr. Carbone.

## 2023-11-22 NOTE — PROGRESS NOTES
Pre op physical and EKG manually faxed to [facility] [place] at [phone].

## 2023-11-30 ENCOUNTER — HOSPITAL ENCOUNTER (OUTPATIENT)
Facility: CLINIC | Age: 68
End: 2023-11-30
Attending: INTERNAL MEDICINE | Admitting: INTERNAL MEDICINE
Payer: COMMERCIAL

## 2023-11-30 ENCOUNTER — TELEPHONE (OUTPATIENT)
Dept: GASTROENTEROLOGY | Facility: CLINIC | Age: 68
End: 2023-11-30
Payer: COMMERCIAL

## 2023-11-30 NOTE — TELEPHONE ENCOUNTER
"Endoscopy Scheduling Screen    Have you had a positive Covid test in the last 14 days?  No    Are you active on MyChart?   Yes    What insurance is in the chart?  Other:      Ordering/Referring Provider: RENATA FRANK    (If ordering provider performs procedure, schedule with ordering provider unless otherwise instructed. )    BMI: Estimated body mass index is 31 kg/m  as calculated from the following:    Height as of 11/3/23: 1.753 m (5' 9\").    Weight as of 11/21/23: 95.2 kg (209 lb 14.4 oz).     Sedation Ordered  moderate sedation.   If patient BMI > 50 do not schedule in [facility].    If patient BMI > 45 do not schedule at [facility].    Are you taking methadone or Suboxone?  No    Are you taking any prescription medications for pain 3 or more times per week?   No    Do you have a history of malignant hyperthermia or adverse reaction to anesthesia?  No    (Females) Are you currently pregnant?        Have you been diagnosed or told you have pulmonary hypertension?   No    Do you have an LVAD?  No    Have you been told you have moderate to severe sleep apnea?  No    Have you been told you have COPD, asthma, or any other lung disease?  No    Do you have any heart conditions?  No     Have you ever had an organ transplant?   No    Have you ever had or are you awaiting a heart or lung transplant?   No    Have you had a stroke or transient ischemic attack (TIA aka \"mini stroke\" in the last 6 months?   No    Have you been diagnosed with or been told you have cirrhosis of the liver?   No    Are you currently on dialysis?   No    Do you need assistance transferring?   No    BMI: Estimated body mass index is 31 kg/m  as calculated from the following:    Height as of 11/3/23: 1.753 m (5' 9\").    Weight as of 11/21/23: 95.2 kg (209 lb 14.4 oz).     Is patients BMI > 40 and scheduling location [facility]?  No    Do you take an injectable medication for weight loss or diabetes (excluding insulin)?  No    Do you take the medication " Naltrexone?  No    Do you take blood thinners?  No       Prep   Are you currently on dialysis or do you have chronic kidney disease?  No    Do you have a diagnosis of diabetes?  No    Do you have a diagnosis of cystic fibrosis (CF)?  No    On a regular basis do you go 3 -5 days between bowel movements?  No    BMI > 40?  No    Preferred Pharmacy:    [id number] [address] [facility]  [address] [facility] [address]  Phone: [phone] Fax: [phone]      Final Scheduling Details   Colonoscopy prep sent?  Standard MiraLAX    Procedure scheduled  Colonoscopy    Surgeon:  CANDIDO     Date of procedure:  3/22     Pre-OP / PAC:   No - Not required for this site.    Location  [facility] - Per order.    Sedation   Moderate Sedation - Per order.      Patient Reminders:   You will receive a call from a Nurse to review instructions and health history.  This assessment must be completed prior to your procedure.  Failure to complete the Nurse assessment may result in the procedure being cancelled.      On the day of your procedure, please designate an adult(s) who can drive you home stay with you for the next 24 hours. The medicines used in the exam will make you sleepy. You will not be able to drive.      You cannot take public transportation, ride share services, or non-medical taxi service without a responsible caregiver.  Medical transport services are allowed with the requirement that a responsible caregiver will receive you at your destination.  We require that drivers and caregivers are confirmed prior to your procedure.

## 2023-12-04 ENCOUNTER — TRANSFERRED RECORDS (OUTPATIENT)
Dept: HEALTH INFORMATION MANAGEMENT | Facility: CLINIC | Age: 68
End: 2023-12-04
Payer: COMMERCIAL

## 2023-12-29 ENCOUNTER — PATIENT OUTREACH (OUTPATIENT)
Dept: UROLOGY | Facility: CLINIC | Age: 68
End: 2023-12-29
Payer: COMMERCIAL

## 2023-12-29 DIAGNOSIS — N39.0 UTI (URINARY TRACT INFECTION): Primary | ICD-10-CM

## 2023-12-29 NOTE — TELEPHONE ENCOUNTER
Pt reached out to writer stating he believes he has a UTI.    Pt states that he has been having urgency and frequency along with burning during urination. Pt states that he has been having these symptoms since his surgery. No other symptoms noted.     Writer placed orders for urine sample. Pt will schedule at [facility].     Will continue to follow for results/treatment

## 2023-12-30 ENCOUNTER — APPOINTMENT (OUTPATIENT)
Dept: LAB | Facility: CLINIC | Age: 68
End: 2023-12-30
Attending: UROLOGY
Payer: COMMERCIAL

## 2023-12-30 PROCEDURE — 99000 SPECIMEN HANDLING OFFICE-LAB: CPT | Performed by: PATHOLOGY

## 2023-12-30 PROCEDURE — 81001 URINALYSIS AUTO W/SCOPE: CPT

## 2023-12-30 PROCEDURE — 87086 URINE CULTURE/COLONY COUNT: CPT | Performed by: UROLOGY

## 2023-12-31 LAB — BACTERIA UR CULT: NORMAL

## 2024-01-03 ENCOUNTER — TRANSFERRED RECORDS (OUTPATIENT)
Dept: HEALTH INFORMATION MANAGEMENT | Facility: CLINIC | Age: 69
End: 2024-01-03
Payer: COMMERCIAL

## 2024-01-05 DIAGNOSIS — N39.0 UTI (URINARY TRACT INFECTION): Primary | ICD-10-CM

## 2024-01-05 RX ORDER — NITROFURANTOIN 25; 75 MG/1; MG/1
100 CAPSULE ORAL 2 TIMES DAILY
Qty: 10 CAPSULE | Refills: 0 | Status: SHIPPED | OUTPATIENT
Start: 2024-01-05 | End: 2024-01-10

## 2024-02-05 DIAGNOSIS — C61 PROSTATE CANCER (H): Primary | ICD-10-CM

## 2024-02-05 NOTE — PROGRESS NOTES
Writer reached out to pt to inform him that orders were placed for recheck on PSA.     No answer, writer left a detailed VM with call back name and number.     Will continue to follow as needed.

## 2024-02-07 ENCOUNTER — TRANSFERRED RECORDS (OUTPATIENT)
Dept: HEALTH INFORMATION MANAGEMENT | Facility: CLINIC | Age: 69
End: 2024-02-07

## 2024-02-07 ENCOUNTER — LAB (OUTPATIENT)
Dept: LAB | Facility: CLINIC | Age: 69
End: 2024-02-07
Payer: COMMERCIAL

## 2024-02-07 DIAGNOSIS — C61 PROSTATE CANCER (H): ICD-10-CM

## 2024-02-07 PROCEDURE — 36415 COLL VENOUS BLD VENIPUNCTURE: CPT

## 2024-02-07 PROCEDURE — 84153 ASSAY OF PSA TOTAL: CPT

## 2024-02-08 LAB — PSA SERPL DL<=0.01 NG/ML-MCNC: 4.06 NG/ML (ref 0–4.5)

## 2024-02-09 ENCOUNTER — PATIENT OUTREACH (OUTPATIENT)
Dept: UROLOGY | Facility: CLINIC | Age: 69
End: 2024-02-09
Payer: COMMERCIAL

## 2024-02-09 DIAGNOSIS — C61 PROSTATE CANCER (H): Primary | ICD-10-CM

## 2024-02-09 NOTE — TELEPHONE ENCOUNTER
Pt returned writers call regarding the need for a repeat PSA.     Pt understands the plan and will schedule at the [facility].     Will continue to follow as needed.

## 2024-02-09 NOTE — TELEPHONE ENCOUNTER
Writer reached out to the pt to inform him of the need to repeat his PSA per the providers recommendations.     No answer, writer left a detailed VM with call back name and number.     Will continue to follow as needed.

## 2024-02-15 ENCOUNTER — APPOINTMENT (OUTPATIENT)
Dept: LAB | Facility: CLINIC | Age: 69
End: 2024-02-15
Payer: COMMERCIAL

## 2024-02-15 LAB — PSA SERPL DL<=0.01 NG/ML-MCNC: 5.26 NG/ML (ref 0–4.5)

## 2024-02-15 PROCEDURE — 36415 COLL VENOUS BLD VENIPUNCTURE: CPT

## 2024-02-15 PROCEDURE — 84153 ASSAY OF PSA TOTAL: CPT | Performed by: UROLOGY

## 2024-02-15 PROCEDURE — 99000 SPECIMEN HANDLING OFFICE-LAB: CPT | Performed by: PATHOLOGY

## 2024-02-16 DIAGNOSIS — C61 PROSTATE CANCER (H): Primary | ICD-10-CM

## 2024-02-28 ENCOUNTER — HOSPITAL ENCOUNTER (OUTPATIENT)
Dept: PET IMAGING | Facility: CLINIC | Age: 69
Setting detail: NUCLEAR MEDICINE
Discharge: HOME | End: 2024-02-28
Attending: UROLOGY | Admitting: UROLOGY
Payer: COMMERCIAL

## 2024-02-28 DIAGNOSIS — C61 PROSTATE CANCER (H): ICD-10-CM

## 2024-02-28 LAB
CREAT BLD-MCNC: 0.9 MG/DL (ref 0.7–1.3)
EGFRCR SERPLBLD CKD-EPI 2021: >60 ML/MIN/1.73M2

## 2024-02-28 PROCEDURE — 343N000001 HC RX 343: Performed by: UROLOGY

## 2024-02-28 PROCEDURE — 71260 CT THORAX DX C+: CPT | Mod: 26 | Performed by: RADIOLOGY

## 2024-02-28 PROCEDURE — 82565 ASSAY OF CREATININE: CPT

## 2024-02-28 PROCEDURE — 78815 PET IMAGE W/CT SKULL-THIGH: CPT | Mod: 26 | Performed by: RADIOLOGY

## 2024-02-28 PROCEDURE — 74177 CT ABD & PELVIS W/CONTRAST: CPT | Mod: 26 | Performed by: RADIOLOGY

## 2024-02-28 PROCEDURE — A9596 HC RX 343: HCPCS | Performed by: UROLOGY

## 2024-02-28 PROCEDURE — 71260 CT THORAX DX C+: CPT

## 2024-02-28 PROCEDURE — 250N000011 HC RX IP 250 OP 636: Performed by: UROLOGY

## 2024-02-28 PROCEDURE — 78815 PET IMAGE W/CT SKULL-THIGH: CPT | Mod: PS

## 2024-02-28 RX ORDER — IOPAMIDOL 755 MG/ML
10-135 INJECTION, SOLUTION INTRAVASCULAR ONCE
Status: COMPLETED | OUTPATIENT
Start: 2024-02-28 | End: 2024-02-28

## 2024-02-28 RX ADMIN — KIT FOR THE PREPARATION OF GALLIUM GA 68 GOZETOTIDE INJECTION 5.3 MILLICURIE: KIT INTRAVENOUS at 08:37

## 2024-02-28 RX ADMIN — IOPAMIDOL 128 ML: 755 INJECTION, SOLUTION INTRAVENOUS at 09:36

## 2024-02-29 ENCOUNTER — PATIENT OUTREACH (OUTPATIENT)
Dept: ONCOLOGY | Facility: CLINIC | Age: 69
End: 2024-02-29
Payer: COMMERCIAL

## 2024-02-29 ENCOUNTER — PATIENT OUTREACH (OUTPATIENT)
Dept: UROLOGY | Facility: CLINIC | Age: 69
End: 2024-02-29
Payer: COMMERCIAL

## 2024-02-29 DIAGNOSIS — C79.51 PROSTATE CANCER METASTATIC TO BONE (H): Primary | ICD-10-CM

## 2024-02-29 DIAGNOSIS — C61 PROSTATE CANCER METASTATIC TO BONE (H): Primary | ICD-10-CM

## 2024-02-29 NOTE — PROGRESS NOTES
New Patient Oncology Nurse Navigator Note     Referring provider:   Sophia Ramirez MD  [facility]  [facility]  [phone]     Referred to (specialty): Medical Oncology    Date Referral Received:   2/29/24     Evaluation for :   Recurrent metastatic prostate cancer sp prostatectomy     Clinical History (per Nurse review of records provided):    See urology notes and prior navigation notes for full history.  Patient had been referred to radiation oncology last November and chose to go to [facility].  A plan was in place based on future PSA levels but most recently he had a PET scan and referred to medical oncology. No radiation was ever started.    Combined Report of: PET and CT on 2/28/2024 10:05 AM:   IMPRESSION:  In this patient with history of recent prostatectomy presents for  follow-up of increasing prostate-specific antigen levels: Overall,  there is metastatic prostate carcinoma with suspicious tumor  recurrence/residual tumor in the prostatectomy bed.     1. Prostatectomy bed: Focal uptake in the left side of the  prostatectomy bed with enhancement on corresponding CT is suspicious  for recurrent disease.      2. Amna disease: Multiple pelvic and few retroperitoneal lymph nodes  demonstrate radiotracer uptake suspicious for amna disease     3. Bones: Sclerotic osseous lesions with radiotracer uptake (>10)  representing osseous metastatic disease.       Pertinent urology notes, pathology/labs & imaging bookmarked**    Records Location (Care Everywhere, Media, etc.):   [facility] Media section      I called Ronnie  to discuss referral to oncology.  I introduced my role and reviewed what this consult visit will entail/what to expect.  I reviewed the location and gave contact numbers including new patient scheduling and clinic phone numbers.   Ronnie, has no other questions at this time.    I forwarded on referral with scheduling instructions for the following PLAN: HOLD:   Daren, [facility], 3/11/24, 11-12, NEW, in person    I transferred call to our new patient scheduling to finalize appointment.    Leisa Will, RN, BSN  Oncology New Patient Nurse Navigator   [facility]  [phone]

## 2024-02-29 NOTE — TELEPHONE ENCOUNTER
Pt reached out to writer stating that he got a message from Dr Ramirez indicating the need for a medical oncology referral.     Pt states he is very anxious to get things going and would like to schedule ASAP. Writer expressed understanding. Writer also provided pt with a contact number if he does not hear about scheduling within the next 2 days.     Will continue to follow as needed.

## 2024-03-07 NOTE — PROGRESS NOTES
[facility]    Hematology/Oncology New Patient Note      Today's Date: 3/11/24    Reason for Consult: Metastatic prostate cancer.    HISTORY OF PRESENT ILLNESS: Ronnie Lagos is a 68 year old male with hypertension, GERD who presents with the following oncologic history:  1.  5/11/2023: PSA elevated at 8.08 (prior 3.44 from 5/10/22).  2. 6/29/2023: MRI pelvis -- PI-RADS 4 - 1.4 x 1 x 0.9 cm nodule abutting posterolateral capsule of prostate; no pelvic adenopathy or bone lesions.  3.  8/30/2023: Prostate biopsy - [place] 4+3=7 prostate adenocarcinoma.  4. 10/2/2023: NM bone scan negative.  5.  11/3/2023: Radical prostatectomy, pelvic lymph node excision -- [place] 4+5=9 adenocarcinoma, [id number], margins widely positive for malignancy.  6.  2/7/2024: PSA = 4.06.  7.  2/15/2024: PSA elevated at 5.26.  8.  2/28/2024: PSMA PET showed focal uptake in left side of prostatectomy bed suspicious for recurrent disease; multiple pelvic and few retroperitoneal lymph nodes with uptake; >10 sclerotic osseous lesions with uptake indicative of osseous metastatic disease.    Ronnie reports left shoulder pain s/p left shoulder surgery in 11/2023. Denies bone, abdominal, or pelvic pain.    REVIEW OF SYSTEMS:   14 point ROS was reviewed and is negative other than as noted above in HPI.       HOME MEDICATIONS:  Current Outpatient Medications   Medication Sig Dispense Refill    acetaminophen (TYLENOL) 325 MG tablet Take 325-650 mg by mouth every 6 hours as needed for mild pain      amLODIPine (NORVASC) 5 MG tablet Take 1 tablet (5 mg) by mouth daily (Patient taking differently: Take 5 mg by mouth every morning) 90 tablet 3    atenolol (TENORMIN) 50 MG tablet Take 1 tablet (50 mg) by mouth daily 90 tablet 3    fluorouracil (EFUDEX) 5 % external cream Apply topically 2 times daily Using an amount sufficient to cover the affected areas on the scalp and temple (Patient not taking: Reported on 11/21/2023) 40 g 3     omeprazole (PRILOSEC) 20 MG DR capsule Take 1 capsule (20 mg) by mouth daily 90 capsule 3         ALLERGIES:  No Known Allergies      PAST MEDICAL HISTORY:  Past Medical History:   Diagnosis Date    Abdominal pain     Basal cell carcinoma     Calculus of bile duct     Elevated liver enzymes     Essential hypertension, benign     abstracted 6/19/02    Gastro-oesophageal reflux disease     GERD (gastroesophageal reflux disease) 7/28/2008    Liver disease     elevated liver enzymes    Squamous cell carcinoma     Unspecified cerebral artery occlusion with cerebral infarction     Unspecified condition of brain     abstracted 6/19/02         PAST SURGICAL HISTORY:  Past Surgical History:   Procedure Laterality Date    ARTHRODESIS FOOT  2/5/2013    Procedure: ARTHRODESIS FOOT;  LEFT FIRST METATARSOPHALANGEAL JOINT ARTHRODESIS ;  Surgeon: Burton Loera DPM;  Location: [facility]    COLONOSCOPY N/A 12/7/2018    Procedure: COMBINED COLONOSCOPY, SINGLE OR MULTIPLE BIOPSY/POLYPECTOMY BY BIOPSY;  Surgeon: Blayne Mora MD;  Location:  GI    DAVINCI PROSTATECTOMY, LYMPHADENECTOMY N/A 11/3/2023    Procedure: PROSTATECTOMY, ROBOT-ASSISTED, WITH PELVIC LYMPHADENECTOMY;  Surgeon: Sophia Ramirez MD;  Location:  OR    ENDOSCOPIC RETROGRADE CHOLANGIOPANCREATOGRAM N/A 1/18/2018    Procedure: COMBINED ENDOSCOPIC RETROGRADE CHOLANGIOPANCREATOGRAPHY, SPHINCTEROTOMY;  ENDOSCOPIC RETROGRADE CHOLANGIOPANCREATOGRAM (ERCP), SPHINCTEROTOMY, STONE REMOVAL, STENT PLACEMENT;  Surgeon: Christiano Sorenson MD;  Location:  OR    ENDOSCOPIC RETROGRADE CHOLANGIOPANCREATOGRAM N/A 4/12/2018    Procedure: ENDOSCOPIC RETROGRADE CHOLANGIOPANCREATOGRAM;  ENDOSCOPIC RETROGRADE CHOLANIOPANCREATOGRAPHY AND ATTEMPTED STENT REMOVAL.;  Surgeon: Christiano Sorenson MD;  Location:  OR    ENDOSCOPIC RETROGRADE CHOLANGIOPANCREATOGRAM N/A 5/10/2018    Procedure: COMBINED ENDOSCOPIC RETROGRADE CHOLANGIOPANCREATOGRAPHY, REMOVE FOREIGN BODY OR  STENT/TUBE;  ENDOSCOPIC RETROGRADE CHOLANGIOPANCREATOGRAPHY AND STENT REMOVAL;  Surgeon: Christiano Sorenson MD;  Location:  OR    ESOPHAGOSCOPY, GASTROSCOPY, DUODENOSCOPY (EGD), COMBINED N/A 4/12/2018    Procedure: COMBINED ESOPHAGOSCOPY, GASTROSCOPY, DUODENOSCOPY (EGD);  EGD with stent removal;  Surgeon: Christiano Sorenson MD;  Location:  GI    LAPAROSCOPIC CHOLECYSTECTOMY N/A 5/1/2015    Procedure: LAPAROSCOPIC CHOLECYSTECTOMY;  Surgeon: Damien Galindo MD;  Location:  [facility]    ORTHOPEDIC SURGERY      left elbow, right shoulder         SOCIAL HISTORY:  Social History     Socioeconomic History    Marital status:      Spouse name: Not on file    Number of children: Not on file    Years of education: Not on file    Highest education level: Not on file   Occupational History     Employer: [employer]   Tobacco Use    Smoking status: Never    Smokeless tobacco: Never   Vaping Use    Vaping Use: Never used   Substance and Sexual Activity    Alcohol use: Yes     Alcohol/week: 2.0 standard drinks of alcohol     Types: 2 Standard drinks or equivalent per week     Comment: 3-4 times a week, light beer    Drug use: Never    Sexual activity: Yes     Partners: Female   Other Topics Concern    Parent/sibling w/ CABG, MI or angioplasty before 65F 55M? Not Asked   Social History Narrative    Not on file     Social Determinants of Health     Financial Resource Strain: Low Risk  (11/21/2023)    Financial Resource Strain     Within the past 12 months, have you or your family members you live with been unable to get utilities (heat, electricity) when it was really needed?: No   Food Insecurity: Low Risk  (11/21/2023)    Food Insecurity     Within the past 12 months, did you worry that your food would run out before you got money to buy more?: No     Within the past 12 months, did the food you bought just not last and you didn t have money to get more?: No   Transportation Needs: Low Risk   "(2023)    Transportation Needs     Within the past 12 months, has lack of transportation kept you from medical appointments, getting your medicines, non-medical meetings or appointments, work, or from getting things that you need?: No   Physical Activity: Not on file   Stress: Not on file   Social Connections: Not on file   Interpersonal Safety: Not At Risk (6/15/2023)    Humiliation, Afraid, Rape, and Kick questionnaire     Fear of Current or Ex-Partner: No     Emotionally Abused: No     Physically Abused: No     Sexually Abused: No   Housing Stability: Low Risk  (2023)    Housing Stability     Do you have housing? : Yes     Are you worried about losing your housing?: No         FAMILY HISTORY:  Family History   Problem Relation Age of Onset    Hypertension Mother     Cancer - colorectal Mother     Skin Cancer Mother     Hypertension Father     Melanoma No family hx of     Anesthesia Reaction No family hx of     Venous thrombosis No family hx of     Bleeding Disorder No family hx of    Father had prostate, liver, and skin cancer. Mother with colorectal and skin cancer.      PHYSICAL EXAM:  Vital signs:  BP (!) 151/85   Pulse 68   Resp 16   Ht 1.753 m (5' 9\")   Wt 99.6 kg (219 lb 9.6 oz)   SpO2 98%   BMI 32.43 kg/m     ECO  GENERAL: No acute distress.  EYES: No scleral icterus. No overt erythema.  RESPIRATORY: No audible cough, wheezing, or labored breathing.  MUSCULOSKELETAL: Range of motion in the neck, shoulders, and arms appear normal.  SKIN: No overt rashes, discolorations, or lesions over the face and neck.  NEUROLOGIC: Alert.  No overt tremors.  PSYCHIATRIC: Normal affect and mood.  Does not appear anxious.       LABS:  CBC RESULTS:   Recent Labs   Lab Test 23  0652   WBC 11.1*   RBC 4.47   HGB 14.3   HCT 41.3   MCV 92   MCH 32.0   MCHC 34.6   RDW 11.3          Recent Labs   Lab Test 24  0830 23  0652 23  0608 10/19/23  1055   NA  --  136  --  134* "   POTASSIUM  --  4.7 4.0 4.6   CHLORIDE  --  100  --  96*   CO2  --  27  --  28   ANIONGAP  --  9  --  10   GLC  --  100* 110* 107*   BUN  --  8.4  --  10.1   CR 0.9 0.85  --  0.93   AV  --  9.2  --  9.8         PATHOLOGY:  Reviewed as per HPI.    IMAGING:  Reviewed as per HPI.    ASSESSMENT/PLAN:  Ronnie Lagos is a 68 year old male with the following issues:  1. Metastatic non-castrate prostate adenocarcinoma, [place] 4+5=9  2. 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 Hypertension  --I discussed with Ronnie that review of his PSMA PET shows metastatic disease to the pelvic and retroperitoneal lymph nodes as well as greater than 10 sites of bony metastatic disease.  --Discussed that he has incurable but treatable metastatic prostate cancer and that treatment can palliative symptoms and prolong survival.  --He had been previously evaluated by Dr. Azar Alvarado (radiation oncology) -- given current distant metastatic disease, he did not advised localized radiation at the present time and I would concur with this as Rnonie would benefit from initiating systemic therapy upfront.  --Given high volume disease, I recommended starting Casodex 50 mg daily for first month, Lupron every 3 months, darolutamide 600 mg orally BID, and Taxotere 75 mg/m2 chemotherapy every 3 weeks x 6 cycles.  --Discussed potential adverse effects of Casodex and Lupron, including but not limited to: hot flashes, fatigue, hot flashes, pain (including general, back, pelvic and abdominal), asthenia, constipation, infection, nausea, peripheral edema, dyspnea, diarrhea, hematuria, nocturia, and anemia.  --Discussed potential adverse effects of darolutamide (Nubeqa), including but not limited to: fatigue, extremity pain, rash. He needs to take these pills with food.  --Discussed potential adverse effects of Taxotere, including but not limited to: peripheral neuropathy, nausea, cytopenias, increased risk for  infection, hair loss, infusion reaction, fatigue.  --Advised starting denosumab (Xgeva) every 4 weeks. Discussed potential adverse effects of hypocalcemia, rash, and small risk of osteonecrosis of jaw.  He will forego a tooth extraction and bone graft of his top incisor.  --He agrees with proceeding with the above outlined treatment regimen.  --Will draw CBC, CMP, and PSA today.  --He would like to try Taxotere infusions via peripheral IV -- if there is difficulty with his vein access, will arrange for port.    5. Strong family history of prostate cancer  --Referral to genetics. Discussed that if he has a BRCA gene mutation, that I would recommend adding a PARP inhibitor to his treatment program.    6. Left shoulder pain  --Related to post-arthroplasty pain.  Continue Tylenol as needed.    Anabell Mercedes MD  Hematology/Oncology  [facility]    Total time spent today: 60 minutes in chart review, patient evaluation, counseling, documentation, test and/or medication/prescription orders, and coordination of care.

## 2024-03-08 NOTE — TELEPHONE ENCOUNTER
Pre Visit March 8, 2024  12:40 PM   Referring Provider/Location:  Sophia Ramirez MD   Dx and Code: Recurrent metastatic prostate cancer sp prostatectomy   Appt Date:  3.11.24  Provider: Daren    Internal Referral, No outside records needed

## 2024-03-10 ENCOUNTER — TELEPHONE (OUTPATIENT)
Dept: GASTROENTEROLOGY | Facility: CLINIC | Age: 69
End: 2024-03-10

## 2024-03-10 NOTE — TELEPHONE ENCOUNTER
Pre visit planning completed.      Procedure details:    Patient scheduled for Colonoscopy  on 3.22.2024.     Arrival time: 0800. Procedure time 0845    Pre op exam needed? N/A    Facility location: [facility]; [address] [place] [address] [place] [address]    Sedation type: Conscious sedation - per chart review patient has tolerated CS with past colonoscopies.     Indication for procedure: screening colonoscopy      Chart review:     Electronic implanted devices? No    Recent diagnosis of diverticulitis within the last 6 weeks? No    Diabetic? No    Diabetic medication HOLDING recommendations: (if applicable)  Oral diabetic medications: N/A  Diabetic injectables: N/A  Insulin: N/A      Medication review:    Anticoagulants? No    NSAIDS? No NSAID medications per patient's medication list.  RN will verify with pre-assessment call.    Other medication HOLDING recommendations:  N/A      Prep for procedure:     Bowel prep recommendation: Standard Miralax  Due to: standard bowel prep.    Prep instructions sent via [employer]         Eileen Bravo RN  Endoscopy Procedure Pre Assessment RN  [phone] option 4

## 2024-03-11 ENCOUNTER — ONCOLOGY VISIT (OUTPATIENT)
Dept: ONCOLOGY | Facility: CLINIC | Age: 69
End: 2024-03-11
Attending: UROLOGY
Payer: COMMERCIAL

## 2024-03-11 ENCOUNTER — PRE VISIT (OUTPATIENT)
Dept: ONCOLOGY | Facility: CLINIC | Age: 69
End: 2024-03-11
Payer: COMMERCIAL

## 2024-03-11 ENCOUNTER — TELEPHONE (OUTPATIENT)
Dept: GASTROENTEROLOGY | Facility: CLINIC | Age: 69
End: 2024-03-11

## 2024-03-11 ENCOUNTER — PATIENT OUTREACH (OUTPATIENT)
Dept: ONCOLOGY | Facility: CLINIC | Age: 69
End: 2024-03-11

## 2024-03-11 VITALS
DIASTOLIC BLOOD PRESSURE: 85 MMHG | HEART RATE: 68 BPM | WEIGHT: 219.6 LBS | SYSTOLIC BLOOD PRESSURE: 151 MMHG | OXYGEN SATURATION: 98 % | HEIGHT: 69 IN | BODY MASS INDEX: 32.53 KG/M2 | RESPIRATION RATE: 16 BRPM

## 2024-03-11 DIAGNOSIS — M25.512 CHRONIC LEFT SHOULDER PAIN: ICD-10-CM

## 2024-03-11 DIAGNOSIS — C79.51 PROSTATE CANCER METASTATIC TO BONE (H): ICD-10-CM

## 2024-03-11 DIAGNOSIS — C77.5 MALIGNANT NEOPLASM METASTATIC TO INTRAPELVIC LYMPH NODE (H): ICD-10-CM

## 2024-03-11 DIAGNOSIS — C61 PROSTATE CANCER METASTATIC TO BONE (H): ICD-10-CM

## 2024-03-11 DIAGNOSIS — C61 PROSTATE CANCER METASTATIC TO MULTIPLE SITES (H): Primary | ICD-10-CM

## 2024-03-11 DIAGNOSIS — G89.29 CHRONIC LEFT SHOULDER PAIN: ICD-10-CM

## 2024-03-11 LAB
ALBUMIN SERPL BCG-MCNC: 4.5 G/DL (ref 3.5–5.2)
ALP SERPL-CCNC: 72 U/L (ref 40–150)
ALT SERPL W P-5'-P-CCNC: 42 U/L (ref 0–70)
ANION GAP SERPL CALCULATED.3IONS-SCNC: 11 MMOL/L (ref 7–15)
AST SERPL W P-5'-P-CCNC: ABNORMAL U/L
BASOPHILS # BLD AUTO: 0 10E3/UL (ref 0–0.2)
BASOPHILS NFR BLD AUTO: 1 %
BILIRUB SERPL-MCNC: 1.9 MG/DL
BUN SERPL-MCNC: 10.5 MG/DL (ref 8–23)
CALCIUM SERPL-MCNC: 9.6 MG/DL (ref 8.8–10.2)
CHLORIDE SERPL-SCNC: 97 MMOL/L (ref 98–107)
CREAT SERPL-MCNC: 1.1 MG/DL (ref 0.67–1.17)
DEPRECATED HCO3 PLAS-SCNC: 27 MMOL/L (ref 22–29)
EGFRCR SERPLBLD CKD-EPI 2021: 74 ML/MIN/1.73M2
EOSINOPHIL # BLD AUTO: 0.2 10E3/UL (ref 0–0.7)
EOSINOPHIL NFR BLD AUTO: 3 %
ERYTHROCYTE [DISTWIDTH] IN BLOOD BY AUTOMATED COUNT: 12.6 % (ref 10–15)
GLUCOSE SERPL-MCNC: 104 MG/DL (ref 70–99)
HCT VFR BLD AUTO: 48.8 % (ref 40–53)
HGB BLD-MCNC: 17.5 G/DL (ref 13.3–17.7)
IMM GRANULOCYTES # BLD: 0 10E3/UL
IMM GRANULOCYTES NFR BLD: 0 %
LYMPHOCYTES # BLD AUTO: 1 10E3/UL (ref 0.8–5.3)
LYMPHOCYTES NFR BLD AUTO: 15 %
MCH RBC QN AUTO: 31.6 PG (ref 26.5–33)
MCHC RBC AUTO-ENTMCNC: 35.9 G/DL (ref 31.5–36.5)
MCV RBC AUTO: 88 FL (ref 78–100)
MONOCYTES # BLD AUTO: 0.7 10E3/UL (ref 0–1.3)
MONOCYTES NFR BLD AUTO: 11 %
NEUTROPHILS # BLD AUTO: 4.6 10E3/UL (ref 1.6–8.3)
NEUTROPHILS NFR BLD AUTO: 70 %
NRBC # BLD AUTO: 0 10E3/UL
NRBC BLD AUTO-RTO: 0 /100
PLATELET # BLD AUTO: 207 10E3/UL (ref 150–450)
POTASSIUM SERPL-SCNC: 5.4 MMOL/L (ref 3.4–5.3)
PROT SERPL-MCNC: 7.9 G/DL (ref 6.4–8.3)
PSA SERPL DL<=0.01 NG/ML-MCNC: 8.75 NG/ML (ref 0–4.5)
RBC # BLD AUTO: 5.53 10E6/UL (ref 4.4–5.9)
SODIUM SERPL-SCNC: 135 MMOL/L (ref 135–145)
WBC # BLD AUTO: 6.6 10E3/UL (ref 4–11)

## 2024-03-11 PROCEDURE — 99205 OFFICE O/P NEW HI 60 MIN: CPT | Performed by: INTERNAL MEDICINE

## 2024-03-11 PROCEDURE — 84153 ASSAY OF PSA TOTAL: CPT | Performed by: INTERNAL MEDICINE

## 2024-03-11 PROCEDURE — 84155 ASSAY OF PROTEIN SERUM: CPT | Performed by: INTERNAL MEDICINE

## 2024-03-11 PROCEDURE — 82247 BILIRUBIN TOTAL: CPT | Performed by: INTERNAL MEDICINE

## 2024-03-11 PROCEDURE — G0463 HOSPITAL OUTPT CLINIC VISIT: HCPCS | Performed by: INTERNAL MEDICINE

## 2024-03-11 PROCEDURE — 36415 COLL VENOUS BLD VENIPUNCTURE: CPT | Performed by: INTERNAL MEDICINE

## 2024-03-11 PROCEDURE — 85025 COMPLETE CBC W/AUTO DIFF WBC: CPT | Performed by: INTERNAL MEDICINE

## 2024-03-11 RX ORDER — HEPARIN SODIUM (PORCINE) LOCK FLUSH IV SOLN 100 UNIT/ML 100 UNIT/ML
5 SOLUTION INTRAVENOUS
Status: CANCELLED | OUTPATIENT
Start: 2024-03-20

## 2024-03-11 RX ORDER — EPINEPHRINE 1 MG/ML
0.3 INJECTION, SOLUTION INTRAMUSCULAR; SUBCUTANEOUS EVERY 5 MIN PRN
Status: CANCELLED | OUTPATIENT
Start: 2024-03-20

## 2024-03-11 RX ORDER — MEPERIDINE HYDROCHLORIDE 25 MG/ML
25 INJECTION INTRAMUSCULAR; INTRAVENOUS; SUBCUTANEOUS EVERY 30 MIN PRN
Status: CANCELLED | OUTPATIENT
Start: 2024-03-20

## 2024-03-11 RX ORDER — ALBUTEROL SULFATE 90 UG/1
1-2 AEROSOL, METERED RESPIRATORY (INHALATION)
Status: CANCELLED
Start: 2024-03-20

## 2024-03-11 RX ORDER — MEPERIDINE HYDROCHLORIDE 25 MG/ML
25 INJECTION INTRAMUSCULAR; INTRAVENOUS; SUBCUTANEOUS EVERY 30 MIN PRN
Status: CANCELLED | OUTPATIENT
Start: 2024-04-19

## 2024-03-11 RX ORDER — DIPHENHYDRAMINE HYDROCHLORIDE 50 MG/ML
50 INJECTION INTRAMUSCULAR; INTRAVENOUS
Status: CANCELLED
Start: 2024-03-20

## 2024-03-11 RX ORDER — ALBUTEROL SULFATE 0.83 MG/ML
2.5 SOLUTION RESPIRATORY (INHALATION)
Status: CANCELLED | OUTPATIENT
Start: 2024-03-20

## 2024-03-11 RX ORDER — HEPARIN SODIUM,PORCINE 10 UNIT/ML
5-20 VIAL (ML) INTRAVENOUS DAILY PRN
Status: CANCELLED | OUTPATIENT
Start: 2024-04-19

## 2024-03-11 RX ORDER — HEPARIN SODIUM (PORCINE) LOCK FLUSH IV SOLN 100 UNIT/ML 100 UNIT/ML
5 SOLUTION INTRAVENOUS
Status: CANCELLED | OUTPATIENT
Start: 2024-04-19

## 2024-03-11 RX ORDER — DIPHENHYDRAMINE HYDROCHLORIDE 50 MG/ML
50 INJECTION INTRAMUSCULAR; INTRAVENOUS
Status: CANCELLED
Start: 2024-04-19

## 2024-03-11 RX ORDER — METHYLPREDNISOLONE SODIUM SUCCINATE 125 MG/2ML
125 INJECTION, POWDER, LYOPHILIZED, FOR SOLUTION INTRAMUSCULAR; INTRAVENOUS
Status: CANCELLED
Start: 2024-03-20

## 2024-03-11 RX ORDER — EPINEPHRINE 1 MG/ML
0.3 INJECTION, SOLUTION INTRAMUSCULAR; SUBCUTANEOUS EVERY 5 MIN PRN
Status: CANCELLED | OUTPATIENT
Start: 2024-04-19

## 2024-03-11 RX ORDER — BICALUTAMIDE 50 MG/1
50 TABLET, FILM COATED ORAL DAILY
Qty: 30 TABLET | Refills: 1 | Status: SHIPPED | OUTPATIENT
Start: 2024-03-11 | End: 2024-04-29

## 2024-03-11 RX ORDER — ALBUTEROL SULFATE 90 UG/1
1-2 AEROSOL, METERED RESPIRATORY (INHALATION)
Status: CANCELLED
Start: 2024-04-19

## 2024-03-11 RX ORDER — ALBUTEROL SULFATE 0.83 MG/ML
2.5 SOLUTION RESPIRATORY (INHALATION)
Status: CANCELLED | OUTPATIENT
Start: 2024-04-19

## 2024-03-11 RX ORDER — HEPARIN SODIUM,PORCINE 10 UNIT/ML
5-20 VIAL (ML) INTRAVENOUS DAILY PRN
Status: CANCELLED | OUTPATIENT
Start: 2024-03-20

## 2024-03-11 RX ORDER — METHYLPREDNISOLONE SODIUM SUCCINATE 125 MG/2ML
125 INJECTION, POWDER, LYOPHILIZED, FOR SOLUTION INTRAMUSCULAR; INTRAVENOUS
Status: CANCELLED
Start: 2024-04-19

## 2024-03-11 RX ORDER — LORAZEPAM 2 MG/ML
0.5 INJECTION INTRAMUSCULAR EVERY 4 HOURS PRN
Status: CANCELLED | OUTPATIENT
Start: 2024-03-20

## 2024-03-11 RX ORDER — LORAZEPAM 2 MG/ML
0.5 INJECTION INTRAMUSCULAR EVERY 4 HOURS PRN
Status: CANCELLED | OUTPATIENT
Start: 2024-04-19

## 2024-03-11 ASSESSMENT — PAIN SCALES - GENERAL: PAINLEVEL: MILD PAIN (3)

## 2024-03-11 NOTE — PATIENT INSTRUCTIONS
Referral to genetics.  Arrange for lab draw and Taxotere every 3 weeks x 6 cycles.  Arrange for Lupron every 3 months.  Arrange for Xgeva every 4 weeks.  RTC NP alternating with MD every 3 weeks with each Taxotere chemo.  Start Casodex 50 mg daily for 30 days.

## 2024-03-11 NOTE — NURSING NOTE
"Oncology Rooming Note    March 11, 2024 10:58 AM   Ronnie Lagos is a 68 year old male who presents for:    Chief Complaint   Patient presents with    Oncology Clinic Visit     Initial Vitals: BP (!) 151/85   Pulse 68   Resp 16   Ht 1.753 m (5' 9\")   Wt 99.6 kg (219 lb 9.6 oz)   SpO2 98%   BMI 32.43 kg/m   Estimated body mass index is 32.43 kg/m  as calculated from the following:    Height as of this encounter: 1.753 m (5' 9\").    Weight as of this encounter: 99.6 kg (219 lb 9.6 oz). Body surface area is 2.2 meters squared.  Mild Pain (3) Comment: Data Unavailable   No LMP for male patient.  Allergies reviewed: Yes  Medications reviewed: Yes    Medications: Medication refills not needed today.  Pharmacy name entered into [facility]: [id number] - [place], [place] - [address] [facility]    Frailty Screening:   Is the patient here for a new oncology consult visit in cancer care? 2. No      Clinical concerns: Darren Cesar"

## 2024-03-11 NOTE — PROGRESS NOTES
Writer received Cancer Risk Management Program referral, referred for:    metastatic prostate cancer, strong family history of prostate cancer; genetic testing will change treatment recommendations      Reviewed for appropriate plan, and sent to New Patient Scheduling for completion.

## 2024-03-11 NOTE — TELEPHONE ENCOUNTER
Attempted to contact patient in order to complete pre assessment questions.     No answer. Left message to return call to [phone] option 4    Missed call communication sent via [employer].    Eileen Bravo RN

## 2024-03-11 NOTE — LETTER
3/11/2024         RE: Ronnie Lagos  [address]  [facility] [address]        Dear Colleague,    Thank you for referring your patient, Ronnie Lagos, to the [facility] CANCER [facility]. Please see a copy of my visit note below.    [facility]    Hematology/Oncology New Patient Note      Today's Date: 3/11/24    Reason for Consult: Metastatic prostate cancer.    HISTORY OF PRESENT ILLNESS: Ronnie Lagos is a 68 year old male with hypertension, GERD who presents with the following oncologic history:  1.  5/11/2023: PSA elevated at 8.08 (prior 3.44 from 5/10/22).  2. 6/29/2023: MRI pelvis -- PI-RADS 4 - 1.4 x 1 x 0.9 cm nodule abutting posterolateral capsule of prostate; no pelvic adenopathy or bone lesions.  3.  8/30/2023: Prostate biopsy - [place] 4+3=7 prostate adenocarcinoma.  4. 10/2/2023: NM bone scan negative.  5.  11/3/2023: Radical prostatectomy, pelvic lymph node excision -- [place] 4+5=9 adenocarcinoma, [id number], margins widely positive for malignancy.  6.  2/7/2024: PSA = 4.06.  7.  2/15/2024: PSA elevated at 5.26.  8.  2/28/2024: PSMA PET showed focal uptake in left side of prostatectomy bed suspicious for recurrent disease; multiple pelvic and few retroperitoneal lymph nodes with uptake; >10 sclerotic osseous lesions with uptake indicative of osseous metastatic disease.    Ronnie reports left shoulder pain s/p left shoulder surgery in 11/2023. Denies bone, abdominal, or pelvic pain.    REVIEW OF SYSTEMS:   14 point ROS was reviewed and is negative other than as noted above in HPI.       HOME MEDICATIONS:  Current Outpatient Medications   Medication Sig Dispense Refill     acetaminophen (TYLENOL) 325 MG tablet Take 325-650 mg by mouth every 6 hours as needed for mild pain       amLODIPine (NORVASC) 5 MG tablet Take 1 tablet (5 mg) by mouth daily (Patient taking differently: Take 5 mg by mouth every morning) 90 tablet 3     atenolol (TENORMIN) 50 MG tablet  Take 1 tablet (50 mg) by mouth daily 90 tablet 3     fluorouracil (EFUDEX) 5 % external cream Apply topically 2 times daily Using an amount sufficient to cover the affected areas on the scalp and temple (Patient not taking: Reported on 11/21/2023) 40 g 3     omeprazole (PRILOSEC) 20 MG DR capsule Take 1 capsule (20 mg) by mouth daily 90 capsule 3         ALLERGIES:  No Known Allergies      PAST MEDICAL HISTORY:  Past Medical History:   Diagnosis Date     Abdominal pain      Basal cell carcinoma      Calculus of bile duct      Elevated liver enzymes      Essential hypertension, benign     abstracted 6/19/02     Gastro-oesophageal reflux disease      GERD (gastroesophageal reflux disease) 7/28/2008     Liver disease     elevated liver enzymes     Squamous cell carcinoma      Unspecified cerebral artery occlusion with cerebral infarction      Unspecified condition of brain     abstracted 6/19/02         PAST SURGICAL HISTORY:  Past Surgical History:   Procedure Laterality Date     ARTHRODESIS FOOT  2/5/2013    Procedure: ARTHRODESIS FOOT;  LEFT FIRST METATARSOPHALANGEAL JOINT ARTHRODESIS ;  Surgeon: Burton Loera DPM;  Location:  [facility]     COLONOSCOPY N/A 12/7/2018    Procedure: COMBINED COLONOSCOPY, SINGLE OR MULTIPLE BIOPSY/POLYPECTOMY BY BIOPSY;  Surgeon: Blayne Mora MD;  Location: [facility]     DAVINCI PROSTATECTOMY, LYMPHADENECTOMY N/A 11/3/2023    Procedure: PROSTATECTOMY, ROBOT-ASSISTED, WITH PELVIC LYMPHADENECTOMY;  Surgeon: Sophia Ramirez MD;  Location:  OR     ENDOSCOPIC RETROGRADE CHOLANGIOPANCREATOGRAM N/A 1/18/2018    Procedure: COMBINED ENDOSCOPIC RETROGRADE CHOLANGIOPANCREATOGRAPHY, SPHINCTEROTOMY;  ENDOSCOPIC RETROGRADE CHOLANGIOPANCREATOGRAM (ERCP), SPHINCTEROTOMY, STONE REMOVAL, STENT PLACEMENT;  Surgeon: Christiano Sorenson MD;  Location:  OR     ENDOSCOPIC RETROGRADE CHOLANGIOPANCREATOGRAM N/A 4/12/2018    Procedure: ENDOSCOPIC RETROGRADE CHOLANGIOPANCREATOGRAM;  ENDOSCOPIC  RETROGRADE CHOLANIOPANCREATOGRAPHY AND ATTEMPTED STENT REMOVAL.;  Surgeon: Christiano Sorenson MD;  Location:  OR     ENDOSCOPIC RETROGRADE CHOLANGIOPANCREATOGRAM N/A 5/10/2018    Procedure: COMBINED ENDOSCOPIC RETROGRADE CHOLANGIOPANCREATOGRAPHY, REMOVE FOREIGN BODY OR STENT/TUBE;  ENDOSCOPIC RETROGRADE CHOLANGIOPANCREATOGRAPHY AND STENT REMOVAL;  Surgeon: Christiano Sorenson MD;  Location:  OR     ESOPHAGOSCOPY, GASTROSCOPY, DUODENOSCOPY (EGD), COMBINED N/A 4/12/2018    Procedure: COMBINED ESOPHAGOSCOPY, GASTROSCOPY, DUODENOSCOPY (EGD);  EGD with stent removal;  Surgeon: Christiano Sorenson MD;  Location:  GI     LAPAROSCOPIC CHOLECYSTECTOMY N/A 5/1/2015    Procedure: LAPAROSCOPIC CHOLECYSTECTOMY;  Surgeon: Damien Galindo MD;  Location:  [facility]     ORTHOPEDIC SURGERY      left elbow, right shoulder         SOCIAL HISTORY:  Social History     Socioeconomic History     Marital status:      Spouse name: Not on file     Number of children: Not on file     Years of education: Not on file     Highest education level: Not on file   Occupational History     Employer: [employer]   Tobacco Use     Smoking status: Never     Smokeless tobacco: Never   Vaping Use     Vaping Use: Never used   Substance and Sexual Activity     Alcohol use: Yes     Alcohol/week: 2.0 standard drinks of alcohol     Types: 2 Standard drinks or equivalent per week     Comment: 3-4 times a week, light beer     Drug use: Never     Sexual activity: Yes     Partners: Female   Other Topics Concern     Parent/sibling w/ CABG, MI or angioplasty before 65F 55M? Not Asked   Social History Narrative     Not on file     Social Determinants of Health     Financial Resource Strain: Low Risk  (11/21/2023)    Financial Resource Strain      Within the past 12 months, have you or your family members you live with been unable to get utilities (heat, electricity) when it was really needed?: No   Food Insecurity: Low Risk  " (2023)    Food Insecurity      Within the past 12 months, did you worry that your food would run out before you got money to buy more?: No      Within the past 12 months, did the food you bought just not last and you didn t have money to get more?: No   Transportation Needs: Low Risk  (2023)    Transportation Needs      Within the past 12 months, has lack of transportation kept you from medical appointments, getting your medicines, non-medical meetings or appointments, work, or from getting things that you need?: No   Physical Activity: Not on file   Stress: Not on file   Social Connections: Not on file   Interpersonal Safety: Not At Risk (6/15/2023)    Humiliation, Afraid, Rape, and Kick questionnaire      Fear of Current or Ex-Partner: No      Emotionally Abused: No      Physically Abused: No      Sexually Abused: No   Housing Stability: Low Risk  (2023)    Housing Stability      Do you have housing? : Yes      Are you worried about losing your housing?: No         FAMILY HISTORY:  Family History   Problem Relation Age of Onset     Hypertension Mother      Cancer - colorectal Mother      Skin Cancer Mother      Hypertension Father      Melanoma No family hx of      Anesthesia Reaction No family hx of      Venous thrombosis No family hx of      Bleeding Disorder No family hx of    Father had prostate, liver, and skin cancer. Mother with colorectal and skin cancer.      PHYSICAL EXAM:  Vital signs:  BP (!) 151/85   Pulse 68   Resp 16   Ht 1.753 m (5' 9\")   Wt 99.6 kg (219 lb 9.6 oz)   SpO2 98%   BMI 32.43 kg/m     ECO  GENERAL: No acute distress.  EYES: No scleral icterus. No overt erythema.  RESPIRATORY: No audible cough, wheezing, or labored breathing.  MUSCULOSKELETAL: Range of motion in the neck, shoulders, and arms appear normal.  SKIN: No overt rashes, discolorations, or lesions over the face and neck.  NEUROLOGIC: Alert.  No overt tremors.  PSYCHIATRIC: Normal affect and mood.  " Does not appear anxious.       LABS:  CBC RESULTS:   Recent Labs   Lab Test 11/04/23  0652   WBC 11.1*   RBC 4.47   HGB 14.3   HCT 41.3   MCV 92   MCH 32.0   MCHC 34.6   RDW 11.3          Recent Labs   Lab Test 02/28/24  0830 11/04/23  0652 11/03/23  0608 10/19/23  1055   NA  --  136  --  134*   POTASSIUM  --  4.7 4.0 4.6   CHLORIDE  --  100  --  96*   CO2  --  27  --  28   ANIONGAP  --  9  --  10   GLC  --  100* 110* 107*   BUN  --  8.4  --  10.1   CR 0.9 0.85  --  0.93   AV  --  9.2  --  9.8         PATHOLOGY:  Reviewed as per HPI.    IMAGING:  Reviewed as per HPI.    ASSESSMENT/PLAN:  Ronnei Lagos is a 68 year old male with the following issues:  1. Metastatic non-castrate prostate adenocarcinoma, [place] 4+5=9  2. 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 Hypertension  --I discussed with Ronnie that review of his PSMA PET shows metastatic disease to the pelvic and retroperitoneal lymph nodes as well as greater than 10 sites of bony metastatic disease.  --Discussed that he has incurable but treatable metastatic prostate cancer and that treatment can palliative symptoms and prolong survival.  --He had been previously evaluated by Dr. Azar Alvarado (radiation oncology) -- given current distant metastatic disease, he did not advised localized radiation at the present time and I would concur with this as Ronnie would benefit from initiating systemic therapy upfront.  --Given high volume disease, I recommended starting Casodex 50 mg daily for first month, Lupron every 3 months, darolutamide 600 mg orally BID, and Taxotere 75 mg/m2 chemotherapy every 3 weeks x 6 cycles.  --Discussed potential adverse effects of Casodex and Lupron, including but not limited to: hot flashes, fatigue, hot flashes, pain (including general, back, pelvic and abdominal), asthenia, constipation, infection, nausea, peripheral edema, dyspnea, diarrhea, hematuria, nocturia, and anemia.  --Discussed  potential adverse effects of darolutamide (Nubeqa), including but not limited to: fatigue, extremity pain, rash. He needs to take these pills with food.  --Discussed potential adverse effects of Taxotere, including but not limited to: peripheral neuropathy, nausea, cytopenias, increased risk for infection, hair loss, infusion reaction, fatigue.  --Advised starting denosumab (Xgeva) every 4 weeks. Discussed potential adverse effects of hypocalcemia, rash, and small risk of osteonecrosis of jaw.  He will forego a tooth extraction and bone graft of his top incisor.  --He agrees with proceeding with the above outlined treatment regimen.  --Will draw CBC, CMP, and PSA today.  --He would like to try Taxotere infusions via peripheral IV -- if there is difficulty with his vein access, will arrange for port.    5. Strong family history of prostate cancer  --Referral to genetics. Discussed that if he has a BRCA gene mutation, that I would recommend adding a PARP inhibitor to his treatment program.    6. Left shoulder pain  --Related to post-arthroplasty pain.  Continue Tylenol as needed.    Anabell Mercedes MD  Hematology/Oncology  [facility]    Total time spent today: 60 minutes in chart review, patient evaluation, counseling, documentation, test and/or medication/prescription orders, and coordination of care.        Again, thank you for allowing me to participate in the care of your patient.        Sincerely,        Anabell Mercedes MD

## 2024-03-11 NOTE — TELEPHONE ENCOUNTER
Caller:     Reason for Reschedule/Cancellation   (please be detailed, any staff messages or encounters to note?): FOUND OUT HAS CANCER AND NEEDS TO CANCEL FOR NOW PER DOCTOR      Prior to reschedule please review:  Ordering Provider:     RENATA FRANK     Sedation Determined: CS  Does patient have any ASC Exclusions, please identify?:       Notes on Cancelled Procedure:  Procedure: Lower Endoscopy [Colonoscopy]   Date: 3/22  Location: [facility]; [facility] [address] [place] [address]   Surgeon: CANDIDO      Rescheduled: No,         Did you cancel or rescheduled an EUS procedure? No.

## 2024-03-13 ENCOUNTER — DOCUMENTATION ONLY (OUTPATIENT)
Dept: ONCOLOGY | Facility: CLINIC | Age: 69
End: 2024-03-13
Payer: COMMERCIAL

## 2024-03-13 NOTE — PROGRESS NOTES
Oral Chemotherapy Monitoring Program    Lab Monitoring Plan  CMP and CBC via infusion every 3 weeks then once done with Docetaxel will likely transition to monthly labs if counts remain stable or per Dr. Mercedes.   Subjective/Objective:  Ronnie Lagos is a 68 year old male contacted by phone for an initial visit for oral chemotherapy education. I discussed dosing (2 x 300 mg tablets twice daily), possible side effects (changes in liver function, fatigue, decreased white blood cells and possible rare side effects seizure and ischemic heart disease), and the need for lab monitoring (CMP and CBC) every 3 weeks during the Docetaxel infusion part of the treatment regimen and then spaced out monthly if stable counts once off Docetaxel infusion. He is planning to start his Bicalutamide medication today as well. All questions answered.       3/11/2024     2:00 PM 3/11/2024     5:00 PM 3/13/2024    11:00 AM   ORAL CHEMOTHERAPY   Assessment Type Chart Review;Initial Work up Chart Review;Initial Work up Chart Review;Initial Work up;New Teach;Lab Monitoring   Diagnosis Code Prostate Cancer Prostate Cancer Prostate Cancer   Providers Dr. Daren Mercedes   Clinic Name/Location [facility]   Drug Name Nubeqa (darolutamide) Nubeqa (darolutamide) Nubeqa (darolutamide)   Dose 600 mg 600 mg 600 mg   Current Schedule BID BID BID   Cycle Details Continuous Continuous Continuous   Planned next cycle start date 4/11/2024 3/15/2024 3/18/2024   Any new drug interactions? No No No   Is the dose as ordered appropriate for the patient? Yes Yes Yes       Last PHQ-2 Score on record:       10/19/2023    10:08 AM 9/6/2023    11:41 AM   PHQ-2 ( 1999 [employer])   Q1: Little interest or pleasure in doing things 0 0   Q2: Feeling down, depressed or hopeless 0 0   PHQ-2 Score 0 0       Vitals:  BP:   BP Readings from Last 1 Encounters:   03/11/24 (!) 151/85     Wt Readings from Last 1 Encounters:   03/11/24 99.6 kg (219 lb 9.6  "oz)     Estimated body surface area is 2.2 meters squared as calculated from the following:    Height as of 3/11/24: 1.753 m (5' 9\").    Weight as of 3/11/24: 99.6 kg (219 lb 9.6 oz).    Labs:  _  Result Component Current Result Ref Range   Sodium 135 (3/11/2024) 135 - 145 mmol/L     _  Result Component Current Result Ref Range   Potassium 5.4 (H) (3/11/2024) 3.4 - 5.3 mmol/L     _  Result Component Current Result Ref Range   Calcium 9.6 (3/11/2024) 8.8 - 10.2 mg/dL     No results found for Mag within last 30 days.     No results found for Phos within last 30 days.     _  Result Component Current Result Ref Range   Albumin 4.5 (3/11/2024) 3.5 - 5.2 g/dL     _  Result Component Current Result Ref Range   Urea Nitrogen 10.5 (3/11/2024) 8.0 - 23.0 mg/dL     _  Result Component Current Result Ref Range   Creatinine 1.10 (3/11/2024) 0.67 - 1.17 mg/dL     No results found for AST within last 30 days.     _  Result Component Current Result Ref Range   ALT 42 (3/11/2024) 0 - 70 U/L     _  Result Component Current Result Ref Range   Bilirubin Total 1.9 (H) (3/11/2024) <=1.2 mg/dL     _  Result Component Current Result Ref Range   WBC Count 6.6 (3/11/2024) 4.0 - 11.0 10e3/uL     _  Result Component Current Result Ref Range   Hemoglobin 17.5 (3/11/2024) 13.3 - 17.7 g/dL     _  Result Component Current Result Ref Range   Platelet Count 207 (3/11/2024) 150 - 450 10e3/uL     No results found for ANC within last 30 days.     _  Result Component Current Result Ref Range   Absolute Neutrophils 4.6 (3/11/2024) 1.6 - 8.3 10e3/uL        Assessment:  Patient is appropriate to start therapy.    Plan:  Basic chemotherapy teaching was reviewed with the patient including indication, start date of therapy, dose, administration, adverse effects, missed doses, food and drug interactions, monitoring, side effect management, office contact information, and safe handling. Written materials were provided and all questions " answered.    Follow-Up:  3/15: check on access. Then set up 1 week assessment phone call once start date known.      Marcus BrockD  [facility]  [phone]  March 13, 2024

## 2024-03-15 ENCOUNTER — TELEPHONE (OUTPATIENT)
Dept: PHARMACY | Facility: CLINIC | Age: 69
End: 2024-03-15
Payer: COMMERCIAL

## 2024-03-15 DIAGNOSIS — C61 PROSTATE CANCER METASTATIC TO MULTIPLE SITES (H): Primary | ICD-10-CM

## 2024-03-15 RX ORDER — DEXAMETHASONE 4 MG/1
8 TABLET ORAL 2 TIMES DAILY WITH MEALS
Qty: 6 TABLET | Refills: 5 | Status: SHIPPED | OUTPATIENT
Start: 2024-03-19 | End: 2024-04-29

## 2024-03-15 RX ORDER — PROCHLORPERAZINE MALEATE 10 MG
10 TABLET ORAL EVERY 6 HOURS PRN
Qty: 30 TABLET | Refills: 2 | Status: SHIPPED | OUTPATIENT
Start: 2024-03-19 | End: 2024-04-29

## 2024-03-15 NOTE — TELEPHONE ENCOUNTER
Prior Authorization Not Needed per Insurance    Medication: NUBEQA 300 MG PO TABS  Insurance Company: [employer] - Phone [phone] Fax [phone]  Expected CoPay: $ 3,303.39  [facility] - [place], [place] - [address] [place] [address] [facility]  [facility]armacy Notified: yes  Patient Notified: yes, pt expressed financial need for the copay. He is unable to afford this. We will apply for the  patient assistance sohail. Pt will try to email his income documents asap.      Patient also had questions on the efficacy of the drug and length of therapy. I told him I would send a msg to the care team to address this.

## 2024-03-18 ENCOUNTER — PATIENT OUTREACH (OUTPATIENT)
Dept: ONCOLOGY | Facility: CLINIC | Age: 69
End: 2024-03-18
Payer: COMMERCIAL

## 2024-03-18 NOTE — TELEPHONE ENCOUNTER
[facility]: Cancer Care Initial Note                                    Discussion with Patient:                                                      Intro to RNCC role     Education concerns: Cancer care service line, chemo education        Assessment:                                                      Initial  Current living arrangement:: I live in a private home with spouse  Type of residence:: Private home - stairs  Informal Support system:: Spouse  Bed or wheelchair confined:: No  Mobility Status: Independent  Transportation means:: Regular car  Medication adherence problem (GOAL):: No  Knowledgeable about how to use meds:: Yes  Medication side effects suspected:: No  Advanced Care Plans/Directives on file:: No  Patient Reported Pain?: No    Plan of Care Education Review:   Assessment completed with:: Patient    Plan of Care Education   Yearly learning assessment completed?: Yes (see Education tab)  Diagnosis:: prostate cancer with mets  Does patient understand diagnosis?: Yes  Tx plan/regimen:: taxotere, Xgeva, Lupron, casodex, nubeqa  Does patient understand treatment plan/regimen?: Yes  Preparing for treatment:: Reviewed treatment preparation information with patient (vascular access, day of chemo, visitor policy, what to bring, etc.)  Vascular access education provided for:: Port  Side effect education:: Diarrhea/Constipation;Fatigue;Hair loss;Infection;Lab value monitoring (anemia, neutropenia, thrombocytopenia);Nausea/Vomiting;Neuropathy  Supportive services education provided for:: Social work  Safety/self care at home reviewed with patient:: Yes  Coping - concerns/fears reviewed with patient:: Yes  Plan of Care:: Treatment schedule;VERA follow-up appointment;Lab appointment;MD follow-up appointment  When to call provider:: Bleeding;Increased shortness of breath;New/worsening pain;Shaking chills;Temperature >100.4F;Uncontrolled diarrhea/constipation;Uncontrolled nausea/vomiting  Reasons for  deferring treatment reviewed with patient:: Yes  Additional education provided for: : Neutropenic precautions;Bleeding precautions    Evaluation of Learning  Patient Education Provided: Yes  Readiness:: Acceptance  Method:: Booklet/Handout;Literature;Explanation  Response:: Verbalizes understanding           Intervention/Education provided during outreach:                                                       PHI form completed  Patient to follow up as scheduled at next appt  Patient to call/Emme E2MShart message with updates  Confirmed patient has clinic and triage numbers    Signature:  Halina Sprague RN

## 2024-03-18 NOTE — TELEPHONE ENCOUNTER
ANIYAH APPROVED    Medication: NUBEQA 300 MG PO TABS  Amount: $    Foundation Name: [facility] Effective Date: 3/18/2024  Foundation Expiration Date: 12/31/2024  Additional Information:   Patient Notified: yes, left a voice message for the patient.

## 2024-03-18 NOTE — TELEPHONE ENCOUNTER
ANIYAH INITIATED    Medication: NUBEQA 300 MG PO TABS  Foundation Name: [facility]  Date submitted: 3/18/2024 12:02 PM   Foundation Phone:    [facility] Fax:    Sent request today with copy of federal tax return to [facility] leadership

## 2024-03-19 ENCOUNTER — VIRTUAL VISIT (OUTPATIENT)
Dept: ONCOLOGY | Facility: CLINIC | Age: 69
End: 2024-03-19
Attending: INTERNAL MEDICINE
Payer: COMMERCIAL

## 2024-03-19 DIAGNOSIS — C61 PROSTATE CANCER METASTATIC TO MULTIPLE SITES (H): Primary | ICD-10-CM

## 2024-03-19 DIAGNOSIS — Z80.42 FAMILY HISTORY OF PROSTATE CANCER: ICD-10-CM

## 2024-03-19 DIAGNOSIS — Z80.0 FAMILY HISTORY OF COLON CANCER: ICD-10-CM

## 2024-03-19 PROCEDURE — 96040 HC GENETIC COUNSELING, EACH 30 MINUTES: CPT | Mod: TEL,95 | Performed by: GENETIC COUNSELOR, MS

## 2024-03-19 NOTE — PROGRESS NOTES
3/19/2024    Virtual Visit Details  Type of service:  Telephone Visit   Phone call duration: 40 minutes   Originating Location (pt. Location): Home  Distant Location (provider location):  Off-site    Referring Provider: Anabell Mercedes MD    Presenting Information:   Today Ronnie elected for a virtual genetic counseling visit through the Cancer Risk Management Program to discuss his personal and family history of cancer. We reviewed this history, cancer screening recommendations, and available genetic testing options.    Personal History:  Ronnie is a 68 year old male. He was diagnosed with prostate adenocarcinoma ([place] 4+5) at age 68; recent imaging has confirmed metastasis. Treatment originally included a prostatectomy and he is now scheduled to start chemotherapy tomorrow. Per Dr. Mercedes, Ronnie's genetic testing results may inform chemotherapy decisions. 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    His most recent colonoscopy in December 2018 identified one 6mm tubular adenoma and follow-up was recommended in five years. His prior colonoscopy in December 2013 removed one 5mm tubular adenoma. He does not regularly do any other cancer screening at this time.    Family History: (Please see scanned pedigree for detailed family history information)  Ronnie's mother was diagnosed with colon cancer in her 60's and passed away at age 82. She also had skin cancer.  Ronnie's maternal uncle is 90 and was diagnosed with prostate cancer at an unknown age.  One of his sons is in his 60's and was diagnosed with prostate cancer in his 60's.  One of his daughters was diagnosed with an unknown type of leukemia as a teenager.  Ronnie's father was diagnosed with skin cancer, prostate cancer around age 50, vocal cord cancer in his 50's, liver cancer in his 60's (unknown if primary versus metastasis), and passed away at age 64.  Ronnie's paternal grandfather passed away at age 65 and had an unknown  blood disorder.  His maternal ethnicity is [place] and Citizen of [place]. His paternal ethnicity is French and [place]. There is no known Ashkenazi Yazidism ancestry on either side of his family.    Discussion:  We reviewed the features of sporadic, familial, and hereditary cancers. In looking at Ronnie's family history, it is possible that a cancer susceptibility gene is present as Ronnie and relatives on each side of his family have been diagnosed with related cancers in two generations; Ronnie has also been diagnosed with metastatic prostate cancer, which has a higher chance to have a hereditary explanation.  We discussed the natural history and genetics of hereditary prostate cancer, including hereditary breast and ovarian cancer (HBOC) syndrome.   We reviewed that one of the most common causes of hereditary prostate cancer is HBOC syndrome, which is caused by mutations in the BRCA1 and BRCA2 genes. Individuals with HBOC syndrome are at increased risk for several different cancers, including prostate, male breast, ovarian, female breast, melanoma, and pancreatic cancer.  We discussed that there are additional genes that could cause increased risk for prostate and related cancers. As many of these genes present with overlapping features in a family and accurate cancer risk cannot always be established based upon the pedigree analysis alone, it would be reasonable for Ronnie to consider panel genetic testing to analyze multiple genes at once.  Based on his personal and family history, Ronnie meets current National Comprehensive Cancer Network (NCCN) criteria for genetic testing of high penetrance prostate cancer genes (i.e. BRCA1, BRCA2, JAVI, PALB2, CHEK2, HOXB13, MLH1, MSH2, MSH6, PMS2, etc.).  A detailed handout regarding these genes/syndromes and the information we discussed was provided to Ronnie at the end of our appointment today and can be found in the after visit summary. Topics included: inheritance pattern, cancer  risks, cancer screening recommendations, and also risks, benefits and limitations of testing.  We reviewed genetic testing options for hereditary prostate and related cancers: targeted prostate cancer panel and expanded pan-cancer panels (such as the Comprehensive Hereditary Cancer Panel or Expanded Panel).  Ronnie shared several concerns regarding this genetic testing, including the privacy of the information. We discussed that genetic information is protected like other forms of medical information per the federal law HIPAA and he can opt out of his sample being used for research purposes, if that would make him most comfortable. Ronnie verbalized understanding.  He then shared that he will need a few days to consider the above information before making a decision and requested that I contact him later this week. If Ronnie elects to proceed with genetic testing at that time, we will discuss sample collection and review the consent form. Ronnie verbalized agreement with this plan.  Medical Management: For Ronnie, we reviewed that the information from genetic testing may determine:  additional cancer screening for which Ronnie may qualify (i.e. regular clinical breast exams, more frequent colonoscopies, more frequent dermatologic exams, etc.),  and targeted chemotherapies for Ronnie's active cancer, or if he were to develop certain cancers in the future (i.e. immunotherapy for individuals with Ervin syndrome, PARP inhibitors, etc.).   These recommendations and possible targeted chemotherapies will be discussed in detail once genetic testing is completed.     Plan:  1) Today Ronnie elected to take some time to consider the above information before making a decision regarding genetic testing.  2) I will contact him later this week and if he wishes to proceed with testing at that time, we will coordinate sample collection.  3) Ronnie is encouraged to contact me with any questions or concerns.    Shyanne Thomas MS,  [facility] [place]  Licensed, Certified Genetic Counselor  Office: [phone]  [email or web address]

## 2024-03-19 NOTE — NURSING NOTE
Is the patient currently in the state of [place]? YES    Visit mode:TELEPHONE    If the visit is dropped, the patient can be reconnected by: TELEPHONE VISIT: Phone number:   Telephone Information:   Mobile [phone]       Will anyone else be joining the visit? NO  (If patient encounters technical issues they should call [phone])    How would you like to obtain your AVS? MyChart    Are changes needed to the allergy or medication list? N/A    Reason for visit: Consult    Shannon POLANCO

## 2024-03-19 NOTE — LETTER
3/19/2024         RE: Ronnie Lagos  [address]  [place] [place] [address]        Dear Colleague,    Thank you for referring your patient, Ronnie Lagos, to the [facility]. Please see a copy of my visit note below.    3/19/2024    Virtual Visit Details  Type of service:  Telephone Visit   Phone call duration: 40 minutes   Originating Location (pt. Location): Home  Distant Location (provider location):  Off-site    Referring Provider: Anabell Mercedes MD    Presenting Information:   Today Ronnie elected for a virtual genetic counseling visit through the Cancer Risk Management Program to discuss his personal and family history of cancer. We reviewed this history, cancer screening recommendations, and available genetic testing options.    Personal History:  Ronnie is a 68 year old male. He was diagnosed with prostate adenocarcinoma ([place] 4+5) at age 68; recent imaging has confirmed metastasis. Treatment originally included a prostatectomy and he is now scheduled to start chemotherapy tomorrow. Per Dr. Mercedes, Ronnie's genetic testing results may inform chemotherapy decisions. 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    His most recent colonoscopy in December 2018 identified one 6mm tubular adenoma and follow-up was recommended in five years. His prior colonoscopy in December 2013 removed one 5mm tubular adenoma. He does not regularly do any other cancer screening at this time.    Family History: (Please see scanned pedigree for detailed family history information)  Ronnie's mother was diagnosed with colon cancer in her 60's and passed away at age 82. She also had skin cancer.  Ronnie's maternal uncle is 90 and was diagnosed with prostate cancer at an unknown age.  One of his sons is in his 60's and was diagnosed with prostate cancer in his 60's.  One of his daughters was diagnosed with an unknown type of leukemia as a teenager.  [place]  father was diagnosed with skin cancer, prostate cancer around age 50, vocal cord cancer in his 50's, liver cancer in his 60's (unknown if primary versus metastasis), and passed away at age 64.  Ronnie's paternal grandfather passed away at age 65 and had an unknown blood disorder.  His maternal ethnicity is [place] and [place]. His paternal ethnicity is [place]ian and [place]ian. There is no known Ashkenazi Orthodoxy ancestry on either side of his family.    Discussion:  We reviewed the features of sporadic, familial, and hereditary cancers. In looking at Ronnie's family history, it is possible that a cancer susceptibility gene is present as Ronnie and relatives on each side of his family have been diagnosed with related cancers in two generations; Ronnie has also been diagnosed with metastatic prostate cancer, which has a higher chance to have a hereditary explanation.  We discussed the natural history and genetics of hereditary prostate cancer, including hereditary breast and ovarian cancer (HBOC) syndrome.   We reviewed that one of the most common causes of hereditary prostate cancer is HBOC syndrome, which is caused by mutations in the BRCA1 and BRCA2 genes. Individuals with HBOC syndrome are at increased risk for several different cancers, including prostate, male breast, ovarian, female breast, melanoma, and pancreatic cancer.  We discussed that there are additional genes that could cause increased risk for prostate and related cancers. As many of these genes present with overlapping features in a family and accurate cancer risk cannot always be established based upon the pedigree analysis alone, it would be reasonable for Ronnie to consider panel genetic testing to analyze multiple genes at once.  Based on his personal and family history, Ronnie meets current National Comprehensive Cancer Network (NCCN) criteria for genetic testing of high penetrance prostate cancer genes (i.e. BRCA1, BRCA2, JAVI, PALB2, CHEK2, HOXB13,  MLH1, MSH2, MSH6, PMS2, etc.).  A detailed handout regarding these genes/syndromes and the information we discussed was provided to Ronnie at the end of our appointment today and can be found in the after visit summary. Topics included: inheritance pattern, cancer risks, cancer screening recommendations, and also risks, benefits and limitations of testing.  We reviewed genetic testing options for hereditary prostate and related cancers: targeted prostate cancer panel and expanded pan-cancer panels (such as the Comprehensive Hereditary Cancer Panel or Expanded Panel).  Ronnie shared several concerns regarding this genetic testing, including the privacy of the information. We discussed that genetic information is protected like other forms of medical information per the federal law HIPAA and he can opt out of his sample being used for research purposes, if that would make him most comfortable. Ronnie verbalized understanding.  He then shared that he will need a few days to consider the above information before making a decision and requested that I contact him later this week. If Ronnie elects to proceed with genetic testing at that time, we will discuss sample collection and review the consent form. Ronnie verbalized agreement with this plan.  Medical Management: For Ronnie, we reviewed that the information from genetic testing may determine:  additional cancer screening for which Ronnie may qualify (i.e. regular clinical breast exams, more frequent colonoscopies, more frequent dermatologic exams, etc.),  and targeted chemotherapies for Ronnie's active cancer, or if he were to develop certain cancers in the future (i.e. immunotherapy for individuals with Ervin syndrome, PARP inhibitors, etc.).   These recommendations and possible targeted chemotherapies will be discussed in detail once genetic testing is completed.     Plan:  1) Today Ronnie elected to take some time to consider the above information before making a decision  regarding genetic testing.  2) I will contact him later this week and if he wishes to proceed with testing at that time, we will coordinate sample collection.  3) Ronnie is encouraged to contact me with any questions or concerns.    Shyanne Thomas MS, [facility] [place]  Licensed, Certified Genetic Counselor  Office: [phone]  [email or web address]

## 2024-03-20 ENCOUNTER — TRANSFERRED RECORDS (OUTPATIENT)
Dept: HEALTH INFORMATION MANAGEMENT | Facility: CLINIC | Age: 69
End: 2024-03-20

## 2024-03-20 ENCOUNTER — INFUSION THERAPY VISIT (OUTPATIENT)
Dept: INFUSION THERAPY | Facility: CLINIC | Age: 69
End: 2024-03-20
Attending: INTERNAL MEDICINE
Payer: COMMERCIAL

## 2024-03-20 VITALS
OXYGEN SATURATION: 96 % | BODY MASS INDEX: 31.99 KG/M2 | RESPIRATION RATE: 16 BRPM | HEIGHT: 69 IN | HEART RATE: 65 BPM | TEMPERATURE: 97.8 F | DIASTOLIC BLOOD PRESSURE: 84 MMHG | SYSTOLIC BLOOD PRESSURE: 181 MMHG | WEIGHT: 216 LBS

## 2024-03-20 DIAGNOSIS — C61 PROSTATE CANCER METASTATIC TO BONE (H): Primary | ICD-10-CM

## 2024-03-20 DIAGNOSIS — C61 PROSTATE CANCER METASTATIC TO BONE (H): ICD-10-CM

## 2024-03-20 DIAGNOSIS — C79.51 PROSTATE CANCER METASTATIC TO BONE (H): ICD-10-CM

## 2024-03-20 DIAGNOSIS — C79.51 PROSTATE CANCER METASTATIC TO BONE (H): Primary | ICD-10-CM

## 2024-03-20 DIAGNOSIS — C61 PROSTATE CANCER METASTATIC TO MULTIPLE SITES (H): Primary | ICD-10-CM

## 2024-03-20 LAB
ALBUMIN SERPL BCG-MCNC: 4.4 G/DL (ref 3.5–5.2)
ALP SERPL-CCNC: 73 U/L (ref 40–150)
ALT SERPL W P-5'-P-CCNC: 57 U/L (ref 0–70)
ANION GAP SERPL CALCULATED.3IONS-SCNC: 11 MMOL/L (ref 7–15)
AST SERPL W P-5'-P-CCNC: 52 U/L (ref 0–45)
BASOPHILS # BLD AUTO: 0 10E3/UL (ref 0–0.2)
BASOPHILS NFR BLD AUTO: 1 %
BILIRUB SERPL-MCNC: 1.5 MG/DL
BUN SERPL-MCNC: 9.7 MG/DL (ref 8–23)
CALCIUM SERPL-MCNC: 9.2 MG/DL (ref 8.8–10.2)
CHLORIDE SERPL-SCNC: 100 MMOL/L (ref 98–107)
CREAT SERPL-MCNC: 0.94 MG/DL (ref 0.67–1.17)
DEPRECATED HCO3 PLAS-SCNC: 25 MMOL/L (ref 22–29)
EGFRCR SERPLBLD CKD-EPI 2021: 89 ML/MIN/1.73M2
EOSINOPHIL # BLD AUTO: 0.2 10E3/UL (ref 0–0.7)
EOSINOPHIL NFR BLD AUTO: 3 %
ERYTHROCYTE [DISTWIDTH] IN BLOOD BY AUTOMATED COUNT: 12.5 % (ref 10–15)
GLUCOSE SERPL-MCNC: 118 MG/DL (ref 70–99)
HCT VFR BLD AUTO: 49.6 % (ref 40–53)
HGB BLD-MCNC: 17.8 G/DL (ref 13.3–17.7)
IMM GRANULOCYTES # BLD: 0 10E3/UL
IMM GRANULOCYTES NFR BLD: 0 %
LYMPHOCYTES # BLD AUTO: 1.2 10E3/UL (ref 0.8–5.3)
LYMPHOCYTES NFR BLD AUTO: 20 %
MCH RBC QN AUTO: 32.4 PG (ref 26.5–33)
MCHC RBC AUTO-ENTMCNC: 35.9 G/DL (ref 31.5–36.5)
MCV RBC AUTO: 90 FL (ref 78–100)
MONOCYTES # BLD AUTO: 0.6 10E3/UL (ref 0–1.3)
MONOCYTES NFR BLD AUTO: 9 %
NEUTROPHILS # BLD AUTO: 3.9 10E3/UL (ref 1.6–8.3)
NEUTROPHILS NFR BLD AUTO: 67 %
NRBC # BLD AUTO: 0 10E3/UL
NRBC BLD AUTO-RTO: 0 /100
PHOSPHATE SERPL-MCNC: 3.9 MG/DL (ref 2.5–4.5)
PLATELET # BLD AUTO: 195 10E3/UL (ref 150–450)
POTASSIUM SERPL-SCNC: 4 MMOL/L (ref 3.4–5.3)
PROT SERPL-MCNC: 7.5 G/DL (ref 6.4–8.3)
RBC # BLD AUTO: 5.5 10E6/UL (ref 4.4–5.9)
SODIUM SERPL-SCNC: 136 MMOL/L (ref 135–145)
WBC # BLD AUTO: 5.9 10E3/UL (ref 4–11)

## 2024-03-20 PROCEDURE — 84153 ASSAY OF PSA TOTAL: CPT | Performed by: INTERNAL MEDICINE

## 2024-03-20 PROCEDURE — 84100 ASSAY OF PHOSPHORUS: CPT | Performed by: INTERNAL MEDICINE

## 2024-03-20 PROCEDURE — 96367 TX/PROPH/DG ADDL SEQ IV INF: CPT

## 2024-03-20 PROCEDURE — 96409 CHEMO IV PUSH SNGL DRUG: CPT

## 2024-03-20 PROCEDURE — 250N000011 HC RX IP 250 OP 636: Mod: JZ | Performed by: NURSE PRACTITIONER

## 2024-03-20 PROCEDURE — 250N000011 HC RX IP 250 OP 636: Performed by: INTERNAL MEDICINE

## 2024-03-20 PROCEDURE — 96372 THER/PROPH/DIAG INJ SC/IM: CPT | Mod: XS | Performed by: NURSE PRACTITIONER

## 2024-03-20 PROCEDURE — 36415 COLL VENOUS BLD VENIPUNCTURE: CPT | Performed by: INTERNAL MEDICINE

## 2024-03-20 PROCEDURE — 96375 TX/PRO/DX INJ NEW DRUG ADDON: CPT

## 2024-03-20 PROCEDURE — 96402 CHEMO HORMON ANTINEOPL SQ/IM: CPT

## 2024-03-20 PROCEDURE — 258N000003 HC RX IP 258 OP 636: Performed by: INTERNAL MEDICINE

## 2024-03-20 PROCEDURE — 82040 ASSAY OF SERUM ALBUMIN: CPT | Performed by: INTERNAL MEDICINE

## 2024-03-20 PROCEDURE — 85025 COMPLETE CBC W/AUTO DIFF WBC: CPT | Performed by: INTERNAL MEDICINE

## 2024-03-20 RX ORDER — ALBUTEROL SULFATE 0.83 MG/ML
2.5 SOLUTION RESPIRATORY (INHALATION)
Status: CANCELLED | OUTPATIENT
Start: 2024-04-19

## 2024-03-20 RX ORDER — ALBUTEROL SULFATE 90 UG/1
1-2 AEROSOL, METERED RESPIRATORY (INHALATION)
Status: DISCONTINUED | OUTPATIENT
Start: 2024-03-20 | End: 2024-03-20 | Stop reason: HOSPADM

## 2024-03-20 RX ORDER — ALBUTEROL SULFATE 90 UG/1
1-2 AEROSOL, METERED RESPIRATORY (INHALATION)
Status: CANCELLED
Start: 2024-03-20

## 2024-03-20 RX ORDER — MEPERIDINE HYDROCHLORIDE 25 MG/ML
25 INJECTION INTRAMUSCULAR; INTRAVENOUS; SUBCUTANEOUS EVERY 30 MIN PRN
Status: CANCELLED | OUTPATIENT
Start: 2024-04-19

## 2024-03-20 RX ORDER — METHYLPREDNISOLONE SODIUM SUCCINATE 125 MG/2ML
125 INJECTION, POWDER, LYOPHILIZED, FOR SOLUTION INTRAMUSCULAR; INTRAVENOUS
Status: CANCELLED
Start: 2024-03-20

## 2024-03-20 RX ORDER — MEPERIDINE HYDROCHLORIDE 25 MG/ML
25 INJECTION INTRAMUSCULAR; INTRAVENOUS; SUBCUTANEOUS EVERY 30 MIN PRN
Status: CANCELLED | OUTPATIENT
Start: 2024-03-20

## 2024-03-20 RX ORDER — EPINEPHRINE 1 MG/ML
0.3 INJECTION, SOLUTION INTRAMUSCULAR; SUBCUTANEOUS EVERY 5 MIN PRN
Status: CANCELLED | OUTPATIENT
Start: 2024-04-19

## 2024-03-20 RX ORDER — ALBUTEROL SULFATE 0.83 MG/ML
2.5 SOLUTION RESPIRATORY (INHALATION)
Status: CANCELLED | OUTPATIENT
Start: 2024-03-20

## 2024-03-20 RX ORDER — MEPERIDINE HYDROCHLORIDE 25 MG/ML
25 INJECTION INTRAMUSCULAR; INTRAVENOUS; SUBCUTANEOUS EVERY 30 MIN PRN
Status: DISCONTINUED | OUTPATIENT
Start: 2024-03-20 | End: 2024-03-20 | Stop reason: HOSPADM

## 2024-03-20 RX ORDER — DIPHENHYDRAMINE HYDROCHLORIDE 50 MG/ML
50 INJECTION INTRAMUSCULAR; INTRAVENOUS
Status: CANCELLED
Start: 2024-04-19

## 2024-03-20 RX ORDER — METHYLPREDNISOLONE SODIUM SUCCINATE 125 MG/2ML
125 INJECTION, POWDER, LYOPHILIZED, FOR SOLUTION INTRAMUSCULAR; INTRAVENOUS
Status: CANCELLED
Start: 2024-04-19

## 2024-03-20 RX ORDER — EPINEPHRINE 1 MG/ML
0.3 INJECTION, SOLUTION INTRAMUSCULAR; SUBCUTANEOUS EVERY 5 MIN PRN
Status: CANCELLED | OUTPATIENT
Start: 2024-03-20

## 2024-03-20 RX ORDER — DIPHENHYDRAMINE HYDROCHLORIDE 50 MG/ML
50 INJECTION INTRAMUSCULAR; INTRAVENOUS
Status: COMPLETED | OUTPATIENT
Start: 2024-03-20 | End: 2024-03-20

## 2024-03-20 RX ORDER — ALBUTEROL SULFATE 0.83 MG/ML
2.5 SOLUTION RESPIRATORY (INHALATION)
Status: DISCONTINUED | OUTPATIENT
Start: 2024-03-20 | End: 2024-03-20 | Stop reason: HOSPADM

## 2024-03-20 RX ORDER — DIPHENHYDRAMINE HYDROCHLORIDE 50 MG/ML
50 INJECTION INTRAMUSCULAR; INTRAVENOUS
Status: CANCELLED
Start: 2024-03-20

## 2024-03-20 RX ORDER — EPINEPHRINE 1 MG/ML
0.3 INJECTION, SOLUTION INTRAMUSCULAR; SUBCUTANEOUS EVERY 5 MIN PRN
Status: DISCONTINUED | OUTPATIENT
Start: 2024-03-20 | End: 2024-03-20 | Stop reason: HOSPADM

## 2024-03-20 RX ORDER — ALBUTEROL SULFATE 90 UG/1
1-2 AEROSOL, METERED RESPIRATORY (INHALATION)
Status: CANCELLED
Start: 2024-04-19

## 2024-03-20 RX ORDER — METHYLPREDNISOLONE SODIUM SUCCINATE 125 MG/2ML
125 INJECTION, POWDER, LYOPHILIZED, FOR SOLUTION INTRAMUSCULAR; INTRAVENOUS
Status: DISCONTINUED | OUTPATIENT
Start: 2024-03-20 | End: 2024-03-20 | Stop reason: HOSPADM

## 2024-03-20 RX ADMIN — DENOSUMAB 120 MG: 120 INJECTION SUBCUTANEOUS at 15:53

## 2024-03-20 RX ADMIN — LEUPROLIDE ACETATE 22.5 MG: KIT at 15:56

## 2024-03-20 RX ADMIN — SODIUM CHLORIDE 250 ML: 9 INJECTION, SOLUTION INTRAVENOUS at 13:50

## 2024-03-20 RX ADMIN — SODIUM CHLORIDE 166 MG: 9 INJECTION, SOLUTION INTRAVENOUS at 14:17

## 2024-03-20 RX ADMIN — DEXAMETHASONE SODIUM PHOSPHATE: 10 INJECTION, SOLUTION INTRAMUSCULAR; INTRAVENOUS at 13:55

## 2024-03-20 RX ADMIN — DIPHENHYDRAMINE HYDROCHLORIDE 50 MG: 50 INJECTION INTRAMUSCULAR; INTRAVENOUS at 14:27

## 2024-03-20 RX ADMIN — METHYLPREDNISOLONE SODIUM SUCCINATE 125 MG: 125 INJECTION, POWDER, FOR SOLUTION INTRAMUSCULAR; INTRAVENOUS at 14:32

## 2024-03-20 RX ADMIN — FAMOTIDINE 20 MG: 10 INJECTION, SOLUTION INTRAVENOUS at 14:29

## 2024-03-20 NOTE — PROGRESS NOTES
Infusion Nursing Note:  Ronnie Lagos presents today for Labs + C1D1 Taxotere (REACTION, not re-challenged) + Lupron + Xgeva.    Patient seen by provider today: No   present during visit today: Not Applicable.    Note: Patient oriented to infusion center, room and call bell system.  Reinforced education on Taxotere, Lupron, and Xgeva, including potential side effects, signs/symptoms to monitor for, and when to seek medical attention.  Written material on Taxotere, Lupron, and Xgeva given to patient.  All questions answered.  Patient verbalized understanding and is in agreement with this plan.    Ronnie denies any recent or upcoming dental work.  (He states he has a crown that fell off, but due to his cancer treatment, his dentist has recommended putting that off for now.)  Patient denies tooth, mouth, or jaw pain.  He confirms that he will start taking Calcium and Vitamin D supplement.    --------    About 5 minutes into Taxotere infusion, patient was noted to be very flushed.  Patient reported feeling hot, chest heaviness, nausea.  Denied difficulty breathing.  Taxotere stopped immediately and NS open to gravity.  Oxygen applied.  Emergency meds administered, including IV Bendaryl, IV Pepcid, and IV Solu-Medrol.  Hypertensive up to to 195/105.  JENNIFER Ortiz came to beside to evaluate patient.  Patient reported that he felt better when Taxotere was stopped and gradually symptoms went away.  Flushing noticeably improved after IV Benadryl administration.    3/20/2024  2:31 PM  SUZANNA Mata PA-C/Eloy Contreras RN:  Do not rechallenge Taxotere today.  OK to give Lupron and Xgeva today after 1 hour of observation in clinic.  OK for patient to start oral Darolutaminde tomorrow.    Message sent to Dr. Mercedes to notify her of reaction and request she advise how to proceed; also sent to [facility] Halina to follow-up with patient once plan is made.    BP continued to be elevated post-reaction--169/90, 166/102,  181/84.  Patient reported feeling fine.  Per JENNIFER Ortiz, likely due to all the medications/steroids that patient received, OK to discharge if patient feeling ok and has BP cuff at home.  Reviewed recommendations with patient.  He stated he has a BP cuff at home.  Advised him to check BP later today once home and if it remains elevated above 160/100 to contact triage for further recommendations.  He verbalized understanding and is in agreement with this plan.    Intravenous Access:  Labs drawn without difficulty.  Peripheral IV placed.    Treatment Conditions:  Lab Results   Component Value Date    HGB 17.8 (H) 03/20/2024    WBC 5.9 03/20/2024    ANEU 5.2 01/12/2018    ANEUTAUTO 3.9 03/20/2024     03/20/2024        Lab Results   Component Value Date     03/20/2024    POTASSIUM 4.0 03/20/2024    CR 0.94 03/20/2024    AV 9.2 03/20/2024    BILITOTAL 1.5 (H) 03/20/2024    ALBUMIN 4.4 03/20/2024    ALT 57 03/20/2024    AST 52 (H) 03/20/2024       Results reviewed, labs MET treatment parameters, ok to proceed with treatment.    Post Infusion Assessment:  Patient tolerated infusion poorly due to Hypersensitivity:   Did patient have a hypersensitivity reaction? : Yes  Drug or Product name: Taxotere  Were pre-meds administered?: Yes  What pre-meds were administered?: Dexamethasone (decadron;Ondansetron (Zofran)  First or Subsequent treatment: First time receiving  Rate of infusion when patient had hypersensitivity reaction: 283 ml/hr  Time the hypersensitivity reaction was first recognized: 1426  Symptoms observed or reported (select all that apply): Flushing;Chest tightness;Nausea;Hypertension  Interventions/treatment following reaction: Infusion stopped;Hypersensitivity medications administered;Oxygen applied;Therapy discontinued;Additional observation period added  What hypersensitivity medications were administered?: DiphendydrAMINE (benadryl);Famotidine(Pepcid);Methylprednisolone  Name of provider notified:  JENNIFER Salamanca  Time provider notified: 0682  Type of notification (select all that apply): Provider at bedside  Was the patient re-challenged today?: No - no re-challenge today    Patient tolerated injections without incident.  Blood return noted pre and post infusion.  Site patent and intact, free from redness, edema or discomfort.  No evidence of extravasations.  Access discontinued per protocol.     Discharge Plan:   Discharge instructions reviewed with: Patient.  Patient and/or family verbalized understanding of discharge instructions and all questions answered.  AVS to patient via GraphOn.  Follow-up pending Dr. Mercedes's response regarding reaction.  Patient is aware that he should get a call from Dr. Mercedes's nurse Halina once plan is finalized.  Patient discharged in stable condition accompanied by: self.  Departure Mode: Ambulatory.      Eloy Contreras RN

## 2024-03-20 NOTE — PATIENT INSTRUCTIONS
Conditions discussed today:  BRCA1 and BRCA2 gene mutations    Assessing Cancer Risk  Only about 5-10% of cancers are thought to be due to an inherited cancer susceptibility gene.    These families often have:  Several people with the same or related types of cancer  Cancers diagnosed at a young age (before age 50)  Individuals with more than one primary cancer  Multiple generations of the family affected with cancer    Genetic Testing  Genetic testing involves a blood or saliva test and will look at the genetic information in select genes for any harmful mutations that are associated with increased cancer risk.  If possible, it is recommended that the person(s) who has had cancer be tested before other family members.  That person will give us the most useful information about whether or not a specific gene is associated with the cancer in the family.     Results  There are three possible results of genetic testing:  Positive--a harmful mutation was identified  Negative--no mutation was identified  Variant of unknown significance--a variation in one of the genes was identified, but it is unclear how this impacts cancer risk in the family    Advantages and Disadvantages  There are advantages and disadvantages to genetic testing.    Advantages  May clarify your cancer risk  Can help you make medical decisions  May explain the cancers in your family  May give useful information to your family members (if you share your results)    Disadvantages  Possible negative emotional impact of learning about inherited cancer risk  Uncertainty in interpreting a negative test result in some situations  Possible genetic discrimination concerns (see below)    Inheritance   Mutations in most cancer risk genes are inherited in an autosomal dominant pattern.  This means that if a parent has a mutation, each of their children will have a 50% chance of inheriting that same mutation.  Therefore, each child would have a 50% chance of  being at increased risk for developing cancer.                                              Image obtained from Genetics Home Reference, 2013     Genetic Information Nondiscrimination Act (JORGE)  JORGE is a federal law that protects individuals from health insurance or employment discrimination based on a genetic test result alone.  Although rare, there are currently no legal protections in terms of life insurance, long term care, or disability insurances.  Visit the National Human Genome Research [place] at Genome.gov/24041891 to learn more.    Reducing Cancer Risk  If a harmful mutation is found in a cancer risk gene, there may be certain screening tests or preventative surgeries that can be offered. This information will be discussed after genetic testing is completed. If no mutations are found on genetic testing, screening is then recommended based on personal and/or family history of cancer.     Questions to Think About Regarding Genetic Testing  What effect will the test result have on me and my relationship with my family members if I have an inherited gene mutation?  If I don t have a gene mutation?  Should I share my test results, and how will my family react to this news, which may also affect them?  Are my children ready to learn new information that may one day affect their own health?    Resources  American Cancer Society (ACS) cancer.org   National Cancer [place] (NCI) cancer.gov     Please call us if you have any questions or concerns.   Cancer Risk Management Program [phone]-CANCER ([phone])  Estuardo Loera, MS [facility]  [phone]  Sommer Amador, MS, [facility] [phone]  Mahogany Cleary, MS, [facility]  [phone]  Amanda Aguilar, MS, [facility]  [phone]  Delmy Martin, MS, [facility]  [phone]  Shyanne Thomas, MS, [facility] [phone]  Dhara Arana, MS, [facility] [phone]

## 2024-03-21 LAB — PSA SERPL DL<=0.01 NG/ML-MCNC: 6 NG/ML (ref 0–4.5)

## 2024-03-22 ENCOUNTER — TELEPHONE (OUTPATIENT)
Dept: ONCOLOGY | Facility: CLINIC | Age: 69
End: 2024-03-22
Payer: COMMERCIAL

## 2024-03-22 DIAGNOSIS — Z80.0 FAMILY HISTORY OF COLON CANCER: ICD-10-CM

## 2024-03-22 DIAGNOSIS — Z80.42 FAMILY HISTORY OF PROSTATE CANCER: ICD-10-CM

## 2024-03-22 DIAGNOSIS — C79.51 PROSTATE CANCER METASTATIC TO BONE (H): Primary | ICD-10-CM

## 2024-03-22 DIAGNOSIS — C61 PROSTATE CANCER METASTATIC TO BONE (H): Primary | ICD-10-CM

## 2024-03-22 NOTE — TELEPHONE ENCOUNTER
3/22/2024    Referring Provider: Anabell Mercedes MD    Presenting Information:   I called Ronnie today to follow-up after our previous appointment on 3/19/2024. At that time, Ronnie elected to take a few days to consider genetic testing before making a decision and requested that I contact him later in the week to discuss next steps.    Discussion:  We previously reviewed the details of Ronnie's family and personal history. Please see the clinic note from our previous appointment for details. As previously discussed, Ronnie meets current National Comprehensive Cancer Network (NCCN) criteria for genetic testing of high penetrance prostate cancer genes (i.e. BRCA1, BRCA2, JAVI, PALB2, CHEK2, HOXB13, MLH1, MSH2, MSH6, PMS2, etc.).  We again reviewed genetic testing options for hereditary prostate and related cancers: targeted prostate cancer panel and expanded pan-cancer panels (Comprehensive Hereditary Cancer Panel and Expanded Panel). He opted for the Comprehensive Hereditary Cancer Panel.  Genetic testing is available for 43 genes associated with hereditary cancer: APC, JAVI, AXIN2, BAP1, BARD1, BMPR1A, BRCA1, BRCA2, BRIP1, CDH1, CDK4, CDKN2A, CHEK2, DICER1, EPCAM, GREM1, HOXB13, MBD4, MITF, MLH1, MLH3, MRE11, MSH2, MSH3, MSH6, MUTYH, NBN, NF1, NTHL1, PALB2, PMS2, POLD1, POLE, POT1, PTEN, RAD50, RAD51C, RAD51D, SMAD4, SMARCA4, STK11, TERT, TP53.  Consent was obtained over the phone and his blood will be drawn at his earliest convenience. Once his blood is drawn, it will be sent to the [facility] for testing. Of note, testing will begin with the BRCA1 and BRCA2 genes, with reflex to the complete panel. Turn around time: approximately 4 weeks after his blood is drawn and insurance pre-authorization is obtained (Ronnie requested that he be contacted with the result of the pre-authorization, regardless of anticipated out of pocket cost).  Medical Management: For Ronnie, we reviewed that  the information from genetic testing may determine:  additional cancer screening for which Ronnie may qualify (i.e. more frequent colonoscopies, regular breast exams, more frequent dermatologic exams, etc.),  and targeted chemotherapies for Ronnie's active cancer, or if he were to develop certain cancers in the future (i.e. immunotherapy for individuals with Ervin syndrome, PARP inhibitors, etc.).   These recommendations and possible targeted chemotherapies will be discussed in detail once genetic testing is completed.     Plan:  1) Today Ronnie elected to proceed with testing of the BRCA1 and BRCA2 genes, with reflex to the Comprehensive Hereditary Cancer Panel, through the [facility]. His blood will be drawn at his earliest convenience.  2) Ronnie will be contacted to schedule a virtual return visit with me to review the results, once they become available. Turn around time: approximately 4 weeks after his blood is drawn and insurance pre-authorization is obtained.     Shyanne Thomas MS, [facility]  Licensed, Certified Genetic Counselor  Office: [phone]  [email or web address]

## 2024-03-23 DIAGNOSIS — C61 PROSTATE CANCER METASTATIC TO MULTIPLE SITES (H): Primary | ICD-10-CM

## 2024-03-23 RX ORDER — HEPARIN SODIUM (PORCINE) LOCK FLUSH IV SOLN 100 UNIT/ML 100 UNIT/ML
5 SOLUTION INTRAVENOUS
Status: CANCELLED | OUTPATIENT
Start: 2024-03-29

## 2024-03-23 RX ORDER — ALBUTEROL SULFATE 0.83 MG/ML
2.5 SOLUTION RESPIRATORY (INHALATION)
Status: CANCELLED | OUTPATIENT
Start: 2024-03-29

## 2024-03-23 RX ORDER — DIPHENHYDRAMINE HYDROCHLORIDE 50 MG/ML
50 INJECTION INTRAMUSCULAR; INTRAVENOUS EVERY 6 HOURS PRN
Status: CANCELLED
Start: 2024-03-29

## 2024-03-23 RX ORDER — HEPARIN SODIUM,PORCINE 10 UNIT/ML
5-20 VIAL (ML) INTRAVENOUS DAILY PRN
Status: CANCELLED | OUTPATIENT
Start: 2024-03-29

## 2024-03-23 RX ORDER — LORAZEPAM 2 MG/ML
0.5 INJECTION INTRAMUSCULAR EVERY 4 HOURS PRN
Status: CANCELLED | OUTPATIENT
Start: 2024-03-29

## 2024-03-23 RX ORDER — EPINEPHRINE 1 MG/ML
0.3 INJECTION, SOLUTION, CONCENTRATE INTRAVENOUS EVERY 5 MIN PRN
Status: CANCELLED | OUTPATIENT
Start: 2024-03-29

## 2024-03-23 RX ORDER — DIPHENHYDRAMINE HYDROCHLORIDE 50 MG/ML
50 INJECTION INTRAMUSCULAR; INTRAVENOUS
Status: CANCELLED
Start: 2024-03-29

## 2024-03-23 RX ORDER — ALBUTEROL SULFATE 90 UG/1
1-2 AEROSOL, METERED RESPIRATORY (INHALATION)
Status: CANCELLED
Start: 2024-03-29

## 2024-03-23 RX ORDER — MEPERIDINE HYDROCHLORIDE 25 MG/ML
25 INJECTION INTRAMUSCULAR; INTRAVENOUS; SUBCUTANEOUS EVERY 30 MIN PRN
Status: CANCELLED | OUTPATIENT
Start: 2024-03-29

## 2024-03-25 ENCOUNTER — TELEPHONE (OUTPATIENT)
Dept: ONCOLOGY | Facility: CLINIC | Age: 69
End: 2024-03-25
Payer: COMMERCIAL

## 2024-03-25 NOTE — TELEPHONE ENCOUNTER
Spoke with Ronnie to relay message regarding Taxotere reaction. Explained plan and Ronnie is in agreement with this. Will contact sched at [facility] to see if sooner appt is available.     Pt reports he started Nubeqa on 3/22/24    BEAN Farrell Jocelin, MD Rasmussen, Kristin G, RN; Halina Sprague RN  Cc: Angel Mata PA  I would recommend a Taxotere rechallenge.  I'll enter premeds for his next infusion.  Please schedule infusion for next available -- thank you!    JH          Previous Messages       ----- Message -----  From: Nazia Contreras RN  Sent: 3/20/2024   2:59 PM CDT  To: Halina Sprague RN; JENNIFER [place]na; *  Subject: Taxotere hypersensitivity reaction              Dr. Mercedes    Ronnie had a reaction 5 minutes into his Taxotere today--severe flushing, chest heaviness, nausea, and hypertension.  Patient received IV Benadryl, IV Pepcid, and IV Solu-Medrol for treatment of reaction with improvement in symptoms.  (Plan to give his Lupron and Xgeva prior to discharge today.)    Angel did not want to re-challenge today.  Please advise how to proceed.  If you want to re-challenge, please advise what additional pre-medications you are ordering.    Halina--Please follow-up with Ronnie on the plan after Dr. Mercedes responds.    Eloy Contreras, FARHANAN, RN, OCN  Infusion Nurse  [facility]

## 2024-03-27 ENCOUNTER — TELEPHONE (OUTPATIENT)
Dept: LAB | Facility: CLINIC | Age: 69
End: 2024-03-27
Payer: COMMERCIAL

## 2024-03-27 NOTE — PROGRESS NOTES
Notified Ronnie, patient, that no authorization is required for his genetic testing.    Explained that insurance benefits may still apply, therefore, there could be an out of pocket cost. Provided Ronnie with estimated out of pocket cost for testing.    Ronnie expressed understanding and stated that he does NOT want to proceed with testing due to no guarantees of coverage and possible high cost of testing that would fall under his responsibility if genetic testing was denied. Ronnie would like to discuss with his wife further but feels they will decline testing at this time. He would like to connect with genetic counselor Shyanne and if there are alternative options to testing. [facility] staff will reach out to  and have her follow up with the patient. Ronnie appreciated this information and had no further questions.      Rene Wynn  Genomics Billing    [facility]  Molecular Diagnostics Laboratory  [address] [address]  [email or web address]  [email or web address]  Office: [phone]  Fax:   Employed by [employer]

## 2024-03-29 ENCOUNTER — TELEPHONE (OUTPATIENT)
Dept: ONCOLOGY | Facility: CLINIC | Age: 69
End: 2024-03-29
Payer: COMMERCIAL

## 2024-03-29 ENCOUNTER — MYC MEDICAL ADVICE (OUTPATIENT)
Dept: ONCOLOGY | Facility: CLINIC | Age: 69
End: 2024-03-29

## 2024-03-29 ENCOUNTER — INFUSION THERAPY VISIT (OUTPATIENT)
Dept: INFUSION THERAPY | Facility: CLINIC | Age: 69
End: 2024-03-29
Attending: INTERNAL MEDICINE
Payer: COMMERCIAL

## 2024-03-29 VITALS
RESPIRATION RATE: 18 BRPM | WEIGHT: 212.2 LBS | OXYGEN SATURATION: 96 % | TEMPERATURE: 97.6 F | SYSTOLIC BLOOD PRESSURE: 146 MMHG | DIASTOLIC BLOOD PRESSURE: 86 MMHG | BODY MASS INDEX: 32.16 KG/M2 | HEART RATE: 66 BPM | HEIGHT: 68 IN

## 2024-03-29 DIAGNOSIS — C61 PROSTATE CANCER METASTATIC TO MULTIPLE SITES (H): Primary | ICD-10-CM

## 2024-03-29 LAB
ALBUMIN SERPL BCG-MCNC: 4.2 G/DL (ref 3.5–5.2)
ALP SERPL-CCNC: 84 U/L (ref 40–150)
ALT SERPL W P-5'-P-CCNC: <5 U/L (ref 0–70)
ANION GAP SERPL CALCULATED.3IONS-SCNC: 15 MMOL/L (ref 7–15)
AST SERPL W P-5'-P-CCNC: ABNORMAL U/L
BASOPHILS # BLD AUTO: 0 10E3/UL (ref 0–0.2)
BASOPHILS NFR BLD AUTO: 1 %
BILIRUB SERPL-MCNC: 1.4 MG/DL
BUN SERPL-MCNC: 12 MG/DL (ref 8–23)
CALCIUM SERPL-MCNC: 8.9 MG/DL (ref 8.8–10.2)
CHLORIDE SERPL-SCNC: 99 MMOL/L (ref 98–107)
CREAT SERPL-MCNC: 0.88 MG/DL (ref 0.67–1.17)
DEPRECATED HCO3 PLAS-SCNC: 23 MMOL/L (ref 22–29)
EGFRCR SERPLBLD CKD-EPI 2021: >90 ML/MIN/1.73M2
EOSINOPHIL # BLD AUTO: 0.2 10E3/UL (ref 0–0.7)
EOSINOPHIL NFR BLD AUTO: 3 %
ERYTHROCYTE [DISTWIDTH] IN BLOOD BY AUTOMATED COUNT: 12.1 % (ref 10–15)
GLUCOSE SERPL-MCNC: 97 MG/DL (ref 70–99)
HCT VFR BLD AUTO: 49 % (ref 40–53)
HGB BLD-MCNC: 17.1 G/DL (ref 13.3–17.7)
IMM GRANULOCYTES # BLD: 0 10E3/UL
IMM GRANULOCYTES NFR BLD: 0 %
LYMPHOCYTES # BLD AUTO: 1.3 10E3/UL (ref 0.8–5.3)
LYMPHOCYTES NFR BLD AUTO: 21 %
MCH RBC QN AUTO: 31.8 PG (ref 26.5–33)
MCHC RBC AUTO-ENTMCNC: 34.9 G/DL (ref 31.5–36.5)
MCV RBC AUTO: 91 FL (ref 78–100)
MONOCYTES # BLD AUTO: 0.7 10E3/UL (ref 0–1.3)
MONOCYTES NFR BLD AUTO: 11 %
NEUTROPHILS # BLD AUTO: 4.1 10E3/UL (ref 1.6–8.3)
NEUTROPHILS NFR BLD AUTO: 64 %
NRBC # BLD AUTO: 0 10E3/UL
NRBC BLD AUTO-RTO: 0 /100
PLATELET # BLD AUTO: 216 10E3/UL (ref 150–450)
POTASSIUM SERPL-SCNC: 4.4 MMOL/L (ref 3.4–5.3)
PROT SERPL-MCNC: 7.4 G/DL (ref 6.4–8.3)
RBC # BLD AUTO: 5.37 10E6/UL (ref 4.4–5.9)
SODIUM SERPL-SCNC: 137 MMOL/L (ref 135–145)
WBC # BLD AUTO: 6.3 10E3/UL (ref 4–11)

## 2024-03-29 PROCEDURE — 85049 AUTOMATED PLATELET COUNT: CPT | Performed by: INTERNAL MEDICINE

## 2024-03-29 PROCEDURE — 84155 ASSAY OF PROTEIN SERUM: CPT | Performed by: INTERNAL MEDICINE

## 2024-03-29 PROCEDURE — 250N000011 HC RX IP 250 OP 636: Performed by: INTERNAL MEDICINE

## 2024-03-29 PROCEDURE — 258N000003 HC RX IP 258 OP 636: Performed by: INTERNAL MEDICINE

## 2024-03-29 PROCEDURE — 96376 TX/PRO/DX INJ SAME DRUG ADON: CPT

## 2024-03-29 PROCEDURE — 96367 TX/PROPH/DG ADDL SEQ IV INF: CPT

## 2024-03-29 PROCEDURE — 36415 COLL VENOUS BLD VENIPUNCTURE: CPT | Performed by: INTERNAL MEDICINE

## 2024-03-29 PROCEDURE — 96409 CHEMO IV PUSH SNGL DRUG: CPT

## 2024-03-29 PROCEDURE — 96375 TX/PRO/DX INJ NEW DRUG ADDON: CPT

## 2024-03-29 RX ORDER — DIPHENHYDRAMINE HYDROCHLORIDE 50 MG/ML
50 INJECTION INTRAMUSCULAR; INTRAVENOUS EVERY 6 HOURS PRN
Status: DISCONTINUED | OUTPATIENT
Start: 2024-03-29 | End: 2024-03-29 | Stop reason: HOSPADM

## 2024-03-29 RX ORDER — METHYLPREDNISOLONE SODIUM SUCCINATE 125 MG/2ML
125 INJECTION, POWDER, LYOPHILIZED, FOR SOLUTION INTRAMUSCULAR; INTRAVENOUS
Status: DISCONTINUED | OUTPATIENT
Start: 2024-03-29 | End: 2024-03-29 | Stop reason: HOSPADM

## 2024-03-29 RX ORDER — ALBUTEROL SULFATE 90 UG/1
1-2 AEROSOL, METERED RESPIRATORY (INHALATION)
Status: DISCONTINUED | OUTPATIENT
Start: 2024-03-29 | End: 2024-03-29 | Stop reason: HOSPADM

## 2024-03-29 RX ORDER — ALBUTEROL SULFATE 0.83 MG/ML
2.5 SOLUTION RESPIRATORY (INHALATION)
Status: DISCONTINUED | OUTPATIENT
Start: 2024-03-29 | End: 2024-03-29 | Stop reason: HOSPADM

## 2024-03-29 RX ORDER — DIPHENHYDRAMINE HYDROCHLORIDE 50 MG/ML
50 INJECTION INTRAMUSCULAR; INTRAVENOUS
Status: COMPLETED | OUTPATIENT
Start: 2024-03-29 | End: 2024-03-29

## 2024-03-29 RX ORDER — MEPERIDINE HYDROCHLORIDE 25 MG/ML
25 INJECTION INTRAMUSCULAR; INTRAVENOUS; SUBCUTANEOUS EVERY 30 MIN PRN
Status: DISCONTINUED | OUTPATIENT
Start: 2024-03-29 | End: 2024-03-29 | Stop reason: HOSPADM

## 2024-03-29 RX ORDER — METHYLPREDNISOLONE SODIUM SUCCINATE 125 MG/2ML
125 INJECTION, POWDER, LYOPHILIZED, FOR SOLUTION INTRAMUSCULAR; INTRAVENOUS ONCE
Status: COMPLETED | OUTPATIENT
Start: 2024-03-29 | End: 2024-03-29

## 2024-03-29 RX ORDER — EPINEPHRINE 1 MG/ML
0.3 INJECTION, SOLUTION INTRAMUSCULAR; SUBCUTANEOUS EVERY 5 MIN PRN
Status: DISCONTINUED | OUTPATIENT
Start: 2024-03-29 | End: 2024-03-29 | Stop reason: HOSPADM

## 2024-03-29 RX ADMIN — METHYLPREDNISOLONE SODIUM SUCCINATE 125 MG: 125 INJECTION, POWDER, FOR SOLUTION INTRAMUSCULAR; INTRAVENOUS at 13:43

## 2024-03-29 RX ADMIN — SODIUM CHLORIDE 166 MG: 9 INJECTION, SOLUTION INTRAVENOUS at 14:17

## 2024-03-29 RX ADMIN — DIPHENHYDRAMINE HYDROCHLORIDE 50 MG: 50 INJECTION INTRAMUSCULAR; INTRAVENOUS at 13:48

## 2024-03-29 RX ADMIN — DIPHENHYDRAMINE HYDROCHLORIDE 50 MG: 50 INJECTION INTRAMUSCULAR; INTRAVENOUS at 14:25

## 2024-03-29 RX ADMIN — DEXAMETHASONE SODIUM PHOSPHATE: 10 INJECTION, SOLUTION INTRAMUSCULAR; INTRAVENOUS at 13:42

## 2024-03-29 RX ADMIN — FAMOTIDINE 20 MG: 10 INJECTION, SOLUTION INTRAVENOUS at 13:49

## 2024-03-29 RX ADMIN — SODIUM CHLORIDE 250 ML: 9 INJECTION, SOLUTION INTRAVENOUS at 13:41

## 2024-03-29 NOTE — ORAL ONC MGMT
Oral Chemotherapy Monitoring Program    Subjective/Objective:  Ronnie Lagos is a 68 year old male contacted by phone for a follow-up visit for oral chemotherapy.  Ronnie reports starting 3/22 morning on Darolutamide medication. He reports taking 2 x 300 mg tablets morning and evening. He has noticed only fatigue but is not bothered by it thus far. He is aware of higher blood pressure readings, I encourage him to keep monitoring and to see PCP if seeing continued elevated BP readings. He is aware of upcoming appts including infusion re challenge today with Docetaxel. He will finish his 30 day supply of Casodex around 4/12. All questions answered.         3/11/2024     2:00 PM 3/11/2024     5:00 PM 3/13/2024    11:00 AM 3/15/2024    12:00 PM 3/29/2024    10:00 AM   ORAL CHEMOTHERAPY   Assessment Type Chart Review;Initial Work up Chart Review;Initial Work up Chart Review;Initial Work up;New Teach;Lab Monitoring Chart Review;Refill Chart Review;Initial Follow up   Diagnosis Code Prostate Cancer Prostate Cancer Prostate Cancer Prostate Cancer Prostate Cancer   Providers Dr. Daren Mercedes   Clinic Name/Location [facility]   Drug Name Nubeqa (darolutamide) Nubeqa (darolutamide) Nubeqa (darolutamide) Nubeqa (darolutamide) Nubeqa (darolutamide)   Dose 600 mg 600 mg 600 mg 600 mg 600 mg   Current Schedule BID BID BID BID BID   Cycle Details Continuous Continuous Continuous Continuous Continuous   Start Date of Last Cycle     3/22/2024   Planned next cycle start date 4/11/2024 3/15/2024 3/18/2024 3/20/2024 4/12/2024   Adverse Effects     Fatigue;Hypertension   Fatigue     Grade 1   Pharmacist Intervention(fatigue)     No   Hypertension     Grade 2   Pharmacist intervention(hypertension)     Yes   Intervention(s)     Patient education   Any new drug interactions? No No No No No   Is the dose as ordered appropriate for the patient? Yes Yes Yes Yes Yes  "      Last PHQ-2 Score on record:       10/19/2023    10:08 AM 9/6/2023    11:41 AM   PHQ-2 ( 1999 Pf[employer])   Q1: Little interest or pleasure in doing things 0 0   Q2: Feeling down, depressed or hopeless 0 0   PHQ-2 Score 0 0       Vitals:  BP:   BP Readings from Last 1 Encounters:   03/20/24 (!) 181/84     Wt Readings from Last 1 Encounters:   03/20/24 98 kg (216 lb)     Estimated body surface area is 2.18 meters squared as calculated from the following:    Height as of 3/20/24: 1.74 m (5' 8.5\").    Weight as of 3/20/24: 98 kg (216 lb).    Labs:  _  Result Component Current Result Ref Range   Sodium 136 (3/20/2024) 135 - 145 mmol/L     _  Result Component Current Result Ref Range   Potassium 4.0 (3/20/2024) 3.4 - 5.3 mmol/L     _  Result Component Current Result Ref Range   Calcium 9.2 (3/20/2024) 8.8 - 10.2 mg/dL     No results found for Mag within last 30 days.     _  Result Component Current Result Ref Range   Phosphorus 3.9 (3/20/2024) 2.5 - 4.5 mg/dL     _  Result Component Current Result Ref Range   Albumin 4.4 (3/20/2024) 3.5 - 5.2 g/dL     _  Result Component Current Result Ref Range   Urea Nitrogen 9.7 (3/20/2024) 8.0 - 23.0 mg/dL     _  Result Component Current Result Ref Range   Creatinine 0.94 (3/20/2024) 0.67 - 1.17 mg/dL     _  Result Component Current Result Ref Range   AST 52 (H) (3/20/2024) 0 - 45 U/L     _  Result Component Current Result Ref Range   ALT 57 (3/20/2024) 0 - 70 U/L     _  Result Component Current Result Ref Range   Bilirubin Total 1.5 (H) (3/20/2024) <=1.2 mg/dL     _  Result Component Current Result Ref Range   WBC Count 5.9 (3/20/2024) 4.0 - 11.0 10e3/uL     _  Result Component Current Result Ref Range   Hemoglobin 17.8 (H) (3/20/2024) 13.3 - 17.7 g/dL     _  Result Component Current Result Ref Range   Platelet Count 195 (3/20/2024) 150 - 450 10e3/uL     No results found for ANC within last 30 days.     _  Result Component Current Result Ref Range   Absolute Neutrophils 3.9 " (3/20/2024) 1.6 - 8.3 10e3/uL      Assessment/Plan:  Overall tolerating Darolutamide well. Continue Darolutamide therapy. Continue monitoring for side effects and continue close monitoring of blood pressure. Continue lab monitoring (CMP and CBC) along with infusion appts then can move to less frequent monitoring if stable counts (after 6 doses of Docetaxel infusion).     Follow-Up:  4/9: provider appt    Marcus German PharmD  [facility] Oral [facility]  [phone]  March 29, 2024

## 2024-03-29 NOTE — PROGRESS NOTES
Infusion Nursing Note:  Ronnie Lagos presents today for Cycle 1 Day 1: Taxotere.    Patient seen by provider today: No   present during visit today: Not Applicable.    Note: Patient here for re challenge of Taxotere (C1D1).  This time with Pepcid, benadryl, and solu-medrol  as pre-medications.    Again 4 mins into the infusion patient had a hyperresenativity reaction (see notes below).    At this time JENNIFER Steiner; does not want to re-challenge patient today.    Chemo was taken down and given to pharmacist Dmitriy.    We continued with observation period of 45 mins; patient remained stable.  Patient was asked to have his wife come pick him up due to x2 doses of benadryl IV.  She was unable to leave work until 3:30 and couldn't get here to get patient until 4pm.    Aileen Agrawal to send message to Dr. Mercedes.    Intravenous Access:  Labs drawn without difficulty.  Peripheral IV placed.    Treatment Conditions:  Lab Results   Component Value Date    HGB 17.1 03/29/2024    WBC 6.3 03/29/2024    ANEU 5.2 01/12/2018    ANEUTAUTO 4.1 03/29/2024     03/29/2024        Lab Results   Component Value Date     03/20/2024    POTASSIUM 4.0 03/20/2024    CR 0.94 03/20/2024    AV 9.2 03/20/2024    BILITOTAL 1.5 (H) 03/20/2024    ALBUMIN 4.4 03/20/2024    ALT 57 03/20/2024    AST 52 (H) 03/20/2024       Results reviewed, labs MET treatment parameters, ok to proceed with treatment.      Post Infusion Assessment:  Patient tolerated infusion poorly due to : Hypersensitivity: Did patient have a hypersensitivity reaction? : Yes  Drug or Product name: Taxotere  Were pre-meds administered?: Yes  What pre-meds were administered?: Diphenhydramine (Benadryl);Dexamethasone (decadron;Famotidine (Pepcid);Methylprednisolone;Ondansetron (Zofran)  First or Subsequent treatment: Subsequent infusion (rechallange C1 Day 1)  Rate of infusion when patient had hypersensitivity reaction: 283  Time the hypersensitivity  reaction was first recognized: 1421  Symptoms observed or reported (select all that apply): Chest tightness;Erythema;Flushing  Interventions/treatment following reaction: Infusion stopped;Hypersensitivity medications administered;Oxygen applied;Therapy discontinued;Additional observation period added  What hypersensitivity medications were administered?: DiphendydrAMINE (benadryl);NaCl 0.9% Bolus  Name of provider notified: JENNIFER Steiner  Time provider notified: 1430  Type of notification (select all that apply): Provider at bedside  Was the patient re-challenged today?: No - no re-challenge today  Patient observed for 45 minutes post chemo reaction per protocol.  Blood return noted pre and post infusion.  Site patent and intact, free from redness, edema or discomfort.  No evidence of extravasations.  Access discontinued per protocol.       Discharge Plan:   Discharge instructions reviewed with: Patient and Family.  Patient and/or family verbalized understanding of discharge instructions and all questions answered.  AVS to patient via [employer].  Patient will wait for a follow up with Dr. Chu for next appointment.   Patient discharged in stable condition accompanied by: wife.  Departure Mode: Ambulatory.      Kari Schoen, RN

## 2024-04-02 DIAGNOSIS — C61 PROSTATE CANCER METASTATIC TO MULTIPLE SITES (H): Primary | ICD-10-CM

## 2024-04-05 ENCOUNTER — TELEPHONE (OUTPATIENT)
Dept: ONCOLOGY | Facility: CLINIC | Age: 69
End: 2024-04-05
Payer: COMMERCIAL

## 2024-04-05 DIAGNOSIS — Z80.42 FAMILY HISTORY OF PROSTATE CANCER: ICD-10-CM

## 2024-04-05 DIAGNOSIS — C61 PROSTATE CANCER METASTATIC TO MULTIPLE SITES (H): Primary | ICD-10-CM

## 2024-04-05 DIAGNOSIS — Z80.0 FAMILY HISTORY OF COLON CANCER: ICD-10-CM

## 2024-04-05 NOTE — TELEPHONE ENCOUNTER
4/5/2024    Referring Provider: Anabell Mercedes MD    Presenting Information:   I called Ronnie today to readdress his genetic testing options. We last spoke on 3/22/2024, at which time he elected to proceed with genetic testing using the Comprehensive Hereditary Cancer Panel offered by the [facility]. He was then contacted by  Rene on 3/27/2024 and declined continuing with the genetic test as insurance coverage could not be guaranteed. Please see telephone notes from 3/22/2024 and 3/27/2024 for additional details.    Discussion:  We previously reviewed the details of Ronnie's family and personal history. Please see the clinic note from our previous appointment on 3/19/2024 for details.   We reviewed that genetic testing is also available through a lab called [facility] and they offer very similar genetic testing, as well as additional financial assistance options. Ronnie verbalized understanding and elected to continue with similar genetic testing through [facility].  Genetic testing is available for 48 genes associated with cancers of the breast, ovary, uterus, prostate, and gastrointestinal system: [facility] Common Hereditary Cancers Panel (APC, JAVI, AXIN2, BAP1, BARD1, BMPR1A, BRCA1, BRCA2, BRIP1, CDH1, CDK4, CDKN2A, CHEK2, CTNNA1, DICER1, EPCAM, FH, GREM1, HOXB13, KIT, MBD4, MEN1, MLH1, MSH2, MSH3, MSH6, MUTYH, NF1, NTHL1, PALB2, PDGFRA, PMS2, POLD1, POLE, PTEN, RAD51C, RAD51D, SDHA, SDHB, SDHC, SDHD, SMAD4, SMARCA4, STK11, TP53, TSC1, TSC2, VHL).    Consent was obtained over the phone and Ronnie elected to have his blood drawn during his upcoming lab appointment. Once his blood is drawn, it will be sent to [facility] for testing. Of note, testing will begin with the BRCA1 and BRCA2 genes with reflex to the complete panel. Turn around time: 2-3 weeks [email or web address].  Medical Management: For Ronnie, we reviewed that the  information from genetic testing may determine:  additional cancer screening for which Ronnie may qualify (i.e. regular clinical breast exams, more frequent colonoscopies, more frequent dermatologic exams, etc.),  and targeted chemotherapies for Ronnie's active cancer, or if he were to develop certain cancers in the future (i.e. immunotherapy for individuals with Ervin syndrome, PARP inhibitors, etc.).   These recommendations and possible targeted chemotherapies will be discussed in detail once genetic testing is completed.     Plan:  1) Today Ronnie elected to cancel his genetic testing through the [facility] and instead pursue genetic testing using the [facility] Common Hereditary Cancers Panel. His blood sample will be collected during an upcoming lab appointment.  2) Ronnie will be contacted to schedule a virtual return visit with me to review the results, once they become available. Turn around time: 2-3 weeks after Carolann receives his blood sample.     Shyanne Thomas MS, [facility]  Licensed, Certified Genetic Counselor  Office: [phone]  [email or web address]

## 2024-04-09 ENCOUNTER — LAB (OUTPATIENT)
Dept: LAB | Facility: CLINIC | Age: 69
End: 2024-04-09
Payer: COMMERCIAL

## 2024-04-09 ENCOUNTER — VIRTUAL VISIT (OUTPATIENT)
Dept: ONCOLOGY | Facility: CLINIC | Age: 69
End: 2024-04-09
Attending: INTERNAL MEDICINE
Payer: COMMERCIAL

## 2024-04-09 VITALS — BODY MASS INDEX: 31.4 KG/M2 | WEIGHT: 212 LBS | HEIGHT: 69 IN

## 2024-04-09 DIAGNOSIS — C61 PROSTATE CANCER METASTATIC TO BONE (H): ICD-10-CM

## 2024-04-09 DIAGNOSIS — Z80.0 FAMILY HISTORY OF COLON CANCER: ICD-10-CM

## 2024-04-09 DIAGNOSIS — C61 PROSTATE CANCER METASTATIC TO MULTIPLE SITES (H): Primary | ICD-10-CM

## 2024-04-09 DIAGNOSIS — C79.51 PROSTATE CANCER METASTATIC TO BONE (H): ICD-10-CM

## 2024-04-09 DIAGNOSIS — C61 PROSTATE CANCER METASTATIC TO MULTIPLE SITES (H): ICD-10-CM

## 2024-04-09 DIAGNOSIS — Z80.42 FAMILY HISTORY OF PROSTATE CANCER: ICD-10-CM

## 2024-04-09 PROCEDURE — 84153 ASSAY OF PSA TOTAL: CPT

## 2024-04-09 PROCEDURE — 99442 PR PHYSICIAN TELEPHONE EVALUATION 11-20 MIN: CPT | Mod: 93 | Performed by: NURSE PRACTITIONER

## 2024-04-09 PROCEDURE — 36415 COLL VENOUS BLD VENIPUNCTURE: CPT

## 2024-04-09 PROCEDURE — 99000 SPECIMEN HANDLING OFFICE-LAB: CPT

## 2024-04-09 ASSESSMENT — PAIN SCALES - GENERAL: PAINLEVEL: NO PAIN (0)

## 2024-04-09 NOTE — Clinical Note
4/9/2024         RE: Ronnie Lagos  [address] [place]  [facility] [address]        Dear Colleague,    Thank you for referring your patient, Ronnie Lagos, to the [facility]. Please see a copy of my visit note below.    Is the patient currently in the state of [place]? YES    Visit mode:TELEPHONE    If the visit is dropped, the patient can be reconnected by: VIDEO VISIT: Send to e-mail at: [email or web address]    Will anyone else be joining the visit? NO  (If patient encounters technical issues they should call [phone])    How would you like to obtain your AVS? MyChart    Are changes needed to the allergy or medication list?NO       Reason for visit: Video Visit (Follow UP )    Shameka Muller F         Again, thank you for allowing me to participate in the care of your patient.        Sincerely,        VERONICA Bullard CNP

## 2024-04-09 NOTE — PROGRESS NOTES
"    Virtual Visit Details    Type of service:  Telephone Visit   Phone call duration: *** minutes   Originating Location (pt. Location): {patient location:327574::\"Home\"}  {PROVIDER LOCATION On-site should be selected for visits conducted from your clinic location or adjoining [facility] hospital, academic office, or other nearby [facility] building. Off-site should be selected for all other provider locations, including home:500563}  Distant Location (provider location):  {virtual location provider:538413}       Jeffrey Trujillo is a 68 year old who is being evaluated via a billable telephone visit.    {ROOMING STAFF complete during rooming of virtual visit (Optional):548729}  Originating Location (pt. Location): {patient location:066046::\"Home\"}  {PROVIDER LOCATION On-site should be selected for visits conducted from your clinic location or adjoining [facility] hospital, academic office, or other nearby [facility] building. Off-site should be selected for all other provider locations, including home:221751}  Distant Location (provider location):  {virtual location provider:677798}  Phone call duration: *** minutes    "

## 2024-04-09 NOTE — PROGRESS NOTES
Telephone visit 15 minutes    Oncology/Hematology Visit Note  Apr 9, 2024    Reason for Visit: follow up of metastatic prostate cancer  Follows with Dr. 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  Denies fever chills sweats cough shortness of breath chest pain nausea diarrhea abdominal pain bleeding denies bone pain had some tooth ache in the past but now has resolved      Review of Systems:  14 point ROS of systems including Constitutional, Eyes, Respiratory, Cardiovascular, Gastroenterology, Genitourinary, Integumentary, Muscularskeletal, Psychiatric were all negative except for pertinent positives noted in my HPI.    Physical Examination:  Telephone visit-no audible wheezing or cough patient answers all questions appropriately    Lab  PSA from today is pending      Assessment and Plan:    metastatic prostate cancer  Follows with Dr. 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 Mercedes recommendation he will be on androgen blockade only.   Currently undergoing treatment with Casodex, darolutamide 600 twice daily Lupron every 3 months  -Patient finished 1 month of Casodex-no more Casodex after this  -No Taxotere since he developed severe reaction.  -Continue with darolutamide 600 mg twice daily  -Continue with as per pharmacy protocol  -Next Lupron is due in June 20  -PSA down to 0.82  -I will add testosterone levels with next blood draw  -Patient has follow-up appointment with Dr. Mercedes  on May 1      Bone mets  Continue with Xgeva every 4 weeks  Schedule for next Xgeva on 04/20/24  Side effects of Xgeva reviewed advised patient to call us with any dental issues or jaw pain      Family history of prostate cancer  Patient was referred to genetics    Please call clinic with any changes in health condition    VERONICA Bullard CNP  [facility]     Chart documentation with Dragon Voice recognition Software. Although reviewed after completion, some words and grammatical errors may remain.

## 2024-04-09 NOTE — Clinical Note
4/9/2024         RE: Ronnie Lagos  [address]  [facility] [address]        Dear Colleague,    Thank you for referring your patient, Ronnie Lagos, to the [facility]. Please see a copy of my visit note below.    Is the patient currently in the state of [place]? YES    Visit mode:TELEPHONE    If the visit is dropped, the patient can be reconnected by: VIDEO VISIT: Send to e-mail at: [email or web address]    Will anyone else be joining the visit? NO  (If patient encounters technical issues they should call [phone])    How would you like to obtain your AVS? MyChart    Are changes needed to the allergy or medication list?NO       Reason for visit: Video Visit (Follow UP )    Shameka Muller F         Again, thank you for allowing me to participate in the care of your patient.        Sincerely,        VERONICA Bullard CNP

## 2024-04-10 DIAGNOSIS — C61 PROSTATE CANCER METASTATIC TO MULTIPLE SITES (H): Primary | ICD-10-CM

## 2024-04-10 LAB — PSA SERPL DL<=0.01 NG/ML-MCNC: 0.82 NG/ML (ref 0–4.5)

## 2024-04-11 ENCOUNTER — PATIENT OUTREACH (OUTPATIENT)
Dept: CARE COORDINATION | Facility: CLINIC | Age: 69
End: 2024-04-11
Payer: COMMERCIAL

## 2024-04-17 ENCOUNTER — INFUSION THERAPY VISIT (OUTPATIENT)
Dept: INFUSION THERAPY | Facility: CLINIC | Age: 69
End: 2024-04-17
Attending: INTERNAL MEDICINE
Payer: COMMERCIAL

## 2024-04-17 VITALS
OXYGEN SATURATION: 97 % | HEART RATE: 64 BPM | SYSTOLIC BLOOD PRESSURE: 165 MMHG | DIASTOLIC BLOOD PRESSURE: 85 MMHG | TEMPERATURE: 97 F

## 2024-04-17 DIAGNOSIS — C61 PROSTATE CANCER METASTATIC TO BONE (H): Primary | ICD-10-CM

## 2024-04-17 DIAGNOSIS — C79.51 PROSTATE CANCER METASTATIC TO BONE (H): Primary | ICD-10-CM

## 2024-04-17 DIAGNOSIS — C61 PROSTATE CANCER METASTATIC TO MULTIPLE SITES (H): ICD-10-CM

## 2024-04-17 LAB
ALBUMIN SERPL BCG-MCNC: 4.4 G/DL (ref 3.5–5.2)
CALCIUM SERPL-MCNC: 9.6 MG/DL (ref 8.8–10.2)
CREAT SERPL-MCNC: 0.96 MG/DL (ref 0.67–1.17)
EGFRCR SERPLBLD CKD-EPI 2021: 87 ML/MIN/1.73M2
PHOSPHATE SERPL-MCNC: 3.7 MG/DL (ref 2.5–4.5)
SCANNED LAB RESULT: NORMAL

## 2024-04-17 PROCEDURE — 36415 COLL VENOUS BLD VENIPUNCTURE: CPT

## 2024-04-17 PROCEDURE — 96372 THER/PROPH/DIAG INJ SC/IM: CPT | Performed by: NURSE PRACTITIONER

## 2024-04-17 PROCEDURE — 250N000011 HC RX IP 250 OP 636: Mod: JZ | Performed by: NURSE PRACTITIONER

## 2024-04-17 PROCEDURE — 82310 ASSAY OF CALCIUM: CPT | Performed by: NURSE PRACTITIONER

## 2024-04-17 PROCEDURE — 84100 ASSAY OF PHOSPHORUS: CPT | Performed by: NURSE PRACTITIONER

## 2024-04-17 PROCEDURE — 84270 ASSAY OF SEX HORMONE GLOBUL: CPT | Performed by: NURSE PRACTITIONER

## 2024-04-17 PROCEDURE — 82040 ASSAY OF SERUM ALBUMIN: CPT | Performed by: NURSE PRACTITIONER

## 2024-04-17 PROCEDURE — 84153 ASSAY OF PSA TOTAL: CPT | Performed by: INTERNAL MEDICINE

## 2024-04-17 PROCEDURE — 84403 ASSAY OF TOTAL TESTOSTERONE: CPT | Performed by: NURSE PRACTITIONER

## 2024-04-17 PROCEDURE — 82565 ASSAY OF CREATININE: CPT | Performed by: NURSE PRACTITIONER

## 2024-04-17 RX ORDER — METHYLPREDNISOLONE SODIUM SUCCINATE 125 MG/2ML
125 INJECTION, POWDER, LYOPHILIZED, FOR SOLUTION INTRAMUSCULAR; INTRAVENOUS
Status: CANCELLED
Start: 2024-04-19

## 2024-04-17 RX ORDER — ALBUTEROL SULFATE 0.83 MG/ML
2.5 SOLUTION RESPIRATORY (INHALATION)
Status: CANCELLED | OUTPATIENT
Start: 2024-04-19

## 2024-04-17 RX ORDER — DIPHENHYDRAMINE HYDROCHLORIDE 50 MG/ML
50 INJECTION INTRAMUSCULAR; INTRAVENOUS
Status: CANCELLED
Start: 2024-04-19

## 2024-04-17 RX ORDER — MEPERIDINE HYDROCHLORIDE 25 MG/ML
25 INJECTION INTRAMUSCULAR; INTRAVENOUS; SUBCUTANEOUS EVERY 30 MIN PRN
Status: CANCELLED | OUTPATIENT
Start: 2024-04-19

## 2024-04-17 RX ORDER — EPINEPHRINE 1 MG/ML
0.3 INJECTION, SOLUTION INTRAMUSCULAR; SUBCUTANEOUS EVERY 5 MIN PRN
Status: CANCELLED | OUTPATIENT
Start: 2024-04-19

## 2024-04-17 RX ORDER — ALBUTEROL SULFATE 90 UG/1
1-2 AEROSOL, METERED RESPIRATORY (INHALATION)
Status: CANCELLED
Start: 2024-04-19

## 2024-04-17 RX ADMIN — DENOSUMAB 120 MG: 120 INJECTION SUBCUTANEOUS at 15:53

## 2024-04-17 NOTE — PROGRESS NOTES
Infusion Nursing Note:  Ronnie Lagos presents today for labs/Xgeva.    Patient seen by provider today: No   present during visit today: Not Applicable.    Note: Pt confirms he is taking an OTC Ca/Vit D tab BID      Intravenous Access:  Lab draw site LAC, Needle type butterfly, Gauge 23.  Labs drawn without difficulty.    Treatment Conditions:  Results reviewed, labs MET treatment parameters, ok to proceed with treatment.  Ca 9.6  Cr 0.96      Post Infusion Assessment:  Patient tolerated injection without incident.  Site patent and intact, free from redness, edema or discomfort.       Discharge Plan:   AVS [facility] via [facility]  Patient will return 5/15/24 for labs/Xgeva for next appointment.   Patient discharged in stable condition accompanied by: self.  Departure Mode: Ambulatory.      Nazia Carter RN

## 2024-04-18 DIAGNOSIS — Z80.42 FAMILY HISTORY OF PROSTATE CANCER: ICD-10-CM

## 2024-04-18 DIAGNOSIS — C79.51 PROSTATE CANCER METASTATIC TO BONE (H): Primary | ICD-10-CM

## 2024-04-18 DIAGNOSIS — Z80.0 FAMILY HISTORY OF COLON CANCER: ICD-10-CM

## 2024-04-18 DIAGNOSIS — C61 PROSTATE CANCER METASTATIC TO BONE (H): Primary | ICD-10-CM

## 2024-04-18 LAB — SHBG SERPL-SCNC: 67 NMOL/L (ref 11–80)

## 2024-04-20 LAB
PSA SERPL DL<=0.01 NG/ML-MCNC: 0.53 NG/ML (ref 0–4.5)
TESTOST FREE SERPL-MCNC: 0.16 NG/DL
TESTOST SERPL-MCNC: 14 NG/DL (ref 240–950)

## 2024-04-25 ENCOUNTER — VIRTUAL VISIT (OUTPATIENT)
Dept: ONCOLOGY | Facility: CLINIC | Age: 69
End: 2024-04-25
Attending: GENETIC COUNSELOR, MS
Payer: COMMERCIAL

## 2024-04-25 DIAGNOSIS — C61 PROSTATE CANCER METASTATIC TO MULTIPLE SITES (H): Primary | ICD-10-CM

## 2024-04-25 DIAGNOSIS — C79.51 PROSTATE CANCER METASTATIC TO BONE (H): ICD-10-CM

## 2024-04-25 DIAGNOSIS — Z80.0 FAMILY HISTORY OF COLON CANCER: ICD-10-CM

## 2024-04-25 DIAGNOSIS — C61 PROSTATE CANCER METASTATIC TO BONE (H): ICD-10-CM

## 2024-04-25 DIAGNOSIS — Z80.42 FAMILY HISTORY OF PROSTATE CANCER: ICD-10-CM

## 2024-04-25 PROCEDURE — 999N000069 HC STATISTIC GENETIC COUNSELING, < 16 MIN: Mod: TEL,95 | Performed by: GENETIC COUNSELOR, MS

## 2024-04-25 NOTE — NURSING NOTE
Is the patient currently in the state of [place]? YES    Visit mode:TELEPHONE    If the visit is dropped, the patient can be reconnected by: TELEPHONE VISIT: Phone number:   Telephone Information:   Mobile [phone]       Will anyone else be joining the visit? NO  (If patient encounters technical issues they should call [phone])    How would you like to obtain your AVS? MyChart    Are changes needed to the allergy or medication list? N/A    Are refills needed on medications prescribed by this physician? NO    Reason for visit: RECHECK    Shannon POLANCO

## 2024-04-25 NOTE — LETTER
"    4/25/2024         RE: Ronnie Lagos  [address] [place]  [facility] [id number]        Dear Colleague,    Thank you for referring your patient, Ronnie Lagos, to the [facility]. Please see a copy of my visit note below.    4/25/2024    Virtual Visit Details  Type of service:  Telephone Visit   Phone call duration: 10 minutes   Originating Location (pt. Location): Home  Distant Location (provider location):  Off-site    Referring Provider: Anabell Mercedes MD    Presenting Information:  I spoke to Ronnie by phone today to discuss his genetic testing results. We first met on 3/19/2024 and after concerns arose regarding insurance coverage for the original genetic test we ordered, his blood was drawn on 4/9/2024 for the [employer] Common Hereditary Cancers Panel. This testing was done because of Ronnie's personal and family history of cancer.    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 No other variants or mutations were found in the CHEK2 gene. Given the uncertain significance of this result, medical management decisions should NOT be made based on this test result alone.    Of note, Ronnie tested negative for mutations in the following genes by sequencing and deletion/duplication analysis (unless otherwise specified in the test report): APC, JAVI, AXIN2, BAP1, BARD1, BMPR1A, BRCA1, BRCA2, BRIP1, CDH1, CDK4, CDKN2A, CTNNA1, DICER1, EPCAM, FH, GREM1, HOXB13, KIT, MBD4, MEN1, MLH1, MSH2, MSH3, MSH6, MUTYH, NF1, NTHL1, PALB2, PDGFRA, PMS2, POLD1, POLE, PTEN, RAD51C, RAD51D, SDHA, SDHB, SDHC, SDHD, SMAD4, SMARCA4, STK11, TP53, TSC1, TSC2, VHL.     A copy of the test report can be found in the Laboratory tab, dated 4/9/2024, and named \"LABORATORY MISCELLANEOUS ORDER\". The report is scanned in as a linked document.    Interpretation:  We " discussed several different interpretations of this inconclusive test result. It is not clear if this variant in the CHEK2 gene is associated with increased cancer risk.  1. This variant may be a benign change that does not increase cancer risk.  2. This variant may be a harmful mutation that does increase cancer risk.    Genetic testing labs are working to collect evidence to determine if this variant is harmful or benign, and [facility] will contact me if it is reclassified. If this variant is determined to be a benign change, there may be a different gene or combination of genes and environment that are associated with the cancers in this family that are not identifiable using this genetic test. As such, Ronnie is encouraged to contact me regularly to review any new genetic testing options that may be appropriate for him.    It is also important to consider that his other relatives with cancer, such as his father, may have had a mutation in one of the genes tested and Ronnie did not inherit it. If that is the case, [place] prostate cancer may be sporadic and caused by random cellular changes.    Inheritance:  We reviewed the autosomal dominant inheritance of this variant in the CHEK2 gene. We discussed that each of his siblings has a 50% chance of having the same variant. Other relatives may carry the variant, as well. Because it is unclear what, if any, risk is associated with this variant, clinical genetic testing for this CHEK2 variant alone is not recommended for relatives.    Cancer Screening:  Based on this inconclusive test result, it is important for Ronnie and his relatives to refer back to the family history for appropriate cancer screening.    Ronnie should continue to follow all prostate cancer treatment and follow-up recommendations as made by his medical providers.  Based on Ronnie's personal and family history of prostate cancer, his close male relatives remain at increased risk for prostate  "cancer. As such, they are encouraged to discuss regular prostate screening with their medical providers per the current NCCN guideline \"Prostate Cancer Early Detection\"; this screening could be considered as early as age 40.  Per current National Comprehensive Cancer Network (NCCN) guidelines, individuals with a first degree relative with colorectal cancer diagnosed at any age (i.e. Ronnie's mother) should begin colonoscopy at age 40, or 10 years before the earliest diagnosis of colorectal cancer, whichever is first. In this family, colonoscopy should start at age 40 and be repeated every 5 years, or based on colonoscopy findings. This would apply to Ronnie and his brothers. These recommendations may change based on personal and family history as well as personal preference, and should be discussed with an individual's physician.  Other population cancer screening options, such as those recommended by the American Cancer Society and NCCN, are also appropriate for Ronnie and his family. These screening recommendations may change if there are changes to Ronnie's personal and/or family history of cancer. Final screening recommendations should be made by each individual's primary care provider.      Additional Testing Considerations:  Although Ronnie's genetic testing result is inconclusive, there still remains a small chance other relatives may carry a harmful gene mutation associated with hereditary cancer. Genetic counseling is recommended for Ronnie's maternal uncle and maternal first cousin with prostate cancer to discuss their genetic testing options. Ronnie's brothers and other extended relatives could consider meeting with a genetic counselor, as well. If any of these relatives do pursue genetic testing, Ronnie is encouraged to contact me so that we may review the impact of their test results on him.    Summary:  We do not have an explanation for Ronnie's personal or family history of cancer. While no obviously harmful " genetic changes were identified, Ronnie may still be at risk for certain cancers due to family history, environmental factors, or other genetic causes not identified by this test.  Because of that, it is important that he continue with cancer screening based on his personal and family history as discussed above.    Genetic testing is rapidly advancing, and new cancer susceptibility genes will most likely be identified in the future. Therefore, I encouraged Ronnie to contact me periodically or if there are changes in his personal or family history. This may change how we assess his cancer risk, screening, and the testing we would offer.    Plan:  1. Ronnie was provided a copy of his test results via [facility]'s patient portal.    2. He plans to follow-up with his medical providers, as needed.  3. He should contact me regularly, or sooner if his family history changes.  4. I will attempt to contact Ronnie if the laboratory informs me that this CHEK2 variant has been reclassified. This may change screening and testing recommendations for Ronnie and his relatives.    If Ronnie has any further questions, I encouraged him to contact me at [phone].    Shyanne Thomas MS, [place]  Licensed, Certified Genetic Counselor  Office: [phone]  [email or web address]

## 2024-04-25 NOTE — PROGRESS NOTES
"4/25/2024    Virtual Visit Details  Type of service:  Telephone Visit   Phone call duration: 10 minutes   Originating Location (pt. Location): Home  Distant Location (provider location):  Off-site    Referring Provider: Anabell Mercedes MD    Presenting Information:  I spoke to Ronnie by phone today to discuss his genetic testing results. We first met on 3/19/2024 and after concerns arose regarding insurance coverage for the original genetic test we ordered, his blood was drawn on 4/9/2024 for the [employer] Common Hereditary Cancers Panel. This testing was done because of Ronnie's personal and family history of cancer.    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 No other variants or mutations were found in the CHEK2 gene. Given the uncertain significance of this result, medical management decisions should NOT be made based on this test result alone.    Of note, Ronnie tested negative for mutations in the following genes by sequencing and deletion/duplication analysis (unless otherwise specified in the test report): APC, JAVI, AXIN2, BAP1, BARD1, BMPR1A, BRCA1, BRCA2, BRIP1, CDH1, CDK4, CDKN2A, CTNNA1, DICER1, EPCAM, FH, GREM1, HOXB13, KIT, MBD4, MEN1, MLH1, MSH2, MSH3, MSH6, MUTYH, NF1, NTHL1, PALB2, PDGFRA, PMS2, POLD1, POLE, PTEN, RAD51C, RAD51D, SDHA, SDHB, SDHC, SDHD, SMAD4, SMARCA4, STK11, TP53, TSC1, TSC2, VHL.     A copy of the test report can be found in the Laboratory tab, dated 4/9/2024, and named \"LABORATORY MISCELLANEOUS ORDER\". The report is scanned in as a linked document.    Interpretation:  We discussed several different interpretations of this inconclusive test result. It is not clear if this variant in the CHEK2 gene is associated with increased cancer risk.  1. This variant may be a benign change that does not increase cancer risk.  2. This variant " "may be a harmful mutation that does increase cancer risk.    Genetic testing labs are working to collect evidence to determine if this variant is harmful or benign, and [facility] will contact me if it is reclassified. If this variant is determined to be a benign change, there may be a different gene or combination of genes and environment that are associated with the cancers in this family that are not identifiable using this genetic test. As such, Ronnie is encouraged to contact me regularly to review any new genetic testing options that may be appropriate for him.    It is also important to consider that his other relatives with cancer, such as his father, may have had a mutation in one of the genes tested and Ronnie did not inherit it. If that is the case, Ronnie's prostate cancer may be sporadic and caused by random cellular changes.    Inheritance:  We reviewed the autosomal dominant inheritance of this variant in the CHEK2 gene. We discussed that each of his siblings has a 50% chance of having the same variant. Other relatives may carry the variant, as well. Because it is unclear what, if any, risk is associated with this variant, clinical genetic testing for this CHEK2 variant alone is not recommended for relatives.    Cancer Screening:  Based on this inconclusive test result, it is important for Ronnie and his relatives to refer back to the family history for appropriate cancer screening.    Ronnie should continue to follow all prostate cancer treatment and follow-up recommendations as made by his medical providers.  Based on Ronnie's personal and family history of prostate cancer, his close male relatives remain at increased risk for prostate cancer. As such, they are encouraged to discuss regular prostate screening with their medical providers per the current NCCN guideline \"Prostate Cancer Early Detection\"; this screening could be considered as early as age 40.  Per current National Comprehensive " Cancer Network (NCCN) guidelines, individuals with a first degree relative with colorectal cancer diagnosed at any age (i.e. Ronnie's mother) should begin colonoscopy at age 40, or 10 years before the earliest diagnosis of colorectal cancer, whichever is first. In this family, colonoscopy should start at age 40 and be repeated every 5 years, or based on colonoscopy findings. This would apply to Ronnie and his brothers. These recommendations may change based on personal and family history as well as personal preference, and should be discussed with an individual's physician.  Other population cancer screening options, such as those recommended by the American Cancer Society and NCCN, are also appropriate for Ronnie and his family. These screening recommendations may change if there are changes to Ronnie's personal and/or family history of cancer. Final screening recommendations should be made by each individual's primary care provider.      Additional Testing Considerations:  Although Ronnie's genetic testing result is inconclusive, there still remains a small chance other relatives may carry a harmful gene mutation associated with hereditary cancer. Genetic counseling is recommended for Ronnie's maternal uncle and maternal first cousin with prostate cancer to discuss their genetic testing options. Ronnie's brothers and other extended relatives could consider meeting with a genetic counselor, as well. If any of these relatives do pursue genetic testing, Ronnie is encouraged to contact me so that we may review the impact of their test results on him.    Summary:  We do not have an explanation for Ronnie's personal or family history of cancer. While no obviously harmful genetic changes were identified, Ronnie may still be at risk for certain cancers due to family history, environmental factors, or other genetic causes not identified by this test.  Because of that, it is important that he continue with cancer screening based on  his personal and family history as discussed above.    Genetic testing is rapidly advancing, and new cancer susceptibility genes will most likely be identified in the future. Therefore, I encouraged Ronnie to contact me periodically or if there are changes in his personal or family history. This may change how we assess his cancer risk, screening, and the testing we would offer.    Plan:  1. Ronnie was provided a copy of his test results via [facility] [facility]'s patient portal.    2. He plans to follow-up with his medical providers, as needed.  3. He should contact me regularly, or sooner if his family history changes.  4. I will attempt to contact Ronnie if the laboratory informs me that this CHEK2 variant has been reclassified. This may change screening and testing recommendations for Ronnie and his relatives.    If Ronnie has any further questions, I encouraged him to contact me at [phone].    Shyanne Thomas MS, [facility]  Licensed, Certified Genetic Counselor  Office: [phone]  [email or web address]

## 2024-04-26 NOTE — PROGRESS NOTES
[facility]    Hematology/Oncology Established Patient Note      Today's Date: 5/1/24    Reason for visit: Metastatic prostate cancer.    HISTORY OF PRESENT ILLNESS: Ronnie Lagos is a 68 year old male with hypertension, GERD who presents with the following oncologic history:  1.  5/11/2023: PSA elevated at 8.08 (prior 3.44 from 5/10/22).  2. 6/29/2023: MRI pelvis -- PI-RADS 4 - 1.4 x 1 x 0.9 cm nodule abutting posterolateral capsule of prostate; no pelvic adenopathy or bone lesions.  3.  8/30/2023: Prostate biopsy - [place] 4+3=7 prostate adenocarcinoma.  4. 10/2/2023: NM bone scan negative.  5.  11/3/2023: Radical prostatectomy, pelvic lymph node excision -- [place] 4+5=9 adenocarcinoma, [id number], margins widely positive for malignancy.  6.  2/7/2024: PSA = 4.06.  7.  2/15/2024: PSA elevated at 5.26.  8.  2/28/2024: PSMA PET showed focal uptake in left side of prostatectomy bed suspicious for recurrent disease; multiple pelvic and few retroperitoneal lymph nodes with uptake; >10 sclerotic osseous lesions with uptake indicative of osseous metastatic disease.  9. 3/20/2024: Started Casodex, Lupron, Xgeva, cycle 1 Taxotere but developed flushing, chest heaviness, nausea (no dyspnea) reaction 5 minutes into Taxotere infusion. Re-challenge not attempted that day.  10.  3/29/2024: Re-challenged Taxotere but developed repeat hypersensitivity reaction 4 minutes into infusion. Taxotere discontinued.  11. 4/17/2024: PSA 0.53.    INTERVAL HISTORY:  Ronnie reports feeling tired, tolerable hot flash within few hours of taking each darolutamide pill.  He reports new back upper molar pain, separate from his broken crown.    REVIEW OF SYSTEMS:   14 point ROS was reviewed and is negative other than as noted above in HPI.       HOME MEDICATIONS:  Current Outpatient Medications   Medication Sig Dispense Refill    acetaminophen (TYLENOL) 325 MG tablet Take 325-650 mg by mouth every 6 hours as needed for mild pain  (Patient not taking: Reported on 3/20/2024)      amLODIPine (NORVASC) 5 MG tablet Take 1 tablet (5 mg) by mouth daily (Patient taking differently: Take 5 mg by mouth every morning) 90 tablet 3    atenolol (TENORMIN) 50 MG tablet Take 1 tablet (50 mg) by mouth daily 90 tablet 3    bicalutamide (CASODEX) 50 MG tablet Take 1 tablet (50 mg) by mouth daily 30 tablet 1    [START ON 5/2/2024] darolutamide (NUBEQA) 300 MG tablet Take 2 tablets (600 mg) by mouth 2 times daily for 30 days . Swallow tablets whole. Take with food. Avoid grapefruit or grapefruit juice. 120 tablet 0    dexAMETHasone (DECADRON) 4 MG tablet Take 2 tablets (8 mg) by mouth 2 times daily (with meals) Start evening of Docetaxel infusion and continue for a total of 3 doses. (Patient not taking: Reported on 3/20/2024) 6 tablet 5    fluorouracil (EFUDEX) 5 % external cream Apply topically 2 times daily Using an amount sufficient to cover the affected areas on the scalp and temple (Patient not taking: Reported on 3/20/2024) 40 g 3    omeprazole (PRILOSEC) 20 MG DR capsule Take 1 capsule (20 mg) by mouth daily 90 capsule 3    prochlorperazine (COMPAZINE) 10 MG tablet Take 1 tablet (10 mg) by mouth every 6 hours as needed for nausea or vomiting (Patient not taking: Reported on 3/20/2024) 30 tablet 2         ALLERGIES:  Allergies   Allergen Reactions    Taxotere [Docetaxel]          PAST MEDICAL HISTORY:  Past Medical History:   Diagnosis Date    Abdominal pain     Basal cell carcinoma     Calculus of bile duct     Elevated liver enzymes     Essential hypertension, benign     abstracted 6/19/02    Gastro-oesophageal reflux disease     GERD (gastroesophageal reflux disease) 7/28/2008    Liver disease     elevated liver enzymes    Squamous cell carcinoma     Unspecified cerebral artery occlusion with cerebral infarction     Unspecified condition of brain     abstracted 6/19/02         PAST SURGICAL HISTORY:  Past Surgical History:   Procedure Laterality Date     ARTHRODESIS FOOT  2/5/2013    Procedure: ARTHRODESIS FOOT;  LEFT FIRST METATARSOPHALANGEAL JOINT ARTHRODESIS ;  Surgeon: Burton Loera DPM;  Location: [facility]    COLONOSCOPY N/A 12/7/2018    Procedure: COMBINED COLONOSCOPY, SINGLE OR MULTIPLE BIOPSY/POLYPECTOMY BY BIOPSY;  Surgeon: Blayne Mora MD;  Location: [facility]    DAVINCI PROSTATECTOMY, LYMPHADENECTOMY N/A 11/3/2023    Procedure: PROSTATECTOMY, ROBOT-ASSISTED, WITH PELVIC LYMPHADENECTOMY;  Surgeon: Sophia Ramirez MD;  Location:  OR    ENDOSCOPIC RETROGRADE CHOLANGIOPANCREATOGRAM N/A 1/18/2018    Procedure: COMBINED ENDOSCOPIC RETROGRADE CHOLANGIOPANCREATOGRAPHY, SPHINCTEROTOMY;  ENDOSCOPIC RETROGRADE CHOLANGIOPANCREATOGRAM (ERCP), SPHINCTEROTOMY, STONE REMOVAL, STENT PLACEMENT;  Surgeon: Christiano Sorenson MD;  Location:  OR    ENDOSCOPIC RETROGRADE CHOLANGIOPANCREATOGRAM N/A 4/12/2018    Procedure: ENDOSCOPIC RETROGRADE CHOLANGIOPANCREATOGRAM;  ENDOSCOPIC RETROGRADE CHOLANIOPANCREATOGRAPHY AND ATTEMPTED STENT REMOVAL.;  Surgeon: Christiano Sorenson MD;  Location:  OR    ENDOSCOPIC RETROGRADE CHOLANGIOPANCREATOGRAM N/A 5/10/2018    Procedure: COMBINED ENDOSCOPIC RETROGRADE CHOLANGIOPANCREATOGRAPHY, REMOVE FOREIGN BODY OR STENT/TUBE;  ENDOSCOPIC RETROGRADE CHOLANGIOPANCREATOGRAPHY AND STENT REMOVAL;  Surgeon: Christiano Sorenson MD;  Location:  OR    ESOPHAGOSCOPY, GASTROSCOPY, DUODENOSCOPY (EGD), COMBINED N/A 4/12/2018    Procedure: COMBINED ESOPHAGOSCOPY, GASTROSCOPY, DUODENOSCOPY (EGD);  EGD with stent removal;  Surgeon: Christiano Sorenson MD;  Location: [facility]    LAPAROSCOPIC CHOLECYSTECTOMY N/A 5/1/2015    Procedure: LAPAROSCOPIC CHOLECYSTECTOMY;  Surgeon: Damien Galindo MD;  Location: [facility]    ORTHOPEDIC SURGERY      left elbow, right shoulder         SOCIAL HISTORY:  Social History     Socioeconomic History    Marital status:      Spouse name: Not on file    Number of children: Not on file    Years of  education: Not on file    Highest education level: Not on file   Occupational History     Employer: [employer]   Tobacco Use    Smoking status: Never    Smokeless tobacco: Never   Vaping Use    Vaping status: Never Used   Substance and Sexual Activity    Alcohol use: Yes     Alcohol/week: 2.0 standard drinks of alcohol     Types: 2 Standard drinks or equivalent per week     Comment: 3-4 times a week, light beer    Drug use: Never    Sexual activity: Yes     Partners: Female   Other Topics Concern    Parent/sibling w/ CABG, MI or angioplasty before 65F 55M? Not Asked   Social History Narrative    Not on file     Social Determinants of Health     Financial Resource Strain: Low Risk  (11/21/2023)    Financial Resource Strain     Within the past 12 months, have you or your family members you live with been unable to get utilities (heat, electricity) when it was really needed?: No   Food Insecurity: Low Risk  (11/21/2023)    Food Insecurity     Within the past 12 months, did you worry that your food would run out before you got money to buy more?: No     Within the past 12 months, did the food you bought just not last and you didn t have money to get more?: No   Transportation Needs: Low Risk  (11/21/2023)    Transportation Needs     Within the past 12 months, has lack of transportation kept you from medical appointments, getting your medicines, non-medical meetings or appointments, work, or from getting things that you need?: No   Physical Activity: Not on file   Stress: Not on file   Social Connections: Not on file   Interpersonal Safety: Not At Risk (6/15/2023)    Humiliation, Afraid, Rape, and Kick questionnaire     Fear of Current or Ex-Partner: No     Emotionally Abused: No     Physically Abused: No     Sexually Abused: No   Housing Stability: Low Risk  (11/21/2023)    Housing Stability     Do you have housing? : Yes     Are you worried about losing your housing?: No         FAMILY  "HISTORY:  Family History   Problem Relation Age of Onset    Hypertension Mother     Cancer - colorectal Mother     Skin Cancer Mother     Hypertension Father     Melanoma No family hx of     Anesthesia Reaction No family hx of     Venous thrombosis No family hx of     Bleeding Disorder No family hx of    Father had prostate, liver, and skin cancer. Mother with colorectal and skin cancer.      PHYSICAL EXAM:  Vital signs:  BP (!) 149/88   Pulse 55   Resp 16   Ht 1.727 m (5' 8\")   Wt 99.5 kg (219 lb 6.4 oz)   SpO2 97%   BMI 33.36 kg/m     ECO  GENERAL: No acute distress.  EYES: No scleral icterus. No overt erythema.  ENT: Oropharynx exam shows multiple fillings in multiple teeth; no overt gum erythema.  RESPIRATORY: No audible cough, wheezing, or labored breathing.  MUSCULOSKELETAL: Range of motion in the neck, shoulders, and arms appear normal.  SKIN: No overt rashes, discolorations, or lesions over the face and neck.  NEUROLOGIC: Alert.  No overt tremors.  PSYCHIATRIC: Normal affect and mood.  Does not appear anxious.       LABS:  CBC RESULTS:   Recent Labs   Lab Test 24  1311   WBC 6.3   RBC 5.37   HGB 17.1   HCT 49.0   MCV 91   MCH 31.8   MCHC 34.9   RDW 12.1         Last Comprehensive Metabolic Panel:  Sodium   Date Value Ref Range Status   2024 137 135 - 145 mmol/L Final     Comment:     Reference intervals for this test were updated on 2023 to more accurately reflect our healthy population. There may be differences in the flagging of prior results with similar values performed with this method. Interpretation of those prior results can be made in the context of the updated reference intervals.    2021 133 133 - 144 mmol/L Final     Potassium   Date Value Ref Range Status   2024 4.4 3.4 - 5.3 mmol/L Final     Comment:     Specimen slightly hemolyzed. The reported potassium value may be falsely elevated. Analysis of a non-hemolyzed specimen (i.e. re-draw) may result " in a lower potassium value.   05/10/2022 4.6 3.4 - 5.3 mmol/L Final   04/06/2021 4.4 3.4 - 5.3 mmol/L Final     Chloride   Date Value Ref Range Status   03/29/2024 99 98 - 107 mmol/L Final   05/10/2022 99 94 - 109 mmol/L Final   04/06/2021 101 94 - 109 mmol/L Final     Carbon Dioxide   Date Value Ref Range Status   04/06/2021 31 20 - 32 mmol/L Final     Carbon Dioxide (CO2)   Date Value Ref Range Status   03/29/2024 23 22 - 29 mmol/L Final   05/10/2022 28 20 - 32 mmol/L Final     Anion Gap   Date Value Ref Range Status   03/29/2024 15 7 - 15 mmol/L Final   05/10/2022 4 3 - 14 mmol/L Final   04/06/2021 1 (L) 3 - 14 mmol/L Final     Glucose   Date Value Ref Range Status   03/29/2024 97 70 - 99 mg/dL Final   05/10/2022 112 (H) 70 - 99 mg/dL Final   04/06/2021 110 (H) 70 - 99 mg/dL Final     Urea Nitrogen   Date Value Ref Range Status   03/29/2024 12.0 8.0 - 23.0 mg/dL Final   05/10/2022 10 7 - 30 mg/dL Final   04/06/2021 8 7 - 30 mg/dL Final     Creatinine   Date Value Ref Range Status   04/17/2024 0.96 0.67 - 1.17 mg/dL Final   04/06/2021 0.92 0.66 - 1.25 mg/dL Final     GFR Estimate   Date Value Ref Range Status   04/17/2024 87 >60 mL/min/1.73m2 Final   04/06/2021 87 >60 mL/min/[1.73_m2] Final     Comment:     Non  GFR Calc  Starting 12/18/2018, serum creatinine based estimated GFR (eGFR) will be   calculated using the Chronic Kidney Disease Epidemiology Collaboration   (CKD-EPI) equation.       GFR, ESTIMATED POCT   Date Value Ref Range Status   02/28/2024 >60 >60 mL/min/1.73m2 Final     Calcium   Date Value Ref Range Status   04/17/2024 9.6 8.8 - 10.2 mg/dL Final   04/06/2021 9.3 8.5 - 10.1 mg/dL Final     Bilirubin Total   Date Value Ref Range Status   03/29/2024 1.4 (H) <=1.2 mg/dL Final   04/06/2021 2.3 (H) 0.2 - 1.3 mg/dL Final     Alkaline Phosphatase   Date Value Ref Range Status   03/29/2024 84 40 - 150 U/L Final     Comment:     Reference intervals for this test were updated on 11/14/2023 to  more accurately reflect our healthy population. There may be differences in the flagging of prior results with similar values performed with this method. Interpretation of those prior results can be made in the context of the updated reference intervals.   04/06/2021 60 40 - 150 U/L Final     ALT   Date Value Ref Range Status   03/29/2024 <5 0 - 70 U/L Final     Comment:     Reference intervals for this test were updated on 6/12/2023 to more accurately reflect our healthy population. There may be differences in the flagging of prior results with similar values performed with this method. Interpretation of those prior results can be made in the context of the updated reference intervals.     04/06/2021 70 0 - 70 U/L Final     AST   Date Value Ref Range Status   03/29/2024   Final     Comment:     Unsatisfactory specimen - hemolyzed   Reference intervals for this test were updated on 6/12/2023 to more accurately reflect our healthy population. There may be differences in the flagging of prior results with similar values performed with this method. Interpretation of those prior results can be made in the context of the updated reference intervals.   04/06/2021 66 (H) 0 - 45 U/L Final     PSA   Date Value Ref Range Status   04/06/2021 1.51 0 - 4 ug/L Final     Comment:     Assay Method:  Chemiluminescence using Siemens Vista analyzer     Prostate Specific Antigen Screen   Date Value Ref Range Status   05/10/2022 3.44 0.00 - 4.00 ug/L Final     PSA Tumor Marker   Date Value Ref Range Status   04/17/2024 0.53 0.00 - 4.50 ng/mL Final        PATHOLOGY:  Reviewed as per HPI.    IMAGING:  Reviewed as per HPI.    ASSESSMENT/PLAN:  Ronnie Lagos is a 68 year old male with the following issues:  1. Metastatic non-castrate prostate adenocarcinoma, [place] 4+5=9  2. 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 Hypertension  5. Hypersensitivity reaction to Taxotere  --2/28/2024 PSMA PET showed  metastatic disease to the pelvic and retroperitoneal lymph nodes as well as greater than 10 sites of bony metastatic disease.  --Given high volume disease, he started Casodex 50 mg daily for first month, Lupron every 3 months, darolutamide 600 mg orally BID, and tried Taxotere twice but developed hypersensitivity reaction only 4-5 minutes into infusion.  Therefore, I recommended discontinuation of Taxotere.  Discussed cabazitaxel could be utilized in the future upon disease progression.  --Continue denosumab (Xgeva) every 4 weeks.   --Will continue to monitor CBC, CMP, and PSA.    5. 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    6. Left shoulder pain  --Related to post-arthroplasty pain.  Continue Tylenol as needed.    7. Left upper molar pain  --Advised he see dentist for further evaluation.    Return in 3 months.    Anabell Mercedes MD  [facility] Hematology/Oncology     Total time spent today: 40 minutes in chart review, patient evaluation, counseling, documentation, test and/or medication/prescription orders, and coordination of care.

## 2024-04-29 ENCOUNTER — OFFICE VISIT (OUTPATIENT)
Dept: DERMATOLOGY | Facility: CLINIC | Age: 69
End: 2024-04-29
Payer: COMMERCIAL

## 2024-04-29 DIAGNOSIS — L82.0 INFLAMED SEBORRHEIC KERATOSIS: ICD-10-CM

## 2024-04-29 DIAGNOSIS — L57.0 ACTINIC KERATOSIS: ICD-10-CM

## 2024-04-29 DIAGNOSIS — D22.9 MULTIPLE BENIGN NEVI: ICD-10-CM

## 2024-04-29 DIAGNOSIS — L57.8 ACTINIC SKIN DAMAGE: Primary | ICD-10-CM

## 2024-04-29 PROCEDURE — 17110 DESTRUCTION B9 LES UP TO 14: CPT | Performed by: STUDENT IN AN ORGANIZED HEALTH CARE EDUCATION/TRAINING PROGRAM

## 2024-04-29 PROCEDURE — 17003 DESTRUCT PREMALG LES 2-14: CPT | Mod: XS | Performed by: STUDENT IN AN ORGANIZED HEALTH CARE EDUCATION/TRAINING PROGRAM

## 2024-04-29 PROCEDURE — 17000 DESTRUCT PREMALG LESION: CPT | Mod: XS | Performed by: STUDENT IN AN ORGANIZED HEALTH CARE EDUCATION/TRAINING PROGRAM

## 2024-04-29 PROCEDURE — 99213 OFFICE O/P EST LOW 20 MIN: CPT | Mod: 25 | Performed by: STUDENT IN AN ORGANIZED HEALTH CARE EDUCATION/TRAINING PROGRAM

## 2024-04-29 ASSESSMENT — PAIN SCALES - GENERAL: PAINLEVEL: NO PAIN (0)

## 2024-04-29 NOTE — LETTER
4/29/2024         RE: Ronnie Lagos  [address] [place]  [place] [address]        Dear Colleague,    Thank you for referring your patient, Ronnie Lagos, to the [facility]. Please see a copy of my visit note below.      [facility] Dermatology Note    Encounter Date: Apr 29, 2024    Dermatology Problem List:  -     Major PMHx  #Stage IV prostate cancer; darolutamide, leuprolide   ______________________________________    Impression/Plan:  Ronnie was seen today for skin check.    Diagnoses and all orders for this visit:    Actinic skin damage  Multiple benign nevi  - Reviewed the compounding benefits of incremental changes to sun protective clothing behaviors including increased frequency of sunscreen and sun protective clothing like broad brimmed hats and longsleeved UPF containing clothing    Inflamed seborrheic keratosis  -     DESTRUCT BENIGN LESION, UP TO 14  - R dorsal hand    Actinic keratosis  -     OK DESTRUCT PREMALIGNANT LESION, FIRST [[id number]]  -     OK DESTRUCT PREMALIGNANT LESION, 2-14 [[id number]]  - x4 on scalp and L lower lip   -Due to improvement after last freezing, can discontinue Efudex therapy (did not use due to concerns about interaction with antiandrogen therapy for prostate cancer)      Cryotherapy procedure note: After verbal consent and discussion of risks and benefits including but no limited to dyspigmentation/scar, blister, and pain, 1 I SK right dorsal hand and 4 AK's on the scalp and the left lower lip was(were) treated with 1-2mm freeze border for 2 cycles with liquid nitrogen. Post cryotherapy instructions were provided.     Follow-up in 12 mo.       Staff Involved:  Staff Only    Perico Murrieta MD   of Dermatology  Department of Dermatology  [facility]      CC:   Chief Complaint   Patient presents with     Skin Check       History of Present Illness:  Mr. Ronnie MENDOZA  Yolis is a 68 year old male who presents as a return patient.    Patient was last seen October 2023 at that time 17 AK's on the face and scalp were frozen.  Recommended starting the Efudex for 2 weeks.    Patient presents today for checkup.  He did not use the Efudex cream yet as he is undergoing treatment for prostate cancer    Labs:      Physical exam:  Vitals: There were no vitals taken for this visit.  GEN: well developed, well-nourished, in no acute distress, in a pleasant mood.     SKIN: [place] phototype 1  - Full skin, which includes the head/face, both arms, chest, back, abdomen,both legs, genitalia and/or groin buttocks, digits and/or nails, was examined.  - Flat brown macules and patches in a sun exposed areas on face and extremities  - scattered brown papules on trunk and extremities   - No other lesions of concern on areas examined.     Past Medical History:   Past Medical History:   Diagnosis Date     Abdominal pain      Actinic keratosis      Basal cell carcinoma      Calculus of bile duct      Elevated liver enzymes      Essential hypertension, benign     abstracted 6/19/02     Gastro-oesophageal reflux disease      GERD (gastroesophageal reflux disease) 07/28/2008     Liver disease     elevated liver enzymes     Squamous cell carcinoma      Unspecified cerebral artery occlusion with cerebral infarction      Unspecified condition of brain     abstracted 6/19/02     Past Surgical History:   Procedure Laterality Date     ARTHRODESIS FOOT  2/5/2013    Procedure: ARTHRODESIS FOOT;  LEFT FIRST METATARSOPHALANGEAL JOINT ARTHRODESIS ;  Surgeon: Burton Loera DPM;  Location: [facility]     COLONOSCOPY N/A 12/7/2018    Procedure: COMBINED COLONOSCOPY, SINGLE OR MULTIPLE BIOPSY/POLYPECTOMY BY BIOPSY;  Surgeon: Blayne Mora MD;  Location: [facility]     DAVINCI PROSTATECTOMY, LYMPHADENECTOMY N/A 11/3/2023    Procedure: PROSTATECTOMY, ROBOT-ASSISTED, WITH PELVIC LYMPHADENECTOMY;  Surgeon: James  MD Sophia;  Location:  OR     ENDOSCOPIC RETROGRADE CHOLANGIOPANCREATOGRAM N/A 1/18/2018    Procedure: COMBINED ENDOSCOPIC RETROGRADE CHOLANGIOPANCREATOGRAPHY, SPHINCTEROTOMY;  ENDOSCOPIC RETROGRADE CHOLANGIOPANCREATOGRAM (ERCP), SPHINCTEROTOMY, STONE REMOVAL, STENT PLACEMENT;  Surgeon: Christiano Sorenson MD;  Location:  OR     ENDOSCOPIC RETROGRADE CHOLANGIOPANCREATOGRAM N/A 4/12/2018    Procedure: ENDOSCOPIC RETROGRADE CHOLANGIOPANCREATOGRAM;  ENDOSCOPIC RETROGRADE CHOLANIOPANCREATOGRAPHY AND ATTEMPTED STENT REMOVAL.;  Surgeon: Christiano Sorenson MD;  Location:  OR     ENDOSCOPIC RETROGRADE CHOLANGIOPANCREATOGRAM N/A 5/10/2018    Procedure: COMBINED ENDOSCOPIC RETROGRADE CHOLANGIOPANCREATOGRAPHY, REMOVE FOREIGN BODY OR STENT/TUBE;  ENDOSCOPIC RETROGRADE CHOLANGIOPANCREATOGRAPHY AND STENT REMOVAL;  Surgeon: Christiano Sorenson MD;  Location:  OR     ESOPHAGOSCOPY, GASTROSCOPY, DUODENOSCOPY (EGD), COMBINED N/A 4/12/2018    Procedure: COMBINED ESOPHAGOSCOPY, GASTROSCOPY, DUODENOSCOPY (EGD);  EGD with stent removal;  Surgeon: Christiano Sorenson MD;  Location:  GI     LAPAROSCOPIC CHOLECYSTECTOMY N/A 5/1/2015    Procedure: LAPAROSCOPIC CHOLECYSTECTOMY;  Surgeon: Damien Galindo MD;  Location:  [facility]     ORTHOPEDIC SURGERY      left elbow, right shoulder       Social History:   reports that he has never smoked. He has never used smokeless tobacco. He reports current alcohol use of about 2.0 standard drinks of alcohol per week. He reports that he does not use drugs.    Family History:  Family History   Problem Relation Age of Onset     Hypertension Mother      Cancer - colorectal Mother      Skin Cancer Mother      Hypertension Father      Melanoma No family hx of      Anesthesia Reaction No family hx of      Venous thrombosis No family hx of      Bleeding Disorder No family hx of        Medications:  Current Outpatient Medications   Medication Sig Dispense Refill     amLODIPine (NORVASC)  5 MG tablet Take 1 tablet (5 mg) by mouth daily (Patient taking differently: Take 5 mg by mouth every morning) 90 tablet 3     atenolol (TENORMIN) 50 MG tablet Take 1 tablet (50 mg) by mouth daily 90 tablet 3     [START ON 5/2/2024] darolutamide (NUBEQA) 300 MG tablet Take 2 tablets (600 mg) by mouth 2 times daily for 30 days . Swallow tablets whole. Take with food. Avoid grapefruit or grapefruit juice. 120 tablet 0     omeprazole (PRILOSEC) 20 MG DR capsule Take 1 capsule (20 mg) by mouth daily 90 capsule 3     Allergies   Allergen Reactions     Taxotere [Docetaxel]                Again, thank you for allowing me to participate in the care of your patient.        Sincerely,        Perico Murrieta MD

## 2024-04-29 NOTE — PROGRESS NOTES
[facility] Dermatology Note    Encounter Date: Apr 29, 2024    Dermatology Problem List:  -     Major PMHx  #Stage IV prostate cancer; darolutamide, leuprolide   ______________________________________    Impression/Plan:  Ronnie was seen today for skin check.    Diagnoses and all orders for this visit:    Actinic skin damage  Multiple benign nevi  - Reviewed the compounding benefits of incremental changes to sun protective clothing behaviors including increased frequency of sunscreen and sun protective clothing like broad brimmed hats and longsleeved UPF containing clothing    Inflamed seborrheic keratosis  -     DESTRUCT BENIGN LESION, UP TO 14  - R dorsal hand    Actinic keratosis  -     ND DESTRUCT PREMALIGNANT LESION, FIRST [6885142]  -     ND DESTRUCT PREMALIGNANT LESION, 2-14 [[id number]]  - x4 on scalp and L lower lip   -Due to improvement after last freezing, can discontinue Efudex therapy (did not use due to concerns about interaction with antiandrogen therapy for prostate cancer)      Cryotherapy procedure note: After verbal consent and discussion of risks and benefits including but no limited to dyspigmentation/scar, blister, and pain, 1 I SK right dorsal hand and 4 AK's on the scalp and the left lower lip was(were) treated with 1-2mm freeze border for 2 cycles with liquid nitrogen. Post cryotherapy instructions were provided.     Follow-up in 12 mo.       Staff Involved:  Staff Only    Perico Murrieta MD   of Dermatology  Department of Dermatology  [facility]      CC:   Chief Complaint   Patient presents with    Skin Check       History of Present Illness:  Mr. Ronnie Lagos is a 68 year old male who presents as a return patient.    Patient was last seen October 2023 at that time 17 AK's on the face and scalp were frozen.  Recommended starting the Efudex for 2 weeks.    Patient presents today for checkup.  He did not use the Efudex  cream yet as he is undergoing treatment for prostate cancer    Labs:      Physical exam:  Vitals: There were no vitals taken for this visit.  GEN: well developed, well-nourished, in no acute distress, in a pleasant mood.     SKIN: Linares phototype 1  - Full skin, which includes the head/face, both arms, chest, back, abdomen,both legs, genitalia and/or groin buttocks, digits and/or nails, was examined.  - Flat brown macules and patches in a sun exposed areas on face and extremities  - scattered brown papules on trunk and extremities   - No other lesions of concern on areas examined.     Past Medical History:   Past Medical History:   Diagnosis Date    Abdominal pain     Actinic keratosis     Basal cell carcinoma     Calculus of bile duct     Elevated liver enzymes     Essential hypertension, benign     abstracted 6/19/02    Gastro-oesophageal reflux disease     GERD (gastroesophageal reflux disease) 07/28/2008    Liver disease     elevated liver enzymes    Squamous cell carcinoma     Unspecified cerebral artery occlusion with cerebral infarction     Unspecified condition of brain     abstracted 6/19/02     Past Surgical History:   Procedure Laterality Date    ARTHRODESIS FOOT  2/5/2013    Procedure: ARTHRODESIS FOOT;  LEFT FIRST METATARSOPHALANGEAL JOINT ARTHRODESIS ;  Surgeon: Burton Loera DPM;  Location:  [facility]    COLONOSCOPY N/A 12/7/2018    Procedure: COMBINED COLONOSCOPY, SINGLE OR MULTIPLE BIOPSY/POLYPECTOMY BY BIOPSY;  Surgeon: Blayne Mora MD;  Location:  GI    DAVINCI PROSTATECTOMY, LYMPHADENECTOMY N/A 11/3/2023    Procedure: PROSTATECTOMY, ROBOT-ASSISTED, WITH PELVIC LYMPHADENECTOMY;  Surgeon: Sophia Ramirez MD;  Location:  OR    ENDOSCOPIC RETROGRADE CHOLANGIOPANCREATOGRAM N/A 1/18/2018    Procedure: COMBINED ENDOSCOPIC RETROGRADE CHOLANGIOPANCREATOGRAPHY, SPHINCTEROTOMY;  ENDOSCOPIC RETROGRADE CHOLANGIOPANCREATOGRAM (ERCP), SPHINCTEROTOMY, STONE REMOVAL, STENT PLACEMENT;  Surgeon:  Christiano Sorenson MD;  Location:  OR    ENDOSCOPIC RETROGRADE CHOLANGIOPANCREATOGRAM N/A 4/12/2018    Procedure: ENDOSCOPIC RETROGRADE CHOLANGIOPANCREATOGRAM;  ENDOSCOPIC RETROGRADE CHOLANIOPANCREATOGRAPHY AND ATTEMPTED STENT REMOVAL.;  Surgeon: Christiano Sorenson MD;  Location:  OR    ENDOSCOPIC RETROGRADE CHOLANGIOPANCREATOGRAM N/A 5/10/2018    Procedure: COMBINED ENDOSCOPIC RETROGRADE CHOLANGIOPANCREATOGRAPHY, REMOVE FOREIGN BODY OR STENT/TUBE;  ENDOSCOPIC RETROGRADE CHOLANGIOPANCREATOGRAPHY AND STENT REMOVAL;  Surgeon: Christiano Sorenson MD;  Location:  OR    ESOPHAGOSCOPY, GASTROSCOPY, DUODENOSCOPY (EGD), COMBINED N/A 4/12/2018    Procedure: COMBINED ESOPHAGOSCOPY, GASTROSCOPY, DUODENOSCOPY (EGD);  EGD with stent removal;  Surgeon: Christiano Sorenson MD;  Location:  GI    LAPAROSCOPIC CHOLECYSTECTOMY N/A 5/1/2015    Procedure: LAPAROSCOPIC CHOLECYSTECTOMY;  Surgeon: Damien Galindo MD;  Location:  [facility]    ORTHOPEDIC SURGERY      left elbow, right shoulder       Social History:   reports that he has never smoked. He has never used smokeless tobacco. He reports current alcohol use of about 2.0 standard drinks of alcohol per week. He reports that he does not use drugs.    Family History:  Family History   Problem Relation Age of Onset    Hypertension Mother     Cancer - colorectal Mother     Skin Cancer Mother     Hypertension Father     Melanoma No family hx of     Anesthesia Reaction No family hx of     Venous thrombosis No family hx of     Bleeding Disorder No family hx of        Medications:  Current Outpatient Medications   Medication Sig Dispense Refill    amLODIPine (NORVASC) 5 MG tablet Take 1 tablet (5 mg) by mouth daily (Patient taking differently: Take 5 mg by mouth every morning) 90 tablet 3    atenolol (TENORMIN) 50 MG tablet Take 1 tablet (50 mg) by mouth daily 90 tablet 3    [START ON 5/2/2024] darolutamide (NUBEQA) 300 MG tablet Take 2 tablets (600 mg) by mouth 2  times daily for 30 days . Swallow tablets whole. Take with food. Avoid grapefruit or grapefruit juice. 120 tablet 0    omeprazole (PRILOSEC) 20 MG DR capsule Take 1 capsule (20 mg) by mouth daily 90 capsule 3     Allergies   Allergen Reactions    Taxotere [Docetaxel]

## 2024-05-01 ENCOUNTER — ONCOLOGY VISIT (OUTPATIENT)
Dept: ONCOLOGY | Facility: CLINIC | Age: 69
End: 2024-05-01
Attending: INTERNAL MEDICINE
Payer: COMMERCIAL

## 2024-05-01 VITALS
WEIGHT: 219.4 LBS | HEIGHT: 68 IN | SYSTOLIC BLOOD PRESSURE: 149 MMHG | OXYGEN SATURATION: 97 % | BODY MASS INDEX: 33.25 KG/M2 | DIASTOLIC BLOOD PRESSURE: 88 MMHG | RESPIRATION RATE: 16 BRPM | HEART RATE: 55 BPM

## 2024-05-01 DIAGNOSIS — C79.51 PROSTATE CANCER METASTATIC TO BONE (H): ICD-10-CM

## 2024-05-01 DIAGNOSIS — C61 PROSTATE CANCER METASTATIC TO BONE (H): ICD-10-CM

## 2024-05-01 DIAGNOSIS — K08.89 PAIN, DENTAL: ICD-10-CM

## 2024-05-01 DIAGNOSIS — C61 PROSTATE CANCER METASTATIC TO MULTIPLE SITES (H): Primary | ICD-10-CM

## 2024-05-01 PROCEDURE — 99215 OFFICE O/P EST HI 40 MIN: CPT | Performed by: INTERNAL MEDICINE

## 2024-05-01 PROCEDURE — G0463 HOSPITAL OUTPT CLINIC VISIT: HCPCS | Performed by: INTERNAL MEDICINE

## 2024-05-01 ASSESSMENT — PAIN SCALES - GENERAL: PAINLEVEL: NO PAIN (0)

## 2024-05-01 NOTE — LETTER
5/1/2024         RE: Ronnie Lagos  [address] [place]  [facility] [address]        Dear Colleague,    Thank you for referring your patient, Ronnie Lagos, to the [facility]. Please see a copy of my visit note below.    [facility]    Hematology/Oncology Established Patient Note      Today's Date: 5/1/24    Reason for visit: Metastatic prostate cancer.    HISTORY OF PRESENT ILLNESS: Ronnie Lagos is a 68 year old male with hypertension, GERD who presents with the following oncologic history:  1.  5/11/2023: PSA elevated at 8.08 (prior 3.44 from 5/10/22).  2. 6/29/2023: MRI pelvis -- PI-RADS 4 - 1.4 x 1 x 0.9 cm nodule abutting posterolateral capsule of prostate; no pelvic adenopathy or bone lesions.  3.  8/30/2023: Prostate biopsy - Glenn 4+3=7 prostate adenocarcinoma.  4. 10/2/2023: NM bone scan negative.  5.  11/3/2023: Radical prostatectomy, pelvic lymph node excision -- Glenn 4+5=9 adenocarcinoma, [id number], margins widely positive for malignancy.  6.  2/7/2024: PSA = 4.06.  7.  2/15/2024: PSA elevated at 5.26.  8.  2/28/2024: PSMA PET showed focal uptake in left side of prostatectomy bed suspicious for recurrent disease; multiple pelvic and few retroperitoneal lymph nodes with uptake; >10 sclerotic osseous lesions with uptake indicative of osseous metastatic disease.  9. 3/20/2024: Started Casodex, Lupron, Xgeva, cycle 1 Taxotere but developed flushing, chest heaviness, nausea (no dyspnea) reaction 5 minutes into Taxotere infusion. Re-challenge not attempted that day.  10.  3/29/2024: Re-challenged Taxotere but developed repeat hypersensitivity reaction 4 minutes into infusion. Taxotere discontinued.  11. 4/17/2024: PSA 0.53.    INTERVAL HISTORY:  Ronnie reports feeling tired, tolerable hot flash within few hours of taking each darolutamide pill.  He reports new back upper molar pain, separate from his broken crown.    REVIEW OF SYSTEMS:   14 point  ROS was reviewed and is negative other than as noted above in HPI.       HOME MEDICATIONS:  Current Outpatient Medications   Medication Sig Dispense Refill     acetaminophen (TYLENOL) 325 MG tablet Take 325-650 mg by mouth every 6 hours as needed for mild pain (Patient not taking: Reported on 3/20/2024)       amLODIPine (NORVASC) 5 MG tablet Take 1 tablet (5 mg) by mouth daily (Patient taking differently: Take 5 mg by mouth every morning) 90 tablet 3     atenolol (TENORMIN) 50 MG tablet Take 1 tablet (50 mg) by mouth daily 90 tablet 3     bicalutamide (CASODEX) 50 MG tablet Take 1 tablet (50 mg) by mouth daily 30 tablet 1     [START ON 5/2/2024] darolutamide (NUBEQA) 300 MG tablet Take 2 tablets (600 mg) by mouth 2 times daily for 30 days . Swallow tablets whole. Take with food. Avoid grapefruit or grapefruit juice. 120 tablet 0     dexAMETHasone (DECADRON) 4 MG tablet Take 2 tablets (8 mg) by mouth 2 times daily (with meals) Start evening of Docetaxel infusion and continue for a total of 3 doses. (Patient not taking: Reported on 3/20/2024) 6 tablet 5     fluorouracil (EFUDEX) 5 % external cream Apply topically 2 times daily Using an amount sufficient to cover the affected areas on the scalp and temple (Patient not taking: Reported on 3/20/2024) 40 g 3     omeprazole (PRILOSEC) 20 MG DR capsule Take 1 capsule (20 mg) by mouth daily 90 capsule 3     prochlorperazine (COMPAZINE) 10 MG tablet Take 1 tablet (10 mg) by mouth every 6 hours as needed for nausea or vomiting (Patient not taking: Reported on 3/20/2024) 30 tablet 2         ALLERGIES:  Allergies   Allergen Reactions     Taxotere [Docetaxel]          PAST MEDICAL HISTORY:  Past Medical History:   Diagnosis Date     Abdominal pain      Basal cell carcinoma      Calculus of bile duct      Elevated liver enzymes      Essential hypertension, benign     abstracted 6/19/02     Gastro-oesophageal reflux disease      GERD (gastroesophageal reflux disease) 7/28/2008      Liver disease     elevated liver enzymes     Squamous cell carcinoma      Unspecified cerebral artery occlusion with cerebral infarction      Unspecified condition of brain     abstracted 6/19/02         PAST SURGICAL HISTORY:  Past Surgical History:   Procedure Laterality Date     ARTHRODESIS FOOT  2/5/2013    Procedure: ARTHRODESIS FOOT;  LEFT FIRST METATARSOPHALANGEAL JOINT ARTHRODESIS ;  Surgeon: Burton Loera DPM;  Location: [facility]     COLONOSCOPY N/A 12/7/2018    Procedure: COMBINED COLONOSCOPY, SINGLE OR MULTIPLE BIOPSY/POLYPECTOMY BY BIOPSY;  Surgeon: Blayne Mora MD;  Location: [facility]     DAVINCI PROSTATECTOMY, LYMPHADENECTOMY N/A 11/3/2023    Procedure: PROSTATECTOMY, ROBOT-ASSISTED, WITH PELVIC LYMPHADENECTOMY;  Surgeon: Sophia Ramirez MD;  Location:  OR     ENDOSCOPIC RETROGRADE CHOLANGIOPANCREATOGRAM N/A 1/18/2018    Procedure: COMBINED ENDOSCOPIC RETROGRADE CHOLANGIOPANCREATOGRAPHY, SPHINCTEROTOMY;  ENDOSCOPIC RETROGRADE CHOLANGIOPANCREATOGRAM (ERCP), SPHINCTEROTOMY, STONE REMOVAL, STENT PLACEMENT;  Surgeon: Christiano Sorenson MD;  Location:  OR     ENDOSCOPIC RETROGRADE CHOLANGIOPANCREATOGRAM N/A 4/12/2018    Procedure: ENDOSCOPIC RETROGRADE CHOLANGIOPANCREATOGRAM;  ENDOSCOPIC RETROGRADE CHOLANIOPANCREATOGRAPHY AND ATTEMPTED STENT REMOVAL.;  Surgeon: Christiano Sorenson MD;  Location:  OR     ENDOSCOPIC RETROGRADE CHOLANGIOPANCREATOGRAM N/A 5/10/2018    Procedure: COMBINED ENDOSCOPIC RETROGRADE CHOLANGIOPANCREATOGRAPHY, REMOVE FOREIGN BODY OR STENT/TUBE;  ENDOSCOPIC RETROGRADE CHOLANGIOPANCREATOGRAPHY AND STENT REMOVAL;  Surgeon: Christiano Sorenson MD;  Location:  OR     ESOPHAGOSCOPY, GASTROSCOPY, DUODENOSCOPY (EGD), COMBINED N/A 4/12/2018    Procedure: COMBINED ESOPHAGOSCOPY, GASTROSCOPY, DUODENOSCOPY (EGD);  EGD with stent removal;  Surgeon: Christiano Sorenson MD;  Location: [facility]     LAPAROSCOPIC CHOLECYSTECTOMY N/A 5/1/2015    Procedure: LAPAROSCOPIC  CHOLECYSTECTOMY;  Surgeon: Damien Galindo MD;  Location: [facility]     ORTHOPEDIC SURGERY      left elbow, right shoulder         SOCIAL HISTORY:  Social History     Socioeconomic History     Marital status:      Spouse name: Not on file     Number of children: Not on file     Years of education: Not on file     Highest education level: Not on file   Occupational History     Employer: [employer]   Tobacco Use     Smoking status: Never     Smokeless tobacco: Never   Vaping Use     Vaping status: Never Used   Substance and Sexual Activity     Alcohol use: Yes     Alcohol/week: 2.0 standard drinks of alcohol     Types: 2 Standard drinks or equivalent per week     Comment: 3-4 times a week, light beer     Drug use: Never     Sexual activity: Yes     Partners: Female   Other Topics Concern     Parent/sibling w/ CABG, MI or angioplasty before 65F 55M? Not Asked   Social History Narrative     Not on file     Social Determinants of Health     Financial Resource Strain: Low Risk  (11/21/2023)    Financial Resource Strain      Within the past 12 months, have you or your family members you live with been unable to get utilities (heat, electricity) when it was really needed?: No   Food Insecurity: Low Risk  (11/21/2023)    Food Insecurity      Within the past 12 months, did you worry that your food would run out before you got money to buy more?: No      Within the past 12 months, did the food you bought just not last and you didn t have money to get more?: No   Transportation Needs: Low Risk  (11/21/2023)    Transportation Needs      Within the past 12 months, has lack of transportation kept you from medical appointments, getting your medicines, non-medical meetings or appointments, work, or from getting things that you need?: No   Physical Activity: Not on file   Stress: Not on file   Social Connections: Not on file   Interpersonal Safety: Not At Risk (6/15/2023)    Humiliation, Afraid, Rape,  "and Kick questionnaire      Fear of Current or Ex-Partner: No      Emotionally Abused: No      Physically Abused: No      Sexually Abused: No   Housing Stability: Low Risk  (2023)    Housing Stability      Do you have housing? : Yes      Are you worried about losing your housing?: No         FAMILY HISTORY:  Family History   Problem Relation Age of Onset     Hypertension Mother      Cancer - colorectal Mother      Skin Cancer Mother      Hypertension Father      Melanoma No family hx of      Anesthesia Reaction No family hx of      Venous thrombosis No family hx of      Bleeding Disorder No family hx of    Father had prostate, liver, and skin cancer. Mother with colorectal and skin cancer.      PHYSICAL EXAM:  Vital signs:  BP (!) 149/88   Pulse 55   Resp 16   Ht 1.727 m (5' 8\")   Wt 99.5 kg (219 lb 6.4 oz)   SpO2 97%   BMI 33.36 kg/m     ECO  GENERAL: No acute distress.  EYES: No scleral icterus. No overt erythema.  ENT: Oropharynx exam shows multiple fillings in multiple teeth; no overt gum erythema.  RESPIRATORY: No audible cough, wheezing, or labored breathing.  MUSCULOSKELETAL: Range of motion in the neck, shoulders, and arms appear normal.  SKIN: No overt rashes, discolorations, or lesions over the face and neck.  NEUROLOGIC: Alert.  No overt tremors.  PSYCHIATRIC: Normal affect and mood.  Does not appear anxious.       LABS:  CBC RESULTS:   Recent Labs   Lab Test 24  1311   WBC 6.3   RBC 5.37   HGB 17.1   HCT 49.0   MCV 91   MCH 31.8   MCHC 34.9   RDW 12.1         Last Comprehensive Metabolic Panel:  Sodium   Date Value Ref Range Status   2024 137 135 - 145 mmol/L Final     Comment:     Reference intervals for this test were updated on 2023 to more accurately reflect our healthy population. There may be differences in the flagging of prior results with similar values performed with this method. Interpretation of those prior results can be made in the context of the " updated reference intervals.    04/06/2021 133 133 - 144 mmol/L Final     Potassium   Date Value Ref Range Status   03/29/2024 4.4 3.4 - 5.3 mmol/L Final     Comment:     Specimen slightly hemolyzed. The reported potassium value may be falsely elevated. Analysis of a non-hemolyzed specimen (i.e. re-draw) may result in a lower potassium value.   05/10/2022 4.6 3.4 - 5.3 mmol/L Final   04/06/2021 4.4 3.4 - 5.3 mmol/L Final     Chloride   Date Value Ref Range Status   03/29/2024 99 98 - 107 mmol/L Final   05/10/2022 99 94 - 109 mmol/L Final   04/06/2021 101 94 - 109 mmol/L Final     Carbon Dioxide   Date Value Ref Range Status   04/06/2021 31 20 - 32 mmol/L Final     Carbon Dioxide (CO2)   Date Value Ref Range Status   03/29/2024 23 22 - 29 mmol/L Final   05/10/2022 28 20 - 32 mmol/L Final     Anion Gap   Date Value Ref Range Status   03/29/2024 15 7 - 15 mmol/L Final   05/10/2022 4 3 - 14 mmol/L Final   04/06/2021 1 (L) 3 - 14 mmol/L Final     Glucose   Date Value Ref Range Status   03/29/2024 97 70 - 99 mg/dL Final   05/10/2022 112 (H) 70 - 99 mg/dL Final   04/06/2021 110 (H) 70 - 99 mg/dL Final     Urea Nitrogen   Date Value Ref Range Status   03/29/2024 12.0 8.0 - 23.0 mg/dL Final   05/10/2022 10 7 - 30 mg/dL Final   04/06/2021 8 7 - 30 mg/dL Final     Creatinine   Date Value Ref Range Status   04/17/2024 0.96 0.67 - 1.17 mg/dL Final   04/06/2021 0.92 0.66 - 1.25 mg/dL Final     GFR Estimate   Date Value Ref Range Status   04/17/2024 87 >60 mL/min/1.73m2 Final   04/06/2021 87 >60 mL/min/[1.73_m2] Final     Comment:     Non  GFR Calc  Starting 12/18/2018, serum creatinine based estimated GFR (eGFR) will be   calculated using the Chronic Kidney Disease Epidemiology Collaboration   (CKD-EPI) equation.       GFR, ESTIMATED POCT   Date Value Ref Range Status   02/28/2024 >60 >60 mL/min/1.73m2 Final     Calcium   Date Value Ref Range Status   04/17/2024 9.6 8.8 - 10.2 mg/dL Final   04/06/2021 9.3 8.5 -  10.1 mg/dL Final     Bilirubin Total   Date Value Ref Range Status   03/29/2024 1.4 (H) <=1.2 mg/dL Final   04/06/2021 2.3 (H) 0.2 - 1.3 mg/dL Final     Alkaline Phosphatase   Date Value Ref Range Status   03/29/2024 84 40 - 150 U/L Final     Comment:     Reference intervals for this test were updated on 11/14/2023 to more accurately reflect our healthy population. There may be differences in the flagging of prior results with similar values performed with this method. Interpretation of those prior results can be made in the context of the updated reference intervals.   04/06/2021 60 40 - 150 U/L Final     ALT   Date Value Ref Range Status   03/29/2024 <5 0 - 70 U/L Final     Comment:     Reference intervals for this test were updated on 6/12/2023 to more accurately reflect our healthy population. There may be differences in the flagging of prior results with similar values performed with this method. Interpretation of those prior results can be made in the context of the updated reference intervals.     04/06/2021 70 0 - 70 U/L Final     AST   Date Value Ref Range Status   03/29/2024   Final     Comment:     Unsatisfactory specimen - hemolyzed   Reference intervals for this test were updated on 6/12/2023 to more accurately reflect our healthy population. There may be differences in the flagging of prior results with similar values performed with this method. Interpretation of those prior results can be made in the context of the updated reference intervals.   04/06/2021 66 (H) 0 - 45 U/L Final     PSA   Date Value Ref Range Status   04/06/2021 1.51 0 - 4 ug/L Final     Comment:     Assay Method:  Chemiluminescence using Siemens Vista analyzer     Prostate Specific Antigen Screen   Date Value Ref Range Status   05/10/2022 3.44 0.00 - 4.00 ug/L Final     PSA Tumor Marker   Date Value Ref Range Status   04/17/2024 0.53 0.00 - 4.50 ng/mL Final        PATHOLOGY:  Reviewed as per HPI.    IMAGING:  Reviewed as per  "HPI.    ASSESSMENT/PLAN:  Ronnie Lagos is a 68 year old male with the following issues:  1. Metastatic non-castrate prostate adenocarcinoma, Poneto 4+5=9  2. 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 Hypertension  5. Hypersensitivity reaction to Taxotere  --2/28/2024 PSMA PET showed metastatic disease to the pelvic and retroperitoneal lymph nodes as well as greater than 10 sites of bony metastatic disease.  --Given high volume disease, he started Casodex 50 mg daily for first month, Lupron every 3 months, darolutamide 600 mg orally BID, and tried Taxotere twice but developed hypersensitivity reaction only 4-5 minutes into infusion.  Therefore, I recommended discontinuation of Taxotere.  Discussed cabazitaxel could be utilized in the future upon disease progression.  --Continue denosumab (Xgeva) every 4 weeks.   --Will continue to monitor CBC, CMP, and PSA.    5. 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    6. Left shoulder pain  --Related to post-arthroplasty pain.  Continue Tylenol as needed.    7. Left upper molar pain  --Advised he see dentist for further evaluation.    Return in 3 months.    Anabell Mercedes MD  [facility] Hematology/Oncology     Total time spent today: 40 minutes in chart review, patient evaluation, counseling, documentation, test and/or medication/prescription orders, and coordination of care.      Oncology Rooming Note    May 1, 2024 8:02 AM   Ronnie Lagos is a 68 year old male who presents for:    Chief Complaint   Patient presents with     Oncology Clinic Visit     Initial Vitals: Resp 16   Ht 1.727 m (5' 8\")   Wt 99.5 kg (219 lb 6.4 oz)   BMI 33.36 kg/m   Estimated body mass index is 33.36 kg/m  as calculated from the following:    Height as of this encounter: 1.727 m (5' 8\").    Weight as of this encounter: 99.5 kg (219 lb 6.4 oz). Body surface area is 2.18 " meters squared.  No Pain (0) Comment: Data Unavailable   No LMP for male patient.  Allergies reviewed: Yes  Medications reviewed: Yes    Medications: Medication refills not needed today.  Pharmacy name entered into [facility]:    [id number] - [place], [place] - [address] [facility] - [place], [place] - [address]    Frailty Screening:   Is the patient here for a new oncology consult visit in cancer care? 2. No        Lizzie Baez MA              Again, thank you for allowing me to participate in the care of your patient.        Sincerely,        Anabell Mercedes MD

## 2024-05-01 NOTE — PATIENT INSTRUCTIONS
Lab draw monthly.  Continue Xgeva every 4 weeks.  Continue Lupron every 3 months.  Continue Nubeqa.  RTC MD in 3 months.

## 2024-05-01 NOTE — PATIENT INSTRUCTIONS
Genetic Testing  Genetic testing involved a blood or saliva test which looked at the genetic information in select genes for variants associated with cancer risk.  This testing may have included analysis of a single gene due to a known variant in the family, multiple genes most associated with the cancers in a family, or an expanded panel of genes related to many types of cancers.    Results  There are several possible genetic test results, including:   Positive--a harmful mutation (also known as a  pathogenic  or  likely pathogenic  variant) was identified in a gene associated with increased cancer risk.  These risks, as well as medical management options, depend on the specific genetic variant identified.    Negative--no variants were identified in the genes analyzed   Variant of unknown significance--a variant was identified in one or more genes, though it is currently unclear how this impacts cancer risk in the family.  Genetic testing labs are working to collect evidence about these uncertain variants and may provide updates in the future.    What is a Variant of Unknown Significance?  A variant of unknown significance (VUS) is a genetic change with unclear clinical significance.  Scientists currently do not know if this specific variant is associated with increased cancer risks,  or if it is a benign (harmless) change with no impact on health.       A variant may be of uncertain significance for several reasons.  It is possible that this specific variant has not been seen before by the laboratory, or only in a small number of families.  There is currently not enough information to know how this variant may impact your health.          Reclassification  Genetic testing laboratories and researchers are collecting evidence to determine the importance of variants of unknown significance.  Many variants of uncertain significance are later reclassified as benign findings, however some may be associated with  increased cancer risk.  Laboratories will often notify the genetic counselor/ordering provider when a patient s VUS has been reclassified.        Some families may be candidates for participation in the laboratory s variant research programs to help determine the importance of their VUS.  Participating in these programs does not guarantee that families will learn the significance of their VUS immediately.  It could be months or years before a VUS is reclassified.       Screening Recommendations  A combination of personal and family history factors may inform cancer risk and medical management recommendations.  Population cancer screening options, such as those recommended by the American Cancer Society and the National Comprehensive Cancer Network (NCCN) are appropriate for many families at average risk for cancer.  However, earlier and/or more frequent screening may be recommended based on personal factors (lifestyle, exposures, medications, screening results), family history of cancer, and sometimes genetic factors.  These cancer risk management options should be discussed in more detail with an individual's medical providers.      It is usually not recommended that other relatives have genetic testing for the VUS unless it is done as part of the laboratory s variant research program because an individual s test results should not influence their cancer screening until we determine the importance of the VUS.  Your genetic counselor can help you and your relatives understand the risks and benefits of all genetic testing and cancer screening options.    Please call us if you have any questions or concerns.   Cancer Risk Management Program (Appointments: [phone])  Estuardo Loera, MS [facility]  [phone]  Sommer Amador, MS, [facility] [phone]  Mahogany Cleary, MS, [facility]  [phone]  Amanda Aguilar, MS, [facility]  [phone]  Delmy Martin, MS, [facility]  [phone]  Shyanne Thomas, MS, [facility] [phone]  Dhara Arana MS,  [facility] [phone]

## 2024-05-01 NOTE — PROGRESS NOTES
"Oncology Rooming Note    May 1, 2024 8:02 AM   Ronnie Lagos is a 68 year old male who presents for:    Chief Complaint   Patient presents with    Oncology Clinic Visit     Initial Vitals: Resp 16   Ht 1.727 m (5' 8\")   Wt 99.5 kg (219 lb 6.4 oz)   BMI 33.36 kg/m   Estimated body mass index is 33.36 kg/m  as calculated from the following:    Height as of this encounter: 1.727 m (5' 8\").    Weight as of this encounter: 99.5 kg (219 lb 6.4 oz). Body surface area is 2.18 meters squared.  No Pain (0) Comment: Data Unavailable   No LMP for male patient.  Allergies reviewed: Yes  Medications reviewed: Yes    Medications: Medication refills not needed today.  Pharmacy name entered into [employer]:    [id number] - [place], [place] - [address] [facility] - [place], [place] - [address]    Frailty Screening:   Is the patient here for a new oncology consult visit in cancer care? 2. No        Lizzie Baez MA            "

## 2024-05-15 ENCOUNTER — INFUSION THERAPY VISIT (OUTPATIENT)
Dept: INFUSION THERAPY | Facility: CLINIC | Age: 69
End: 2024-05-15
Attending: INTERNAL MEDICINE
Payer: COMMERCIAL

## 2024-05-15 ENCOUNTER — DOCUMENTATION ONLY (OUTPATIENT)
Dept: PHARMACY | Facility: CLINIC | Age: 69
End: 2024-05-15

## 2024-05-15 VITALS
SYSTOLIC BLOOD PRESSURE: 144 MMHG | DIASTOLIC BLOOD PRESSURE: 83 MMHG | TEMPERATURE: 97.8 F | WEIGHT: 218.1 LBS | BODY MASS INDEX: 33.16 KG/M2 | RESPIRATION RATE: 18 BRPM | HEART RATE: 59 BPM | OXYGEN SATURATION: 97 %

## 2024-05-15 DIAGNOSIS — C61 PROSTATE CANCER METASTATIC TO MULTIPLE SITES (H): Primary | ICD-10-CM

## 2024-05-15 DIAGNOSIS — C79.51 PROSTATE CANCER METASTATIC TO BONE (H): ICD-10-CM

## 2024-05-15 DIAGNOSIS — C61 PROSTATE CANCER METASTATIC TO BONE (H): ICD-10-CM

## 2024-05-15 LAB
ALBUMIN SERPL BCG-MCNC: 4.5 G/DL (ref 3.5–5.2)
ALP SERPL-CCNC: 59 U/L (ref 40–150)
ALT SERPL W P-5'-P-CCNC: 24 U/L (ref 0–70)
ANION GAP SERPL CALCULATED.3IONS-SCNC: 10 MMOL/L (ref 7–15)
AST SERPL W P-5'-P-CCNC: 29 U/L (ref 0–45)
BASOPHILS # BLD AUTO: 0 10E3/UL (ref 0–0.2)
BASOPHILS NFR BLD AUTO: 1 %
BILIRUB SERPL-MCNC: 2.8 MG/DL
BUN SERPL-MCNC: 12 MG/DL (ref 8–23)
CALCIUM SERPL-MCNC: 9.5 MG/DL (ref 8.8–10.2)
CHLORIDE SERPL-SCNC: 105 MMOL/L (ref 98–107)
CREAT SERPL-MCNC: 0.93 MG/DL (ref 0.67–1.17)
DEPRECATED HCO3 PLAS-SCNC: 26 MMOL/L (ref 22–29)
EGFRCR SERPLBLD CKD-EPI 2021: 90 ML/MIN/1.73M2
EOSINOPHIL # BLD AUTO: 0.2 10E3/UL (ref 0–0.7)
EOSINOPHIL NFR BLD AUTO: 4 %
ERYTHROCYTE [DISTWIDTH] IN BLOOD BY AUTOMATED COUNT: 11.9 % (ref 10–15)
GLUCOSE SERPL-MCNC: 120 MG/DL (ref 70–99)
HCT VFR BLD AUTO: 41 % (ref 40–53)
HGB BLD-MCNC: 14.7 G/DL (ref 13.3–17.7)
IMM GRANULOCYTES # BLD: 0 10E3/UL
IMM GRANULOCYTES NFR BLD: 0 %
LYMPHOCYTES # BLD AUTO: 1.1 10E3/UL (ref 0.8–5.3)
LYMPHOCYTES NFR BLD AUTO: 20 %
MCH RBC QN AUTO: 31.1 PG (ref 26.5–33)
MCHC RBC AUTO-ENTMCNC: 35.9 G/DL (ref 31.5–36.5)
MCV RBC AUTO: 87 FL (ref 78–100)
MONOCYTES # BLD AUTO: 0.4 10E3/UL (ref 0–1.3)
MONOCYTES NFR BLD AUTO: 8 %
NEUTROPHILS # BLD AUTO: 3.7 10E3/UL (ref 1.6–8.3)
NEUTROPHILS NFR BLD AUTO: 67 %
NRBC # BLD AUTO: 0 10E3/UL
NRBC BLD AUTO-RTO: 0 /100
PHOSPHATE SERPL-MCNC: 4.4 MG/DL (ref 2.5–4.5)
PLATELET # BLD AUTO: 177 10E3/UL (ref 150–450)
POTASSIUM SERPL-SCNC: 4 MMOL/L (ref 3.4–5.3)
PROT SERPL-MCNC: 7.2 G/DL (ref 6.4–8.3)
PSA SERPL DL<=0.01 NG/ML-MCNC: 0.17 NG/ML (ref 0–4.5)
RBC # BLD AUTO: 4.73 10E6/UL (ref 4.4–5.9)
SHBG SERPL-SCNC: 65 NMOL/L (ref 11–80)
SODIUM SERPL-SCNC: 141 MMOL/L (ref 135–145)
WBC # BLD AUTO: 5.5 10E3/UL (ref 4–11)

## 2024-05-15 PROCEDURE — 85025 COMPLETE CBC W/AUTO DIFF WBC: CPT | Performed by: INTERNAL MEDICINE

## 2024-05-15 PROCEDURE — 36415 COLL VENOUS BLD VENIPUNCTURE: CPT

## 2024-05-15 PROCEDURE — 80069 RENAL FUNCTION PANEL: CPT | Performed by: INTERNAL MEDICINE

## 2024-05-15 PROCEDURE — 84403 ASSAY OF TOTAL TESTOSTERONE: CPT | Performed by: INTERNAL MEDICINE

## 2024-05-15 PROCEDURE — 96372 THER/PROPH/DIAG INJ SC/IM: CPT | Performed by: NURSE PRACTITIONER

## 2024-05-15 PROCEDURE — 250N000011 HC RX IP 250 OP 636: Mod: JZ | Performed by: NURSE PRACTITIONER

## 2024-05-15 PROCEDURE — 84100 ASSAY OF PHOSPHORUS: CPT | Performed by: NURSE PRACTITIONER

## 2024-05-15 PROCEDURE — 84155 ASSAY OF PROTEIN SERUM: CPT | Performed by: INTERNAL MEDICINE

## 2024-05-15 PROCEDURE — 84153 ASSAY OF PSA TOTAL: CPT | Performed by: INTERNAL MEDICINE

## 2024-05-15 PROCEDURE — 84270 ASSAY OF SEX HORMONE GLOBUL: CPT | Performed by: INTERNAL MEDICINE

## 2024-05-15 RX ORDER — ERGOCALCIFEROL (VITAMIN D2) 10 MCG
TABLET ORAL
COMMUNITY

## 2024-05-15 RX ORDER — EPINEPHRINE 1 MG/ML
0.3 INJECTION, SOLUTION INTRAMUSCULAR; SUBCUTANEOUS EVERY 5 MIN PRN
Status: CANCELLED | OUTPATIENT
Start: 2024-05-17

## 2024-05-15 RX ORDER — DIPHENHYDRAMINE HYDROCHLORIDE 50 MG/ML
50 INJECTION INTRAMUSCULAR; INTRAVENOUS
Status: CANCELLED
Start: 2024-05-17

## 2024-05-15 RX ORDER — MEPERIDINE HYDROCHLORIDE 25 MG/ML
25 INJECTION INTRAMUSCULAR; INTRAVENOUS; SUBCUTANEOUS EVERY 30 MIN PRN
Status: CANCELLED | OUTPATIENT
Start: 2024-05-17

## 2024-05-15 RX ORDER — IBUPROFEN 200 MG
1 CAPSULE ORAL 2 TIMES DAILY
COMMUNITY

## 2024-05-15 RX ORDER — ALBUTEROL SULFATE 0.83 MG/ML
2.5 SOLUTION RESPIRATORY (INHALATION)
Status: CANCELLED | OUTPATIENT
Start: 2024-05-17

## 2024-05-15 RX ORDER — METHYLPREDNISOLONE SODIUM SUCCINATE 125 MG/2ML
125 INJECTION, POWDER, LYOPHILIZED, FOR SOLUTION INTRAMUSCULAR; INTRAVENOUS
Status: CANCELLED
Start: 2024-05-17

## 2024-05-15 RX ORDER — ALBUTEROL SULFATE 90 UG/1
1-2 AEROSOL, METERED RESPIRATORY (INHALATION)
Status: CANCELLED
Start: 2024-05-17

## 2024-05-15 RX ADMIN — DENOSUMAB 120 MG: 120 INJECTION SUBCUTANEOUS at 10:17

## 2024-05-15 ASSESSMENT — PAIN SCALES - GENERAL: PAINLEVEL: MILD PAIN (2)

## 2024-05-15 NOTE — PROGRESS NOTES
Oral Chemotherapy Monitoring Program  Lab Follow Up    Reviewed lab results from 5/15/24.        3/11/2024     5:00 PM 3/13/2024    11:00 AM 3/15/2024    12:00 PM 3/29/2024    10:00 AM 4/2/2024    10:00 AM 4/25/2024     5:00 PM 5/15/2024    11:00 AM   ORAL CHEMOTHERAPY   Assessment Type Chart Review;Initial Work up Chart Review;Initial Work up;New Teach;Lab Monitoring Chart Review;Refill Chart Review;Initial Follow up Refill Refill Lab Monitoring   Diagnosis Code Prostate Cancer Prostate Cancer Prostate Cancer Prostate Cancer Prostate Cancer Prostate Cancer Prostate Cancer   Providers Dr. Daren Mercedes   Clinic Name/Location [facility] [place]   Drug Name Nubeqa (darolutamide) Nubeqa (darolutamide) Nubeqa (darolutamide) Nubeqa (darolutamide) Nubeqa (darolutamide) Nubeqa (darolutamide) Nubeqa (darolutamide)   Dose 600 mg 600 mg 600 mg 600 mg 600 mg 600 mg 600 mg   Current Schedule BID BID BID BID BID BID BID   Cycle Details Continuous Continuous Continuous Continuous Continuous Continuous Continuous   Start Date of Last Cycle    3/22/2024      Planned next cycle start date 3/15/2024 3/18/2024 3/20/2024 4/12/2024 4/12/2024     Adverse Effects    Fatigue;Hypertension   Increased AST/ALT/T BILI   Fatigue    Grade 1      Pharmacist Intervention(fatigue)    No      Increased AST/ALT/T BILI       Grade 2   Pharmacist Intervention(increased ast/alt/t bili)       No   Hypertension    Grade 2      Pharmacist intervention(hypertension)    Yes      Intervention(s)    Patient education      Any new drug interactions? No No No No  No    Is the dose as ordered appropriate for the patient? Yes Yes Yes Yes  Yes Yes       Labs:  _  Result Component Current Result Ref Range   Sodium 141 (5/15/2024) 135 - 145 mmol/L     _  Result Component Current Result Ref Range   Potassium 4.0 (5/15/2024) 3.4 - 5.3 mmol/L     _  Result Component  Current Result Ref Range   Calcium 9.5 (5/15/2024) 8.8 - 10.2 mg/dL     No results found for Mag within last 30 days.     _  Result Component Current Result Ref Range   Phosphorus 4.4 (5/15/2024) 2.5 - 4.5 mg/dL     _  Result Component Current Result Ref Range   Albumin 4.5 (5/15/2024) 3.5 - 5.2 g/dL     _  Result Component Current Result Ref Range   Urea Nitrogen 12.0 (5/15/2024) 8.0 - 23.0 mg/dL     _  Result Component Current Result Ref Range   Creatinine 0.93 (5/15/2024) 0.67 - 1.17 mg/dL     _  Result Component Current Result Ref Range   AST 29 (5/15/2024) 0 - 45 U/L     _  Result Component Current Result Ref Range   ALT 24 (5/15/2024) 0 - 70 U/L     _  Result Component Current Result Ref Range   Bilirubin Total 2.8 (H) (5/15/2024) <=1.2 mg/dL     _  Result Component Current Result Ref Range   WBC Count 5.5 (5/15/2024) 4.0 - 11.0 10e3/uL     _  Result Component Current Result Ref Range   Hemoglobin 14.7 (5/15/2024) 13.3 - 17.7 g/dL     _  Result Component Current Result Ref Range   Platelet Count 177 (5/15/2024) 150 - 450 10e3/uL     No results found for ANC within last 30 days.     _  Result Component Current Result Ref Range   Absolute Neutrophils 3.7 (5/15/2024) 1.6 - 8.3 10e3/uL        Assessment & Plan:  No concerning abnormalities. Grade 2 increased BILI, was elevated at baseline. Continue Nubeqa. Monthly labs per Mercedes 5/1.       Follow-Up:  6/12 - labs and infusion     Trey Bobo  Pharmacy Intern   [facility]   [phone]

## 2024-05-15 NOTE — PATIENT INSTRUCTIONS
The recommended Calcium and Vitamin D dose is to take Calcium/Vitamin D supplement 1 tablet twice a day.  Each tablet should contain 500-600 mg of elemental Calcium and 400 units of Vitamin D.

## 2024-05-15 NOTE — PROGRESS NOTES
Infusion Nursing Note:  Ronnie Lagos presents today for Labs + Xgeva.    Patient seen by provider today: No   present during visit today: Not Applicable.    Note: Ronnie denies any upcoming dental work.  He states he had a recent surface filling redone, but denies any recent invasive jaw or dental work.  He states he has tooth sensitivity in back left tooth that his dentist is monitoring, but other wise denies any other tooth, mouth, or jaw pain.  He states his dentist is aware that he is on Xgeva.  He confirms that he is taking Calcium and Vitamin D supplement.    Message sent to [facility] pharmacy requesting Nubeqa treatment plan dates be adjusted to coincide with Xgeva appointments.  These medications were started around the same time (3/20 and 3/22.)  Patient declines to come for a separate lab only appointment each month.    Intravenous Access:  Lab draw site left AC, Needle type butterfly, Gauge 21.  Labs drawn without difficulty.    Treatment Conditions:   05/15/24 09:10   Creatinine 0.93   Calcium 9.5   Phosphorus 4.4   Albumin 4.5     Results reviewed, labs MET treatment parameters, ok to proceed with treatment.    Post Infusion Assessment:  Patient tolerated injection without incident.     Discharge Plan:   Discharge instructions reviewed with: Patient.  Patient and/or family verbalized understanding of discharge instructions and all questions answered.  AVS to patient via [employer].  Patient will return 6/12 for next appointment.   Patient discharged in stable condition accompanied by: self.  Departure Mode: Ambulatory.      Eloy Contreras RN

## 2024-05-17 DIAGNOSIS — I10 ESSENTIAL HYPERTENSION, BENIGN: ICD-10-CM

## 2024-05-17 DIAGNOSIS — K21.9 GASTROESOPHAGEAL REFLUX DISEASE WITHOUT ESOPHAGITIS: ICD-10-CM

## 2024-05-17 LAB
TESTOST FREE SERPL-MCNC: 0.12 NG/DL
TESTOST SERPL-MCNC: 11 NG/DL (ref 240–950)

## 2024-05-17 RX ORDER — ATENOLOL 50 MG/1
50 TABLET ORAL DAILY
Qty: 90 TABLET | Refills: 0 | Status: SHIPPED | OUTPATIENT
Start: 2024-05-17 | End: 2024-06-05

## 2024-05-17 RX ORDER — AMLODIPINE BESYLATE 5 MG/1
5 TABLET ORAL DAILY
Qty: 90 TABLET | Refills: 0 | Status: SHIPPED | OUTPATIENT
Start: 2024-05-17 | End: 2024-06-05

## 2024-05-22 NOTE — PATIENT INSTRUCTIONS
WOUND CARE INSTRUCTIONS  FOR CRYOSURGERY        This area treated with liquid nitrogen will form a blister. You do not need to bandage the area until after the blister forms and breaks (which may be a few days).  When the blister breaks, begin daily dressing changes as follows:    1) Clean and dry the area with tap water using clean Q-tip or sterile gauze pad.    2) Apply Polysporin ointment or Bacitracin ointment over entire wound.  Do NOT use Neosporin ointment.    3) Cover the wound with a band-aid or sterile non-stick gauze pad and micropore paper tape.      REPEAT THESE INSTRUCTIONS AT LEAST ONCE A DAY UNTIL THE WOUND HAS COMPLETELY HEALED.        It is an old wives tale that a wound heals better when it is exposed to air and allowed to dry out. The wound will heal faster with a better cosmetic result if it is kept moist with ointment and covered with a bandage.  Do not let the wound dry out.      Supplies Needed:     *Cotton tipped applicators (Q-tips)   *Polysporin ointment or Bacitracin ointment (NOT NEOSPORIN)   *Band-aids, or non stick gauze pads and micropore paper tape    PATIENT INFORMATION    During the healing process you will notice a number of changes. All wounds develop a small halo of redness surrounding the wound.  This means healing is occurring. Severe itching with extensive redness usually indicates sensitivity to the ointment or bandage tape used to dress the wound.  You should call our office if this develops.      Swelling and/or discoloration around your surgical site is common, particularly when performed around the eye.    All wounds normally drain.  The larger the wound the more drainage there will be.  After 7-10 days, you will notice the wound beginning to shrink and new skin will begin to grow.  The wound is healed when you can see skin has formed over the entire area.  A healed wound has a healthy, shiny look to the surface and is red to dark pink in color to normalize.  Wounds may  take approximately 4-6 weeks to heal.  Larger wounds may take 6-8 weeks.  After the wound is healed you may discontinue dressing changes.    You may experience a sensation of tightness as your wound heals. This is normal and will gradually subside.    Your healed wound may be sensitive to temperature changes. This sensitivity improves with time, but if you re having a lot of discomfort, try to avoid temperature extremes.    Patients frequently experience itching after their wound appears to have healed because of the continue healing under the skin.  Plain Vaseline will help relieve the itching.           Proper skin care from [facility]:    -Eliminate harsh soaps as they strip the natural oils from the skin, often resulting in dry itchy skin ( i.e. Dial, Zest, Gu[place] Spring)  -Use mild soaps such as Cetaphil or Dove Sensitive Skin in the shower. You do not need to use soap on arms, legs, and trunk every time you shower unless visibly soiled.   -Avoid hot or cold showers.  -After showering, lightly dry off and apply moisturizing within 2-3 minutes. This will help trap moisture in the skin.   -Aggressive use of a moisturizer at least 1-2 times a day to the entire body (including -Vanicream, Cetaphil, Aquaphor or Cerave) and moisturize hands after every washing.  -We recommend using moisturizers that come in a tub that needs to be scooped out, not a pump. This has more of an oil base. It will hold moisture in your skin much better than a water base moisturizer. The above recommended are non-pore clogging.           Wear a sunscreen with at least SPF 30 on your face, ears, neck and V of the chest daily. Wear sunscreen on other areas of the body if those areas are exposed to the sun throughout the day. Sunscreens can contain physical and/or chemical blockers. Physical blockers are less likely to clog pores, these include zinc oxide and titanium dioxide. Reapply every two hour and after swimming. Sunscreen  examples include Neutrogena, CeraVe, Blue Lizard, Elta MD and many others.    UV radiation  UVA radiation remains constant throughout the day and throughout the year. It is a longer wavelength than UVB and therefore penetrates deeper into the skin leading to immediate and delayed tanning, photoaging, and skin cancer. 70-80% of UVA and UVB radiation occurs between the hours of 10am-2pm.  UVB radiation  UVB radiation causes the most harmful effects and is more significant during the summer months. However, snow and ice can reflect UVB radiation leading to skin damage during the winter months as well. UVB radiation is responsible for tanning, burning, inflammation, delayed erythema (pinkness), pigmentation (brown spots), and skin cancer.       Follow up in 2-3 months to recheck face.  Follow up in 1 Year for Skin Check.   Yes

## 2024-05-24 ENCOUNTER — TELEPHONE (OUTPATIENT)
Dept: ONCOLOGY | Facility: CLINIC | Age: 69
End: 2024-05-24
Payer: COMMERCIAL

## 2024-05-24 NOTE — TELEPHONE ENCOUNTER
Pt is calling.  He is currently on treatment for metastatic prostate cancer with xgeva, lupron, and nubeqa.   States that he has not had Covid booster since October 2023.  He is wondering if Dr Mercedes would recommend that he receive another Covid booster?    Will route to Dr Mercedes for recommendations.    OK to leave message if unable to reach pt.    Kristine Jones RN on 5/24/2024 at 9:37 AM

## 2024-05-24 NOTE — CONFIDENTIAL NOTE
Called pt with recommendations.    Anabell Mercedes MD Nielsen, Karla, RN; Halina Sprague, RN5 minutes ago (12:49 PM)     JH  I would leave it up to his preference if he wants a COVID booster.         Pt states that he will likely receive the Covid booster.

## 2024-05-28 ENCOUNTER — MYC MEDICAL ADVICE (OUTPATIENT)
Dept: INTERNAL MEDICINE | Facility: CLINIC | Age: 69
End: 2024-05-28
Payer: COMMERCIAL

## 2024-06-03 SDOH — HEALTH STABILITY: PHYSICAL HEALTH: ON AVERAGE, HOW MANY MINUTES DO YOU ENGAGE IN EXERCISE AT THIS LEVEL?: 10 MIN

## 2024-06-03 SDOH — HEALTH STABILITY: PHYSICAL HEALTH: ON AVERAGE, HOW MANY DAYS PER WEEK DO YOU ENGAGE IN MODERATE TO STRENUOUS EXERCISE (LIKE A BRISK WALK)?: 3 DAYS

## 2024-06-03 ASSESSMENT — SOCIAL DETERMINANTS OF HEALTH (SDOH): HOW OFTEN DO YOU GET TOGETHER WITH FRIENDS OR RELATIVES?: ONCE A WEEK

## 2024-06-05 ENCOUNTER — OFFICE VISIT (OUTPATIENT)
Dept: INTERNAL MEDICINE | Facility: CLINIC | Age: 69
End: 2024-06-05
Payer: COMMERCIAL

## 2024-06-05 VITALS
TEMPERATURE: 96.9 F | HEIGHT: 68 IN | RESPIRATION RATE: 18 BRPM | HEART RATE: 59 BPM | WEIGHT: 216.5 LBS | BODY MASS INDEX: 32.81 KG/M2 | DIASTOLIC BLOOD PRESSURE: 74 MMHG | OXYGEN SATURATION: 99 % | SYSTOLIC BLOOD PRESSURE: 130 MMHG

## 2024-06-05 DIAGNOSIS — I10 ESSENTIAL HYPERTENSION, BENIGN: ICD-10-CM

## 2024-06-05 DIAGNOSIS — Z12.11 COLON CANCER SCREENING: ICD-10-CM

## 2024-06-05 DIAGNOSIS — Z13.6 CARDIOVASCULAR SCREENING; LDL GOAL LESS THAN 160: ICD-10-CM

## 2024-06-05 DIAGNOSIS — C61 PROSTATE CANCER METASTATIC TO MULTIPLE SITES (H): ICD-10-CM

## 2024-06-05 DIAGNOSIS — Z00.00 ENCOUNTER FOR SUBSEQUENT ANNUAL WELLNESS VISIT (AWV) IN MEDICARE PATIENT: Primary | ICD-10-CM

## 2024-06-05 DIAGNOSIS — K21.9 GASTROESOPHAGEAL REFLUX DISEASE WITHOUT ESOPHAGITIS: ICD-10-CM

## 2024-06-05 DIAGNOSIS — R73.9 HYPERGLYCEMIA: ICD-10-CM

## 2024-06-05 PROCEDURE — G0439 PPPS, SUBSEQ VISIT: HCPCS | Performed by: INTERNAL MEDICINE

## 2024-06-05 PROCEDURE — 99214 OFFICE O/P EST MOD 30 MIN: CPT | Mod: 25 | Performed by: INTERNAL MEDICINE

## 2024-06-05 RX ORDER — ATENOLOL 50 MG/1
50 TABLET ORAL DAILY
Qty: 90 TABLET | Refills: 3 | Status: SHIPPED | OUTPATIENT
Start: 2024-06-05

## 2024-06-05 RX ORDER — AMLODIPINE BESYLATE 5 MG/1
5 TABLET ORAL DAILY
Qty: 90 TABLET | Refills: 3 | Status: SHIPPED | OUTPATIENT
Start: 2024-06-05

## 2024-06-05 NOTE — PROGRESS NOTES
"Preventive Care Visit  [facility]  Abdiel Jones MD, Internal Medicine  Jun 5, 2024      Assessment & Plan     Encounter for subsequent annual wellness visit (AWV) in Medicare patient  Advised patient consider updating routine vaccines accordingly as the    Essential hypertension, benign  Stable on therapy continue with medical manage  - atenolol (TENORMIN) 50 MG tablet; Take 1 tablet (50 mg) by mouth daily  - amLODIPine (NORVASC) 5 MG tablet; Take 1 tablet (5 mg) by mouth daily    CARDIOVASCULAR SCREENING; LDL GOAL LESS THAN 160  Ordered as fasting per routine  - Lipid Profile; Future    Prostate cancer metastatic to multiple sites (H)  Following with oncology as directed    Hyperglycemia  Rechecking A1c  - Hemoglobin A1c; Future    Gastroesophageal reflux disease without esophagitis  Continuing with paying PPI therapy  - omeprazole (PRILOSEC) 20 MG DR capsule; Take 1 capsule (20 mg) by mouth daily    Colon cancer screening  Advised patient to subsequently benefit from upcoming colonoscopy.  He will touch base with his oncologist        BMI  Estimated body mass index is 32.92 kg/m  as calculated from the following:    Height as of this encounter: 1.727 m (5' 8\").    Weight as of this encounter: 98.2 kg (216 lb 8 oz).   Weight management plan: Discussed healthy diet and exercise guidelines    Counseling  Appropriate preventive services were discussed with this patient, including applicable screening as appropriate for fall prevention, nutrition, physical activity, Tobacco-use cessation, weight loss and cognition.  Checklist reviewing preventive services available has been given to the patient.  Reviewed patient's diet, addressing concerns and/or questions.   He is at risk for lack of exercise and has been provided with information to increase physical activity for the benefit of his well-being.   Discussed possible causes of fatigue. The patient was provided with written information regarding " signs of hearing loss.   Information on urinary incontinence and treatment options given to patient.       Work on weight loss  Regular exercise    Blue Trujillo is a 68 year old, presenting for the following:  Wellness Visit      Health Care Directive  Patient does not have a Health Care Directive or Living Will: Discussed advance care planning with patient; however, patient declined at this time.    HPI    Wellness exam        6/3/2024   General Health   How would you rate your overall physical health? Good   Feel stress (tense, anxious, or unable to sleep) Rather much   (!) STRESS CONCERN      6/3/2024   Nutrition   Diet: Regular (no restrictions)         6/3/2024   Exercise   Days per week of moderate/strenous exercise 3 days   Average minutes spent exercising at this level 10 min         6/3/2024   Social Factors   Frequency of gathering with friends or relatives Once a week   Worry food won't last until get money to buy more No   Food not last or not have enough money for food? No   Do you have housing?  Yes   Are you worried about losing your housing? No   Lack of transportation? No   Unable to get utilities (heat,electricity)? No         6/3/2024   Fall Risk   Fallen 2 or more times in the past year? No    No   Trouble with walking or balance? No    No          6/3/2024   Activities of Daily Living- Home Safety   Needs help with the following daily activites None of the above   Safety concerns in the home None of the above         6/3/2024   Dental   Dentist two times every year? Yes         6/3/2024   Hearing Screening   Hearing concerns? (!) I FEEL THAT PEOPLE ARE MUMBLING OR NOT SPEAKING CLEARLY.    (!) I NEED TO ASK PEOPLE TO SPEAK UP OR REPEAT THEMSELVES.    (!) IT'S HARD TO FOLLOW A CONVERSATION IN A NOISY RESTAURANT OR CROWDED ROOM.    (!) TROUBLE UNDESTANDING A SPEAKER IN A PUBLIC MEETING OR Church SERVICE.    (!) TROUBLE UNDERSTANDING SOFT OR WHISPERED SPEECH.         6/3/2024   Driving Risk  Screening   Patient/family members have concerns about driving No         6/3/2024   General Alertness/Fatigue Screening   Have you been more tired than usual lately? (!) YES         6/3/2024   Urinary Incontinence Screening   Bothered by leaking urine in past 6 months Yes         6/3/2024   TB Screening   Were you born outside of the [place]? No       Today's PHQ-2 Score:       6/5/2024    10:07 AM   PHQ-2 ( 1999 [employer])   Q1: Little interest or pleasure in doing things 0   Q2: Feeling down, depressed or hopeless 0   PHQ-2 Score 0   Q1: Little interest or pleasure in doing things Not at all   Q2: Feeling down, depressed or hopeless Not at all   PHQ-2 Score 0         6/3/2024   Substance Use   Alcohol more than 3/day or more than 7/wk No   Do you have a current opioid prescription? No   How severe/bad is pain from 1 to 10? 6/10   Do you use any other substances recreationally? (!) CANNABIS PRODUCTS     Social History     Tobacco Use    Smoking status: Never    Smokeless tobacco: Never   Vaping Use    Vaping status: Never Used   Substance Use Topics    Alcohol use: Yes     Alcohol/week: 2.0 standard drinks of alcohol     Types: 2 Standard drinks or equivalent per week     Comment: 3-4 times a week, light beer    Drug use: Never           6/3/2024   AAA Screening   Family history of Abdominal Aortic Aneurysm (AAA)? (!) YES    Last PSA:   PSA   Date Value Ref Range Status   04/06/2021 1.51 0 - 4 ug/L Final     Comment:     Assay Method:  Chemiluminescence using Siemens Vista analyzer     Prostate Specific Antigen Screen   Date Value Ref Range Status   05/10/2022 3.44 0.00 - 4.00 ug/L Final     PSA Tumor Marker   Date Value Ref Range Status   05/15/2024 0.17 0.00 - 4.50 ng/mL Final     ASCVD Risk   The ASCVD Risk score (Ángel RYAN, et al., 2019) failed to calculate for the following reasons:    The patient has a prior MI or stroke diagnosis      Reviewed and updated as needed this visit by Provider                     Lab work is in process  Current providers sharing in care for this patient include:  Patient Care Team:  Abdiel Joens MD as PCP - General  Abdiel Jones MD as Assigned PCP  Burton Park MD as MD (Dermatology)  Perico Murrieta MD as MD (Dermatology)  Sophia Ramirez MD as MD (Urology)  Sophia Ramirez MD as Assigned Surgical Provider  Anabell Mercedes MD as MD (Hematology & Oncology)  Anabell Mercedes MD as MD (Hematology & Oncology)  Halina Sprague, RN as Specialty Care Coordinator (Hematology & Oncology)  Anabell Mercedes MD as Assigned Cancer Care Provider    The following health maintenance items are reviewed in Epic and correct as of today:  Health Maintenance   Topic Date Due    DTAP/TDAP/TD IMMUNIZATION (2 - Td or Tdap) 01/24/2023    ANNUAL REVIEW OF HM ORDERS  05/10/2023    COVID-19 Vaccine (7 - 2023-24 season) 11/30/2023    COLORECTAL CANCER SCREENING  12/07/2023    BMP  05/15/2025    MEDICARE ANNUAL WELLNESS VISIT  06/05/2025    FALL RISK ASSESSMENT  06/05/2025    GLUCOSE  05/15/2027    LIPID  05/11/2028    ADVANCE CARE PLANNING  11/21/2028    HEPATITIS C SCREENING  Completed    PHQ-2 (once per calendar year)  Completed    INFLUENZA VACCINE  Completed    Pneumococcal Vaccine: 65+ Years  Completed    ZOSTER IMMUNIZATION  Completed    RSV VACCINE (Pregnancy & 60+)  Completed    IPV IMMUNIZATION  Aged Out    HPV IMMUNIZATION  Aged Out    MENINGITIS IMMUNIZATION  Aged Out    RSV MONOCLONAL ANTIBODY  Aged Out         Review of Systems  CONSTITUTIONAL: NEGATIVE for fever, chills, change in weight  EYES: NEGATIVE for vision changes or irritation  ENT/MOUTH: NEGATIVE for ear, mouth and throat problems  RESP: NEGATIVE for significant cough or SOB  CV: NEGATIVE for chest pain, palpitations or peripheral edema  GI: NEGATIVE for nausea, abdominal pain, heartburn, or change in bowel habits  : NEGATIVE for frequency, dysuria, or hematuria  MUSCULOSKELETAL: NEGATIVE for significant arthralgias or  "myalgia  NEURO: NEGATIVE for weakness, dizziness or paresthesias  ENDOCRINE: NEGATIVE for temperature intolerance, skin/hair changes  HEME: NEGATIVE for bleeding problems  PSYCHIATRIC: NEGATIVE for changes in mood or affect     Objective    Exam  /74   Pulse 59   Temp 96.9  F (36.1  C) (Temporal)   Resp 18   Ht 1.727 m (5' 8\")   Wt 98.2 kg (216 lb 8 oz)   SpO2 99%   BMI 32.92 kg/m     Estimated body mass index is 32.92 kg/m  as calculated from the following:    Height as of this encounter: 1.727 m (5' 8\").    Weight as of this encounter: 98.2 kg (216 lb 8 oz).    Physical Exam  GENERAL: alert and no distress  EYES: Eyes grossly normal to inspection, PERRL and conjunctivae and sclerae normal  HENT: ear canals and TM's normal, nose and mouth without ulcers or lesions  NECK: no adenopathy, no asymmetry, masses, or scars  RESP: lungs clear to auscultation - no rales, rhonchi or wheezes  CV: regular rate and rhythm, normal S1 S2, no S3 or S4, no murmur, click or rub, no peripheral edema  ABDOMEN: soft, nontender, no hepatosplenomegaly, no masses and bowel sounds normal  MS: no gross musculoskeletal defects noted, no edema  NEURO: No focal changes  PSYCH: mentation appears normal, affect normal/bright         6/5/2024   Mini Cog   Clock Draw Score 2 Normal   3 Item Recall 3 objects recalled   Mini Cog Total Score 5          Signed Electronically by: Abdiel Jones MD    "

## 2024-06-12 ENCOUNTER — INFUSION THERAPY VISIT (OUTPATIENT)
Dept: INFUSION THERAPY | Facility: CLINIC | Age: 69
End: 2024-06-12
Attending: INTERNAL MEDICINE
Payer: COMMERCIAL

## 2024-06-12 ENCOUNTER — TRANSFERRED RECORDS (OUTPATIENT)
Dept: HEALTH INFORMATION MANAGEMENT | Facility: CLINIC | Age: 69
End: 2024-06-12

## 2024-06-12 VITALS
SYSTOLIC BLOOD PRESSURE: 149 MMHG | TEMPERATURE: 98 F | OXYGEN SATURATION: 95 % | RESPIRATION RATE: 18 BRPM | HEART RATE: 62 BPM | DIASTOLIC BLOOD PRESSURE: 83 MMHG

## 2024-06-12 DIAGNOSIS — C61 PROSTATE CANCER METASTATIC TO MULTIPLE SITES (H): ICD-10-CM

## 2024-06-12 DIAGNOSIS — R73.9 HYPERGLYCEMIA: ICD-10-CM

## 2024-06-12 DIAGNOSIS — C61 PROSTATE CANCER METASTATIC TO BONE (H): Primary | ICD-10-CM

## 2024-06-12 DIAGNOSIS — Z13.6 CARDIOVASCULAR SCREENING; LDL GOAL LESS THAN 160: ICD-10-CM

## 2024-06-12 DIAGNOSIS — C79.51 PROSTATE CANCER METASTATIC TO BONE (H): Primary | ICD-10-CM

## 2024-06-12 LAB
ALBUMIN SERPL BCG-MCNC: 4.4 G/DL (ref 3.5–5.2)
CALCIUM SERPL-MCNC: 9.6 MG/DL (ref 8.8–10.2)
CHOLEST SERPL-MCNC: 175 MG/DL
CREAT SERPL-MCNC: 0.99 MG/DL (ref 0.67–1.17)
EGFRCR SERPLBLD CKD-EPI 2021: 83 ML/MIN/1.73M2
FASTING STATUS PATIENT QL REPORTED: YES
HBA1C MFR BLD: 5.3 %
HDLC SERPL-MCNC: 57 MG/DL
LDLC SERPL CALC-MCNC: 88 MG/DL
NONHDLC SERPL-MCNC: 118 MG/DL
PHOSPHATE SERPL-MCNC: 4.4 MG/DL (ref 2.5–4.5)
TRIGL SERPL-MCNC: 151 MG/DL

## 2024-06-12 PROCEDURE — 84100 ASSAY OF PHOSPHORUS: CPT | Performed by: NURSE PRACTITIONER

## 2024-06-12 PROCEDURE — 82310 ASSAY OF CALCIUM: CPT | Performed by: NURSE PRACTITIONER

## 2024-06-12 PROCEDURE — 83036 HEMOGLOBIN GLYCOSYLATED A1C: CPT | Performed by: INTERNAL MEDICINE

## 2024-06-12 PROCEDURE — 96402 CHEMO HORMON ANTINEOPL SQ/IM: CPT

## 2024-06-12 PROCEDURE — 82565 ASSAY OF CREATININE: CPT | Performed by: NURSE PRACTITIONER

## 2024-06-12 PROCEDURE — 250N000011 HC RX IP 250 OP 636: Mod: JZ | Performed by: NURSE PRACTITIONER

## 2024-06-12 PROCEDURE — 80061 LIPID PANEL: CPT | Performed by: INTERNAL MEDICINE

## 2024-06-12 PROCEDURE — 250N000011 HC RX IP 250 OP 636: Mod: JZ | Performed by: INTERNAL MEDICINE

## 2024-06-12 PROCEDURE — 36415 COLL VENOUS BLD VENIPUNCTURE: CPT

## 2024-06-12 PROCEDURE — 96372 THER/PROPH/DIAG INJ SC/IM: CPT | Mod: XS | Performed by: NURSE PRACTITIONER

## 2024-06-12 PROCEDURE — 82040 ASSAY OF SERUM ALBUMIN: CPT | Performed by: NURSE PRACTITIONER

## 2024-06-12 RX ORDER — EPINEPHRINE 1 MG/ML
0.3 INJECTION, SOLUTION INTRAMUSCULAR; SUBCUTANEOUS EVERY 5 MIN PRN
Status: CANCELLED | OUTPATIENT
Start: 2024-06-16

## 2024-06-12 RX ORDER — METHYLPREDNISOLONE SODIUM SUCCINATE 125 MG/2ML
125 INJECTION, POWDER, LYOPHILIZED, FOR SOLUTION INTRAMUSCULAR; INTRAVENOUS
Status: CANCELLED
Start: 2024-06-16

## 2024-06-12 RX ORDER — MEPERIDINE HYDROCHLORIDE 25 MG/ML
25 INJECTION INTRAMUSCULAR; INTRAVENOUS; SUBCUTANEOUS EVERY 30 MIN PRN
Status: CANCELLED | OUTPATIENT
Start: 2024-06-16

## 2024-06-12 RX ORDER — ALBUTEROL SULFATE 90 UG/1
1-2 AEROSOL, METERED RESPIRATORY (INHALATION)
Status: CANCELLED
Start: 2024-06-16

## 2024-06-12 RX ORDER — ALBUTEROL SULFATE 0.83 MG/ML
2.5 SOLUTION RESPIRATORY (INHALATION)
Status: CANCELLED | OUTPATIENT
Start: 2024-06-16

## 2024-06-12 RX ORDER — DIPHENHYDRAMINE HYDROCHLORIDE 50 MG/ML
50 INJECTION INTRAMUSCULAR; INTRAVENOUS
Status: CANCELLED
Start: 2024-06-16

## 2024-06-12 RX ADMIN — DENOSUMAB 120 MG: 120 INJECTION SUBCUTANEOUS at 09:55

## 2024-06-12 RX ADMIN — LEUPROLIDE ACETATE 22.5 MG: KIT at 09:56

## 2024-06-12 NOTE — PROGRESS NOTES
Infusion Nursing Note:  Ronnie Lagos presents today for labs+ Xgeva/Lupron.    Patient seen by provider today: No   present during visit today: Not Applicable.    Note: Ronnie confirms he is taking calcium/vitamin D. No recent or upcoming major dental work.       Intravenous Access:  Lab draw site L AC, Needle type butterfly, Gauge 23.  Labs drawn without difficulty.    Treatment Conditions:  Component      Latest Ref Rng 6/12/2024  9:05 AM   Creatinine      0.67 - 1.17 mg/dL 0.99    GFR Estimate      >60 mL/min/1.73m2 83    Calcium      8.8 - 10.2 mg/dL 9.6    Phosphorus      2.5 - 4.5 mg/dL 4.4    Albumin      3.5 - 5.2 g/dL 4.4      .      Post Infusion Assessment:  Patient tolerated injection without incident.  Site patent and intact, free from redness, edema or discomfort.       Discharge Plan:   Discharge instructions reviewed with: Patient.  Patient and/or family verbalized understanding of discharge instructions and all questions answered.  AVS to patient via MbiteT.  Patient will return 7/10/24 for next appointment.   Patient discharged in stable condition accompanied by: self.  Departure Mode: Ambulatory.      Suzi Ibarra RN

## 2024-06-20 DIAGNOSIS — C61 PROSTATE CANCER METASTATIC TO MULTIPLE SITES (H): Primary | ICD-10-CM

## 2024-07-10 ENCOUNTER — INFUSION THERAPY VISIT (OUTPATIENT)
Dept: INFUSION THERAPY | Facility: CLINIC | Age: 69
End: 2024-07-10
Attending: INTERNAL MEDICINE
Payer: COMMERCIAL

## 2024-07-10 ENCOUNTER — DOCUMENTATION ONLY (OUTPATIENT)
Dept: PHARMACY | Facility: CLINIC | Age: 69
End: 2024-07-10

## 2024-07-10 VITALS
TEMPERATURE: 97 F | WEIGHT: 215.2 LBS | RESPIRATION RATE: 17 BRPM | HEART RATE: 57 BPM | DIASTOLIC BLOOD PRESSURE: 70 MMHG | BODY MASS INDEX: 32.72 KG/M2 | SYSTOLIC BLOOD PRESSURE: 126 MMHG | OXYGEN SATURATION: 97 %

## 2024-07-10 DIAGNOSIS — C79.51 PROSTATE CANCER METASTATIC TO BONE (H): Primary | ICD-10-CM

## 2024-07-10 DIAGNOSIS — C61 PROSTATE CANCER METASTATIC TO MULTIPLE SITES (H): ICD-10-CM

## 2024-07-10 DIAGNOSIS — C61 PROSTATE CANCER METASTATIC TO MULTIPLE SITES (H): Primary | ICD-10-CM

## 2024-07-10 DIAGNOSIS — C61 PROSTATE CANCER METASTATIC TO BONE (H): Primary | ICD-10-CM

## 2024-07-10 LAB
ALBUMIN SERPL BCG-MCNC: 4.4 G/DL (ref 3.5–5.2)
ALP SERPL-CCNC: 62 U/L (ref 40–150)
ALT SERPL W P-5'-P-CCNC: 24 U/L (ref 0–70)
ANION GAP SERPL CALCULATED.3IONS-SCNC: 15 MMOL/L (ref 7–15)
AST SERPL W P-5'-P-CCNC: 34 U/L (ref 0–45)
BASOPHILS # BLD AUTO: 0 10E3/UL (ref 0–0.2)
BASOPHILS NFR BLD AUTO: 1 %
BILIRUB SERPL-MCNC: 2.1 MG/DL
BUN SERPL-MCNC: 12.9 MG/DL (ref 8–23)
CALCIUM SERPL-MCNC: 9.2 MG/DL (ref 8.8–10.2)
CALCIUM SERPL-MCNC: 9.2 MG/DL (ref 8.8–10.2)
CHLORIDE SERPL-SCNC: 102 MMOL/L (ref 98–107)
CREAT SERPL-MCNC: 0.95 MG/DL (ref 0.67–1.17)
CREAT SERPL-MCNC: 0.95 MG/DL (ref 0.67–1.17)
DEPRECATED HCO3 PLAS-SCNC: 22 MMOL/L (ref 22–29)
EGFRCR SERPLBLD CKD-EPI 2021: 87 ML/MIN/1.73M2
EGFRCR SERPLBLD CKD-EPI 2021: 87 ML/MIN/1.73M2
EOSINOPHIL # BLD AUTO: 0.2 10E3/UL (ref 0–0.7)
EOSINOPHIL NFR BLD AUTO: 4 %
ERYTHROCYTE [DISTWIDTH] IN BLOOD BY AUTOMATED COUNT: 12.5 % (ref 10–15)
GLUCOSE SERPL-MCNC: 109 MG/DL (ref 70–99)
HCT VFR BLD AUTO: 39.5 % (ref 40–53)
HGB BLD-MCNC: 13.9 G/DL (ref 13.3–17.7)
IMM GRANULOCYTES # BLD: 0 10E3/UL
IMM GRANULOCYTES NFR BLD: 0 %
LYMPHOCYTES # BLD AUTO: 1.2 10E3/UL (ref 0.8–5.3)
LYMPHOCYTES NFR BLD AUTO: 21 %
MCH RBC QN AUTO: 31.9 PG (ref 26.5–33)
MCHC RBC AUTO-ENTMCNC: 35.2 G/DL (ref 31.5–36.5)
MCV RBC AUTO: 91 FL (ref 78–100)
MONOCYTES # BLD AUTO: 0.3 10E3/UL (ref 0–1.3)
MONOCYTES NFR BLD AUTO: 6 %
NEUTROPHILS # BLD AUTO: 4 10E3/UL (ref 1.6–8.3)
NEUTROPHILS NFR BLD AUTO: 69 %
NRBC # BLD AUTO: 0 10E3/UL
NRBC BLD AUTO-RTO: 0 /100
PHOSPHATE SERPL-MCNC: 3.6 MG/DL (ref 2.5–4.5)
PLATELET # BLD AUTO: 212 10E3/UL (ref 150–450)
POTASSIUM SERPL-SCNC: 4 MMOL/L (ref 3.4–5.3)
PROT SERPL-MCNC: 7.3 G/DL (ref 6.4–8.3)
PSA SERPL DL<=0.01 NG/ML-MCNC: 0.06 NG/ML (ref 0–4.5)
RBC # BLD AUTO: 4.36 10E6/UL (ref 4.4–5.9)
SODIUM SERPL-SCNC: 139 MMOL/L (ref 135–145)
WBC # BLD AUTO: 5.8 10E3/UL (ref 4–11)

## 2024-07-10 PROCEDURE — 82310 ASSAY OF CALCIUM: CPT | Performed by: NURSE PRACTITIONER

## 2024-07-10 PROCEDURE — 36415 COLL VENOUS BLD VENIPUNCTURE: CPT

## 2024-07-10 PROCEDURE — 85048 AUTOMATED LEUKOCYTE COUNT: CPT | Performed by: INTERNAL MEDICINE

## 2024-07-10 PROCEDURE — 96372 THER/PROPH/DIAG INJ SC/IM: CPT | Performed by: NURSE PRACTITIONER

## 2024-07-10 PROCEDURE — 82565 ASSAY OF CREATININE: CPT | Performed by: NURSE PRACTITIONER

## 2024-07-10 PROCEDURE — 84100 ASSAY OF PHOSPHORUS: CPT | Performed by: NURSE PRACTITIONER

## 2024-07-10 PROCEDURE — 84153 ASSAY OF PSA TOTAL: CPT | Performed by: INTERNAL MEDICINE

## 2024-07-10 PROCEDURE — 80053 COMPREHEN METABOLIC PANEL: CPT | Performed by: NURSE PRACTITIONER

## 2024-07-10 PROCEDURE — 250N000011 HC RX IP 250 OP 636: Performed by: NURSE PRACTITIONER

## 2024-07-10 PROCEDURE — 36415 COLL VENOUS BLD VENIPUNCTURE: CPT | Performed by: NURSE PRACTITIONER

## 2024-07-10 RX ORDER — METHYLPREDNISOLONE SODIUM SUCCINATE 125 MG/2ML
125 INJECTION, POWDER, LYOPHILIZED, FOR SOLUTION INTRAMUSCULAR; INTRAVENOUS
Status: CANCELLED
Start: 2024-07-12

## 2024-07-10 RX ORDER — DIPHENHYDRAMINE HYDROCHLORIDE 50 MG/ML
50 INJECTION INTRAMUSCULAR; INTRAVENOUS
Status: CANCELLED
Start: 2024-07-12

## 2024-07-10 RX ORDER — ALBUTEROL SULFATE 90 UG/1
1-2 AEROSOL, METERED RESPIRATORY (INHALATION)
Status: CANCELLED
Start: 2024-07-12

## 2024-07-10 RX ORDER — ALBUTEROL SULFATE 0.83 MG/ML
2.5 SOLUTION RESPIRATORY (INHALATION)
Status: CANCELLED | OUTPATIENT
Start: 2024-07-12

## 2024-07-10 RX ORDER — EPINEPHRINE 1 MG/ML
0.3 INJECTION, SOLUTION INTRAMUSCULAR; SUBCUTANEOUS EVERY 5 MIN PRN
Status: CANCELLED | OUTPATIENT
Start: 2024-07-12

## 2024-07-10 RX ORDER — MEPERIDINE HYDROCHLORIDE 25 MG/ML
25 INJECTION INTRAMUSCULAR; INTRAVENOUS; SUBCUTANEOUS EVERY 30 MIN PRN
Status: CANCELLED | OUTPATIENT
Start: 2024-07-12

## 2024-07-10 RX ADMIN — DENOSUMAB 120 MG: 120 INJECTION SUBCUTANEOUS at 10:33

## 2024-07-10 ASSESSMENT — PAIN SCALES - GENERAL: PAINLEVEL: NO PAIN (0)

## 2024-07-10 NOTE — PROGRESS NOTES
Infusion Nursing Note:  Ronnie Lagos presents today for Labs + Xgeva.    Patient seen by provider today: No   present during visit today: Not Applicable.    Note: Ronnie confirms he is taking Ca and Vit D.  He denies any recent or upcoming major dental appt.      Intravenous Access:  Lab draw site left AC, Needle type butterfly, Gauge 23.  Labs drawn without difficulty.    Treatment Conditions:  Lab Results   Component Value Date     07/10/2024    POTASSIUM 4.0 07/10/2024    CR 0.95 07/10/2024    CR 0.95 07/10/2024    AV 9.2 07/10/2024    AV 9.2 07/10/2024    BILITOTAL 2.1 (H) 07/10/2024    ALBUMIN 4.4 07/10/2024    ALT 24 07/10/2024    AST 34 07/10/2024       Results reviewed, labs MET treatment parameters, ok to proceed with treatment.      Post Infusion Assessment:  Patient tolerated injection without incident.  Site patent and intact, free from redness, edema or discomfort.       Discharge Plan:   Discharge instructions reviewed with: Patient.  Patient and/or family verbalized understanding of discharge instructions and all questions answered.  AVS to patient via ElliT.  Patient will return 8/7 for next appointment.   Patient discharged in stable condition accompanied by: self.  Departure Mode: Ambulatory.      Liliana Haynes RN

## 2024-07-10 NOTE — PROGRESS NOTES
Oral Chemotherapy Monitoring Program  Lab Follow Up    Reviewed lab results from 7/10/24        3/15/2024    12:00 PM 3/29/2024    10:00 AM 4/2/2024    10:00 AM 4/25/2024     5:00 PM 5/15/2024    11:00 AM 6/20/2024    12:00 PM 7/10/2024    11:00 AM   ORAL CHEMOTHERAPY   Assessment Type Chart Review;Refill Chart Review;Initial Follow up Refill Refill Lab Monitoring Refill Lab Monitoring   Diagnosis Code Prostate Cancer Prostate Cancer Prostate Cancer Prostate Cancer Prostate Cancer Prostate Cancer Prostate Cancer   Providers Dr. Daren Mercedes   Clinic Name/Location [facility]   Drug Name Nubeqa (darolutamide) Nubeqa (darolutamide) Nubeqa (darolutamide) Nubeqa (darolutamide) Nubeqa (darolutamide) Nubeqa (darolutamide) Nubeqa (darolutamide)   Dose 600 mg 600 mg 600 mg 600 mg 600 mg 600 mg 600 mg   Current Schedule BID BID BID BID BID     Cycle Details Continuous Continuous Continuous Continuous Continuous Continuous Continuous   Start Date of Last Cycle  3/22/2024        Planned next cycle start date 3/20/2024 4/12/2024 4/12/2024       Adverse Effects  Fatigue;Hypertension   Increased AST/ALT/T BILI  Increased AST/ALT/T BILI   Fatigue  Grade 1        Pharmacist Intervention(fatigue)  No        Increased AST/ALT/T BILI     Grade 2  Grade 1   Pharmacist Intervention(increased ast/alt/t bili)     No  No   Hypertension  Grade 2        Pharmacist intervention(hypertension)  Yes        Intervention(s)  Patient education        Any new drug interactions? No No  No  No    Is the dose as ordered appropriate for the patient? Yes Yes  Yes Yes Yes Yes       Labs:  _  Result Component Current Result Ref Range   Sodium 139 (7/10/2024) 135 - 145 mmol/L     _  Result Component Current Result Ref Range   Potassium 4.0 (7/10/2024) 3.4 - 5.3 mmol/L     _  Result Component Current Result Ref Range   Calcium 9.2 (7/10/2024) 8.8 -  10.2 mg/dL    9.2 (7/10/2024) 8.8 - 10.2 mg/dL     No results found for Mag within last 30 days.     _  Result Component Current Result Ref Range   Phosphorus 3.6 (7/10/2024) 2.5 - 4.5 mg/dL     _  Result Component Current Result Ref Range   Albumin 4.4 (7/10/2024) 3.5 - 5.2 g/dL     _  Result Component Current Result Ref Range   Urea Nitrogen 12.9 (7/10/2024) 8.0 - 23.0 mg/dL     _  Result Component Current Result Ref Range   Creatinine 0.95 (7/10/2024) 0.67 - 1.17 mg/dL    0.95 (7/10/2024) 0.67 - 1.17 mg/dL     _  Result Component Current Result Ref Range   AST 34 (7/10/2024) 0 - 45 U/L     _  Result Component Current Result Ref Range   ALT 24 (7/10/2024) 0 - 70 U/L     _  Result Component Current Result Ref Range   Bilirubin Total 2.1 (H) (7/10/2024) <=1.2 mg/dL     No results found for WBC within last 30 days.     No results found for HGB within last 30 days.     No results found for PLT within last 30 days.     No results found for ANC within last 30 days.     No results found for ANC within last 30 days.        Assessment & Plan:  No new concerning abnormalities. Grade 1 elevated bili total, baseline abnormal. Trending downwards from 2.8 on 5/15 to 2.1 today. Continue to monitor with monthly CBC, CMP, PSA. OK to continue Nubeqa treatment.     Follow-Up:  8/7- Labs and Daren Slaughter  Pharmacy Intern   [facility]   [phone]

## 2024-07-30 NOTE — PROGRESS NOTES
[facility]    Hematology/Oncology Established Patient Note      Today's Date: 8/7/24    Reason for visit: Metastatic prostate cancer.    HISTORY OF PRESENT ILLNESS: Ronnie Lagos is a 69 year old male with hypertension, GERD who presents with the following oncologic history:  1.  5/11/2023: PSA elevated at 8.08 (prior 3.44 from 5/10/22).  2. 6/29/2023: MRI pelvis -- PI-RADS 4 - 1.4 x 1 x 0.9 cm nodule abutting posterolateral capsule of prostate; no pelvic adenopathy or bone lesions.  3.  8/30/2023: Prostate biopsy - [place] 4+3=7 prostate adenocarcinoma.  4. 10/2/2023: NM bone scan negative.  5.  11/3/2023: Radical prostatectomy, pelvic lymph node excision -- Pia 4+5=9 adenocarcinoma, [id number], margins widely positive for malignancy.  6.  2/7/2024: PSA = 4.06.  7.  2/15/2024: PSA elevated at 5.26.  8.  2/28/2024: PSMA PET showed focal uptake in left side of prostatectomy bed suspicious for recurrent disease; multiple pelvic and few retroperitoneal lymph nodes with uptake; >10 sclerotic osseous lesions with uptake indicative of osseous metastatic disease.  9. 3/20/2024: Started Casodex, Lupron, darolutamide, Xgeva, cycle 1 Taxotere but developed flushing, chest heaviness, nausea (no dyspnea) reaction 5 minutes into Taxotere infusion. Re-challenge not attempted that day.  10.  3/29/2024: Re-challenged Taxotere but developed repeat hypersensitivity reaction 4 minutes into infusion. Taxotere discontinued.  11. 4/17/2024: PSA 0.53.  12. 7/10/2024: PSA 0.06.    INTERVAL HISTORY:  Ronnie reports focal right anterior rib pain reproducible with cough over past 2 months. He complaints of ongoing hot flashes.    REVIEW OF SYSTEMS:   14 point ROS was reviewed and is negative other than as noted above in HPI.       HOME MEDICATIONS:  Current Outpatient Medications   Medication Sig Dispense Refill    amLODIPine (NORVASC) 5 MG tablet Take 1 tablet (5 mg) by mouth daily 90 tablet 3    atenolol (TENORMIN) 50  MG tablet Take 1 tablet (50 mg) by mouth daily 90 tablet 3    calcium citrate (CITRACAL) 950 (200 Ca) MG tablet Take 1 tablet by mouth 2 times daily      [START ON 7/31/2024] darolutamide (NUBEQA) 300 MG tablet Take 2 tablets (600 mg) by mouth 2 times daily for 30 days . Swallow tablets whole. Take with food. Avoid grapefruit or grapefruit juice. 120 tablet 0    darolutamide (NUBEQA) 300 MG tablet Take 2 tablets (600 mg) by mouth 2 times daily for 30 days . Swallow tablets whole. Take with food. Avoid grapefruit or grapefruit juice. 120 tablet 0    omeprazole (PRILOSEC) 20 MG DR capsule Take 1 capsule (20 mg) by mouth daily 90 capsule 3    Vitamin D, Cholecalciferol, 10 MCG (400 UNIT) TABS            ALLERGIES:  Allergies   Allergen Reactions    Taxotere [Docetaxel]          PAST MEDICAL HISTORY:  Past Medical History:   Diagnosis Date    Abdominal pain     Actinic keratosis     Basal cell carcinoma     Calculus of bile duct     Elevated liver enzymes     Essential hypertension, benign     abstracted 6/19/02    Gastro-oesophageal reflux disease     GERD (gastroesophageal reflux disease) 07/28/2008    Liver disease     elevated liver enzymes    Squamous cell carcinoma     Unspecified cerebral artery occlusion with cerebral infarction     Unspecified condition of brain     abstracted 6/19/02         PAST SURGICAL HISTORY:  Past Surgical History:   Procedure Laterality Date    ARTHRODESIS FOOT  2/5/2013    Procedure: ARTHRODESIS FOOT;  LEFT FIRST METATARSOPHALANGEAL JOINT ARTHRODESIS ;  Surgeon: Burton Loera DPM;  Location: [facility]    COLONOSCOPY N/A 12/7/2018    Procedure: COMBINED COLONOSCOPY, SINGLE OR MULTIPLE BIOPSY/POLYPECTOMY BY BIOPSY;  Surgeon: Blayne Mora MD;  Location:  GI    DAVINCI PROSTATECTOMY, LYMPHADENECTOMY N/A 11/3/2023    Procedure: PROSTATECTOMY, ROBOT-ASSISTED, WITH PELVIC LYMPHADENECTOMY;  Surgeon: Sophia Ramirez MD;  Location:  OR    ENDOSCOPIC RETROGRADE  CHOLANGIOPANCREATOGRAM N/A 1/18/2018    Procedure: COMBINED ENDOSCOPIC RETROGRADE CHOLANGIOPANCREATOGRAPHY, SPHINCTEROTOMY;  ENDOSCOPIC RETROGRADE CHOLANGIOPANCREATOGRAM (ERCP), SPHINCTEROTOMY, STONE REMOVAL, STENT PLACEMENT;  Surgeon: Christiano Sorenson MD;  Location:  OR    ENDOSCOPIC RETROGRADE CHOLANGIOPANCREATOGRAM N/A 4/12/2018    Procedure: ENDOSCOPIC RETROGRADE CHOLANGIOPANCREATOGRAM;  ENDOSCOPIC RETROGRADE CHOLANIOPANCREATOGRAPHY AND ATTEMPTED STENT REMOVAL.;  Surgeon: Christiano Sorenson MD;  Location:  OR    ENDOSCOPIC RETROGRADE CHOLANGIOPANCREATOGRAM N/A 5/10/2018    Procedure: COMBINED ENDOSCOPIC RETROGRADE CHOLANGIOPANCREATOGRAPHY, REMOVE FOREIGN BODY OR STENT/TUBE;  ENDOSCOPIC RETROGRADE CHOLANGIOPANCREATOGRAPHY AND STENT REMOVAL;  Surgeon: Christiano Sorenson MD;  Location:  OR    ESOPHAGOSCOPY, GASTROSCOPY, DUODENOSCOPY (EGD), COMBINED N/A 4/12/2018    Procedure: COMBINED ESOPHAGOSCOPY, GASTROSCOPY, DUODENOSCOPY (EGD);  EGD with stent removal;  Surgeon: Christiano Sorenson MD;  Location:  GI    LAPAROSCOPIC CHOLECYSTECTOMY N/A 5/1/2015    Procedure: LAPAROSCOPIC CHOLECYSTECTOMY;  Surgeon: Damien Galindo MD;  Location:  [facility]    ORTHOPEDIC SURGERY      left elbow, right shoulder         SOCIAL HISTORY:  Social History     Socioeconomic History    Marital status:      Spouse name: Not on file    Number of children: Not on file    Years of education: Not on file    Highest education level: Not on file   Occupational History     Employer: [employer]   Tobacco Use    Smoking status: Never    Smokeless tobacco: Never   Vaping Use    Vaping status: Never Used   Substance and Sexual Activity    Alcohol use: Yes     Alcohol/week: 2.0 standard drinks of alcohol     Types: 2 Standard drinks or equivalent per week     Comment: 3-4 times a week, light beer    Drug use: Never    Sexual activity: Yes     Partners: Female   Other Topics Concern     Parent/sibling w/ CABG, MI or angioplasty before 65F 55M? No   Social History Narrative    Not on file     Social Determinants of Health     Financial Resource Strain: Low Risk  (6/3/2024)    Financial Resource Strain     Within the past 12 months, have you or your family members you live with been unable to get utilities (heat, electricity) when it was really needed?: No   Food Insecurity: Low Risk  (6/3/2024)    Food Insecurity     Within the past 12 months, did you worry that your food would run out before you got money to buy more?: No     Within the past 12 months, did the food you bought just not last and you didn t have money to get more?: No   Transportation Needs: Low Risk  (6/3/2024)    Transportation Needs     Within the past 12 months, has lack of transportation kept you from medical appointments, getting your medicines, non-medical meetings or appointments, work, or from getting things that you need?: No   Physical Activity: Insufficiently Active (6/3/2024)    Exercise Vital Sign     Days of Exercise per Week: 3 days     Minutes of Exercise per Session: 10 min   Stress: Stress Concern Present (6/3/2024)    [place] [employer] - Occupational Stress Questionnaire     Feeling of Stress : Rather much   Social Connections: Unknown (6/3/2024)    Social Connection and Isolation Panel [NHANES]     Frequency of Communication with Friends and Family: Not on file     Frequency of Social Gatherings with Friends and Family: Once a week     Attends Faith Services: Not on file     Active Member of Clubs or Organizations: Not on file     Attends Club or Organization Meetings: Not on file     Marital Status: Not on file   Interpersonal Safety: Not At Risk (6/15/2023)    Humiliation, Afraid, Rape, and Kick questionnaire     Fear of Current or Ex-Partner: No     Emotionally Abused: No     Physically Abused: No     Sexually Abused: No   Housing Stability: Low Risk  (6/3/2024)    Housing Stability  "    Do you have housing? : Yes     Are you worried about losing your housing?: No         FAMILY HISTORY:  Family History   Problem Relation Age of Onset    Hypertension Mother     Cancer - colorectal Mother     Skin Cancer Mother     Colon Cancer Mother     Hypertension Father     Prostate Cancer Father     Melanoma No family hx of     Anesthesia Reaction No family hx of     Venous thrombosis No family hx of     Bleeding Disorder No family hx of    Father had prostate, liver, and skin cancer. Mother with colorectal and skin cancer.      PHYSICAL EXAM:  Vital signs:  BP (!) 148/69   Pulse 58   Resp 16   Ht 1.727 m (5' 8\")   Wt 99 kg (218 lb 3.2 oz)   SpO2 100%   BMI 33.18 kg/m     ECO  GENERAL: No acute distress.  EYES: No scleral icterus. No overt erythema.  RESPIRATORY: No audible cough, wheezing, or labored breathing.  MUSCULOSKELETAL: Range of motion in the neck, shoulders, and arms appear normal.  SKIN: No overt rashes, discolorations, or lesions over the face and neck.  NEUROLOGIC: Alert.  No overt tremors.  PSYCHIATRIC: Normal affect and mood.  Does not appear anxious.       LABS:  CBC RESULTS:   Recent Labs   Lab Test 24  0804   WBC 6.3   RBC 4.54   HGB 15.0   HCT 40.9   MCV 90   MCH 33.0   MCHC 36.7*   RDW 11.9         Last Comprehensive Metabolic Panel:  Sodium   Date Value Ref Range Status   2024 136 135 - 145 mmol/L Final   2021 133 133 - 144 mmol/L Final     Potassium   Date Value Ref Range Status   2024 4.1 3.4 - 5.3 mmol/L Final   05/10/2022 4.6 3.4 - 5.3 mmol/L Final   2021 4.4 3.4 - 5.3 mmol/L Final     Chloride   Date Value Ref Range Status   2024 99 98 - 107 mmol/L Final   05/10/2022 99 94 - 109 mmol/L Final   2021 101 94 - 109 mmol/L Final     Carbon Dioxide   Date Value Ref Range Status   2021 31 20 - 32 mmol/L Final     Carbon Dioxide (CO2)   Date Value Ref Range Status   2024 28 22 - 29 mmol/L Final   05/10/2022 28 20 - 32 " mmol/L Final     Anion Gap   Date Value Ref Range Status   08/07/2024 9 7 - 15 mmol/L Final   05/10/2022 4 3 - 14 mmol/L Final   04/06/2021 1 (L) 3 - 14 mmol/L Final     Glucose   Date Value Ref Range Status   08/07/2024 157 (H) 70 - 99 mg/dL Final   05/10/2022 112 (H) 70 - 99 mg/dL Final   04/06/2021 110 (H) 70 - 99 mg/dL Final     Urea Nitrogen   Date Value Ref Range Status   08/07/2024 12.2 8.0 - 23.0 mg/dL Final   05/10/2022 10 7 - 30 mg/dL Final   04/06/2021 8 7 - 30 mg/dL Final     Creatinine   Date Value Ref Range Status   08/07/2024 0.98 0.67 - 1.17 mg/dL Final   04/06/2021 0.92 0.66 - 1.25 mg/dL Final     GFR Estimate   Date Value Ref Range Status   08/07/2024 84 >60 mL/min/1.73m2 Final     Comment:     eGFR calculated using 2021 CKD-EPI equation.   04/06/2021 87 >60 mL/min/[1.73_m2] Final     Comment:     Non  GFR Calc  Starting 12/18/2018, serum creatinine based estimated GFR (eGFR) will be   calculated using the Chronic Kidney Disease Epidemiology Collaboration   (CKD-EPI) equation.       GFR, ESTIMATED POCT   Date Value Ref Range Status   02/28/2024 >60 >60 mL/min/1.73m2 Final     Calcium   Date Value Ref Range Status   08/07/2024 9.3 8.8 - 10.4 mg/dL Final     Comment:     Reference intervals for this test were updated on 7/16/2024 to reflect our healthy population more accurately. There may be differences in the flagging of prior results with similar values performed with this method. Those prior results can be interpreted in the context of the updated reference intervals.   04/06/2021 9.3 8.5 - 10.1 mg/dL Final     Bilirubin Total   Date Value Ref Range Status   08/07/2024 1.2 <=1.2 mg/dL Final   04/06/2021 2.3 (H) 0.2 - 1.3 mg/dL Final     Alkaline Phosphatase   Date Value Ref Range Status   08/07/2024 57 40 - 150 U/L Final   04/06/2021 60 40 - 150 U/L Final     ALT   Date Value Ref Range Status   08/07/2024 22 0 - 70 U/L Final   04/06/2021 70 0 - 70 U/L Final     AST   Date Value  Ref Range Status   08/07/2024 41 0 - 45 U/L Final   04/06/2021 66 (H) 0 - 45 U/L Final       ASSESSMENT/PLAN:  Ronnie Lagos is a 69 year old male with the following issues:  1. 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 Hypertension  5. Hypersensitivity reaction to Taxotere  --2/28/2024 PSMA PET showed metastatic disease to the pelvic and retroperitoneal lymph nodes as well as greater than 10 sites of bony metastatic disease.  --Given high volume disease, he started Casodex 50 mg daily for first month, Lupron every 3 months, darolutamide 600 mg orally BID, and tried Taxotere twice but developed hypersensitivity reaction only 4-5 minutes into infusion.  Therefore, I recommended discontinuation of Taxotere.  Discussed again that cabazitaxel could be utilized in the future upon disease progression.  --Continue denosumab (Xgeva) every 4 weeks.   --Will continue to monitor CBC, CMP, and PSA.    5. 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    6. Left shoulder pain  --Related to post-arthroplasty pain.  Continue Tylenol as needed.    7. Left upper molar pain  --He may need bone implant/grafting in the future but would need to hold Xgeva for 3 months if needed.  He might be able to do a less invasive option with a flipper.    8. Right knee osteoarthritis  --He will discuss right knee arthroplasty versus shaving the right knee cap with orthopedics.    9. Right rib pain  --Has known metastatic disease to ribs/bones.  --Will rule out fracture with right rib x-ray.    Return in 3 months.    Anabell Mercedes MD  [facility] Hematology/Oncology     Total time spent today: 40 minutes in chart review, patient evaluation, counseling, documentation, test and/or medication/prescription orders, and coordination of care.      The longitudinal plan of  care for the diagnosis(es)/condition(s) as documented were addressed during this visit. Due to the added complexity in care, I will continue to support Ronnie in the subsequent management and with ongoing continuity of care.

## 2024-08-07 ENCOUNTER — LAB (OUTPATIENT)
Dept: INFUSION THERAPY | Facility: CLINIC | Age: 69
End: 2024-08-07
Attending: INTERNAL MEDICINE
Payer: COMMERCIAL

## 2024-08-07 ENCOUNTER — ONCOLOGY VISIT (OUTPATIENT)
Dept: ONCOLOGY | Facility: CLINIC | Age: 69
End: 2024-08-07
Attending: INTERNAL MEDICINE
Payer: COMMERCIAL

## 2024-08-07 ENCOUNTER — ANCILLARY PROCEDURE (OUTPATIENT)
Dept: GENERAL RADIOLOGY | Facility: CLINIC | Age: 69
End: 2024-08-07
Attending: INTERNAL MEDICINE
Payer: COMMERCIAL

## 2024-08-07 VITALS
RESPIRATION RATE: 16 BRPM | SYSTOLIC BLOOD PRESSURE: 148 MMHG | WEIGHT: 218.2 LBS | HEIGHT: 68 IN | OXYGEN SATURATION: 100 % | DIASTOLIC BLOOD PRESSURE: 69 MMHG | HEART RATE: 58 BPM | BODY MASS INDEX: 33.07 KG/M2

## 2024-08-07 DIAGNOSIS — R07.81 RIB PAIN ON RIGHT SIDE: ICD-10-CM

## 2024-08-07 DIAGNOSIS — C61 PROSTATE CANCER METASTATIC TO MULTIPLE SITES (H): ICD-10-CM

## 2024-08-07 DIAGNOSIS — C79.51 PROSTATE CANCER METASTATIC TO BONE (H): Primary | ICD-10-CM

## 2024-08-07 DIAGNOSIS — C61 PROSTATE CANCER METASTATIC TO BONE (H): Primary | ICD-10-CM

## 2024-08-07 DIAGNOSIS — C79.51 PROSTATE CANCER METASTATIC TO BONE (H): ICD-10-CM

## 2024-08-07 DIAGNOSIS — C61 PROSTATE CANCER METASTATIC TO BONE (H): ICD-10-CM

## 2024-08-07 DIAGNOSIS — N95.1 MENOPAUSAL SYNDROME (HOT FLASHES): ICD-10-CM

## 2024-08-07 LAB
ALBUMIN SERPL BCG-MCNC: 4.2 G/DL (ref 3.5–5.2)
ALP SERPL-CCNC: 57 U/L (ref 40–150)
ALT SERPL W P-5'-P-CCNC: 22 U/L (ref 0–70)
ANION GAP SERPL CALCULATED.3IONS-SCNC: 9 MMOL/L (ref 7–15)
AST SERPL W P-5'-P-CCNC: 41 U/L (ref 0–45)
BASOPHILS # BLD AUTO: 0.1 10E3/UL (ref 0–0.2)
BASOPHILS NFR BLD AUTO: 1 %
BILIRUB SERPL-MCNC: 1.2 MG/DL
BUN SERPL-MCNC: 12.2 MG/DL (ref 8–23)
CALCIUM SERPL-MCNC: 9.3 MG/DL (ref 8.8–10.4)
CHLORIDE SERPL-SCNC: 99 MMOL/L (ref 98–107)
CREAT SERPL-MCNC: 0.98 MG/DL (ref 0.67–1.17)
EGFRCR SERPLBLD CKD-EPI 2021: 84 ML/MIN/1.73M2
EOSINOPHIL # BLD AUTO: 0.2 10E3/UL (ref 0–0.7)
EOSINOPHIL NFR BLD AUTO: 4 %
ERYTHROCYTE [DISTWIDTH] IN BLOOD BY AUTOMATED COUNT: 11.9 % (ref 10–15)
GLUCOSE SERPL-MCNC: 157 MG/DL (ref 70–99)
HCO3 SERPL-SCNC: 28 MMOL/L (ref 22–29)
HCT VFR BLD AUTO: 40.9 % (ref 40–53)
HGB BLD-MCNC: 15 G/DL (ref 13.3–17.7)
IMM GRANULOCYTES # BLD: 0 10E3/UL
IMM GRANULOCYTES NFR BLD: 0 %
LYMPHOCYTES # BLD AUTO: 1.5 10E3/UL (ref 0.8–5.3)
LYMPHOCYTES NFR BLD AUTO: 24 %
MCH RBC QN AUTO: 33 PG (ref 26.5–33)
MCHC RBC AUTO-ENTMCNC: 36.7 G/DL (ref 31.5–36.5)
MCV RBC AUTO: 90 FL (ref 78–100)
MONOCYTES # BLD AUTO: 0.3 10E3/UL (ref 0–1.3)
MONOCYTES NFR BLD AUTO: 5 %
NEUTROPHILS # BLD AUTO: 4.2 10E3/UL (ref 1.6–8.3)
NEUTROPHILS NFR BLD AUTO: 66 %
NRBC # BLD AUTO: 0 10E3/UL
NRBC BLD AUTO-RTO: 0 /100
PLATELET # BLD AUTO: 224 10E3/UL (ref 150–450)
POTASSIUM SERPL-SCNC: 4.1 MMOL/L (ref 3.4–5.3)
PROT SERPL-MCNC: 7.1 G/DL (ref 6.4–8.3)
PSA SERPL DL<=0.01 NG/ML-MCNC: 0.04 NG/ML (ref 0–4.5)
RBC # BLD AUTO: 4.54 10E6/UL (ref 4.4–5.9)
SODIUM SERPL-SCNC: 136 MMOL/L (ref 135–145)
WBC # BLD AUTO: 6.3 10E3/UL (ref 4–11)

## 2024-08-07 PROCEDURE — 71101 X-RAY EXAM UNILAT RIBS/CHEST: CPT | Mod: TC | Performed by: RADIOLOGY

## 2024-08-07 PROCEDURE — 80053 COMPREHEN METABOLIC PANEL: CPT | Performed by: INTERNAL MEDICINE

## 2024-08-07 PROCEDURE — 84153 ASSAY OF PSA TOTAL: CPT | Performed by: INTERNAL MEDICINE

## 2024-08-07 PROCEDURE — 96372 THER/PROPH/DIAG INJ SC/IM: CPT | Performed by: NURSE PRACTITIONER

## 2024-08-07 PROCEDURE — 36415 COLL VENOUS BLD VENIPUNCTURE: CPT

## 2024-08-07 PROCEDURE — 85025 COMPLETE CBC W/AUTO DIFF WBC: CPT | Performed by: INTERNAL MEDICINE

## 2024-08-07 PROCEDURE — 99215 OFFICE O/P EST HI 40 MIN: CPT | Performed by: INTERNAL MEDICINE

## 2024-08-07 PROCEDURE — 250N000011 HC RX IP 250 OP 636: Performed by: NURSE PRACTITIONER

## 2024-08-07 PROCEDURE — G2211 COMPLEX E/M VISIT ADD ON: HCPCS | Performed by: INTERNAL MEDICINE

## 2024-08-07 PROCEDURE — G0463 HOSPITAL OUTPT CLINIC VISIT: HCPCS | Performed by: INTERNAL MEDICINE

## 2024-08-07 RX ORDER — MEPERIDINE HYDROCHLORIDE 25 MG/ML
25 INJECTION INTRAMUSCULAR; INTRAVENOUS; SUBCUTANEOUS EVERY 30 MIN PRN
Status: CANCELLED | OUTPATIENT
Start: 2024-08-09

## 2024-08-07 RX ORDER — ALBUTEROL SULFATE 90 UG/1
1-2 AEROSOL, METERED RESPIRATORY (INHALATION)
Status: CANCELLED
Start: 2024-08-09

## 2024-08-07 RX ORDER — METHYLPREDNISOLONE SODIUM SUCCINATE 125 MG/2ML
125 INJECTION, POWDER, LYOPHILIZED, FOR SOLUTION INTRAMUSCULAR; INTRAVENOUS
Status: CANCELLED
Start: 2024-08-09

## 2024-08-07 RX ORDER — EPINEPHRINE 1 MG/ML
0.3 INJECTION, SOLUTION INTRAMUSCULAR; SUBCUTANEOUS EVERY 5 MIN PRN
Status: CANCELLED | OUTPATIENT
Start: 2024-08-09

## 2024-08-07 RX ORDER — ALBUTEROL SULFATE 0.83 MG/ML
2.5 SOLUTION RESPIRATORY (INHALATION)
Status: CANCELLED | OUTPATIENT
Start: 2024-08-09

## 2024-08-07 RX ORDER — DIPHENHYDRAMINE HYDROCHLORIDE 50 MG/ML
50 INJECTION INTRAMUSCULAR; INTRAVENOUS
Status: CANCELLED
Start: 2024-08-09

## 2024-08-07 RX ADMIN — DENOSUMAB 120 MG: 120 INJECTION SUBCUTANEOUS at 09:17

## 2024-08-07 ASSESSMENT — PAIN SCALES - GENERAL: PAINLEVEL: NO PAIN (0)

## 2024-08-07 NOTE — PROGRESS NOTES
Medical Assistant Note:  Ronnie Lagos presents today for blood draw.    Patient seen by provider today: Yes: [email or web address].   present during visit today: Not Applicable.    Concerns: No Concerns.    Procedure:  Lab draw site: rac, Needle type: bf, Gauge: 23.    Post Assessment:  Labs drawn without difficulty: Yes.    Discharge Plan:  Departure Mode: Ambulatory.    Face to Face Time: 5 min.    Tati Stack, CMA

## 2024-08-07 NOTE — PROGRESS NOTES
"Oncology Rooming Note    August 7, 2024 8:08 AM   Ronnie Lagos is a 69 year old male who presents for:    Chief Complaint   Patient presents with    Oncology Clinic Visit     Initial Vitals: There were no vitals taken for this visit. Estimated body mass index is 32.72 kg/m  as calculated from the following:    Height as of 6/5/24: 1.727 m (5' 8\").    Weight as of 7/10/24: 97.6 kg (215 lb 3.2 oz). There is no height or weight on file to calculate BSA.  Data Unavailable Comment: Data Unavailable   No LMP for male patient.  Allergies reviewed: Yes  Medications reviewed: Yes    Medications: Medication refills not needed today.  Pharmacy name entered into [facility]:    [id number] - [place], [place] - [address] [facility] - [place], [place] - [address]    Frailty Screening:   Is the patient here for a new oncology consult visit in cancer care? 2. No        Lizzie Baez MA              "

## 2024-08-07 NOTE — PATIENT INSTRUCTIONS
1. Arrange for stat rib x-ray.  2. Lab draw monthly.  3. RTC MD in 3 months.  4. Lupron every 3 months.  5. Xgeva monthly.

## 2024-08-07 NOTE — LETTER
8/7/2024      Ronnie Lagos  [address]  [facility] [id number]      Dear Colleague,    Thank you for referring your patient, Ronnie Lagos, to the [facility]. Please see a copy of my visit note below.    [facility]    Hematology/Oncology Established Patient Note      Today's Date: 8/7/24    Reason for visit: Metastatic prostate cancer.    HISTORY OF PRESENT ILLNESS: Ronnie Lagos is a 69 year old male with hypertension, GERD who presents with the following oncologic history:  1.  5/11/2023: PSA elevated at 8.08 (prior 3.44 from 5/10/22).  2. 6/29/2023: MRI pelvis -- PI-RADS 4 - 1.4 x 1 x 0.9 cm nodule abutting posterolateral capsule of prostate; no pelvic adenopathy or bone lesions.  3.  8/30/2023: Prostate biopsy - Pia 4+3=7 prostate adenocarcinoma.  4. 10/2/2023: NM bone scan negative.  5.  11/3/2023: Radical prostatectomy, pelvic lymph node excision -- [place] 4+5=9 adenocarcinoma, [id number], margins widely positive for malignancy.  6.  2/7/2024: PSA = 4.06.  7.  2/15/2024: PSA elevated at 5.26.  8.  2/28/2024: PSMA PET showed focal uptake in left side of prostatectomy bed suspicious for recurrent disease; multiple pelvic and few retroperitoneal lymph nodes with uptake; >10 sclerotic osseous lesions with uptake indicative of osseous metastatic disease.  9. 3/20/2024: Started Casodex, Lupron, darolutamide, Xgeva, cycle 1 Taxotere but developed flushing, chest heaviness, nausea (no dyspnea) reaction 5 minutes into Taxotere infusion. Re-challenge not attempted that day.  10.  3/29/2024: Re-challenged Taxotere but developed repeat hypersensitivity reaction 4 minutes into infusion. Taxotere discontinued.  11. 4/17/2024: PSA 0.53.  12. 7/10/2024: PSA 0.06.    INTERVAL HISTORY:  Ronnie reports focal right anterior rib pain reproducible with cough over past 2 months. He complaints of ongoing hot flashes.    REVIEW OF SYSTEMS:   14 point ROS was reviewed  and is negative other than as noted above in HPI.       HOME MEDICATIONS:  Current Outpatient Medications   Medication Sig Dispense Refill     amLODIPine (NORVASC) 5 MG tablet Take 1 tablet (5 mg) by mouth daily 90 tablet 3     atenolol (TENORMIN) 50 MG tablet Take 1 tablet (50 mg) by mouth daily 90 tablet 3     calcium citrate (CITRACAL) 950 (200 Ca) MG tablet Take 1 tablet by mouth 2 times daily       [START ON 7/31/2024] darolutamide (NUBEQA) 300 MG tablet Take 2 tablets (600 mg) by mouth 2 times daily for 30 days . Swallow tablets whole. Take with food. Avoid grapefruit or grapefruit juice. 120 tablet 0     darolutamide (NUBEQA) 300 MG tablet Take 2 tablets (600 mg) by mouth 2 times daily for 30 days . Swallow tablets whole. Take with food. Avoid grapefruit or grapefruit juice. 120 tablet 0     omeprazole (PRILOSEC) 20 MG DR capsule Take 1 capsule (20 mg) by mouth daily 90 capsule 3     Vitamin D, Cholecalciferol, 10 MCG (400 UNIT) TABS            ALLERGIES:  Allergies   Allergen Reactions     Taxotere [Docetaxel]          PAST MEDICAL HISTORY:  Past Medical History:   Diagnosis Date     Abdominal pain      Actinic keratosis      Basal cell carcinoma      Calculus of bile duct      Elevated liver enzymes      Essential hypertension, benign     abstracted 6/19/02     Gastro-oesophageal reflux disease      GERD (gastroesophageal reflux disease) 07/28/2008     Liver disease     elevated liver enzymes     Squamous cell carcinoma      Unspecified cerebral artery occlusion with cerebral infarction      Unspecified condition of brain     abstracted 6/19/02         PAST SURGICAL HISTORY:  Past Surgical History:   Procedure Laterality Date     ARTHRODESIS FOOT  2/5/2013    Procedure: ARTHRODESIS FOOT;  LEFT FIRST METATARSOPHALANGEAL JOINT ARTHRODESIS ;  Surgeon: Burton Loera DPM;  Location: [facility]     COLONOSCOPY N/A 12/7/2018    Procedure: COMBINED COLONOSCOPY, SINGLE OR MULTIPLE BIOPSY/POLYPECTOMY BY BIOPSY;   Surgeon: Blayne Mora MD;  Location:  GI     DAVINCI PROSTATECTOMY, LYMPHADENECTOMY N/A 11/3/2023    Procedure: PROSTATECTOMY, ROBOT-ASSISTED, WITH PELVIC LYMPHADENECTOMY;  Surgeon: Sophia Ramirez MD;  Location:  OR     ENDOSCOPIC RETROGRADE CHOLANGIOPANCREATOGRAM N/A 1/18/2018    Procedure: COMBINED ENDOSCOPIC RETROGRADE CHOLANGIOPANCREATOGRAPHY, SPHINCTEROTOMY;  ENDOSCOPIC RETROGRADE CHOLANGIOPANCREATOGRAM (ERCP), SPHINCTEROTOMY, STONE REMOVAL, STENT PLACEMENT;  Surgeon: Christiano Sorenson MD;  Location:  OR     ENDOSCOPIC RETROGRADE CHOLANGIOPANCREATOGRAM N/A 4/12/2018    Procedure: ENDOSCOPIC RETROGRADE CHOLANGIOPANCREATOGRAM;  ENDOSCOPIC RETROGRADE CHOLANIOPANCREATOGRAPHY AND ATTEMPTED STENT REMOVAL.;  Surgeon: Christiano Sorenson MD;  Location:  OR     ENDOSCOPIC RETROGRADE CHOLANGIOPANCREATOGRAM N/A 5/10/2018    Procedure: COMBINED ENDOSCOPIC RETROGRADE CHOLANGIOPANCREATOGRAPHY, REMOVE FOREIGN BODY OR STENT/TUBE;  ENDOSCOPIC RETROGRADE CHOLANGIOPANCREATOGRAPHY AND STENT REMOVAL;  Surgeon: Christiano Sorenson MD;  Location:  OR     ESOPHAGOSCOPY, GASTROSCOPY, DUODENOSCOPY (EGD), COMBINED N/A 4/12/2018    Procedure: COMBINED ESOPHAGOSCOPY, GASTROSCOPY, DUODENOSCOPY (EGD);  EGD with stent removal;  Surgeon: Christiano Sorenson MD;  Location: [facility]     LAPAROSCOPIC CHOLECYSTECTOMY N/A 5/1/2015    Procedure: LAPAROSCOPIC CHOLECYSTECTOMY;  Surgeon: Damien Galindo MD;  Location:  SD     ORTHOPEDIC SURGERY      left elbow, right shoulder         SOCIAL HISTORY:  Social History     Socioeconomic History     Marital status:      Spouse name: Not on file     Number of children: Not on file     Years of education: Not on file     Highest education level: Not on file   Occupational History     Employer: [employer]   Tobacco Use     Smoking status: Never     Smokeless tobacco: Never   Vaping Use     Vaping status: Never Used   Substance and Sexual  Activity     Alcohol use: Yes     Alcohol/week: 2.0 standard drinks of alcohol     Types: 2 Standard drinks or equivalent per week     Comment: 3-4 times a week, light beer     Drug use: Never     Sexual activity: Yes     Partners: Female   Other Topics Concern     Parent/sibling w/ CABG, MI or angioplasty before 65F 55M? No   Social History Narrative     Not on file     Social Determinants of Health     Financial Resource Strain: Low Risk  (6/3/2024)    Financial Resource Strain      Within the past 12 months, have you or your family members you live with been unable to get utilities (heat, electricity) when it was really needed?: No   Food Insecurity: Low Risk  (6/3/2024)    Food Insecurity      Within the past 12 months, did you worry that your food would run out before you got money to buy more?: No      Within the past 12 months, did the food you bought just not last and you didn t have money to get more?: No   Transportation Needs: Low Risk  (6/3/2024)    Transportation Needs      Within the past 12 months, has lack of transportation kept you from medical appointments, getting your medicines, non-medical meetings or appointments, work, or from getting things that you need?: No   Physical Activity: Insufficiently Active (6/3/2024)    Exercise Vital Sign      Days of Exercise per Week: 3 days      Minutes of Exercise per Session: 10 min   Stress: Stress Concern Present (6/3/2024)    [place] [employer] [place] [employer] - Occupational Stress Questionnaire      Feeling of Stress : Rather much   Social Connections: Unknown (6/3/2024)    Social Connection and Isolation Panel [NHANES]      Frequency of Communication with Friends and Family: Not on file      Frequency of Social Gatherings with Friends and Family: Once a week      Attends Jewish Services: Not on file      Active Member of Clubs or Organizations: Not on file      Attends Club or Organization Meetings: Not on file      Marital Status: Not on file  "  Interpersonal Safety: Not At Risk (6/15/2023)    Humiliation, Afraid, Rape, and Kick questionnaire      Fear of Current or Ex-Partner: No      Emotionally Abused: No      Physically Abused: No      Sexually Abused: No   Housing Stability: Low Risk  (6/3/2024)    Housing Stability      Do you have housing? : Yes      Are you worried about losing your housing?: No         FAMILY HISTORY:  Family History   Problem Relation Age of Onset     Hypertension Mother      Cancer - colorectal Mother      Skin Cancer Mother      Colon Cancer Mother      Hypertension Father      Prostate Cancer Father      Melanoma No family hx of      Anesthesia Reaction No family hx of      Venous thrombosis No family hx of      Bleeding Disorder No family hx of    Father had prostate, liver, and skin cancer. Mother with colorectal and skin cancer.      PHYSICAL EXAM:  Vital signs:  BP (!) 148/69   Pulse 58   Resp 16   Ht 1.727 m (5' 8\")   Wt 99 kg (218 lb 3.2 oz)   SpO2 100%   BMI 33.18 kg/m     ECO  GENERAL: No acute distress.  EYES: No scleral icterus. No overt erythema.  RESPIRATORY: No audible cough, wheezing, or labored breathing.  MUSCULOSKELETAL: Range of motion in the neck, shoulders, and arms appear normal.  SKIN: No overt rashes, discolorations, or lesions over the face and neck.  NEUROLOGIC: Alert.  No overt tremors.  PSYCHIATRIC: Normal affect and mood.  Does not appear anxious.       LABS:  CBC RESULTS:   Recent Labs   Lab Test 24  0804   WBC 6.3   RBC 4.54   HGB 15.0   HCT 40.9   MCV 90   MCH 33.0   MCHC 36.7*   RDW 11.9         Last Comprehensive Metabolic Panel:  Sodium   Date Value Ref Range Status   2024 136 135 - 145 mmol/L Final   2021 133 133 - 144 mmol/L Final     Potassium   Date Value Ref Range Status   2024 4.1 3.4 - 5.3 mmol/L Final   05/10/2022 4.6 3.4 - 5.3 mmol/L Final   2021 4.4 3.4 - 5.3 mmol/L Final     Chloride   Date Value Ref Range Status   2024 99 98 - " 107 mmol/L Final   05/10/2022 99 94 - 109 mmol/L Final   04/06/2021 101 94 - 109 mmol/L Final     Carbon Dioxide   Date Value Ref Range Status   04/06/2021 31 20 - 32 mmol/L Final     Carbon Dioxide (CO2)   Date Value Ref Range Status   08/07/2024 28 22 - 29 mmol/L Final   05/10/2022 28 20 - 32 mmol/L Final     Anion Gap   Date Value Ref Range Status   08/07/2024 9 7 - 15 mmol/L Final   05/10/2022 4 3 - 14 mmol/L Final   04/06/2021 1 (L) 3 - 14 mmol/L Final     Glucose   Date Value Ref Range Status   08/07/2024 157 (H) 70 - 99 mg/dL Final   05/10/2022 112 (H) 70 - 99 mg/dL Final   04/06/2021 110 (H) 70 - 99 mg/dL Final     Urea Nitrogen   Date Value Ref Range Status   08/07/2024 12.2 8.0 - 23.0 mg/dL Final   05/10/2022 10 7 - 30 mg/dL Final   04/06/2021 8 7 - 30 mg/dL Final     Creatinine   Date Value Ref Range Status   08/07/2024 0.98 0.67 - 1.17 mg/dL Final   04/06/2021 0.92 0.66 - 1.25 mg/dL Final     GFR Estimate   Date Value Ref Range Status   08/07/2024 84 >60 mL/min/1.73m2 Final     Comment:     eGFR calculated using 2021 CKD-EPI equation.   04/06/2021 87 >60 mL/min/[1.73_m2] Final     Comment:     Non  GFR Calc  Starting 12/18/2018, serum creatinine based estimated GFR (eGFR) will be   calculated using the Chronic Kidney Disease Epidemiology Collaboration   (CKD-EPI) equation.       GFR, ESTIMATED POCT   Date Value Ref Range Status   02/28/2024 >60 >60 mL/min/1.73m2 Final     Calcium   Date Value Ref Range Status   08/07/2024 9.3 8.8 - 10.4 mg/dL Final     Comment:     Reference intervals for this test were updated on 7/16/2024 to reflect our healthy population more accurately. There may be differences in the flagging of prior results with similar values performed with this method. Those prior results can be interpreted in the context of the updated reference intervals.   04/06/2021 9.3 8.5 - 10.1 mg/dL Final     Bilirubin Total   Date Value Ref Range Status   08/07/2024 1.2 <=1.2 mg/dL Final    04/06/2021 2.3 (H) 0.2 - 1.3 mg/dL Final     Alkaline Phosphatase   Date Value Ref Range Status   08/07/2024 57 40 - 150 U/L Final   04/06/2021 60 40 - 150 U/L Final     ALT   Date Value Ref Range Status   08/07/2024 22 0 - 70 U/L Final   04/06/2021 70 0 - 70 U/L Final     AST   Date Value Ref Range Status   08/07/2024 41 0 - 45 U/L Final   04/06/2021 66 (H) 0 - 45 U/L Final       ASSESSMENT/PLAN:  Ronnie Lagos is a 69 year old male with the following issues:  1. Metastatic non-castrate prostate adenocarcinoma, [place] 4+5=9  2. 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 Hypertension  5. Hypersensitivity reaction to Taxotere  --2/28/2024 PSMA PET showed metastatic disease to the pelvic and retroperitoneal lymph nodes as well as greater than 10 sites of bony metastatic disease.  --Given high volume disease, he started Casodex 50 mg daily for first month, Lupron every 3 months, darolutamide 600 mg orally BID, and tried Taxotere twice but developed hypersensitivity reaction only 4-5 minutes into infusion.  Therefore, I recommended discontinuation of Taxotere.  Discussed again that cabazitaxel could be utilized in the future upon disease progression.  --Continue denosumab (Xgeva) every 4 weeks.   --Will continue to monitor CBC, CMP, and PSA.    5. 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    6. Left shoulder pain  --Related to post-arthroplasty pain.  Continue Tylenol as needed.    7. Left upper molar pain  --He may need bone implant/grafting in the future but would need to hold Xgeva for 3 months if needed.  He might be able to do a less invasive option with a flipper.    8. Right knee osteoarthritis  --He will discuss right knee arthroplasty versus shaving the right knee cap with orthopedics.    9. Right rib pain  --Has known metastatic disease to ribs/bones.  --Will rule out fracture with right rib  "x-ray.    Return in 3 months.    Anabell Mercedes MD  [facility] Hematology/Oncology     Total time spent today: 40 minutes in chart review, patient evaluation, counseling, documentation, test and/or medication/prescription orders, and coordination of care.      The longitudinal plan of care for the diagnosis(es)/condition(s) as documented were addressed during this visit. Due to the added complexity in care, I will continue to support Ronnie in the subsequent management and with ongoing continuity of care.      Oncology Rooming Note    August 7, 2024 8:08 AM   Ronnie Lagos is a 69 year old male who presents for:    Chief Complaint   Patient presents with     Oncology Clinic Visit     Initial Vitals: There were no vitals taken for this visit. Estimated body mass index is 32.72 kg/m  as calculated from the following:    Height as of 6/5/24: 1.727 m (5' 8\").    Weight as of 7/10/24: 97.6 kg (215 lb 3.2 oz). There is no height or weight on file to calculate BSA.  Data Unavailable Comment: Data Unavailable   No LMP for male patient.  Allergies reviewed: Yes  Medications reviewed: Yes    Medications: Medication refills not needed today.  Pharmacy name entered into Ginger[facility]:    [id number] - [place], [place] - [address] [facility] - [place], [place] - [address]    Frailty Screening:   Is the patient here for a new oncology consult visit in cancer care? 2. No        Lizzie Baez MA                Again, thank you for allowing me to participate in the care of your patient.        Sincerely,        Anabell Mercedes MD  "

## 2024-08-22 DIAGNOSIS — C61 PROSTATE CANCER METASTATIC TO MULTIPLE SITES (H): Primary | ICD-10-CM

## 2024-09-03 ENCOUNTER — TELEPHONE (OUTPATIENT)
Dept: GASTROENTEROLOGY | Facility: CLINIC | Age: 69
End: 2024-09-03
Payer: COMMERCIAL

## 2024-09-03 NOTE — TELEPHONE ENCOUNTER
"Endoscopy Scheduling Screen    Have you had a positive Covid test in the last 14 days?  No    What is your communication preference for Instructions and/or Bowel Prep?   MyChart    What insurance is in the chart?  Other:  [employer]    Ordering/Referring Provider: RENATA FRANK    (If ordering provider performs procedure, schedule with ordering provider unless otherwise instructed. )    BMI: Estimated body mass index is 33.18 kg/m  as calculated from the following:    Height as of 8/7/24: 1.727 m (5' 8\").    Weight as of 8/7/24: 99 kg (218 lb 3.2 oz).     Sedation Ordered  moderate sedation.   If patient BMI > 50 do not schedule in [facility].    If patient BMI > 45 do not schedule at [facility].    Are you taking methadone or Suboxone?  No    Have you had difficulties, pain, or discomfort during past endoscopy procedures?  No    Are you taking any prescription medications for pain 3 or more times per week?   NO, No RN review required.    Do you have a history of malignant hyperthermia?  No    (Females) Are you currently pregnant?        Have you been diagnosed or told you have pulmonary hypertension?   No    Do you have an LVAD?  No    Have you been told you have moderate to severe sleep apnea?  No    Have you been told you have COPD, asthma, or any other lung disease?  No    Do you have any heart conditions?  No     Have you ever had or are you waiting for an organ transplant?  No. Continue scheduling, no site restrictions.    Have you had a stroke or transient ischemic attack (TIA aka \"mini stroke\" in the last 6 months?   No    Have you been diagnosed with or been told you have cirrhosis of the liver?   No    Are you currently on dialysis?   No    Do you need assistance transferring?   No    BMI: Estimated body mass index is 33.18 kg/m  as calculated from the following:    Height as of 8/7/24: 1.727 m (5' 8\").    Weight as of 8/7/24: 99 kg (218 lb 3.2 oz).     Is patients BMI > 40 and scheduling location [facility]?  No    Do you take " an injectable medication for weight loss or diabetes (excluding insulin)?  No    Do you take the medication Naltrexone?  No    Do you take blood thinners?  No       Prep   Are you currently on dialysis or do you have chronic kidney disease?  No    Do you have a diagnosis of diabetes?  No    Do you have a diagnosis of cystic fibrosis (CF)?  No    On a regular basis do you go 3 -5 days between bowel movements?  No    BMI > 40?  No    Preferred Pharmacy:    [id number] [address] [facility]  [address] [facility] [address]  Phone: [phone] Fax: [phone]    Final Scheduling Details     Procedure scheduled  Colonoscopy    Surgeon:  CANDIDO     Date of procedure:  12/13     Pre-OP / PAC:   No - Not required for this site.    Location  [facility] - Per order.    Sedation   Moderate Sedation - Per order.      Patient Reminders:   You will receive a call from a Nurse to review instructions and health history.  This assessment must be completed prior to your procedure.  Failure to complete the Nurse assessment may result in the procedure being cancelled.      On the day of your procedure, please designate an adult(s) who can drive you home stay with you for the next 24 hours. The medicines used in the exam will make you sleepy. You will not be able to drive.      You cannot take public transportation, ride share services, or non-medical taxi service without a responsible caregiver.  Medical transport services are allowed with the requirement that a responsible caregiver will receive you at your destination.  We require that drivers and caregivers are confirmed prior to your procedure.

## 2024-09-04 ENCOUNTER — INFUSION THERAPY VISIT (OUTPATIENT)
Dept: INFUSION THERAPY | Facility: CLINIC | Age: 69
End: 2024-09-04
Attending: INTERNAL MEDICINE
Payer: COMMERCIAL

## 2024-09-04 VITALS
HEART RATE: 57 BPM | DIASTOLIC BLOOD PRESSURE: 83 MMHG | OXYGEN SATURATION: 98 % | RESPIRATION RATE: 16 BRPM | TEMPERATURE: 97.8 F | SYSTOLIC BLOOD PRESSURE: 151 MMHG

## 2024-09-04 DIAGNOSIS — C61 PROSTATE CANCER METASTATIC TO MULTIPLE SITES (H): Primary | ICD-10-CM

## 2024-09-04 DIAGNOSIS — C79.51 PROSTATE CANCER METASTATIC TO BONE (H): ICD-10-CM

## 2024-09-04 DIAGNOSIS — C61 PROSTATE CANCER METASTATIC TO BONE (H): ICD-10-CM

## 2024-09-04 LAB
ALBUMIN SERPL BCG-MCNC: 4.6 G/DL (ref 3.5–5.2)
ALP SERPL-CCNC: 53 U/L (ref 40–150)
ALT SERPL W P-5'-P-CCNC: 22 U/L (ref 0–70)
ANION GAP SERPL CALCULATED.3IONS-SCNC: 10 MMOL/L (ref 7–15)
AST SERPL W P-5'-P-CCNC: 31 U/L (ref 0–45)
BASOPHILS # BLD AUTO: 0 10E3/UL (ref 0–0.2)
BASOPHILS NFR BLD AUTO: 1 %
BILIRUB SERPL-MCNC: 2.3 MG/DL
BUN SERPL-MCNC: 12 MG/DL (ref 8–23)
CALCIUM SERPL-MCNC: 9.5 MG/DL (ref 8.8–10.4)
CHLORIDE SERPL-SCNC: 100 MMOL/L (ref 98–107)
CREAT SERPL-MCNC: 1.05 MG/DL (ref 0.67–1.17)
EGFRCR SERPLBLD CKD-EPI 2021: 77 ML/MIN/1.73M2
EOSINOPHIL # BLD AUTO: 0.2 10E3/UL (ref 0–0.7)
EOSINOPHIL NFR BLD AUTO: 3 %
ERYTHROCYTE [DISTWIDTH] IN BLOOD BY AUTOMATED COUNT: 11.8 % (ref 10–15)
GLUCOSE SERPL-MCNC: 108 MG/DL (ref 70–99)
HCO3 SERPL-SCNC: 28 MMOL/L (ref 22–29)
HCT VFR BLD AUTO: 41.6 % (ref 40–53)
HGB BLD-MCNC: 14.7 G/DL (ref 13.3–17.7)
IMM GRANULOCYTES # BLD: 0 10E3/UL
IMM GRANULOCYTES NFR BLD: 0 %
LYMPHOCYTES # BLD AUTO: 1.4 10E3/UL (ref 0.8–5.3)
LYMPHOCYTES NFR BLD AUTO: 21 %
MCH RBC QN AUTO: 31.3 PG (ref 26.5–33)
MCHC RBC AUTO-ENTMCNC: 35.3 G/DL (ref 31.5–36.5)
MCV RBC AUTO: 89 FL (ref 78–100)
MONOCYTES # BLD AUTO: 0.5 10E3/UL (ref 0–1.3)
MONOCYTES NFR BLD AUTO: 8 %
NEUTROPHILS # BLD AUTO: 4.3 10E3/UL (ref 1.6–8.3)
NEUTROPHILS NFR BLD AUTO: 66 %
NRBC # BLD AUTO: 0 10E3/UL
NRBC BLD AUTO-RTO: 0 /100
PHOSPHATE SERPL-MCNC: 4.2 MG/DL (ref 2.5–4.5)
PLATELET # BLD AUTO: 198 10E3/UL (ref 150–450)
POTASSIUM SERPL-SCNC: 4.6 MMOL/L (ref 3.4–5.3)
PROT SERPL-MCNC: 7.4 G/DL (ref 6.4–8.3)
PSA SERPL DL<=0.01 NG/ML-MCNC: 0.03 NG/ML (ref 0–4.5)
RBC # BLD AUTO: 4.7 10E6/UL (ref 4.4–5.9)
SODIUM SERPL-SCNC: 138 MMOL/L (ref 135–145)
WBC # BLD AUTO: 6.5 10E3/UL (ref 4–11)

## 2024-09-04 PROCEDURE — 85025 COMPLETE CBC W/AUTO DIFF WBC: CPT | Performed by: INTERNAL MEDICINE

## 2024-09-04 PROCEDURE — 36415 COLL VENOUS BLD VENIPUNCTURE: CPT | Performed by: NURSE PRACTITIONER

## 2024-09-04 PROCEDURE — 84153 ASSAY OF PSA TOTAL: CPT | Performed by: INTERNAL MEDICINE

## 2024-09-04 PROCEDURE — 96372 THER/PROPH/DIAG INJ SC/IM: CPT | Mod: XS | Performed by: NURSE PRACTITIONER

## 2024-09-04 PROCEDURE — 96402 CHEMO HORMON ANTINEOPL SQ/IM: CPT

## 2024-09-04 PROCEDURE — 36415 COLL VENOUS BLD VENIPUNCTURE: CPT

## 2024-09-04 PROCEDURE — 250N000011 HC RX IP 250 OP 636: Mod: JZ | Performed by: NURSE PRACTITIONER

## 2024-09-04 PROCEDURE — 84100 ASSAY OF PHOSPHORUS: CPT | Performed by: NURSE PRACTITIONER

## 2024-09-04 PROCEDURE — 250N000011 HC RX IP 250 OP 636: Performed by: INTERNAL MEDICINE

## 2024-09-04 PROCEDURE — 80053 COMPREHEN METABOLIC PANEL: CPT | Performed by: INTERNAL MEDICINE

## 2024-09-04 RX ORDER — MEPERIDINE HYDROCHLORIDE 25 MG/ML
25 INJECTION INTRAMUSCULAR; INTRAVENOUS; SUBCUTANEOUS EVERY 30 MIN PRN
OUTPATIENT
Start: 2024-09-10

## 2024-09-04 RX ORDER — ALBUTEROL SULFATE 0.83 MG/ML
2.5 SOLUTION RESPIRATORY (INHALATION)
OUTPATIENT
Start: 2024-09-10

## 2024-09-04 RX ORDER — EPINEPHRINE 1 MG/ML
0.3 INJECTION, SOLUTION INTRAMUSCULAR; SUBCUTANEOUS EVERY 5 MIN PRN
OUTPATIENT
Start: 2024-09-10

## 2024-09-04 RX ORDER — ALBUTEROL SULFATE 90 UG/1
1-2 AEROSOL, METERED RESPIRATORY (INHALATION)
Start: 2024-09-10

## 2024-09-04 RX ORDER — DIPHENHYDRAMINE HYDROCHLORIDE 50 MG/ML
50 INJECTION INTRAMUSCULAR; INTRAVENOUS
Start: 2024-09-10

## 2024-09-04 RX ORDER — METHYLPREDNISOLONE SODIUM SUCCINATE 125 MG/2ML
125 INJECTION, POWDER, LYOPHILIZED, FOR SOLUTION INTRAMUSCULAR; INTRAVENOUS
Start: 2024-09-10

## 2024-09-04 RX ADMIN — LEUPROLIDE ACETATE 22.5 MG: KIT at 12:35

## 2024-09-04 RX ADMIN — DENOSUMAB 120 MG: 120 INJECTION SUBCUTANEOUS at 12:35

## 2024-09-04 NOTE — PROGRESS NOTES
Oral Chemotherapy Monitoring Program  Lab Follow Up     Patient currently on darolutamide (Nubeqa)  Reviewed lab results from today.     Lab Results   Component Value Date    WBC 6.5 09/04/2024    WBC 6.0 01/12/2018     Lab Results   Component Value Date    RBC 4.70 09/04/2024    RBC 4.70 01/12/2018     Lab Results   Component Value Date    HGB 14.7 09/04/2024    HGB 16.7 04/06/2021     Lab Results   Component Value Date    HCT 41.6 09/04/2024    HCT 42.0 01/12/2018     Lab Results   Component Value Date    MCV 89 09/04/2024    MCV 89 01/12/2018     Lab Results   Component Value Date    MCH 31.3 09/04/2024    MCH 31.9 01/12/2018     Lab Results   Component Value Date    MCHC 35.3 09/04/2024    MCHC 35.7 01/12/2018     Lab Results   Component Value Date    RDW 11.8 09/04/2024    RDW 12.2 01/12/2018     Lab Results   Component Value Date     09/04/2024     01/18/2018       Last Comprehensive Metabolic Panel:  Sodium   Date Value Ref Range Status   09/04/2024 138 135 - 145 mmol/L Final   04/06/2021 133 133 - 144 mmol/L Final     Potassium   Date Value Ref Range Status   09/04/2024 4.6 3.4 - 5.3 mmol/L Final   05/10/2022 4.6 3.4 - 5.3 mmol/L Final   04/06/2021 4.4 3.4 - 5.3 mmol/L Final     Chloride   Date Value Ref Range Status   09/04/2024 100 98 - 107 mmol/L Final   05/10/2022 99 94 - 109 mmol/L Final   04/06/2021 101 94 - 109 mmol/L Final     Carbon Dioxide   Date Value Ref Range Status   04/06/2021 31 20 - 32 mmol/L Final     Carbon Dioxide (CO2)   Date Value Ref Range Status   09/04/2024 28 22 - 29 mmol/L Final   05/10/2022 28 20 - 32 mmol/L Final     Anion Gap   Date Value Ref Range Status   09/04/2024 10 7 - 15 mmol/L Final   05/10/2022 4 3 - 14 mmol/L Final   04/06/2021 1 (L) 3 - 14 mmol/L Final     Glucose   Date Value Ref Range Status   09/04/2024 108 (H) 70 - 99 mg/dL Final   05/10/2022 112 (H) 70 - 99 mg/dL Final   04/06/2021 110 (H) 70 - 99 mg/dL Final     Urea Nitrogen   Date Value Ref Range  Status   09/04/2024 12.0 8.0 - 23.0 mg/dL Final   05/10/2022 10 7 - 30 mg/dL Final   04/06/2021 8 7 - 30 mg/dL Final     Creatinine   Date Value Ref Range Status   09/04/2024 1.05 0.67 - 1.17 mg/dL Final   04/06/2021 0.92 0.66 - 1.25 mg/dL Final     GFR Estimate   Date Value Ref Range Status   09/04/2024 77 >60 mL/min/1.73m2 Final     Comment:     eGFR calculated using 2021 CKD-EPI equation.   04/06/2021 87 >60 mL/min/[1.73_m2] Final     Comment:     Non  GFR Calc  Starting 12/18/2018, serum creatinine based estimated GFR (eGFR) will be   calculated using the Chronic Kidney Disease Epidemiology Collaboration   (CKD-EPI) equation.       GFR, ESTIMATED POCT   Date Value Ref Range Status   02/28/2024 >60 >60 mL/min/1.73m2 Final     Calcium   Date Value Ref Range Status   09/04/2024 9.5 8.8 - 10.4 mg/dL Final     Comment:     Reference intervals for this test were updated on 7/16/2024 to reflect our healthy population more accurately. There may be differences in the flagging of prior results with similar values performed with this method. Those prior results can be interpreted in the context of the updated reference intervals.   04/06/2021 9.3 8.5 - 10.1 mg/dL Final     Bilirubin Total   Date Value Ref Range Status   09/04/2024 2.3 (H) <=1.2 mg/dL Final   04/06/2021 2.3 (H) 0.2 - 1.3 mg/dL Final     Alkaline Phosphatase   Date Value Ref Range Status   09/04/2024 53 40 - 150 U/L Final   04/06/2021 60 40 - 150 U/L Final     ALT   Date Value Ref Range Status   09/04/2024 22 0 - 70 U/L Final   04/06/2021 70 0 - 70 U/L Final     AST   Date Value Ref Range Status   09/04/2024 31 0 - 45 U/L Final   04/06/2021 66 (H) 0 - 45 U/L Final         Assessment & Plan:  No new lab abnormalities notes, persistent grade 2 bilirubin elevation which was elevated at baseline. Okay to continue darolutamide as prescribed with monthly labs.     Follow-Up:  10/8 - labs and infusion    Sage Stanton, PharmD, [facility]  Infusion Pharmacy and Oral Chemotherapy  [phone]

## 2024-09-04 NOTE — PROGRESS NOTES
Infusion Nursing Note:  Ronine Lagos presents today for Lupron and Xgeva + labs.    Patient seen by provider today: No   present during visit today: Not Applicable.    Note: Broken crown on front tooth. Per Dr Mercedes 8/7/24 note, okay to continue Xgeva injections with non-invasive Flipper procedure scheduled next month.      Intravenous Access:  Lab draw site right AC, Needle type butterfly, Gauge 21.  Labs drawn without difficulty.    Treatment Conditions:  Lab Results   Component Value Date     09/04/2024    POTASSIUM 4.6 09/04/2024    CR 1.05 09/04/2024    AV 9.5 09/04/2024    BILITOTAL 2.3 (H) 09/04/2024    ALBUMIN 4.6 09/04/2024    ALT 22 09/04/2024    AST 31 09/04/2024       Results reviewed, labs MET treatment parameters, ok to proceed with treatment.      Post Infusion Assessment:  Patient tolerated injections without incident.       Discharge Plan:   Discharge instructions reviewed with: Patient.  Patient and/or family verbalized understanding of discharge instructions and all questions answered.  AVS to patient via [facility].  Patient will return 10/8/24 for next appointment.   Patient discharged in stable condition accompanied by: self.  Departure Mode: Ambulatory.      Suzette Banks RN

## 2024-09-06 ENCOUNTER — TELEPHONE (OUTPATIENT)
Dept: ONCOLOGY | Facility: CLINIC | Age: 69
End: 2024-09-06
Payer: COMMERCIAL

## 2024-09-06 NOTE — CONFIDENTIAL NOTE
Pt calling to inquire if there is a recommended waiting period for getting the Covid and Flu vaccine after he received Lupron and Xgeva on 9/4.    Discussed with Marcus ESTRADA and recommendations given that there is no need to wait for those vaccines and that there is no optimal time in between.    Pt verbalized understanding.

## 2024-10-08 ENCOUNTER — DOCUMENTATION ONLY (OUTPATIENT)
Dept: PHARMACY | Facility: CLINIC | Age: 69
End: 2024-10-08

## 2024-10-08 ENCOUNTER — INFUSION THERAPY VISIT (OUTPATIENT)
Dept: INFUSION THERAPY | Facility: CLINIC | Age: 69
End: 2024-10-08
Attending: INTERNAL MEDICINE
Payer: COMMERCIAL

## 2024-10-08 VITALS
HEIGHT: 68 IN | DIASTOLIC BLOOD PRESSURE: 80 MMHG | HEART RATE: 52 BPM | RESPIRATION RATE: 18 BRPM | TEMPERATURE: 97.5 F | SYSTOLIC BLOOD PRESSURE: 127 MMHG | BODY MASS INDEX: 32.83 KG/M2 | OXYGEN SATURATION: 97 % | WEIGHT: 216.6 LBS

## 2024-10-08 DIAGNOSIS — C61 PROSTATE CANCER METASTATIC TO MULTIPLE SITES (H): ICD-10-CM

## 2024-10-08 DIAGNOSIS — C79.51 PROSTATE CANCER METASTATIC TO BONE (H): Primary | ICD-10-CM

## 2024-10-08 DIAGNOSIS — C61 PROSTATE CANCER METASTATIC TO BONE (H): Primary | ICD-10-CM

## 2024-10-08 LAB
ALBUMIN SERPL BCG-MCNC: 4.5 G/DL (ref 3.5–5.2)
ALBUMIN SERPL BCG-MCNC: 4.5 G/DL (ref 3.5–5.2)
ALP SERPL-CCNC: 52 U/L (ref 40–150)
ALT SERPL W P-5'-P-CCNC: 21 U/L (ref 0–70)
ANION GAP SERPL CALCULATED.3IONS-SCNC: 12 MMOL/L (ref 7–15)
AST SERPL W P-5'-P-CCNC: 30 U/L (ref 0–45)
BASOPHILS # BLD AUTO: 0.1 10E3/UL (ref 0–0.2)
BASOPHILS NFR BLD AUTO: 1 %
BILIRUB SERPL-MCNC: 2 MG/DL
BUN SERPL-MCNC: 10.8 MG/DL (ref 8–23)
CALCIUM SERPL-MCNC: 9.7 MG/DL (ref 8.8–10.4)
CALCIUM SERPL-MCNC: 9.8 MG/DL (ref 8.8–10.4)
CHLORIDE SERPL-SCNC: 102 MMOL/L (ref 98–107)
CREAT SERPL-MCNC: 1 MG/DL (ref 0.67–1.17)
CREAT SERPL-MCNC: 1 MG/DL (ref 0.67–1.17)
EGFRCR SERPLBLD CKD-EPI 2021: 82 ML/MIN/1.73M2
EGFRCR SERPLBLD CKD-EPI 2021: 82 ML/MIN/1.73M2
EOSINOPHIL # BLD AUTO: 0.3 10E3/UL (ref 0–0.7)
EOSINOPHIL NFR BLD AUTO: 4 %
ERYTHROCYTE [DISTWIDTH] IN BLOOD BY AUTOMATED COUNT: 11.9 % (ref 10–15)
GLUCOSE SERPL-MCNC: 79 MG/DL (ref 70–99)
HCO3 SERPL-SCNC: 24 MMOL/L (ref 22–29)
HCT VFR BLD AUTO: 39.9 % (ref 40–53)
HGB BLD-MCNC: 13.9 G/DL (ref 13.3–17.7)
IMM GRANULOCYTES # BLD: 0 10E3/UL
IMM GRANULOCYTES NFR BLD: 0 %
LYMPHOCYTES # BLD AUTO: 1.3 10E3/UL (ref 0.8–5.3)
LYMPHOCYTES NFR BLD AUTO: 22 %
MCH RBC QN AUTO: 30.3 PG (ref 26.5–33)
MCHC RBC AUTO-ENTMCNC: 34.8 G/DL (ref 31.5–36.5)
MCV RBC AUTO: 87 FL (ref 78–100)
MONOCYTES # BLD AUTO: 0.4 10E3/UL (ref 0–1.3)
MONOCYTES NFR BLD AUTO: 7 %
NEUTROPHILS # BLD AUTO: 4 10E3/UL (ref 1.6–8.3)
NEUTROPHILS NFR BLD AUTO: 66 %
NRBC # BLD AUTO: 0 10E3/UL
NRBC BLD AUTO-RTO: 0 /100
PHOSPHATE SERPL-MCNC: 4.1 MG/DL (ref 2.5–4.5)
PLATELET # BLD AUTO: 190 10E3/UL (ref 150–450)
POTASSIUM SERPL-SCNC: 4.1 MMOL/L (ref 3.4–5.3)
PROT SERPL-MCNC: 7.6 G/DL (ref 6.4–8.3)
PSA SERPL DL<=0.01 NG/ML-MCNC: 0.02 NG/ML (ref 0–4.5)
RBC # BLD AUTO: 4.58 10E6/UL (ref 4.4–5.9)
SODIUM SERPL-SCNC: 138 MMOL/L (ref 135–145)
WBC # BLD AUTO: 6.1 10E3/UL (ref 4–11)

## 2024-10-08 PROCEDURE — 250N000011 HC RX IP 250 OP 636: Mod: JZ | Performed by: NURSE PRACTITIONER

## 2024-10-08 PROCEDURE — 96372 THER/PROPH/DIAG INJ SC/IM: CPT | Performed by: NURSE PRACTITIONER

## 2024-10-08 PROCEDURE — 84100 ASSAY OF PHOSPHORUS: CPT | Performed by: NURSE PRACTITIONER

## 2024-10-08 PROCEDURE — 85025 COMPLETE CBC W/AUTO DIFF WBC: CPT | Performed by: INTERNAL MEDICINE

## 2024-10-08 PROCEDURE — 36415 COLL VENOUS BLD VENIPUNCTURE: CPT

## 2024-10-08 PROCEDURE — 84153 ASSAY OF PSA TOTAL: CPT | Performed by: INTERNAL MEDICINE

## 2024-10-08 PROCEDURE — 82040 ASSAY OF SERUM ALBUMIN: CPT | Performed by: INTERNAL MEDICINE

## 2024-10-08 PROCEDURE — 82565 ASSAY OF CREATININE: CPT | Performed by: NURSE PRACTITIONER

## 2024-10-08 PROCEDURE — 82310 ASSAY OF CALCIUM: CPT | Performed by: NURSE PRACTITIONER

## 2024-10-08 RX ORDER — METHYLPREDNISOLONE SODIUM SUCCINATE 125 MG/2ML
125 INJECTION INTRAMUSCULAR; INTRAVENOUS
Status: CANCELLED
Start: 2024-10-16

## 2024-10-08 RX ORDER — ALBUTEROL SULFATE 90 UG/1
1-2 INHALANT RESPIRATORY (INHALATION)
Status: CANCELLED
Start: 2024-10-16

## 2024-10-08 RX ORDER — MEPERIDINE HYDROCHLORIDE 25 MG/ML
25 INJECTION INTRAMUSCULAR; INTRAVENOUS; SUBCUTANEOUS EVERY 30 MIN PRN
Status: CANCELLED | OUTPATIENT
Start: 2024-10-16

## 2024-10-08 RX ORDER — ALBUTEROL SULFATE 0.83 MG/ML
2.5 SOLUTION RESPIRATORY (INHALATION)
Status: CANCELLED | OUTPATIENT
Start: 2024-10-16

## 2024-10-08 RX ORDER — EPINEPHRINE 1 MG/ML
0.3 INJECTION, SOLUTION INTRAMUSCULAR; SUBCUTANEOUS EVERY 5 MIN PRN
Status: CANCELLED | OUTPATIENT
Start: 2024-10-16

## 2024-10-08 RX ORDER — DIPHENHYDRAMINE HYDROCHLORIDE 50 MG/ML
50 INJECTION INTRAMUSCULAR; INTRAVENOUS
Status: CANCELLED
Start: 2024-10-16

## 2024-10-08 RX ADMIN — DENOSUMAB 120 MG: 120 INJECTION SUBCUTANEOUS at 10:07

## 2024-10-08 ASSESSMENT — PAIN SCALES - GENERAL: PAINLEVEL: MILD PAIN (2)

## 2024-10-08 NOTE — PROGRESS NOTES
Oral Chemotherapy Monitoring Program  Lab Follow Up    Reviewed lab results from 10/8/24.        4/25/2024     5:00 PM 5/15/2024    11:00 AM 6/20/2024    12:00 PM 7/10/2024    11:00 AM 8/22/2024    11:00 AM 9/4/2024    12:00 PM 10/8/2024    12:00 PM   ORAL CHEMOTHERAPY   Assessment Type Refill Lab Monitoring Refill Lab Monitoring Lab Monitoring Lab Monitoring Lab Monitoring   Diagnosis Code Prostate Cancer Prostate Cancer Prostate Cancer Prostate Cancer Prostate Cancer Prostate Cancer Prostate Cancer   Providers Dr. Daren Mercedes   Clinic Name/Location [facility]   Drug Name Nubeqa (darolutamide) Nubeqa (darolutamide) Nubeqa (darolutamide) Nubeqa (darolutamide) Nubeqa (darolutamide) Nubeqa (darolutamide) Nubeqa (darolutamide)   Dose 600 mg 600 mg 600 mg 600 mg 600 mg 600 mg 600 mg   Current Schedule BID BID   BID BID BID   Cycle Details Continuous Continuous Continuous Continuous Continuous Continuous Continuous   Planned next cycle start date     8/30/2024 9/20/2024    Adverse Effects  Increased AST/ALT/T BILI  Increased AST/ALT/T BILI   Increased AST/ALT/T BILI   Increased AST/ALT/T BILI  Grade 2  Grade 1   Grade 1   Pharmacist Intervention(increased ast/alt/t bili)  No  No   No   Any new drug interactions? No  No   No    Is the dose as ordered appropriate for the patient? Yes Yes Yes Yes  Yes Yes   Has the patient missed any days of school, work, or other routine activity?     No     Since the last time we talked, have you been hospitalized or used the emergency room?     No         Labs:  _  Result Component Current Result Ref Range   Sodium 138 (10/8/2024) 135 - 145 mmol/L     _  Result Component Current Result Ref Range   Potassium 4.1 (10/8/2024) 3.4 - 5.3 mmol/L     _  Result Component Current Result Ref Range   Calcium 9.8 (10/8/2024) 8.8 - 10.4 mg/dL     No results found for Mag within last 30  days.     _  Result Component Current Result Ref Range   Phosphorus 4.1 (10/8/2024) 2.5 - 4.5 mg/dL     _  Result Component Current Result Ref Range   Albumin 4.5 (10/8/2024) 3.5 - 5.2 g/dL     _  Result Component Current Result Ref Range   Urea Nitrogen 10.8 (10/8/2024) 8.0 - 23.0 mg/dL     _  Result Component Current Result Ref Range   Creatinine 1.00 (10/8/2024) 0.67 - 1.17 mg/dL     _  Result Component Current Result Ref Range   AST 30 (10/8/2024) 0 - 45 U/L     _  Result Component Current Result Ref Range   ALT 21 (10/8/2024) 0 - 70 U/L     _  Result Component Current Result Ref Range   Bilirubin Total 2.0 (H) (10/8/2024) <=1.2 mg/dL     _  Result Component Current Result Ref Range   WBC Count 6.1 (10/8/2024) 4.0 - 11.0 10e3/uL     _  Result Component Current Result Ref Range   Hemoglobin 13.9 (10/8/2024) 13.3 - 17.7 g/dL     _  Result Component Current Result Ref Range   Platelet Count 190 (10/8/2024) 150 - 450 10e3/uL     No results found for ANC within last 30 days.     _  Result Component Current Result Ref Range   Absolute Neutrophils 4.0 (10/8/2024) 1.6 - 8.3 10e3/uL        Assessment  Patient has slightly elevated bilirubin. No other concerning abnormalities at this time.     Plan   Continue [employer] Sharea   Labs and Mercedes 11/6    Trey Bobo  Pharmacy Intern   [facility]   [phone]

## 2024-10-22 DIAGNOSIS — C61 PROSTATE CANCER METASTATIC TO MULTIPLE SITES (H): Primary | ICD-10-CM

## 2024-10-27 NOTE — PROGRESS NOTES
[facility]    Hematology/Oncology Established Patient Note      Today's Date: 11/6/24    Reason for visit: Metastatic prostate cancer.    HISTORY OF PRESENT ILLNESS: Ronnie Lagos is a 69 year old male with hypertension, GERD who presents with the following oncologic history:  1.  5/11/2023: PSA elevated at 8.08 (prior 3.44 from 5/10/22).  2. 6/29/2023: MRI pelvis -- PI-RADS 4 - 1.4 x 1 x 0.9 cm nodule abutting posterolateral capsule of prostate; no pelvic adenopathy or bone lesions.  3.  8/30/2023: Prostate biopsy - [place] 4+3=7 prostate adenocarcinoma.  4. 10/2/2023: NM bone scan negative.  5.  11/3/2023: Radical prostatectomy, pelvic lymph node excision -- Pia 4+5=9 adenocarcinoma, [id number], margins widely positive for malignancy.  6.  2/7/2024: PSA = 4.06.  7.  2/15/2024: PSA elevated at 5.26.  8.  2/28/2024: PSMA PET showed focal uptake in left side of prostatectomy bed suspicious for recurrent disease; multiple pelvic and few retroperitoneal lymph nodes with uptake; >10 sclerotic osseous lesions with uptake indicative of osseous metastatic disease.  9. 3/20/2024: Started Casodex, Lupron, darolutamide, Xgeva, cycle 1 Taxotere but developed flushing, chest heaviness, nausea (no dyspnea) reaction 5 minutes into Taxotere infusion. Re-challenge not attempted that day.  10.  3/29/2024: Re-challenged Taxotere but developed repeat hypersensitivity reaction 4 minutes into infusion. Taxotere discontinued.  11. 4/17/2024: PSA 0.53.  12. 7/10/2024: PSA 0.06.  13. 10/8/2024: PSA 0.02.    INTERVAL HISTORY:  Ronnie reports ongoing fatigue and hot flashes.  He denies drinking coffee, spicy foods, or alcohol.  He has tried different bedsheets and PJs. He reports intermittent right rib pain from prior fractures.    REVIEW OF SYSTEMS:   14 point ROS was reviewed and is negative other than as noted above in HPI.       HOME MEDICATIONS:  Current Outpatient Medications   Medication Sig Dispense Refill     amLODIPine (NORVASC) 5 MG tablet Take 1 tablet (5 mg) by mouth daily 90 tablet 3    atenolol (TENORMIN) 50 MG tablet Take 1 tablet (50 mg) by mouth daily 90 tablet 3    calcium citrate (CITRACAL) 950 (200 Ca) MG tablet Take 1 tablet by mouth 2 times daily      [START ON 10/29/2024] darolutamide (NUBEQA) 300 MG tablet Take 2 tablets (600 mg) by mouth 2 times daily. . Swallow tablets whole. Take with food. Avoid grapefruit or grapefruit juice. 120 tablet 0    darolutamide (NUBEQA) 300 MG tablet Take 2 tablets (600 mg) by mouth 2 times daily. . Swallow tablets whole. Take with food. Avoid grapefruit or grapefruit juice. 120 tablet 0    omeprazole (PRILOSEC) 20 MG DR capsule Take 1 capsule (20 mg) by mouth daily 90 capsule 3    Vitamin D, Cholecalciferol, 10 MCG (400 UNIT) TABS            ALLERGIES:  Allergies   Allergen Reactions    Taxotere [Docetaxel]          PAST MEDICAL HISTORY:  Past Medical History:   Diagnosis Date    Abdominal pain     Actinic keratosis     Basal cell carcinoma     Calculus of bile duct     Elevated liver enzymes     Essential hypertension, benign     abstracted 6/19/02    Gastro-oesophageal reflux disease     GERD (gastroesophageal reflux disease) 07/28/2008    Liver disease     elevated liver enzymes    Squamous cell carcinoma     Unspecified cerebral artery occlusion with cerebral infarction     Unspecified condition of brain     abstracted 6/19/02         PAST SURGICAL HISTORY:  Past Surgical History:   Procedure Laterality Date    ARTHRODESIS FOOT  2/5/2013    Procedure: ARTHRODESIS FOOT;  LEFT FIRST METATARSOPHALANGEAL JOINT ARTHRODESIS ;  Surgeon: Burton Loera DPM;  Location: [facility]    COLONOSCOPY N/A 12/7/2018    Procedure: COMBINED COLONOSCOPY, SINGLE OR MULTIPLE BIOPSY/POLYPECTOMY BY BIOPSY;  Surgeon: Blayne Mora MD;  Location:  GI    DAVINCI PROSTATECTOMY, LYMPHADENECTOMY N/A 11/3/2023    Procedure: PROSTATECTOMY, ROBOT-ASSISTED, WITH PELVIC LYMPHADENECTOMY;   Surgeon: Sophia Ramirez MD;  Location:  OR    ENDOSCOPIC RETROGRADE CHOLANGIOPANCREATOGRAM N/A 1/18/2018    Procedure: COMBINED ENDOSCOPIC RETROGRADE CHOLANGIOPANCREATOGRAPHY, SPHINCTEROTOMY;  ENDOSCOPIC RETROGRADE CHOLANGIOPANCREATOGRAM (ERCP), SPHINCTEROTOMY, STONE REMOVAL, STENT PLACEMENT;  Surgeon: Christiano Sorenson MD;  Location:  OR    ENDOSCOPIC RETROGRADE CHOLANGIOPANCREATOGRAM N/A 4/12/2018    Procedure: ENDOSCOPIC RETROGRADE CHOLANGIOPANCREATOGRAM;  ENDOSCOPIC RETROGRADE CHOLANIOPANCREATOGRAPHY AND ATTEMPTED STENT REMOVAL.;  Surgeon: Christiano Sorenson MD;  Location:  OR    ENDOSCOPIC RETROGRADE CHOLANGIOPANCREATOGRAM N/A 5/10/2018    Procedure: COMBINED ENDOSCOPIC RETROGRADE CHOLANGIOPANCREATOGRAPHY, REMOVE FOREIGN BODY OR STENT/TUBE;  ENDOSCOPIC RETROGRADE CHOLANGIOPANCREATOGRAPHY AND STENT REMOVAL;  Surgeon: Christiano Sorenson MD;  Location:  OR    ESOPHAGOSCOPY, GASTROSCOPY, DUODENOSCOPY (EGD), COMBINED N/A 4/12/2018    Procedure: COMBINED ESOPHAGOSCOPY, GASTROSCOPY, DUODENOSCOPY (EGD);  EGD with stent removal;  Surgeon: Christiano Sorenson MD;  Location:  GI    LAPAROSCOPIC CHOLECYSTECTOMY N/A 5/1/2015    Procedure: LAPAROSCOPIC CHOLECYSTECTOMY;  Surgeon: Damien Galindo MD;  Location:  [facility]    ORTHOPEDIC SURGERY      left elbow, right shoulder         SOCIAL HISTORY:  Social History     Socioeconomic History    Marital status:      Spouse name: Not on file    Number of children: Not on file    Years of education: Not on file    Highest education level: Not on file   Occupational History     Employer: [employer]   Tobacco Use    Smoking status: Never    Smokeless tobacco: Never   Vaping Use    Vaping status: Never Used   Substance and Sexual Activity    Alcohol use: Yes     Alcohol/week: 2.0 standard drinks of alcohol     Types: 2 Standard drinks or equivalent per week     Comment: 3-4 times a week, light beer    Drug use: Never    Sexual  activity: Yes     Partners: Female   Other Topics Concern    Parent/sibling w/ CABG, MI or angioplasty before 65F 55M? No   Social History Narrative    Not on file     Social Drivers of Health     Financial Resource Strain: Low Risk  (6/3/2024)    Financial Resource Strain     Within the past 12 months, have you or your family members you live with been unable to get utilities (heat, electricity) when it was really needed?: No   Food Insecurity: Low Risk  (6/3/2024)    Food Insecurity     Within the past 12 months, did you worry that your food would run out before you got money to buy more?: No     Within the past 12 months, did the food you bought just not last and you didn t have money to get more?: No   Transportation Needs: Low Risk  (6/3/2024)    Transportation Needs     Within the past 12 months, has lack of transportation kept you from medical appointments, getting your medicines, non-medical meetings or appointments, work, or from getting things that you need?: No   Physical Activity: Insufficiently Active (6/3/2024)    Exercise Vital Sign     Days of Exercise per Week: 3 days     Minutes of Exercise per Session: 10 min   Stress: Stress Concern Present (6/3/2024)    [employer] [employer] - Occupational Stress Questionnaire     Feeling of Stress : Rather much   Social Connections: Unknown (6/3/2024)    Social Connection and Isolation Panel [NHANES]     Frequency of Communication with Friends and Family: Not on file     Frequency of Social Gatherings with Friends and Family: Once a week     Attends Orthodoxy Services: Not on file     Active Member of Clubs or Organizations: Not on file     Attends Club or Organization Meetings: Not on file     Marital Status: Not on file   Interpersonal Safety: Not At Risk (6/15/2023)    Humiliation, Afraid, Rape, and Kick questionnaire     Fear of Current or Ex-Partner: No     Emotionally Abused: No     Physically Abused: No     Sexually Abused: No  "  Housing Stability: Low Risk  (6/3/2024)    Housing Stability     Do you have housing? : Yes     Are you worried about losing your housing?: No         FAMILY HISTORY:  Family History   Problem Relation Age of Onset    Hypertension Mother     Cancer - colorectal Mother     Skin Cancer Mother     Colon Cancer Mother     Hypertension Father     Prostate Cancer Father     Melanoma No family hx of     Anesthesia Reaction No family hx of     Venous thrombosis No family hx of     Bleeding Disorder No family hx of    Father had prostate, liver, and skin cancer. Mother with colorectal and skin cancer.      PHYSICAL EXAM:  Vital signs:  BP (!) 149/72   Pulse 66   Resp 16   Ht 1.727 m (5' 8\")   Wt 97.9 kg (215 lb 12.8 oz)   SpO2 99%   BMI 32.81 kg/m     ECO  GENERAL: No acute distress.  EYES: No scleral icterus. No overt erythema.  RESPIRATORY: No audible cough, wheezing, or labored breathing.  MUSCULOSKELETAL: Range of motion in the neck, shoulders, and arms appear normal.  SKIN: No overt rashes, discolorations, or lesions over the face and neck.  NEUROLOGIC: Alert.  No overt tremors.  PSYCHIATRIC: Normal affect and mood.  Does not appear anxious.       LABS:  CBC RESULTS:   Recent Labs   Lab Test 24  0904   WBC 7.1   RBC 4.96   HGB 15.1   HCT 43.2   MCV 87   MCH 30.4   MCHC 35.0   RDW 12.4         Last Comprehensive Metabolic Panel:  Sodium   Date Value Ref Range Status   2024 139 135 - 145 mmol/L Final   2021 133 133 - 144 mmol/L Final     Potassium   Date Value Ref Range Status   2024 4.4 3.4 - 5.3 mmol/L Final   05/10/2022 4.6 3.4 - 5.3 mmol/L Final   2021 4.4 3.4 - 5.3 mmol/L Final     Chloride   Date Value Ref Range Status   2024 100 98 - 107 mmol/L Final   05/10/2022 99 94 - 109 mmol/L Final   2021 101 94 - 109 mmol/L Final     Carbon Dioxide   Date Value Ref Range Status   2021 31 20 - 32 mmol/L Final     Carbon Dioxide (CO2)   Date Value Ref Range " Status   11/05/2024 26 22 - 29 mmol/L Final   05/10/2022 28 20 - 32 mmol/L Final     Anion Gap   Date Value Ref Range Status   11/05/2024 13 7 - 15 mmol/L Final   05/10/2022 4 3 - 14 mmol/L Final   04/06/2021 1 (L) 3 - 14 mmol/L Final     Glucose   Date Value Ref Range Status   11/05/2024 152 (H) 70 - 99 mg/dL Final   05/10/2022 112 (H) 70 - 99 mg/dL Final   04/06/2021 110 (H) 70 - 99 mg/dL Final     Urea Nitrogen   Date Value Ref Range Status   11/05/2024 13.5 8.0 - 23.0 mg/dL Final   05/10/2022 10 7 - 30 mg/dL Final   04/06/2021 8 7 - 30 mg/dL Final     Creatinine   Date Value Ref Range Status   11/05/2024 1.12 0.67 - 1.17 mg/dL Final   04/06/2021 0.92 0.66 - 1.25 mg/dL Final     GFR Estimate   Date Value Ref Range Status   11/05/2024 72 >60 mL/min/1.73m2 Final     Comment:     eGFR calculated using 2021 CKD-EPI equation.   04/06/2021 87 >60 mL/min/[1.73_m2] Final     Comment:     Non  GFR Calc  Starting 12/18/2018, serum creatinine based estimated GFR (eGFR) will be   calculated using the Chronic Kidney Disease Epidemiology Collaboration   (CKD-EPI) equation.       GFR, ESTIMATED POCT   Date Value Ref Range Status   02/28/2024 >60 >60 mL/min/1.73m2 Final     Calcium   Date Value Ref Range Status   11/05/2024 9.6 8.8 - 10.4 mg/dL Final     Comment:     Reference intervals for this test were updated on 7/16/2024 to reflect our healthy population more accurately. There may be differences in the flagging of prior results with similar values performed with this method. Those prior results can be interpreted in the context of the updated reference intervals.   04/06/2021 9.3 8.5 - 10.1 mg/dL Final     Bilirubin Total   Date Value Ref Range Status   11/05/2024 2.3 (H) <=1.2 mg/dL Final   04/06/2021 2.3 (H) 0.2 - 1.3 mg/dL Final     Alkaline Phosphatase   Date Value Ref Range Status   11/05/2024 54 40 - 150 U/L Final   04/06/2021 60 40 - 150 U/L Final     ALT   Date Value Ref Range Status   11/05/2024 22  0 - 70 U/L Final   04/06/2021 70 0 - 70 U/L Final     AST   Date Value Ref Range Status   11/05/2024 32 0 - 45 U/L Final   04/06/2021 66 (H) 0 - 45 U/L Final       ASSESSMENT/PLAN:  Ronnie Lagos is a 69 year old male with the following issues:  1. Metastatic non-castrate prostate adenocarcinoma, [place] 4+5=9  2. 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 Hypertension  5. Hypersensitivity reaction to Taxotere  --2/28/2024 PSMA PET showed metastatic disease to the pelvic and retroperitoneal lymph nodes as well as greater than 10 sites of bony metastatic disease. I showed him the images today.  --Given high volume disease, he started Casodex 50 mg daily for first month, Lupron every 3 months, darolutamide 600 mg orally BID, and tried Taxotere twice but developed hypersensitivity reaction only 4-5 minutes into infusion.  Therefore, I recommended discontinuation of Taxotere.  Discussed again that cabazitaxel could be utilized in the future upon disease progression.  --Continue denosumab (Xgeva) every 4 weeks.   --Will continue to monitor CBC, CMP, and PSA monthly.    5. 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    6. Left shoulder pain  --Related to post-arthroplasty pain.  Continue Tylenol as needed.    7. Left upper molar pain  --He may need bone implant/grafting in the future but would need to hold Xgeva for 3 months if needed.  He might be able to do a less invasive option with a flipper.    8. Right knee osteoarthritis  --He will discuss right knee arthroplasty versus shaving the right knee cap with orthopedics.    9. Right rib pain related to multiple right-sided rib fractures.  --Has known metastatic disease to ribs/bones.  --8/7/2024 Rib x-rays showed multiple indeterminate chronicity right rib fractures.  --Continue conservative management.    10. Insomnia  11. Male hot flashes  --Multifactorially  related to joint pain and hot flashes.  --Not alleviated with CBD, THC gummies, or melatonin.  --Suggested acupuncture but does not like needles.  --Offered venlafaxine -- discussed potential adverse effects and he agrees to try it.  --Also encouraged moderate exercise and stretching.    Return in 3 months.    Anabell Mercedes MD  [facility] Hematology/Oncology     Total time spent today: 40 minutes in chart review, patient evaluation, counseling, documentation, test and/or medication/prescription orders, and coordination of care.      The longitudinal plan of care for the diagnosis(es)/condition(s) as documented were addressed during this visit. Due to the added complexity in care, I will continue to support Ronnie in the subsequent management and with ongoing continuity of care.

## 2024-11-05 ENCOUNTER — LAB (OUTPATIENT)
Dept: LAB | Facility: CLINIC | Age: 69
End: 2024-11-05
Payer: COMMERCIAL

## 2024-11-05 DIAGNOSIS — C61 PROSTATE CANCER METASTATIC TO MULTIPLE SITES (H): ICD-10-CM

## 2024-11-05 LAB
ALBUMIN SERPL BCG-MCNC: 4.5 G/DL (ref 3.5–5.2)
ALP SERPL-CCNC: 54 U/L (ref 40–150)
ALT SERPL W P-5'-P-CCNC: 22 U/L (ref 0–70)
ANION GAP SERPL CALCULATED.3IONS-SCNC: 13 MMOL/L (ref 7–15)
AST SERPL W P-5'-P-CCNC: 32 U/L (ref 0–45)
BASOPHILS # BLD AUTO: 0 10E3/UL (ref 0–0.2)
BASOPHILS NFR BLD AUTO: 0 %
BILIRUB SERPL-MCNC: 2.3 MG/DL
BUN SERPL-MCNC: 13.5 MG/DL (ref 8–23)
CALCIUM SERPL-MCNC: 9.6 MG/DL (ref 8.8–10.4)
CHLORIDE SERPL-SCNC: 100 MMOL/L (ref 98–107)
CREAT SERPL-MCNC: 1.12 MG/DL (ref 0.67–1.17)
EGFRCR SERPLBLD CKD-EPI 2021: 72 ML/MIN/1.73M2
EOSINOPHIL # BLD AUTO: 0.3 10E3/UL (ref 0–0.7)
EOSINOPHIL NFR BLD AUTO: 4 %
ERYTHROCYTE [DISTWIDTH] IN BLOOD BY AUTOMATED COUNT: 12.4 % (ref 10–15)
GLUCOSE SERPL-MCNC: 152 MG/DL (ref 70–99)
HCO3 SERPL-SCNC: 26 MMOL/L (ref 22–29)
HCT VFR BLD AUTO: 43.2 % (ref 40–53)
HGB BLD-MCNC: 15.1 G/DL (ref 13.3–17.7)
IMM GRANULOCYTES # BLD: 0 10E3/UL
IMM GRANULOCYTES NFR BLD: 0 %
LYMPHOCYTES # BLD AUTO: 1.6 10E3/UL (ref 0.8–5.3)
LYMPHOCYTES NFR BLD AUTO: 23 %
MCH RBC QN AUTO: 30.4 PG (ref 26.5–33)
MCHC RBC AUTO-ENTMCNC: 35 G/DL (ref 31.5–36.5)
MCV RBC AUTO: 87 FL (ref 78–100)
MONOCYTES # BLD AUTO: 0.3 10E3/UL (ref 0–1.3)
MONOCYTES NFR BLD AUTO: 5 %
NEUTROPHILS # BLD AUTO: 4.9 10E3/UL (ref 1.6–8.3)
NEUTROPHILS NFR BLD AUTO: 68 %
PLATELET # BLD AUTO: 247 10E3/UL (ref 150–450)
POTASSIUM SERPL-SCNC: 4.4 MMOL/L (ref 3.4–5.3)
PROT SERPL-MCNC: 7.6 G/DL (ref 6.4–8.3)
PSA SERPL DL<=0.01 NG/ML-MCNC: 0.03 NG/ML (ref 0–4.5)
RBC # BLD AUTO: 4.96 10E6/UL (ref 4.4–5.9)
SODIUM SERPL-SCNC: 139 MMOL/L (ref 135–145)
WBC # BLD AUTO: 7.1 10E3/UL (ref 4–11)

## 2024-11-05 PROCEDURE — 36415 COLL VENOUS BLD VENIPUNCTURE: CPT

## 2024-11-05 PROCEDURE — 85025 COMPLETE CBC W/AUTO DIFF WBC: CPT

## 2024-11-05 PROCEDURE — 84153 ASSAY OF PSA TOTAL: CPT

## 2024-11-05 PROCEDURE — 80053 COMPREHEN METABOLIC PANEL: CPT

## 2024-11-06 ENCOUNTER — ONCOLOGY VISIT (OUTPATIENT)
Dept: ONCOLOGY | Facility: CLINIC | Age: 69
End: 2024-11-06
Attending: INTERNAL MEDICINE
Payer: COMMERCIAL

## 2024-11-06 VITALS
SYSTOLIC BLOOD PRESSURE: 149 MMHG | HEART RATE: 66 BPM | HEIGHT: 68 IN | WEIGHT: 215.8 LBS | DIASTOLIC BLOOD PRESSURE: 72 MMHG | OXYGEN SATURATION: 99 % | RESPIRATION RATE: 16 BRPM | BODY MASS INDEX: 32.71 KG/M2

## 2024-11-06 DIAGNOSIS — C79.51 PROSTATE CANCER METASTATIC TO BONE (H): ICD-10-CM

## 2024-11-06 DIAGNOSIS — C61 PROSTATE CANCER METASTATIC TO BONE (H): ICD-10-CM

## 2024-11-06 DIAGNOSIS — R07.81 RIB PAIN ON RIGHT SIDE: ICD-10-CM

## 2024-11-06 DIAGNOSIS — M25.50 POLYARTHRALGIA: ICD-10-CM

## 2024-11-06 DIAGNOSIS — C61 PROSTATE CANCER METASTATIC TO MULTIPLE SITES (H): Primary | ICD-10-CM

## 2024-11-06 DIAGNOSIS — R23.2 HOT FLASH IN MALE: ICD-10-CM

## 2024-11-06 DIAGNOSIS — G47.00 PERSISTENT INSOMNIA: ICD-10-CM

## 2024-11-06 PROCEDURE — G2211 COMPLEX E/M VISIT ADD ON: HCPCS | Performed by: INTERNAL MEDICINE

## 2024-11-06 PROCEDURE — G0463 HOSPITAL OUTPT CLINIC VISIT: HCPCS | Performed by: INTERNAL MEDICINE

## 2024-11-06 PROCEDURE — 250N000011 HC RX IP 250 OP 636: Mod: JZ | Performed by: NURSE PRACTITIONER

## 2024-11-06 PROCEDURE — 96372 THER/PROPH/DIAG INJ SC/IM: CPT | Performed by: NURSE PRACTITIONER

## 2024-11-06 PROCEDURE — 99215 OFFICE O/P EST HI 40 MIN: CPT | Performed by: INTERNAL MEDICINE

## 2024-11-06 RX ORDER — VENLAFAXINE HYDROCHLORIDE 37.5 MG/1
37.5 CAPSULE, EXTENDED RELEASE ORAL DAILY
Qty: 30 CAPSULE | Refills: 3 | Status: SHIPPED | OUTPATIENT
Start: 2024-11-06

## 2024-11-06 RX ORDER — EPINEPHRINE 1 MG/ML
0.3 INJECTION, SOLUTION INTRAMUSCULAR; SUBCUTANEOUS EVERY 5 MIN PRN
OUTPATIENT
Start: 2024-11-07

## 2024-11-06 RX ORDER — METHYLPREDNISOLONE SODIUM SUCCINATE 125 MG/2ML
125 INJECTION INTRAMUSCULAR; INTRAVENOUS
Start: 2024-11-07

## 2024-11-06 RX ORDER — ALBUTEROL SULFATE 90 UG/1
1-2 INHALANT RESPIRATORY (INHALATION)
Start: 2024-11-07

## 2024-11-06 RX ORDER — MEPERIDINE HYDROCHLORIDE 25 MG/ML
25 INJECTION INTRAMUSCULAR; INTRAVENOUS; SUBCUTANEOUS EVERY 30 MIN PRN
OUTPATIENT
Start: 2024-11-07

## 2024-11-06 RX ORDER — ALBUTEROL SULFATE 0.83 MG/ML
2.5 SOLUTION RESPIRATORY (INHALATION)
OUTPATIENT
Start: 2024-11-07

## 2024-11-06 RX ORDER — DIPHENHYDRAMINE HYDROCHLORIDE 50 MG/ML
50 INJECTION INTRAMUSCULAR; INTRAVENOUS
Start: 2024-11-07

## 2024-11-06 RX ADMIN — DENOSUMAB 120 MG: 120 INJECTION SUBCUTANEOUS at 09:18

## 2024-11-06 ASSESSMENT — PAIN SCALES - GENERAL: PAINLEVEL_OUTOF10: MILD PAIN (2)

## 2024-11-06 NOTE — LETTER
11/6/2024      Ronnie Lagos  [address]  [facility] [address]      Dear Colleague,    Thank you for referring your patient, Ronnie Lagos, to the [facility] CANCER [facility]. Please see a copy of my visit note below.    [facility] Cancer [facility]    Hematology/Oncology Established Patient Note      Today's Date: 11/6/24    Reason for visit: Metastatic prostate cancer.    HISTORY OF PRESENT ILLNESS: Ronnie Lagos is a 69 year old male with hypertension, GERD who presents with the following oncologic history:  1.  5/11/2023: PSA elevated at 8.08 (prior 3.44 from 5/10/22).  2. 6/29/2023: MRI pelvis -- PI-RADS 4 - 1.4 x 1 x 0.9 cm nodule abutting posterolateral capsule of prostate; no pelvic adenopathy or bone lesions.  3.  8/30/2023: Prostate biopsy - [place] 4+3=7 prostate adenocarcinoma.  4. 10/2/2023: NM bone scan negative.  5.  11/3/2023: Radical prostatectomy, pelvic lymph node excision -- [place] 4+5=9 adenocarcinoma, [id number], margins widely positive for malignancy.  6.  2/7/2024: PSA = 4.06.  7.  2/15/2024: PSA elevated at 5.26.  8.  2/28/2024: PSMA PET showed focal uptake in left side of prostatectomy bed suspicious for recurrent disease; multiple pelvic and few retroperitoneal lymph nodes with uptake; >10 sclerotic osseous lesions with uptake indicative of osseous metastatic disease.  9. 3/20/2024: Started Casodex, Lupron, darolutamide, Xgeva, cycle 1 Taxotere but developed flushing, chest heaviness, nausea (no dyspnea) reaction 5 minutes into Taxotere infusion. Re-challenge not attempted that day.  10.  3/29/2024: Re-challenged Taxotere but developed repeat hypersensitivity reaction 4 minutes into infusion. Taxotere discontinued.  11. 4/17/2024: PSA 0.53.  12. 7/10/2024: PSA 0.06.  13. 10/8/2024: PSA 0.02.    INTERVAL HISTORY:  Ronnie reports ongoing fatigue and hot flashes.  He denies drinking coffee, spicy foods, or alcohol.  He has tried different bedsheets and PJs. He  reports intermittent right rib pain from prior fractures.    REVIEW OF SYSTEMS:   14 point ROS was reviewed and is negative other than as noted above in HPI.       HOME MEDICATIONS:  Current Outpatient Medications   Medication Sig Dispense Refill     amLODIPine (NORVASC) 5 MG tablet Take 1 tablet (5 mg) by mouth daily 90 tablet 3     atenolol (TENORMIN) 50 MG tablet Take 1 tablet (50 mg) by mouth daily 90 tablet 3     calcium citrate (CITRACAL) 950 (200 Ca) MG tablet Take 1 tablet by mouth 2 times daily       [START ON 10/29/2024] darolutamide (NUBEQA) 300 MG tablet Take 2 tablets (600 mg) by mouth 2 times daily. . Swallow tablets whole. Take with food. Avoid grapefruit or grapefruit juice. 120 tablet 0     darolutamide (NUBEQA) 300 MG tablet Take 2 tablets (600 mg) by mouth 2 times daily. . Swallow tablets whole. Take with food. Avoid grapefruit or grapefruit juice. 120 tablet 0     omeprazole (PRILOSEC) 20 MG DR capsule Take 1 capsule (20 mg) by mouth daily 90 capsule 3     Vitamin D, Cholecalciferol, 10 MCG (400 UNIT) TABS            ALLERGIES:  Allergies   Allergen Reactions     Taxotere [Docetaxel]          PAST MEDICAL HISTORY:  Past Medical History:   Diagnosis Date     Abdominal pain      Actinic keratosis      Basal cell carcinoma      Calculus of bile duct      Elevated liver enzymes      Essential hypertension, benign     abstracted 6/19/02     Gastro-oesophageal reflux disease      GERD (gastroesophageal reflux disease) 07/28/2008     Liver disease     elevated liver enzymes     Squamous cell carcinoma      Unspecified cerebral artery occlusion with cerebral infarction      Unspecified condition of brain     abstracted 6/19/02         PAST SURGICAL HISTORY:  Past Surgical History:   Procedure Laterality Date     ARTHRODESIS FOOT  2/5/2013    Procedure: ARTHRODESIS FOOT;  LEFT FIRST METATARSOPHALANGEAL JOINT ARTHRODESIS ;  Surgeon: Burton Loera DPM;  Location: [facility]     COLONOSCOPY N/A 12/7/2018     Procedure: COMBINED COLONOSCOPY, SINGLE OR MULTIPLE BIOPSY/POLYPECTOMY BY BIOPSY;  Surgeon: Blayne Mora MD;  Location: [facility]     DAVINCI PROSTATECTOMY, LYMPHADENECTOMY N/A 11/3/2023    Procedure: PROSTATECTOMY, ROBOT-ASSISTED, WITH PELVIC LYMPHADENECTOMY;  Surgeon: Sophia Ramirez MD;  Location:  OR     ENDOSCOPIC RETROGRADE CHOLANGIOPANCREATOGRAM N/A 1/18/2018    Procedure: COMBINED ENDOSCOPIC RETROGRADE CHOLANGIOPANCREATOGRAPHY, SPHINCTEROTOMY;  ENDOSCOPIC RETROGRADE CHOLANGIOPANCREATOGRAM (ERCP), SPHINCTEROTOMY, STONE REMOVAL, STENT PLACEMENT;  Surgeon: Christiano Sorenson MD;  Location:  OR     ENDOSCOPIC RETROGRADE CHOLANGIOPANCREATOGRAM N/A 4/12/2018    Procedure: ENDOSCOPIC RETROGRADE CHOLANGIOPANCREATOGRAM;  ENDOSCOPIC RETROGRADE CHOLANIOPANCREATOGRAPHY AND ATTEMPTED STENT REMOVAL.;  Surgeon: Christiano Sorenson MD;  Location:  OR     ENDOSCOPIC RETROGRADE CHOLANGIOPANCREATOGRAM N/A 5/10/2018    Procedure: COMBINED ENDOSCOPIC RETROGRADE CHOLANGIOPANCREATOGRAPHY, REMOVE FOREIGN BODY OR STENT/TUBE;  ENDOSCOPIC RETROGRADE CHOLANGIOPANCREATOGRAPHY AND STENT REMOVAL;  Surgeon: Christiano Sorenson MD;  Location:  OR     ESOPHAGOSCOPY, GASTROSCOPY, DUODENOSCOPY (EGD), COMBINED N/A 4/12/2018    Procedure: COMBINED ESOPHAGOSCOPY, GASTROSCOPY, DUODENOSCOPY (EGD);  EGD with stent removal;  Surgeon: Christiano Sorenson MD;  Location: [facility]     LAPAROSCOPIC CHOLECYSTECTOMY N/A 5/1/2015    Procedure: LAPAROSCOPIC CHOLECYSTECTOMY;  Surgeon: Damien Galindo MD;  Location: [facility]     ORTHOPEDIC SURGERY      left elbow, right shoulder         SOCIAL HISTORY:  Social History     Socioeconomic History     Marital status:      Spouse name: Not on file     Number of children: Not on file     Years of education: Not on file     Highest education level: Not on file   Occupational History     Employer: [employer]   Tobacco Use     Smoking status: Never     Smokeless  tobacco: Never   Vaping Use     Vaping status: Never Used   Substance and Sexual Activity     Alcohol use: Yes     Alcohol/week: 2.0 standard drinks of alcohol     Types: 2 Standard drinks or equivalent per week     Comment: 3-4 times a week, light beer     Drug use: Never     Sexual activity: Yes     Partners: Female   Other Topics Concern     Parent/sibling w/ CABG, MI or angioplasty before 65F 55M? No   Social History Narrative     Not on file     Social Drivers of Health     Financial Resource Strain: Low Risk  (6/3/2024)    Financial Resource Strain      Within the past 12 months, have you or your family members you live with been unable to get utilities (heat, electricity) when it was really needed?: No   Food Insecurity: Low Risk  (6/3/2024)    Food Insecurity      Within the past 12 months, did you worry that your food would run out before you got money to buy more?: No      Within the past 12 months, did the food you bought just not last and you didn t have money to get more?: No   Transportation Needs: Low Risk  (6/3/2024)    Transportation Needs      Within the past 12 months, has lack of transportation kept you from medical appointments, getting your medicines, non-medical meetings or appointments, work, or from getting things that you need?: No   Physical Activity: Insufficiently Active (6/3/2024)    Exercise Vital Sign      Days of Exercise per Week: 3 days      Minutes of Exercise per Session: 10 min   Stress: Stress Concern Present (6/3/2024)    [employer] [place] [employer] - Occupational Stress Questionnaire      Feeling of Stress : Rather much   Social Connections: Unknown (6/3/2024)    Social Connection and Isolation Panel [NHANES]      Frequency of Communication with Friends and Family: Not on file      Frequency of Social Gatherings with Friends and Family: Once a week      Attends Confucianist Services: Not on file      Active Member of Clubs or Organizations: Not on file      Attends  "Club or Organization Meetings: Not on file      Marital Status: Not on file   Interpersonal Safety: Not At Risk (6/15/2023)    Humiliation, Afraid, Rape, and Kick questionnaire      Fear of Current or Ex-Partner: No      Emotionally Abused: No      Physically Abused: No      Sexually Abused: No   Housing Stability: Low Risk  (6/3/2024)    Housing Stability      Do you have housing? : Yes      Are you worried about losing your housing?: No         FAMILY HISTORY:  Family History   Problem Relation Age of Onset     Hypertension Mother      Cancer - colorectal Mother      Skin Cancer Mother      Colon Cancer Mother      Hypertension Father      Prostate Cancer Father      Melanoma No family hx of      Anesthesia Reaction No family hx of      Venous thrombosis No family hx of      Bleeding Disorder No family hx of    Father had prostate, liver, and skin cancer. Mother with colorectal and skin cancer.      PHYSICAL EXAM:  Vital signs:  BP (!) 149/72   Pulse 66   Resp 16   Ht 1.727 m (5' 8\")   Wt 97.9 kg (215 lb 12.8 oz)   SpO2 99%   BMI 32.81 kg/m     ECO  GENERAL: No acute distress.  EYES: No scleral icterus. No overt erythema.  RESPIRATORY: No audible cough, wheezing, or labored breathing.  MUSCULOSKELETAL: Range of motion in the neck, shoulders, and arms appear normal.  SKIN: No overt rashes, discolorations, or lesions over the face and neck.  NEUROLOGIC: Alert.  No overt tremors.  PSYCHIATRIC: Normal affect and mood.  Does not appear anxious.       LABS:  CBC RESULTS:   Recent Labs   Lab Test 24  0904   WBC 7.1   RBC 4.96   HGB 15.1   HCT 43.2   MCV 87   MCH 30.4   MCHC 35.0   RDW 12.4         Last Comprehensive Metabolic Panel:  Sodium   Date Value Ref Range Status   2024 139 135 - 145 mmol/L Final   2021 133 133 - 144 mmol/L Final     Potassium   Date Value Ref Range Status   2024 4.4 3.4 - 5.3 mmol/L Final   05/10/2022 4.6 3.4 - 5.3 mmol/L Final   2021 4.4 3.4 - 5.3 " mmol/L Final     Chloride   Date Value Ref Range Status   11/05/2024 100 98 - 107 mmol/L Final   05/10/2022 99 94 - 109 mmol/L Final   04/06/2021 101 94 - 109 mmol/L Final     Carbon Dioxide   Date Value Ref Range Status   04/06/2021 31 20 - 32 mmol/L Final     Carbon Dioxide (CO2)   Date Value Ref Range Status   11/05/2024 26 22 - 29 mmol/L Final   05/10/2022 28 20 - 32 mmol/L Final     Anion Gap   Date Value Ref Range Status   11/05/2024 13 7 - 15 mmol/L Final   05/10/2022 4 3 - 14 mmol/L Final   04/06/2021 1 (L) 3 - 14 mmol/L Final     Glucose   Date Value Ref Range Status   11/05/2024 152 (H) 70 - 99 mg/dL Final   05/10/2022 112 (H) 70 - 99 mg/dL Final   04/06/2021 110 (H) 70 - 99 mg/dL Final     Urea Nitrogen   Date Value Ref Range Status   11/05/2024 13.5 8.0 - 23.0 mg/dL Final   05/10/2022 10 7 - 30 mg/dL Final   04/06/2021 8 7 - 30 mg/dL Final     Creatinine   Date Value Ref Range Status   11/05/2024 1.12 0.67 - 1.17 mg/dL Final   04/06/2021 0.92 0.66 - 1.25 mg/dL Final     GFR Estimate   Date Value Ref Range Status   11/05/2024 72 >60 mL/min/1.73m2 Final     Comment:     eGFR calculated using 2021 CKD-EPI equation.   04/06/2021 87 >60 mL/min/[1.73_m2] Final     Comment:     Non  GFR Calc  Starting 12/18/2018, serum creatinine based estimated GFR (eGFR) will be   calculated using the Chronic Kidney Disease Epidemiology Collaboration   (CKD-EPI) equation.       GFR, ESTIMATED POCT   Date Value Ref Range Status   02/28/2024 >60 >60 mL/min/1.73m2 Final     Calcium   Date Value Ref Range Status   11/05/2024 9.6 8.8 - 10.4 mg/dL Final     Comment:     Reference intervals for this test were updated on 7/16/2024 to reflect our healthy population more accurately. There may be differences in the flagging of prior results with similar values performed with this method. Those prior results can be interpreted in the context of the updated reference intervals.   04/06/2021 9.3 8.5 - 10.1 mg/dL Final      Bilirubin Total   Date Value Ref Range Status   11/05/2024 2.3 (H) <=1.2 mg/dL Final   04/06/2021 2.3 (H) 0.2 - 1.3 mg/dL Final     Alkaline Phosphatase   Date Value Ref Range Status   11/05/2024 54 40 - 150 U/L Final   04/06/2021 60 40 - 150 U/L Final     ALT   Date Value Ref Range Status   11/05/2024 22 0 - 70 U/L Final   04/06/2021 70 0 - 70 U/L Final     AST   Date Value Ref Range Status   11/05/2024 32 0 - 45 U/L Final   04/06/2021 66 (H) 0 - 45 U/L Final       ASSESSMENT/PLAN:  Ronnie Lagos is a 69 year old male with the following issues:  1. Metastatic non-castrate prostate adenocarcinoma, [place] 4+5=9  2. 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 Hypertension  5. Hypersensitivity reaction to Taxotere  --2/28/2024 PSMA PET showed metastatic disease to the pelvic and retroperitoneal lymph nodes as well as greater than 10 sites of bony metastatic disease. I showed him the images today.  --Given high volume disease, he started Casodex 50 mg daily for first month, Lupron every 3 months, darolutamide 600 mg orally BID, and tried Taxotere twice but developed hypersensitivity reaction only 4-5 minutes into infusion.  Therefore, I recommended discontinuation of Taxotere.  Discussed again that cabazitaxel could be utilized in the future upon disease progression.  --Continue denosumab (Xgeva) every 4 weeks.   --Will continue to monitor CBC, CMP, and PSA monthly.    5. 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    6. Left shoulder pain  --Related to post-arthroplasty pain.  Continue Tylenol as needed.    7. Left upper molar pain  --He may need bone implant/grafting in the future but would need to hold Xgeva for 3 months if needed.  He might be able to do a less invasive option with a flipper.    8. Right knee osteoarthritis  --He will discuss right knee arthroplasty versus shaving the right knee cap  "with orthopedics.    9. Right rib pain related to multiple right-sided rib fractures.  --Has known metastatic disease to ribs/bones.  --8/7/2024 Rib x-rays showed multiple indeterminate chronicity right rib fractures.  --Continue conservative management.    10. Insomnia  11. Male hot flashes  --Multifactorially related to joint pain and hot flashes.  --Not alleviated with CBD, THC gummies, or melatonin.  --Suggested acupuncture but does not like needles.  --Offered venlafaxine -- discussed potential adverse effects and he agrees to try it.  --Also encouraged moderate exercise and stretching.    Return in 3 months.    Anabell Mercedes MD  [facility] Hematology/Oncology     Total time spent today: 40 minutes in chart review, patient evaluation, counseling, documentation, test and/or medication/prescription orders, and coordination of care.      The longitudinal plan of care for the diagnosis(es)/condition(s) as documented were addressed during this visit. Due to the added complexity in care, I will continue to support Ronnie in the subsequent management and with ongoing continuity of care.      Oncology Rooming Note    November 6, 2024 8:24 AM   Ronnie Lagos is a 69 year old male who presents for:    Chief Complaint   Patient presents with     Oncology Clinic Visit     Initial Vitals: There were no vitals taken for this visit. Estimated body mass index is 32.93 kg/m  as calculated from the following:    Height as of 10/8/24: 1.727 m (5' 8\").    Weight as of 10/8/24: 98.2 kg (216 lb 9.6 oz). There is no height or weight on file to calculate BSA.  Data Unavailable Comment: Data Unavailable   No LMP for male patient.  Allergies reviewed: Yes  Medications reviewed: Yes    Medications: Medication refills not needed today.  Pharmacy name entered into [employer]:    [id number] - [place], [place] - [address] [facility] - [place], [place] - [address]    Frailty Screening:   Is " the patient here for a new oncology consult visit in cancer care? 2. No        Lizzie Baez MA              Again, thank you for allowing me to participate in the care of your patient.        Sincerely,        Anabell Mercedes MD

## 2024-11-06 NOTE — PATIENT INSTRUCTIONS
Arrange for Xgeva every 4 weeks.  Lupron every 3 months.  Lab draw monthly.  RTC MD in 3 months with lab draw prior to visit.

## 2024-11-06 NOTE — PROGRESS NOTES
"Oncology Rooming Note    November 6, 2024 8:24 AM   Ronnie Lagos is a 69 year old male who presents for:    Chief Complaint   Patient presents with    Oncology Clinic Visit     Initial Vitals: There were no vitals taken for this visit. Estimated body mass index is 32.93 kg/m  as calculated from the following:    Height as of 10/8/24: 1.727 m (5' 8\").    Weight as of 10/8/24: 98.2 kg (216 lb 9.6 oz). There is no height or weight on file to calculate BSA.  Data Unavailable Comment: Data Unavailable   No LMP for male patient.  Allergies reviewed: Yes  Medications reviewed: Yes    Medications: Medication refills not needed today.  Pharmacy name entered into [facility]:    [id number] - [place], [place] - [address] [facility] - [place], [place] - [address]    Frailty Screening:   Is the patient here for a new oncology consult visit in cancer care? 2. No        Lizzie Baez MA            "

## 2024-11-18 DIAGNOSIS — C61 PROSTATE CANCER METASTATIC TO MULTIPLE SITES (H): Primary | ICD-10-CM

## 2024-12-02 DIAGNOSIS — C61 PROSTATE CANCER METASTATIC TO MULTIPLE SITES (H): Primary | ICD-10-CM

## 2024-12-04 ENCOUNTER — INFUSION THERAPY VISIT (OUTPATIENT)
Dept: INFUSION THERAPY | Facility: CLINIC | Age: 69
End: 2024-12-04
Attending: INTERNAL MEDICINE
Payer: COMMERCIAL

## 2024-12-04 ENCOUNTER — DOCUMENTATION ONLY (OUTPATIENT)
Dept: PHARMACY | Facility: CLINIC | Age: 69
End: 2024-12-04

## 2024-12-04 VITALS
TEMPERATURE: 97.1 F | SYSTOLIC BLOOD PRESSURE: 157 MMHG | HEART RATE: 65 BPM | RESPIRATION RATE: 16 BRPM | WEIGHT: 220 LBS | BODY MASS INDEX: 33.45 KG/M2 | DIASTOLIC BLOOD PRESSURE: 85 MMHG | OXYGEN SATURATION: 97 %

## 2024-12-04 DIAGNOSIS — C79.51 PROSTATE CANCER METASTATIC TO BONE (H): Primary | ICD-10-CM

## 2024-12-04 DIAGNOSIS — C61 PROSTATE CANCER METASTATIC TO MULTIPLE SITES (H): ICD-10-CM

## 2024-12-04 DIAGNOSIS — C61 PROSTATE CANCER METASTATIC TO BONE (H): Primary | ICD-10-CM

## 2024-12-04 LAB
ALBUMIN SERPL BCG-MCNC: 4.3 G/DL (ref 3.5–5.2)
ALP SERPL-CCNC: 58 U/L (ref 40–150)
ALT SERPL W P-5'-P-CCNC: 25 U/L (ref 0–70)
ANION GAP SERPL CALCULATED.3IONS-SCNC: 14 MMOL/L (ref 7–15)
AST SERPL W P-5'-P-CCNC: 33 U/L (ref 0–45)
BASOPHILS # BLD AUTO: 0.1 10E3/UL (ref 0–0.2)
BASOPHILS NFR BLD AUTO: 1 %
BILIRUB SERPL-MCNC: 1.3 MG/DL
BUN SERPL-MCNC: 10.5 MG/DL (ref 8–23)
CALCIUM SERPL-MCNC: 9.2 MG/DL (ref 8.8–10.4)
CHLORIDE SERPL-SCNC: 101 MMOL/L (ref 98–107)
CREAT SERPL-MCNC: 0.94 MG/DL (ref 0.67–1.17)
EGFRCR SERPLBLD CKD-EPI 2021: 88 ML/MIN/1.73M2
EOSINOPHIL # BLD AUTO: 0.2 10E3/UL (ref 0–0.7)
EOSINOPHIL NFR BLD AUTO: 3 %
ERYTHROCYTE [DISTWIDTH] IN BLOOD BY AUTOMATED COUNT: 12.7 % (ref 10–15)
GLUCOSE SERPL-MCNC: 149 MG/DL (ref 70–99)
HCO3 SERPL-SCNC: 22 MMOL/L (ref 22–29)
HCT VFR BLD AUTO: 40.1 % (ref 40–53)
HGB BLD-MCNC: 14 G/DL (ref 13.3–17.7)
IMM GRANULOCYTES # BLD: 0 10E3/UL
IMM GRANULOCYTES NFR BLD: 0 %
LYMPHOCYTES # BLD AUTO: 1.2 10E3/UL (ref 0.8–5.3)
LYMPHOCYTES NFR BLD AUTO: 18 %
MCH RBC QN AUTO: 30.7 PG (ref 26.5–33)
MCHC RBC AUTO-ENTMCNC: 34.9 G/DL (ref 31.5–36.5)
MCV RBC AUTO: 88 FL (ref 78–100)
MONOCYTES # BLD AUTO: 0.3 10E3/UL (ref 0–1.3)
MONOCYTES NFR BLD AUTO: 5 %
NEUTROPHILS # BLD AUTO: 4.9 10E3/UL (ref 1.6–8.3)
NEUTROPHILS NFR BLD AUTO: 73 %
NRBC # BLD AUTO: 0 10E3/UL
NRBC BLD AUTO-RTO: 0 /100
PHOSPHATE SERPL-MCNC: 3.3 MG/DL (ref 2.5–4.5)
PLATELET # BLD AUTO: 225 10E3/UL (ref 150–450)
POTASSIUM SERPL-SCNC: 4.2 MMOL/L (ref 3.4–5.3)
PROT SERPL-MCNC: 7.3 G/DL (ref 6.4–8.3)
PSA SERPL DL<=0.01 NG/ML-MCNC: 0.04 NG/ML (ref 0–4.5)
RBC # BLD AUTO: 4.56 10E6/UL (ref 4.4–5.9)
SODIUM SERPL-SCNC: 137 MMOL/L (ref 135–145)
WBC # BLD AUTO: 6.7 10E3/UL (ref 4–11)

## 2024-12-04 PROCEDURE — 96402 CHEMO HORMON ANTINEOPL SQ/IM: CPT

## 2024-12-04 PROCEDURE — 96372 THER/PROPH/DIAG INJ SC/IM: CPT | Mod: XS | Performed by: NURSE PRACTITIONER

## 2024-12-04 PROCEDURE — 84100 ASSAY OF PHOSPHORUS: CPT | Performed by: NURSE PRACTITIONER

## 2024-12-04 PROCEDURE — 250N000011 HC RX IP 250 OP 636: Mod: JZ | Performed by: NURSE PRACTITIONER

## 2024-12-04 PROCEDURE — 85004 AUTOMATED DIFF WBC COUNT: CPT | Performed by: INTERNAL MEDICINE

## 2024-12-04 PROCEDURE — 82947 ASSAY GLUCOSE BLOOD QUANT: CPT | Performed by: INTERNAL MEDICINE

## 2024-12-04 PROCEDURE — 84460 ALANINE AMINO (ALT) (SGPT): CPT | Performed by: INTERNAL MEDICINE

## 2024-12-04 PROCEDURE — 80053 COMPREHEN METABOLIC PANEL: CPT | Performed by: INTERNAL MEDICINE

## 2024-12-04 PROCEDURE — 36415 COLL VENOUS BLD VENIPUNCTURE: CPT

## 2024-12-04 PROCEDURE — 250N000011 HC RX IP 250 OP 636: Mod: JZ | Performed by: INTERNAL MEDICINE

## 2024-12-04 PROCEDURE — 85041 AUTOMATED RBC COUNT: CPT | Performed by: INTERNAL MEDICINE

## 2024-12-04 PROCEDURE — 84153 ASSAY OF PSA TOTAL: CPT | Performed by: INTERNAL MEDICINE

## 2024-12-04 RX ORDER — ALBUTEROL SULFATE 90 UG/1
1-2 INHALANT RESPIRATORY (INHALATION)
Start: 2024-12-07

## 2024-12-04 RX ORDER — ALBUTEROL SULFATE 0.83 MG/ML
2.5 SOLUTION RESPIRATORY (INHALATION)
OUTPATIENT
Start: 2024-12-07

## 2024-12-04 RX ORDER — METHYLPREDNISOLONE SODIUM SUCCINATE 125 MG/2ML
125 INJECTION INTRAMUSCULAR; INTRAVENOUS
Start: 2024-12-07

## 2024-12-04 RX ORDER — EPINEPHRINE 1 MG/ML
0.3 INJECTION, SOLUTION INTRAMUSCULAR; SUBCUTANEOUS EVERY 5 MIN PRN
OUTPATIENT
Start: 2024-12-07

## 2024-12-04 RX ORDER — DIPHENHYDRAMINE HYDROCHLORIDE 50 MG/ML
50 INJECTION INTRAMUSCULAR; INTRAVENOUS
Start: 2024-12-07

## 2024-12-04 RX ORDER — MEPERIDINE HYDROCHLORIDE 25 MG/ML
25 INJECTION INTRAMUSCULAR; INTRAVENOUS; SUBCUTANEOUS EVERY 30 MIN PRN
OUTPATIENT
Start: 2024-12-07

## 2024-12-04 RX ADMIN — LEUPROLIDE ACETATE 22.5 MG: KIT at 11:03

## 2024-12-04 RX ADMIN — DENOSUMAB 120 MG: 120 INJECTION SUBCUTANEOUS at 11:02

## 2024-12-04 NOTE — PROGRESS NOTES
Infusion Nursing Note:  Ronnie Lagos presents today for Labs + Xgeva + Lupron.    Patient seen by provider today: No   present during visit today: Not Applicable.    Note: Ronnie reports feeling well today, denies fever/cough/chills/SOB/chest pain.    He confirms he is taking a calcium/vitamin D supplement. No recent or upcoming dental procedures or surgeries.     Xgeva given to right upper arm.  Lupron injection to right glute.    Intravenous Access:  Lab draw site left AC, Needle type butterfly, Gauge 23.  Labs drawn without difficulty.    Treatment Conditions:  Lab Results   Component Value Date     12/04/2024    POTASSIUM 4.2 12/04/2024    CR 0.94 12/04/2024    AV 9.2 12/04/2024    BILITOTAL 1.3 (H) 12/04/2024    ALBUMIN 4.3 12/04/2024    ALT 25 12/04/2024    AST 33 12/04/2024     Results reviewed, labs MET treatment parameters, ok to proceed with treatment.    Post Infusion Assessment:  Patient tolerated injection without incident.  Site patent and intact, free from redness, edema or discomfort.     Discharge Plan:   Discharge instructions reviewed with: Patient.  Patient and/or family verbalized understanding of discharge instructions and all questions answered.  AVS to patient via [employer].  Patient will return 1/2/2025 for next appointment.   Patient discharged in stable condition accompanied by: self.  Departure Mode: Ambulatory.    Daniel Walker RN

## 2024-12-04 NOTE — PROGRESS NOTES
Oral Chemotherapy Monitoring Program  Lab Follow Up    Reviewed lab results from 12/4/24.        6/20/2024    12:00 PM 7/10/2024    11:00 AM 8/22/2024    11:00 AM 9/4/2024    12:00 PM 10/8/2024    12:00 PM 11/18/2024     4:00 PM 12/4/2024    12:00 PM   ORAL CHEMOTHERAPY   Assessment Type Refill Lab Monitoring Lab Monitoring Lab Monitoring Lab Monitoring Refill;Chart Review Lab Monitoring   Diagnosis Code Prostate Cancer Prostate Cancer Prostate Cancer Prostate Cancer Prostate Cancer Prostate Cancer Prostate Cancer   Providers Dr. Daren Mercedes   Clinic Name/Location [facility]   Drug Name Nubeqa (darolutamide) Nubeqa (darolutamide) Nubeqa (darolutamide) Nubeqa (darolutamide) Nubeqa (darolutamide) Nubeqa (darolutamide) Nubeqa (darolutamide)   Dose 600 mg 600 mg 600 mg 600 mg 600 mg 600 mg 600 mg   Current Schedule   BID BID BID BID BID   Cycle Details Continuous Continuous Continuous Continuous Continuous Continuous Continuous   Planned next cycle start date   8/30/2024 9/20/2024      Adverse Effects  Increased AST/ALT/T BILI   Increased AST/ALT/T BILI  No AE identified during assessment   Increased AST/ALT/T BILI  Grade 1   Grade 1     Pharmacist Intervention(increased ast/alt/t bili)  No   No     Any new drug interactions? No   No  No    Is the dose as ordered appropriate for the patient? Yes Yes  Yes Yes Yes    Has the patient missed any days of school, work, or other routine activity?   No       Since the last time we talked, have you been hospitalized or used the emergency room?   No           Labs:  _  Result Component Current Result Ref Range   Sodium 137 (12/4/2024) 135 - 145 mmol/L     _  Result Component Current Result Ref Range   Potassium 4.2 (12/4/2024) 3.4 - 5.3 mmol/L     _  Result Component Current Result Ref Range   Calcium 9.2 (12/4/2024) 8.8 - 10.4 mg/dL     No results found for Mag within  last 30 days.     _  Result Component Current Result Ref Range   Phosphorus 3.3 (12/4/2024) 2.5 - 4.5 mg/dL     _  Result Component Current Result Ref Range   Albumin 4.3 (12/4/2024) 3.5 - 5.2 g/dL     _  Result Component Current Result Ref Range   Urea Nitrogen 10.5 (12/4/2024) 8.0 - 23.0 mg/dL     _  Result Component Current Result Ref Range   Creatinine 0.94 (12/4/2024) 0.67 - 1.17 mg/dL     _  Result Component Current Result Ref Range   AST 33 (12/4/2024) 0 - 45 U/L     _  Result Component Current Result Ref Range   ALT 25 (12/4/2024) 0 - 70 U/L     _  Result Component Current Result Ref Range   Bilirubin Total 1.3 (H) (12/4/2024) <=1.2 mg/dL     _  Result Component Current Result Ref Range   WBC Count 6.7 (12/4/2024) 4.0 - 11.0 10e3/uL     _  Result Component Current Result Ref Range   Hemoglobin 14.0 (12/4/2024) 13.3 - 17.7 g/dL     _  Result Component Current Result Ref Range   Platelet Count 225 (12/4/2024) 150 - 450 10e3/uL     _  Result Component Current Result Ref Range   Absolute Neutrophils 4.9 (12/4/2024) 1.6 - 8.3 10e3/uL        Assessment & Plan:  No concerning abnormalities. Bilirubin is slightly elevated, but is elevated at baseline and stable. Continue to monitor. Ok to proceed with darolutamide.     Questions answered to patient's satisfaction.    Follow-Up:  1/2/25 - Labs    Rachel Moniz, PharmD  Hematology/Oncology Pharmacist  [facility]  [phone]

## 2024-12-13 ENCOUNTER — HOSPITAL ENCOUNTER (OUTPATIENT)
Facility: CLINIC | Age: 69
Discharge: HOME | End: 2024-12-13
Attending: INTERNAL MEDICINE | Admitting: INTERNAL MEDICINE
Payer: COMMERCIAL

## 2024-12-13 VITALS
HEIGHT: 68 IN | HEART RATE: 54 BPM | OXYGEN SATURATION: 99 % | DIASTOLIC BLOOD PRESSURE: 81 MMHG | RESPIRATION RATE: 29 BRPM | BODY MASS INDEX: 33.34 KG/M2 | SYSTOLIC BLOOD PRESSURE: 139 MMHG | WEIGHT: 220 LBS

## 2024-12-13 LAB — COLONOSCOPY: NORMAL

## 2024-12-13 PROCEDURE — 88305 TISSUE EXAM BY PATHOLOGIST: CPT | Mod: TC | Performed by: INTERNAL MEDICINE

## 2024-12-13 PROCEDURE — G0500 MOD SEDAT ENDO SERVICE >5YRS: HCPCS | Performed by: INTERNAL MEDICINE

## 2024-12-13 PROCEDURE — 250N000011 HC RX IP 250 OP 636: Performed by: INTERNAL MEDICINE

## 2024-12-13 PROCEDURE — 45380 COLONOSCOPY AND BIOPSY: CPT | Performed by: INTERNAL MEDICINE

## 2024-12-13 RX ORDER — FENTANYL CITRATE 50 UG/ML
INJECTION, SOLUTION INTRAMUSCULAR; INTRAVENOUS PRN
Status: DISCONTINUED | OUTPATIENT
Start: 2024-12-13 | End: 2024-12-13 | Stop reason: HOSPADM

## 2024-12-13 ASSESSMENT — ACTIVITIES OF DAILY LIVING (ADL)
ADLS_ACUITY_SCORE: 46
ADLS_ACUITY_SCORE: 46

## 2024-12-16 LAB
PATH REPORT.COMMENTS IMP SPEC: NORMAL
PATH REPORT.COMMENTS IMP SPEC: NORMAL
PATH REPORT.FINAL DX SPEC: NORMAL
PATH REPORT.GROSS SPEC: NORMAL
PATH REPORT.MICROSCOPIC SPEC OTHER STN: NORMAL
PATH REPORT.RELEVANT HX SPEC: NORMAL
PHOTO IMAGE: NORMAL

## 2024-12-18 DIAGNOSIS — C61 PROSTATE CANCER METASTATIC TO MULTIPLE SITES (H): Primary | ICD-10-CM

## 2024-12-23 ENCOUNTER — TELEPHONE (OUTPATIENT)
Dept: PHARMACY | Facility: CLINIC | Age: 69
End: 2024-12-23
Payer: COMMERCIAL

## 2024-12-23 NOTE — TELEPHONE ENCOUNTER
ANIYAH APPROVED    Medication: NUBEQA 300 MG PO TABS  Amount: $ 8,000  [facility] Name: [facility] Effective Date: 11/23/2024  Foundation Expiration Date: 11/22/2025  Additional Information: fpap convert  Patient Notified: yes

## 2024-12-30 PROBLEM — D12.6 ADENOMA OF COLON: Status: ACTIVE | Noted: 2024-12-30

## 2024-12-31 ENCOUNTER — PATIENT OUTREACH (OUTPATIENT)
Dept: GASTROENTEROLOGY | Facility: CLINIC | Age: 69
End: 2024-12-31
Payer: COMMERCIAL

## 2025-01-02 ENCOUNTER — INFUSION THERAPY VISIT (OUTPATIENT)
Dept: INFUSION THERAPY | Facility: CLINIC | Age: 70
End: 2025-01-02
Attending: INTERNAL MEDICINE
Payer: COMMERCIAL

## 2025-01-02 ENCOUNTER — DOCUMENTATION ONLY (OUTPATIENT)
Dept: PHARMACY | Facility: CLINIC | Age: 70
End: 2025-01-02

## 2025-01-02 VITALS
OXYGEN SATURATION: 98 % | TEMPERATURE: 97.4 F | DIASTOLIC BLOOD PRESSURE: 91 MMHG | SYSTOLIC BLOOD PRESSURE: 152 MMHG | HEART RATE: 56 BPM

## 2025-01-02 DIAGNOSIS — C61 PROSTATE CANCER METASTATIC TO MULTIPLE SITES (H): ICD-10-CM

## 2025-01-02 DIAGNOSIS — C61 PROSTATE CANCER METASTATIC TO BONE (H): Primary | ICD-10-CM

## 2025-01-02 DIAGNOSIS — C79.51 PROSTATE CANCER METASTATIC TO BONE (H): Primary | ICD-10-CM

## 2025-01-02 LAB
ALBUMIN SERPL BCG-MCNC: 4.4 G/DL (ref 3.5–5.2)
ALP SERPL-CCNC: 55 U/L (ref 40–150)
ALT SERPL W P-5'-P-CCNC: 22 U/L (ref 0–70)
ANION GAP SERPL CALCULATED.3IONS-SCNC: 11 MMOL/L (ref 7–15)
AST SERPL W P-5'-P-CCNC: 27 U/L (ref 0–45)
BASOPHILS # BLD AUTO: 0.1 10E3/UL (ref 0–0.2)
BASOPHILS NFR BLD AUTO: 1 %
BILIRUB SERPL-MCNC: 1.5 MG/DL
BUN SERPL-MCNC: 11.9 MG/DL (ref 8–23)
CALCIUM SERPL-MCNC: 9.2 MG/DL (ref 8.8–10.4)
CHLORIDE SERPL-SCNC: 102 MMOL/L (ref 98–107)
CREAT SERPL-MCNC: 0.99 MG/DL (ref 0.67–1.17)
EGFRCR SERPLBLD CKD-EPI 2021: 83 ML/MIN/1.73M2
EOSINOPHIL # BLD AUTO: 0.2 10E3/UL (ref 0–0.7)
EOSINOPHIL NFR BLD AUTO: 4 %
ERYTHROCYTE [DISTWIDTH] IN BLOOD BY AUTOMATED COUNT: 12.2 % (ref 10–15)
GLUCOSE SERPL-MCNC: 141 MG/DL (ref 70–99)
HCO3 SERPL-SCNC: 24 MMOL/L (ref 22–29)
HCT VFR BLD AUTO: 40.9 % (ref 40–53)
HGB BLD-MCNC: 14.1 G/DL (ref 13.3–17.7)
IMM GRANULOCYTES # BLD: 0 10E3/UL
IMM GRANULOCYTES NFR BLD: 0 %
LYMPHOCYTES # BLD AUTO: 1.3 10E3/UL (ref 0.8–5.3)
LYMPHOCYTES NFR BLD AUTO: 21 %
MCH RBC QN AUTO: 29.9 PG (ref 26.5–33)
MCHC RBC AUTO-ENTMCNC: 34.5 G/DL (ref 31.5–36.5)
MCV RBC AUTO: 87 FL (ref 78–100)
MONOCYTES # BLD AUTO: 0.4 10E3/UL (ref 0–1.3)
MONOCYTES NFR BLD AUTO: 7 %
NEUTROPHILS # BLD AUTO: 4 10E3/UL (ref 1.6–8.3)
NEUTROPHILS NFR BLD AUTO: 67 %
NRBC # BLD AUTO: 0 10E3/UL
NRBC BLD AUTO-RTO: 0 /100
PLATELET # BLD AUTO: 208 10E3/UL (ref 150–450)
POTASSIUM SERPL-SCNC: 3.9 MMOL/L (ref 3.4–5.3)
PROT SERPL-MCNC: 7.1 G/DL (ref 6.4–8.3)
PSA SERPL DL<=0.01 NG/ML-MCNC: 0.07 NG/ML (ref 0–4.5)
RBC # BLD AUTO: 4.71 10E6/UL (ref 4.4–5.9)
SODIUM SERPL-SCNC: 137 MMOL/L (ref 135–145)
WBC # BLD AUTO: 6 10E3/UL (ref 4–11)

## 2025-01-02 PROCEDURE — 84153 ASSAY OF PSA TOTAL: CPT | Performed by: INTERNAL MEDICINE

## 2025-01-02 PROCEDURE — 80053 COMPREHEN METABOLIC PANEL: CPT | Performed by: INTERNAL MEDICINE

## 2025-01-02 PROCEDURE — 36415 COLL VENOUS BLD VENIPUNCTURE: CPT

## 2025-01-02 PROCEDURE — 96372 THER/PROPH/DIAG INJ SC/IM: CPT | Performed by: NURSE PRACTITIONER

## 2025-01-02 PROCEDURE — 85025 COMPLETE CBC W/AUTO DIFF WBC: CPT | Performed by: INTERNAL MEDICINE

## 2025-01-02 RX ORDER — MEPERIDINE HYDROCHLORIDE 25 MG/ML
25 INJECTION INTRAMUSCULAR; INTRAVENOUS; SUBCUTANEOUS EVERY 30 MIN PRN
OUTPATIENT
Start: 2025-01-03

## 2025-01-02 RX ORDER — EPINEPHRINE 1 MG/ML
0.3 INJECTION, SOLUTION INTRAMUSCULAR; SUBCUTANEOUS EVERY 5 MIN PRN
OUTPATIENT
Start: 2025-01-03

## 2025-01-02 RX ORDER — METHYLPREDNISOLONE SODIUM SUCCINATE 125 MG/2ML
125 INJECTION INTRAMUSCULAR; INTRAVENOUS
Start: 2025-01-03

## 2025-01-02 RX ORDER — DIPHENHYDRAMINE HYDROCHLORIDE 50 MG/ML
50 INJECTION INTRAMUSCULAR; INTRAVENOUS
Start: 2025-01-03

## 2025-01-02 RX ORDER — ALBUTEROL SULFATE 0.83 MG/ML
2.5 SOLUTION RESPIRATORY (INHALATION)
OUTPATIENT
Start: 2025-01-03

## 2025-01-02 RX ORDER — ALBUTEROL SULFATE 90 UG/1
1-2 INHALANT RESPIRATORY (INHALATION)
Start: 2025-01-03

## 2025-01-02 NOTE — PROGRESS NOTES
Oral Chemotherapy Monitoring Program  Lab Follow Up    Reviewed lab results from 1/2/25.        7/10/2024    11:00 AM 8/22/2024    11:00 AM 9/4/2024    12:00 PM 10/8/2024    12:00 PM 11/18/2024     4:00 PM 12/4/2024    12:00 PM 1/2/2025    11:00 AM   ORAL CHEMOTHERAPY   Assessment Type Lab Monitoring Lab Monitoring Lab Monitoring Lab Monitoring Refill;Chart Review Lab Monitoring Lab Monitoring   Diagnosis Code Prostate Cancer Prostate Cancer Prostate Cancer Prostate Cancer Prostate Cancer Prostate Cancer Prostate Cancer   Providers Dr. Daren Mercedes   Clinic Name/Location [facility]   Drug Name Nubeqa (darolutamide) Nubeqa (darolutamide) Nubeqa (darolutamide) Nubeqa (darolutamide) Nubeqa (darolutamide) Nubeqa (darolutamide) Nubeqa (darolutamide)   Dose 600 mg 600 mg 600 mg 600 mg 600 mg 600 mg 600 mg   Current Schedule  BID BID BID BID BID BID   Cycle Details Continuous Continuous Continuous Continuous Continuous Continuous Continuous   Planned next cycle start date  8/30/2024 9/20/2024       Adverse Effects Increased AST/ALT/T BILI   Increased AST/ALT/T BILI  No AE identified during assessment No AE identified during assessment   Increased AST/ALT/T BILI Grade 1   Grade 1      Pharmacist Intervention(increased ast/alt/t bili) No   No      Any new drug interactions?   No  No     Is the dose as ordered appropriate for the patient? Yes  Yes Yes Yes     Has the patient missed any days of school, work, or other routine activity?  No        Since the last time we talked, have you been hospitalized or used the emergency room?  No            Labs:  _  Result Component Current Result Ref Range   Sodium 137 (1/2/2025) 135 - 145 mmol/L     _  Result Component Current Result Ref Range   Potassium 3.9 (1/2/2025) 3.4 - 5.3 mmol/L     _  Result Component Current Result Ref Range   Calcium 9.2 (1/2/2025) 8.8 - 10.4 mg/dL      No results found for Mag within last 30 days.     _  Result Component Current Result Ref Range   Phosphorus 3.3 (12/4/2024) 2.5 - 4.5 mg/dL     _  Result Component Current Result Ref Range   Albumin 4.4 (1/2/2025) 3.5 - 5.2 g/dL     _  Result Component Current Result Ref Range   Urea Nitrogen 11.9 (1/2/2025) 8.0 - 23.0 mg/dL     _  Result Component Current Result Ref Range   Creatinine 0.99 (1/2/2025) 0.67 - 1.17 mg/dL     _  Result Component Current Result Ref Range   AST 27 (1/2/2025) 0 - 45 U/L     _  Result Component Current Result Ref Range   ALT 22 (1/2/2025) 0 - 70 U/L     _  Result Component Current Result Ref Range   Bilirubin Total 1.5 (H) (1/2/2025) <=1.2 mg/dL     _  Result Component Current Result Ref Range   WBC Count 6.0 (1/2/2025) 4.0 - 11.0 10e3/uL     _  Result Component Current Result Ref Range   Hemoglobin 14.1 (1/2/2025) 13.3 - 17.7 g/dL     _  Result Component Current Result Ref Range   Platelet Count 208 (1/2/2025) 150 - 450 10e3/uL     No results found for ANC within last 30 days.     _  Result Component Current Result Ref Range   Absolute Neutrophils 4.0 (1/2/2025) 1.6 - 8.3 10e3/uL        Assessment  No concerning abnormalities in labs today.     Plan   Continue NubeYoubei Gamea  Labs and Mercedes on 1/30    [facility]  Pharmacy Intern   [facility]   [phone]

## 2025-01-02 NOTE — PROGRESS NOTES
Infusion Nursing Note:  Ronnie Lagos presents today for labs/Xgeva.    Patient seen by provider today: No   present during visit today: Not Applicable.    Note: Ronnie verifies he is taking Ca/Vit D as prescribed. No upcoming invasive dental procedures    Continues po Darolutamide and Q3mo Lupron.      Intravenous Access:  Lab draw site LAC, Needle type butterfly, Gauge 23.  Labs drawn without difficulty.    Treatment Conditions:  Lab Results   Component Value Date     01/02/2025    POTASSIUM 3.9 01/02/2025    CR 0.99 01/02/2025    AV 9.2 01/02/2025    BILITOTAL 1.5 (H) 01/02/2025    ALBUMIN 4.4 01/02/2025    ALT 22 01/02/2025    AST 27 01/02/2025       Results reviewed, labs MET treatment parameters, ok to proceed with treatment.      Post Infusion Assessment:  Patient tolerated injection without incident.  Site patent and intact, free from redness, edema or discomfort.       Discharge Plan:   [facility]  Patient will return 1/30/25 to [facility] for labs/RC/Xgeva   Patient discharged in stable condition accompanied by: self.  Departure Mode: Ambulatory.      Nazia Carter RN

## 2025-01-20 ENCOUNTER — DOCUMENTATION ONLY (OUTPATIENT)
Dept: ONCOLOGY | Facility: CLINIC | Age: 70
End: 2025-01-20
Payer: COMMERCIAL

## 2025-01-20 DIAGNOSIS — C61 PROSTATE CANCER METASTATIC TO MULTIPLE SITES (H): Primary | ICD-10-CM

## 2025-01-21 DIAGNOSIS — C61 PROSTATE CANCER METASTATIC TO MULTIPLE SITES (H): Primary | ICD-10-CM

## 2025-01-23 NOTE — PROGRESS NOTES
[facility] Cancer Care    Hematology/Oncology Established Patient Note      Today's Date: 1/30/25    Reason for visit: Metastatic prostate cancer.    HISTORY OF PRESENT ILLNESS: Ronnie Lagos is a 69 year old male with hypertension, GERD who presents with the following oncologic history:  1.  5/11/2023: PSA elevated at 8.08 (prior 3.44 from 5/10/22).  2. 6/29/2023: MRI pelvis -- PI-RADS 4 - 1.4 x 1 x 0.9 cm nodule abutting posterolateral capsule of prostate; no pelvic adenopathy or bone lesions.  3.  8/30/2023: Prostate biopsy - [place] 4+3=7 prostate adenocarcinoma.  4. 10/2/2023: NM bone scan negative.  5.  11/3/2023: Radical prostatectomy, pelvic lymph node excision -- [place] 4+5=9 adenocarcinoma, [id number], margins widely positive for malignancy.  6.  2/7/2024: PSA = 4.06.  7.  2/15/2024: PSA elevated at 5.26.  8.  2/28/2024: PSMA PET showed focal uptake in left side of prostatectomy bed suspicious for recurrent disease; multiple pelvic and few retroperitoneal lymph nodes with uptake; >10 sclerotic osseous lesions with uptake indicative of osseous metastatic disease.  9. 3/20/2024: Started Casodex, Lupron, darolutamide, Xgeva, cycle 1 Taxotere but developed flushing, chest heaviness, nausea (no dyspnea) reaction 5 minutes into Taxotere infusion. Re-challenge not attempted that day.  10.  3/29/2024: Re-challenged Taxotere but developed repeat hypersensitivity reaction 4 minutes into infusion. Taxotere discontinued.  11. 4/17/2024: PSA 0.53.  12. 7/10/2024: PSA 0.06.  13. 10/8/2024: PSA 0.02.  14. 1/28/2025: PSA 0.13.    INTERVAL HISTORY:  Ronnie reports increased right sided rib pain and left upper hip pain.    REVIEW OF SYSTEMS:   14 point ROS was reviewed and is negative other than as noted above in HPI.       HOME MEDICATIONS:  Current Outpatient Medications   Medication Sig Dispense Refill    amLODIPine (NORVASC) 5 MG tablet Take 1 tablet (5 mg) by mouth daily 90 tablet 3    atenolol (TENORMIN) 50 MG  tablet Take 1 tablet (50 mg) by mouth daily 90 tablet 3    calcium citrate (CITRACAL) 950 (200 Ca) MG tablet Take 1 tablet by mouth 2 times daily      [START ON 1/27/2025] darolutamide (NUBEQA) 300 MG tablet Take 2 tablets (600 mg) by mouth 2 times daily. . Swallow tablets whole. Take with food. Avoid grapefruit or grapefruit juice. 120 tablet 0    darolutamide (NUBEQA) 300 MG tablet Take 2 tablets (600 mg) by mouth 2 times daily. . Swallow tablets whole. Take with food. Avoid grapefruit or grapefruit juice. 120 tablet 0    omeprazole (PRILOSEC) 20 MG DR capsule Take 1 capsule (20 mg) by mouth daily 90 capsule 3    venlafaxine (EFFEXOR XR) 37.5 MG 24 hr capsule Take 1 capsule (37.5 mg) by mouth daily. 30 capsule 3    Vitamin D, Cholecalciferol, 10 MCG (400 UNIT) TABS            ALLERGIES:  Allergies   Allergen Reactions    Taxotere [Docetaxel]          PAST MEDICAL HISTORY:  Past Medical History:   Diagnosis Date    Abdominal pain     Actinic keratosis     Basal cell carcinoma     Calculus of bile duct     Elevated liver enzymes     Essential hypertension, benign     abstracted 6/19/02    Gastro-oesophageal reflux disease     GERD (gastroesophageal reflux disease) 07/28/2008    Liver disease     elevated liver enzymes    Prostate cancer (H)     bone and lymph nodes 2024    Squamous cell carcinoma     Unspecified cerebral artery occlusion with cerebral infarction     Unspecified condition of brain     abstracted 6/19/02         PAST SURGICAL HISTORY:  Past Surgical History:   Procedure Laterality Date    ARTHRODESIS FOOT  2/5/2013    Procedure: ARTHRODESIS FOOT;  LEFT FIRST METATARSOPHALANGEAL JOINT ARTHRODESIS ;  Surgeon: Burton Loera DPM;  Location:  [facility]    COLONOSCOPY N/A 12/7/2018    Procedure: COMBINED COLONOSCOPY, SINGLE OR MULTIPLE BIOPSY/POLYPECTOMY BY BIOPSY;  Surgeon: Blayne Mora MD;  Location:  GI    COLONOSCOPY N/A 12/13/2024    Procedure: Colonoscopy;  Surgeon: Arti Mercedes MD;   Location:  GI    DAVINCI PROSTATECTOMY, LYMPHADENECTOMY N/A 11/3/2023    Procedure: PROSTATECTOMY, ROBOT-ASSISTED, WITH PELVIC LYMPHADENECTOMY;  Surgeon: Sophia Ramirez MD;  Location:  OR    ENDOSCOPIC RETROGRADE CHOLANGIOPANCREATOGRAM N/A 1/18/2018    Procedure: COMBINED ENDOSCOPIC RETROGRADE CHOLANGIOPANCREATOGRAPHY, SPHINCTEROTOMY;  ENDOSCOPIC RETROGRADE CHOLANGIOPANCREATOGRAM (ERCP), SPHINCTEROTOMY, STONE REMOVAL, STENT PLACEMENT;  Surgeon: Christiano Sorenson MD;  Location:  OR    ENDOSCOPIC RETROGRADE CHOLANGIOPANCREATOGRAM N/A 4/12/2018    Procedure: ENDOSCOPIC RETROGRADE CHOLANGIOPANCREATOGRAM;  ENDOSCOPIC RETROGRADE CHOLANIOPANCREATOGRAPHY AND ATTEMPTED STENT REMOVAL.;  Surgeon: Christiano Sorenson MD;  Location:  OR    ENDOSCOPIC RETROGRADE CHOLANGIOPANCREATOGRAM N/A 5/10/2018    Procedure: COMBINED ENDOSCOPIC RETROGRADE CHOLANGIOPANCREATOGRAPHY, REMOVE FOREIGN BODY OR STENT/TUBE;  ENDOSCOPIC RETROGRADE CHOLANGIOPANCREATOGRAPHY AND STENT REMOVAL;  Surgeon: Christiano Sorenson MD;  Location:  OR    ESOPHAGOSCOPY, GASTROSCOPY, DUODENOSCOPY (EGD), COMBINED N/A 4/12/2018    Procedure: COMBINED ESOPHAGOSCOPY, GASTROSCOPY, DUODENOSCOPY (EGD);  EGD with stent removal;  Surgeon: Christiano Sorenson MD;  Location: [facility]    LAPAROSCOPIC CHOLECYSTECTOMY N/A 5/1/2015    Procedure: LAPAROSCOPIC CHOLECYSTECTOMY;  Surgeon: Damien Galindo MD;  Location:  [facility]    ORTHOPEDIC SURGERY      left elbow, right shoulder         SOCIAL HISTORY:  Social History     Socioeconomic History    Marital status:      Spouse name: Not on file    Number of children: Not on file    Years of education: Not on file    Highest education level: Not on file   Occupational History     Employer: [employer]   Tobacco Use    Smoking status: Never    Smokeless tobacco: Never   Vaping Use    Vaping status: Never Used   Substance and Sexual Activity    Alcohol use: Not Currently     Alcohol/week:  2.0 standard drinks of alcohol     Types: 2 Standard drinks or equivalent per week    Drug use: Never    Sexual activity: Yes     Partners: Female   Other Topics Concern    Parent/sibling w/ CABG, MI or angioplasty before 65F 55M? No   Social History Narrative    Not on file     Social Drivers of Health     Financial Resource Strain: Low Risk  (6/3/2024)    Financial Resource Strain     Within the past 12 months, have you or your family members you live with been unable to get utilities (heat, electricity) when it was really needed?: No   Food Insecurity: Low Risk  (6/3/2024)    Food Insecurity     Within the past 12 months, did you worry that your food would run out before you got money to buy more?: No     Within the past 12 months, did the food you bought just not last and you didn t have money to get more?: No   Transportation Needs: Low Risk  (6/3/2024)    Transportation Needs     Within the past 12 months, has lack of transportation kept you from medical appointments, getting your medicines, non-medical meetings or appointments, work, or from getting things that you need?: No   Physical Activity: Insufficiently Active (6/3/2024)    Exercise Vital Sign     Days of Exercise per Week: 3 days     Minutes of Exercise per Session: 10 min   Stress: Stress Concern Present (6/3/2024)    [place] [place] [employer] - Occupational Stress Questionnaire     Feeling of Stress : Rather much   Social Connections: Unknown (6/3/2024)    Social Connection and Isolation Panel [NHANES]     Frequency of Communication with Friends and Family: Not on file     Frequency of Social Gatherings with Friends and Family: Once a week     Attends Religion Services: Not on file     Active Member of Clubs or Organizations: Not on file     Attends Club or Organization Meetings: Not on file     Marital Status: Not on file   Interpersonal Safety: Not At Risk (6/15/2023)    Humiliation, Afraid, Rape, and Kick questionnaire     Fear of  Current or Ex-Partner: No     Emotionally Abused: No     Physically Abused: No     Sexually Abused: No   Housing Stability: Low Risk  (6/3/2024)    Housing Stability     Do you have housing? : Yes     Are you worried about losing your housing?: No         FAMILY HISTORY:  Family History   Problem Relation Age of Onset    Hypertension Mother     Cancer - colorectal Mother     Skin Cancer Mother     Colon Cancer Mother     Hypertension Father     Prostate Cancer Father     Melanoma No family hx of     Anesthesia Reaction No family hx of     Venous thrombosis No family hx of     Bleeding Disorder No family hx of    Father had prostate, liver, and skin cancer. Mother with colorectal and skin cancer.      PHYSICAL EXAM:  Vital signs:  BP (!) 163/100   Pulse 57   Temp 97.5  F (36.4  C) (Oral)   Resp 14   Wt 100.3 kg (221 lb 3.2 oz)   SpO2 98%   BMI 33.63 kg/m     ECO  GENERAL: No acute distress.  EYES: No scleral icterus. No overt erythema.  RESPIRATORY: No audible cough, wheezing, or labored breathing.  MUSCULOSKELETAL: Range of motion in the neck, shoulders, and arms appear normal.  SKIN: No overt rashes, discolorations, or lesions over the face and neck.  NEUROLOGIC: Alert.  No overt tremors.  PSYCHIATRIC: Normal affect and mood.  Does not appear anxious.       LABS:  CBC RESULTS:   Recent Labs   Lab Test 24  0904   WBC 7.1   RBC 4.96   HGB 15.1   HCT 43.2   MCV 87   MCH 30.4   MCHC 35.0   RDW 12.4         Last Comprehensive Metabolic Panel:  Sodium   Date Value Ref Range Status   2025 137 135 - 145 mmol/L Final   2021 133 133 - 144 mmol/L Final     Potassium   Date Value Ref Range Status   2025 3.9 3.4 - 5.3 mmol/L Final   05/10/2022 4.6 3.4 - 5.3 mmol/L Final   2021 4.4 3.4 - 5.3 mmol/L Final     Chloride   Date Value Ref Range Status   2025 102 98 - 107 mmol/L Final   05/10/2022 99 94 - 109 mmol/L Final   2021 101 94 - 109 mmol/L Final     Carbon Dioxide    Date Value Ref Range Status   04/06/2021 31 20 - 32 mmol/L Final     Carbon Dioxide (CO2)   Date Value Ref Range Status   01/02/2025 24 22 - 29 mmol/L Final   05/10/2022 28 20 - 32 mmol/L Final     Anion Gap   Date Value Ref Range Status   01/02/2025 11 7 - 15 mmol/L Final   05/10/2022 4 3 - 14 mmol/L Final   04/06/2021 1 (L) 3 - 14 mmol/L Final     Glucose   Date Value Ref Range Status   01/02/2025 141 (H) 70 - 99 mg/dL Final   05/10/2022 112 (H) 70 - 99 mg/dL Final   04/06/2021 110 (H) 70 - 99 mg/dL Final     Urea Nitrogen   Date Value Ref Range Status   01/02/2025 11.9 8.0 - 23.0 mg/dL Final   05/10/2022 10 7 - 30 mg/dL Final   04/06/2021 8 7 - 30 mg/dL Final     Creatinine   Date Value Ref Range Status   01/02/2025 0.99 0.67 - 1.17 mg/dL Final   04/06/2021 0.92 0.66 - 1.25 mg/dL Final     GFR Estimate   Date Value Ref Range Status   01/02/2025 83 >60 mL/min/1.73m2 Final     Comment:     eGFR calculated using 2021 CKD-EPI equation.   04/06/2021 87 >60 mL/min/[1.73_m2] Final     Comment:     Non  GFR Calc  Starting 12/18/2018, serum creatinine based estimated GFR (eGFR) will be   calculated using the Chronic Kidney Disease Epidemiology Collaboration   (CKD-EPI) equation.       GFR, ESTIMATED POCT   Date Value Ref Range Status   02/28/2024 >60 >60 mL/min/1.73m2 Final     Calcium   Date Value Ref Range Status   01/02/2025 9.2 8.8 - 10.4 mg/dL Final     Comment:     Reference intervals for this test were updated on 7/16/2024 to reflect our healthy population more accurately. There may be differences in the flagging of prior results with similar values performed with this method. Those prior results can be interpreted in the context of the updated reference intervals.   04/06/2021 9.3 8.5 - 10.1 mg/dL Final     Bilirubin Total   Date Value Ref Range Status   01/02/2025 1.5 (H) <=1.2 mg/dL Final   04/06/2021 2.3 (H) 0.2 - 1.3 mg/dL Final     Alkaline Phosphatase   Date Value Ref Range Status    01/02/2025 55 40 - 150 U/L Final   04/06/2021 60 40 - 150 U/L Final     ALT   Date Value Ref Range Status   01/02/2025 22 0 - 70 U/L Final   04/06/2021 70 0 - 70 U/L Final     AST   Date Value Ref Range Status   01/02/2025 27 0 - 45 U/L Final   04/06/2021 66 (H) 0 - 45 U/L Final       ASSESSMENT/PLAN:  Ronnie Lagos is a 69 year old male with the following issues:  1. Metastatic non-castrate prostate adenocarcinoma, [place] 4+5=9  2. 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 Hypertension  5. Hypersensitivity reaction to Taxotere  --2/28/2024 PSMA PET showed metastatic disease to the pelvic and retroperitoneal lymph nodes as well as greater than 10 sites of bony metastatic disease. I showed him the images today.  --Given high volume disease, he started Casodex 50 mg daily for first month, Lupron every 3 months, darolutamide 600 mg orally BID, and tried Taxotere twice but developed hypersensitivity reaction only 4-5 minutes into infusion.  Therefore, I had recommended discontinuation of Taxotere.    --Discussed his PSA has increased further to 0.13 consistent with biochemical progression.  He is having increase in bone pain.   --Will obtain new CT chest/abdomen/pelvis and NM bone scan.  --Advised starting cabazitaxel infusion with 10 mg prednisone daily. Will discontinue darolutamide.  --We discussed the schedule and dosing of chemotherapy regimen, as well as counseled on potential side effects, including neutropenia, anemia,  leukopenia, thrombocytopenia, diarrhea, fatigue, nausea, vomiting, constipation, asthenia, abdominal pain, hematuria, back pain, anorexia, peripheral neuropathy, pyrexia, dyspnea, dysgeusia, cough, arthralgia, alopecia, increased risk for infection. Patient expressed understanding and agreed to proceed with chemotherapy.     --Discussed that infusion therapy would be given until disease progression or unacceptable toxicity.  --Discussed alternate option  of Pluvicto and switch to enzalutamide but would recommend utilizing this line of therapy after cabazitaxel progression.  --Continue denosumab (Xgeva) every 4 weeks.   --Will continue to monitor CBC, CMP, and PSA monthly.  --Discussed placing a port -- he would like to hold off for now.    5. 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    6. Left shoulder pain  --Related to post-arthroplasty pain.  Continue Tylenol as needed.    7. Left upper molar pain  --He may need bone implant/grafting in the future but would need to hold Xgeva for 3 months if needed.  He might be able to do a less invasive option with a flipper.    8. Right knee osteoarthritis  --He will discuss right knee arthroplasty versus shaving the right knee cap with orthopedics.    9. Right rib pain related to multiple right-sided rib fractures.  --Has known metastatic disease to ribs/bones.  --8/7/2024 Rib x-rays showed multiple indeterminate chronicity right rib fractures.  --Continue conservative management.    10. Insomnia  11. Male hot flashes  --Multifactorially related to joint pain and hot flashes.  --Not alleviated with CBD, THC gummies, or melatonin.  --Suggested acupuncture but does not like needles.  --Offered venlafaxine -- discussed potential adverse effects and he agrees to try it.  --Also encouraged moderate exercise and stretching.    Anabell Mercedes MD  [facility] Hematology/Oncology     Total time spent today: 40 minutes in chart review, patient evaluation, counseling, documentation, test and/or medication/prescription orders, and coordination of care.      The longitudinal plan of care for the diagnosis(es)/condition(s) as documented were addressed during this visit. Due to the added complexity in care, I will continue to support Ronnie in the subsequent management and with ongoing continuity of care.

## 2025-01-28 ENCOUNTER — LAB (OUTPATIENT)
Dept: LAB | Facility: CLINIC | Age: 70
End: 2025-01-28
Payer: COMMERCIAL

## 2025-01-28 DIAGNOSIS — C61 PROSTATE CANCER METASTATIC TO MULTIPLE SITES (H): ICD-10-CM

## 2025-01-28 LAB
ALBUMIN SERPL BCG-MCNC: 4.2 G/DL (ref 3.5–5.2)
ALP SERPL-CCNC: 53 U/L (ref 40–150)
ALT SERPL W P-5'-P-CCNC: 21 U/L (ref 0–70)
ANION GAP SERPL CALCULATED.3IONS-SCNC: 9 MMOL/L (ref 7–15)
AST SERPL W P-5'-P-CCNC: 33 U/L (ref 0–45)
BASOPHILS # BLD AUTO: 0 10E3/UL (ref 0–0.2)
BASOPHILS NFR BLD AUTO: 0 %
BILIRUB SERPL-MCNC: 1.5 MG/DL
BUN SERPL-MCNC: 15.8 MG/DL (ref 8–23)
CALCIUM SERPL-MCNC: 10.1 MG/DL (ref 8.8–10.4)
CHLORIDE SERPL-SCNC: 102 MMOL/L (ref 98–107)
CREAT SERPL-MCNC: 0.99 MG/DL (ref 0.67–1.17)
EGFRCR SERPLBLD CKD-EPI 2021: 83 ML/MIN/1.73M2
EOSINOPHIL # BLD AUTO: 0.3 10E3/UL (ref 0–0.7)
EOSINOPHIL NFR BLD AUTO: 4 %
ERYTHROCYTE [DISTWIDTH] IN BLOOD BY AUTOMATED COUNT: 11.9 % (ref 10–15)
GLUCOSE SERPL-MCNC: 124 MG/DL (ref 70–99)
HCO3 SERPL-SCNC: 28 MMOL/L (ref 22–29)
HCT VFR BLD AUTO: 39.3 % (ref 40–53)
HGB BLD-MCNC: 14.1 G/DL (ref 13.3–17.7)
IMM GRANULOCYTES # BLD: 0 10E3/UL
IMM GRANULOCYTES NFR BLD: 0 %
LYMPHOCYTES # BLD AUTO: 1.3 10E3/UL (ref 0.8–5.3)
LYMPHOCYTES NFR BLD AUTO: 20 %
MCH RBC QN AUTO: 30.9 PG (ref 26.5–33)
MCHC RBC AUTO-ENTMCNC: 35.9 G/DL (ref 31.5–36.5)
MCV RBC AUTO: 86 FL (ref 78–100)
MONOCYTES # BLD AUTO: 0.4 10E3/UL (ref 0–1.3)
MONOCYTES NFR BLD AUTO: 7 %
NEUTROPHILS # BLD AUTO: 4.6 10E3/UL (ref 1.6–8.3)
NEUTROPHILS NFR BLD AUTO: 69 %
PLATELET # BLD AUTO: 216 10E3/UL (ref 150–450)
POTASSIUM SERPL-SCNC: 4.2 MMOL/L (ref 3.4–5.3)
PROT SERPL-MCNC: 7.2 G/DL (ref 6.4–8.3)
PSA SERPL DL<=0.01 NG/ML-MCNC: 0.13 NG/ML (ref 0–4.5)
RBC # BLD AUTO: 4.57 10E6/UL (ref 4.4–5.9)
SODIUM SERPL-SCNC: 139 MMOL/L (ref 135–145)
WBC # BLD AUTO: 6.7 10E3/UL (ref 4–11)

## 2025-01-28 PROCEDURE — 85025 COMPLETE CBC W/AUTO DIFF WBC: CPT

## 2025-01-28 PROCEDURE — 36415 COLL VENOUS BLD VENIPUNCTURE: CPT

## 2025-01-28 PROCEDURE — 80053 COMPREHEN METABOLIC PANEL: CPT

## 2025-01-28 PROCEDURE — 84153 ASSAY OF PSA TOTAL: CPT

## 2025-01-30 ENCOUNTER — DOCUMENTATION ONLY (OUTPATIENT)
Dept: PHARMACY | Facility: CLINIC | Age: 70
End: 2025-01-30

## 2025-01-30 ENCOUNTER — ONCOLOGY VISIT (OUTPATIENT)
Dept: ONCOLOGY | Facility: CLINIC | Age: 70
End: 2025-01-30
Attending: INTERNAL MEDICINE
Payer: COMMERCIAL

## 2025-01-30 VITALS
HEART RATE: 57 BPM | WEIGHT: 221.2 LBS | SYSTOLIC BLOOD PRESSURE: 163 MMHG | RESPIRATION RATE: 14 BRPM | DIASTOLIC BLOOD PRESSURE: 100 MMHG | OXYGEN SATURATION: 98 % | BODY MASS INDEX: 33.63 KG/M2 | TEMPERATURE: 97.5 F

## 2025-01-30 DIAGNOSIS — C61 PROSTATE CANCER METASTATIC TO MULTIPLE SITES (H): ICD-10-CM

## 2025-01-30 DIAGNOSIS — C79.51 PROSTATE CANCER METASTATIC TO BONE (H): Primary | ICD-10-CM

## 2025-01-30 DIAGNOSIS — C61 PROSTATE CANCER METASTATIC TO BONE (H): Primary | ICD-10-CM

## 2025-01-30 DIAGNOSIS — R07.81 RIB PAIN ON RIGHT SIDE: ICD-10-CM

## 2025-01-30 DIAGNOSIS — G47.00 PERSISTENT INSOMNIA: ICD-10-CM

## 2025-01-30 DIAGNOSIS — R23.2 HOT FLASH IN MALE: ICD-10-CM

## 2025-01-30 PROCEDURE — G0463 HOSPITAL OUTPT CLINIC VISIT: HCPCS | Performed by: INTERNAL MEDICINE

## 2025-01-30 PROCEDURE — 96372 THER/PROPH/DIAG INJ SC/IM: CPT | Performed by: NURSE PRACTITIONER

## 2025-01-30 RX ORDER — HEPARIN SODIUM (PORCINE) LOCK FLUSH IV SOLN 100 UNIT/ML 100 UNIT/ML
5 SOLUTION INTRAVENOUS
OUTPATIENT
Start: 2025-02-05

## 2025-01-30 RX ORDER — EPINEPHRINE 1 MG/ML
0.3 INJECTION, SOLUTION INTRAMUSCULAR; SUBCUTANEOUS EVERY 5 MIN PRN
OUTPATIENT
Start: 2025-02-05

## 2025-01-30 RX ORDER — DIPHENHYDRAMINE HYDROCHLORIDE 50 MG/ML
50 INJECTION INTRAMUSCULAR; INTRAVENOUS
Start: 2025-02-27

## 2025-01-30 RX ORDER — METHYLPREDNISOLONE SODIUM SUCCINATE 125 MG/2ML
125 INJECTION INTRAMUSCULAR; INTRAVENOUS
Status: CANCELLED
Start: 2025-02-01

## 2025-01-30 RX ORDER — ALBUTEROL SULFATE 90 UG/1
1-2 INHALANT RESPIRATORY (INHALATION)
Status: CANCELLED
Start: 2025-02-01

## 2025-01-30 RX ORDER — ALBUTEROL SULFATE 90 UG/1
1-2 INHALANT RESPIRATORY (INHALATION)
Start: 2025-02-05

## 2025-01-30 RX ORDER — ALBUTEROL SULFATE 0.83 MG/ML
2.5 SOLUTION RESPIRATORY (INHALATION)
OUTPATIENT
Start: 2025-02-27

## 2025-01-30 RX ORDER — MEPERIDINE HYDROCHLORIDE 25 MG/ML
25 INJECTION INTRAMUSCULAR; INTRAVENOUS; SUBCUTANEOUS EVERY 30 MIN PRN
OUTPATIENT
Start: 2025-02-27

## 2025-01-30 RX ORDER — ALBUTEROL SULFATE 0.83 MG/ML
2.5 SOLUTION RESPIRATORY (INHALATION)
Status: CANCELLED | OUTPATIENT
Start: 2025-02-01

## 2025-01-30 RX ORDER — DIPHENHYDRAMINE HYDROCHLORIDE 50 MG/ML
25 INJECTION INTRAMUSCULAR; INTRAVENOUS
Start: 2025-02-05

## 2025-01-30 RX ORDER — ALBUTEROL SULFATE 0.83 MG/ML
2.5 SOLUTION RESPIRATORY (INHALATION)
OUTPATIENT
Start: 2025-02-05

## 2025-01-30 RX ORDER — EPINEPHRINE 1 MG/ML
0.3 INJECTION, SOLUTION INTRAMUSCULAR; SUBCUTANEOUS EVERY 5 MIN PRN
Status: CANCELLED | OUTPATIENT
Start: 2025-02-01

## 2025-01-30 RX ORDER — METHYLPREDNISOLONE SODIUM SUCCINATE 40 MG/ML
40 INJECTION INTRAMUSCULAR; INTRAVENOUS
Start: 2025-02-05

## 2025-01-30 RX ORDER — MEPERIDINE HYDROCHLORIDE 25 MG/ML
25 INJECTION INTRAMUSCULAR; INTRAVENOUS; SUBCUTANEOUS
OUTPATIENT
Start: 2025-02-05

## 2025-01-30 RX ORDER — METHYLPREDNISOLONE SODIUM SUCCINATE 125 MG/2ML
125 INJECTION INTRAMUSCULAR; INTRAVENOUS
Start: 2025-02-27

## 2025-01-30 RX ORDER — DIPHENHYDRAMINE HYDROCHLORIDE 50 MG/ML
50 INJECTION INTRAMUSCULAR; INTRAVENOUS
Status: CANCELLED
Start: 2025-02-01

## 2025-01-30 RX ORDER — LORAZEPAM 2 MG/ML
0.5 INJECTION INTRAMUSCULAR EVERY 4 HOURS PRN
OUTPATIENT
Start: 2025-02-05

## 2025-01-30 RX ORDER — DIPHENHYDRAMINE HYDROCHLORIDE 50 MG/ML
50 INJECTION INTRAMUSCULAR; INTRAVENOUS
Start: 2025-02-05

## 2025-01-30 RX ORDER — MEPERIDINE HYDROCHLORIDE 25 MG/ML
25 INJECTION INTRAMUSCULAR; INTRAVENOUS; SUBCUTANEOUS EVERY 30 MIN PRN
Status: CANCELLED | OUTPATIENT
Start: 2025-02-01

## 2025-01-30 RX ORDER — ALBUTEROL SULFATE 90 UG/1
1-2 INHALANT RESPIRATORY (INHALATION)
Start: 2025-02-27

## 2025-01-30 RX ORDER — HEPARIN SODIUM,PORCINE 10 UNIT/ML
5-20 VIAL (ML) INTRAVENOUS DAILY PRN
OUTPATIENT
Start: 2025-02-05

## 2025-01-30 RX ORDER — EPINEPHRINE 1 MG/ML
0.3 INJECTION, SOLUTION INTRAMUSCULAR; SUBCUTANEOUS EVERY 5 MIN PRN
OUTPATIENT
Start: 2025-02-27

## 2025-01-30 ASSESSMENT — PAIN SCALES - GENERAL: PAINLEVEL_OUTOF10: MODERATE PAIN (4)

## 2025-01-30 NOTE — PROGRESS NOTES
"Oncology Rooming Note    January 30, 2025 9:01 AM   Ronnie Lagos is a 69 year old male who presents for:    Chief Complaint   Patient presents with    Oncology Clinic Visit     Initial Vitals: BP (!) 163/100   Pulse 57   Temp 97.5  F (36.4  C) (Oral)   Resp 14   Wt 100.3 kg (221 lb 3.2 oz)   SpO2 98%   BMI 33.63 kg/m   Estimated body mass index is 33.63 kg/m  as calculated from the following:    Height as of 12/13/24: 1.727 m (5' 8\").    Weight as of this encounter: 100.3 kg (221 lb 3.2 oz). Body surface area is 2.19 meters squared.  Moderate Pain (4) Comment: Data Unavailable   No LMP for male patient.  Allergies reviewed: Yes  Medications reviewed: Yes    Medications: Medication refills not needed today.  Pharmacy name entered into [facility] Audio:    [id number] - [place], [place] - [address] [facility] - [place], [place] - [address]    Frailty Screening:   Is the patient here for a new oncology consult visit in cancer care? 2. No      Clinical concerns: Tooth that is painful.  PSA is trending upwards   Dr. Mercedes  was notified.      Shari J. Schoenberger, [facility]              "

## 2025-01-30 NOTE — LETTER
1/30/2025      Ronnie Lagos  [address]  [facility] [address]      Dear Colleague,    Thank you for referring your patient, Ronnie Lagos, to the [facility]CER [facility]. Please see a copy of my visit note below.    [facility] Cancer Care    Hematology/Oncology Established Patient Note      Today's Date: 1/30/25    Reason for visit: Metastatic prostate cancer.    HISTORY OF PRESENT ILLNESS: Ronnie Lagos is a 69 year old male with hypertension, GERD who presents with the following oncologic history:  1.  5/11/2023: PSA elevated at 8.08 (prior 3.44 from 5/10/22).  2. 6/29/2023: MRI pelvis -- PI-RADS 4 - 1.4 x 1 x 0.9 cm nodule abutting posterolateral capsule of prostate; no pelvic adenopathy or bone lesions.  3.  8/30/2023: Prostate biopsy - Quail 4+3=7 prostate adenocarcinoma.  4. 10/2/2023: NM bone scan negative.  5.  11/3/2023: Radical prostatectomy, pelvic lymph node excision -- Quail 4+5=9 adenocarcinoma, [id number], margins widely positive for malignancy.  6.  2/7/2024: PSA = 4.06.  7.  2/15/2024: PSA elevated at 5.26.  8.  2/28/2024: PSMA PET showed focal uptake in left side of prostatectomy bed suspicious for recurrent disease; multiple pelvic and few retroperitoneal lymph nodes with uptake; >10 sclerotic osseous lesions with uptake indicative of osseous metastatic disease.  9. 3/20/2024: Started Casodex, Lupron, darolutamide, Xgeva, cycle 1 Taxotere but developed flushing, chest heaviness, nausea (no dyspnea) reaction 5 minutes into Taxotere infusion. Re-challenge not attempted that day.  10.  3/29/2024: Re-challenged Taxotere but developed repeat hypersensitivity reaction 4 minutes into infusion. Taxotere discontinued.  11. 4/17/2024: PSA 0.53.  12. 7/10/2024: PSA 0.06.  13. 10/8/2024: PSA 0.02.  14. 1/28/2025: PSA 0.13.    INTERVAL HISTORY:  Ronnie reports increased right sided rib pain and left upper hip pain.    REVIEW OF SYSTEMS:   14 point ROS was reviewed and  is negative other than as noted above in HPI.       HOME MEDICATIONS:  Current Outpatient Medications   Medication Sig Dispense Refill     amLODIPine (NORVASC) 5 MG tablet Take 1 tablet (5 mg) by mouth daily 90 tablet 3     atenolol (TENORMIN) 50 MG tablet Take 1 tablet (50 mg) by mouth daily 90 tablet 3     calcium citrate (CITRACAL) 950 (200 Ca) MG tablet Take 1 tablet by mouth 2 times daily       [START ON 1/27/2025] darolutamide (NUBEQA) 300 MG tablet Take 2 tablets (600 mg) by mouth 2 times daily. . Swallow tablets whole. Take with food. Avoid grapefruit or grapefruit juice. 120 tablet 0     darolutamide (NUBEQA) 300 MG tablet Take 2 tablets (600 mg) by mouth 2 times daily. . Swallow tablets whole. Take with food. Avoid grapefruit or grapefruit juice. 120 tablet 0     omeprazole (PRILOSEC) 20 MG DR capsule Take 1 capsule (20 mg) by mouth daily 90 capsule 3     venlafaxine (EFFEXOR XR) 37.5 MG 24 hr capsule Take 1 capsule (37.5 mg) by mouth daily. 30 capsule 3     Vitamin D, Cholecalciferol, 10 MCG (400 UNIT) TABS            ALLERGIES:  Allergies   Allergen Reactions     Taxotere [Docetaxel]          PAST MEDICAL HISTORY:  Past Medical History:   Diagnosis Date     Abdominal pain      Actinic keratosis      Basal cell carcinoma      Calculus of bile duct      Elevated liver enzymes      Essential hypertension, benign     abstracted 6/19/02     Gastro-oesophageal reflux disease      GERD (gastroesophageal reflux disease) 07/28/2008     Liver disease     elevated liver enzymes     Prostate cancer (H)     bone and lymph nodes 2024     Squamous cell carcinoma      Unspecified cerebral artery occlusion with cerebral infarction      Unspecified condition of brain     abstracted 6/19/02         PAST SURGICAL HISTORY:  Past Surgical History:   Procedure Laterality Date     ARTHRODESIS FOOT  2/5/2013    Procedure: ARTHRODESIS FOOT;  LEFT FIRST METATARSOPHALANGEAL JOINT ARTHRODESIS ;  Surgeon: Burton Loera DPM;   Location: [facility]     COLONOSCOPY N/A 12/7/2018    Procedure: COMBINED COLONOSCOPY, SINGLE OR MULTIPLE BIOPSY/POLYPECTOMY BY BIOPSY;  Surgeon: Blayne Mora MD;  Location:  GI     COLONOSCOPY N/A 12/13/2024    Procedure: Colonoscopy;  Surgeon: Arti Mercedes MD;  Location: [facility]     DAVINCI PROSTATECTOMY, LYMPHADENECTOMY N/A 11/3/2023    Procedure: PROSTATECTOMY, ROBOT-ASSISTED, WITH PELVIC LYMPHADENECTOMY;  Surgeon: Sophia Ramirez MD;  Location:  OR     ENDOSCOPIC RETROGRADE CHOLANGIOPANCREATOGRAM N/A 1/18/2018    Procedure: COMBINED ENDOSCOPIC RETROGRADE CHOLANGIOPANCREATOGRAPHY, SPHINCTEROTOMY;  ENDOSCOPIC RETROGRADE CHOLANGIOPANCREATOGRAM (ERCP), SPHINCTEROTOMY, STONE REMOVAL, STENT PLACEMENT;  Surgeon: Christiano Sorenson MD;  Location:  OR     ENDOSCOPIC RETROGRADE CHOLANGIOPANCREATOGRAM N/A 4/12/2018    Procedure: ENDOSCOPIC RETROGRADE CHOLANGIOPANCREATOGRAM;  ENDOSCOPIC RETROGRADE CHOLANIOPANCREATOGRAPHY AND ATTEMPTED STENT REMOVAL.;  Surgeon: Christiano Sorenson MD;  Location:  OR     ENDOSCOPIC RETROGRADE CHOLANGIOPANCREATOGRAM N/A 5/10/2018    Procedure: COMBINED ENDOSCOPIC RETROGRADE CHOLANGIOPANCREATOGRAPHY, REMOVE FOREIGN BODY OR STENT/TUBE;  ENDOSCOPIC RETROGRADE CHOLANGIOPANCREATOGRAPHY AND STENT REMOVAL;  Surgeon: Christiano Sorenson MD;  Location:  OR     ESOPHAGOSCOPY, GASTROSCOPY, DUODENOSCOPY (EGD), COMBINED N/A 4/12/2018    Procedure: COMBINED ESOPHAGOSCOPY, GASTROSCOPY, DUODENOSCOPY (EGD);  EGD with stent removal;  Surgeon: Christiano Sorenson MD;  Location: [facility]     LAPAROSCOPIC CHOLECYSTECTOMY N/A 5/1/2015    Procedure: LAPAROSCOPIC CHOLECYSTECTOMY;  Surgeon: Damien Galindo MD;  Location: [facility]     ORTHOPEDIC SURGERY      left elbow, right shoulder         SOCIAL HISTORY:  Social History     Socioeconomic History     Marital status:      Spouse name: Not on file     Number of children: Not on file     Years of education: Not on file     Highest  education level: Not on file   Occupational History     Employer: [employer]   Tobacco Use     Smoking status: Never     Smokeless tobacco: Never   Vaping Use     Vaping status: Never Used   Substance and Sexual Activity     Alcohol use: Not Currently     Alcohol/week: 2.0 standard drinks of alcohol     Types: 2 Standard drinks or equivalent per week     Drug use: Never     Sexual activity: Yes     Partners: Female   Other Topics Concern     Parent/sibling w/ CABG, MI or angioplasty before 65F 55M? No   Social History Narrative     Not on file     Social Drivers of Health     Financial Resource Strain: Low Risk  (6/3/2024)    Financial Resource Strain      Within the past 12 months, have you or your family members you live with been unable to get utilities (heat, electricity) when it was really needed?: No   Food Insecurity: Low Risk  (6/3/2024)    Food Insecurity      Within the past 12 months, did you worry that your food would run out before you got money to buy more?: No      Within the past 12 months, did the food you bought just not last and you didn t have money to get more?: No   Transportation Needs: Low Risk  (6/3/2024)    Transportation Needs      Within the past 12 months, has lack of transportation kept you from medical appointments, getting your medicines, non-medical meetings or appointments, work, or from getting things that you need?: No   Physical Activity: Insufficiently Active (6/3/2024)    Exercise Vital Sign      Days of Exercise per Week: 3 days      Minutes of Exercise per Session: 10 min   Stress: Stress Concern Present (6/3/2024)    [place] [employer] - Occupational Stress Questionnaire      Feeling of Stress : Rather much   Social Connections: Unknown (6/3/2024)    Social Connection and Isolation Panel [NHANES]      Frequency of Communication with Friends and Family: Not on file      Frequency of Social Gatherings with Friends and Family: Once a  week      Attends Mandaeism Services: Not on file      Active Member of Clubs or Organizations: Not on file      Attends Club or Organization Meetings: Not on file      Marital Status: Not on file   Interpersonal Safety: Not At Risk (6/15/2023)    Humiliation, Afraid, Rape, and Kick questionnaire      Fear of Current or Ex-Partner: No      Emotionally Abused: No      Physically Abused: No      Sexually Abused: No   Housing Stability: Low Risk  (6/3/2024)    Housing Stability      Do you have housing? : Yes      Are you worried about losing your housing?: No         FAMILY HISTORY:  Family History   Problem Relation Age of Onset     Hypertension Mother      Cancer - colorectal Mother      Skin Cancer Mother      Colon Cancer Mother      Hypertension Father      Prostate Cancer Father      Melanoma No family hx of      Anesthesia Reaction No family hx of      Venous thrombosis No family hx of      Bleeding Disorder No family hx of    Father had prostate, liver, and skin cancer. Mother with colorectal and skin cancer.      PHYSICAL EXAM:  Vital signs:  BP (!) 163/100   Pulse 57   Temp 97.5  F (36.4  C) (Oral)   Resp 14   Wt 100.3 kg (221 lb 3.2 oz)   SpO2 98%   BMI 33.63 kg/m     ECO  GENERAL: No acute distress.  EYES: No scleral icterus. No overt erythema.  RESPIRATORY: No audible cough, wheezing, or labored breathing.  MUSCULOSKELETAL: Range of motion in the neck, shoulders, and arms appear normal.  SKIN: No overt rashes, discolorations, or lesions over the face and neck.  NEUROLOGIC: Alert.  No overt tremors.  PSYCHIATRIC: Normal affect and mood.  Does not appear anxious.       LABS:  CBC RESULTS:   Recent Labs   Lab Test 24  0904   WBC 7.1   RBC 4.96   HGB 15.1   HCT 43.2   MCV 87   MCH 30.4   MCHC 35.0   RDW 12.4         Last Comprehensive Metabolic Panel:  Sodium   Date Value Ref Range Status   2025 137 135 - 145 mmol/L Final   2021 133 133 - 144 mmol/L Final     Potassium    Date Value Ref Range Status   01/02/2025 3.9 3.4 - 5.3 mmol/L Final   05/10/2022 4.6 3.4 - 5.3 mmol/L Final   04/06/2021 4.4 3.4 - 5.3 mmol/L Final     Chloride   Date Value Ref Range Status   01/02/2025 102 98 - 107 mmol/L Final   05/10/2022 99 94 - 109 mmol/L Final   04/06/2021 101 94 - 109 mmol/L Final     Carbon Dioxide   Date Value Ref Range Status   04/06/2021 31 20 - 32 mmol/L Final     Carbon Dioxide (CO2)   Date Value Ref Range Status   01/02/2025 24 22 - 29 mmol/L Final   05/10/2022 28 20 - 32 mmol/L Final     Anion Gap   Date Value Ref Range Status   01/02/2025 11 7 - 15 mmol/L Final   05/10/2022 4 3 - 14 mmol/L Final   04/06/2021 1 (L) 3 - 14 mmol/L Final     Glucose   Date Value Ref Range Status   01/02/2025 141 (H) 70 - 99 mg/dL Final   05/10/2022 112 (H) 70 - 99 mg/dL Final   04/06/2021 110 (H) 70 - 99 mg/dL Final     Urea Nitrogen   Date Value Ref Range Status   01/02/2025 11.9 8.0 - 23.0 mg/dL Final   05/10/2022 10 7 - 30 mg/dL Final   04/06/2021 8 7 - 30 mg/dL Final     Creatinine   Date Value Ref Range Status   01/02/2025 0.99 0.67 - 1.17 mg/dL Final   04/06/2021 0.92 0.66 - 1.25 mg/dL Final     GFR Estimate   Date Value Ref Range Status   01/02/2025 83 >60 mL/min/1.73m2 Final     Comment:     eGFR calculated using 2021 CKD-EPI equation.   04/06/2021 87 >60 mL/min/[1.73_m2] Final     Comment:     Non  GFR Calc  Starting 12/18/2018, serum creatinine based estimated GFR (eGFR) will be   calculated using the Chronic Kidney Disease Epidemiology Collaboration   (CKD-EPI) equation.       GFR, ESTIMATED POCT   Date Value Ref Range Status   02/28/2024 >60 >60 mL/min/1.73m2 Final     Calcium   Date Value Ref Range Status   01/02/2025 9.2 8.8 - 10.4 mg/dL Final     Comment:     Reference intervals for this test were updated on 7/16/2024 to reflect our healthy population more accurately. There may be differences in the flagging of prior results with similar values performed with this  method. Those prior results can be interpreted in the context of the updated reference intervals.   04/06/2021 9.3 8.5 - 10.1 mg/dL Final     Bilirubin Total   Date Value Ref Range Status   01/02/2025 1.5 (H) <=1.2 mg/dL Final   04/06/2021 2.3 (H) 0.2 - 1.3 mg/dL Final     Alkaline Phosphatase   Date Value Ref Range Status   01/02/2025 55 40 - 150 U/L Final   04/06/2021 60 40 - 150 U/L Final     ALT   Date Value Ref Range Status   01/02/2025 22 0 - 70 U/L Final   04/06/2021 70 0 - 70 U/L Final     AST   Date Value Ref Range Status   01/02/2025 27 0 - 45 U/L Final   04/06/2021 66 (H) 0 - 45 U/L Final       ASSESSMENT/PLAN:  Ronnie Lagos is a 69 year old male with the following issues:  1. 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 Hypertension  5. Hypersensitivity reaction to Taxotere  --2/28/2024 PSMA PET showed metastatic disease to the pelvic and retroperitoneal lymph nodes as well as greater than 10 sites of bony metastatic disease. I showed him the images today.  --Given high volume disease, he started Casodex 50 mg daily for first month, Lupron every 3 months, darolutamide 600 mg orally BID, and tried Taxotere twice but developed hypersensitivity reaction only 4-5 minutes into infusion.  Therefore, I had recommended discontinuation of Taxotere.    --Discussed his PSA has increased further to 0.13 consistent with biochemical progression.  He is having increase in bone pain.   --Will obtain new CT chest/abdomen/pelvis and NM bone scan.  --Advised starting cabazitaxel infusion with 10 mg prednisone daily. Will discontinue darolutamide.  --We discussed the schedule and dosing of chemotherapy regimen, as well as counseled on potential side effects, including neutropenia, anemia,  leukopenia, thrombocytopenia, diarrhea, fatigue, nausea, vomiting, constipation, asthenia, abdominal pain, hematuria, back pain,  anorexia, peripheral neuropathy, pyrexia, dyspnea, dysgeusia, cough, arthralgia, alopecia, increased risk for infection. Patient expressed understanding and agreed to proceed with chemotherapy.     --Discussed that infusion therapy would be given until disease progression or unacceptable toxicity.  --Discussed alternate option of Pluvicto and switch to enzalutamide but would recommend utilizing this line of therapy after cabazitaxel progression.  --Continue denosumab (Xgeva) every 4 weeks.   --Will continue to monitor CBC, CMP, and PSA monthly.  --Discussed placing a port -- he would like to hold off for now.    5. 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    6. Left shoulder pain  --Related to post-arthroplasty pain.  Continue Tylenol as needed.    7. Left upper molar pain  --He may need bone implant/grafting in the future but would need to hold Xgeva for 3 months if needed.  He might be able to do a less invasive option with a flipper.    8. Right knee osteoarthritis  --He will discuss right knee arthroplasty versus shaving the right knee cap with orthopedics.    9. Right rib pain related to multiple right-sided rib fractures.  --Has known metastatic disease to ribs/bones.  --8/7/2024 Rib x-rays showed multiple indeterminate chronicity right rib fractures.  --Continue conservative management.    10. Insomnia  11. Male hot flashes  --Multifactorially related to joint pain and hot flashes.  --Not alleviated with CBD, THC gummies, or melatonin.  --Suggested acupuncture but does not like needles.  --Offered venlafaxine -- discussed potential adverse effects and he agrees to try it.  --Also encouraged moderate exercise and stretching.    Anabell Mercedes MD  [facility] Hematology/Oncology     Total time spent today: 40 minutes in chart review, patient evaluation, counseling, documentation, test and/or medication/prescription orders, and  "coordination of care.      The longitudinal plan of care for the diagnosis(es)/condition(s) as documented were addressed during this visit. Due to the added complexity in care, I will continue to support Ronnie in the subsequent management and with ongoing continuity of care.      Oncology Rooming Note    January 30, 2025 9:01 AM   Ronnie Lagos is a 69 year old male who presents for:    Chief Complaint   Patient presents with     Oncology Clinic Visit     Initial Vitals: BP (!) 163/100   Pulse 57   Temp 97.5  F (36.4  C) (Oral)   Resp 14   Wt 100.3 kg (221 lb 3.2 oz)   SpO2 98%   BMI 33.63 kg/m   Estimated body mass index is 33.63 kg/m  as calculated from the following:    Height as of 12/13/24: 1.727 m (5' 8\").    Weight as of this encounter: 100.3 kg (221 lb 3.2 oz). Body surface area is 2.19 meters squared.  Moderate Pain (4) Comment: Data Unavailable   No LMP for male patient.  Allergies reviewed: Yes  Medications reviewed: Yes    Medications: Medication refills not needed today.  Pharmacy name entered into [employer]:    [id number] - [place], [place] - [address] [facility] - [place], [place] - [address]    Frailty Screening:   Is the patient here for a new oncology consult visit in cancer care? 2. No      Clinical concerns: Tooth that is painful.  PSA is trending upwards   Dr. Mercedes  was notified.      Shari J. Schoenberger, [facility]                Again, thank you for allowing me to participate in the care of your patient.        Sincerely,        Anabell Mercedes MD    Electronically signed"

## 2025-01-30 NOTE — PROGRESS NOTES
[facility] Oral Chemotherapy Monitoring Program    Thank you for the opportunity to be a part in the care of this patient's oral chemotherapy. The oncology pharmacy will no longer be following this patient for oral chemotherapy. If there are any questions or the plan changes, feel free to contact us.        9/4/2024    12:00 PM 10/8/2024    12:00 PM 11/18/2024     4:00 PM 12/4/2024    12:00 PM 1/2/2025    11:00 AM 1/21/2025    10:00 AM 1/30/2025    11:00 AM   ORAL CHEMOTHERAPY   Assessment Type Lab Monitoring Lab Monitoring Refill;Chart Review Lab Monitoring Lab Monitoring Refill Discontinuation   Stop Date       1/30/2025   Reason for Discontinuation       Disease progression   Diagnosis Code Prostate Cancer Prostate Cancer Prostate Cancer Prostate Cancer Prostate Cancer Prostate Cancer Prostate Cancer   Providers Dr. Daren Mercedes   Clinic Name/Location [facility]   Drug Name Nubeqa (darolutamide) Nubeqa (darolutamide) Nubeqa (darolutamide) Nubeqa (darolutamide) Nubeqa (darolutamide) Nubeqa (darolutamide) Nubeqa (darolutamide)   Dose 600 mg 600 mg 600 mg 600 mg 600 mg 600 mg 600 mg   Current Schedule BID BID BID BID BID BID BID   Cycle Details Continuous Continuous Continuous Continuous Continuous Continuous Continuous   Planned next cycle start date 9/20/2024         Adverse Effects  Increased AST/ALT/T BILI  No AE identified during assessment No AE identified during assessment     Increased AST/ALT/T BILI  Grade 1        Pharmacist Intervention(increased ast/alt/t bili)  No        Any new drug interactions? No  No       Is the dose as ordered appropriate for the patient? Yes Yes Yes           Thank you,  Marjorie Molina, Pharmacy Intern   [facility]

## 2025-01-30 NOTE — PATIENT INSTRUCTIONS
1. Discontinue Nubeqa.  2. Arrange for CT chest/abdomen/pelvis and NM bone scan.  3. Arrange for Lupron every 3 months.  4. Arrange for Xgeva every 4 weeks.  5. Arrange for lab draw and cabazitaxel (Jevtana) every 3 weeks.  6. Start prednisone 10 mg daily when Jevtana starts.  7. RTC NP alternating with MD visit every 3 weeks.

## 2025-02-05 ENCOUNTER — PATIENT OUTREACH (OUTPATIENT)
Dept: ONCOLOGY | Facility: CLINIC | Age: 70
End: 2025-02-05
Payer: COMMERCIAL

## 2025-02-05 DIAGNOSIS — C61 PROSTATE CANCER METASTATIC TO MULTIPLE SITES (H): Primary | ICD-10-CM

## 2025-02-05 RX ORDER — PROCHLORPERAZINE MALEATE 10 MG
10 TABLET ORAL EVERY 6 HOURS PRN
Qty: 30 TABLET | Refills: 2 | Status: SHIPPED | OUTPATIENT
Start: 2025-02-05

## 2025-02-05 NOTE — TELEPHONE ENCOUNTER
[facility]: Cancer Care Plan of Care Education Note                                    Discussion with Patient:                                                      Phone call to Ronnie to review previously emailed information on Cabazitaxel. Reviewed side effects and when to call clinic.     Antiemetics: Compazine sent to [facility] pharmacy in [place]  Steroid use/side effect: Reviewed side effects        Assessment:                                                      Assessment completed with:: Patient    Plan of Care Education   Yearly learning assessment completed?: No  Diagnosis:: Metastatic prostate cancer  Does patient understand diagnosis?: Yes  Tx plan/regimen:: Cabazitaxel +prednison every 3 weeks, plus continue lupron, xgeva  Does patient understand treatment plan/regimen?: Yes  Preparing for treatment:: Reviewed treatment preparation information with patient (vascular access, day of chemo, visitor policy, what to bring, etc.)  Vascular access education provided for:: Peripheral IV, Port  Side effect education:: Diarrhea/Constipation, Fatigue, Infection, Lab value monitoring (anemia, neutropenia, thrombocytopenia), Nausea/Vomiting, Hair loss  Supportive services education provided for:: Cancer rehab, Nutrition, Palliative care, Social work, Support group  Safety/self care at home reviewed with patient:: No  Coping - concerns/fears reviewed with patient:: No  Plan of Care:: VERA follow-up appointment, Lab appointment, Imaging, MD follow-up appointment, Treatment schedule  When to call provider:: Bleeding, Increased shortness of breath, New/worsening pain, Shaking chills, Temperature >100.4F, Uncontrolled diarrhea/constipation, Uncontrolled nausea/vomiting  Reasons for deferring treatment reviewed with patient:: Yes  Additional education provided for: : Steroid therapy, Dental clearance/precautions, Neutropenic precautions    Evaluation of Learning  Patient Education Provided: Yes  Readiness::  Acceptance  Method:: Booklet/Handout, Literature, Explanation  Response:: Verbalizes understanding    No assessment indicated    Intervention/Education provided during outreach:                                                       Ronnie to call Tia in scheduling to set up scans, labs, VERA appt and infusions in [place], preferably.    Patient to follow up as scheduled at next appt  Patient to call/MyChart message with updates  Confirmed patient has clinic and triage numbers    Signature:  Halina Sprague RN

## 2025-02-06 DIAGNOSIS — C61 PROSTATE CANCER METASTATIC TO MULTIPLE SITES (H): Primary | ICD-10-CM

## 2025-02-08 ENCOUNTER — LAB (OUTPATIENT)
Dept: LAB | Facility: CLINIC | Age: 70
End: 2025-02-08
Payer: COMMERCIAL

## 2025-02-08 DIAGNOSIS — C61 PROSTATE CANCER METASTATIC TO MULTIPLE SITES (H): ICD-10-CM

## 2025-02-08 LAB
ALBUMIN SERPL BCG-MCNC: 4.4 G/DL (ref 3.5–5.2)
ALP SERPL-CCNC: 50 U/L (ref 40–150)
ALT SERPL W P-5'-P-CCNC: 24 U/L (ref 0–70)
ANION GAP SERPL CALCULATED.3IONS-SCNC: 12 MMOL/L (ref 7–15)
AST SERPL W P-5'-P-CCNC: 35 U/L (ref 0–45)
BASOPHILS # BLD AUTO: 0 10E3/UL (ref 0–0.2)
BASOPHILS NFR BLD AUTO: 0 %
BILIRUB SERPL-MCNC: 1.1 MG/DL
BUN SERPL-MCNC: 11.9 MG/DL (ref 8–23)
CALCIUM SERPL-MCNC: 9.8 MG/DL (ref 8.8–10.4)
CHLORIDE SERPL-SCNC: 102 MMOL/L (ref 98–107)
CREAT SERPL-MCNC: 1.02 MG/DL (ref 0.67–1.17)
EGFRCR SERPLBLD CKD-EPI 2021: 80 ML/MIN/1.73M2
EOSINOPHIL # BLD AUTO: 0.3 10E3/UL (ref 0–0.7)
EOSINOPHIL NFR BLD AUTO: 4 %
ERYTHROCYTE [DISTWIDTH] IN BLOOD BY AUTOMATED COUNT: 11.9 % (ref 10–15)
GLUCOSE SERPL-MCNC: 119 MG/DL (ref 70–99)
HCO3 SERPL-SCNC: 26 MMOL/L (ref 22–29)
HCT VFR BLD AUTO: 40.5 % (ref 40–53)
HGB BLD-MCNC: 14.4 G/DL (ref 13.3–17.7)
IMM GRANULOCYTES # BLD: 0 10E3/UL
IMM GRANULOCYTES NFR BLD: 0 %
LYMPHOCYTES # BLD AUTO: 1.5 10E3/UL (ref 0.8–5.3)
LYMPHOCYTES NFR BLD AUTO: 20 %
MCH RBC QN AUTO: 30.8 PG (ref 26.5–33)
MCHC RBC AUTO-ENTMCNC: 35.6 G/DL (ref 31.5–36.5)
MCV RBC AUTO: 87 FL (ref 78–100)
MONOCYTES # BLD AUTO: 0.5 10E3/UL (ref 0–1.3)
MONOCYTES NFR BLD AUTO: 6 %
NEUTROPHILS # BLD AUTO: 5.1 10E3/UL (ref 1.6–8.3)
NEUTROPHILS NFR BLD AUTO: 69 %
PLATELET # BLD AUTO: 230 10E3/UL (ref 150–450)
POTASSIUM SERPL-SCNC: 4.6 MMOL/L (ref 3.4–5.3)
PROT SERPL-MCNC: 7.4 G/DL (ref 6.4–8.3)
PSA SERPL DL<=0.01 NG/ML-MCNC: 0.16 NG/ML (ref 0–4.5)
RBC # BLD AUTO: 4.67 10E6/UL (ref 4.4–5.9)
SODIUM SERPL-SCNC: 140 MMOL/L (ref 135–145)
WBC # BLD AUTO: 7.3 10E3/UL (ref 4–11)

## 2025-02-08 PROCEDURE — 80053 COMPREHEN METABOLIC PANEL: CPT

## 2025-02-08 PROCEDURE — 85025 COMPLETE CBC W/AUTO DIFF WBC: CPT

## 2025-02-08 PROCEDURE — 36415 COLL VENOUS BLD VENIPUNCTURE: CPT

## 2025-02-08 PROCEDURE — 84153 ASSAY OF PSA TOTAL: CPT

## 2025-02-10 ENCOUNTER — DOCUMENTATION ONLY (OUTPATIENT)
Dept: ONCOLOGY | Facility: CLINIC | Age: 70
End: 2025-02-10
Payer: COMMERCIAL

## 2025-02-11 ENCOUNTER — INFUSION THERAPY VISIT (OUTPATIENT)
Dept: INFUSION THERAPY | Facility: CLINIC | Age: 70
End: 2025-02-11
Attending: INTERNAL MEDICINE
Payer: COMMERCIAL

## 2025-02-11 VITALS
WEIGHT: 221.8 LBS | BODY MASS INDEX: 33.62 KG/M2 | OXYGEN SATURATION: 97 % | HEART RATE: 60 BPM | TEMPERATURE: 97.7 F | RESPIRATION RATE: 16 BRPM | SYSTOLIC BLOOD PRESSURE: 148 MMHG | DIASTOLIC BLOOD PRESSURE: 92 MMHG | HEIGHT: 68 IN

## 2025-02-11 DIAGNOSIS — C61 PROSTATE CANCER METASTATIC TO MULTIPLE SITES (H): Primary | ICD-10-CM

## 2025-02-11 PROCEDURE — 250N000011 HC RX IP 250 OP 636: Performed by: INTERNAL MEDICINE

## 2025-02-11 PROCEDURE — 96376 TX/PRO/DX INJ SAME DRUG ADON: CPT

## 2025-02-11 PROCEDURE — 96375 TX/PRO/DX INJ NEW DRUG ADDON: CPT

## 2025-02-11 PROCEDURE — 96413 CHEMO IV INFUSION 1 HR: CPT

## 2025-02-11 PROCEDURE — 258N000003 HC RX IP 258 OP 636: Performed by: INTERNAL MEDICINE

## 2025-02-11 RX ORDER — DIPHENHYDRAMINE HYDROCHLORIDE 50 MG/ML
25 INJECTION INTRAMUSCULAR; INTRAVENOUS
Status: COMPLETED | OUTPATIENT
Start: 2025-02-11 | End: 2025-02-11

## 2025-02-11 RX ORDER — ONDANSETRON 2 MG/ML
8 INJECTION INTRAMUSCULAR; INTRAVENOUS ONCE
Status: COMPLETED | OUTPATIENT
Start: 2025-02-11 | End: 2025-02-11

## 2025-02-11 RX ORDER — DIPHENHYDRAMINE HYDROCHLORIDE 50 MG/ML
25 INJECTION INTRAMUSCULAR; INTRAVENOUS ONCE
Status: COMPLETED | OUTPATIENT
Start: 2025-02-11 | End: 2025-02-11

## 2025-02-11 RX ORDER — METHYLPREDNISOLONE SODIUM SUCCINATE 40 MG/ML
40 INJECTION INTRAMUSCULAR; INTRAVENOUS
Status: COMPLETED | OUTPATIENT
Start: 2025-02-11 | End: 2025-02-11

## 2025-02-11 RX ORDER — PREDNISONE 10 MG/1
10 TABLET ORAL DAILY
Qty: 21 TABLET | Refills: 0 | Status: SHIPPED | OUTPATIENT
Start: 2025-02-11 | End: 2025-03-04

## 2025-02-11 RX ORDER — DEXAMETHASONE SODIUM PHOSPHATE 4 MG/ML
8 INJECTION, SOLUTION INTRA-ARTICULAR; INTRALESIONAL; INTRAMUSCULAR; INTRAVENOUS; SOFT TISSUE ONCE
Status: COMPLETED | OUTPATIENT
Start: 2025-02-11 | End: 2025-02-11

## 2025-02-11 RX ADMIN — SODIUM CHLORIDE 44 MG: 9 INJECTION, SOLUTION INTRAVENOUS at 14:01

## 2025-02-11 RX ADMIN — SODIUM CHLORIDE 250 ML: 9 INJECTION, SOLUTION INTRAVENOUS at 13:29

## 2025-02-11 RX ADMIN — FAMOTIDINE 20 MG: 10 INJECTION, SOLUTION INTRAVENOUS at 14:13

## 2025-02-11 RX ADMIN — SODIUM CHLORIDE 1000 ML: 9 INJECTION, SOLUTION INTRAVENOUS at 14:05

## 2025-02-11 RX ADMIN — FAMOTIDINE 20 MG: 10 INJECTION, SOLUTION INTRAVENOUS at 13:38

## 2025-02-11 RX ADMIN — METHYLPREDNISOLONE SODIUM SUCCINATE 40 MG: 40 INJECTION, POWDER, FOR SOLUTION INTRAMUSCULAR; INTRAVENOUS at 14:15

## 2025-02-11 RX ADMIN — DEXAMETHASONE SODIUM PHOSPHATE 8 MG: 4 INJECTION INTRA-ARTICULAR; INTRALESIONAL; INTRAMUSCULAR; INTRAVENOUS; SOFT TISSUE at 13:32

## 2025-02-11 RX ADMIN — DIPHENHYDRAMINE HYDROCHLORIDE 25 MG: 50 INJECTION, SOLUTION INTRAMUSCULAR; INTRAVENOUS at 13:44

## 2025-02-11 RX ADMIN — DIPHENHYDRAMINE HYDROCHLORIDE 25 MG: 50 INJECTION INTRAMUSCULAR; INTRAVENOUS at 14:04

## 2025-02-11 RX ADMIN — ONDANSETRON 8 MG: 2 INJECTION INTRAMUSCULAR; INTRAVENOUS at 13:31

## 2025-02-11 NOTE — PROGRESS NOTES
Infusion Nursing Note:  Ronnie MENDOZA Yolis presents today for C1D1 Jevtana.    Patient seen by provider today: No   present during visit today: Not Applicable.    Note: Within 5 min of starting the Jevtana, pt became very red in the face.  Infusion immediately stopped, NS wide open and rescue meds given per protocol.  Symptoms resolved quickly.  BRITTANY Beckwith at chairside.  After 30 min observation, pt restarted at half rate (140cc/hr), and titrated up to 190cc/hr after 30 min. Patient tolerated remainder of infusion at a max rate of 190cc/hr.  Patient instructed to take his Prednisone about an hour prior to his next infusion (or bring it with).  IB sent to Dr. Mercedes to see if she wants any changes going forward.      Patient agreeable to trying E-sym management program.       Intravenous Access:  Peripheral IV placed.    Treatment Conditions:  Lab Results   Component Value Date    HGB 14.4 02/08/2025    WBC 7.3 02/08/2025    ANEU 5.2 01/12/2018    ANEUTAUTO 5.1 02/08/2025     02/08/2025        Lab Results   Component Value Date     02/08/2025    POTASSIUM 4.6 02/08/2025    CR 1.02 02/08/2025    AV 9.8 02/08/2025    BILITOTAL 1.1 02/08/2025    ALBUMIN 4.4 02/08/2025    ALT 24 02/08/2025    AST 35 02/08/2025       Results reviewed, labs MET treatment parameters, ok to proceed with treatment.      Post Infusion Assessment:  Patient tolerated infusion poorly due to : Hypersensitivity: Did patient have a hypersensitivity reaction? : Yes  Drug or Product name: Jevtana  Were pre-meds administered?: Yes  What pre-meds were administered?: Diphenhydramine (Benadryl), Dexamethasone (decadron, Famotidine (Pepcid), Ondansetron (Zofran)  First or Subsequent treatment: First time receiving  Rate of infusion when patient had hypersensitivity reaction: 279  Time the hypersensitivity reaction was first recognized: 1404  Symptoms observed or reported (select all that apply): Flushing,  Erythema  Interventions/treatment following reaction: Infusion stopped, Hypersensitivity medications administered, Additional observation period added, Reduced rate of medication administration  What hypersensitivity medications were administered?: DiphendydrAMINE (benadryl), Methylprednisolone, NaCl 0.9% Bolus, Famotidine(Pepcid)  Name of provider notified: Tang Rogers  Time provider notified: 2548  Type of notification (select all that apply): Provider at bedside  Was the patient re-challenged today?: Yes - tolerated well  Blood return noted pre and post infusion.  Site patent and intact, free from redness, edema or discomfort.  No evidence of extravasations.  Access discontinued per protocol.       Discharge Plan:   Prescription refills given for Prednisone.  Discharge instructions reviewed with: Patient.  Patient and/or family verbalized understanding of discharge instructions and all questions answered.  AVS to patient via [employer].  Patient will return 2/27/25  for next appointment.   Patient discharged in stable condition accompanied by: self.  Departure Mode: Ambulatory.      Nahum Carlson RN

## 2025-02-11 NOTE — PROGRESS NOTES
Take Home Medication Teaching    Patient was educated on the following oral medications: prochlorperazine and prednisone. on February 11, 2025. Teaching provided to patient included indication, dose, administration, adverse effects and side effect management. Written materials were provided and patient was given the opportunity to ask questions. Patient verbalized understanding of the information presented. Discussed that provider and pt will discuss what tapering will look like if prednisone is ever stopped.

## 2025-02-13 ENCOUNTER — TELEPHONE (OUTPATIENT)
Dept: ONCOLOGY | Facility: CLINIC | Age: 70
End: 2025-02-13
Payer: COMMERCIAL

## 2025-02-13 NOTE — TELEPHONE ENCOUNTER
"Moniz, Rachel M, [facility]  You; Halina Sprague, RN; [facility] minutes ago (3:01 PM)     RM  Correct - it just says \"for 21 days\" due to cycle length and how many he is being prescribed. He should be taking prednisone every day continuously.    Rachel Moniz, PharmD  Hematology/Oncology Pharmacist  [facility]  [phone]     Pt was notified and verbalized understanding.  He will contact the clinic with any further questions.   Kristine Jones, BEAN on 2/13/2025 at 3:06 PM   "

## 2025-02-13 NOTE — TELEPHONE ENCOUNTER
"Pt is calling with questions about his prednisone dosing.    predniSONE (DELTASONE) 10 MG tablet 21 tablet 0 2/11/2025 3/4/2025 --   Sig - Route: Take 1 tablet (10 mg) by mouth daily for 21 days. - Oral     Pt received his first infusion C1D1 Jevtana on 2/11/2025. He started taking prednisone yesterday morning with breakfast and took another dose today.    Pt states that he believes that he needs to take prednisone for a certain length of time and then take a break? He is wondering how the timing of the prednisone works in relation to his infusions.    Writer advised pt that prednisone should be taken daily. Rx states for 21 days because this is the length of his chemo cycle. However, he should be getting a new Rx for prednisone with each cycle.     Pt reports that the Rx stating \"for 21 days\" was confusing to him.    Will route to care team for verification.  OK to leave message if unable to reach patient.  Patient verbalized understanding and agreement with plan.  Patient was instructed to call the clinic with any questions, concerns, or new/worsening symptoms.  Kristine Jones RN on 2/13/2025 at 2:53 PM     "

## 2025-02-26 ENCOUNTER — HOSPITAL ENCOUNTER (OUTPATIENT)
Dept: NUCLEAR MEDICINE | Facility: CLINIC | Age: 70
Setting detail: NUCLEAR MEDICINE
Discharge: HOME | End: 2025-02-26
Attending: INTERNAL MEDICINE
Payer: COMMERCIAL

## 2025-02-26 ENCOUNTER — HOSPITAL ENCOUNTER (OUTPATIENT)
Dept: CT IMAGING | Facility: CLINIC | Age: 70
Discharge: HOME | End: 2025-02-26
Attending: INTERNAL MEDICINE
Payer: COMMERCIAL

## 2025-02-26 DIAGNOSIS — C61 PROSTATE CANCER METASTATIC TO BONE (H): ICD-10-CM

## 2025-02-26 DIAGNOSIS — C79.51 PROSTATE CANCER METASTATIC TO BONE (H): ICD-10-CM

## 2025-02-26 DIAGNOSIS — C61 PROSTATE CANCER METASTATIC TO MULTIPLE SITES (H): ICD-10-CM

## 2025-02-26 PROCEDURE — A9503 TC99M MEDRONATE: HCPCS | Performed by: INTERNAL MEDICINE

## 2025-02-26 PROCEDURE — 343N000001 HC RX 343 MED OP 636: Performed by: INTERNAL MEDICINE

## 2025-02-26 PROCEDURE — 74177 CT ABD & PELVIS W/CONTRAST: CPT

## 2025-02-26 PROCEDURE — 71260 CT THORAX DX C+: CPT

## 2025-02-26 PROCEDURE — 250N000011 HC RX IP 250 OP 636: Performed by: INTERNAL MEDICINE

## 2025-02-26 PROCEDURE — 78306 BONE IMAGING WHOLE BODY: CPT

## 2025-02-26 PROCEDURE — 250N000009 HC RX 250: Performed by: INTERNAL MEDICINE

## 2025-02-26 RX ORDER — IOPAMIDOL 755 MG/ML
500 INJECTION, SOLUTION INTRAVASCULAR ONCE
Status: COMPLETED | OUTPATIENT
Start: 2025-02-26 | End: 2025-02-26

## 2025-02-26 RX ORDER — TC 99M MEDRONATE 20 MG/10ML
25 INJECTION, POWDER, LYOPHILIZED, FOR SOLUTION INTRAVENOUS ONCE
Status: COMPLETED | OUTPATIENT
Start: 2025-02-26 | End: 2025-02-26

## 2025-02-26 RX ADMIN — IOPAMIDOL 100 ML: 755 INJECTION, SOLUTION INTRAVENOUS at 09:08

## 2025-02-26 RX ADMIN — SODIUM CHLORIDE 65 ML: 9 INJECTION, SOLUTION INTRAVENOUS at 09:10

## 2025-02-26 RX ADMIN — TC 99M MEDRONATE 26 MILLICURIE: 20 INJECTION, POWDER, LYOPHILIZED, FOR SOLUTION INTRAVENOUS at 09:01

## 2025-02-27 ENCOUNTER — INFUSION THERAPY VISIT (OUTPATIENT)
Dept: INFUSION THERAPY | Facility: CLINIC | Age: 70
End: 2025-02-27
Attending: INTERNAL MEDICINE
Payer: COMMERCIAL

## 2025-02-27 VITALS
OXYGEN SATURATION: 97 % | TEMPERATURE: 97.3 F | RESPIRATION RATE: 16 BRPM | HEART RATE: 57 BPM | DIASTOLIC BLOOD PRESSURE: 81 MMHG | SYSTOLIC BLOOD PRESSURE: 155 MMHG

## 2025-02-27 DIAGNOSIS — C61 PROSTATE CANCER METASTATIC TO MULTIPLE SITES (H): ICD-10-CM

## 2025-02-27 DIAGNOSIS — C79.51 PROSTATE CANCER METASTATIC TO BONE (H): Primary | ICD-10-CM

## 2025-02-27 DIAGNOSIS — C61 PROSTATE CANCER METASTATIC TO BONE (H): Primary | ICD-10-CM

## 2025-02-27 LAB
ALBUMIN SERPL BCG-MCNC: 4 G/DL (ref 3.5–5.2)
CALCIUM SERPL-MCNC: 9.6 MG/DL (ref 8.8–10.4)
CREAT SERPL-MCNC: 0.89 MG/DL (ref 0.67–1.17)
EGFRCR SERPLBLD CKD-EPI 2021: >90 ML/MIN/1.73M2
PHOSPHATE SERPL-MCNC: 3.4 MG/DL (ref 2.5–4.5)
PSA SERPL DL<=0.01 NG/ML-MCNC: 0.19 NG/ML (ref 0–4.5)

## 2025-02-27 PROCEDURE — 250N000011 HC RX IP 250 OP 636: Mod: JZ | Performed by: INTERNAL MEDICINE

## 2025-02-27 PROCEDURE — 36415 COLL VENOUS BLD VENIPUNCTURE: CPT | Performed by: INTERNAL MEDICINE

## 2025-02-27 PROCEDURE — 82040 ASSAY OF SERUM ALBUMIN: CPT | Performed by: INTERNAL MEDICINE

## 2025-02-27 PROCEDURE — 84153 ASSAY OF PSA TOTAL: CPT | Performed by: INTERNAL MEDICINE

## 2025-02-27 PROCEDURE — 82565 ASSAY OF CREATININE: CPT | Performed by: INTERNAL MEDICINE

## 2025-02-27 PROCEDURE — 82310 ASSAY OF CALCIUM: CPT | Performed by: INTERNAL MEDICINE

## 2025-02-27 PROCEDURE — 96372 THER/PROPH/DIAG INJ SC/IM: CPT | Performed by: INTERNAL MEDICINE

## 2025-02-27 PROCEDURE — 84100 ASSAY OF PHOSPHORUS: CPT | Performed by: INTERNAL MEDICINE

## 2025-02-27 PROCEDURE — 36415 COLL VENOUS BLD VENIPUNCTURE: CPT

## 2025-02-27 RX ORDER — EPINEPHRINE 1 MG/ML
0.3 INJECTION, SOLUTION INTRAMUSCULAR; SUBCUTANEOUS EVERY 5 MIN PRN
OUTPATIENT
Start: 2025-03-27

## 2025-02-27 RX ORDER — ALBUTEROL SULFATE 0.83 MG/ML
2.5 SOLUTION RESPIRATORY (INHALATION)
OUTPATIENT
Start: 2025-03-27

## 2025-02-27 RX ORDER — DIPHENHYDRAMINE HYDROCHLORIDE 50 MG/ML
50 INJECTION INTRAMUSCULAR; INTRAVENOUS
Start: 2025-03-27

## 2025-02-27 RX ORDER — METHYLPREDNISOLONE SODIUM SUCCINATE 125 MG/2ML
125 INJECTION INTRAMUSCULAR; INTRAVENOUS
Start: 2025-03-27

## 2025-02-27 RX ORDER — MEPERIDINE HYDROCHLORIDE 25 MG/ML
25 INJECTION INTRAMUSCULAR; INTRAVENOUS; SUBCUTANEOUS EVERY 30 MIN PRN
OUTPATIENT
Start: 2025-03-27

## 2025-02-27 RX ORDER — ALBUTEROL SULFATE 90 UG/1
1-2 INHALANT RESPIRATORY (INHALATION)
Start: 2025-03-27

## 2025-02-27 RX ADMIN — DENOSUMAB 120 MG: 120 INJECTION SUBCUTANEOUS at 10:53

## 2025-02-27 NOTE — PROGRESS NOTES
Infusion Nursing Note:  Ronniecindy aLgos presents today for labs+Xgeva/Lupron.    Patient seen by provider today: No   present during visit today: Not Applicable.    Note: taking calcium/vitamin D as prescribed, no recent or upcoming major dental work.     Pt asked about getting tx labs drawn today instead of Saturday, OK per TALON Gallagher to draw PSA today but cbc/cmp to be drawn on treatment day, pt will cancel lab appt on Saturday and will have labs drawn 3/4 prior to seeing Aileen and infusion.      Intravenous Access:  Lab draw site LAC, Needle type BF, Gauge 23.  Labs drawn without difficulty.    Treatment Conditions:  Results reviewed, labs MET treatment parameters, ok to proceed with treatment.    Calcium-9.6  Phosphorus-3.4        Post Infusion Assessment:  Patient tolerated injection without incident.  Site patent and intact, free from redness, edema or discomfort.       Discharge Plan:   Discharge instructions reviewed with: Patient.  Patient and/or family verbalized understanding of discharge instructions and all questions answered.  AVS to patient via [employer]OST.  Patient will return 3/4/25 for next appointment.   Patient discharged in stable condition accompanied by: self.  Departure Mode: Ambulatory.      Suzi Ibarra RN

## 2025-03-01 RX ORDER — EPINEPHRINE 1 MG/ML
0.3 INJECTION, SOLUTION, CONCENTRATE INTRAVENOUS EVERY 5 MIN PRN
Status: CANCELLED | OUTPATIENT
Start: 2025-03-04

## 2025-03-01 RX ORDER — DIPHENHYDRAMINE HYDROCHLORIDE 50 MG/ML
25 INJECTION, SOLUTION INTRAMUSCULAR; INTRAVENOUS
Status: CANCELLED
Start: 2025-03-04

## 2025-03-01 RX ORDER — MEPERIDINE HYDROCHLORIDE 25 MG/ML
25 INJECTION INTRAMUSCULAR; INTRAVENOUS; SUBCUTANEOUS
Status: CANCELLED | OUTPATIENT
Start: 2025-03-04

## 2025-03-01 RX ORDER — HEPARIN SODIUM,PORCINE 10 UNIT/ML
5-20 VIAL (ML) INTRAVENOUS DAILY PRN
Status: CANCELLED | OUTPATIENT
Start: 2025-03-04

## 2025-03-01 RX ORDER — HEPARIN SODIUM (PORCINE) LOCK FLUSH IV SOLN 100 UNIT/ML 100 UNIT/ML
5 SOLUTION INTRAVENOUS
Status: CANCELLED | OUTPATIENT
Start: 2025-03-04

## 2025-03-01 RX ORDER — DIPHENHYDRAMINE HYDROCHLORIDE 50 MG/ML
50 INJECTION, SOLUTION INTRAMUSCULAR; INTRAVENOUS
Status: CANCELLED
Start: 2025-03-04

## 2025-03-01 RX ORDER — ALBUTEROL SULFATE 90 UG/1
1-2 INHALANT RESPIRATORY (INHALATION)
Status: CANCELLED
Start: 2025-03-04

## 2025-03-01 RX ORDER — LORAZEPAM 2 MG/ML
0.5 INJECTION INTRAMUSCULAR EVERY 4 HOURS PRN
Status: CANCELLED | OUTPATIENT
Start: 2025-03-04

## 2025-03-01 RX ORDER — ALBUTEROL SULFATE 0.83 MG/ML
2.5 SOLUTION RESPIRATORY (INHALATION)
Status: CANCELLED | OUTPATIENT
Start: 2025-03-04

## 2025-03-03 NOTE — PROGRESS NOTES
Oncology/Hematology Visit Note    Mar 4, 2025    Reason for visit: Follow up prostate cancer    Oncology HPI: Ronnie Lagos is a 69 year old male with hypertension, GERD who presents with the following oncologic history:  1.  5/11/2023: PSA elevated at 8.08 (prior 3.44 from 5/10/22).  2. 6/29/2023: MRI pelvis -- PI-RADS 4 - 1.4 x 1 x 0.9 cm nodule abutting posterolateral capsule of prostate; no pelvic adenopathy or bone lesions.  3.  8/30/2023: Prostate biopsy - Pia 4+3=7 prostate adenocarcinoma.  4. 10/2/2023: NM bone scan negative.  5.  11/3/2023: Radical prostatectomy, pelvic lymph node excision -- [place] 4+5=9 adenocarcinoma, [id number], margins widely positive for malignancy.  6.  2/7/2024: PSA = 4.06.  7.  2/15/2024: PSA elevated at 5.26.  8.  2/28/2024: PSMA PET showed focal uptake in left side of prostatectomy bed suspicious for recurrent disease; multiple pelvic and few retroperitoneal lymph nodes with uptake; >10 sclerotic osseous lesions with uptake indicative of osseous metastatic disease.  9. 3/20/2024: Started Casodex, Lupron, darolutamide, Xgeva, cycle 1 Taxotere but developed flushing, chest heaviness, nausea (no dyspnea) reaction 5 minutes into Taxotere infusion. Re-challenge not attempted that day.  10.  3/29/2024: Re-challenged Taxotere but developed repeat hypersensitivity reaction 4 minutes into infusion. Taxotere discontinued.  11. 4/17/2024: PSA 0.53.  12. 7/10/2024: PSA 0.06.  13. 10/8/2024: PSA 0.02.  14. 1/28/2025: PSA 0.13.  15. 2/11/2025: Cabazitaxel C1D1, darolutamide discontinued    Patient was seen by Dr. Mercedes and repeat CT CAP and bone scan with possible new disease versus treatment response. Cabazitaxel C1D1 completed on 2/11/25 and he did have a reaction, tolerated at a slower rate.     He is here today for cabazitaxel C2D1.     Interval History: Ronnie is here unaccompanied today.  He had a reaction with cabazitaxel cycle 1 with flushing, received his medications, improved and  tolerated at a slower rate.  He had a reaction to Taxotere in the past as well, so is little nervous about the second dose.  He has been taking his prednisone 10 mg.  He also admits that his insomnia has become more severe.  This was always fairly bad prior to his prostate cancer diagnosis and chemotherapy, but now it is worse.  He will maybe get 2 hours at a time and the hot flashes are waking him up pretty often.  He was prescribed venlafaxine by Dr. Mercedes, however he was nervous to start it given that it is an antidepressant, but was not aware about the dosing.  He has chronic right rib pain from a fracture about 3 years ago, prior to his prostate cancer diagnosis.  He does take Tylenol when needed with some improvement.  He admits that he does have some increased CASE lately since starting chemotherapy, but tolerable. He is also open to a port placement, but wants to think about it a little bit further.  No fever, chills, vomiting, diarrhea, bleeding, headache, dizziness.    Review of Systems: See interval hx. Denies fevers, chills, HA, dizziness, n/t, changes in vision, sore throat, CP, abdominal pain, N/V, diarrhea, changes in urination, bleeding, bruising.     PMHx and Social Hx reviewed per EPIC.      Medications:  Current Outpatient Medications   Medication Sig Dispense Refill    amLODIPine (NORVASC) 5 MG tablet Take 1 tablet (5 mg) by mouth daily 90 tablet 3    atenolol (TENORMIN) 50 MG tablet Take 1 tablet (50 mg) by mouth daily 90 tablet 3    calcium citrate (CITRACAL) 950 (200 Ca) MG tablet Take 1 tablet by mouth 2 times daily      omeprazole (PRILOSEC) 20 MG DR capsule Take 1 capsule (20 mg) by mouth daily 90 capsule 3    predniSONE (DELTASONE) 10 MG tablet Take 1 tablet (10 mg) by mouth daily for 21 days. 21 tablet 0    prochlorperazine (COMPAZINE) 10 MG tablet Take 1 tablet (10 mg) by mouth every 6 hours as needed for nausea or vomiting. 30 tablet 2    Vitamin D, Cholecalciferol, 10 MCG (400 UNIT)  TABS          Allergies   Allergen Reactions    Taxotere [Docetaxel]        EXAM:    BP (!) 153/74   Pulse 59   Temp 97.8  F (36.6  C) (Temporal)   Resp 18   Wt 100.5 kg (221 lb 9.6 oz)   SpO2 100%   BMI 33.70 kg/m      GENERAL:  Male, in no acute distress.  Alert and oriented x3.   HEENT:  Normocephalic, atraumatic.    CV:  RRR, No murmurs, gallops, or rubs.   LUNGS:  Clear to auscultation bilaterally.   ABDOMEN:  Soft, nontender and nondistended.    EXTREMITIES:  No clubbing, cyanosis, or edema.   SKIN: No rash, port  PSYCH: Calm and cooperative       Labs:    03/04/25 07:55   Sodium 139   Potassium 3.9   Chloride 103   Carbon Dioxide (CO2) 21 (L)   Urea Nitrogen 15.6   Creatinine 0.82   GFR Estimate >90   Calcium 8.8   Anion Gap 15   Albumin 3.8   Protein Total 6.9   Alkaline Phosphatase 47   ALT 28   AST 26   Bilirubin Total 0.7   Glucose 191 (H)   WBC 10.3   Hemoglobin 13.2 (L)   Hematocrit 37.6 (L)   Platelet Count 231   RBC Count 4.33 (L)   MCV 87   MCH 30.5   MCHC 35.1   RDW 12.6   % Neutrophils 64   % Lymphocytes 25   % Monocytes 5   % Eosinophils 0   % Basophils 1   Absolute Basophils 0.1   Absolute Eosinophils 0.0   Absolute Immature Granulocytes 0.5 (H)   Absolute Lymphocytes 2.5   Absolute Monocytes 0.5   % Immature Granulocytes 5   Absolute Neutrophils 6.6   Absolute NRBCs 0.0   NRBCs per 100 WBC 0     Imaging:   EXAM: CT CHEST/ABDOMEN/PELVIS W CONTRAST  LOCATION: [facility]  DATE: 2/26/2025     INDICATION: reevaluate metastatic prostate cancer, please compare to prior scans  COMPARISON: PSMA PET/CT 02/28/2024.  TECHNIQUE: CT scan of the chest, abdomen, and pelvis was performed following injection of IV contrast. Multiplanar reformats were obtained. Dose reduction techniques were used.   CONTRAST: 100mL Isovue 370     FINDINGS:   LUNGS AND PLEURA: No suspicious new or enlarging pulmonary nodule. Stable 3 mm right lower lobe nodule (series 12, image 138). No pleural  effusion.     MEDIASTINUM/AXILLAE: Unchanged subcentimeter left thyroid nodule. Normal heart size without pericardial effusion. No aneurysmal dilatation of the thoracic aorta. Stable mildly enlarged 1.3 cm right hilar lymph node which was not significantly radiotracer   avid on prior PET (series 4, image 54).     CORONARY ARTERY CALCIFICATION: Mild.     HEPATOBILIARY: Cholecystectomy. No biliary ductal dilatation. No suspicious liver lesion.     PANCREAS: Normal.     SPLEEN: Normal.     ADRENAL GLANDS: Normal.     KIDNEYS/BLADDER: Symmetric renal enhancement. No hydronephrosis. Left renal sinus cysts, not requiring follow-up. Unremarkable urinary bladder.     BOWEL: No obstruction or inflammatory change. Colonic diverticula. Normal appendix. Duodenal diverticulum.     LYMPH NODES: No lymphadenopathy by CT size criteria. Previously noted radiotracer avid subcentimeter pelvic lymph nodes have decreased in size or resolved such as a left external iliac node which now measures 2 mm compared to 5 mm (series 4, image 271).   Stable tiny left external iliac fluid collection, likely a small lymphocele.     VASCULATURE: Moderate burden of atherosclerotic disease of the abdominal aorta without abdominal aortic aneurysm.     PELVIC ORGANS: Prostatectomy. No measurable soft tissue within the prostatectomy bed. Previously noted focal enhancement within the left aspect of the prostatectomy bed is no longer evident.     MUSCULOSKELETAL: Increased sclerosis of multiple osseous metastases involving the spine, ribs, and bony pelvis. For example, metastasis of L3 is increasingly sclerotic. There are several newly apparent sclerotic osseous lesions such as the inferior   endplate of T10 and right posterior ninth rib. Right gluteal intramuscular lipoma.                                                                      IMPRESSION:  1.  Increased sclerosis of multiple osseous metastases which may represent treatment response. There are  several newly apparent sclerotic osseous lesions which may represent new metastases or treatment response of previously occult lesions. Correlate   with PSA values and forthcoming nuclear medicine bone scan.  2.  Decreased size or resolution of previously noted radiotracer avid subcentimeter pelvic lymph nodes.  3.  Previously noted focal enhancement within the left aspect of the prostatectomy bed is no longer evident.      EXAM: NM BONE SCAN WHOLE BODY  LOCATION: [facility]  DATE: 2/26/2025     INDICATION: Malignant neoplasm of prostate  COMPARISON: CT of the chest abdomen pelvis dated 2/26/2025.  TECHNIQUE: 26.0 mCi technetium-99m MDP, IV. Anterior and posterior delayed whole-body images at 3 hours with additional spot images of the skull.     FINDINGS: Radiotracer uptake in a pattern typical of degenerative change involving shoulders, spine, hips, and knees. Focal radiotracer uptake in a anterior rib likely representing sequelae of prior trauma. No suspicious areas of altered uptake to   suggest osseous metastasis.                                                                      IMPRESSION:     No scintigraphic evidence of osseous metastasis.      Impression/Plan: Ronnie Lagos is a 69 year old male with metastatic prostate cancer, previously on Casodex, Lupron, delivering made, Taxotere and now on cabazitaxel.      1. 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 Hypertension  5. Hypersensitivity reaction to Taxotere  --2/28/2024 PSMA PET showed metastatic disease to the pelvic and retroperitoneal lymph nodes as well as greater than 10 sites of bony metastatic disease.   --Given high volume disease, he started Casodex 50 mg daily for first month, Lupron every 3 months, darolutamide 600 mg orally BID, and tried Taxotere twice but developed hypersensitivity reaction only 4-5 minutes  into infusion, therefore permanently discontinued.  --Casodex and darolutamide were also discontinued  --PSA increased further to 0.13 consistent with biochemical progression.  He was having increase in bone pain.   --New baseline CT CAP and bone scan with no significant progression, but PSA continues to rise and 0.19 on 2/27/2025  --Started cabazitaxel with C1 D1 on 2/11/2025, had a reaction in infusion, given meds and restarted at a slower rate, tolerated well  -Discussed continuing with cabazitaxel, labs reviewed today, okay to proceed with C2 D1  --Discussed that infusion therapy would be given until disease progression or unacceptable toxicity.  -- Dr. Mercedes discussed alternate option of Pluvicto and switch to enzalutamide but would recommend utilizing this line of therapy after cabazitaxel progression.  --Lupron given 2/27/25, continue every 3 months  --Continue denosumab (Xgeva) every 4 weeks.  However he does have a dental visit coming up, if he requires any dental work other than a cleaning, we will need to hold Xgeva for 3 months  --Will continue to monitor CBC, CMP, and PSA monthly.  --Discussed placing a port -- he would like to hold off for now, but will message us if he changes his mind  --VERA/MD every 3 weeks with cabazitaxel     5. 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     6. Left shoulder pain  --Related to post-arthroplasty pain.  Continue Tylenol as needed.     7. Left upper molar pain  --He may need bone implant/grafting in the future but would need to hold Xgeva for 3 months if needed.       8. Right knee osteoarthritis  --He will discuss options with Ortho when needed     9. Right rib pain related to multiple right-sided rib fractures.  --Has known metastatic disease to ribs/bones.  --8/7/2024 Rib x-rays showed multiple indeterminate chronicity right rib fractures.  --Continue conservative management.     10.  Insomnia  11. Male hot flashes  --Multifactorially related to joint pain and hot flashes.  --Not alleviated with CBD, THC gummies, or melatonin.  --Suggested acupuncture in the past, but does not like needles.  -- Rx venlafaxine sent to pharmacy by Dr. Mercedes, however patient read more about the medication he was nervous to try it.  We discussed this in detail today and at the lower dose, it should help the hot flashes as he is concerned about any mood changes.  He will start this and keep us updated on his symptoms.  --Also encouraged moderate exercise and stretching.      Chart documentation with Dragon Voice recognition Software. Although reviewed after completion, some words and grammatical errors may remain.    50 minutes spent on the date of the encounter doing chart review, review of test results, interpretation of tests, patient visit, and documentation     The longitudinal plan of care for the diagnosis(es)/condition(s) as documented were addressed during this visit. Due to the added complexity in care, I will continue to support Ronnie in the subsequent management and with ongoing continuity of care.    Aileen Agrawal PA-C  Hematology/Oncology  [facility]

## 2025-03-04 ENCOUNTER — ONCOLOGY VISIT (OUTPATIENT)
Dept: ONCOLOGY | Facility: CLINIC | Age: 70
End: 2025-03-04
Attending: PHYSICIAN ASSISTANT
Payer: COMMERCIAL

## 2025-03-04 ENCOUNTER — LAB (OUTPATIENT)
Dept: INFUSION THERAPY | Facility: CLINIC | Age: 70
End: 2025-03-04
Attending: INTERNAL MEDICINE
Payer: COMMERCIAL

## 2025-03-04 ENCOUNTER — INFUSION THERAPY VISIT (OUTPATIENT)
Dept: INFUSION THERAPY | Facility: CLINIC | Age: 70
End: 2025-03-04
Attending: PHYSICIAN ASSISTANT
Payer: COMMERCIAL

## 2025-03-04 VITALS
HEART RATE: 62 BPM | DIASTOLIC BLOOD PRESSURE: 84 MMHG | SYSTOLIC BLOOD PRESSURE: 135 MMHG | RESPIRATION RATE: 16 BRPM | OXYGEN SATURATION: 97 %

## 2025-03-04 VITALS
HEART RATE: 59 BPM | BODY MASS INDEX: 33.7 KG/M2 | WEIGHT: 221.6 LBS | SYSTOLIC BLOOD PRESSURE: 153 MMHG | RESPIRATION RATE: 18 BRPM | DIASTOLIC BLOOD PRESSURE: 74 MMHG | TEMPERATURE: 97.8 F | OXYGEN SATURATION: 100 %

## 2025-03-04 DIAGNOSIS — R23.2 HOT FLASH IN MALE: ICD-10-CM

## 2025-03-04 DIAGNOSIS — C61 PROSTATE CANCER METASTATIC TO MULTIPLE SITES (H): Primary | ICD-10-CM

## 2025-03-04 DIAGNOSIS — G47.00 PERSISTENT INSOMNIA: ICD-10-CM

## 2025-03-04 LAB
ALBUMIN SERPL BCG-MCNC: 3.8 G/DL (ref 3.5–5.2)
ALP SERPL-CCNC: 47 U/L (ref 40–150)
ALT SERPL W P-5'-P-CCNC: 28 U/L (ref 0–70)
ANION GAP SERPL CALCULATED.3IONS-SCNC: 15 MMOL/L (ref 7–15)
AST SERPL W P-5'-P-CCNC: 26 U/L (ref 0–45)
BASOPHILS # BLD AUTO: 0.1 10E3/UL (ref 0–0.2)
BASOPHILS NFR BLD AUTO: 1 %
BILIRUB SERPL-MCNC: 0.7 MG/DL
BUN SERPL-MCNC: 15.6 MG/DL (ref 8–23)
CALCIUM SERPL-MCNC: 8.8 MG/DL (ref 8.8–10.4)
CHLORIDE SERPL-SCNC: 103 MMOL/L (ref 98–107)
CREAT SERPL-MCNC: 0.82 MG/DL (ref 0.67–1.17)
EGFRCR SERPLBLD CKD-EPI 2021: >90 ML/MIN/1.73M2
EOSINOPHIL # BLD AUTO: 0 10E3/UL (ref 0–0.7)
EOSINOPHIL NFR BLD AUTO: 0 %
ERYTHROCYTE [DISTWIDTH] IN BLOOD BY AUTOMATED COUNT: 12.6 % (ref 10–15)
GLUCOSE SERPL-MCNC: 191 MG/DL (ref 70–99)
HCO3 SERPL-SCNC: 21 MMOL/L (ref 22–29)
HCT VFR BLD AUTO: 37.6 % (ref 40–53)
HGB BLD-MCNC: 13.2 G/DL (ref 13.3–17.7)
IMM GRANULOCYTES # BLD: 0.5 10E3/UL
IMM GRANULOCYTES NFR BLD: 5 %
LYMPHOCYTES # BLD AUTO: 2.5 10E3/UL (ref 0.8–5.3)
LYMPHOCYTES NFR BLD AUTO: 25 %
MCH RBC QN AUTO: 30.5 PG (ref 26.5–33)
MCHC RBC AUTO-ENTMCNC: 35.1 G/DL (ref 31.5–36.5)
MCV RBC AUTO: 87 FL (ref 78–100)
MONOCYTES # BLD AUTO: 0.5 10E3/UL (ref 0–1.3)
MONOCYTES NFR BLD AUTO: 5 %
NEUTROPHILS # BLD AUTO: 6.6 10E3/UL (ref 1.6–8.3)
NEUTROPHILS NFR BLD AUTO: 64 %
NRBC # BLD AUTO: 0 10E3/UL
NRBC BLD AUTO-RTO: 0 /100
PLATELET # BLD AUTO: 231 10E3/UL (ref 150–450)
POTASSIUM SERPL-SCNC: 3.9 MMOL/L (ref 3.4–5.3)
PROT SERPL-MCNC: 6.9 G/DL (ref 6.4–8.3)
RBC # BLD AUTO: 4.33 10E6/UL (ref 4.4–5.9)
SODIUM SERPL-SCNC: 139 MMOL/L (ref 135–145)
WBC # BLD AUTO: 10.3 10E3/UL (ref 4–11)

## 2025-03-04 PROCEDURE — 84132 ASSAY OF SERUM POTASSIUM: CPT | Performed by: INTERNAL MEDICINE

## 2025-03-04 PROCEDURE — G0463 HOSPITAL OUTPT CLINIC VISIT: HCPCS | Performed by: PHYSICIAN ASSISTANT

## 2025-03-04 PROCEDURE — 96413 CHEMO IV INFUSION 1 HR: CPT

## 2025-03-04 PROCEDURE — 84155 ASSAY OF PROTEIN SERUM: CPT | Performed by: INTERNAL MEDICINE

## 2025-03-04 PROCEDURE — 85014 HEMATOCRIT: CPT | Performed by: INTERNAL MEDICINE

## 2025-03-04 PROCEDURE — 96367 TX/PROPH/DG ADDL SEQ IV INF: CPT

## 2025-03-04 PROCEDURE — G2211 COMPLEX E/M VISIT ADD ON: HCPCS | Performed by: PHYSICIAN ASSISTANT

## 2025-03-04 PROCEDURE — 36415 COLL VENOUS BLD VENIPUNCTURE: CPT | Performed by: INTERNAL MEDICINE

## 2025-03-04 PROCEDURE — 250N000011 HC RX IP 250 OP 636: Performed by: INTERNAL MEDICINE

## 2025-03-04 PROCEDURE — 96415 CHEMO IV INFUSION ADDL HR: CPT

## 2025-03-04 PROCEDURE — 99215 OFFICE O/P EST HI 40 MIN: CPT | Performed by: PHYSICIAN ASSISTANT

## 2025-03-04 PROCEDURE — 85004 AUTOMATED DIFF WBC COUNT: CPT | Performed by: INTERNAL MEDICINE

## 2025-03-04 PROCEDURE — 96375 TX/PRO/DX INJ NEW DRUG ADDON: CPT

## 2025-03-04 PROCEDURE — 96376 TX/PRO/DX INJ SAME DRUG ADON: CPT

## 2025-03-04 PROCEDURE — 258N000003 HC RX IP 258 OP 636: Performed by: INTERNAL MEDICINE

## 2025-03-04 RX ORDER — DIPHENHYDRAMINE HYDROCHLORIDE 50 MG/ML
25 INJECTION, SOLUTION INTRAMUSCULAR; INTRAVENOUS
Status: COMPLETED | OUTPATIENT
Start: 2025-03-04 | End: 2025-03-04

## 2025-03-04 RX ORDER — PREDNISONE 10 MG/1
10 TABLET ORAL DAILY
Qty: 90 TABLET | Refills: 0 | Status: SHIPPED | OUTPATIENT
Start: 2025-03-04

## 2025-03-04 RX ORDER — PREDNISONE 10 MG/1
10 TABLET ORAL DAILY
Qty: 21 TABLET | Refills: 0 | Status: CANCELLED | OUTPATIENT
Start: 2025-03-04

## 2025-03-04 RX ADMIN — SODIUM CHLORIDE 250 ML: 0.9 INJECTION, SOLUTION INTRAVENOUS at 09:08

## 2025-03-04 RX ADMIN — FAMOTIDINE 20 MG: 10 INJECTION INTRAVENOUS at 09:08

## 2025-03-04 RX ADMIN — DIPHENHYDRAMINE HYDROCHLORIDE 25 MG: 50 INJECTION INTRAMUSCULAR; INTRAVENOUS at 09:12

## 2025-03-04 RX ADMIN — SODIUM CHLORIDE 44 MG: 9 INJECTION, SOLUTION INTRAVENOUS at 10:00

## 2025-03-04 RX ADMIN — DIPHENHYDRAMINE HYDROCHLORIDE 25 MG: 50 INJECTION INTRAMUSCULAR; INTRAVENOUS at 10:36

## 2025-03-04 RX ADMIN — DEXAMETHASONE SODIUM PHOSPHATE: 10 INJECTION, SOLUTION INTRAMUSCULAR; INTRAVENOUS at 09:22

## 2025-03-04 ASSESSMENT — PAIN SCALES - GENERAL: PAINLEVEL_OUTOF10: MILD PAIN (2)

## 2025-03-04 NOTE — LETTER
3/4/2025      Ronnie Lagos  [address]  [facility] [address]      Dear Colleague,    Thank you for referring your patient, Ronnie Lagos, to the [facility]. Please see a copy of my visit note below.    Oncology/Hematology Visit Note    Mar 4, 2025    Reason for visit: Follow up prostate cancer    Oncology HPI: Ronnie Lagos is a 69 year old male with hypertension, GERD who presents with the following oncologic history:  1.  5/11/2023: PSA elevated at 8.08 (prior 3.44 from 5/10/22).  2. 6/29/2023: MRI pelvis -- PI-RADS 4 - 1.4 x 1 x 0.9 cm nodule abutting posterolateral capsule of prostate; no pelvic adenopathy or bone lesions.  3.  8/30/2023: Prostate biopsy - [place] 4+3=7 prostate adenocarcinoma.  4. 10/2/2023: NM bone scan negative.  5.  11/3/2023: Radical prostatectomy, pelvic lymph node excision -- [place] 4+5=9 adenocarcinoma, [id number], margins widely positive for malignancy.  6.  2/7/2024: PSA = 4.06.  7.  2/15/2024: PSA elevated at 5.26.  8.  2/28/2024: PSMA PET showed focal uptake in left side of prostatectomy bed suspicious for recurrent disease; multiple pelvic and few retroperitoneal lymph nodes with uptake; >10 sclerotic osseous lesions with uptake indicative of osseous metastatic disease.  9. 3/20/2024: Started Casodex, Lupron, darolutamide, Xgeva, cycle 1 Taxotere but developed flushing, chest heaviness, nausea (no dyspnea) reaction 5 minutes into Taxotere infusion. Re-challenge not attempted that day.  10.  3/29/2024: Re-challenged Taxotere but developed repeat hypersensitivity reaction 4 minutes into infusion. Taxotere discontinued.  11. 4/17/2024: PSA 0.53.  12. 7/10/2024: PSA 0.06.  13. 10/8/2024: PSA 0.02.  14. 1/28/2025: PSA 0.13.  15. 2/11/2025: Cabazitaxel C1D1, darolutamide discontinued    Patient was seen by Dr. Mercedes and repeat CT CAP and bone scan with possible new disease versus treatment response. Cabazitaxel C1D1 completed on 2/11/25  and he did have a reaction, tolerated at a slower rate.     He is here today for cabazitaxel C2D1.     Interval History: Ronnie is here unaccompanied today.  He had a reaction with cabazitaxel cycle 1 with flushing, received his medications, improved and tolerated at a slower rate.  He had a reaction to Taxotere in the past as well, so is little nervous about the second dose.  He has been taking his prednisone 10 mg.  He also admits that his insomnia has become more severe.  This was always fairly bad prior to his prostate cancer diagnosis and chemotherapy, but now it is worse.  He will maybe get 2 hours at a time and the hot flashes are waking him up pretty often.  He was prescribed venlafaxine by Dr. Mercedes, however he was nervous to start it given that it is an antidepressant, but was not aware about the dosing.  He has chronic right rib pain from a fracture about 3 years ago, prior to his prostate cancer diagnosis.  He does take Tylenol when needed with some improvement.  He admits that he does have some increased CASE lately since starting chemotherapy, but tolerable. He is also open to a port placement, but wants to think about it a little bit further.  No fever, chills, vomiting, diarrhea, bleeding, headache, dizziness.    Review of Systems: See interval hx. Denies fevers, chills, HA, dizziness, n/t, changes in vision, sore throat, CP, abdominal pain, N/V, diarrhea, changes in urination, bleeding, bruising.     PMHx and Social Hx reviewed per EPIC.      Medications:  Current Outpatient Medications   Medication Sig Dispense Refill     amLODIPine (NORVASC) 5 MG tablet Take 1 tablet (5 mg) by mouth daily 90 tablet 3     atenolol (TENORMIN) 50 MG tablet Take 1 tablet (50 mg) by mouth daily 90 tablet 3     calcium citrate (CITRACAL) 950 (200 Ca) MG tablet Take 1 tablet by mouth 2 times daily       omeprazole (PRILOSEC) 20 MG DR capsule Take 1 capsule (20 mg) by mouth daily 90 capsule 3     predniSONE (DELTASONE) 10  MG tablet Take 1 tablet (10 mg) by mouth daily for 21 days. 21 tablet 0     prochlorperazine (COMPAZINE) 10 MG tablet Take 1 tablet (10 mg) by mouth every 6 hours as needed for nausea or vomiting. 30 tablet 2     Vitamin D, Cholecalciferol, 10 MCG (400 UNIT) TABS          Allergies   Allergen Reactions     Taxotere [Docetaxel]        EXAM:    BP (!) 153/74   Pulse 59   Temp 97.8  F (36.6  C) (Temporal)   Resp 18   Wt 100.5 kg (221 lb 9.6 oz)   SpO2 100%   BMI 33.70 kg/m      GENERAL:  Male, in no acute distress.  Alert and oriented x3.   HEENT:  Normocephalic, atraumatic.    CV:  RRR, No murmurs, gallops, or rubs.   LUNGS:  Clear to auscultation bilaterally.   ABDOMEN:  Soft, nontender and nondistended.    EXTREMITIES:  No clubbing, cyanosis, or edema.   SKIN: No rash, port  PSYCH: Calm and cooperative       Labs:    03/04/25 07:55   Sodium 139   Potassium 3.9   Chloride 103   Carbon Dioxide (CO2) 21 (L)   Urea Nitrogen 15.6   Creatinine 0.82   GFR Estimate >90   Calcium 8.8   Anion Gap 15   Albumin 3.8   Protein Total 6.9   Alkaline Phosphatase 47   ALT 28   AST 26   Bilirubin Total 0.7   Glucose 191 (H)   WBC 10.3   Hemoglobin 13.2 (L)   Hematocrit 37.6 (L)   Platelet Count 231   RBC Count 4.33 (L)   MCV 87   MCH 30.5   MCHC 35.1   RDW 12.6   % Neutrophils 64   % Lymphocytes 25   % Monocytes 5   % Eosinophils 0   % Basophils 1   Absolute Basophils 0.1   Absolute Eosinophils 0.0   Absolute Immature Granulocytes 0.5 (H)   Absolute Lymphocytes 2.5   Absolute Monocytes 0.5   % Immature Granulocytes 5   Absolute Neutrophils 6.6   Absolute NRBCs 0.0   NRBCs per 100 WBC 0     Imaging:   EXAM: CT CHEST/ABDOMEN/PELVIS W CONTRAST  LOCATION: [facility]  DATE: 2/26/2025     INDICATION: reevaluate metastatic prostate cancer, please compare to prior scans  COMPARISON: PSMA PET/CT 02/28/2024.  TECHNIQUE: CT scan of the chest, abdomen, and pelvis was performed following injection of IV contrast.  Multiplanar reformats were obtained. Dose reduction techniques were used.   CONTRAST: 100mL Isovue 370     FINDINGS:   LUNGS AND PLEURA: No suspicious new or enlarging pulmonary nodule. Stable 3 mm right lower lobe nodule (series 12, image 138). No pleural effusion.     MEDIASTINUM/AXILLAE: Unchanged subcentimeter left thyroid nodule. Normal heart size without pericardial effusion. No aneurysmal dilatation of the thoracic aorta. Stable mildly enlarged 1.3 cm right hilar lymph node which was not significantly radiotracer   avid on prior PET (series 4, image 54).     CORONARY ARTERY CALCIFICATION: Mild.     HEPATOBILIARY: Cholecystectomy. No biliary ductal dilatation. No suspicious liver lesion.     PANCREAS: Normal.     SPLEEN: Normal.     ADRENAL GLANDS: Normal.     KIDNEYS/BLADDER: Symmetric renal enhancement. No hydronephrosis. Left renal sinus cysts, not requiring follow-up. Unremarkable urinary bladder.     BOWEL: No obstruction or inflammatory change. Colonic diverticula. Normal appendix. Duodenal diverticulum.     LYMPH NODES: No lymphadenopathy by CT size criteria. Previously noted radiotracer avid subcentimeter pelvic lymph nodes have decreased in size or resolved such as a left external iliac node which now measures 2 mm compared to 5 mm (series 4, image 271).   Stable tiny left external iliac fluid collection, likely a small lymphocele.     VASCULATURE: Moderate burden of atherosclerotic disease of the abdominal aorta without abdominal aortic aneurysm.     PELVIC ORGANS: Prostatectomy. No measurable soft tissue within the prostatectomy bed. Previously noted focal enhancement within the left aspect of the prostatectomy bed is no longer evident.     MUSCULOSKELETAL: Increased sclerosis of multiple osseous metastases involving the spine, ribs, and bony pelvis. For example, metastasis of L3 is increasingly sclerotic. There are several newly apparent sclerotic osseous lesions such as the inferior   endplate of  T10 and right posterior ninth rib. Right gluteal intramuscular lipoma.                                                                      IMPRESSION:  1.  Increased sclerosis of multiple osseous metastases which may represent treatment response. There are several newly apparent sclerotic osseous lesions which may represent new metastases or treatment response of previously occult lesions. Correlate   with PSA values and forthcoming nuclear medicine bone scan.  2.  Decreased size or resolution of previously noted radiotracer avid subcentimeter pelvic lymph nodes.  3.  Previously noted focal enhancement within the left aspect of the prostatectomy bed is no longer evident.      EXAM: NM BONE SCAN WHOLE BODY  LOCATION: [facility]  DATE: 2/26/2025     INDICATION: Malignant neoplasm of prostate  COMPARISON: CT of the chest abdomen pelvis dated 2/26/2025.  TECHNIQUE: 26.0 mCi technetium-99m MDP, IV. Anterior and posterior delayed whole-body images at 3 hours with additional spot images of the skull.     FINDINGS: Radiotracer uptake in a pattern typical of degenerative change involving shoulders, spine, hips, and knees. Focal radiotracer uptake in a anterior rib likely representing sequelae of prior trauma. No suspicious areas of altered uptake to   suggest osseous metastasis.                                                                      IMPRESSION:     No scintigraphic evidence of osseous metastasis.      Impression/Plan: Ronnie Lagos is a 69 year old male with metastatic prostate cancer, previously on Casodex, Lupron, delivering made, Taxotere and now on cabazitaxel.      1. Metastatic non-castrate prostate adenocarcinoma, [place] 4+5=9  2. 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 Hypertension  5. Hypersensitivity reaction to Taxotere  --2/28/2024 PSMA PET showed metastatic disease to the pelvic and retroperitoneal lymph nodes as well as greater  than 10 sites of bony metastatic disease.   --Given high volume disease, he started Casodex 50 mg daily for first month, Lupron every 3 months, darolutamide 600 mg orally BID, and tried Taxotere twice but developed hypersensitivity reaction only 4-5 minutes into infusion, therefore permanently discontinued.  --Casodex and darolutamide were also discontinued  --PSA increased further to 0.13 consistent with biochemical progression.  He was having increase in bone pain.   --New baseline CT CAP and bone scan with no significant progression, but PSA continues to rise and 0.19 on 2/27/2025  --Started cabazitaxel with C1 D1 on 2/11/2025, had a reaction in infusion, given meds and restarted at a slower rate, tolerated well  -Discussed continuing with cabazitaxel, labs reviewed today, okay to proceed with C2 D1  --Discussed that infusion therapy would be given until disease progression or unacceptable toxicity.  -- Dr. Mercedes discussed alternate option of Pluvicto and switch to enzalutamide but would recommend utilizing this line of therapy after cabazitaxel progression.  --Lupron given 2/27/25, continue every 3 months  --Continue denosumab (Xgeva) every 4 weeks.  However he does have a dental visit coming up, if he requires any dental work other than a cleaning, we will need to hold Xgeva for 3 months  --Will continue to monitor CBC, CMP, and PSA monthly.  --Discussed placing a port -- he would like to hold off for now, but will message us if he changes his mind  --VERA/MD every 3 weeks with cabazitaxel     5. 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     6. Left shoulder pain  --Related to post-arthroplasty pain.  Continue Tylenol as needed.     7. Left upper molar pain  --He may need bone implant/grafting in the future but would need to hold Xgeva for 3 months if needed.       8. Right knee osteoarthritis  --He will discuss options with Ortho when  needed     9. Right rib pain related to multiple right-sided rib fractures.  --Has known metastatic disease to ribs/bones.  --8/7/2024 Rib x-rays showed multiple indeterminate chronicity right rib fractures.  --Continue conservative management.     10. Insomnia  11. Male hot flashes  --Multifactorially related to joint pain and hot flashes.  --Not alleviated with CBD, THC gummies, or melatonin.  --Suggested acupuncture in the past, but does not like needles.  -- Rx venlafaxine sent to pharmacy by Dr. Mercedes, however patient read more about the medication he was nervous to try it.  We discussed this in detail today and at the lower dose, it should help the hot flashes as he is concerned about any mood changes.  He will start this and keep us updated on his symptoms.  --Also encouraged moderate exercise and stretching.      Chart documentation with Dragon Voice recognition Software. Although reviewed after completion, some words and grammatical errors may remain.    50 minutes spent on the date of the encounter doing chart review, review of test results, interpretation of tests, patient visit, and documentation     The longitudinal plan of care for the diagnosis(es)/condition(s) as documented were addressed during this visit. Due to the added complexity in care, I will continue to support Ronnie in the subsequent management and with ongoing continuity of care.    Aileen Agrawal PA-C  Hematology/Oncology  [facility]                  Again, thank you for allowing me to participate in the care of your patient.        Sincerely,        Aileen Agrawal PA-C    Electronically signed

## 2025-03-04 NOTE — PROGRESS NOTES
Infusion Nursing Note:  Ronnie Lagos presents today for C2D1 Jevtana.    Patient seen by provider today: Yes: JENNIFER Solano   present during visit today: Not Applicable.    Note: Patient confirmed taking 10 mg Prednisone at home this morning.   Premedicated with IV 20 mg Pepcid, 25 mg Benadryl, 8 mg Zofran and 8 mg Decadron.  5 minutes into Jevtana infusion, patient's face started turning red. Stopped infusion and switched to NS wide open. Patient's face started looking back to normal within 20 minutes without any hypersensitivity medications. After observing for 30 minutes, per Aileen, give 25 mg Benadryl and re-challenge at 1/2 rate. 140 ml/hr for remainder of infusion. Patient tolerated well.    Intravenous Access:  Peripheral IV placed by FT RN.    Treatment Conditions:  Lab Results   Component Value Date    HGB 13.2 (L) 03/04/2025    WBC 10.3 03/04/2025    ANEU 5.2 01/12/2018    ANEUTAUTO 6.6 03/04/2025     03/04/2025        Lab Results   Component Value Date     03/04/2025    POTASSIUM 3.9 03/04/2025    CR 0.82 03/04/2025    AV 8.8 03/04/2025    BILITOTAL 0.7 03/04/2025    ALBUMIN 3.8 03/04/2025    ALT 28 03/04/2025    AST 26 03/04/2025       Results reviewed, labs MET treatment parameters, ok to proceed with treatment.      Post Infusion Assessment:  Patient tolerated infusion poorly due to : Hypersensitivity: Did patient have a hypersensitivity reaction? : Yes  Drug or Product name: Jevtana  Were pre-meds administered?: Yes  What pre-meds were administered?: Diphenhydramine (Benadryl), Prednisone, Famotidine (Pepcid), Ondansetron (Zofran), Dexamethasone (decadron  First or Subsequent treatment: Subsequent infusion  Rate of infusion when patient had hypersensitivity reaction: 279  Time the hypersensitivity reaction was first recognized: 1007  Symptoms observed or reported (select all that apply): Flushing  Interventions/treatment following reaction: Infusion stopped     Name  of provider notified: JENNIFER Solano  Time provider notified: 1008  Type of notification (select all that apply): Provider at bedside  Was the patient re-challenged today?: Yes - tolerated well  Blood return noted pre and post infusion.  Site patent and intact, free from redness, edema or discomfort.  No evidence of extravasations.  Access discontinued per protocol.       Discharge Plan:   Discharge instructions reviewed with: Patient.  Patient and/or family verbalized understanding of discharge instructions and all questions answered.  AVS to patient via [employer].  Patient will return in 3 weeks for next appointment.   Patient discharged in stable condition accompanied by: self.  Departure Mode: Ambulatory.      Suzette Banks RN

## 2025-03-04 NOTE — NURSING NOTE
"Oncology Rooming Note    March 4, 2025 8:03 AM   Ronnie Lagos is a 69 year old male who presents for:    Chief Complaint   Patient presents with    Oncology Clinic Visit     Initial Vitals: BP (!) 153/74   Pulse 59   Temp 97.8  F (36.6  C) (Temporal)   Resp 18   Wt 100.5 kg (221 lb 9.6 oz)   SpO2 100%   BMI 33.70 kg/m   Estimated body mass index is 33.7 kg/m  as calculated from the following:    Height as of 2/11/25: 1.727 m (5' 7.99\").    Weight as of this encounter: 100.5 kg (221 lb 9.6 oz). Body surface area is 2.2 meters squared.  Mild Pain (2) Comment: Data Unavailable   No LMP for male patient.  Allergies reviewed: Yes  Medications reviewed: Yes    Medications: Medication refills not needed today.  Pharmacy name entered into [employer]:    [id number] - [place], [place] - [address] [facility] - [place], [place] - [address]    Frailty Screening:   Is the patient here for a new oncology consult visit in cancer care? 2. No    PHQ9:  Did this patient require a PHQ9?: No      Clinical concerns: f/u - sleep concerns       Eileen Finnegan CMA              "

## 2025-03-04 NOTE — PROGRESS NOTES
Nursing Note:  Ronnie Lagos presents today for labs.    Patient seen by provider today: Yes: JENNIFER Solano   present during visit today: Not Applicable.    Note: N/A.    Intravenous Access:  Lab draw site left anterior lower forearm, Needle type angiocath, Gauge 22.  Labs drawn without difficulty.  Peripheral IV placed.    Discharge Plan:   Patient was sent to clinic for PA appointment.    Nazia Carter RN

## 2025-03-05 ENCOUNTER — MYC MEDICAL ADVICE (OUTPATIENT)
Dept: ONCOLOGY | Facility: CLINIC | Age: 70
End: 2025-03-05
Payer: COMMERCIAL

## 2025-03-23 DIAGNOSIS — C61 PROSTATE CANCER METASTATIC TO MULTIPLE SITES (H): Primary | ICD-10-CM

## 2025-03-23 RX ORDER — LORAZEPAM 2 MG/ML
0.5 INJECTION INTRAMUSCULAR EVERY 4 HOURS PRN
Status: CANCELLED | OUTPATIENT
Start: 2025-03-25

## 2025-03-23 RX ORDER — ALBUTEROL SULFATE 90 UG/1
1-2 INHALANT RESPIRATORY (INHALATION)
Status: CANCELLED
Start: 2025-03-25

## 2025-03-23 RX ORDER — ALBUTEROL SULFATE 0.83 MG/ML
2.5 SOLUTION RESPIRATORY (INHALATION)
Status: CANCELLED | OUTPATIENT
Start: 2025-03-25

## 2025-03-23 RX ORDER — HEPARIN SODIUM (PORCINE) LOCK FLUSH IV SOLN 100 UNIT/ML 100 UNIT/ML
5 SOLUTION INTRAVENOUS
Status: CANCELLED | OUTPATIENT
Start: 2025-03-25

## 2025-03-23 RX ORDER — DIPHENHYDRAMINE HYDROCHLORIDE 50 MG/ML
25 INJECTION, SOLUTION INTRAMUSCULAR; INTRAVENOUS
Status: CANCELLED
Start: 2025-03-25

## 2025-03-23 RX ORDER — HEPARIN SODIUM,PORCINE 10 UNIT/ML
5-20 VIAL (ML) INTRAVENOUS DAILY PRN
Status: CANCELLED | OUTPATIENT
Start: 2025-03-25

## 2025-03-23 RX ORDER — MEPERIDINE HYDROCHLORIDE 25 MG/ML
25 INJECTION INTRAMUSCULAR; INTRAVENOUS; SUBCUTANEOUS
Status: CANCELLED | OUTPATIENT
Start: 2025-03-25

## 2025-03-23 RX ORDER — EPINEPHRINE 1 MG/ML
0.3 INJECTION, SOLUTION, CONCENTRATE INTRAVENOUS EVERY 5 MIN PRN
Status: CANCELLED | OUTPATIENT
Start: 2025-03-25

## 2025-03-23 RX ORDER — DIPHENHYDRAMINE HYDROCHLORIDE 50 MG/ML
50 INJECTION, SOLUTION INTRAMUSCULAR; INTRAVENOUS
Status: CANCELLED
Start: 2025-03-25

## 2025-03-24 NOTE — ANESTHESIA CARE TRANSFER NOTE
Patient: Ronnie Lagos    Procedure(s):  ENDOSCOPIC RETROGRADE CHOLANGIOPANCREATOGRAM (ERCP), SPHINCTEROTOMY, STONE REMOVAL, STENT PLACEMENT - Wound Class: II-Clean Contaminated    Diagnosis: STONES  Diagnosis Additional Information: No value filed.    Anesthesia Type:   MAC     Note:  Airway :Face Mask  Patient transferred to:PACU  Comments: Pt exhibits spont resps, all monitors and alarms on in pacu, report given to RN, vss.Handoff Report: Identifed the Patient, Identified the Reponsible Provider, Reviewed the pertinent medical history, Discussed the surgical course, Reviewed Intra-OP anesthesia mangement and issues during anesthesia, Set expectations for post-procedure period and Allowed opportunity for questions and acknowledgement of understanding      Vitals: (Last set prior to Anesthesia Care Transfer)    CRNA VITALS  1/18/2018 1225 - 1/18/2018 1305      1/18/2018             Pulse: 64    SpO2: 99 %    Resp Rate (set): 10                Electronically Signed By: VERONICA Paula CRNA  January 18, 2018  1:05 PM  
Neuro

## 2025-03-24 NOTE — PROGRESS NOTES
Oncology/Hematology Visit Note    Mar 25, 2025    Reason for visit: Follow up prostate cancer    Oncology HPI: Ronnie Lagos is a 69 year old male with hypertension, GERD who presents with the following oncologic history:  1.  5/11/2023: PSA elevated at 8.08 (prior 3.44 from 5/10/22).  2. 6/29/2023: MRI pelvis -- PI-RADS 4 - 1.4 x 1 x 0.9 cm nodule abutting posterolateral capsule of prostate; no pelvic adenopathy or bone lesions.  3.  8/30/2023: Prostate biopsy - [place] 4+3=7 prostate adenocarcinoma.  4. 10/2/2023: NM bone scan negative.  5.  11/3/2023: Radical prostatectomy, pelvic lymph node excision -- [place] 4+5=9 adenocarcinoma, [id number], margins widely positive for malignancy.  6.  2/7/2024: PSA = 4.06.  7.  2/15/2024: PSA elevated at 5.26.  8.  2/28/2024: PSMA PET showed focal uptake in left side of prostatectomy bed suspicious for recurrent disease; multiple pelvic and few retroperitoneal lymph nodes with uptake; >10 sclerotic osseous lesions with uptake indicative of osseous metastatic disease.  9. 3/20/2024: Started Casodex, Lupron, darolutamide, Xgeva, cycle 1 Taxotere but developed flushing, chest heaviness, nausea (no dyspnea) reaction 5 minutes into Taxotere infusion. Re-challenge not attempted that day.  10.  3/29/2024: Re-challenged Taxotere but developed repeat hypersensitivity reaction 4 minutes into infusion. Taxotere discontinued.  11. 4/17/2024: PSA 0.53.  12. 7/10/2024: PSA 0.06.  13. 10/8/2024: PSA 0.02.  14. 1/28/2025: PSA 0.13.  15. 2/11/2025: Cabazitaxel C1D1, darolutamide discontinued    Patient was seen by Dr. Mercedes and repeat CT CAP and bone scan with possible new disease vs treatment response. Cabazitaxel C1D1 completed on 2/11/25 and he did have a reaction, tolerated at a slower rate.     He is here today for cabazitaxel C3D1.     Interval History: Ronnie is here unaccompanied today.  He continues to tolerate cabazitaxel better than Taxotere.  Still had a few reactions, but more  mild and more just flushing.  He is tolerating at a slower rate.  He noticed some dried blood in his nose lately, but no active bleeding.  Continues to have some right posterior rib pain, trauma in the past 3-4 years ago in that same spot, but no recent injury.  He is taking his calcium and vitamin D.  Continues on venlafaxine for hot flashes and this is helping.  He noticed some right arm pain since his PET scan about a month ago, but worse with extension.  Having some issues sleeping, melatonin not helpful, lays in bed for a few hours.  He is open to a sleep aid.  No other complaints.    Review of Systems: See interval hx. Denies fevers, chills, HA, changes in vision, sore throat, CP, abdominal pain, N/V, diarrhea, changes in urination, bleeding, bruising.     PMHx and Social Hx reviewed per EPIC.      Medications:  Current Outpatient Medications   Medication Sig Dispense Refill    amLODIPine (NORVASC) 5 MG tablet Take 1 tablet (5 mg) by mouth daily 90 tablet 3    atenolol (TENORMIN) 50 MG tablet Take 1 tablet (50 mg) by mouth daily 90 tablet 3    calcium citrate (CITRACAL) 950 (200 Ca) MG tablet Take 1 tablet by mouth 2 times daily      omeprazole (PRILOSEC) 20 MG DR capsule Take 1 capsule (20 mg) by mouth daily 90 capsule 3    predniSONE (DELTASONE) 10 MG tablet Take 1 tablet (10 mg) by mouth daily. 90 tablet 0    prochlorperazine (COMPAZINE) 10 MG tablet Take 1 tablet (10 mg) by mouth every 6 hours as needed for nausea or vomiting. 30 tablet 2    Vitamin D, Cholecalciferol, 10 MCG (400 UNIT) TABS          Allergies   Allergen Reactions    Taxotere [Docetaxel]        EXAM:    /65   Pulse 53   Temp (!) 96.5  F (35.8  C) (Tympanic)   Resp 16   Wt 100.2 kg (221 lb)   SpO2 96%   BMI 33.61 kg/m      GENERAL:  Male, in no acute distress.  Alert and oriented x3. Well groomed.   HEENT:  Normocephalic, atraumatic. No conjunctival injection or eye swelling.   LUNGS:  Nonlabored breathing, no cough or audible  wheezing, able to speak full sentences.  MSK: Full ROM UE.  Slight tenderness in the right antecubital region of the right arm, especially with extension.  Slight  firmness noted over the blood vessel, no swelling, erythema.  SKIN: No rash on exposed skin.   NEURO: CN grossly intact, speech normal  PSYCH: Mentation appears normal, insight and judgement intact      Labs:     03/25/25 08:01   Sodium 135   Potassium 4.5   Chloride 100   Carbon Dioxide (CO2) 25   Urea Nitrogen 15.5   Creatinine 0.87   GFR Estimate >90   Calcium 9.2   Anion Gap 10   Albumin 4.2   Protein Total 7.1   Alkaline Phosphatase 48   ALT 30   AST 36   Bilirubin Total 1.1   Glucose 128 (H)   WBC 9.4   Hemoglobin 13.8   Hematocrit 39.8 (L)   Platelet Count 131 (L)   RBC Count 4.57   MCV 87   MCH 30.2   MCHC 34.7   RDW 13.5   % Neutrophils 70   % Lymphocytes 17   % Monocytes 10   % Eosinophils 1   % Basophils 1   Absolute Basophils 0.1   Absolute Eosinophils 0.1   Absolute Immature Granulocytes 0.1   Absolute Lymphocytes 1.6   Absolute Monocytes 0.9   % Immature Granulocytes 1   Absolute Neutrophils 6.6   Absolute NRBCs 0.0   NRBCs per 100 WBC 0   RBC Morphology Confirmed RBC Indices   Platelet Morphology Automated Count Confirmed. Platelet morphology is normal.     Imaging: n/a    Impression/Plan: Ronnie Lagos is a 69 year old male with metastatic prostate cancer, previously on Casodex, Lupron, darolutamide, Taxotere and now on cabazitaxel.      1. 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 Hypertension  5. Hypersensitivity reaction to Taxotere  --2/28/2024 PSMA PET showed metastatic disease to the pelvic and retroperitoneal lymph nodes as well as greater than 10 sites of bony metastatic disease.   --Given high volume disease, he started Casodex 50 mg daily for first month, Lupron every 3 months, darolutamide 600 mg orally BID, and tried  Taxotere twice but developed hypersensitivity reaction only 4-5 minutes into infusion, therefore permanently discontinued.  --Casodex and darolutamide were also discontinued  --PSA increased further to 0.13 consistent with biochemical progression.  He was having increase in bone pain.   --New baseline CT CAP and bone scan with no significant progression, but PSA continues to rise and 0.19 on 2/27/2025  --Started cabazitaxel with C1 D1 on 2/11/2025, had a reaction in infusion, given meds and restarted at a slower rate, tolerated well  -Discussed continuing with cabazitaxel, labs reviewed today, okay to proceed with C2 D1  --Discussed that infusion therapy would be given until disease progression or unacceptable toxicity.  -- Dr. Mercedes discussed alternate option of Pluvicto and switch to enzalutamide but would recommend utilizing this line of therapy after cabazitaxel progression.  --Lupron given 2/27/25, continue every 3 months  --Continue denosumab (Xgeva) every 4-6 weeks, we will try to align with cabazitaxel infusions.  However he does have a dental visit coming up, if he requires any dental work other than a cleaning, we will need to hold Xgeva for 3 months  --Will continue to monitor CBC, CMP, and PSA monthly.  --Labs reviewed today and okay to proceed with cabazitaxel C2 D1, slower rate  --Schedule Xgeva in 2 days, but we will extend the schedule every 6 weeks to align with cabazitaxel  --Discussed placing a port --initially declined, but had near syncopal episode with IV placement today, so port placement ordered  --VERA/MD every 3 weeks with cabazitaxel     5. 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     6. Left shoulder pain  --Related to post-arthroplasty pain.  Continue Tylenol as needed.     7. Left upper molar pain  --He may need bone implant/grafting in the future but would need to hold Xgeva for 3 months if needed.       8.  Right knee osteoarthritis  --He will discuss options with Ortho when needed     9. Right rib pain related to multiple right-sided rib fractures.  --Has known metastatic disease to ribs/bones.  --8/7/2024 Rib x-rays showed multiple indeterminate chronicity right rib fractures.  --Continue conservative management.  --Reviewed CT CAP from 2/26/2025 today, reviewed images together.     10. Insomnia  11. Male hot flashes  --Multifactorially related to joint pain and hot flashes.  --Not alleviated with CBD, THC gummies, or melatonin.  --Suggested acupuncture in the past, but does not like needles.  --Hot flashes are improved with venlafaxine 37.5 mg, he will continue daily  --Willing to try trazodone, Rx 50 mg sent to pharmacy      Chart documentation with Dragon Voice recognition Software. Although reviewed after completion, some words and grammatical errors may remain.    30 minutes spent on the date of the encounter doing chart review, review of test results, interpretation of tests, patient visit, and documentation     The longitudinal plan of care for the diagnosis(es)/condition(s) as documented were addressed during this visit. Due to the added complexity in care, I will continue to support Ronnie in the subsequent management and with ongoing continuity of care.    Aileen Agrawal PA-C  Hematology/Oncology  [facility]

## 2025-03-25 ENCOUNTER — ONCOLOGY VISIT (OUTPATIENT)
Dept: ONCOLOGY | Facility: CLINIC | Age: 70
End: 2025-03-25
Attending: PHYSICIAN ASSISTANT
Payer: COMMERCIAL

## 2025-03-25 ENCOUNTER — INFUSION THERAPY VISIT (OUTPATIENT)
Dept: INFUSION THERAPY | Facility: CLINIC | Age: 70
End: 2025-03-25
Attending: PHYSICIAN ASSISTANT
Payer: COMMERCIAL

## 2025-03-25 VITALS — OXYGEN SATURATION: 96 % | HEART RATE: 53 BPM | SYSTOLIC BLOOD PRESSURE: 121 MMHG | DIASTOLIC BLOOD PRESSURE: 65 MMHG

## 2025-03-25 VITALS
OXYGEN SATURATION: 96 % | WEIGHT: 221 LBS | BODY MASS INDEX: 33.61 KG/M2 | HEART RATE: 53 BPM | DIASTOLIC BLOOD PRESSURE: 65 MMHG | RESPIRATION RATE: 16 BRPM | SYSTOLIC BLOOD PRESSURE: 121 MMHG | TEMPERATURE: 96.5 F

## 2025-03-25 DIAGNOSIS — G47.00 PERSISTENT INSOMNIA: ICD-10-CM

## 2025-03-25 DIAGNOSIS — C61 PROSTATE CANCER METASTATIC TO MULTIPLE SITES (H): Primary | ICD-10-CM

## 2025-03-25 DIAGNOSIS — C61 PROSTATE CANCER METASTATIC TO BONE (H): ICD-10-CM

## 2025-03-25 DIAGNOSIS — G47.00 INSOMNIA, UNSPECIFIED TYPE: ICD-10-CM

## 2025-03-25 DIAGNOSIS — R07.81 RIB PAIN ON RIGHT SIDE: ICD-10-CM

## 2025-03-25 DIAGNOSIS — R23.2 HOT FLASH IN MALE: ICD-10-CM

## 2025-03-25 DIAGNOSIS — C79.51 PROSTATE CANCER METASTATIC TO BONE (H): ICD-10-CM

## 2025-03-25 LAB
ALBUMIN SERPL BCG-MCNC: 4.2 G/DL (ref 3.5–5.2)
ALP SERPL-CCNC: 48 U/L (ref 40–150)
ALT SERPL W P-5'-P-CCNC: 30 U/L (ref 0–70)
ANION GAP SERPL CALCULATED.3IONS-SCNC: 10 MMOL/L (ref 7–15)
AST SERPL W P-5'-P-CCNC: 36 U/L (ref 0–45)
BASOPHILS # BLD AUTO: 0.1 10E3/UL (ref 0–0.2)
BASOPHILS NFR BLD AUTO: 1 %
BILIRUB SERPL-MCNC: 1.1 MG/DL
BUN SERPL-MCNC: 15.5 MG/DL (ref 8–23)
CALCIUM SERPL-MCNC: 9.2 MG/DL (ref 8.8–10.4)
CHLORIDE SERPL-SCNC: 100 MMOL/L (ref 98–107)
CREAT SERPL-MCNC: 0.87 MG/DL (ref 0.67–1.17)
EGFRCR SERPLBLD CKD-EPI 2021: >90 ML/MIN/1.73M2
EOSINOPHIL # BLD AUTO: 0.1 10E3/UL (ref 0–0.7)
EOSINOPHIL NFR BLD AUTO: 1 %
ERYTHROCYTE [DISTWIDTH] IN BLOOD BY AUTOMATED COUNT: 13.5 % (ref 10–15)
GLUCOSE SERPL-MCNC: 128 MG/DL (ref 70–99)
HCO3 SERPL-SCNC: 25 MMOL/L (ref 22–29)
HCT VFR BLD AUTO: 39.8 % (ref 40–53)
HGB BLD-MCNC: 13.8 G/DL (ref 13.3–17.7)
IMM GRANULOCYTES # BLD: 0.1 10E3/UL
IMM GRANULOCYTES NFR BLD: 1 %
LYMPHOCYTES # BLD AUTO: 1.6 10E3/UL (ref 0.8–5.3)
LYMPHOCYTES NFR BLD AUTO: 17 %
MCH RBC QN AUTO: 30.2 PG (ref 26.5–33)
MCHC RBC AUTO-ENTMCNC: 34.7 G/DL (ref 31.5–36.5)
MCV RBC AUTO: 87 FL (ref 78–100)
MONOCYTES # BLD AUTO: 0.9 10E3/UL (ref 0–1.3)
MONOCYTES NFR BLD AUTO: 10 %
NEUTROPHILS # BLD AUTO: 6.6 10E3/UL (ref 1.6–8.3)
NEUTROPHILS NFR BLD AUTO: 70 %
NRBC # BLD AUTO: 0 10E3/UL
NRBC BLD AUTO-RTO: 0 /100
PLAT MORPH BLD: NORMAL
PLATELET # BLD AUTO: 131 10E3/UL (ref 150–450)
POTASSIUM SERPL-SCNC: 4.5 MMOL/L (ref 3.4–5.3)
PROT SERPL-MCNC: 7.1 G/DL (ref 6.4–8.3)
PSA SERPL DL<=0.01 NG/ML-MCNC: 0.26 NG/ML (ref 0–4.5)
RBC # BLD AUTO: 4.57 10E6/UL (ref 4.4–5.9)
RBC MORPH BLD: NORMAL
SODIUM SERPL-SCNC: 135 MMOL/L (ref 135–145)
WBC # BLD AUTO: 9.4 10E3/UL (ref 4–11)

## 2025-03-25 PROCEDURE — 96372 THER/PROPH/DIAG INJ SC/IM: CPT | Performed by: INTERNAL MEDICINE

## 2025-03-25 PROCEDURE — G2211 COMPLEX E/M VISIT ADD ON: HCPCS | Performed by: PHYSICIAN ASSISTANT

## 2025-03-25 PROCEDURE — 96415 CHEMO IV INFUSION ADDL HR: CPT

## 2025-03-25 PROCEDURE — 36415 COLL VENOUS BLD VENIPUNCTURE: CPT

## 2025-03-25 PROCEDURE — 85004 AUTOMATED DIFF WBC COUNT: CPT | Performed by: INTERNAL MEDICINE

## 2025-03-25 PROCEDURE — 250N000011 HC RX IP 250 OP 636: Mod: JW | Performed by: INTERNAL MEDICINE

## 2025-03-25 PROCEDURE — 96413 CHEMO IV INFUSION 1 HR: CPT

## 2025-03-25 PROCEDURE — 96375 TX/PRO/DX INJ NEW DRUG ADDON: CPT

## 2025-03-25 PROCEDURE — 80051 ELECTROLYTE PANEL: CPT | Performed by: INTERNAL MEDICINE

## 2025-03-25 PROCEDURE — 85014 HEMATOCRIT: CPT | Performed by: INTERNAL MEDICINE

## 2025-03-25 PROCEDURE — G0463 HOSPITAL OUTPT CLINIC VISIT: HCPCS | Performed by: PHYSICIAN ASSISTANT

## 2025-03-25 PROCEDURE — 84153 ASSAY OF PSA TOTAL: CPT | Performed by: INTERNAL MEDICINE

## 2025-03-25 PROCEDURE — 99214 OFFICE O/P EST MOD 30 MIN: CPT | Performed by: PHYSICIAN ASSISTANT

## 2025-03-25 PROCEDURE — 258N000003 HC RX IP 258 OP 636: Performed by: INTERNAL MEDICINE

## 2025-03-25 PROCEDURE — 36415 COLL VENOUS BLD VENIPUNCTURE: CPT | Performed by: INTERNAL MEDICINE

## 2025-03-25 RX ORDER — METHYLPREDNISOLONE SODIUM SUCCINATE 125 MG/2ML
125 INJECTION INTRAMUSCULAR; INTRAVENOUS
Start: 2025-03-27

## 2025-03-25 RX ORDER — VENLAFAXINE HYDROCHLORIDE 37.5 MG/1
37.5 CAPSULE, EXTENDED RELEASE ORAL DAILY
COMMUNITY
Start: 2025-03-04 | End: 2025-03-26

## 2025-03-25 RX ORDER — EPINEPHRINE 1 MG/ML
0.3 INJECTION, SOLUTION INTRAMUSCULAR; SUBCUTANEOUS EVERY 5 MIN PRN
OUTPATIENT
Start: 2025-03-27

## 2025-03-25 RX ORDER — MEPERIDINE HYDROCHLORIDE 25 MG/ML
25 INJECTION INTRAMUSCULAR; INTRAVENOUS; SUBCUTANEOUS EVERY 30 MIN PRN
OUTPATIENT
Start: 2025-03-27

## 2025-03-25 RX ORDER — ALBUTEROL SULFATE 0.83 MG/ML
2.5 SOLUTION RESPIRATORY (INHALATION)
OUTPATIENT
Start: 2025-03-27

## 2025-03-25 RX ORDER — PREDNISONE 10 MG/1
10 TABLET ORAL DAILY
Qty: 90 TABLET | Refills: 0 | Status: SHIPPED | OUTPATIENT
Start: 2025-03-25

## 2025-03-25 RX ORDER — ALBUTEROL SULFATE 90 UG/1
1-2 INHALANT RESPIRATORY (INHALATION)
Start: 2025-03-27

## 2025-03-25 RX ORDER — TRAZODONE HYDROCHLORIDE 50 MG/1
50 TABLET ORAL AT BEDTIME
Qty: 30 TABLET | Refills: 0 | Status: SHIPPED | OUTPATIENT
Start: 2025-03-25 | End: 2025-04-24

## 2025-03-25 RX ORDER — DIPHENHYDRAMINE HYDROCHLORIDE 50 MG/ML
50 INJECTION, SOLUTION INTRAMUSCULAR; INTRAVENOUS
Start: 2025-03-27

## 2025-03-25 RX ADMIN — DENOSUMAB 120 MG: 120 INJECTION SUBCUTANEOUS at 11:56

## 2025-03-25 RX ADMIN — DEXAMETHASONE SODIUM PHOSPHATE: 10 INJECTION, SOLUTION INTRAMUSCULAR; INTRAVENOUS at 10:15

## 2025-03-25 RX ADMIN — SODIUM CHLORIDE 44 MG: 0.9 INJECTION, SOLUTION INTRAVENOUS at 10:51

## 2025-03-25 RX ADMIN — DIPHENHYDRAMINE HYDROCHLORIDE 25 MG: 50 INJECTION INTRAMUSCULAR; INTRAVENOUS at 09:58

## 2025-03-25 RX ADMIN — SODIUM CHLORIDE 250 ML: 0.9 INJECTION, SOLUTION INTRAVENOUS at 09:58

## 2025-03-25 RX ADMIN — FAMOTIDINE 20 MG: 10 INJECTION INTRAVENOUS at 09:53

## 2025-03-25 ASSESSMENT — PAIN SCALES - GENERAL: PAINLEVEL_OUTOF10: MILD PAIN (2)

## 2025-03-25 NOTE — PROGRESS NOTES
Nursing Note:  Ronnie Lagos presents today for labs/PIV placement.    Patient seen by provider today: Yes: JENNIFER Solano   present during visit today: Not Applicable.    Note: Ronnie became diaphoretic and lightheaded after second IV attempt. Progressed to syncopal episode with quick recovery after lying back in chair and applying cool washcloth to forehead. Once recovered, sat upright for a few minutes and drank water. Vitals remained stable. Once fully recovered, Ronnie was transported to clinic via wheelchair for follow up with PA  Refer to vitals flowsheet for list of vitals  .    Intravenous Access:  Labs drawn; no IV in place    Discharge Plan:   Patient was sent to clinic for PA appointment.    Nazia Carter RN

## 2025-03-25 NOTE — NURSING NOTE
"Oncology Rooming Note    March 25, 2025 8:20 AM   Ronnie Lagos is a 69 year old male who presents for:    Chief Complaint   Patient presents with    Oncology Clinic Visit     Initial Vitals: /65   Pulse 53   Temp (!) 96.5  F (35.8  C) (Tympanic)   Resp 16   Wt 100.2 kg (221 lb)   SpO2 96%   BMI 33.61 kg/m   Estimated body mass index is 33.61 kg/m  as calculated from the following:    Height as of 2/11/25: 1.727 m (5' 7.99\").    Weight as of this encounter: 100.2 kg (221 lb). Body surface area is 2.19 meters squared.  Mild Pain (2) Comment: Data Unavailable   No LMP for male patient.  Allergies reviewed: Yes  Medications reviewed: Yes    Medications:   Pharmacy name entered into St[employer]:    [id number] - [place], [place] - [address] [facility] - [place], [place] - [address]    Frailty Screening:   Is the patient here for a new oncology consult visit in cancer care? 2. No    PHQ9:  Did this patient require a PHQ9?: No      Clinical concerns: f/u       Sybil Ross, MISTY              "

## 2025-03-25 NOTE — PROGRESS NOTES
Infusion Nursing Note:  Ronnie Lagos presents today for C3D1 Jevtana + premeds and Xgeva.    Patient seen by provider today: Yes: JENNIFER Solano   present during visit today: Not Applicable.    Note: Slow rate due to previous reactions. Okay to give Xgeva today, 2 days early, per pharmacy.  Per Patient, he is taking Prednisone as prescribed.  Patient confirmed taking vitamin D and Calcium. He does not have any recent/future dental procedures.    Intravenous Access:  Peripheral IV placed.    Treatment Conditions:  Lab Results   Component Value Date    HGB 13.8 03/25/2025    WBC 9.4 03/25/2025    ANEU 5.2 01/12/2018    ANEUTAUTO 6.6 03/25/2025     (L) 03/25/2025        Lab Results   Component Value Date     03/25/2025    POTASSIUM 4.5 03/25/2025    CR 0.87 03/25/2025    AV 9.2 03/25/2025    BILITOTAL 1.1 03/25/2025    ALBUMIN 4.2 03/25/2025    ALT 30 03/25/2025    AST 36 03/25/2025       Results reviewed, labs MET treatment parameters, ok to proceed with treatment.      Post Infusion Assessment:  Patient tolerated infusion without incident.  Blood return noted pre and post infusion.  Site patent and intact, free from redness, edema or discomfort.  No evidence of extravasations.  Access discontinued per protocol.       Discharge Plan:   Discharge instructions reviewed with: Patient.  Patient and/or family verbalized understanding of discharge instructions and all questions answered.  AVS to patient via [employer].  Patient will return 4/15/25 for next appointment.   Patient discharged in stable condition accompanied by: self.  Departure Mode: Ambulatory.      Suzette Banks RN

## 2025-03-25 NOTE — LETTER
3/25/2025      Ronnie Lagos  [address]  [facility] [address]      Dear Colleague,    Thank you for referring your patient, Ronnie Lagos, to the [facility]. Please see a copy of my visit note below.    Oncology/Hematology Visit Note    Mar 25, 2025    Reason for visit: Follow up prostate cancer    Oncology HPI: Ronnie Lagos is a 69 year old male with hypertension, GERD who presents with the following oncologic history:  1.  5/11/2023: PSA elevated at 8.08 (prior 3.44 from 5/10/22).  2. 6/29/2023: MRI pelvis -- PI-RADS 4 - 1.4 x 1 x 0.9 cm nodule abutting posterolateral capsule of prostate; no pelvic adenopathy or bone lesions.  3.  8/30/2023: Prostate biopsy - [place] 4+3=7 prostate adenocarcinoma.  4. 10/2/2023: NM bone scan negative.  5.  11/3/2023: Radical prostatectomy, pelvic lymph node excision -- [place] 4+5=9 adenocarcinoma, [id number], margins widely positive for malignancy.  6.  2/7/2024: PSA = 4.06.  7.  2/15/2024: PSA elevated at 5.26.  8.  2/28/2024: PSMA PET showed focal uptake in left side of prostatectomy bed suspicious for recurrent disease; multiple pelvic and few retroperitoneal lymph nodes with uptake; >10 sclerotic osseous lesions with uptake indicative of osseous metastatic disease.  9. 3/20/2024: Started Casodex, Lupron, darolutamide, Xgeva, cycle 1 Taxotere but developed flushing, chest heaviness, nausea (no dyspnea) reaction 5 minutes into Taxotere infusion. Re-challenge not attempted that day.  10.  3/29/2024: Re-challenged Taxotere but developed repeat hypersensitivity reaction 4 minutes into infusion. Taxotere discontinued.  11. 4/17/2024: PSA 0.53.  12. 7/10/2024: PSA 0.06.  13. 10/8/2024: PSA 0.02.  14. 1/28/2025: PSA 0.13.  15. 2/11/2025: Cabazitaxel C1D1, darolutamide discontinued    Patient was seen by Dr. Mercedes and repeat CT CAP and bone scan with possible new disease vs treatment response. Cabazitaxel C1D1 completed on 2/11/25 and  he did have a reaction, tolerated at a slower rate.     He is here today for cabazitaxel C3D1.     Interval History: Ronnie is here unaccompanied today.  He continues to tolerate cabazitaxel better than Taxotere.  Still had a few reactions, but more mild and more just flushing.  He is tolerating at a slower rate.  He noticed some dried blood in his nose lately, but no active bleeding.  Continues to have some right posterior rib pain, trauma in the past 3-4 years ago in that same spot, but no recent injury.  He is taking his calcium and vitamin D.  Continues on venlafaxine for hot flashes and this is helping.  He noticed some right arm pain since his PET scan about a month ago, but worse with extension.  Having some issues sleeping, melatonin not helpful, lays in bed for a few hours.  He is open to a sleep aid.  No other complaints.    Review of Systems: See interval hx. Denies fevers, chills, HA, changes in vision, sore throat, CP, abdominal pain, N/V, diarrhea, changes in urination, bleeding, bruising.     PMHx and Social Hx reviewed per EPIC.      Medications:  Current Outpatient Medications   Medication Sig Dispense Refill     amLODIPine (NORVASC) 5 MG tablet Take 1 tablet (5 mg) by mouth daily 90 tablet 3     atenolol (TENORMIN) 50 MG tablet Take 1 tablet (50 mg) by mouth daily 90 tablet 3     calcium citrate (CITRACAL) 950 (200 Ca) MG tablet Take 1 tablet by mouth 2 times daily       omeprazole (PRILOSEC) 20 MG DR capsule Take 1 capsule (20 mg) by mouth daily 90 capsule 3     predniSONE (DELTASONE) 10 MG tablet Take 1 tablet (10 mg) by mouth daily. 90 tablet 0     prochlorperazine (COMPAZINE) 10 MG tablet Take 1 tablet (10 mg) by mouth every 6 hours as needed for nausea or vomiting. 30 tablet 2     Vitamin D, Cholecalciferol, 10 MCG (400 UNIT) TABS          Allergies   Allergen Reactions     Taxotere [Docetaxel]        EXAM:    /65   Pulse 53   Temp (!) 96.5  F (35.8  C) (Tympanic)   Resp 16   Wt 100.2  kg (221 lb)   SpO2 96%   BMI 33.61 kg/m      GENERAL:  Male, in no acute distress.  Alert and oriented x3. Well groomed.   HEENT:  Normocephalic, atraumatic. No conjunctival injection or eye swelling.   LUNGS:  Nonlabored breathing, no cough or audible wheezing, able to speak full sentences.  MSK: Full ROM UE.  Slight tenderness in the right antecubital region of the right arm, especially with extension.  Slight  firmness noted over the blood vessel, no swelling, erythema.  SKIN: No rash on exposed skin.   NEURO: CN grossly intact, speech normal  PSYCH: Mentation appears normal, insight and judgement intact      Labs:     03/25/25 08:01   Sodium 135   Potassium 4.5   Chloride 100   Carbon Dioxide (CO2) 25   Urea Nitrogen 15.5   Creatinine 0.87   GFR Estimate >90   Calcium 9.2   Anion Gap 10   Albumin 4.2   Protein Total 7.1   Alkaline Phosphatase 48   ALT 30   AST 36   Bilirubin Total 1.1   Glucose 128 (H)   WBC 9.4   Hemoglobin 13.8   Hematocrit 39.8 (L)   Platelet Count 131 (L)   RBC Count 4.57   MCV 87   MCH 30.2   MCHC 34.7   RDW 13.5   % Neutrophils 70   % Lymphocytes 17   % Monocytes 10   % Eosinophils 1   % Basophils 1   Absolute Basophils 0.1   Absolute Eosinophils 0.1   Absolute Immature Granulocytes 0.1   Absolute Lymphocytes 1.6   Absolute Monocytes 0.9   % Immature Granulocytes 1   Absolute Neutrophils 6.6   Absolute NRBCs 0.0   NRBCs per 100 WBC 0   RBC Morphology Confirmed RBC Indices   Platelet Morphology Automated Count Confirmed. Platelet morphology is normal.     Imaging: n/a    Impression/Plan: Ronnie Lagos is a 69 year old male with metastatic prostate cancer, previously on Casodex, Lupron, darolutamide, Taxotere and now on cabazitaxel.      1. Metastatic non-castrate prostate adenocarcinoma, [place] 4+5=9  2. 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 Hypertension  5. Hypersensitivity reaction to Taxotere  --2/28/2024 PSMA PET showed metastatic disease  to the pelvic and retroperitoneal lymph nodes as well as greater than 10 sites of bony metastatic disease.   --Given high volume disease, he started Casodex 50 mg daily for first month, Lupron every 3 months, darolutamide 600 mg orally BID, and tried Taxotere twice but developed hypersensitivity reaction only 4-5 minutes into infusion, therefore permanently discontinued.  --Casodex and darolutamide were also discontinued  --PSA increased further to 0.13 consistent with biochemical progression.  He was having increase in bone pain.   --New baseline CT CAP and bone scan with no significant progression, but PSA continues to rise and 0.19 on 2/27/2025  --Started cabazitaxel with C1 D1 on 2/11/2025, had a reaction in infusion, given meds and restarted at a slower rate, tolerated well  -Discussed continuing with cabazitaxel, labs reviewed today, okay to proceed with C2 D1  --Discussed that infusion therapy would be given until disease progression or unacceptable toxicity.  -- Dr. Mercedes discussed alternate option of Pluvicto and switch to enzalutamide but would recommend utilizing this line of therapy after cabazitaxel progression.  --Lupron given 2/27/25, continue every 3 months  --Continue denosumab (Xgeva) every 4-6 weeks, we will try to align with cabazitaxel infusions.  However he does have a dental visit coming up, if he requires any dental work other than a cleaning, we will need to hold Xgeva for 3 months  --Will continue to monitor CBC, CMP, and PSA monthly.  --Labs reviewed today and okay to proceed with cabazitaxel C2 D1, slower rate  --Schedule Xgeva in 2 days, but we will extend the schedule every 6 weeks to align with cabazitaxel  --Discussed placing a port --initially declined, but had near syncopal episode with IV placement today, so port placement ordered  --VERA/MD every 3 weeks with cabazitaxel     5. 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     6. Left shoulder pain  --Related to post-arthroplasty pain.  Continue Tylenol as needed.     7. Left upper molar pain  --He may need bone implant/grafting in the future but would need to hold Xgeva for 3 months if needed.       8. Right knee osteoarthritis  --He will discuss options with Ortho when needed     9. Right rib pain related to multiple right-sided rib fractures.  --Has known metastatic disease to ribs/bones.  --8/7/2024 Rib x-rays showed multiple indeterminate chronicity right rib fractures.  --Continue conservative management.  --Reviewed CT CAP from 2/26/2025 today, reviewed images together.     10. Insomnia  11. Male hot flashes  --Multifactorially related to joint pain and hot flashes.  --Not alleviated with CBD, THC gummies, or melatonin.  --Suggested acupuncture in the past, but does not like needles.  --Hot flashes are improved with venlafaxine.  To 7.5 mg, he will continue daily  --Willing to try trazodone, Rx 50 mg sent to pharmacy      Chart documentation with Dragon Voice recognition Software. Although reviewed after completion, some words and grammatical errors may remain.    30 minutes spent on the date of the encounter doing chart review, review of test results, interpretation of tests, patient visit, and documentation     The longitudinal plan of care for the diagnosis(es)/condition(s) as documented were addressed during this visit. Due to the added complexity in care, I will continue to support Ronnie in the subsequent management and with ongoing continuity of care.    Aileen Agrawal PA-C  Hematology/Oncology  [facility]                  Again, thank you for allowing me to participate in the care of your patient.        Sincerely,        Aileen Agrawal PA-C    Electronically signed

## 2025-03-26 DIAGNOSIS — R23.2 HOT FLASH IN MALE: ICD-10-CM

## 2025-03-26 DIAGNOSIS — G47.00 PERSISTENT INSOMNIA: ICD-10-CM

## 2025-03-26 RX ORDER — VENLAFAXINE HYDROCHLORIDE 37.5 MG/1
37.5 CAPSULE, EXTENDED RELEASE ORAL DAILY
Qty: 90 CAPSULE | Refills: 3 | Status: SHIPPED | OUTPATIENT
Start: 2025-03-26

## 2025-04-10 ENCOUNTER — TELEPHONE (OUTPATIENT)
Dept: INTERVENTIONAL RADIOLOGY/VASCULAR | Facility: CLINIC | Age: 70
End: 2025-04-10
Payer: COMMERCIAL

## 2025-04-11 ENCOUNTER — HOSPITAL ENCOUNTER (OUTPATIENT)
Facility: CLINIC | Age: 70
Discharge: HOME | End: 2025-04-11
Attending: RADIOLOGY | Admitting: RADIOLOGY
Payer: COMMERCIAL

## 2025-04-11 ENCOUNTER — APPOINTMENT (OUTPATIENT)
Dept: INTERVENTIONAL RADIOLOGY/VASCULAR | Facility: CLINIC | Age: 70
End: 2025-04-11
Attending: PHYSICIAN ASSISTANT
Payer: COMMERCIAL

## 2025-04-11 VITALS
SYSTOLIC BLOOD PRESSURE: 140 MMHG | TEMPERATURE: 96.3 F | RESPIRATION RATE: 18 BRPM | DIASTOLIC BLOOD PRESSURE: 80 MMHG | OXYGEN SATURATION: 94 % | HEART RATE: 59 BPM

## 2025-04-11 DIAGNOSIS — C61 PROSTATE CANCER METASTATIC TO MULTIPLE SITES (H): ICD-10-CM

## 2025-04-11 PROCEDURE — C1788 PORT, INDWELLING, IMP: HCPCS

## 2025-04-11 PROCEDURE — C1769 GUIDE WIRE: HCPCS

## 2025-04-11 PROCEDURE — 250N000011 HC RX IP 250 OP 636: Mod: JZ | Performed by: PHYSICIAN ASSISTANT

## 2025-04-11 PROCEDURE — C1894 INTRO/SHEATH, NON-LASER: HCPCS

## 2025-04-11 PROCEDURE — C1887 CATHETER, GUIDING: HCPCS

## 2025-04-11 PROCEDURE — 76937 US GUIDE VASCULAR ACCESS: CPT

## 2025-04-11 PROCEDURE — 250N000011 HC RX IP 250 OP 636: Performed by: RADIOLOGY

## 2025-04-11 PROCEDURE — 250N000009 HC RX 250: Mod: JW | Performed by: PHYSICIAN ASSISTANT

## 2025-04-11 RX ORDER — NALOXONE HYDROCHLORIDE 0.4 MG/ML
0.4 INJECTION, SOLUTION INTRAMUSCULAR; INTRAVENOUS; SUBCUTANEOUS
Status: DISCONTINUED | OUTPATIENT
Start: 2025-04-11 | End: 2025-04-11 | Stop reason: HOSPADM

## 2025-04-11 RX ORDER — HEPARIN SODIUM,PORCINE 10 UNIT/ML
5-10 VIAL (ML) INTRAVENOUS EVERY 24 HOURS
Status: DISCONTINUED | OUTPATIENT
Start: 2025-04-11 | End: 2025-04-11 | Stop reason: HOSPADM

## 2025-04-11 RX ORDER — FLUMAZENIL 0.1 MG/ML
0.2 INJECTION, SOLUTION INTRAVENOUS
Status: DISCONTINUED | OUTPATIENT
Start: 2025-04-11 | End: 2025-04-11 | Stop reason: HOSPADM

## 2025-04-11 RX ORDER — HEPARIN SODIUM (PORCINE) LOCK FLUSH IV SOLN 100 UNIT/ML 100 UNIT/ML
5-10 SOLUTION INTRAVENOUS
Status: DISCONTINUED | OUTPATIENT
Start: 2025-04-11 | End: 2025-04-11 | Stop reason: HOSPADM

## 2025-04-11 RX ORDER — HEPARIN SODIUM (PORCINE) LOCK FLUSH IV SOLN 100 UNIT/ML 100 UNIT/ML
5 SOLUTION INTRAVENOUS ONCE
Status: COMPLETED | OUTPATIENT
Start: 2025-04-11 | End: 2025-04-11

## 2025-04-11 RX ORDER — LIDOCAINE HYDROCHLORIDE AND EPINEPHRINE 10; 10 MG/ML; UG/ML
INJECTION, SOLUTION INFILTRATION; PERINEURAL PRN
Status: DISCONTINUED | OUTPATIENT
Start: 2025-04-11 | End: 2025-04-11 | Stop reason: HOSPADM

## 2025-04-11 RX ORDER — LIDOCAINE 40 MG/G
CREAM TOPICAL
Status: DISCONTINUED | OUTPATIENT
Start: 2025-04-11 | End: 2025-04-11 | Stop reason: HOSPADM

## 2025-04-11 RX ORDER — HEPARIN SODIUM,PORCINE 10 UNIT/ML
5-10 VIAL (ML) INTRAVENOUS
Status: DISCONTINUED | OUTPATIENT
Start: 2025-04-11 | End: 2025-04-11 | Stop reason: HOSPADM

## 2025-04-11 RX ORDER — CEFAZOLIN SODIUM 2 G/50ML
2 SOLUTION INTRAVENOUS
Status: COMPLETED | OUTPATIENT
Start: 2025-04-11 | End: 2025-04-11

## 2025-04-11 RX ORDER — NALOXONE HYDROCHLORIDE 0.4 MG/ML
0.2 INJECTION, SOLUTION INTRAMUSCULAR; INTRAVENOUS; SUBCUTANEOUS
Status: DISCONTINUED | OUTPATIENT
Start: 2025-04-11 | End: 2025-04-11 | Stop reason: HOSPADM

## 2025-04-11 RX ORDER — ACETAMINOPHEN 325 MG/1
325 TABLET ORAL
Status: DISCONTINUED | OUTPATIENT
Start: 2025-04-11 | End: 2025-04-11 | Stop reason: HOSPADM

## 2025-04-11 RX ORDER — FENTANYL CITRATE 50 UG/ML
25-50 INJECTION, SOLUTION INTRAMUSCULAR; INTRAVENOUS EVERY 5 MIN PRN
Status: DISCONTINUED | OUTPATIENT
Start: 2025-04-11 | End: 2025-04-11 | Stop reason: HOSPADM

## 2025-04-11 RX ORDER — HEPARIN SODIUM 200 [USP'U]/100ML
1 INJECTION, SOLUTION INTRAVENOUS EVERY 5 MIN PRN
Status: DISCONTINUED | OUTPATIENT
Start: 2025-04-11 | End: 2025-04-11 | Stop reason: HOSPADM

## 2025-04-11 RX ADMIN — FENTANYL CITRATE 50 MCG: 50 INJECTION, SOLUTION INTRAMUSCULAR; INTRAVENOUS at 08:28

## 2025-04-11 RX ADMIN — HEPARIN SODIUM (PORCINE) LOCK FLUSH IV SOLN 100 UNIT/ML 5 ML: 100 SOLUTION at 08:41

## 2025-04-11 RX ADMIN — LIDOCAINE HYDROCHLORIDE,EPINEPHRINE BITARTRATE 10 ML: 10; .01 INJECTION, SOLUTION INFILTRATION; PERINEURAL at 08:30

## 2025-04-11 RX ADMIN — MIDAZOLAM 2 MG: 1 INJECTION INTRAMUSCULAR; INTRAVENOUS at 08:28

## 2025-04-11 RX ADMIN — CEFAZOLIN SODIUM 2 G: 2 SOLUTION INTRAVENOUS at 08:18

## 2025-04-11 RX ADMIN — MIDAZOLAM 1 MG: 1 INJECTION INTRAMUSCULAR; INTRAVENOUS at 08:31

## 2025-04-11 RX ADMIN — FENTANYL CITRATE 25 MCG: 50 INJECTION, SOLUTION INTRAMUSCULAR; INTRAVENOUS at 08:32

## 2025-04-11 RX ADMIN — LIDOCAINE HYDROCHLORIDE 10 ML: 10 INJECTION, SOLUTION EPIDURAL; INFILTRATION; INTRACAUDAL; PERINEURAL at 08:30

## 2025-04-11 ASSESSMENT — ACTIVITIES OF DAILY LIVING (ADL)
ADLS_ACUITY_SCORE: 46
ADLS_ACUITY_SCORE: 46
ADLS_ACUITY_SCORE: 50

## 2025-04-11 NOTE — DISCHARGE INSTRUCTIONS
Port Placement Procedure Discharge Instructions:  You had a port placed. A port is a small medical device that is placed under the skin and is connected to a vein with a catheter (thin, flexible tube). Ports can be used to administer IV medications (including chemotherapy), fluids or blood products or for blood lab draws. Please follow the below instructions after your procedure:    Care Instructions:  - If you received sedation for your procedure, do not drive or operate heavy machinery for the rest of the day.  - You may shower beginning tomorrow (post procedure day #1). Do not scrub site until well healed, pat dry gently with a towel.  - You likely have skin adhesive over your port site. Skin adhesive works like a bandage to keep the site covered and protected. Do not use antibiotic ointment or creams/lotions over adhesive as it can break it down. The skin adhesive will peel off on its own (typically in 5-14 days).  - Avoid submerging the port site under water (ex: tub baths, lakes, hot tubs and pools) for 10 days or until your site is well healed.  - You may have some discomfort, minimal swelling, redness and/or bruising at your port site/procedure site. You may take over the counter pain medication for discomfort (follow the package directions) or apply an ice pack wrapped in a towel over the site (rotating 20 minutes with ice pack on and 20 minutes with ice pack off) for comfort as needed. It can take several days for these to resolve.  - Avoid heavy lifting (greater than 10 pounds) and strenuous activities for 2-3 days following your procedure.  - If you experience significant bleeding at site, apply pressure with hands above the clavicle bone, sit upright and seek immediate medical assistance.  - Ports need to be flushed approximately every 4 weeks, if not being used more frequently. Follow up with the provider who ordered your port placement for further instructions for this.    If you experience the  following seek medical evaluation:  - Uncontrolled bleeding from port site  - Fever (greater than 100 )  - Purulent (yellow/green/foul smelling) drainage from port insertion site  - Increasing pain at port site  - Increasing redness at port site  - Increasing swelling    INTERVENTIONAL RADIOLOGY DEPARTMENT  Procedure Physician: Dr. Cardoso      Date of procedure: 4/11/2025     [facility] IR RN Line: [phone]     IF YOU ARE EXPERIENCING A MEDICAL EMERGENCY PLEASE CALL [phone]

## 2025-04-11 NOTE — SEDATION DOCUMENTATION
Post Procedure Summary:  Prior to the start of the procedure and with procedural staff participation, I verbally confirmed the patient s identity using two indicators, relevant allergies, that the procedure was appropriate and matched the consent or emergent situation, and that the correct equipment/implants were available. Immediately prior to starting the procedure I conducted the Time Out with the procedural staff and re-confirmed the patient s name, procedure, and site/side. (The Joint Commission universal protocol was followed.)  Yes       Sedatives: 75 mcg Fentanyl and 3 mg Midazolam (Versed).    Vital signs, airway and pulse oximetry were monitored and remained stable throughout the procedure and sedation was maintained until the procedure was complete.  The patient was monitored by staff until sedation discharge criteria were met.    Patient tolerance: Patient tolerated the procedure well with no immediate complications.    Time of sedation in minutes: 17 minutes from beginning to end of physician one to one monitoring.

## 2025-04-11 NOTE — IR NOTE
Pt a/o x4. Denied pain. Port insertion site clean dry and intact, small amount of swelling. IV removed. Went over discharge instructions with pt. Pt verbalized understanding. All questions answered. Pt discharged home with wife.     BP (!) 140/80 (BP Location: Right arm)   Pulse 59   Temp (!) 96.3  F (35.7  C) (Temporal)   Resp 18   SpO2 94%

## 2025-04-11 NOTE — PROGRESS NOTES
Procedure 6 FR SmartPort 21 cm length  Versed 3 mg  Fentanyl 75 mcg   Sedation time 17 min  Other meds 2 grams Ancef  1% Lidocaine 10 ml  1% Lido with Epi 10 ml  Fluoro time 1.4 min  AK 28 mGy

## 2025-04-11 NOTE — IR NOTE
Interventional Radiology Pre-Procedure Sedation Assessment   Time of Assessment: 8:11 AM    Expected Level: Moderate Sedation    Indication: Sedation is required for the following type of Procedure: Venous Access    Sedation and procedural consent: Risks, benefits and alternatives were discussed with Patient    PO Intake: Appropriately NPO for procedure    ASA Class: Class 2 - MILD SYSTEMIC DISEASE, NO ACUTE PROBLEMS, NO FUNCTIONAL LIMITATIONS.    Mallampati: Grade 2:  Soft palate, base of uvula, tonsillar pillars, and portion of posterior pharyngeal wall visible    Lungs: Lungs Clear with good breath sounds bilaterally    Heart: Normal heart sounds and rate    History and physical reviewed and no updates needed. I have reviewed the lab findings, diagnostic data, medications, and the plan for sedation. I have determined this patient to be an appropriate candidate for the planned sedation and procedure and have reassessed the patient IMMEDIATELY PRIOR to sedation and procedure.    Popeye Cardoso MD

## 2025-04-13 RX ORDER — HEPARIN SODIUM,PORCINE 10 UNIT/ML
5-20 VIAL (ML) INTRAVENOUS DAILY PRN
OUTPATIENT
Start: 2025-04-15

## 2025-04-13 RX ORDER — MEPERIDINE HYDROCHLORIDE 25 MG/ML
25 INJECTION INTRAMUSCULAR; INTRAVENOUS; SUBCUTANEOUS
OUTPATIENT
Start: 2025-04-15

## 2025-04-13 RX ORDER — DIPHENHYDRAMINE HYDROCHLORIDE 50 MG/ML
50 INJECTION, SOLUTION INTRAMUSCULAR; INTRAVENOUS
Start: 2025-04-15

## 2025-04-13 RX ORDER — HEPARIN SODIUM (PORCINE) LOCK FLUSH IV SOLN 100 UNIT/ML 100 UNIT/ML
5 SOLUTION INTRAVENOUS
Status: CANCELLED | OUTPATIENT
Start: 2025-04-15

## 2025-04-13 RX ORDER — ALBUTEROL SULFATE 90 UG/1
1-2 INHALANT RESPIRATORY (INHALATION)
Start: 2025-04-15

## 2025-04-13 RX ORDER — EPINEPHRINE 1 MG/ML
0.3 INJECTION, SOLUTION, CONCENTRATE INTRAVENOUS EVERY 5 MIN PRN
OUTPATIENT
Start: 2025-04-15

## 2025-04-13 RX ORDER — DIPHENHYDRAMINE HYDROCHLORIDE 50 MG/ML
25 INJECTION, SOLUTION INTRAMUSCULAR; INTRAVENOUS
Start: 2025-04-15

## 2025-04-13 RX ORDER — LORAZEPAM 2 MG/ML
0.5 INJECTION INTRAMUSCULAR EVERY 4 HOURS PRN
OUTPATIENT
Start: 2025-04-15

## 2025-04-13 RX ORDER — ALBUTEROL SULFATE 0.83 MG/ML
2.5 SOLUTION RESPIRATORY (INHALATION)
OUTPATIENT
Start: 2025-04-15

## 2025-04-14 NOTE — PROGRESS NOTES
Oncology/Hematology Visit Note    Apr 15, 2025    Reason for visit: Follow up prostate cancer    Oncology HPI: Ronnie Lagos is a 69 year old male with hypertension, GERD who presents with the following oncologic history:  1.  5/11/2023: PSA elevated at 8.08 (prior 3.44 from 5/10/22).  2. 6/29/2023: MRI pelvis -- PI-RADS 4 - 1.4 x 1 x 0.9 cm nodule abutting posterolateral capsule of prostate; no pelvic adenopathy or bone lesions.  3.  8/30/2023: Prostate biopsy - [place] 4+3=7 prostate adenocarcinoma.  4. 10/2/2023: NM bone scan negative.  5.  11/3/2023: Radical prostatectomy, pelvic lymph node excision -- [place] 4+5=9 adenocarcinoma, [id number], margins widely positive for malignancy.  6.  2/7/2024: PSA = 4.06.  7.  2/15/2024: PSA elevated at 5.26.  8.  2/28/2024: PSMA PET showed focal uptake in left side of prostatectomy bed suspicious for recurrent disease; multiple pelvic and few retroperitoneal lymph nodes with uptake; >10 sclerotic osseous lesions with uptake indicative of osseous metastatic disease.  9. 3/20/2024: Started Casodex, Lupron, darolutamide, Xgeva, cycle 1 Taxotere but developed flushing, chest heaviness, nausea (no dyspnea) reaction 5 minutes into Taxotere infusion. Re-challenge not attempted that day.  10.  3/29/2024: Re-challenged Taxotere but developed repeat hypersensitivity reaction 4 minutes into infusion. Taxotere discontinued.  11. 4/17/2024: PSA 0.53.  12. 7/10/2024: PSA 0.06.  13. 10/8/2024: PSA 0.02.  14. 1/28/2025: PSA 0.13.  15. 2/11/2025: Cabazitaxel C1D1, darolutamide discontinued  16. 4/11/2025: Port placed    Patient was seen by Dr. Mercedes and repeat CT CAP and bone scan with possible new disease vs treatment response. Cabazitaxel C1D1 completed on 2/11/25 and he did have a reaction, tolerated at a slower rate.     He is here today for cabazitaxel C4D1.     Interval History: Ronnie is here unaccompanied today.  He feels that cabazitaxel is going pretty well.  He did have an episode  of flushing that lasted about 2 hours after he went home after the last infusion.  It also could have been a hot flash, but lasted longer than usual.  His wife said his face was pretty red, but eventually resolved and did not have another episode.  He felt well during all the previous infusions.  Continues on trazodone 50 mg at bedtime, he feels that this is helping his insomnia little bit.  Still has a difficult time getting to sleep, but better than previously.  Port placement went really well, still little sore, but healing well.  Continues to have some right posterior rib pain, previous fractures a few years ago, but no significant changes.  Not getting worse.  Tylenol is helpful.  He admits that he may have some dental work coming up, we discussed holding Xgeva, but he would like treatment next week and then he is open to holding for a few months if needed for dental work.  No other new complaints.    Review of Systems: See interval hx. Denies fevers, chills, HA, changes in vision, CP, abdominal pain, N/V, diarrhea, changes in urination, bleeding, bruising.     PMHx and Social Hx reviewed per EPIC.      Medications:  Current Outpatient Medications   Medication Sig Dispense Refill    amLODIPine (NORVASC) 5 MG tablet Take 1 tablet (5 mg) by mouth daily 90 tablet 3    atenolol (TENORMIN) 50 MG tablet Take 1 tablet (50 mg) by mouth daily 90 tablet 3    calcium citrate (CITRACAL) 950 (200 Ca) MG tablet Take 1 tablet by mouth 2 times daily      omeprazole (PRILOSEC) 20 MG DR capsule Take 1 capsule (20 mg) by mouth daily 90 capsule 3    predniSONE (DELTASONE) 10 MG tablet Take 1 tablet (10 mg) by mouth daily. 90 tablet 0    prochlorperazine (COMPAZINE) 10 MG tablet Take 1 tablet (10 mg) by mouth every 6 hours as needed for nausea or vomiting. 30 tablet 2    traZODone (DESYREL) 50 MG tablet Take 1 tablet (50 mg) by mouth at bedtime. 30 tablet 0    venlafaxine (EFFEXOR XR) 37.5 MG 24 hr capsule Take 1 capsule (37.5 mg)  "by mouth daily. 90 capsule 3    Vitamin D, Cholecalciferol, 10 MCG (400 UNIT) TABS          Allergies   Allergen Reactions    Taxotere [Docetaxel]        EXAM:    BP (!) 144/86   Pulse 64   Temp 97.7  F (36.5  C) (Temporal)   Resp 16   Ht 1.727 m (5' 8\")   Wt 101.8 kg (224 lb 6.4 oz)   SpO2 96%   BMI 34.12 kg/m      GENERAL:  Male, in no acute distress.  Alert and oriented x3. Well groomed.   HEENT:  Normocephalic, atraumatic. No conjunctival injection or eye swelling.   LUNGS:  Nonlabored breathing, no cough or audible wheezing, able to speak full sentences.  SKIN: Port right chest with some ecchymosis, but access is good.   NEURO: CN grossly intact, speech normal  PSYCH: Mentation appears normal, insight and judgement intact    Labs:     04/15/25 08:11   Sodium 136   Potassium 3.8   Chloride 100   Carbon Dioxide (CO2) 25   Urea Nitrogen 16.5   Creatinine 0.89   GFR Estimate >90   Calcium 9.2   Anion Gap 11   Albumin 4.3   Protein Total 6.6   Alkaline Phosphatase 48   ALT 24   AST 29   Bilirubin Total 1.0   Glucose 229 (H)   WBC 6.7   Hemoglobin 12.8 (L)   Hematocrit 36.8 (L)   Platelet Count 188   RBC Count 4.18 (L)   MCV 88   MCH 30.6   MCHC 34.8   RDW 13.9   % Neutrophils 69   % Lymphocytes 19   % Monocytes 7   % Eosinophils 4   % Basophils 1   % Immature Granulocytes 0   Absolute Basophils 0.1   Absolute Eosinophils 0.3   Absolute Immature Granulocytes 0.0   Absolute Lymphocytes 1.3   Absolute Monocytes 0.5   Absolute Neutrophils 4.6   Absolute NRBCs 0.0   NRBCs per 100 WBC 0     Imaging: n/a    Impression/Plan: Ronnie Lagos is a 69 year old male with metastatic prostate cancer, previously on Casodex, Lupron, darolutamide, Taxotere and now on cabazitaxel.      1. 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 Hypertension  5. Hypersensitivity reaction to Taxotere  --2/28/2024 PSMA PET showed metastatic " disease to the pelvic and retroperitoneal lymph nodes as well as greater than 10 sites of bony metastatic disease.   --Given high volume disease, he started Casodex 50 mg daily for first month, Lupron every 3 months, darolutamide 600 mg orally BID, and tried Taxotere twice but developed hypersensitivity reaction only 4-5 minutes into infusion, therefore permanently discontinued.  --Casodex and darolutamide were also discontinued  --PSA increased further to 0.13 consistent with biochemical progression.  He was having increase in bone pain.   --New baseline CT CAP and bone scan with no significant progression, but PSA continues to rise and 0.19 on 2/27/2025  --Started cabazitaxel with C1 D1 on 2/11/2025, had a reaction in infusion, given meds and restarted at a slower rate, tolerated well  -Discussed continuing with cabazitaxel at slower rate and he agrees, this has been going well so far  --Discussed that infusion therapy would be given until disease progression or unacceptable toxicity.  -- Dr. Mercedes discussed alternate option of Pluvicto and switch to enzalutamide but would recommend utilizing this line of therapy after cabazitaxel progression.  --Lupron given 2/27/25, continue every 3 months, due late May 2025  --Will continue to monitor CBC, CMP, and PSA monthly.  --Labs reviewed today and okay to proceed with cabazitaxel C4D1, slower rate  --Currently on denosumab (Xgeva) every 4 weeks, but we will try to extend to every 6 weeks to align with cabazitaxel infusions.  However, he is hoping to have dental work done in the next few months.  We had a long conversation about this today.  He would like to receive Xgeva on 4/22/2025 when it is due, but then we will likely need to put the plan on hold for about 3 months before the dental work.  He is aware and he will update his dentist/surgeon.  --Port placed 4/11, working well today, healing well  --VERA/MD every 3 weeks with cabazitaxel     5. 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     6. Left shoulder pain  --Related to post-arthroplasty pain.  Continue Tylenol as needed.     7. Left upper molar pain  --He may need bone implant/grafting in the future but would need to hold Xgeva for 3 months if needed.    --See above.  Discussed in detail today.  We will give Xgeva on 4/22 as scheduled next week, however we will likely need to hold Xgeva for to 3 months prior to any dental work and then hold for approximately 2 months afterwards.  Patient aware.     8. Right knee osteoarthritis  --He will discuss options with Ortho when needed     9. Right rib pain related to multiple right-sided rib fractures.  --Has known metastatic disease to ribs/bones.  --8/7/2024 Rib x-rays showed multiple indeterminate chronicity right rib fractures.  --Continue conservative management.  --Reviewed CT CAP and bone scan from 2/26/2025 today, reviewed images together.  --Briefly discussed radiation, but suspect likely from previous fractures and will hold on Rad Onc referral for now.   --Continue Tylenol PRN     10. Insomnia  11. Male hot flashes  --Multifactorially related to joint pain and hot flashes.  --Not alleviated with CBD, THC gummies, or melatonin.  --Suggested acupuncture in the past, but does not like needles.  --Hot flashes are improved with venlafaxine 37.5 mg, he will continue daily  --Started trazodone 50 mg and helped, but still would prefer more sleep. Rx 100mg sent to pharmacy today and given # 30 tabs. He will let us know which dose he prefers and will need a refill next month.       Chart documentation with Dragon Voice recognition Software. Although reviewed after completion, some words and grammatical errors may remain.    40 minutes spent on the date of the encounter doing chart review, review of test results, interpretation of tests, patient visit, and documentation     The longitudinal plan of care for  the diagnosis(es)/condition(s) as documented were addressed during this visit. Due to the added complexity in care, I will continue to support Ronnie in the subsequent management and with ongoing continuity of care.    Aileen Agrawal PA-C  Hematology/Oncology  [facility]

## 2025-04-15 ENCOUNTER — INFUSION THERAPY VISIT (OUTPATIENT)
Dept: INFUSION THERAPY | Facility: CLINIC | Age: 70
End: 2025-04-15
Attending: PHYSICIAN ASSISTANT
Payer: COMMERCIAL

## 2025-04-15 ENCOUNTER — ONCOLOGY VISIT (OUTPATIENT)
Dept: ONCOLOGY | Facility: CLINIC | Age: 70
End: 2025-04-15
Attending: PHYSICIAN ASSISTANT
Payer: COMMERCIAL

## 2025-04-15 VITALS
SYSTOLIC BLOOD PRESSURE: 144 MMHG | BODY MASS INDEX: 34.01 KG/M2 | OXYGEN SATURATION: 96 % | DIASTOLIC BLOOD PRESSURE: 86 MMHG | RESPIRATION RATE: 16 BRPM | HEART RATE: 64 BPM | WEIGHT: 224.4 LBS | TEMPERATURE: 97.7 F | HEIGHT: 68 IN

## 2025-04-15 DIAGNOSIS — G47.00 INSOMNIA, UNSPECIFIED TYPE: ICD-10-CM

## 2025-04-15 DIAGNOSIS — C61 PROSTATE CANCER METASTATIC TO MULTIPLE SITES (H): ICD-10-CM

## 2025-04-15 DIAGNOSIS — C61 PROSTATE CANCER METASTATIC TO BONE (H): Primary | ICD-10-CM

## 2025-04-15 DIAGNOSIS — C61 PROSTATE CANCER METASTATIC TO MULTIPLE SITES (H): Primary | ICD-10-CM

## 2025-04-15 DIAGNOSIS — C79.51 PROSTATE CANCER METASTATIC TO BONE (H): Primary | ICD-10-CM

## 2025-04-15 DIAGNOSIS — R23.2 HOT FLASH IN MALE: ICD-10-CM

## 2025-04-15 DIAGNOSIS — R07.81 RIB PAIN ON RIGHT SIDE: ICD-10-CM

## 2025-04-15 LAB
ALBUMIN SERPL BCG-MCNC: 4.3 G/DL (ref 3.5–5.2)
ALP SERPL-CCNC: 48 U/L (ref 40–150)
ALT SERPL W P-5'-P-CCNC: 24 U/L (ref 0–70)
ANION GAP SERPL CALCULATED.3IONS-SCNC: 11 MMOL/L (ref 7–15)
AST SERPL W P-5'-P-CCNC: 29 U/L (ref 0–45)
BASOPHILS # BLD AUTO: 0.1 10E3/UL (ref 0–0.2)
BASOPHILS NFR BLD AUTO: 1 %
BILIRUB SERPL-MCNC: 1 MG/DL
BUN SERPL-MCNC: 16.5 MG/DL (ref 8–23)
CALCIUM SERPL-MCNC: 9.2 MG/DL (ref 8.8–10.4)
CHLORIDE SERPL-SCNC: 100 MMOL/L (ref 98–107)
CREAT SERPL-MCNC: 0.89 MG/DL (ref 0.67–1.17)
EGFRCR SERPLBLD CKD-EPI 2021: >90 ML/MIN/1.73M2
EOSINOPHIL # BLD AUTO: 0.3 10E3/UL (ref 0–0.7)
EOSINOPHIL NFR BLD AUTO: 4 %
ERYTHROCYTE [DISTWIDTH] IN BLOOD BY AUTOMATED COUNT: 13.9 % (ref 10–15)
GLUCOSE SERPL-MCNC: 229 MG/DL (ref 70–99)
HCO3 SERPL-SCNC: 25 MMOL/L (ref 22–29)
HCT VFR BLD AUTO: 36.8 % (ref 40–53)
HGB BLD-MCNC: 12.8 G/DL (ref 13.3–17.7)
IMM GRANULOCYTES # BLD: 0 10E3/UL
IMM GRANULOCYTES NFR BLD: 0 %
LYMPHOCYTES # BLD AUTO: 1.3 10E3/UL (ref 0.8–5.3)
LYMPHOCYTES NFR BLD AUTO: 19 %
MCH RBC QN AUTO: 30.6 PG (ref 26.5–33)
MCHC RBC AUTO-ENTMCNC: 34.8 G/DL (ref 31.5–36.5)
MCV RBC AUTO: 88 FL (ref 78–100)
MONOCYTES # BLD AUTO: 0.5 10E3/UL (ref 0–1.3)
MONOCYTES NFR BLD AUTO: 7 %
NEUTROPHILS # BLD AUTO: 4.6 10E3/UL (ref 1.6–8.3)
NEUTROPHILS NFR BLD AUTO: 69 %
NRBC # BLD AUTO: 0 10E3/UL
NRBC BLD AUTO-RTO: 0 /100
PLATELET # BLD AUTO: 188 10E3/UL (ref 150–450)
POTASSIUM SERPL-SCNC: 3.8 MMOL/L (ref 3.4–5.3)
PROT SERPL-MCNC: 6.6 G/DL (ref 6.4–8.3)
PSA SERPL DL<=0.01 NG/ML-MCNC: 0.35 NG/ML (ref 0–4.5)
RBC # BLD AUTO: 4.18 10E6/UL (ref 4.4–5.9)
SODIUM SERPL-SCNC: 136 MMOL/L (ref 135–145)
WBC # BLD AUTO: 6.7 10E3/UL (ref 4–11)

## 2025-04-15 PROCEDURE — 82040 ASSAY OF SERUM ALBUMIN: CPT | Performed by: INTERNAL MEDICINE

## 2025-04-15 PROCEDURE — 250N000011 HC RX IP 250 OP 636: Performed by: INTERNAL MEDICINE

## 2025-04-15 PROCEDURE — 258N000003 HC RX IP 258 OP 636: Performed by: INTERNAL MEDICINE

## 2025-04-15 PROCEDURE — 96367 TX/PROPH/DG ADDL SEQ IV INF: CPT

## 2025-04-15 PROCEDURE — G0463 HOSPITAL OUTPT CLINIC VISIT: HCPCS | Mod: 25 | Performed by: PHYSICIAN ASSISTANT

## 2025-04-15 PROCEDURE — 36591 DRAW BLOOD OFF VENOUS DEVICE: CPT | Performed by: INTERNAL MEDICINE

## 2025-04-15 PROCEDURE — 85004 AUTOMATED DIFF WBC COUNT: CPT | Performed by: INTERNAL MEDICINE

## 2025-04-15 PROCEDURE — 96415 CHEMO IV INFUSION ADDL HR: CPT

## 2025-04-15 PROCEDURE — 82310 ASSAY OF CALCIUM: CPT | Performed by: INTERNAL MEDICINE

## 2025-04-15 PROCEDURE — G2211 COMPLEX E/M VISIT ADD ON: HCPCS | Performed by: PHYSICIAN ASSISTANT

## 2025-04-15 PROCEDURE — 96413 CHEMO IV INFUSION 1 HR: CPT

## 2025-04-15 PROCEDURE — 96375 TX/PRO/DX INJ NEW DRUG ADDON: CPT

## 2025-04-15 PROCEDURE — 99215 OFFICE O/P EST HI 40 MIN: CPT | Performed by: PHYSICIAN ASSISTANT

## 2025-04-15 PROCEDURE — 84153 ASSAY OF PSA TOTAL: CPT | Performed by: INTERNAL MEDICINE

## 2025-04-15 RX ORDER — MEPERIDINE HYDROCHLORIDE 25 MG/ML
25 INJECTION INTRAMUSCULAR; INTRAVENOUS; SUBCUTANEOUS EVERY 30 MIN PRN
OUTPATIENT
Start: 2025-04-22

## 2025-04-15 RX ORDER — ALBUTEROL SULFATE 90 UG/1
1-2 INHALANT RESPIRATORY (INHALATION)
Start: 2025-04-22

## 2025-04-15 RX ORDER — TRAZODONE HYDROCHLORIDE 100 MG/1
100 TABLET ORAL AT BEDTIME
Qty: 30 TABLET | Refills: 0 | Status: SHIPPED | OUTPATIENT
Start: 2025-04-15

## 2025-04-15 RX ORDER — EPINEPHRINE 1 MG/ML
0.3 INJECTION, SOLUTION INTRAMUSCULAR; SUBCUTANEOUS EVERY 5 MIN PRN
OUTPATIENT
Start: 2025-04-22

## 2025-04-15 RX ORDER — TRAZODONE HYDROCHLORIDE 50 MG/1
50 TABLET ORAL AT BEDTIME
Qty: 30 TABLET | Refills: 0 | Status: CANCELLED | OUTPATIENT
Start: 2025-04-15

## 2025-04-15 RX ORDER — ALBUTEROL SULFATE 0.83 MG/ML
2.5 SOLUTION RESPIRATORY (INHALATION)
OUTPATIENT
Start: 2025-04-22

## 2025-04-15 RX ORDER — DIPHENHYDRAMINE HYDROCHLORIDE 50 MG/ML
50 INJECTION, SOLUTION INTRAMUSCULAR; INTRAVENOUS
Start: 2025-04-22

## 2025-04-15 RX ORDER — METHYLPREDNISOLONE SODIUM SUCCINATE 125 MG/2ML
125 INJECTION INTRAMUSCULAR; INTRAVENOUS
Start: 2025-04-22

## 2025-04-15 RX ORDER — HEPARIN SODIUM (PORCINE) LOCK FLUSH IV SOLN 100 UNIT/ML 100 UNIT/ML
5 SOLUTION INTRAVENOUS
Status: DISCONTINUED | OUTPATIENT
Start: 2025-04-15 | End: 2025-04-15 | Stop reason: HOSPADM

## 2025-04-15 RX ADMIN — DIPHENHYDRAMINE HYDROCHLORIDE 25 MG: 50 INJECTION INTRAMUSCULAR; INTRAVENOUS at 09:29

## 2025-04-15 RX ADMIN — FAMOTIDINE 20 MG: 10 INJECTION, SOLUTION INTRAVENOUS at 09:27

## 2025-04-15 RX ADMIN — DEXAMETHASONE SODIUM PHOSPHATE: 10 INJECTION, SOLUTION INTRAMUSCULAR; INTRAVENOUS at 09:43

## 2025-04-15 RX ADMIN — SODIUM CHLORIDE 44 MG: 0.9 INJECTION, SOLUTION INTRAVENOUS at 10:02

## 2025-04-15 RX ADMIN — Medication 5 ML: at 12:05

## 2025-04-15 RX ADMIN — SODIUM CHLORIDE 250 ML: 0.9 INJECTION, SOLUTION INTRAVENOUS at 09:24

## 2025-04-15 ASSESSMENT — PAIN SCALES - GENERAL: PAINLEVEL_OUTOF10: NO PAIN (0)

## 2025-04-15 NOTE — NURSING NOTE
"Oncology Rooming Note    April 15, 2025 8:24 AM   Ronnie Lagos is a 69 year old male who presents for:    Chief Complaint   Patient presents with    Oncology Clinic Visit     Prostate cancer metastatic      Initial Vitals: BP (!) 144/86   Pulse 64   Temp 97.7  F (36.5  C) (Temporal)   Resp 16   Ht 1.727 m (5' 8\")   Wt 101.8 kg (224 lb 6.4 oz)   SpO2 96%   BMI 34.12 kg/m   Estimated body mass index is 34.12 kg/m  as calculated from the following:    Height as of this encounter: 1.727 m (5' 8\").    Weight as of this encounter: 101.8 kg (224 lb 6.4 oz). Body surface area is 2.21 meters squared.  No Pain (0) Comment: Data Unavailable   No LMP for male patient.  Allergies reviewed: Yes  Medications reviewed: Yes    Medications: MEDICATION REFILLS NEEDED TODAY. Provider was notified.  Pharmacy name entered into [employer]:    [id number] - [place], [place] - [address] [facility] - [place], [place] - [address]    Frailty Screening:   Is the patient here for a new oncology consult visit in cancer care? 2. No    PHQ9:  Did this patient require a PHQ9?: No      Clinical concerns: f/u       Sherrie Quinn CMA              "

## 2025-04-15 NOTE — PROGRESS NOTES
Infusion Nursing Note:  Ronnie MENDOZA Lagos presents today for C4D1 Jevtana.    Patient seen by provider today: Yes: JENNIFER Solano   present during visit today: Not Applicable.    Note: N/A.      Intravenous Access:  Implanted Port- Accessed by FT RN    Treatment Conditions:  Lab Results   Component Value Date    HGB 12.8 (L) 04/15/2025    WBC 6.7 04/15/2025    ANEU 5.2 01/12/2018    ANEUTAUTO 4.6 04/15/2025     04/15/2025        Results reviewed, labs MET treatment parameters, ok to proceed with treatment.      Post Infusion Assessment:  Patient tolerated infusion without incident.  Blood return noted pre and post infusion.  Site patent and intact, free from redness, edema or discomfort.  No evidence of extravasations.  Access discontinued per protocol.       Discharge Plan:   Discharge instructions reviewed with: Patient.  Patient and/or family verbalized understanding of discharge instructions and all questions answered.  AVS to patient via [employer].  Patient will return 5/6 for next appointment.   Patient discharged in stable condition accompanied by: self.  Departure Mode: Ambulatory.      Eva Gary RN

## 2025-04-15 NOTE — LETTER
4/15/2025      Ronnie Lagos  [address] [place]  [facility] [address]      Dear Colleague,    Thank you for referring your patient, Ronnie Lagos, to the [facility]. Please see a copy of my visit note below.    Oncology/Hematology Visit Note    Apr 15, 2025    Reason for visit: Follow up prostate cancer    Oncology HPI: Ronnie Lagos is a 69 year old male with hypertension, GERD who presents with the following oncologic history:  1.  5/11/2023: PSA elevated at 8.08 (prior 3.44 from 5/10/22).  2. 6/29/2023: MRI pelvis -- PI-RADS 4 - 1.4 x 1 x 0.9 cm nodule abutting posterolateral capsule of prostate; no pelvic adenopathy or bone lesions.  3.  8/30/2023: Prostate biopsy - Pia 4+3=7 prostate adenocarcinoma.  4. 10/2/2023: NM bone scan negative.  5.  11/3/2023: Radical prostatectomy, pelvic lymph node excision -- Pia 4+5=9 adenocarcinoma, [id number], margins widely positive for malignancy.  6.  2/7/2024: PSA = 4.06.  7.  2/15/2024: PSA elevated at 5.26.  8.  2/28/2024: PSMA PET showed focal uptake in left side of prostatectomy bed suspicious for recurrent disease; multiple pelvic and few retroperitoneal lymph nodes with uptake; >10 sclerotic osseous lesions with uptake indicative of osseous metastatic disease.  9. 3/20/2024: Started Casodex, Lupron, darolutamide, Xgeva, cycle 1 Taxotere but developed flushing, chest heaviness, nausea (no dyspnea) reaction 5 minutes into Taxotere infusion. Re-challenge not attempted that day.  10.  3/29/2024: Re-challenged Taxotere but developed repeat hypersensitivity reaction 4 minutes into infusion. Taxotere discontinued.  11. 4/17/2024: PSA 0.53.  12. 7/10/2024: PSA 0.06.  13. 10/8/2024: PSA 0.02.  14. 1/28/2025: PSA 0.13.  15. 2/11/2025: Cabazitaxel C1D1, darolutamide discontinued  16. 4/11/2025: Port placed    Patient was seen by Dr. Mercedes and repeat CT CAP and bone scan with possible new disease vs treatment response. Cabazitaxel  C1D1 completed on 2/11/25 and he did have a reaction, tolerated at a slower rate.     He is here today for cabazitaxel C4D1.     Interval History: Ronnie is here unaccompanied today.  He feels that cabazitaxel is going pretty well.  He did have an episode of flushing that lasted about 2 hours after he went home after the last infusion.  It also could have been a hot flash, but lasted longer than usual.  His wife said his face was pretty red, but eventually resolved and did not have another episode.  He felt well during all the previous infusions.  Continues on trazodone 50 mg at bedtime, he feels that this is helping his insomnia little bit.  Still has a difficult time getting to sleep, but better than previously.  Port placement went really well, still little sore, but healing well.  Continues to have some right posterior rib pain, previous fractures a few years ago, but no significant changes.  Not getting worse.  Tylenol is helpful.  He admits that he may have some dental work coming up, we discussed holding Xgeva, but he would like treatment next week and then he is open to holding for a few months if needed for dental work.  No other new complaints.    Review of Systems: See interval hx. Denies fevers, chills, HA, changes in vision, CP, abdominal pain, N/V, diarrhea, changes in urination, bleeding, bruising.     PMHx and Social Hx reviewed per EPIC.      Medications:  Current Outpatient Medications   Medication Sig Dispense Refill     amLODIPine (NORVASC) 5 MG tablet Take 1 tablet (5 mg) by mouth daily 90 tablet 3     atenolol (TENORMIN) 50 MG tablet Take 1 tablet (50 mg) by mouth daily 90 tablet 3     calcium citrate (CITRACAL) 950 (200 Ca) MG tablet Take 1 tablet by mouth 2 times daily       omeprazole (PRILOSEC) 20 MG DR capsule Take 1 capsule (20 mg) by mouth daily 90 capsule 3     predniSONE (DELTASONE) 10 MG tablet Take 1 tablet (10 mg) by mouth daily. 90 tablet 0     prochlorperazine (COMPAZINE) 10 MG  "tablet Take 1 tablet (10 mg) by mouth every 6 hours as needed for nausea or vomiting. 30 tablet 2     traZODone (DESYREL) 50 MG tablet Take 1 tablet (50 mg) by mouth at bedtime. 30 tablet 0     venlafaxine (EFFEXOR XR) 37.5 MG 24 hr capsule Take 1 capsule (37.5 mg) by mouth daily. 90 capsule 3     Vitamin D, Cholecalciferol, 10 MCG (400 UNIT) TABS          Allergies   Allergen Reactions     Taxotere [Docetaxel]        EXAM:    BP (!) 144/86   Pulse 64   Temp 97.7  F (36.5  C) (Temporal)   Resp 16   Ht 1.727 m (5' 8\")   Wt 101.8 kg (224 lb 6.4 oz)   SpO2 96%   BMI 34.12 kg/m      GENERAL:  Male, in no acute distress.  Alert and oriented x3. Well groomed.   HEENT:  Normocephalic, atraumatic. No conjunctival injection or eye swelling.   LUNGS:  Nonlabored breathing, no cough or audible wheezing, able to speak full sentences.  SKIN: Port right chest with some ecchymosis, but access is good.   NEURO: CN grossly intact, speech normal  PSYCH: Mentation appears normal, insight and judgement intact    Labs:     04/15/25 08:11   Sodium 136   Potassium 3.8   Chloride 100   Carbon Dioxide (CO2) 25   Urea Nitrogen 16.5   Creatinine 0.89   GFR Estimate >90   Calcium 9.2   Anion Gap 11   Albumin 4.3   Protein Total 6.6   Alkaline Phosphatase 48   ALT 24   AST 29   Bilirubin Total 1.0   Glucose 229 (H)   WBC 6.7   Hemoglobin 12.8 (L)   Hematocrit 36.8 (L)   Platelet Count 188   RBC Count 4.18 (L)   MCV 88   MCH 30.6   MCHC 34.8   RDW 13.9   % Neutrophils 69   % Lymphocytes 19   % Monocytes 7   % Eosinophils 4   % Basophils 1   % Immature Granulocytes 0   Absolute Basophils 0.1   Absolute Eosinophils 0.3   Absolute Immature Granulocytes 0.0   Absolute Lymphocytes 1.3   Absolute Monocytes 0.5   Absolute Neutrophils 4.6   Absolute NRBCs 0.0   NRBCs per 100 WBC 0     Imaging: n/a    Impression/Plan: Ronnie Lagos is a 69 year old male with metastatic prostate cancer, previously on Casodex, Lupron, darolutamide, Taxotere and " now on cabazitaxel.      1. 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 Hypertension  5. Hypersensitivity reaction to Taxotere  --2/28/2024 PSMA PET showed metastatic disease to the pelvic and retroperitoneal lymph nodes as well as greater than 10 sites of bony metastatic disease.   --Given high volume disease, he started Casodex 50 mg daily for first month, Lupron every 3 months, darolutamide 600 mg orally BID, and tried Taxotere twice but developed hypersensitivity reaction only 4-5 minutes into infusion, therefore permanently discontinued.  --Casodex and darolutamide were also discontinued  --PSA increased further to 0.13 consistent with biochemical progression.  He was having increase in bone pain.   --New baseline CT CAP and bone scan with no significant progression, but PSA continues to rise and 0.19 on 2/27/2025  --Started cabazitaxel with C1 D1 on 2/11/2025, had a reaction in infusion, given meds and restarted at a slower rate, tolerated well  -Discussed continuing with cabazitaxel at slower rate and he agrees, this has been going well so far  --Discussed that infusion therapy would be given until disease progression or unacceptable toxicity.  -- Dr. Mercedes discussed alternate option of Pluvicto and switch to enzalutamide but would recommend utilizing this line of therapy after cabazitaxel progression.  --Lupron given 2/27/25, continue every 3 months, due late May 2025  --Will continue to monitor CBC, CMP, and PSA monthly.  --Labs reviewed today and okay to proceed with cabazitaxel C4D1, slower rate  --Currently on denosumab (Xgeva) every 4 weeks, but we will try to extend to every 6 weeks to align with cabazitaxel infusions.  However, he is hoping to have dental work done in the next few months.  We had a long conversation about this today.  He would like to receive Xgeva on 4/22/2025 when it is due, but  then we will likely need to put the plan on hold for about 3 months before the dental work.  He is aware and he will update his dentist/surgeon.  --Port placed 4/11, working well today, healing well  --VERA/MD every 3 weeks with cabazitaxel     5. 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     6. Left shoulder pain  --Related to post-arthroplasty pain.  Continue Tylenol as needed.     7. Left upper molar pain  --He may need bone implant/grafting in the future but would need to hold Xgeva for 3 months if needed.    --See above.  Discussed in detail today.  We will give Xgeva on 4/22 as scheduled next week, however we will likely need to hold Xgeva for to 3 months prior to any dental work and then hold for approximately 2 months afterwards.  Patient aware.     8. Right knee osteoarthritis  --He will discuss options with Ortho when needed     9. Right rib pain related to multiple right-sided rib fractures.  --Has known metastatic disease to ribs/bones.  --8/7/2024 Rib x-rays showed multiple indeterminate chronicity right rib fractures.  --Continue conservative management.  --Reviewed CT CAP and bone scan from 2/26/2025 today, reviewed images together.  --Briefly discussed radiation, but suspect likely from previous fractures and will hold on Rad Onc referral for now.   --Continue Tylenol PRN     10. Insomnia  11. Male hot flashes  --Multifactorially related to joint pain and hot flashes.  --Not alleviated with CBD, THC gummies, or melatonin.  --Suggested acupuncture in the past, but does not like needles.  --Hot flashes are improved with venlafaxine 37.5 mg, he will continue daily  --Started trazodone 50 mg and helped, but still would prefer more sleep. Rx 100mg sent to pharmacy today and given # 30 tabs. He will let us know which dose he prefers and will need a refill next month.       Chart documentation with Dragon Voice recognition Software.  Although reviewed after completion, some words and grammatical errors may remain.    40 minutes spent on the date of the encounter doing chart review, review of test results, interpretation of tests, patient visit, and documentation     The longitudinal plan of care for the diagnosis(es)/condition(s) as documented were addressed during this visit. Due to the added complexity in care, I will continue to support Ronnie in the subsequent management and with ongoing continuity of care.    Aileen Agrawal PA-C  Hematology/Oncology  [facility]                  Again, thank you for allowing me to participate in the care of your patient.        Sincerely,        Aileen Agrawal PA-C    Electronically signed

## 2025-04-15 NOTE — PROGRESS NOTES
Nursing Note:  Ronnie Lagos presents today for labs.    Patient seen by provider today: Yes: JENNIFER Solano   present during visit today: Not Applicable.    Note: N/A.    Intravenous Access:  Labs drawn without difficulty.  Implanted Port.    Discharge Plan:   Patient was sent to clinic for PA appointment.    Nazia Carter RN

## 2025-04-22 ENCOUNTER — INFUSION THERAPY VISIT (OUTPATIENT)
Dept: INFUSION THERAPY | Facility: CLINIC | Age: 70
End: 2025-04-22
Attending: PHYSICIAN ASSISTANT
Payer: COMMERCIAL

## 2025-04-22 VITALS — DIASTOLIC BLOOD PRESSURE: 78 MMHG | OXYGEN SATURATION: 98 % | HEART RATE: 73 BPM | SYSTOLIC BLOOD PRESSURE: 154 MMHG

## 2025-04-22 DIAGNOSIS — C61 PROSTATE CANCER METASTATIC TO BONE (H): Primary | ICD-10-CM

## 2025-04-22 DIAGNOSIS — C79.51 PROSTATE CANCER METASTATIC TO BONE (H): Primary | ICD-10-CM

## 2025-04-22 PROCEDURE — 250N000011 HC RX IP 250 OP 636: Mod: JZ | Performed by: PHYSICIAN ASSISTANT

## 2025-04-22 PROCEDURE — 96372 THER/PROPH/DIAG INJ SC/IM: CPT | Performed by: PHYSICIAN ASSISTANT

## 2025-04-22 RX ORDER — METHYLPREDNISOLONE SODIUM SUCCINATE 125 MG/2ML
125 INJECTION INTRAMUSCULAR; INTRAVENOUS
Start: 2025-06-03

## 2025-04-22 RX ORDER — ALBUTEROL SULFATE 0.83 MG/ML
2.5 SOLUTION RESPIRATORY (INHALATION)
OUTPATIENT
Start: 2025-06-03

## 2025-04-22 RX ORDER — DIPHENHYDRAMINE HYDROCHLORIDE 50 MG/ML
50 INJECTION, SOLUTION INTRAMUSCULAR; INTRAVENOUS
Start: 2025-06-03

## 2025-04-22 RX ORDER — EPINEPHRINE 1 MG/ML
0.3 INJECTION, SOLUTION INTRAMUSCULAR; SUBCUTANEOUS EVERY 5 MIN PRN
OUTPATIENT
Start: 2025-06-03

## 2025-04-22 RX ORDER — ALBUTEROL SULFATE 90 UG/1
1-2 INHALANT RESPIRATORY (INHALATION)
Start: 2025-06-03

## 2025-04-22 RX ORDER — MEPERIDINE HYDROCHLORIDE 25 MG/ML
25 INJECTION INTRAMUSCULAR; INTRAVENOUS; SUBCUTANEOUS EVERY 30 MIN PRN
OUTPATIENT
Start: 2025-06-03

## 2025-04-22 RX ADMIN — DENOSUMAB 120 MG: 120 INJECTION SUBCUTANEOUS at 15:07

## 2025-04-28 NOTE — PROGRESS NOTES
[facility]    Hematology/Oncology Established Patient Note      Today's Date: 5/5/2025    Reason for visit: Metastatic prostate cancer.    HISTORY OF PRESENT ILLNESS: Ronnie Lagos is a 69 year old male with hypertension, GERD who presents with the following oncologic history:  1.  5/11/2023: PSA elevated at 8.08 (prior 3.44 from 5/10/22).  2. 6/29/2023: MRI pelvis -- PI-RADS 4 - 1.4 x 1 x 0.9 cm nodule abutting posterolateral capsule of prostate; no pelvic adenopathy or bone lesions.  3.  8/30/2023: Prostate biopsy - [place] 4+3=7 prostate adenocarcinoma.  4. 10/2/2023: NM bone scan negative.  5.  11/3/2023: Radical prostatectomy, pelvic lymph node excision -- Pia 4+5=9 adenocarcinoma, [id number], margins widely positive for malignancy.  6.  2/7/2024: PSA = 4.06.  7.  2/15/2024: PSA elevated at 5.26.  8.  2/28/2024: PSMA PET showed focal uptake in left side of prostatectomy bed suspicious for recurrent disease; multiple pelvic and few retroperitoneal lymph nodes with uptake; >10 sclerotic osseous lesions with uptake indicative of osseous metastatic disease.  9. 3/20/2024: Started Casodex, Lupron, darolutamide, Xgeva, cycle 1 Taxotere but developed flushing, chest heaviness, nausea (no dyspnea) reaction 5 minutes into Taxotere infusion. Re-challenge not attempted that day.  10.  3/29/2024: Re-challenged Taxotere but developed repeat hypersensitivity reaction 4 minutes into infusion. Taxotere discontinued.  11. 4/17/2024: PSA 0.53.  12. 7/10/2024: PSA 0.06.  13. 10/8/2024: PSA 0.02.  14. 1/28/2025: PSA 0.13.  15. 2/26/2025: CT C/A/P showed increased sclerosis of multiple bone metastases consistent with treatment response but several new apparent sclerotic bone lesions may represent new metastases. Decrease size of subcentimeter pelvic lymph nodes. Resolution of focal enhancement within left prostatectomy bed. NM bone scan showed no evidence of bone metastasis.  16. 2/11/2025: Started  cabazitazel/prednisone.    INTERVAL HISTORY:  Ronnie reports persistent right sided rib pain (at location of prior fractures). He is not requiring any pain medication. Left upper hip pain resolved.    REVIEW OF SYSTEMS:   14 point ROS was reviewed and is negative other than as noted above in HPI.       HOME MEDICATIONS:  Current Outpatient Medications   Medication Sig Dispense Refill    amLODIPine (NORVASC) 5 MG tablet Take 1 tablet (5 mg) by mouth daily 90 tablet 3    atenolol (TENORMIN) 50 MG tablet Take 1 tablet (50 mg) by mouth daily 90 tablet 3    calcium citrate (CITRACAL) 950 (200 Ca) MG tablet Take 1 tablet by mouth 2 times daily      omeprazole (PRILOSEC) 20 MG DR capsule Take 1 capsule (20 mg) by mouth daily 90 capsule 3    predniSONE (DELTASONE) 10 MG tablet Take 1 tablet (10 mg) by mouth daily. 90 tablet 0    prochlorperazine (COMPAZINE) 10 MG tablet Take 1 tablet (10 mg) by mouth every 6 hours as needed for nausea or vomiting. 30 tablet 2    traZODone (DESYREL) 100 MG tablet Take 1 tablet (100 mg) by mouth at bedtime. 30 tablet 0    venlafaxine (EFFEXOR XR) 37.5 MG 24 hr capsule Take 1 capsule (37.5 mg) by mouth daily. 90 capsule 3    Vitamin D, Cholecalciferol, 10 MCG (400 UNIT) TABS            ALLERGIES:  Allergies   Allergen Reactions    Taxotere [Docetaxel]          PAST MEDICAL HISTORY:  Past Medical History:   Diagnosis Date    Abdominal pain     Actinic keratosis     Basal cell carcinoma     Calculus of bile duct     Elevated liver enzymes     Essential hypertension, benign     abstracted 6/19/02    Gastro-oesophageal reflux disease     GERD (gastroesophageal reflux disease) 07/28/2008    Liver disease     elevated liver enzymes    Prostate cancer (H)     bone and lymph nodes 2024    Squamous cell carcinoma     Unspecified cerebral artery occlusion with cerebral infarction     Unspecified condition of brain     abstracted 6/19/02         PAST SURGICAL HISTORY:  Past Surgical History:   Procedure  Laterality Date    ARTHRODESIS FOOT  2/5/2013    Procedure: ARTHRODESIS FOOT;  LEFT FIRST METATARSOPHALANGEAL JOINT ARTHRODESIS ;  Surgeon: Burton Loera DPM;  Location: [facility]    COLONOSCOPY N/A 12/7/2018    Procedure: COMBINED COLONOSCOPY, SINGLE OR MULTIPLE BIOPSY/POLYPECTOMY BY BIOPSY;  Surgeon: Blayne Mora MD;  Location: [facility]    COLONOSCOPY N/A 12/13/2024    Procedure: Colonoscopy;  Surgeon: Arti Mercedes MD;  Location: [facility]    DAVINCI PROSTATECTOMY, LYMPHADENECTOMY N/A 11/3/2023    Procedure: PROSTATECTOMY, ROBOT-ASSISTED, WITH PELVIC LYMPHADENECTOMY;  Surgeon: Sophia Ramirez MD;  Location:  OR    ENDOSCOPIC RETROGRADE CHOLANGIOPANCREATOGRAM N/A 1/18/2018    Procedure: COMBINED ENDOSCOPIC RETROGRADE CHOLANGIOPANCREATOGRAPHY, SPHINCTEROTOMY;  ENDOSCOPIC RETROGRADE CHOLANGIOPANCREATOGRAM (ERCP), SPHINCTEROTOMY, STONE REMOVAL, STENT PLACEMENT;  Surgeon: Christiano Sorenson MD;  Location:  OR    ENDOSCOPIC RETROGRADE CHOLANGIOPANCREATOGRAM N/A 4/12/2018    Procedure: ENDOSCOPIC RETROGRADE CHOLANGIOPANCREATOGRAM;  ENDOSCOPIC RETROGRADE CHOLANIOPANCREATOGRAPHY AND ATTEMPTED STENT REMOVAL.;  Surgeon: Christiano Sorenson MD;  Location:  OR    ENDOSCOPIC RETROGRADE CHOLANGIOPANCREATOGRAM N/A 5/10/2018    Procedure: COMBINED ENDOSCOPIC RETROGRADE CHOLANGIOPANCREATOGRAPHY, REMOVE FOREIGN BODY OR STENT/TUBE;  ENDOSCOPIC RETROGRADE CHOLANGIOPANCREATOGRAPHY AND STENT REMOVAL;  Surgeon: Christiano Sorenson MD;  Location:  OR    ESOPHAGOSCOPY, GASTROSCOPY, DUODENOSCOPY (EGD), COMBINED N/A 4/12/2018    Procedure: COMBINED ESOPHAGOSCOPY, GASTROSCOPY, DUODENOSCOPY (EGD);  EGD with stent removal;  Surgeon: Christiano Sorenson MD;  Location: [facility]    IR CHEST PORT PLACEMENT > 5 YRS OF AGE  4/11/2025    LAPAROSCOPIC CHOLECYSTECTOMY N/A 5/1/2015    Procedure: LAPAROSCOPIC CHOLECYSTECTOMY;  Surgeon: Damien Galindo MD;  Location: [facility]    ORTHOPEDIC SURGERY      left elbow, right  shoulder         SOCIAL HISTORY:  Social History     Socioeconomic History    Marital status:      Spouse name: Not on file    Number of children: Not on file    Years of education: Not on file    Highest education level: Not on file   Occupational History     Employer: [employer]   Tobacco Use    Smoking status: Never    Smokeless tobacco: Never   Vaping Use    Vaping status: Never Used   Substance and Sexual Activity    Alcohol use: Not Currently     Alcohol/week: 2.0 standard drinks of alcohol     Types: 2 Standard drinks or equivalent per week    Drug use: Never    Sexual activity: Yes     Partners: Female   Other Topics Concern    Parent/sibling w/ CABG, MI or angioplasty before 65F 55M? No   Social History Narrative    Not on file     Social Drivers of Health     Financial Resource Strain: Low Risk  (6/3/2024)    Financial Resource Strain     Within the past 12 months, have you or your family members you live with been unable to get utilities (heat, electricity) when it was really needed?: No   Food Insecurity: Low Risk  (6/3/2024)    Food Insecurity     Within the past 12 months, did you worry that your food would run out before you got money to buy more?: No     Within the past 12 months, did the food you bought just not last and you didn t have money to get more?: No   Transportation Needs: Low Risk  (6/3/2024)    Transportation Needs     Within the past 12 months, has lack of transportation kept you from medical appointments, getting your medicines, non-medical meetings or appointments, work, or from getting things that you need?: No   Physical Activity: Insufficiently Active (6/3/2024)    Exercise Vital Sign     Days of Exercise per Week: 3 days     Minutes of Exercise per Session: 10 min   Stress: Stress Concern Present (6/3/2024)    [employer] - Occupational Stress Questionnaire     Feeling of Stress : Rather much   Social Connections: Unknown  "(6/3/2024)    Social Connection and Isolation Panel [NHANES]     Frequency of Communication with Friends and Family: Not on file     Frequency of Social Gatherings with Friends and Family: Once a week     Attends Anabaptist Services: Not on file     Active Member of Clubs or Organizations: Not on file     Attends Club or Organization Meetings: Not on file     Marital Status: Not on file   Interpersonal Safety: Not At Risk (6/15/2023)    Humiliation, Afraid, Rape, and Kick questionnaire     Fear of Current or Ex-Partner: No     Emotionally Abused: No     Physically Abused: No     Sexually Abused: No   Housing Stability: Low Risk  (6/3/2024)    Housing Stability     Do you have housing? : Yes     Are you worried about losing your housing?: No         FAMILY HISTORY:  Family History   Problem Relation Age of Onset    Hypertension Mother     Cancer - colorectal Mother     Skin Cancer Mother     Colon Cancer Mother     Hypertension Father     Prostate Cancer Father     Melanoma No family hx of     Anesthesia Reaction No family hx of     Venous thrombosis No family hx of     Bleeding Disorder No family hx of    Father had prostate, liver, and skin cancer. Mother with colorectal and skin cancer.      PHYSICAL EXAM:  Vital signs:  BP (!) 152/83   Pulse 63   Resp 16   Ht 1.727 m (5' 8\")   Wt 106.4 kg (234 lb 9.6 oz)   SpO2 99%   BMI 35.67 kg/m     ECO  GENERAL: No acute distress.  EYES: No scleral icterus. No overt erythema.  RESPIRATORY: No audible cough, wheezing, or labored breathing.  MUSCULOSKELETAL: Range of motion in the neck, shoulders, and arms appear normal.  SKIN: No overt rashes, discolorations, or lesions over the face and neck.  NEUROLOGIC: Alert.  No overt tremors.  PSYCHIATRIC: Normal affect and mood.  Does not appear anxious.       LABS:  CBC RESULTS:   Recent Labs   Lab Test 25  1423   WBC 8.0   RBC 3.78*   HGB 11.7*   HCT 34.3*   MCV 91   MCH 31.0   MCHC 34.1   RDW 13.9        Last " Comprehensive Metabolic Panel:  Sodium   Date Value Ref Range Status   04/15/2025 136 135 - 145 mmol/L Final   04/06/2021 133 133 - 144 mmol/L Final     Potassium   Date Value Ref Range Status   04/15/2025 3.8 3.4 - 5.3 mmol/L Final   05/10/2022 4.6 3.4 - 5.3 mmol/L Final   04/06/2021 4.4 3.4 - 5.3 mmol/L Final     Chloride   Date Value Ref Range Status   04/15/2025 100 98 - 107 mmol/L Final   05/10/2022 99 94 - 109 mmol/L Final   04/06/2021 101 94 - 109 mmol/L Final     Carbon Dioxide   Date Value Ref Range Status   04/06/2021 31 20 - 32 mmol/L Final     Carbon Dioxide (CO2)   Date Value Ref Range Status   04/15/2025 25 22 - 29 mmol/L Final   05/10/2022 28 20 - 32 mmol/L Final     Anion Gap   Date Value Ref Range Status   04/15/2025 11 7 - 15 mmol/L Final   05/10/2022 4 3 - 14 mmol/L Final   04/06/2021 1 (L) 3 - 14 mmol/L Final     Glucose   Date Value Ref Range Status   04/15/2025 229 (H) 70 - 99 mg/dL Final   05/10/2022 112 (H) 70 - 99 mg/dL Final   04/06/2021 110 (H) 70 - 99 mg/dL Final     Urea Nitrogen   Date Value Ref Range Status   04/15/2025 16.5 8.0 - 23.0 mg/dL Final   05/10/2022 10 7 - 30 mg/dL Final   04/06/2021 8 7 - 30 mg/dL Final     Creatinine   Date Value Ref Range Status   04/15/2025 0.89 0.67 - 1.17 mg/dL Final   04/06/2021 0.92 0.66 - 1.25 mg/dL Final     GFR Estimate   Date Value Ref Range Status   04/15/2025 >90 >60 mL/min/1.73m2 Final     Comment:     eGFR calculated using 2021 CKD-EPI equation.   04/06/2021 87 >60 mL/min/[1.73_m2] Final     Comment:     Non  GFR Calc  Starting 12/18/2018, serum creatinine based estimated GFR (eGFR) will be   calculated using the Chronic Kidney Disease Epidemiology Collaboration   (CKD-EPI) equation.       GFR, ESTIMATED POCT   Date Value Ref Range Status   02/28/2024 >60 >60 mL/min/1.73m2 Final     Calcium   Date Value Ref Range Status   04/15/2025 9.2 8.8 - 10.4 mg/dL Final   04/06/2021 9.3 8.5 - 10.1 mg/dL Final     Bilirubin Total   Date  Value Ref Range Status   04/15/2025 1.0 <=1.2 mg/dL Final   04/06/2021 2.3 (H) 0.2 - 1.3 mg/dL Final     Alkaline Phosphatase   Date Value Ref Range Status   04/15/2025 48 40 - 150 U/L Final   04/06/2021 60 40 - 150 U/L Final     ALT   Date Value Ref Range Status   04/15/2025 24 0 - 70 U/L Final   04/06/2021 70 0 - 70 U/L Final     AST   Date Value Ref Range Status   04/15/2025 29 0 - 45 U/L Final   04/06/2021 66 (H) 0 - 45 U/L Final       ASSESSMENT/PLAN:  Ronnie Lagos is a 69 year old male with the following issues:  1. 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 Hypertension  5. Hypersensitivity reaction to Taxotere  --2/28/2024 PSMA PET showed metastatic disease to the pelvic and retroperitoneal lymph nodes as well as greater than 10 sites of bony metastatic disease. I showed him the images today.  --Given high volume disease, he started Casodex 50 mg daily for first month, Lupron every 3 months, darolutamide 600 mg orally BID, and tried Taxotere twice but developed hypersensitivity reaction only 4-5 minutes into infusion.  Therefore, I had recommended discontinuation of Taxotere.    --His PSA then increased further to 0.13 on 1/28/2025 consistent with biochemical progression with concomitant increase in bone pain.   --2/26/2025 CT chest/abdomen/pelvis showed several new sclerotic bone lesions but NM bone scan negative for bone metastases.  --Started cabazitaxel infusion on 2/11/2025 with 10 mg prednisone daily. Discontinued darolutamide.  --PSA rising, now to 0.35 on 4/15/2025 and can be due to PSA flare in the initial few months of therapy.  --Discussed that infusion therapy will be continued until disease progression or unacceptable toxicity.  If PSA continues to rise over the course of May, will discuss next line therapies.  --Alternate option is Pluvicto and switch to enzalutamide but would recommend  utilizing this line of therapy after cabazitaxel progression.  --Continue denosumab (Xgeva) but change to every 6 weeks for convenience until he has dental work scheduled.  --Will continue to monitor CBC, CMP, and PSA monthly.    5. 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    6. Left shoulder pain  --Related to post-arthroplasty pain.  Continue Tylenol as needed.    7. Left upper molar pain  --He may need bone implant/grafting in the future and will hold Xgeva for 3 months if needed.  He might be able to do a less invasive option with a flipper.    8. Right knee osteoarthritis  --He will discuss right knee arthroplasty versus shaving the right knee cap with orthopedics.    9. Right rib pain related to multiple right-sided rib fractures.  --Has known metastatic disease to ribs/bones.  --8/7/2024 Rib x-rays showed multiple indeterminate chronicity right rib fractures.  --Continue conservative management. Is not requiring analgesics for this.    10. Insomnia  11. Male hot flashes  --Multifactorially related to joint pain and hot flashes.  --Not alleviated with CBD, THC gummies, or melatonin.  --Suggested acupuncture but does not like needles.  --Offered venlafaxine -- discussed potential adverse effects and he agrees to try it.  --Also encouraged moderate exercise and plant based diet, eating less sugar.    Anabell Mercedes MD  [facility] Hematology/Oncology     Total time spent today: 30 minutes in chart review, patient evaluation, counseling, documentation, test and/or medication/prescription orders, and coordination of care.      The longitudinal plan of care for the diagnosis(es)/condition(s) as documented were addressed during this visit. Due to the added complexity in care, I will continue to support Ronnie in the subsequent management and with ongoing continuity of care.

## 2025-04-30 ENCOUNTER — OFFICE VISIT (OUTPATIENT)
Dept: DERMATOLOGY | Facility: CLINIC | Age: 70
End: 2025-04-30
Payer: COMMERCIAL

## 2025-04-30 DIAGNOSIS — D22.9 MULTIPLE BENIGN NEVI: ICD-10-CM

## 2025-04-30 DIAGNOSIS — L57.0 ACTINIC KERATOSIS: Primary | ICD-10-CM

## 2025-04-30 DIAGNOSIS — L57.8 ACTINIC SKIN DAMAGE: ICD-10-CM

## 2025-04-30 DIAGNOSIS — L81.4 LENTIGINES: ICD-10-CM

## 2025-04-30 ASSESSMENT — PAIN SCALES - GENERAL: PAINLEVEL_OUTOF10: NO PAIN (0)

## 2025-04-30 NOTE — LETTER
4/30/2025      Ronnie Lagos  [address]  [place] [place] [address]      Dear Colleague,    Thank you for referring your patient, Ronnie Lagos, to the [facility]. Please see a copy of my visit note below.    [facility] Dermatology Note    Encounter Date: Apr 30, 2025    Dermatology Problem List:  #Stage IV prostate cancer; darolutamide, leuprolide     Major PMHx  -   ______________________________________    Impression/Plan:  Ronnie was seen today for skin check.    Diagnoses and all orders for this visit:    Actinic keratosis  -     MT DESTRUCT PREMALIGNANT LESION, FIRST [[id number]]  -     MT DESTRUCT PREMALIGNANT LESION, 2-14 [[id number]]  - declines efudex  - see procedure note    Actinic skin damage  Lentigines  Multiple benign nevi  - Reviewed the compounding benefits of incremental changes to sun protective behaviors including increased frequency of sunscreen and sun protective clothing like broad brimmed hats and longsleeved UPF containing clothing        Cryotherapy procedure note: After verbal consent and discussion of risks and benefits including but no limited to dyspigmentation/scar, blister, and pain, 5 aks on face and scalp was(were) treated with 1-2mm freeze border for 2 cycles with liquid nitrogen. Post cryotherapy instructions were provided.     Follow-up in 1 year.       Staff Involved:  Staff Only    Perico Murrieta MD   of Dermatology  Department of Dermatology  [facility]      CC:   Chief Complaint   Patient presents with     Skin Check     FBSC  - spot on back of head, feels scaly and dry  - both temples, rough spots       History of Present Illness:  Mr. Ronnie Lagos is a 69 year old male who presents as a return patient.    Pt presents today for concerns about spots on trunk and extremities. Had concern about efudex and interaction w/ prostate cancer medicines last  visit      Labs:      Physical exam:  Vitals: There were no vitals taken for this visit.  GEN: well developed, well-nourished, in no acute distress, in a pleasant mood.     SKIN: [place] phototype 1  - Full skin, which includes the head/face, both arms, chest, back, abdomen,both legs, genitalia and/or groin buttocks, digits and/or nails, was examined.  - Flat brown macules and patches in a sun exposed areas on face and extremities  - gritty pink papules face and scalp  - No other lesions of concern on areas examined.     Past Medical History:   Past Medical History:   Diagnosis Date     Abdominal pain      Actinic keratosis      Basal cell carcinoma      Calculus of bile duct      Elevated liver enzymes      Essential hypertension, benign     abstracted 6/19/02     Gastro-oesophageal reflux disease      GERD (gastroesophageal reflux disease) 07/28/2008     Liver disease     elevated liver enzymes     Prostate cancer (H)     bone and lymph nodes 2024     Squamous cell carcinoma      Unspecified cerebral artery occlusion with cerebral infarction      Unspecified condition of brain     abstracted 6/19/02     Past Surgical History:   Procedure Laterality Date     ARTHRODESIS FOOT  2/5/2013    Procedure: ARTHRODESIS FOOT;  LEFT FIRST METATARSOPHALANGEAL JOINT ARTHRODESIS ;  Surgeon: Burton Loera DPM;  [facility]     COLONOSCOPY N/A 12/7/2018    Procedure: COMBINED COLONOSCOPY, SINGLE OR MULTIPLE BIOPSY/POLYPECTOMY BY BIOPSY;  Surgeon: Blayne Mora MD;  Location:  [facility]     COLONOSCOPY N/A 12/13/2024    Procedure: Colonoscopy;  Surgeon: Arti Mercedes MD;  Location:  GI     DAVINCI PROSTATECTOMY, LYMPHADENECTOMY N/A 11/3/2023    Procedure: PROSTATECTOMY, ROBOT-ASSISTED, WITH PELVIC LYMPHADENECTOMY;  Surgeon: Sophia Ramirez MD;  Location:  OR     ENDOSCOPIC RETROGRADE CHOLANGIOPANCREATOGRAM N/A 1/18/2018    Procedure: COMBINED ENDOSCOPIC RETROGRADE CHOLANGIOPANCREATOGRAPHY, SPHINCTEROTOMY;   ENDOSCOPIC RETROGRADE CHOLANGIOPANCREATOGRAM (ERCP), SPHINCTEROTOMY, STONE REMOVAL, STENT PLACEMENT;  Surgeon: Christiano Sorenson MD;  Location:  OR     ENDOSCOPIC RETROGRADE CHOLANGIOPANCREATOGRAM N/A 4/12/2018    Procedure: ENDOSCOPIC RETROGRADE CHOLANGIOPANCREATOGRAM;  ENDOSCOPIC RETROGRADE CHOLANIOPANCREATOGRAPHY AND ATTEMPTED STENT REMOVAL.;  Surgeon: Christiano Sorenson MD;  Location:  OR     ENDOSCOPIC RETROGRADE CHOLANGIOPANCREATOGRAM N/A 5/10/2018    Procedure: COMBINED ENDOSCOPIC RETROGRADE CHOLANGIOPANCREATOGRAPHY, REMOVE FOREIGN BODY OR STENT/TUBE;  ENDOSCOPIC RETROGRADE CHOLANGIOPANCREATOGRAPHY AND STENT REMOVAL;  Surgeon: Christiano Sorenson MD;  Location:  OR     ESOPHAGOSCOPY, GASTROSCOPY, DUODENOSCOPY (EGD), COMBINED N/A 4/12/2018    Procedure: COMBINED ESOPHAGOSCOPY, GASTROSCOPY, DUODENOSCOPY (EGD);  EGD with stent removal;  Surgeon: Christiano Sorenson MD;  Location:  GI     IR CHEST PORT PLACEMENT > 5 YRS OF AGE  4/11/2025     LAPAROSCOPIC CHOLECYSTECTOMY N/A 5/1/2015    Procedure: LAPAROSCOPIC CHOLECYSTECTOMY;  Surgeon: Damien Galindo MD;  [facility]:  [facility]     ORTHOPEDIC SURGERY      left elbow, right shoulder       Social History:   reports that he has never smoked. He has never used smokeless tobacco. He reports that he does not currently use alcohol after a past usage of about 2.0 standard drinks of alcohol per week. He reports that he does not use drugs.    Family History:  Family History   Problem Relation Age of Onset     Hypertension Mother      Cancer - colorectal Mother      Skin Cancer Mother      Colon Cancer Mother      Hypertension Father      Prostate Cancer Father      Melanoma No family hx of      Anesthesia Reaction No family hx of      Venous thrombosis No family hx of      Bleeding Disorder No family hx of        Medications:  Current Outpatient Medications   Medication Sig Dispense Refill     amLODIPine (NORVASC) 5 MG tablet Take 1 tablet (5 mg)  by mouth daily 90 tablet 3     atenolol (TENORMIN) 50 MG tablet Take 1 tablet (50 mg) by mouth daily 90 tablet 3     calcium citrate (CITRACAL) 950 (200 Ca) MG tablet Take 1 tablet by mouth 2 times daily       omeprazole (PRILOSEC) 20 MG DR capsule Take 1 capsule (20 mg) by mouth daily 90 capsule 3     predniSONE (DELTASONE) 10 MG tablet Take 1 tablet (10 mg) by mouth daily. 90 tablet 0     prochlorperazine (COMPAZINE) 10 MG tablet Take 1 tablet (10 mg) by mouth every 6 hours as needed for nausea or vomiting. 30 tablet 2     traZODone (DESYREL) 100 MG tablet Take 1 tablet (100 mg) by mouth at bedtime. 30 tablet 0     venlafaxine (EFFEXOR XR) 37.5 MG 24 hr capsule Take 1 capsule (37.5 mg) by mouth daily. 90 capsule 3     Vitamin D, Cholecalciferol, 10 MCG (400 UNIT) TABS        Allergies   Allergen Reactions     Taxotere [Docetaxel]                Again, thank you for allowing me to participate in the care of your patient.        Sincerely,        Perico Murrieta MD    Electronically signed

## 2025-04-30 NOTE — PROGRESS NOTES
[facility] Dermatology Note    Encounter Date: Apr 30, 2025    Dermatology Problem List:  #Stage IV prostate cancer; darolutamide, leuprolide     Major PMHx  -   ______________________________________    Impression/Plan:  Ronnie was seen today for skin check.    Diagnoses and all orders for this visit:    Actinic keratosis  -     AZ DESTRUCT PREMALIGNANT LESION, FIRST [[id number]]  -     [place] DESTRUCT PREMALIGNANT LESION, 2-14 [[id number]]  - declines efudex  - see procedure note    Actinic skin damage  Lentigines  Multiple benign nevi  - Reviewed the compounding benefits of incremental changes to sun protective behaviors including increased frequency of sunscreen and sun protective clothing like broad brimmed hats and longsleeved UPF containing clothing        Cryotherapy procedure note: After verbal consent and discussion of risks and benefits including but no limited to dyspigmentation/scar, blister, and pain, 5 aks on face and scalp was(were) treated with 1-2mm freeze border for 2 cycles with liquid nitrogen. Post cryotherapy instructions were provided.     Follow-up in 1 year.       Staff Involved:  Staff Only    Perico Murrieta MD   of Dermatology  Department of Dermatology  [facility]      CC:   Chief Complaint   Patient presents with    Skin Check     FBSC  - spot on back of head, feels scaly and dry  - both temples, rough spots       History of Present Illness:  Mr. Ronnie Lagos is a 69 year old male who presents as a return patient.    Pt presents today for concerns about spots on trunk and extremities. Had concern about efudex and interaction w/ prostate cancer medicines last visit      Labs:      Physical exam:  Vitals: There were no vitals taken for this visit.  GEN: well developed, well-nourished, in no acute distress, in a pleasant mood.     SKIN: [place] phototype 1  - Full skin, which includes the head/face, both arms, chest, back,  abdomen,both legs, genitalia and/or groin buttocks, digits and/or nails, was examined.  - Flat brown macules and patches in a sun exposed areas on face and extremities  - gritty pink papules face and scalp  - No other lesions of concern on areas examined.     Past Medical History:   Past Medical History:   Diagnosis Date    Abdominal pain     Actinic keratosis     Basal cell carcinoma     Calculus of bile duct     Elevated liver enzymes     Essential hypertension, benign     abstracted 6/19/02    Gastro-oesophageal reflux disease     GERD (gastroesophageal reflux disease) 07/28/2008    Liver disease     elevated liver enzymes    Prostate cancer (H)     bone and lymph nodes 2024    Squamous cell carcinoma     Unspecified cerebral artery occlusion with cerebral infarction     Unspecified condition of brain     abstracted 6/19/02     Past Surgical History:   Procedure Laterality Date    ARTHRODESIS FOOT  2/5/2013    Procedure: ARTHRODESIS FOOT;  LEFT FIRST METATARSOPHALANGEAL JOINT ARTHRODESIS ;  Surgeon: Burton Loera DPM;  [facility]:  [facility]    COLONOSCOPY N/A 12/7/2018    Procedure: COMBINED COLONOSCOPY, SINGLE OR MULTIPLE BIOPSY/POLYPECTOMY BY BIOPSY;  Surgeon: Blayne Mora MD;  Location:  GI    COLONOSCOPY N/A 12/13/2024    Procedure: Colonoscopy;  Surgeon: Arti Mercedes MD;  Location:  GI    DAVINCI PROSTATECTOMY, LYMPHADENECTOMY N/A 11/3/2023    Procedure: PROSTATECTOMY, ROBOT-ASSISTED, WITH PELVIC LYMPHADENECTOMY;  Surgeon: Sophia Ramirez MD;  Location:  OR    ENDOSCOPIC RETROGRADE CHOLANGIOPANCREATOGRAM N/A 1/18/2018    Procedure: COMBINED ENDOSCOPIC RETROGRADE CHOLANGIOPANCREATOGRAPHY, SPHINCTEROTOMY;  ENDOSCOPIC RETROGRADE CHOLANGIOPANCREATOGRAM (ERCP), SPHINCTEROTOMY, STONE REMOVAL, STENT PLACEMENT;  Surgeon: Christiano Sorenson MD;  Location:  OR    ENDOSCOPIC RETROGRADE CHOLANGIOPANCREATOGRAM N/A 4/12/2018    Procedure: ENDOSCOPIC RETROGRADE CHOLANGIOPANCREATOGRAM;  ENDOSCOPIC  RETROGRADE CHOLANIOPANCREATOGRAPHY AND ATTEMPTED STENT REMOVAL.;  Surgeon: Christiano Sorenson MD;  Location:  OR    ENDOSCOPIC RETROGRADE CHOLANGIOPANCREATOGRAM N/A 5/10/2018    Procedure: COMBINED ENDOSCOPIC RETROGRADE CHOLANGIOPANCREATOGRAPHY, REMOVE FOREIGN BODY OR STENT/TUBE;  ENDOSCOPIC RETROGRADE CHOLANGIOPANCREATOGRAPHY AND STENT REMOVAL;  Surgeon: Christiano Sorenson MD;  Location:  OR    ESOPHAGOSCOPY, GASTROSCOPY, DUODENOSCOPY (EGD), COMBINED N/A 4/12/2018    Procedure: COMBINED ESOPHAGOSCOPY, GASTROSCOPY, DUODENOSCOPY (EGD);  EGD with stent removal;  Surgeon: Christiano Sorenson MD;  Location:  GI    IR CHEST PORT PLACEMENT > 5 YRS OF AGE  4/11/2025    LAPAROSCOPIC CHOLECYSTECTOMY N/A 5/1/2015    Procedure: LAPAROSCOPIC CHOLECYSTECTOMY;  Surgeon: Damien Galindo MD;  Location:  [facility]    ORTHOPEDIC SURGERY      left elbow, right shoulder       Social History:   reports that he has never smoked. He has never used smokeless tobacco. He reports that he does not currently use alcohol after a past usage of about 2.0 standard drinks of alcohol per week. He reports that he does not use drugs.    Family History:  Family History   Problem Relation Age of Onset    Hypertension Mother     Cancer - colorectal Mother     Skin Cancer Mother     Colon Cancer Mother     Hypertension Father     Prostate Cancer Father     Melanoma No family hx of     Anesthesia Reaction No family hx of     Venous thrombosis No family hx of     Bleeding Disorder No family hx of        Medications:  Current Outpatient Medications   Medication Sig Dispense Refill    amLODIPine (NORVASC) 5 MG tablet Take 1 tablet (5 mg) by mouth daily 90 tablet 3    atenolol (TENORMIN) 50 MG tablet Take 1 tablet (50 mg) by mouth daily 90 tablet 3    calcium citrate (CITRACAL) 950 (200 Ca) MG tablet Take 1 tablet by mouth 2 times daily      omeprazole (PRILOSEC) 20 MG DR capsule Take 1 capsule (20 mg) by mouth daily 90 capsule 3     predniSONE (DELTASONE) 10 MG tablet Take 1 tablet (10 mg) by mouth daily. 90 tablet 0    prochlorperazine (COMPAZINE) 10 MG tablet Take 1 tablet (10 mg) by mouth every 6 hours as needed for nausea or vomiting. 30 tablet 2    traZODone (DESYREL) 100 MG tablet Take 1 tablet (100 mg) by mouth at bedtime. 30 tablet 0    venlafaxine (EFFEXOR XR) 37.5 MG 24 hr capsule Take 1 capsule (37.5 mg) by mouth daily. 90 capsule 3    Vitamin D, Cholecalciferol, 10 MCG (400 UNIT) TABS        Allergies   Allergen Reactions    Taxotere [Docetaxel]

## 2025-05-05 ENCOUNTER — INFUSION THERAPY VISIT (OUTPATIENT)
Dept: INFUSION THERAPY | Facility: CLINIC | Age: 70
End: 2025-05-05
Attending: INTERNAL MEDICINE
Payer: COMMERCIAL

## 2025-05-05 ENCOUNTER — ONCOLOGY VISIT (OUTPATIENT)
Dept: ONCOLOGY | Facility: CLINIC | Age: 70
End: 2025-05-05
Attending: INTERNAL MEDICINE
Payer: COMMERCIAL

## 2025-05-05 VITALS
WEIGHT: 234.6 LBS | HEIGHT: 68 IN | RESPIRATION RATE: 16 BRPM | SYSTOLIC BLOOD PRESSURE: 152 MMHG | DIASTOLIC BLOOD PRESSURE: 83 MMHG | OXYGEN SATURATION: 99 % | HEART RATE: 63 BPM | BODY MASS INDEX: 35.55 KG/M2

## 2025-05-05 DIAGNOSIS — G47.00 INSOMNIA, UNSPECIFIED TYPE: ICD-10-CM

## 2025-05-05 DIAGNOSIS — R23.2 HOT FLASH IN MALE: ICD-10-CM

## 2025-05-05 DIAGNOSIS — R07.81 RIB PAIN ON RIGHT SIDE: ICD-10-CM

## 2025-05-05 DIAGNOSIS — C61 PROSTATE CANCER METASTATIC TO BONE (H): ICD-10-CM

## 2025-05-05 DIAGNOSIS — C79.51 PROSTATE CANCER METASTATIC TO BONE (H): Primary | ICD-10-CM

## 2025-05-05 DIAGNOSIS — C79.51 PROSTATE CANCER METASTATIC TO BONE (H): ICD-10-CM

## 2025-05-05 DIAGNOSIS — C61 PROSTATE CANCER METASTATIC TO MULTIPLE SITES (H): ICD-10-CM

## 2025-05-05 DIAGNOSIS — C61 PROSTATE CANCER METASTATIC TO MULTIPLE SITES (H): Primary | ICD-10-CM

## 2025-05-05 DIAGNOSIS — C61 PROSTATE CANCER METASTATIC TO BONE (H): Primary | ICD-10-CM

## 2025-05-05 LAB
ALBUMIN SERPL BCG-MCNC: 3.8 G/DL (ref 3.5–5.2)
ALP SERPL-CCNC: 52 U/L (ref 40–150)
ALT SERPL W P-5'-P-CCNC: 24 U/L (ref 0–70)
ANION GAP SERPL CALCULATED.3IONS-SCNC: 12 MMOL/L (ref 7–15)
AST SERPL W P-5'-P-CCNC: 29 U/L (ref 0–45)
BASOPHILS # BLD AUTO: 0 10E3/UL (ref 0–0.2)
BASOPHILS NFR BLD AUTO: 1 %
BILIRUB SERPL-MCNC: 0.8 MG/DL
BUN SERPL-MCNC: 12.8 MG/DL (ref 8–23)
CALCIUM SERPL-MCNC: 9 MG/DL (ref 8.8–10.4)
CHLORIDE SERPL-SCNC: 101 MMOL/L (ref 98–107)
CREAT SERPL-MCNC: 0.92 MG/DL (ref 0.67–1.17)
EGFRCR SERPLBLD CKD-EPI 2021: >90 ML/MIN/1.73M2
EOSINOPHIL # BLD AUTO: 0 10E3/UL (ref 0–0.7)
EOSINOPHIL NFR BLD AUTO: 1 %
ERYTHROCYTE [DISTWIDTH] IN BLOOD BY AUTOMATED COUNT: 13.9 % (ref 10–15)
GLUCOSE SERPL-MCNC: 207 MG/DL (ref 70–99)
HCO3 SERPL-SCNC: 25 MMOL/L (ref 22–29)
HCT VFR BLD AUTO: 34.3 % (ref 40–53)
HGB BLD-MCNC: 11.7 G/DL (ref 13.3–17.7)
IMM GRANULOCYTES # BLD: 0.1 10E3/UL
IMM GRANULOCYTES NFR BLD: 1 %
LYMPHOCYTES # BLD AUTO: 0.8 10E3/UL (ref 0.8–5.3)
LYMPHOCYTES NFR BLD AUTO: 10 %
MCH RBC QN AUTO: 31 PG (ref 26.5–33)
MCHC RBC AUTO-ENTMCNC: 34.1 G/DL (ref 31.5–36.5)
MCV RBC AUTO: 91 FL (ref 78–100)
MONOCYTES # BLD AUTO: 0.5 10E3/UL (ref 0–1.3)
MONOCYTES NFR BLD AUTO: 6 %
NEUTROPHILS # BLD AUTO: 6.5 10E3/UL (ref 1.6–8.3)
NEUTROPHILS NFR BLD AUTO: 82 %
NRBC # BLD AUTO: 0 10E3/UL
NRBC BLD AUTO-RTO: 0 /100
PHOSPHATE SERPL-MCNC: 3.7 MG/DL (ref 2.5–4.5)
PLATELET # BLD AUTO: 179 10E3/UL (ref 150–450)
POTASSIUM SERPL-SCNC: 4.5 MMOL/L (ref 3.4–5.3)
PROT SERPL-MCNC: 6.2 G/DL (ref 6.4–8.3)
PSA SERPL DL<=0.01 NG/ML-MCNC: 0.34 NG/ML (ref 0–4.5)
RBC # BLD AUTO: 3.78 10E6/UL (ref 4.4–5.9)
SODIUM SERPL-SCNC: 138 MMOL/L (ref 135–145)
WBC # BLD AUTO: 8 10E3/UL (ref 4–11)

## 2025-05-05 PROCEDURE — 250N000011 HC RX IP 250 OP 636: Performed by: INTERNAL MEDICINE

## 2025-05-05 PROCEDURE — 99214 OFFICE O/P EST MOD 30 MIN: CPT | Performed by: INTERNAL MEDICINE

## 2025-05-05 PROCEDURE — 36591 DRAW BLOOD OFF VENOUS DEVICE: CPT | Performed by: PHYSICIAN ASSISTANT

## 2025-05-05 PROCEDURE — 82310 ASSAY OF CALCIUM: CPT | Performed by: INTERNAL MEDICINE

## 2025-05-05 PROCEDURE — 84153 ASSAY OF PSA TOTAL: CPT | Performed by: INTERNAL MEDICINE

## 2025-05-05 PROCEDURE — 85004 AUTOMATED DIFF WBC COUNT: CPT | Performed by: INTERNAL MEDICINE

## 2025-05-05 PROCEDURE — G0463 HOSPITAL OUTPT CLINIC VISIT: HCPCS | Performed by: INTERNAL MEDICINE

## 2025-05-05 PROCEDURE — 84100 ASSAY OF PHOSPHORUS: CPT | Performed by: PHYSICIAN ASSISTANT

## 2025-05-05 PROCEDURE — G2211 COMPLEX E/M VISIT ADD ON: HCPCS | Performed by: INTERNAL MEDICINE

## 2025-05-05 RX ORDER — TRAZODONE HYDROCHLORIDE 100 MG/1
50 TABLET ORAL AT BEDTIME
Qty: 30 TABLET | Refills: 3 | Status: SHIPPED | OUTPATIENT
Start: 2025-05-05

## 2025-05-05 RX ORDER — MEPERIDINE HYDROCHLORIDE 25 MG/ML
25 INJECTION INTRAMUSCULAR; INTRAVENOUS; SUBCUTANEOUS EVERY 30 MIN PRN
OUTPATIENT
Start: 2025-06-03

## 2025-05-05 RX ORDER — HEPARIN SODIUM,PORCINE 10 UNIT/ML
3 VIAL (ML) INTRAVENOUS
Status: DISCONTINUED | OUTPATIENT
Start: 2025-05-05 | End: 2025-05-05 | Stop reason: HOSPADM

## 2025-05-05 RX ORDER — ALBUTEROL SULFATE 90 UG/1
1-2 INHALANT RESPIRATORY (INHALATION)
Start: 2025-06-03

## 2025-05-05 RX ORDER — EPINEPHRINE 1 MG/ML
0.3 INJECTION, SOLUTION INTRAMUSCULAR; SUBCUTANEOUS EVERY 5 MIN PRN
OUTPATIENT
Start: 2025-06-03

## 2025-05-05 RX ORDER — DIPHENHYDRAMINE HYDROCHLORIDE 50 MG/ML
50 INJECTION, SOLUTION INTRAMUSCULAR; INTRAVENOUS
Start: 2025-06-03

## 2025-05-05 RX ORDER — METHYLPREDNISOLONE SODIUM SUCCINATE 125 MG/2ML
125 INJECTION INTRAMUSCULAR; INTRAVENOUS
Start: 2025-06-03

## 2025-05-05 RX ORDER — ALBUTEROL SULFATE 0.83 MG/ML
2.5 SOLUTION RESPIRATORY (INHALATION)
OUTPATIENT
Start: 2025-06-03

## 2025-05-05 RX ADMIN — HEPARIN, PORCINE (PF) 10 UNIT/ML INTRAVENOUS SYRINGE 3 ML: at 14:24

## 2025-05-05 ASSESSMENT — PAIN SCALES - GENERAL: PAINLEVEL_OUTOF10: MILD PAIN (2)

## 2025-05-05 NOTE — LETTER
5/5/2025      Ronnie Lagos  [address]  [facility] [address]      Dear Colleague,    Thank you for referring your patient, Ronnie Lagos, to the [facility]. Please see a copy of my visit note below.    [facility]    Hematology/Oncology Established Patient Note      Today's Date: 5/5/2025    Reason for visit: Metastatic prostate cancer.    HISTORY OF PRESENT ILLNESS: Ronnie Lagos is a 69 year old male with hypertension, GERD who presents with the following oncologic history:  1.  5/11/2023: PSA elevated at 8.08 (prior 3.44 from 5/10/22).  2. 6/29/2023: MRI pelvis -- PI-RADS 4 - 1.4 x 1 x 0.9 cm nodule abutting posterolateral capsule of prostate; no pelvic adenopathy or bone lesions.  3.  8/30/2023: Prostate biopsy - [place] 4+3=7 prostate adenocarcinoma.  4. 10/2/2023: NM bone scan negative.  5.  11/3/2023: Radical prostatectomy, pelvic lymph node excision -- [place] 4+5=9 adenocarcinoma, [id number], margins widely positive for malignancy.  6.  2/7/2024: PSA = 4.06.  7.  2/15/2024: PSA elevated at 5.26.  8.  2/28/2024: PSMA PET showed focal uptake in left side of prostatectomy bed suspicious for recurrent disease; multiple pelvic and few retroperitoneal lymph nodes with uptake; >10 sclerotic osseous lesions with uptake indicative of osseous metastatic disease.  9. 3/20/2024: Started Casodex, Lupron, darolutamide, Xgeva, cycle 1 Taxotere but developed flushing, chest heaviness, nausea (no dyspnea) reaction 5 minutes into Taxotere infusion. Re-challenge not attempted that day.  10.  3/29/2024: Re-challenged Taxotere but developed repeat hypersensitivity reaction 4 minutes into infusion. Taxotere discontinued.  11. 4/17/2024: PSA 0.53.  12. 7/10/2024: PSA 0.06.  13. 10/8/2024: PSA 0.02.  14. 1/28/2025: PSA 0.13.  15. 2/26/2025: CT C/A/P showed increased sclerosis of multiple bone metastases consistent with treatment response but several new apparent  sclerotic bone lesions may represent new metastases. Decrease size of subcentimeter pelvic lymph nodes. Resolution of focal enhancement within left prostatectomy bed. NM bone scan showed no evidence of bone metastasis.  16. 2/11/2025: Started cabazitazel/prednisone.    INTERVAL HISTORY:  Ronnie reports persistent right sided rib pain (at location of prior fractures). He is not requiring any pain medication. Left upper hip pain resolved.    REVIEW OF SYSTEMS:   14 point ROS was reviewed and is negative other than as noted above in HPI.       HOME MEDICATIONS:  Current Outpatient Medications   Medication Sig Dispense Refill     amLODIPine (NORVASC) 5 MG tablet Take 1 tablet (5 mg) by mouth daily 90 tablet 3     atenolol (TENORMIN) 50 MG tablet Take 1 tablet (50 mg) by mouth daily 90 tablet 3     calcium citrate (CITRACAL) 950 (200 Ca) MG tablet Take 1 tablet by mouth 2 times daily       omeprazole (PRILOSEC) 20 MG DR capsule Take 1 capsule (20 mg) by mouth daily 90 capsule 3     predniSONE (DELTASONE) 10 MG tablet Take 1 tablet (10 mg) by mouth daily. 90 tablet 0     prochlorperazine (COMPAZINE) 10 MG tablet Take 1 tablet (10 mg) by mouth every 6 hours as needed for nausea or vomiting. 30 tablet 2     traZODone (DESYREL) 100 MG tablet Take 1 tablet (100 mg) by mouth at bedtime. 30 tablet 0     venlafaxine (EFFEXOR XR) 37.5 MG 24 hr capsule Take 1 capsule (37.5 mg) by mouth daily. 90 capsule 3     Vitamin D, Cholecalciferol, 10 MCG (400 UNIT) TABS            ALLERGIES:  Allergies   Allergen Reactions     Taxotere [Docetaxel]          PAST MEDICAL HISTORY:  Past Medical History:   Diagnosis Date     Abdominal pain      Actinic keratosis      Basal cell carcinoma      Calculus of bile duct      Elevated liver enzymes      Essential hypertension, benign     abstracted 6/19/02     Gastro-oesophageal reflux disease      GERD (gastroesophageal reflux disease) 07/28/2008     Liver disease     elevated liver enzymes      Prostate cancer (H)     bone and lymph nodes 2024     Squamous cell carcinoma      Unspecified cerebral artery occlusion with cerebral infarction      Unspecified condition of brain     abstracted 6/19/02         PAST SURGICAL HISTORY:  Past Surgical History:   Procedure Laterality Date     ARTHRODESIS FOOT  2/5/2013    Procedure: ARTHRODESIS FOOT;  LEFT FIRST METATARSOPHALANGEAL JOINT ARTHRODESIS ;  Surgeon: Burton Loera DPM;  [facility]:  [facility]     COLONOSCOPY N/A 12/7/2018    Procedure: COMBINED COLONOSCOPY, SINGLE OR MULTIPLE BIOPSY/POLYPECTOMY BY BIOPSY;  Surgeon: Blayne Mora MD;  Location:  GI     COLONOSCOPY N/A 12/13/2024    Procedure: Colonoscopy;  Surgeon: Arti Mercedes MD;  Location: [facility]     DAVINCI PROSTATECTOMY, LYMPHADENECTOMY N/A 11/3/2023    Procedure: PROSTATECTOMY, ROBOT-ASSISTED, WITH PELVIC LYMPHADENECTOMY;  Surgeon: Sophia Ramirez MD;  Location:  OR     ENDOSCOPIC RETROGRADE CHOLANGIOPANCREATOGRAM N/A 1/18/2018    Procedure: COMBINED ENDOSCOPIC RETROGRADE CHOLANGIOPANCREATOGRAPHY, SPHINCTEROTOMY;  ENDOSCOPIC RETROGRADE CHOLANGIOPANCREATOGRAM (ERCP), SPHINCTEROTOMY, STONE REMOVAL, STENT PLACEMENT;  Surgeon: Christiano Sorenson MD;  Location:  OR     ENDOSCOPIC RETROGRADE CHOLANGIOPANCREATOGRAM N/A 4/12/2018    Procedure: ENDOSCOPIC RETROGRADE CHOLANGIOPANCREATOGRAM;  ENDOSCOPIC RETROGRADE CHOLANIOPANCREATOGRAPHY AND ATTEMPTED STENT REMOVAL.;  Surgeon: Christiano Sorenson MD;  Location:  OR     ENDOSCOPIC RETROGRADE CHOLANGIOPANCREATOGRAM N/A 5/10/2018    Procedure: COMBINED ENDOSCOPIC RETROGRADE CHOLANGIOPANCREATOGRAPHY, REMOVE FOREIGN BODY OR STENT/TUBE;  ENDOSCOPIC RETROGRADE CHOLANGIOPANCREATOGRAPHY AND STENT REMOVAL;  Surgeon: Christiano Sorenson MD;  Location:  OR     ESOPHAGOSCOPY, GASTROSCOPY, DUODENOSCOPY (EGD), COMBINED N/A 4/12/2018    Procedure: COMBINED ESOPHAGOSCOPY, GASTROSCOPY, DUODENOSCOPY (EGD);  EGD with stent removal;  Surgeon: Yas  Christiano Castellanos MD;  Location:  GI     IR CHEST PORT PLACEMENT > 5 YRS OF AGE  4/11/2025     LAPAROSCOPIC CHOLECYSTECTOMY N/A 5/1/2015    Procedure: LAPAROSCOPIC CHOLECYSTECTOMY;  Surgeon: Damien Galindo MD;  Location:  [facility]     ORTHOPEDIC SURGERY      left elbow, right shoulder         SOCIAL HISTORY:  Social History     Socioeconomic History     Marital status:      Spouse name: Not on file     Number of children: Not on file     Years of education: Not on file     Highest education level: Not on file   Occupational History     Employer: [employer]   Tobacco Use     Smoking status: Never     Smokeless tobacco: Never   Vaping Use     Vaping status: Never Used   Substance and Sexual Activity     Alcohol use: Not Currently     Alcohol/week: 2.0 standard drinks of alcohol     Types: 2 Standard drinks or equivalent per week     Drug use: Never     Sexual activity: Yes     Partners: Female   Other Topics Concern     Parent/sibling w/ CABG, MI or angioplasty before 65F 55M? No   Social History Narrative     Not on file     Social Drivers of Health     Financial Resource Strain: Low Risk  (6/3/2024)    Financial Resource Strain      Within the past 12 months, have you or your family members you live with been unable to get utilities (heat, electricity) when it was really needed?: No   Food Insecurity: Low Risk  (6/3/2024)    Food Insecurity      Within the past 12 months, did you worry that your food would run out before you got money to buy more?: No      Within the past 12 months, did the food you bought just not last and you didn t have money to get more?: No   Transportation Needs: Low Risk  (6/3/2024)    Transportation Needs      Within the past 12 months, has lack of transportation kept you from medical appointments, getting your medicines, non-medical meetings or appointments, work, or from getting things that you need?: No   Physical Activity: Insufficiently Active (6/3/2024)  "   Exercise Vital Sign      Days of Exercise per Week: 3 days      Minutes of Exercise per Session: 10 min   Stress: Stress Concern Present (6/3/2024)    [employer] - Occupational Stress Questionnaire      Feeling of Stress : Rather much   Social Connections: Unknown (6/3/2024)    Social Connection and Isolation Panel [NHANES]      Frequency of Communication with Friends and Family: Not on file      Frequency of Social Gatherings with Friends and Family: Once a week      Attends Alevism Services: Not on file      Active Member of Clubs or Organizations: Not on file      Attends Club or Organization Meetings: Not on file      Marital Status: Not on file   Interpersonal Safety: Not At Risk (6/15/2023)    Humiliation, Afraid, Rape, and Kick questionnaire      Fear of Current or Ex-Partner: No      Emotionally Abused: No      Physically Abused: No      Sexually Abused: No   Housing Stability: Low Risk  (6/3/2024)    Housing Stability      Do you have housing? : Yes      Are you worried about losing your housing?: No         FAMILY HISTORY:  Family History   Problem Relation Age of Onset     Hypertension Mother      Cancer - colorectal Mother      Skin Cancer Mother      Colon Cancer Mother      Hypertension Father      Prostate Cancer Father      Melanoma No family hx of      Anesthesia Reaction No family hx of      Venous thrombosis No family hx of      Bleeding Disorder No family hx of    Father had prostate, liver, and skin cancer. Mother with colorectal and skin cancer.      PHYSICAL EXAM:  Vital signs:  BP (!) 152/83   Pulse 63   Resp 16   Ht 1.727 m (5' 8\")   Wt 106.4 kg (234 lb 9.6 oz)   SpO2 99%   BMI 35.67 kg/m     ECO  GENERAL: No acute distress.  EYES: No scleral icterus. No overt erythema.  RESPIRATORY: No audible cough, wheezing, or labored breathing.  MUSCULOSKELETAL: Range of motion in the neck, shoulders, and arms appear normal.  SKIN: No overt rashes, " discolorations, or lesions over the face and neck.  NEUROLOGIC: Alert.  No overt tremors.  PSYCHIATRIC: Normal affect and mood.  Does not appear anxious.       LABS:  CBC RESULTS:   Recent Labs   Lab Test 05/05/25  1423   WBC 8.0   RBC 3.78*   HGB 11.7*   HCT 34.3*   MCV 91   MCH 31.0   MCHC 34.1   RDW 13.9        Last Comprehensive Metabolic Panel:  Sodium   Date Value Ref Range Status   04/15/2025 136 135 - 145 mmol/L Final   04/06/2021 133 133 - 144 mmol/L Final     Potassium   Date Value Ref Range Status   04/15/2025 3.8 3.4 - 5.3 mmol/L Final   05/10/2022 4.6 3.4 - 5.3 mmol/L Final   04/06/2021 4.4 3.4 - 5.3 mmol/L Final     Chloride   Date Value Ref Range Status   04/15/2025 100 98 - 107 mmol/L Final   05/10/2022 99 94 - 109 mmol/L Final   04/06/2021 101 94 - 109 mmol/L Final     Carbon Dioxide   Date Value Ref Range Status   04/06/2021 31 20 - 32 mmol/L Final     Carbon Dioxide (CO2)   Date Value Ref Range Status   04/15/2025 25 22 - 29 mmol/L Final   05/10/2022 28 20 - 32 mmol/L Final     Anion Gap   Date Value Ref Range Status   04/15/2025 11 7 - 15 mmol/L Final   05/10/2022 4 3 - 14 mmol/L Final   04/06/2021 1 (L) 3 - 14 mmol/L Final     Glucose   Date Value Ref Range Status   04/15/2025 229 (H) 70 - 99 mg/dL Final   05/10/2022 112 (H) 70 - 99 mg/dL Final   04/06/2021 110 (H) 70 - 99 mg/dL Final     Urea Nitrogen   Date Value Ref Range Status   04/15/2025 16.5 8.0 - 23.0 mg/dL Final   05/10/2022 10 7 - 30 mg/dL Final   04/06/2021 8 7 - 30 mg/dL Final     Creatinine   Date Value Ref Range Status   04/15/2025 0.89 0.67 - 1.17 mg/dL Final   04/06/2021 0.92 0.66 - 1.25 mg/dL Final     GFR Estimate   Date Value Ref Range Status   04/15/2025 >90 >60 mL/min/1.73m2 Final     Comment:     eGFR calculated using 2021 CKD-EPI equation.   04/06/2021 87 >60 mL/min/[1.73_m2] Final     Comment:     Non  GFR Calc  Starting 12/18/2018, serum creatinine based estimated GFR (eGFR) will be   calculated  using the Chronic Kidney Disease Epidemiology Collaboration   (CKD-EPI) equation.       GFR, ESTIMATED POCT   Date Value Ref Range Status   02/28/2024 >60 >60 mL/min/1.73m2 Final     Calcium   Date Value Ref Range Status   04/15/2025 9.2 8.8 - 10.4 mg/dL Final   04/06/2021 9.3 8.5 - 10.1 mg/dL Final     Bilirubin Total   Date Value Ref Range Status   04/15/2025 1.0 <=1.2 mg/dL Final   04/06/2021 2.3 (H) 0.2 - 1.3 mg/dL Final     Alkaline Phosphatase   Date Value Ref Range Status   04/15/2025 48 40 - 150 U/L Final   04/06/2021 60 40 - 150 U/L Final     ALT   Date Value Ref Range Status   04/15/2025 24 0 - 70 U/L Final   04/06/2021 70 0 - 70 U/L Final     AST   Date Value Ref Range Status   04/15/2025 29 0 - 45 U/L Final   04/06/2021 66 (H) 0 - 45 U/L Final       ASSESSMENT/PLAN:  Ronnie Lagos is a 69 year old male with the following issues:  1. Metastatic non-castrate prostate adenocarcinoma, [place] 4+5=9  2. 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 Hypertension  5. Hypersensitivity reaction to Taxotere  --2/28/2024 PSMA PET showed metastatic disease to the pelvic and retroperitoneal lymph nodes as well as greater than 10 sites of bony metastatic disease. I showed him the images today.  --Given high volume disease, he started Casodex 50 mg daily for first month, Lupron every 3 months, darolutamide 600 mg orally BID, and tried Taxotere twice but developed hypersensitivity reaction only 4-5 minutes into infusion.  Therefore, I had recommended discontinuation of Taxotere.    --His PSA then increased further to 0.13 on 1/28/2025 consistent with biochemical progression with concomitant increase in bone pain.   --2/26/2025 CT chest/abdomen/pelvis showed several new sclerotic bone lesions but NM bone scan negative for bone metastases.  --Started cabazitaxel infusion on 2/11/2025 with 10 mg prednisone daily. Discontinued darolutamide.  --PSA rising, now to 0.35 on 4/15/2025 and can  be due to PSA flare in the initial few months of therapy.  --Discussed that infusion therapy will be continued until disease progression or unacceptable toxicity.  If PSA continues to rise over the course of May, will discuss next line therapies.  --Alternate option is Pluvicto and switch to enzalutamide but would recommend utilizing this line of therapy after cabazitaxel progression.  --Continue denosumab (Xgeva) but change to every 6 weeks for convenience until he has dental work scheduled.  --Will continue to monitor CBC, CMP, and PSA monthly.    5. 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    6. Left shoulder pain  --Related to post-arthroplasty pain.  Continue Tylenol as needed.    7. Left upper molar pain  --He may need bone implant/grafting in the future and will hold Xgeva for 3 months if needed.  He might be able to do a less invasive option with a flipper.    8. Right knee osteoarthritis  --He will discuss right knee arthroplasty versus shaving the right knee cap with orthopedics.    9. Right rib pain related to multiple right-sided rib fractures.  --Has known metastatic disease to ribs/bones.  --8/7/2024 Rib x-rays showed multiple indeterminate chronicity right rib fractures.  --Continue conservative management. Is not requiring analgesics for this.    10. Insomnia  11. Male hot flashes  --Multifactorially related to joint pain and hot flashes.  --Not alleviated with CBD, THC gummies, or melatonin.  --Suggested acupuncture but does not like needles.  --Offered venlafaxine -- discussed potential adverse effects and he agrees to try it.  --Also encouraged moderate exercise and plant based diet, eating less sugar.    Anabell Mercedes MD  [facility] Hematology/Oncology     Total time spent today: 30 minutes in chart review, patient evaluation, counseling, documentation, test and/or medication/prescription orders, and coordination  "of care.      The longitudinal plan of care for the diagnosis(es)/condition(s) as documented were addressed during this visit. Due to the added complexity in care, I will continue to support Ronnie in the subsequent management and with ongoing continuity of care.      Oncology Rooming Note    May 5, 2025 3:02 PM   Ronnie Lagos is a 69 year old male who presents for:    Chief Complaint   Patient presents with     Oncology Clinic Visit     Initial Vitals: BP (!) 152/83   Pulse 63   Resp 16   Ht 1.727 m (5' 8\")   Wt 106.4 kg (234 lb 9.6 oz)   SpO2 99%   BMI 35.67 kg/m   Estimated body mass index is 35.67 kg/m  as calculated from the following:    Height as of this encounter: 1.727 m (5' 8\").    Weight as of this encounter: 106.4 kg (234 lb 9.6 oz). Body surface area is 2.26 meters squared.  Mild Pain (2) Comment: Data Unavailable   No LMP for male patient.  Allergies reviewed: Yes  Medications reviewed: Yes    Medications: Medication refills not needed today.  Pharmacy name entered into [facility]:    [id number] - [place], [place] - [address] [facility] - [place], [place] - [address]    Frailty Screening:   Is the patient here for a new oncology consult visit in cancer care? 2. No    PHQ9:  Did this patient require a PHQ9?: No          Lizzie Baez MA              Again, thank you for allowing me to participate in the care of your patient.        Sincerely,        Anabell Mercedes MD    Electronically signed"

## 2025-05-05 NOTE — PROGRESS NOTES
"Oncology Rooming Note    May 5, 2025 3:02 PM   Ronnie Lagos is a 69 year old male who presents for:    Chief Complaint   Patient presents with    Oncology Clinic Visit     Initial Vitals: BP (!) 152/83   Pulse 63   Resp 16   Ht 1.727 m (5' 8\")   Wt 106.4 kg (234 lb 9.6 oz)   SpO2 99%   BMI 35.67 kg/m   Estimated body mass index is 35.67 kg/m  as calculated from the following:    Height as of this encounter: 1.727 m (5' 8\").    Weight as of this encounter: 106.4 kg (234 lb 9.6 oz). Body surface area is 2.26 meters squared.  Mild Pain (2) Comment: Data Unavailable   No LMP for male patient.  Allergies reviewed: Yes  Medications reviewed: Yes    Medications: Medication refills not needed today.  Pharmacy name entered into [employer]:    [id number] - [place], [place] - [address] [facility] - [place], [place] - [address]    Frailty Screening:   Is the patient here for a new oncology consult visit in cancer care? 2. No    PHQ9:  Did this patient require a PHQ9?: No          Lizzie Baez MA            "

## 2025-05-05 NOTE — PROGRESS NOTES
Nursing Note:  Ronnie Lagos presents today for Port Labs.    Patient seen by provider today: Yes: Dr. Mercedes   present during visit today: Not Applicable.    Note: N/A.    Intravenous Access:  Labs drawn without difficulty.  Implanted Port.    Discharge Plan:   Patient was sent to [facility] for 1510 appointment.    Qi Shelby RN

## 2025-05-05 NOTE — PATIENT INSTRUCTIONS
1. Arrange for Xgeva every 6 weeks.  2. Lab draw and Cabazitaxel every 3 weeks.  3. RTC NP every 3 weeks.  4. RTC MD in 3 months.

## 2025-05-06 ENCOUNTER — INFUSION THERAPY VISIT (OUTPATIENT)
Dept: INFUSION THERAPY | Facility: CLINIC | Age: 70
End: 2025-05-06
Attending: INTERNAL MEDICINE
Payer: COMMERCIAL

## 2025-05-06 VITALS
WEIGHT: 235 LBS | BODY MASS INDEX: 35.73 KG/M2 | OXYGEN SATURATION: 97 % | TEMPERATURE: 97.2 F | HEART RATE: 61 BPM | DIASTOLIC BLOOD PRESSURE: 69 MMHG | SYSTOLIC BLOOD PRESSURE: 143 MMHG | RESPIRATION RATE: 20 BRPM

## 2025-05-06 DIAGNOSIS — C61 PROSTATE CANCER METASTATIC TO MULTIPLE SITES (H): Primary | ICD-10-CM

## 2025-05-06 PROCEDURE — 96375 TX/PRO/DX INJ NEW DRUG ADDON: CPT

## 2025-05-06 PROCEDURE — 96415 CHEMO IV INFUSION ADDL HR: CPT

## 2025-05-06 PROCEDURE — 96413 CHEMO IV INFUSION 1 HR: CPT

## 2025-05-06 PROCEDURE — 258N000003 HC RX IP 258 OP 636: Performed by: INTERNAL MEDICINE

## 2025-05-06 PROCEDURE — 250N000011 HC RX IP 250 OP 636: Performed by: INTERNAL MEDICINE

## 2025-05-06 RX ORDER — ALBUTEROL SULFATE 90 UG/1
1-2 INHALANT RESPIRATORY (INHALATION)
Status: CANCELLED
Start: 2025-05-06

## 2025-05-06 RX ORDER — HEPARIN SODIUM (PORCINE) LOCK FLUSH IV SOLN 100 UNIT/ML 100 UNIT/ML
5 SOLUTION INTRAVENOUS
Status: CANCELLED | OUTPATIENT
Start: 2025-05-06

## 2025-05-06 RX ORDER — DIPHENHYDRAMINE HYDROCHLORIDE 50 MG/ML
50 INJECTION, SOLUTION INTRAMUSCULAR; INTRAVENOUS
Status: CANCELLED
Start: 2025-05-06

## 2025-05-06 RX ORDER — HEPARIN SODIUM (PORCINE) LOCK FLUSH IV SOLN 100 UNIT/ML 100 UNIT/ML
5 SOLUTION INTRAVENOUS
Status: DISCONTINUED | OUTPATIENT
Start: 2025-05-06 | End: 2025-05-06 | Stop reason: HOSPADM

## 2025-05-06 RX ORDER — HEPARIN SODIUM,PORCINE 10 UNIT/ML
5-20 VIAL (ML) INTRAVENOUS DAILY PRN
Status: CANCELLED | OUTPATIENT
Start: 2025-05-06

## 2025-05-06 RX ORDER — DIPHENHYDRAMINE HYDROCHLORIDE 50 MG/ML
25 INJECTION, SOLUTION INTRAMUSCULAR; INTRAVENOUS
Status: CANCELLED
Start: 2025-05-06

## 2025-05-06 RX ORDER — EPINEPHRINE 1 MG/ML
0.3 INJECTION, SOLUTION, CONCENTRATE INTRAVENOUS EVERY 5 MIN PRN
Status: CANCELLED | OUTPATIENT
Start: 2025-05-06

## 2025-05-06 RX ORDER — ALBUTEROL SULFATE 0.83 MG/ML
2.5 SOLUTION RESPIRATORY (INHALATION)
Status: CANCELLED | OUTPATIENT
Start: 2025-05-06

## 2025-05-06 RX ORDER — LORAZEPAM 2 MG/ML
0.5 INJECTION INTRAMUSCULAR EVERY 4 HOURS PRN
Status: CANCELLED | OUTPATIENT
Start: 2025-05-06

## 2025-05-06 RX ORDER — MEPERIDINE HYDROCHLORIDE 25 MG/ML
25 INJECTION INTRAMUSCULAR; INTRAVENOUS; SUBCUTANEOUS
Status: CANCELLED | OUTPATIENT
Start: 2025-05-06

## 2025-05-06 RX ADMIN — Medication 5 ML: at 11:28

## 2025-05-06 RX ADMIN — SODIUM CHLORIDE 44 MG: 0.9 INJECTION, SOLUTION INTRAVENOUS at 09:26

## 2025-05-06 RX ADMIN — DIPHENHYDRAMINE HYDROCHLORIDE 25 MG: 50 INJECTION INTRAMUSCULAR; INTRAVENOUS at 08:44

## 2025-05-06 RX ADMIN — DEXAMETHASONE SODIUM PHOSPHATE: 10 INJECTION, SOLUTION INTRAMUSCULAR; INTRAVENOUS at 09:00

## 2025-05-06 RX ADMIN — FAMOTIDINE 20 MG: 10 INJECTION INTRAVENOUS at 08:39

## 2025-05-06 RX ADMIN — SODIUM CHLORIDE, PRESERVATIVE FREE 250 ML: 5 INJECTION INTRAVENOUS at 08:40

## 2025-05-06 NOTE — PROGRESS NOTES
Infusion Nursing Note:  Ronnie Lagos presents today for C5D1 Jevtana  + premeds.    Patient seen by provider today: No   present during visit today: Not Applicable.    Note: Patient confirmed taking Prednisone as prescribed.  Last Xgeva given 4/22/25, next one due 6/3/25.  Decided to put dental work on hold so he can keep Xgeva as scheduled..    Intravenous Access:  Implanted Port.    Treatment Conditions:  Lab Results   Component Value Date    HGB 11.7 (L) 05/05/2025    WBC 8.0 05/05/2025    ANEU 6.5 05/05/2025     05/05/2025        Lab Results   Component Value Date     05/05/2025    POTASSIUM 4.5 05/05/2025    CR 0.92 05/05/2025    AV 9.0 05/05/2025    BILITOTAL 0.8 05/05/2025    ALBUMIN 3.8 05/05/2025    ALT 24 05/05/2025    AST 29 05/05/2025       Results reviewed, labs MET treatment parameters, ok to proceed with treatment.      Post Infusion Assessment:  Patient tolerated infusion without incident.  Blood return noted pre and post infusion.  Site patent and intact, free from redness, edema or discomfort.  No evidence of extravasations.  Access discontinued per protocol.       Discharge Plan:   Discharge instructions reviewed with: Patient.  Patient and/or family verbalized understanding of discharge instructions and all questions answered.  AVS to patient via [employer].  Patient will return 5/27/25 for next appointment.   Patient discharged in stable condition accompanied by: self.  Departure Mode: Ambulatory.      Suzette Banks RN

## 2025-05-13 DIAGNOSIS — C61 PROSTATE CANCER METASTATIC TO MULTIPLE SITES (H): Primary | ICD-10-CM

## 2025-05-13 RX ORDER — DIPHENHYDRAMINE HYDROCHLORIDE 50 MG/ML
50 INJECTION, SOLUTION INTRAMUSCULAR; INTRAVENOUS
Start: 2025-05-27

## 2025-05-13 RX ORDER — MEPERIDINE HYDROCHLORIDE 25 MG/ML
25 INJECTION INTRAMUSCULAR; INTRAVENOUS; SUBCUTANEOUS
OUTPATIENT
Start: 2025-05-27

## 2025-05-13 RX ORDER — ALBUTEROL SULFATE 0.83 MG/ML
2.5 SOLUTION RESPIRATORY (INHALATION)
OUTPATIENT
Start: 2025-05-27

## 2025-05-13 RX ORDER — LORAZEPAM 2 MG/ML
0.5 INJECTION INTRAMUSCULAR EVERY 4 HOURS PRN
OUTPATIENT
Start: 2025-05-27

## 2025-05-13 RX ORDER — EPINEPHRINE 1 MG/ML
0.3 INJECTION, SOLUTION, CONCENTRATE INTRAVENOUS EVERY 5 MIN PRN
OUTPATIENT
Start: 2025-05-27

## 2025-05-13 RX ORDER — DIPHENHYDRAMINE HYDROCHLORIDE 50 MG/ML
25 INJECTION, SOLUTION INTRAMUSCULAR; INTRAVENOUS
Start: 2025-05-27

## 2025-05-13 RX ORDER — HEPARIN SODIUM,PORCINE 10 UNIT/ML
5-20 VIAL (ML) INTRAVENOUS DAILY PRN
OUTPATIENT
Start: 2025-05-27

## 2025-05-13 RX ORDER — HEPARIN SODIUM (PORCINE) LOCK FLUSH IV SOLN 100 UNIT/ML 100 UNIT/ML
5 SOLUTION INTRAVENOUS
OUTPATIENT
Start: 2025-05-27

## 2025-05-13 RX ORDER — ALBUTEROL SULFATE 90 UG/1
1-2 INHALANT RESPIRATORY (INHALATION)
Start: 2025-05-27

## 2025-05-26 ENCOUNTER — PATIENT OUTREACH (OUTPATIENT)
Dept: CARE COORDINATION | Facility: CLINIC | Age: 70
End: 2025-05-26
Payer: COMMERCIAL

## 2025-05-27 ENCOUNTER — ONCOLOGY VISIT (OUTPATIENT)
Dept: ONCOLOGY | Facility: CLINIC | Age: 70
End: 2025-05-27
Attending: PHYSICIAN ASSISTANT
Payer: COMMERCIAL

## 2025-05-27 ENCOUNTER — INFUSION THERAPY VISIT (OUTPATIENT)
Dept: INFUSION THERAPY | Facility: CLINIC | Age: 70
End: 2025-05-27
Attending: PHYSICIAN ASSISTANT
Payer: COMMERCIAL

## 2025-05-27 VITALS
WEIGHT: 228 LBS | OXYGEN SATURATION: 98 % | SYSTOLIC BLOOD PRESSURE: 159 MMHG | RESPIRATION RATE: 16 BRPM | BODY MASS INDEX: 34.67 KG/M2 | DIASTOLIC BLOOD PRESSURE: 78 MMHG | HEART RATE: 63 BPM

## 2025-05-27 DIAGNOSIS — C79.51 PROSTATE CANCER METASTATIC TO BONE (H): ICD-10-CM

## 2025-05-27 DIAGNOSIS — C61 PROSTATE CANCER METASTATIC TO MULTIPLE SITES (H): ICD-10-CM

## 2025-05-27 DIAGNOSIS — G47.00 INSOMNIA, UNSPECIFIED TYPE: ICD-10-CM

## 2025-05-27 DIAGNOSIS — C61 PROSTATE CANCER METASTATIC TO MULTIPLE SITES (H): Primary | ICD-10-CM

## 2025-05-27 DIAGNOSIS — C79.51 PROSTATE CANCER METASTATIC TO BONE (H): Primary | ICD-10-CM

## 2025-05-27 DIAGNOSIS — R23.2 HOT FLASH IN MALE: ICD-10-CM

## 2025-05-27 DIAGNOSIS — R07.81 RIB PAIN ON RIGHT SIDE: ICD-10-CM

## 2025-05-27 DIAGNOSIS — C61 PROSTATE CANCER METASTATIC TO BONE (H): ICD-10-CM

## 2025-05-27 DIAGNOSIS — C61 PROSTATE CANCER METASTATIC TO BONE (H): Primary | ICD-10-CM

## 2025-05-27 LAB
ALBUMIN SERPL BCG-MCNC: 4.2 G/DL (ref 3.5–5.2)
ALP SERPL-CCNC: 39 U/L (ref 40–150)
ALT SERPL W P-5'-P-CCNC: 22 U/L (ref 0–70)
ANION GAP SERPL CALCULATED.3IONS-SCNC: 13 MMOL/L (ref 7–15)
AST SERPL W P-5'-P-CCNC: 24 U/L (ref 0–45)
BASOPHILS # BLD AUTO: 0.1 10E3/UL (ref 0–0.2)
BASOPHILS NFR BLD AUTO: 1 %
BILIRUB SERPL-MCNC: 1.2 MG/DL
BUN SERPL-MCNC: 16 MG/DL (ref 8–23)
CALCIUM SERPL-MCNC: 9.3 MG/DL (ref 8.8–10.4)
CHLORIDE SERPL-SCNC: 99 MMOL/L (ref 98–107)
CREAT SERPL-MCNC: 0.95 MG/DL (ref 0.67–1.17)
EGFRCR SERPLBLD CKD-EPI 2021: 87 ML/MIN/1.73M2
EOSINOPHIL # BLD AUTO: 0.1 10E3/UL (ref 0–0.7)
EOSINOPHIL NFR BLD AUTO: 1 %
ERYTHROCYTE [DISTWIDTH] IN BLOOD BY AUTOMATED COUNT: 14.2 % (ref 10–15)
GLUCOSE SERPL-MCNC: 198 MG/DL (ref 70–99)
HCO3 SERPL-SCNC: 25 MMOL/L (ref 22–29)
HCT VFR BLD AUTO: 36.6 % (ref 40–53)
HGB BLD-MCNC: 13.2 G/DL (ref 13.3–17.7)
IMM GRANULOCYTES # BLD: 0 10E3/UL
IMM GRANULOCYTES NFR BLD: 0 %
LYMPHOCYTES # BLD AUTO: 1.6 10E3/UL (ref 0.8–5.3)
LYMPHOCYTES NFR BLD AUTO: 21 %
MCH RBC QN AUTO: 32.4 PG (ref 26.5–33)
MCHC RBC AUTO-ENTMCNC: 36.1 G/DL (ref 31.5–36.5)
MCV RBC AUTO: 90 FL (ref 78–100)
MONOCYTES # BLD AUTO: 0.5 10E3/UL (ref 0–1.3)
MONOCYTES NFR BLD AUTO: 6 %
NEUTROPHILS # BLD AUTO: 5.4 10E3/UL (ref 1.6–8.3)
NEUTROPHILS NFR BLD AUTO: 71 %
NRBC # BLD AUTO: 0 10E3/UL
NRBC BLD AUTO-RTO: 0 /100
PHOSPHATE SERPL-MCNC: 3.3 MG/DL (ref 2.5–4.5)
PLATELET # BLD AUTO: 169 10E3/UL (ref 150–450)
POTASSIUM SERPL-SCNC: 3.9 MMOL/L (ref 3.4–5.3)
PROT SERPL-MCNC: 6.5 G/DL (ref 6.4–8.3)
PSA SERPL DL<=0.01 NG/ML-MCNC: 0.26 NG/ML (ref 0–4.5)
RBC # BLD AUTO: 4.07 10E6/UL (ref 4.4–5.9)
SODIUM SERPL-SCNC: 137 MMOL/L (ref 135–145)
WBC # BLD AUTO: 7.6 10E3/UL (ref 4–11)

## 2025-05-27 PROCEDURE — 96402 CHEMO HORMON ANTINEOPL SQ/IM: CPT

## 2025-05-27 PROCEDURE — 258N000003 HC RX IP 258 OP 636: Performed by: INTERNAL MEDICINE

## 2025-05-27 PROCEDURE — 96413 CHEMO IV INFUSION 1 HR: CPT

## 2025-05-27 PROCEDURE — 99214 OFFICE O/P EST MOD 30 MIN: CPT | Performed by: PHYSICIAN ASSISTANT

## 2025-05-27 PROCEDURE — 96415 CHEMO IV INFUSION ADDL HR: CPT

## 2025-05-27 PROCEDURE — 84100 ASSAY OF PHOSPHORUS: CPT | Performed by: INTERNAL MEDICINE

## 2025-05-27 PROCEDURE — 82247 BILIRUBIN TOTAL: CPT | Performed by: INTERNAL MEDICINE

## 2025-05-27 PROCEDURE — 85025 COMPLETE CBC W/AUTO DIFF WBC: CPT | Performed by: INTERNAL MEDICINE

## 2025-05-27 PROCEDURE — 36591 DRAW BLOOD OFF VENOUS DEVICE: CPT

## 2025-05-27 PROCEDURE — 96375 TX/PRO/DX INJ NEW DRUG ADDON: CPT

## 2025-05-27 PROCEDURE — G0463 HOSPITAL OUTPT CLINIC VISIT: HCPCS | Performed by: PHYSICIAN ASSISTANT

## 2025-05-27 PROCEDURE — 250N000011 HC RX IP 250 OP 636: Mod: JZ | Performed by: PHYSICIAN ASSISTANT

## 2025-05-27 PROCEDURE — 250N000011 HC RX IP 250 OP 636: Performed by: INTERNAL MEDICINE

## 2025-05-27 PROCEDURE — 96372 THER/PROPH/DIAG INJ SC/IM: CPT | Performed by: PHYSICIAN ASSISTANT

## 2025-05-27 PROCEDURE — G2211 COMPLEX E/M VISIT ADD ON: HCPCS | Performed by: PHYSICIAN ASSISTANT

## 2025-05-27 PROCEDURE — 84153 ASSAY OF PSA TOTAL: CPT | Performed by: INTERNAL MEDICINE

## 2025-05-27 RX ORDER — METHYLPREDNISOLONE SODIUM SUCCINATE 125 MG/2ML
125 INJECTION INTRAMUSCULAR; INTRAVENOUS
Start: 2025-06-03

## 2025-05-27 RX ORDER — ALBUTEROL SULFATE 0.83 MG/ML
2.5 SOLUTION RESPIRATORY (INHALATION)
OUTPATIENT
Start: 2025-06-03

## 2025-05-27 RX ORDER — PREDNISONE 10 MG/1
10 TABLET ORAL DAILY
Qty: 90 TABLET | Refills: 0 | Status: SHIPPED | OUTPATIENT
Start: 2025-05-27

## 2025-05-27 RX ORDER — ALBUTEROL SULFATE 90 UG/1
1-2 INHALANT RESPIRATORY (INHALATION)
Start: 2025-06-03

## 2025-05-27 RX ORDER — EPINEPHRINE 1 MG/ML
0.3 INJECTION, SOLUTION INTRAMUSCULAR; SUBCUTANEOUS EVERY 5 MIN PRN
OUTPATIENT
Start: 2025-06-03

## 2025-05-27 RX ORDER — DIPHENHYDRAMINE HYDROCHLORIDE 50 MG/ML
50 INJECTION, SOLUTION INTRAMUSCULAR; INTRAVENOUS
Start: 2025-06-03

## 2025-05-27 RX ORDER — MEPERIDINE HYDROCHLORIDE 25 MG/ML
25 INJECTION INTRAMUSCULAR; INTRAVENOUS; SUBCUTANEOUS EVERY 30 MIN PRN
OUTPATIENT
Start: 2025-06-03

## 2025-05-27 RX ORDER — HEPARIN SODIUM (PORCINE) LOCK FLUSH IV SOLN 100 UNIT/ML 100 UNIT/ML
5 SOLUTION INTRAVENOUS
Status: DISCONTINUED | OUTPATIENT
Start: 2025-05-27 | End: 2025-05-27 | Stop reason: HOSPADM

## 2025-05-27 RX ADMIN — FAMOTIDINE 20 MG: 10 INJECTION INTRAVENOUS at 08:58

## 2025-05-27 RX ADMIN — SODIUM CHLORIDE 250 ML: 0.9 INJECTION, SOLUTION INTRAVENOUS at 08:57

## 2025-05-27 RX ADMIN — Medication 5 ML: at 11:49

## 2025-05-27 RX ADMIN — DENOSUMAB 120 MG: 120 INJECTION SUBCUTANEOUS at 09:47

## 2025-05-27 RX ADMIN — SODIUM CHLORIDE 44 MG: 0.9 INJECTION, SOLUTION INTRAVENOUS at 09:43

## 2025-05-27 RX ADMIN — DIPHENHYDRAMINE HYDROCHLORIDE 25 MG: 50 INJECTION INTRAMUSCULAR; INTRAVENOUS at 09:01

## 2025-05-27 RX ADMIN — LEUPROLIDE ACETATE 22.5 MG: KIT at 11:53

## 2025-05-27 RX ADMIN — DEXAMETHASONE SODIUM PHOSPHATE: 10 INJECTION, SOLUTION INTRAMUSCULAR; INTRAVENOUS at 09:19

## 2025-05-27 ASSESSMENT — PAIN SCALES - GENERAL: PAINLEVEL_OUTOF10: NO PAIN (0)

## 2025-05-27 NOTE — PROGRESS NOTES
Oncology/Hematology Visit Note    May 27, 2025    Reason for visit: Follow up prostate cancer    Oncology HPI: Ronnie Lagos is a 69 year old male with hypertension, GERD who presents with the following oncologic history:  1.  5/11/2023: PSA elevated at 8.08 (prior 3.44 from 5/10/22).  2. 6/29/2023: MRI pelvis -- PI-RADS 4 - 1.4 x 1 x 0.9 cm nodule abutting posterolateral capsule of prostate; no pelvic adenopathy or bone lesions.  3.  8/30/2023: Prostate biopsy - Rover 4+3=7 prostate adenocarcinoma.  4. 10/2/2023: NM bone scan negative.  5.  11/3/2023: Radical prostatectomy, pelvic lymph node excision -- Rover 4+5=9 adenocarcinoma, [id number], margins widely positive for malignancy.  6.  2/7/2024: PSA = 4.06.  7.  2/15/2024: PSA elevated at 5.26.  8.  2/28/2024: PSMA PET showed focal uptake in left side of prostatectomy bed suspicious for recurrent disease; multiple pelvic and few retroperitoneal lymph nodes with uptake; >10 sclerotic osseous lesions with uptake indicative of osseous metastatic disease.  9. 3/20/2024: Started Casodex, Lupron, darolutamide, Xgeva, cycle 1 Taxotere but developed flushing, chest heaviness, nausea (no dyspnea) reaction 5 minutes into Taxotere infusion. Re-challenge not attempted that day.  10.  3/29/2024: Re-challenged Taxotere but developed repeat hypersensitivity reaction 4 minutes into infusion. Taxotere discontinued.  11. 4/17/2024: PSA 0.53.  12. 7/10/2024: PSA 0.06.  13. 10/8/2024: PSA 0.02.  14. 1/28/2025: PSA 0.13.  15. 2/11/2025: Cabazitaxel C1D1, darolutamide discontinued  16. 4/11/2025: Port placed    Patient was seen by Dr. Mercedes and repeat CT CAP and bone scan with possible new disease vs treatment response. Cabazitaxel C1D1 completed on 2/11/25 and he did have a reaction, tolerated at a slower rate.     He is here today for cabazitaxel C6D1.     Interval History: Ronnie is here unaccompanied today.  He continues to tolerate cabazitaxel pretty well.  Sleep continues to  be an issue, he was on trazodone 50 mg, then increased to 100 mg, but feeling a little groggy, therefore dropped down to 50 mg again.  He has noticed more fatigue, but actually was more active this past weekend.  His wife was noticing that he was having a more swollen face and he continues on the prednisone.  He is drinking about 70 ounces of water per day.  He notices that his urine is clear and yellow during the day, but pretty dark when he wakes up in the morning.  He denies any kapil blood or clots.  Denies any difficulty urinating, or hesitancy.  He feels that he is flushing a little bit more lately, more hot flashes, but he thinks it may be due to the weather.  Iany pain denies any upcoming dental work.  No other new complaints.    Review of Systems: See interval hx. Denies fevers, chills, HA, changes in vision, CP, abdominal pain, N/V, diarrhea, bleeding, bruising.     PMHx and Social Hx reviewed per EPIC.      Medications:  Current Outpatient Medications   Medication Sig Dispense Refill    amLODIPine (NORVASC) 5 MG tablet Take 1 tablet (5 mg) by mouth daily 90 tablet 3    atenolol (TENORMIN) 50 MG tablet Take 1 tablet (50 mg) by mouth daily 90 tablet 3    calcium citrate (CITRACAL) 950 (200 Ca) MG tablet Take 1 tablet by mouth 2 times daily      omeprazole (PRILOSEC) 20 MG DR capsule Take 1 capsule (20 mg) by mouth daily 90 capsule 3    predniSONE (DELTASONE) 10 MG tablet Take 1 tablet (10 mg) by mouth daily. 90 tablet 0    prochlorperazine (COMPAZINE) 10 MG tablet Take 1 tablet (10 mg) by mouth every 6 hours as needed for nausea or vomiting. 30 tablet 2    traZODone (DESYREL) 100 MG tablet Take 0.5 tablets (50 mg) by mouth at bedtime. 30 tablet 3    venlafaxine (EFFEXOR XR) 37.5 MG 24 hr capsule Take 1 capsule (37.5 mg) by mouth daily. 90 capsule 3    Vitamin D, Cholecalciferol, 10 MCG (400 UNIT) TABS          Allergies   Allergen Reactions    Taxotere [Docetaxel]        EXAM:    BP (!) 159/78   Pulse 63    Resp 16   Wt 103.4 kg (228 lb)   SpO2 98%   BMI 34.67 kg/m      GENERAL:  Male, in no acute distress.  Alert and oriented x3. Well groomed.   HEENT:  Normocephalic, atraumatic. No conjunctival injection or eye swelling.   LUNGS:  Nonlabored breathing, no cough or audible wheezing, able to speak full sentences.  SKIN: Port right chest with some ecchymosis, but access is good.   NEURO: CN grossly intact, speech normal  PSYCH: Mentation appears normal, insight and judgement intact    Labs:     05/27/25 07:53   Sodium 137   Potassium 3.9   Chloride 99   Carbon Dioxide (CO2) 25   Urea Nitrogen 16.0   Creatinine 0.95   GFR Estimate 87   Calcium 9.3   Anion Gap 13   Phosphorus 3.3   Albumin 4.2   Protein Total 6.5   Alkaline Phosphatase 39 (L)   ALT 22   AST 24   Bilirubin Total 1.2   Glucose 198 (H)   WBC 7.6   Hemoglobin 13.2 (L)   Hematocrit 36.6 (L)   Platelet Count 169   RBC Count 4.07 (L)   MCV 90   MCH 32.4   MCHC 36.1   RDW 14.2   % Neutrophils 71   % Lymphocytes 21   % Monocytes 6   % Eosinophils 1   % Basophils 1   % Immature Granulocytes 0   NRBC/W 0   Absolute Neutrophil 5.4   Absolute Lymphocytes 1.6   Absolute Monocytes 0.5   Absolute Eosinophils 0.1   Absolute Basophils 0.1   Absolute Immature Granulocytes 0.0   Absolute NRBCs 0.0     Imaging: n/a    Impression/Plan: Ronnie Lagos is a 69 year old male with metastatic prostate cancer, previously on Casodex, Lupron, darolutamide, Taxotere and now on cabazitaxel.      1. 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 Hypertension  5. Hypersensitivity reaction to Taxotere  --2/28/2024 PSMA PET showed metastatic disease to the pelvic and retroperitoneal lymph nodes as well as greater than 10 sites of bony metastatic disease.   --Given high volume disease, he started Casodex 50 mg daily for first month, Lupron every 3 months, darolutamide 600 mg orally BID,  and tried Taxotere twice but developed hypersensitivity reaction only 4-5 minutes into infusion, therefore permanently discontinued.  --Casodex and darolutamide were also discontinued  --PSA increased further to 0.13 consistent with biochemical progression.  He was having increase in bone pain.   --2/26/25 CT CAP and bone scan with no significant progression, but PSA continues to rise and 0.19 on 2/27/2025  --Started cabazitaxel with C1 D1 on 2/11/2025, had a reaction in infusion, given meds and restarted at a slower rate, tolerated well  -Discussed continuing with cabazitaxel at slower rate and he agrees, this has been going well so far  --Plan to give cabazitaxel until disease progression or unacceptable toxicity.  --Port placed 4/11, working well today, healing well  -- Dr. Mercedes discussed alternate option of Pluvicto and switch to enzalutamide but would recommend utilizing this line of therapy after cabazitaxel progression.  --Lupron given today, continue every 3 months, due late Aug 2025  --Will continue to monitor CBC, CMP, and PSA monthly.  -PSA has been stable, most recently 0.34 on 5/5/2025, pending today  --Labs reviewed today and okay to proceed with cabazitaxel C6D1, slower rate  -- Previously was on denosumab (Xgeva) every 4 weeks, but this was changed to every 6 weeks to coordinate with cabazitaxel infusions.  He was hoping to have dental work in the next few months, but he has spoken to his dentist, they will hold off for now. We will likely need to put the plan on hold for about 3 months before the dental work.  He is aware and he will update his dentist/surgeon.  --VERA/MD every 3 weeks with cabazitaxel  --CT scheduled      5. 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     6. Left shoulder pain  --Related to post-arthroplasty pain.  Continue Tylenol as needed.     7. Left upper molar pain  --He may need bone implant/grafting in  the future but would need to hold Xgeva for 3 months if needed.    --See above.  We will continue to give Xgeva every 6 months until he updates us with the next plan with dental work. Patient aware that we will likely need to hold Xgeva for 3 months prior and 2 months after any dental procedures.     8. Right knee osteoarthritis  --He will discuss options with Ortho when needed     9. Right rib pain related to multiple right-sided rib fractures.  --Has known metastatic disease to ribs/bones.  --8/7/2024 Rib x-rays showed multiple indeterminate chronicity right rib fractures.  --Continue conservative management.  --Reviewed CT CAP and bone scan from 2/26/2025   --Briefly discussed radiation, but suspect likely from previous fractures and will hold on Rad Onc referral for now.   --Continue Tylenol PRN     10. Insomnia  11. Male hot flashes  --Multifactorially related to joint pain and hot flashes.  --Not alleviated with CBD, THC gummies, or melatonin.  --Suggested acupuncture in the past, but does not like needles.  --Hot flashes are improved with venlafaxine 37.5 mg, he will continue daily  --Started trazodone 50 mg and helped, but still would prefer more sleep. Rx 100mg sent to pharmacy today and given # 30 tabs.  He was feeling a little more fatigued and groggy, he will go back to the 50 mg dose at bedtime.      Chart documentation with Dragon Voice recognition Software. Although reviewed after completion, some words and grammatical errors may remain.    30 minutes spent on the date of the encounter doing chart review, review of test results, interpretation of tests, patient visit, and documentation     The longitudinal plan of care for the diagnosis(es)/condition(s) as documented were addressed during this visit. Due to the added complexity in care, I will continue to support Ronnie in the subsequent management and with ongoing continuity of care.    Aileen Agrawal PA-C  Hematology/Oncology  [facility]  Physicians

## 2025-05-27 NOTE — NURSING NOTE
"Oncology Rooming Note    May 27, 2025 8:03 AM   Ronnie Lagos is a 69 year old male who presents for:    Chief Complaint   Patient presents with    Oncology Clinic Visit     Initial Vitals: BP (!) 159/78   Pulse 63   Resp 16   Wt 103.4 kg (228 lb)   SpO2 98%   BMI 34.67 kg/m   Estimated body mass index is 34.67 kg/m  as calculated from the following:    Height as of 5/5/25: 1.727 m (5' 8\").    Weight as of this encounter: 103.4 kg (228 lb). Body surface area is 2.23 meters squared.  No Pain (0) Comment: Data Unavailable   No LMP for male patient.  Allergies reviewed: Yes  Medications reviewed: Yes    Medications: MEDICATION REFILLS NEEDED TODAY. Provider was notified.  Pharmacy name entered into [employer]:    [id number] - [place], [place] - [address] [facility] - [place], [place] - [address]    Frailty Screening:   Is the patient here for a new oncology consult visit in cancer care? 2. No    PHQ9:  Did this patient require a PHQ9?: No      Clinical concerns: follow up        Shaylee Cesar            "

## 2025-05-27 NOTE — LETTER
5/27/2025      Ronnie Lagos  [address]  [facility] [address]      Dear Colleague,    Thank you for referring your patient, Ronnie Lagos, to the [facility]. Please see a copy of my visit note below.    Oncology/Hematology Visit Note    May 27, 2025    Reason for visit: Follow up prostate cancer    Oncology HPI: Ronnie Lagos is a 69 year old male with hypertension, GERD who presents with the following oncologic history:  1.  5/11/2023: PSA elevated at 8.08 (prior 3.44 from 5/10/22).  2. 6/29/2023: MRI pelvis -- PI-RADS 4 - 1.4 x 1 x 0.9 cm nodule abutting posterolateral capsule of prostate; no pelvic adenopathy or bone lesions.  3.  8/30/2023: Prostate biopsy - Pia 4+3=7 prostate adenocarcinoma.  4. 10/2/2023: NM bone scan negative.  5.  11/3/2023: Radical prostatectomy, pelvic lymph node excision -- Pia 4+5=9 adenocarcinoma, [id number], margins widely positive for malignancy.  6.  2/7/2024: PSA = 4.06.  7.  2/15/2024: PSA elevated at 5.26.  8.  2/28/2024: PSMA PET showed focal uptake in left side of prostatectomy bed suspicious for recurrent disease; multiple pelvic and few retroperitoneal lymph nodes with uptake; >10 sclerotic osseous lesions with uptake indicative of osseous metastatic disease.  9. 3/20/2024: Started Casodex, Lupron, darolutamide, Xgeva, cycle 1 Taxotere but developed flushing, chest heaviness, nausea (no dyspnea) reaction 5 minutes into Taxotere infusion. Re-challenge not attempted that day.  10.  3/29/2024: Re-challenged Taxotere but developed repeat hypersensitivity reaction 4 minutes into infusion. Taxotere discontinued.  11. 4/17/2024: PSA 0.53.  12. 7/10/2024: PSA 0.06.  13. 10/8/2024: PSA 0.02.  14. 1/28/2025: PSA 0.13.  15. 2/11/2025: Cabazitaxel C1D1, darolutamide discontinued  16. 4/11/2025: Port placed    Patient was seen by Dr. Mercedes and repeat CT CAP and bone scan with possible new disease vs treatment response. Cabazitaxel  C1D1 completed on 2/11/25 and he did have a reaction, tolerated at a slower rate.     He is here today for cabazitaxel C6D1.     Interval History: Ronnie is here unaccompanied today.  He continues to tolerate cabazitaxel pretty well.  Sleep continues to be an issue, he was on trazodone 50 mg, then increased to 100 mg, but feeling a little groggy, therefore dropped down to 50 mg again.  He has noticed more fatigue, but actually was more active this past weekend.  His wife was noticing that he was having a more swollen face and he continues on the prednisone.  He is drinking about 70 ounces of water per day.  He notices that his urine is clear and yellow during the day, but pretty dark when he wakes up in the morning.  He denies any kapil blood or clots.  Denies any difficulty urinating, or hesitancy.  He feels that he is flushing a little bit more lately, more hot flashes, but he thinks it may be due to the weather.  Iany pain denies any upcoming dental work.  No other new complaints.    Review of Systems: See interval hx. Denies fevers, chills, HA, changes in vision, CP, abdominal pain, N/V, diarrhea, bleeding, bruising.     PMHx and Social Hx reviewed per EPIC.      Medications:  Current Outpatient Medications   Medication Sig Dispense Refill     amLODIPine (NORVASC) 5 MG tablet Take 1 tablet (5 mg) by mouth daily 90 tablet 3     atenolol (TENORMIN) 50 MG tablet Take 1 tablet (50 mg) by mouth daily 90 tablet 3     calcium citrate (CITRACAL) 950 (200 Ca) MG tablet Take 1 tablet by mouth 2 times daily       omeprazole (PRILOSEC) 20 MG DR capsule Take 1 capsule (20 mg) by mouth daily 90 capsule 3     predniSONE (DELTASONE) 10 MG tablet Take 1 tablet (10 mg) by mouth daily. 90 tablet 0     prochlorperazine (COMPAZINE) 10 MG tablet Take 1 tablet (10 mg) by mouth every 6 hours as needed for nausea or vomiting. 30 tablet 2     traZODone (DESYREL) 100 MG tablet Take 0.5 tablets (50 mg) by mouth at bedtime. 30 tablet 3      venlafaxine (EFFEXOR XR) 37.5 MG 24 hr capsule Take 1 capsule (37.5 mg) by mouth daily. 90 capsule 3     Vitamin D, Cholecalciferol, 10 MCG (400 UNIT) TABS          Allergies   Allergen Reactions     Taxotere [Docetaxel]        EXAM:    BP (!) 159/78   Pulse 63   Resp 16   Wt 103.4 kg (228 lb)   SpO2 98%   BMI 34.67 kg/m      GENERAL:  Male, in no acute distress.  Alert and oriented x3. Well groomed.   HEENT:  Normocephalic, atraumatic. No conjunctival injection or eye swelling.   LUNGS:  Nonlabored breathing, no cough or audible wheezing, able to speak full sentences.  SKIN: Port right chest with some ecchymosis, but access is good.   NEURO: CN grossly intact, speech normal  PSYCH: Mentation appears normal, insight and judgement intact    Labs:     05/27/25 07:53   Sodium 137   Potassium 3.9   Chloride 99   Carbon Dioxide (CO2) 25   Urea Nitrogen 16.0   Creatinine 0.95   GFR Estimate 87   Calcium 9.3   Anion Gap 13   Phosphorus 3.3   Albumin 4.2   Protein Total 6.5   Alkaline Phosphatase 39 (L)   ALT 22   AST 24   Bilirubin Total 1.2   Glucose 198 (H)   WBC 7.6   Hemoglobin 13.2 (L)   Hematocrit 36.6 (L)   Platelet Count 169   RBC Count 4.07 (L)   MCV 90   MCH 32.4   MCHC 36.1   RDW 14.2   % Neutrophils 71   % Lymphocytes 21   % Monocytes 6   % Eosinophils 1   % Basophils 1   % Immature Granulocytes 0   NRBC/W 0   Absolute Neutrophil 5.4   Absolute Lymphocytes 1.6   Absolute Monocytes 0.5   Absolute Eosinophils 0.1   Absolute Basophils 0.1   Absolute Immature Granulocytes 0.0   Absolute NRBCs 0.0     Imaging: n/a    Impression/Plan: Ronnie Lagos is a 69 year old male with metastatic prostate cancer, previously on Casodex, Lupron, darolutamide, Taxotere and now on cabazitaxel.      1. Metastatic non-castrate prostate adenocarcinoma, [place] 4+5=9  2. 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 Hypertension  5. Hypersensitivity reaction to Taxotere  --2/28/2024 PSMA PET  showed metastatic disease to the pelvic and retroperitoneal lymph nodes as well as greater than 10 sites of bony metastatic disease.   --Given high volume disease, he started Casodex 50 mg daily for first month, Lupron every 3 months, darolutamide 600 mg orally BID, and tried Taxotere twice but developed hypersensitivity reaction only 4-5 minutes into infusion, therefore permanently discontinued.  --Casodex and darolutamide were also discontinued  --PSA increased further to 0.13 consistent with biochemical progression.  He was having increase in bone pain.   --2/26/25 CT CAP and bone scan with no significant progression, but PSA continues to rise and 0.19 on 2/27/2025  --Started cabazitaxel with C1 D1 on 2/11/2025, had a reaction in infusion, given meds and restarted at a slower rate, tolerated well  -Discussed continuing with cabazitaxel at slower rate and he agrees, this has been going well so far  --Plan to give cabazitaxel until disease progression or unacceptable toxicity.  --Port placed 4/11, working well today, healing well  -- Dr. Mercedes discussed alternate option of Pluvicto and switch to enzalutamide but would recommend utilizing this line of therapy after cabazitaxel progression.  --Lupron given today, continue every 3 months, due late Aug 2025  --Will continue to monitor CBC, CMP, and PSA monthly.  -PSA has been stable, most recently 0.34 on 5/5/2025, pending today  --Labs reviewed today and okay to proceed with cabazitaxel C6D1, slower rate  -- Previously was on denosumab (Xgeva) every 4 weeks, but this was changed to every 6 weeks to coordinate with cabazitaxel infusions.  He was hoping to have dental work in the next few months, but he has spoken to his dentist, they will hold off for now. We will likely need to put the plan on hold for about 3 months before the dental work.  He is aware and he will update his dentist/surgeon.  --VERA/MD every 3 weeks with cabazitaxel  --CT scheduled      5. 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     6. Left shoulder pain  --Related to post-arthroplasty pain.  Continue Tylenol as needed.     7. Left upper molar pain  --He may need bone implant/grafting in the future but would need to hold Xgeva for 3 months if needed.    --See above.  We will continue to give Xgeva every 6 months until he updates us with the next plan with dental work. Patient aware that we will likely need to hold Xgeva for 3 months prior and 2 months after any dental procedures.     8. Right knee osteoarthritis  --He will discuss options with Ortho when needed     9. Right rib pain related to multiple right-sided rib fractures.  --Has known metastatic disease to ribs/bones.  --8/7/2024 Rib x-rays showed multiple indeterminate chronicity right rib fractures.  --Continue conservative management.  --Reviewed CT CAP and bone scan from 2/26/2025   --Briefly discussed radiation, but suspect likely from previous fractures and will hold on Rad Onc referral for now.   --Continue Tylenol PRN     10. Insomnia  11. Male hot flashes  --Multifactorially related to joint pain and hot flashes.  --Not alleviated with CBD, THC gummies, or melatonin.  --Suggested acupuncture in the past, but does not like needles.  --Hot flashes are improved with venlafaxine 37.5 mg, he will continue daily  --Started trazodone 50 mg and helped, but still would prefer more sleep. Rx 100mg sent to pharmacy today and given # 30 tabs.  He was feeling a little more fatigued and groggy, he will go back to the 50 mg dose at bedtime.      Chart documentation with Dragon Voice recognition Software. Although reviewed after completion, some words and grammatical errors may remain.    30 minutes spent on the date of the encounter doing chart review, review of test results, interpretation of tests, patient visit, and documentation     The longitudinal plan of care for the  diagnosis(es)/condition(s) as documented were addressed during this visit. Due to the added complexity in care, I will continue to support Ronnie in the subsequent management and with ongoing continuity of care.    Aileen Agrawal PA-C  Hematology/Oncology  [facility]                  Again, thank you for allowing me to participate in the care of your patient.        Sincerely,        Aileen Agrawal PA-C    Electronically signed

## 2025-05-27 NOTE — PROGRESS NOTES
Infusion Nursing Note:  Ronnie Lagos presents today for C6D1 Cabazitaxel + Lupron + Xgeva.    Patient seen by provider today: Yes: Aileen Lewis PA-C   present during visit today: Not Applicable.    Note: Ronnie reports he is feeling well today.  He verifies he is taking his daily prednisone as prescribed.  Refill sent today by PA.  Xgeva given today (1 week early) per JENNIFER Gallagher's request to get Xgeva to line up with treatment days.  Ronnie denies any recent or upcoming dental work.  He confirms that he is taking Calcium and Vitamin D supplement.    Intravenous Access:  Implanted Port.    Treatment Conditions:  Lab Results   Component Value Date    HGB 13.2 (L) 05/27/2025    WBC 7.6 05/27/2025    ANEU 5.4 05/27/2025     05/27/2025     Lab Results   Component Value Date     05/27/2025    POTASSIUM 3.9 05/27/2025    CR 0.95 05/27/2025    AV 9.3 05/27/2025    BILITOTAL 1.2 05/27/2025    ALBUMIN 4.2 05/27/2025    ALT 22 05/27/2025    AST 24 05/27/2025     Results reviewed, labs MET treatment parameters, ok to proceed with treatment.    Post Infusion Assessment:  Patient tolerated infusion without incident.  Patient tolerated injections without incident.  Blood return noted pre and post infusion.  Site patent and intact, free from redness, edema or discomfort.  No evidence of extravasations.  Access discontinued per protocol.     Discharge Plan:   Discharge instructions reviewed with: Patient.  Patient and/or family verbalized understanding of discharge instructions and all questions answered.  AVS to patient via [place].  Patient will return 6/18 for next appointment.   Patient discharged in stable condition accompanied by: self.  Departure Mode: Ambulatory.      Eloy Contreras RN

## 2025-05-31 ENCOUNTER — NURSE TRIAGE (OUTPATIENT)
Dept: NURSING | Facility: CLINIC | Age: 70
End: 2025-05-31
Payer: COMMERCIAL

## 2025-05-31 ENCOUNTER — HOSPITAL ENCOUNTER (OUTPATIENT)
Facility: CLINIC | Age: 70
Setting detail: OBSERVATION
Discharge: HOME | End: 2025-06-01
Attending: EMERGENCY MEDICINE | Admitting: INTERNAL MEDICINE
Payer: COMMERCIAL

## 2025-05-31 ENCOUNTER — APPOINTMENT (OUTPATIENT)
Dept: CT IMAGING | Facility: CLINIC | Age: 70
End: 2025-05-31
Attending: EMERGENCY MEDICINE
Payer: COMMERCIAL

## 2025-05-31 ENCOUNTER — OFFICE VISIT (OUTPATIENT)
Dept: URGENT CARE | Facility: URGENT CARE | Age: 70
End: 2025-05-31
Payer: COMMERCIAL

## 2025-05-31 VITALS
TEMPERATURE: 98.6 F | WEIGHT: 227 LBS | OXYGEN SATURATION: 98 % | DIASTOLIC BLOOD PRESSURE: 82 MMHG | SYSTOLIC BLOOD PRESSURE: 149 MMHG | BODY MASS INDEX: 34.4 KG/M2 | HEART RATE: 70 BPM | RESPIRATION RATE: 16 BRPM | HEIGHT: 68 IN

## 2025-05-31 DIAGNOSIS — C61 PROSTATE CANCER METASTATIC TO MULTIPLE SITES (H): ICD-10-CM

## 2025-05-31 DIAGNOSIS — D84.9 IMMUNOSUPPRESSED STATUS: ICD-10-CM

## 2025-05-31 DIAGNOSIS — C61 PROSTATE CANCER METASTATIC TO BONE (H): ICD-10-CM

## 2025-05-31 DIAGNOSIS — C79.51 PROSTATE CANCER METASTATIC TO BONE (H): ICD-10-CM

## 2025-05-31 DIAGNOSIS — R39.89 DARK YELLOW-COLORED URINE: ICD-10-CM

## 2025-05-31 DIAGNOSIS — R39.9 URINARY SYMPTOM OR SIGN: ICD-10-CM

## 2025-05-31 DIAGNOSIS — N39.0 COMPLICATED UTI (URINARY TRACT INFECTION): ICD-10-CM

## 2025-05-31 DIAGNOSIS — Z90.79 S/P PROSTATECTOMY: ICD-10-CM

## 2025-05-31 DIAGNOSIS — R10.84 ABDOMINAL PAIN, GENERALIZED: ICD-10-CM

## 2025-05-31 DIAGNOSIS — R31.0 GROSS HEMATURIA: Primary | ICD-10-CM

## 2025-05-31 LAB
ABO + RH BLD: NORMAL
ALBUMIN UR-MCNC: 100 MG/DL
ALBUMIN UR-MCNC: 50 MG/DL
ANION GAP SERPL CALCULATED.3IONS-SCNC: 11 MMOL/L (ref 7–15)
APPEARANCE UR: ABNORMAL
APPEARANCE UR: CLEAR
BACTERIA #/AREA URNS HPF: ABNORMAL /HPF
BASOPHILS # BLD AUTO: 0 10E3/UL (ref 0–0.2)
BASOPHILS NFR BLD AUTO: 0 %
BILIRUB UR QL STRIP: NEGATIVE
BILIRUB UR QL STRIP: NEGATIVE
BLD GP AB SCN SERPL QL: NEGATIVE
BUN SERPL-MCNC: 14.4 MG/DL (ref 8–23)
CALCIUM SERPL-MCNC: 8.8 MG/DL (ref 8.8–10.4)
CHLORIDE SERPL-SCNC: 100 MMOL/L (ref 98–107)
COLOR UR AUTO: ABNORMAL
COLOR UR AUTO: YELLOW
CREAT SERPL-MCNC: 1.05 MG/DL (ref 0.67–1.17)
EGFRCR SERPLBLD CKD-EPI 2021: 77 ML/MIN/1.73M2
EOSINOPHIL # BLD AUTO: 0 10E3/UL (ref 0–0.7)
EOSINOPHIL NFR BLD AUTO: 1 %
ERYTHROCYTE [DISTWIDTH] IN BLOOD BY AUTOMATED COUNT: 13.2 % (ref 10–15)
GLUCOSE SERPL-MCNC: 123 MG/DL (ref 70–99)
GLUCOSE UR STRIP-MCNC: 30 MG/DL
GLUCOSE UR STRIP-MCNC: NEGATIVE MG/DL
HCO3 BLDV-SCNC: 27 MMOL/L (ref 21–28)
HCO3 SERPL-SCNC: 25 MMOL/L (ref 22–29)
HCT VFR BLD AUTO: 35.1 % (ref 40–53)
HGB BLD-MCNC: 12.3 G/DL (ref 13.3–17.7)
HGB UR QL STRIP: ABNORMAL
HGB UR QL STRIP: ABNORMAL
HOLD SPECIMEN: NORMAL
IMM GRANULOCYTES # BLD: 0 10E3/UL
IMM GRANULOCYTES NFR BLD: 0 %
INR PPP: 0.96 (ref 0.85–1.15)
KETONES UR STRIP-MCNC: ABNORMAL MG/DL
KETONES UR STRIP-MCNC: NEGATIVE MG/DL
LACTATE BLD-SCNC: 0.5 MMOL/L (ref 0.7–2)
LEUKOCYTE ESTERASE UR QL STRIP: ABNORMAL
LEUKOCYTE ESTERASE UR QL STRIP: NEGATIVE
LYMPHOCYTES # BLD AUTO: 0.7 10E3/UL (ref 0.8–5.3)
LYMPHOCYTES NFR BLD AUTO: 11 %
MCH RBC QN AUTO: 30.6 PG (ref 26.5–33)
MCHC RBC AUTO-ENTMCNC: 35 G/DL (ref 31.5–36.5)
MCV RBC AUTO: 87 FL (ref 78–100)
MONOCYTES # BLD AUTO: 0.2 10E3/UL (ref 0–1.3)
MONOCYTES NFR BLD AUTO: 3 %
MUCOUS THREADS #/AREA URNS LPF: PRESENT /LPF
MUCOUS THREADS #/AREA URNS LPF: PRESENT /LPF
NEUTROPHILS # BLD AUTO: 5.7 10E3/UL (ref 1.6–8.3)
NEUTROPHILS NFR BLD AUTO: 85 %
NITRATE UR QL: NEGATIVE
NITRATE UR QL: NEGATIVE
NRBC # BLD AUTO: 0 10E3/UL
NRBC BLD AUTO-RTO: 0 /100
PCO2 BLDV: 43 MM HG (ref 40–50)
PH BLDV: 7.41 [PH] (ref 7.32–7.43)
PH UR STRIP: 6.5 [PH] (ref 5–7)
PH UR STRIP: 7 [PH] (ref 5–7)
PLATELET # BLD AUTO: 197 10E3/UL (ref 150–450)
PO2 BLDV: 34 MM HG (ref 25–47)
POTASSIUM SERPL-SCNC: 4.7 MMOL/L (ref 3.4–5.3)
PROTHROMBIN TIME: 12.9 SECONDS (ref 11.8–14.8)
RBC # BLD AUTO: 4.02 10E6/UL (ref 4.4–5.9)
RBC #/AREA URNS AUTO: >100 /HPF
RBC URINE: >182 /HPF
SAO2 % BLDV: 65 % (ref 70–75)
SODIUM SERPL-SCNC: 136 MMOL/L (ref 135–145)
SP GR UR STRIP: 1.02 (ref 1–1.03)
SP GR UR STRIP: 1.02 (ref 1–1.03)
SPECIMEN EXP DATE BLD: NORMAL
SQUAMOUS #/AREA URNS AUTO: ABNORMAL /LPF
UROBILINOGEN UR STRIP-ACNC: 1 E.U./DL
UROBILINOGEN UR STRIP-MCNC: NORMAL MG/DL
WBC # BLD AUTO: 6.7 10E3/UL (ref 4–11)
WBC #/AREA URNS AUTO: ABNORMAL /HPF
WBC URINE: 19 /HPF

## 2025-05-31 PROCEDURE — 82803 BLOOD GASES ANY COMBINATION: CPT

## 2025-05-31 PROCEDURE — 250N000013 HC RX MED GY IP 250 OP 250 PS 637: Performed by: INTERNAL MEDICINE

## 2025-05-31 PROCEDURE — 250N000011 HC RX IP 250 OP 636: Performed by: INTERNAL MEDICINE

## 2025-05-31 PROCEDURE — 99285 EMERGENCY DEPT VISIT HI MDM: CPT | Mod: 25

## 2025-05-31 PROCEDURE — 87086 URINE CULTURE/COLONY COUNT: CPT | Performed by: EMERGENCY MEDICINE

## 2025-05-31 PROCEDURE — 3079F DIAST BP 80-89 MM HG: CPT | Performed by: NURSE PRACTITIONER

## 2025-05-31 PROCEDURE — 250N000011 HC RX IP 250 OP 636: Performed by: EMERGENCY MEDICINE

## 2025-05-31 PROCEDURE — G0378 HOSPITAL OBSERVATION PER HR: HCPCS

## 2025-05-31 PROCEDURE — 87040 BLOOD CULTURE FOR BACTERIA: CPT | Performed by: EMERGENCY MEDICINE

## 2025-05-31 PROCEDURE — 81001 URINALYSIS AUTO W/SCOPE: CPT

## 2025-05-31 PROCEDURE — 86901 BLOOD TYPING SEROLOGIC RH(D): CPT | Performed by: EMERGENCY MEDICINE

## 2025-05-31 PROCEDURE — 250N000009 HC RX 250: Performed by: EMERGENCY MEDICINE

## 2025-05-31 PROCEDURE — 85018 HEMOGLOBIN: CPT | Performed by: EMERGENCY MEDICINE

## 2025-05-31 PROCEDURE — 258N000003 HC RX IP 258 OP 636: Performed by: INTERNAL MEDICINE

## 2025-05-31 PROCEDURE — 99215 OFFICE O/P EST HI 40 MIN: CPT | Performed by: NURSE PRACTITIONER

## 2025-05-31 PROCEDURE — 3077F SYST BP >= 140 MM HG: CPT | Performed by: NURSE PRACTITIONER

## 2025-05-31 PROCEDURE — 80051 ELECTROLYTE PANEL: CPT | Performed by: EMERGENCY MEDICINE

## 2025-05-31 PROCEDURE — 74177 CT ABD & PELVIS W/CONTRAST: CPT

## 2025-05-31 PROCEDURE — 85610 PROTHROMBIN TIME: CPT | Performed by: EMERGENCY MEDICINE

## 2025-05-31 PROCEDURE — 36415 COLL VENOUS BLD VENIPUNCTURE: CPT | Performed by: EMERGENCY MEDICINE

## 2025-05-31 PROCEDURE — 81003 URINALYSIS AUTO W/O SCOPE: CPT | Performed by: EMERGENCY MEDICINE

## 2025-05-31 PROCEDURE — 99222 1ST HOSP IP/OBS MODERATE 55: CPT | Performed by: INTERNAL MEDICINE

## 2025-05-31 RX ORDER — NALOXONE HYDROCHLORIDE 0.4 MG/ML
0.2 INJECTION, SOLUTION INTRAMUSCULAR; INTRAVENOUS; SUBCUTANEOUS
Status: DISCONTINUED | OUTPATIENT
Start: 2025-05-31 | End: 2025-06-01 | Stop reason: HOSPADM

## 2025-05-31 RX ORDER — VENLAFAXINE HYDROCHLORIDE 37.5 MG/1
37.5 CAPSULE, EXTENDED RELEASE ORAL DAILY
Status: DISCONTINUED | OUTPATIENT
Start: 2025-06-01 | End: 2025-06-01 | Stop reason: HOSPADM

## 2025-05-31 RX ORDER — PROCHLORPERAZINE MALEATE 5 MG/1
5 TABLET ORAL EVERY 6 HOURS PRN
Status: DISCONTINUED | OUTPATIENT
Start: 2025-05-31 | End: 2025-06-01 | Stop reason: HOSPADM

## 2025-05-31 RX ORDER — AMOXICILLIN 250 MG
1 CAPSULE ORAL 2 TIMES DAILY PRN
Status: DISCONTINUED | OUTPATIENT
Start: 2025-05-31 | End: 2025-06-01 | Stop reason: HOSPADM

## 2025-05-31 RX ORDER — IOPAMIDOL 755 MG/ML
500 INJECTION, SOLUTION INTRAVASCULAR ONCE
Status: COMPLETED | OUTPATIENT
Start: 2025-05-31 | End: 2025-05-31

## 2025-05-31 RX ORDER — NALOXONE HYDROCHLORIDE 0.4 MG/ML
0.4 INJECTION, SOLUTION INTRAMUSCULAR; INTRAVENOUS; SUBCUTANEOUS
Status: DISCONTINUED | OUTPATIENT
Start: 2025-05-31 | End: 2025-06-01 | Stop reason: HOSPADM

## 2025-05-31 RX ORDER — ACETAMINOPHEN 650 MG/1
650 SUPPOSITORY RECTAL EVERY 4 HOURS PRN
Status: DISCONTINUED | OUTPATIENT
Start: 2025-05-31 | End: 2025-06-01 | Stop reason: HOSPADM

## 2025-05-31 RX ORDER — PREDNISONE 10 MG/1
10 TABLET ORAL DAILY
Status: DISCONTINUED | OUTPATIENT
Start: 2025-06-01 | End: 2025-06-01 | Stop reason: HOSPADM

## 2025-05-31 RX ORDER — CEFTRIAXONE 2 G/1
2 INJECTION, POWDER, FOR SOLUTION INTRAMUSCULAR; INTRAVENOUS EVERY 24 HOURS
Status: DISCONTINUED | OUTPATIENT
Start: 2025-06-01 | End: 2025-05-31

## 2025-05-31 RX ORDER — PANTOPRAZOLE SODIUM 40 MG/1
40 TABLET, DELAYED RELEASE ORAL
Status: DISCONTINUED | OUTPATIENT
Start: 2025-06-01 | End: 2025-06-01 | Stop reason: HOSPADM

## 2025-05-31 RX ORDER — BISACODYL 10 MG
10 SUPPOSITORY, RECTAL RECTAL DAILY PRN
Status: DISCONTINUED | OUTPATIENT
Start: 2025-05-31 | End: 2025-06-01 | Stop reason: HOSPADM

## 2025-05-31 RX ORDER — ONDANSETRON 2 MG/ML
4 INJECTION INTRAMUSCULAR; INTRAVENOUS EVERY 6 HOURS PRN
Status: DISCONTINUED | OUTPATIENT
Start: 2025-05-31 | End: 2025-06-01 | Stop reason: HOSPADM

## 2025-05-31 RX ORDER — ACETAMINOPHEN 325 MG/1
650 TABLET ORAL EVERY 4 HOURS PRN
Status: DISCONTINUED | OUTPATIENT
Start: 2025-05-31 | End: 2025-06-01 | Stop reason: HOSPADM

## 2025-05-31 RX ORDER — ATENOLOL 50 MG/1
50 TABLET ORAL DAILY
Status: DISCONTINUED | OUTPATIENT
Start: 2025-06-01 | End: 2025-06-01 | Stop reason: HOSPADM

## 2025-05-31 RX ORDER — ONDANSETRON 4 MG/1
4 TABLET, ORALLY DISINTEGRATING ORAL EVERY 6 HOURS PRN
Status: DISCONTINUED | OUTPATIENT
Start: 2025-05-31 | End: 2025-06-01 | Stop reason: HOSPADM

## 2025-05-31 RX ORDER — CEFTRIAXONE 2 G/1
2 INJECTION, POWDER, FOR SOLUTION INTRAMUSCULAR; INTRAVENOUS EVERY 24 HOURS
Status: DISCONTINUED | OUTPATIENT
Start: 2025-06-01 | End: 2025-06-01 | Stop reason: HOSPADM

## 2025-05-31 RX ORDER — SODIUM CHLORIDE, SODIUM LACTATE, POTASSIUM CHLORIDE, CALCIUM CHLORIDE 600; 310; 30; 20 MG/100ML; MG/100ML; MG/100ML; MG/100ML
INJECTION, SOLUTION INTRAVENOUS CONTINUOUS
Status: ACTIVE | OUTPATIENT
Start: 2025-05-31 | End: 2025-06-01

## 2025-05-31 RX ORDER — POLYETHYLENE GLYCOL 3350 17 G/17G
17 POWDER, FOR SOLUTION ORAL 2 TIMES DAILY PRN
Status: DISCONTINUED | OUTPATIENT
Start: 2025-05-31 | End: 2025-06-01 | Stop reason: HOSPADM

## 2025-05-31 RX ORDER — CEFTRIAXONE 1 G/1
1 INJECTION, POWDER, FOR SOLUTION INTRAMUSCULAR; INTRAVENOUS ONCE
Status: COMPLETED | OUTPATIENT
Start: 2025-05-31 | End: 2025-05-31

## 2025-05-31 RX ORDER — AMOXICILLIN 250 MG
2 CAPSULE ORAL 2 TIMES DAILY PRN
Status: DISCONTINUED | OUTPATIENT
Start: 2025-05-31 | End: 2025-06-01 | Stop reason: HOSPADM

## 2025-05-31 RX ORDER — OXYCODONE HYDROCHLORIDE 5 MG/1
5 TABLET ORAL EVERY 4 HOURS PRN
Status: DISCONTINUED | OUTPATIENT
Start: 2025-05-31 | End: 2025-06-01 | Stop reason: HOSPADM

## 2025-05-31 RX ORDER — OMEPRAZOLE 20 MG/1
20 CAPSULE, DELAYED RELEASE ORAL DAILY
Status: DISCONTINUED | OUTPATIENT
Start: 2025-06-01 | End: 2025-05-31

## 2025-05-31 RX ORDER — TRAZODONE HYDROCHLORIDE 50 MG/1
50 TABLET ORAL AT BEDTIME
Status: DISCONTINUED | OUTPATIENT
Start: 2025-05-31 | End: 2025-06-01 | Stop reason: HOSPADM

## 2025-05-31 RX ORDER — IBUPROFEN 200 MG
950 CAPSULE ORAL 2 TIMES DAILY
Status: DISCONTINUED | OUTPATIENT
Start: 2025-05-31 | End: 2025-06-01 | Stop reason: HOSPADM

## 2025-05-31 RX ORDER — AMLODIPINE BESYLATE 5 MG/1
5 TABLET ORAL DAILY
Status: DISCONTINUED | OUTPATIENT
Start: 2025-06-01 | End: 2025-06-01

## 2025-05-31 RX ADMIN — IOPAMIDOL 100 ML: 755 INJECTION, SOLUTION INTRAVENOUS at 18:45

## 2025-05-31 RX ADMIN — CEFTRIAXONE 1 G: 1 INJECTION, POWDER, FOR SOLUTION INTRAMUSCULAR; INTRAVENOUS at 20:20

## 2025-05-31 RX ADMIN — Medication 950 MG: at 22:35

## 2025-05-31 RX ADMIN — CEFTRIAXONE 1 G: 1 INJECTION, POWDER, FOR SOLUTION INTRAMUSCULAR; INTRAVENOUS at 22:38

## 2025-05-31 RX ADMIN — SODIUM CHLORIDE 65 ML: 9 INJECTION, SOLUTION INTRAVENOUS at 18:45

## 2025-05-31 RX ADMIN — TRAZODONE HYDROCHLORIDE 50 MG: 50 TABLET ORAL at 22:35

## 2025-05-31 RX ADMIN — SODIUM CHLORIDE, SODIUM LACTATE, POTASSIUM CHLORIDE, AND CALCIUM CHLORIDE: .6; .31; .03; .02 INJECTION, SOLUTION INTRAVENOUS at 22:30

## 2025-05-31 ASSESSMENT — ACTIVITIES OF DAILY LIVING (ADL)
ADLS_ACUITY_SCORE: 44
ADLS_ACUITY_SCORE: 50

## 2025-05-31 ASSESSMENT — COLUMBIA-SUICIDE SEVERITY RATING SCALE - C-SSRS
6. HAVE YOU EVER DONE ANYTHING, STARTED TO DO ANYTHING, OR PREPARED TO DO ANYTHING TO END YOUR LIFE?: NO
1. IN THE PAST MONTH, HAVE YOU WISHED YOU WERE DEAD OR WISHED YOU COULD GO TO SLEEP AND NOT WAKE UP?: NO
2. HAVE YOU ACTUALLY HAD ANY THOUGHTS OF KILLING YOURSELF IN THE PAST MONTH?: NO

## 2025-05-31 NOTE — PROGRESS NOTES
Chief Complaint   Patient presents with    Urgent Care    Abdominal Pain     Lower abdominal pain and hematuria onset 1 week, worse in the morning, more constant today   Darker urine in the morning, concerned about a poss hematuria   Dx prostate cancer         ICD-10-CM    1. Abdominal pain, generalized  R10.84 UA Macroscopic with reflex to Microscopic and Culture - Clinic Collect     UA Microscopic with Reflex to Culture      2. Dark yellow-colored urine  R39.89 UA Macroscopic with reflex to Microscopic and Culture - Clinic Collect     UA Microscopic with Reflex to Culture      3. Urinary symptom or sign  R39.9 UA Macroscopic with reflex to Microscopic and Culture - Clinic Collect     UA Microscopic with Reflex to Culture      Patient needs immediate assessment at higher level of care.  Called [facility] and inform them of patient's impending arrival.  Wife will drive him directly there.    Differential diagnosis includes but is not limited to: Gastritis, gastroenteritis, enteritis, colitis, appendicitis, viral infection, ileus, bowel obstruction, volvulus, intussusception, gas pains, indigestion, GERD, UTI, pyelonephritis, gastric ulcer, duodenal ulcer, viscus perforation, abdominal hernia, internal hernia.  Adverse reaction to chemotherapy, metastasis.    Results for orders placed or performed in visit on 05/31/25 (from the past 24 hours)   UA Macroscopic with reflex to Microscopic and Culture - Clinic Collect    Specimen: Urine, Clean Catch   Result Value Ref Range    Color Urine Yellow Colorless, Straw, Light Yellow, Yellow    Appearance Urine Slightly Cloudy (A) Clear    Glucose Urine Negative Negative mg/dL    Bilirubin Urine Negative Negative    Ketones Urine Trace (A) Negative mg/dL    Specific Gravity Urine 1.020 1.003 - 1.035    Blood Urine Large (A) Negative    pH Urine 7.0 5.0 - 7.0    Protein Albumin Urine 100 (A) Negative mg/dL    Urobilinogen Urine 1.0 0.2, 1.0 E.U./dL    Nitrite Urine  "Negative Negative    Leukocyte Esterase Urine Negative Negative   UA Microscopic with Reflex to Culture   Result Value Ref Range    Bacteria Urine Few (A) None Seen /HPF    RBC Urine >100 (A) 0-2 /HPF /HPF    WBC Urine 5-10 (A) 0-5 /HPF /HPF    Squamous Epithelials Urine Few (A) None Seen /LPF    Mucus Urine Present (A) None Seen /LPF    Narrative    Urine Culture not indicated       Subjective     Ronnie Lagos is an 69 year old male who presents to clinic today for lower central abdominal pain and very dark urine.  Symptoms started about a week ago.  He is currently undergoing chemotherapy for prostate cancer.    ROS: 10 point ROS neg other than the symptoms noted above in the HPI.       Objective    BP (!) 149/82   Pulse 70   Temp 98.6  F (37  C) (Tympanic)   Resp 16   Ht 1.727 m (5' 8\")   Wt 103 kg (227 lb)   SpO2 98%   BMI 34.52 kg/m    Nurses notes and VS have been reviewed.    Physical Exam       GENERAL APPEARANCE: alert and no distress     EYES: PERRL, EOMI, sclera non-icteric     HENT: oral exam benign, mucus membranes intact, without ulcers or lesions     NECK: no adenopathy or asymmetry, thyroid normal to palpation     RESP: lungs clear to auscultation - no rales, rhonchi or wheezes     CV: regular rates and rhythm, no murmurs, rubs, or gallop     ABDOMEN: Distended, firm, normal bowel sounds, suprapubic tenderness     MS: extremities normal- no gross deformities noted; normal muscle tone.     SKIN: no suspicious lesions or rashes      VERONICA Coker, CNP  [facility] Provider    The use of [employer] dictation services may have been used to construct the content in this note; any grammatical or spelling errors are non-intentional. Please contact the author of this note directly if you are in need of any clarification.     "

## 2025-05-31 NOTE — PROGRESS NOTES
Urgent Care Clinic Visit    Chief Complaint   Patient presents with    Urgent Care    Abdominal Pain     Lower abdominal pain and hematuria onset 1 week, worse in the morning, more constant today   Darker urine in the morning, concerned about a poss hematuria   Dx prostate cancer              5/31/2025     3:00 PM   Additional Questions   Roomed by Cande   Accompanied by Emily - spouse     Pre-Provider Visit Orders- Urinalysis UA/UC  Patient reports the following symptoms:  possible urinary tract infection (UTI) , possible bladder infection , and blood in the urine   Does the patient report any of the following symptoms: has a urinary catheter in place, or unable to void in a specimen cup?  No

## 2025-05-31 NOTE — TELEPHONE ENCOUNTER
"Nurse Triage SBAR    Is this a 2nd Level Triage? NO    Situation:  Abdominal pain, possible hematuria    Background: Pt reports abdominal pain and possible blood in urine \"on and off for a week\". Pt reports he has been receiving chemo infusions for prostate cancer. Pt reports abdominal pain today \"constant since 8 am, belly button to belt line\". Pt rates abdominal pain \"3-4\". Oral temperature at time of call \"97.3\". \"Small bm this morning\".     Assessment:  Acute abdomen, cause unknown, rule out hematuria     Protocol Recommended Disposition:   See HCP Within 4 Hours (Or PCP Triage)    Recommendation: Advised pt to be seen in UC within four hours per protocol.     Pt verbalizes understanding and agrees to plan.      Does the patient meet one of the following criteria for ADS visit consideration? 16+ years old, with an [facility] PCP     TIP  Providers, please consider if this condition is appropriate for management at one of our Acute and Diagnostic Services sites.     If patient is a good candidate, please use dotphrase <dot>triageresponse and select Refer to ADS to document.    Reason for Disposition   [1] MILD-MODERATE pain AND [2] constant AND [3] present > 2 hours    Additional Information   Negative: Shock suspected (e.g., cold/pale/clammy skin, too weak to stand, low BP, rapid pulse)   Negative: Difficult to awaken or acting confused (e.g., disoriented, slurred speech)   Negative: Passed out (i.e., lost consciousness, collapsed and was not responding)   Negative: Sounds like a life-threatening emergency to the triager   Negative: Chest pain   Negative: Pain is mainly in upper abdomen  (if needed ask: \"is it mainly above the belly button?\")   Negative: Followed an abdomen (stomach) injury   Negative: Abdomen bloating or swelling are main symptoms   Negative: [1] SEVERE pain (e.g., excruciating) AND [2] present > 1 hour   Negative: [1] SEVERE pain AND [2] age > 60 years   Negative: [1] Vomiting AND [2] contains red " "blood or black (\"coffee ground\") material  (Exception: Few red streaks in vomit that only happened once.)   Negative: Blood in bowel movements  (Exception: Blood on surface of BM with constipation.)   Negative: Black or tarry bowel movements  (Exception: Chronic-unchanged black-grey BMs AND is taking iron pills or Pepto-Bismol.)   Negative: [1] Unable to urinate (or only a few drops) > 4 hours AND [2] bladder feels very full (e.g., palpable bladder or strong urge to urinate)   Negative: [1] Vomiting AND [2] contains bile (green color)   Negative: [1] Pain in the scrotum or testicle AND [2] present > 1 hour   Negative: Patient sounds very sick or weak to the triager    Protocols used: Abdominal Pain - Male-A-AH    "

## 2025-05-31 NOTE — ED PROVIDER NOTES
"  Emergency Department Note      History of Present Illness     Chief Complaint   Abdominal Pain and Hematuria      HPI   Ronnie Lagos is a 69 year old male with history of hypertension and prostate cancer who presents for abdominal pain and hematuria. Patient was sent to the ED from urgent care for lower abdominal pain and hematuria. Patient reports abdominal pain for the past 1 week that has progressively worsened in the past few days. The abdominal pain is localized \"right below belly button to belt line\" and sometimes radiates to the left. The pain fluctuates in intensity and is sometimes not bothersome. He currently rates the pain 2/10 in severity. He has a history of a cholecystectomy. Patient reports dark urine for the past 1 month which was confirmed to be hematuria at urgent care prior to arrival. The urine is dark in the morning, through the whole stream, but clears up throughout the day. He also notes an onset of lower central back pain since Thursday. Patient reports hot flashes lately, but denies fevers. Denies incontinence, dysuria, or other urinary symptoms. Denies nausea and vomiting. Denies numbness and tingling. Patient is currently in chemotherapy for prostate cancer -- his last session was Tuesday 5/27. He is not anticoagulated.     Independent Historian   None    Review of External Notes   Reviewed urgent care note from today 5/31/25 for lower central abdominal pain and dark urine. Currently on chemotherapy for prostate cancer.     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  Lumbago  Cervicalgia    Medications   Amlodipine  Tenormin  Calcium citrate  Prilosec  Deltasone  Compazine  Desyrel  Effexor     Surgical History   Arthrodesis foot  Colonoscopy  DaVinci prostatectomy, " "lymphadenectomy  Endoscopic retrograde cholangiopancreatogram x2  EGD  Chest port placement  Lap cholecystectomy  Orthopedic surgery, left elbow, right shoulder    Physical Exam     Patient Vitals for the past 24 hrs:   BP Temp Temp src Pulse Resp SpO2 Height Weight   05/31/25 1900 (!) 164/77 -- -- 71 -- 97 % -- --   05/31/25 1830 (!) 155/78 -- -- -- -- -- -- --   05/31/25 1800 (!) 156/81 -- -- 64 -- 96 % -- --   05/31/25 1730 (!) 153/73 -- -- 64 -- 95 % -- --   05/31/25 1715 -- -- -- -- -- 96 % -- --   05/31/25 1700 (!) 167/86 -- -- -- -- 97 % -- --   05/31/25 1645 -- -- -- -- -- 97 % -- --   05/31/25 1613 (!) 177/82 -- -- -- -- -- -- --   05/31/25 1612 -- 97  F (36.1  C) Temporal 72 18 99 % 1.727 m (5' 8\") 103 kg (227 lb 1.2 oz)     Physical Exam  General: Alert, no acute distress  HEENT:  Moist mucous membranes.  Conjunctiva normal  CV:  Skin warm and well perfused  Pulm: No acute distress, breathing comfortably  GI:  Soft, mild suprapubic/LLQ tenderness, nondistended.  No rebound or guarding.    MSK:  Moving all extremities.  No focal areas of edema, erythema; no CVA tenderness  Skin:  WWP, no rashes, no lower extremity edema, skin color normal  Psych:  Well-appearing, normal affect, regular speech    Diagnostics     Lab Results   Labs Ordered and Resulted from Time of ED Arrival to Time of ED Departure   BASIC METABOLIC PANEL - Abnormal       Result Value    Sodium 136      Potassium 4.7      Chloride 100      Carbon Dioxide (CO2) 25      Anion Gap 11      Urea Nitrogen 14.4      Creatinine 1.05      GFR Estimate 77      Calcium 8.8      Glucose 123 (*)    ROUTINE UA WITH MICROSCOPIC REFLEX TO CULTURE - Abnormal    Color Urine Light Yellow      Appearance Urine Clear      Glucose Urine 30 (*)     Bilirubin Urine Negative      Ketones Urine Negative      Specific Gravity Urine 1.017      Blood Urine Moderate (*)     pH Urine 6.5      Protein Albumin Urine 50 (*)     Urobilinogen Urine Normal      Nitrite Urine " Negative      Leukocyte Esterase Urine Small (*)     Mucus Urine Present (*)     RBC Urine >182 (*)     WBC Urine 19 (*)    CBC WITH PLATELETS AND DIFFERENTIAL - Abnormal    WBC Count 6.7      RBC Count 4.02 (*)     Hemoglobin 12.3 (*)     Hematocrit 35.1 (*)     MCV 87      MCH 30.6      MCHC 35.0      RDW 13.2      Platelet Count 197      % Neutrophils 85      % Lymphocytes 11      % Monocytes 3      % Eosinophils 1      % Basophils 0      % Immature Granulocytes 0      NRBCs per 100 WBC 0      Absolute Neutrophils 5.7      Absolute Lymphocytes 0.7 (*)     Absolute Monocytes 0.2      Absolute Eosinophils 0.0      Absolute Basophils 0.0      Absolute Immature Granulocytes 0.0      Absolute NRBCs 0.0     ISTAT GASES LACTATE VENOUS POCT - Abnormal    Lactic Acid POCT 0.5 (*)     Bicarbonate Venous POCT 27      O2 Sat, Venous POCT 65 (*)     pCO2 Venous POCT 43      pH Venous POCT 7.41      pO2 Venous POCT 34     INR - Normal    INR 0.96      PT 12.9     TYPE AND SCREEN, ADULT    ABO/RH(D) B POS      Antibody Screen Negative      SPECIMEN EXPIRATION DATE 6/3/2025 11:59:00 PM CDT     URINE CULTURE   BLOOD CULTURE   BLOOD CULTURE   ABO/RH TYPE AND SCREEN       Imaging   Abd/pelvis CT,  IV  contrast only TRAUMA / AAA   Final Result   IMPRESSION:    1.  Inflammatory changes of the bilateral renal pelves and proximal ureters, left greater than right. This is suspicious for an ascending urinary tract infection. Recommend laboratory correlation. No hydronephrosis or urinary tract stone.   2.  Stable sclerotic osseous metastases.          EKG   None    Independent Interpretation   None    ED Course      Medications Administered   Medications   amLODIPine (NORVASC) tablet 5 mg (has no administration in time range)   atenolol (TENORMIN) tablet 50 mg (has no administration in time range)   calcium citrate (CITRACAL) tablet 950 mg (has no administration in time range)   traZODone (DESYREL) tablet 50 mg (has no administration in  time range)   venlafaxine (EFFEXOR XR) 24 hr capsule 37.5 mg (has no administration in time range)   senna-docusate (SENOKOT-S/PERICOLACE) 8.6-50 MG per tablet 1 tablet (has no administration in time range)     Or   senna-docusate (SENOKOT-S/PERICOLACE) 8.6-50 MG per tablet 2 tablet (has no administration in time range)   ondansetron (ZOFRAN ODT) ODT tab 4 mg (has no administration in time range)     Or   ondansetron (ZOFRAN) injection 4 mg (has no administration in time range)   prochlorperazine (COMPAZINE) injection 5 mg (has no administration in time range)     Or   prochlorperazine (COMPAZINE) tablet 5 mg (has no administration in time range)   lactated ringers infusion (has no administration in time range)   acetaminophen (TYLENOL) tablet 650 mg (has no administration in time range)     Or   acetaminophen (TYLENOL) Suppository 650 mg (has no administration in time range)   oxyCODONE IR (ROXICODONE) half-tab 2.5 mg (has no administration in time range)   oxyCODONE (ROXICODONE) tablet 5 mg (has no administration in time range)   melatonin tablet 1 mg (has no administration in time range)   polyethylene glycol (MIRALAX) Packet 17 g (has no administration in time range)   bisacodyl (DULCOLAX) suppository 10 mg (has no administration in time range)   cefTRIAXone (ROCEPHIN) 2 g vial to attach to  ml bag for ADULTS or NS 50 ml bag for PEDS (has no administration in time range)   cefTRIAXone (ROCEPHIN) 1 g vial to attach to  mL bag for ADULTS or NS 50 mL bag for PEDS (has no administration in time range)   naloxone (NARCAN) injection 0.2 mg (has no administration in time range)     Or   naloxone (NARCAN) injection 0.4 mg (has no administration in time range)     Or   naloxone (NARCAN) injection 0.2 mg (has no administration in time range)     Or   naloxone (NARCAN) injection 0.4 mg (has no administration in time range)   pantoprazole (PROTONIX) EC tablet 40 mg (has no administration in time range)   sodium  chloride 0.9 % bag for CT scan flush (65 mLs Intravenous $Given 5/31/25 1845)   iopamidol (ISOVUE-370) solution 500 mL (100 mLs Intravenous $Given 5/31/25 1845)   cefTRIAXone (ROCEPHIN) 1 g vial to attach to  mL bag for ADULTS or NS 50 mL bag for PEDS (1 g Intravenous $New Bag 5/31/25 2020)       Procedures   Procedures     Discussion of Management   None    ED Course   ED Course as of 05/31/25 2125   Sat May 31, 2025   1623 I obtained history and examined the patient as noted above.     1957 I discussed the patient with Dr. Cid who accepted the patient for admission.        Additional Documentation  None    Medical Decision Making / Diagnosis     CMS Diagnoses: IV Antibiotics given and/or elevated Lactate of 0.5 and no sepsis note found - Delete this reminder and enter the sepsis note or '.edcms' before signing chart.>>>None    MIPS   None               [facility]   Ronnie Lagos is a 69 year old male with history of  metastatic prostate cancer status post total prostatectomy on chemotherapy presenting to the emergency department with concerns for lower abdominal pain over the last week that worsened over the last few days with 1 month of dark urine, worse in the morning.   patient was initially seen at urgent care and referred to the ER for further evaluation given hematuria on urinalysis.  Denies any fever he is afebrile and hemodynamically stable here.  He does have mild suprapubic/LLQ tenderness.  I reviewed patient's UA at urgent care and unfortunately not able to order urine culture.  Repeat urinalysis here we repeated urinalysis here and sent this off for urine culture..  Kidney function and WBC are normal.  CT concerning for inflammatory changes to the bilateral renal pelvises and proximal ureters, left greater than right concerning for ascending UTI.  Lactate is normal and patient is hemodynamically stable.  We did obtain blood cultures.  Rocephin was given.  Given patient is immunosuppressed with   concern for ascending UTI, we will admit the patient for ongoing monitoring care.  Patient and wife are comfortable with this plan.  Discussed patient with hospitalist service accepted the patient for admission.    Disposition   The patient was admitted to the hospital.     Diagnosis     ICD-10-CM    1. Complicated UTI (urinary tract infection)  N39.0     Concern for ascending UTI      2. Prostate cancer metastatic to bone (H)  C61     C79.51       3. S/P prostatectomy  Z90.79       4. Immunosuppressed status  D84.9            Discharge Medications   New Prescriptions    No medications on file         Scribe Disclosure:  Lizzie GAYLE, am serving as a scribe at 4:56 PM on 5/31/2025 to document services personally performed by Pablo Arriaga MD based on my observations and the provider's statements to me.        Pablo Arriaga MD  05/31/25 2125       Pablo Arriaga MD  05/31/25 2126

## 2025-05-31 NOTE — ED TRIAGE NOTES
Pt arrives ambulatory into triage from  for ongoing lower abdominal pain and hematuria. Hx of prostate cancer on chemo. Last treatment done on Tuesday. Denies fevers or urinary retention. Is A&O x 4.

## 2025-06-01 ENCOUNTER — TELEPHONE (OUTPATIENT)
Dept: INTERNAL MEDICINE | Facility: CLINIC | Age: 70
End: 2025-06-01

## 2025-06-01 VITALS
WEIGHT: 221.12 LBS | HEIGHT: 68 IN | RESPIRATION RATE: 18 BRPM | BODY MASS INDEX: 33.51 KG/M2 | OXYGEN SATURATION: 96 % | SYSTOLIC BLOOD PRESSURE: 173 MMHG | HEART RATE: 71 BPM | TEMPERATURE: 98.1 F | DIASTOLIC BLOOD PRESSURE: 81 MMHG

## 2025-06-01 LAB
ANION GAP SERPL CALCULATED.3IONS-SCNC: 11 MMOL/L (ref 7–15)
BUN SERPL-MCNC: 12.7 MG/DL (ref 8–23)
CALCIUM SERPL-MCNC: 8.9 MG/DL (ref 8.8–10.4)
CHLORIDE SERPL-SCNC: 100 MMOL/L (ref 98–107)
CREAT SERPL-MCNC: 0.81 MG/DL (ref 0.67–1.17)
EGFRCR SERPLBLD CKD-EPI 2021: >90 ML/MIN/1.73M2
ERYTHROCYTE [DISTWIDTH] IN BLOOD BY AUTOMATED COUNT: 13.1 % (ref 10–15)
GLUCOSE SERPL-MCNC: 96 MG/DL (ref 70–99)
HCO3 SERPL-SCNC: 25 MMOL/L (ref 22–29)
HCT VFR BLD AUTO: 36 % (ref 40–53)
HGB BLD-MCNC: 12.5 G/DL (ref 13.3–17.7)
MCH RBC QN AUTO: 30.6 PG (ref 26.5–33)
MCHC RBC AUTO-ENTMCNC: 34.7 G/DL (ref 31.5–36.5)
MCV RBC AUTO: 88 FL (ref 78–100)
PLATELET # BLD AUTO: 153 10E3/UL (ref 150–450)
POTASSIUM SERPL-SCNC: 4 MMOL/L (ref 3.4–5.3)
RBC # BLD AUTO: 4.09 10E6/UL (ref 4.4–5.9)
SODIUM SERPL-SCNC: 136 MMOL/L (ref 135–145)
WBC # BLD AUTO: 4.5 10E3/UL (ref 4–11)

## 2025-06-01 PROCEDURE — 250N000013 HC RX MED GY IP 250 OP 250 PS 637: Performed by: INTERNAL MEDICINE

## 2025-06-01 PROCEDURE — 36415 COLL VENOUS BLD VENIPUNCTURE: CPT | Performed by: INTERNAL MEDICINE

## 2025-06-01 PROCEDURE — 99239 HOSP IP/OBS DSCHRG MGMT >30: CPT | Performed by: INTERNAL MEDICINE

## 2025-06-01 PROCEDURE — 85041 AUTOMATED RBC COUNT: CPT | Performed by: INTERNAL MEDICINE

## 2025-06-01 PROCEDURE — G0378 HOSPITAL OBSERVATION PER HR: HCPCS

## 2025-06-01 PROCEDURE — 82435 ASSAY OF BLOOD CHLORIDE: CPT | Performed by: INTERNAL MEDICINE

## 2025-06-01 PROCEDURE — 250N000011 HC RX IP 250 OP 636: Performed by: INTERNAL MEDICINE

## 2025-06-01 PROCEDURE — 250N000012 HC RX MED GY IP 250 OP 636 PS 637: Performed by: INTERNAL MEDICINE

## 2025-06-01 RX ORDER — CIPROFLOXACIN 250 MG/1
250 TABLET, FILM COATED ORAL 2 TIMES DAILY
Qty: 14 TABLET | Refills: 0 | Status: SHIPPED | OUTPATIENT
Start: 2025-06-01 | End: 2025-06-02

## 2025-06-01 RX ORDER — HEPARIN SODIUM,PORCINE 10 UNIT/ML
5-10 VIAL (ML) INTRAVENOUS EVERY 24 HOURS
Status: DISCONTINUED | OUTPATIENT
Start: 2025-06-01 | End: 2025-06-01 | Stop reason: HOSPADM

## 2025-06-01 RX ORDER — AMLODIPINE BESYLATE 5 MG/1
5 TABLET ORAL ONCE
Status: COMPLETED | OUTPATIENT
Start: 2025-06-01 | End: 2025-06-01

## 2025-06-01 RX ORDER — AMLODIPINE BESYLATE 10 MG/1
10 TABLET ORAL DAILY
Status: DISCONTINUED | OUTPATIENT
Start: 2025-06-02 | End: 2025-06-01 | Stop reason: HOSPADM

## 2025-06-01 RX ORDER — HEPARIN SODIUM (PORCINE) LOCK FLUSH IV SOLN 100 UNIT/ML 100 UNIT/ML
5-10 SOLUTION INTRAVENOUS
Status: DISCONTINUED | OUTPATIENT
Start: 2025-06-01 | End: 2025-06-01 | Stop reason: HOSPADM

## 2025-06-01 RX ORDER — HEPARIN SODIUM,PORCINE 10 UNIT/ML
5-10 VIAL (ML) INTRAVENOUS
Status: DISCONTINUED | OUTPATIENT
Start: 2025-06-01 | End: 2025-06-01 | Stop reason: HOSPADM

## 2025-06-01 RX ADMIN — ACETAMINOPHEN 650 MG: 325 TABLET, FILM COATED ORAL at 13:23

## 2025-06-01 RX ADMIN — PANTOPRAZOLE SODIUM 40 MG: 40 TABLET, DELAYED RELEASE ORAL at 06:51

## 2025-06-01 RX ADMIN — HEPARIN 5 ML: 100 SYRINGE at 14:46

## 2025-06-01 RX ADMIN — AMLODIPINE BESYLATE 5 MG: 5 TABLET ORAL at 10:50

## 2025-06-01 RX ADMIN — VENLAFAXINE HYDROCHLORIDE 37.5 MG: 37.5 CAPSULE, EXTENDED RELEASE ORAL at 08:51

## 2025-06-01 RX ADMIN — ATENOLOL 50 MG: 50 TABLET ORAL at 08:52

## 2025-06-01 RX ADMIN — AMLODIPINE BESYLATE 5 MG: 5 TABLET ORAL at 08:51

## 2025-06-01 RX ADMIN — PREDNISONE 10 MG: 10 TABLET ORAL at 08:51

## 2025-06-01 RX ADMIN — Medication 950 MG: at 08:51

## 2025-06-01 ASSESSMENT — ACTIVITIES OF DAILY LIVING (ADL)
ADLS_ACUITY_SCORE: 46
ADLS_ACUITY_SCORE: 46
ADLS_ACUITY_SCORE: 47
ADLS_ACUITY_SCORE: 46
ADLS_ACUITY_SCORE: 46
ADLS_ACUITY_SCORE: 47
ADLS_ACUITY_SCORE: 46
ADLS_ACUITY_SCORE: 47
ADLS_ACUITY_SCORE: 46
ADLS_ACUITY_SCORE: 47
ADLS_ACUITY_SCORE: 46
ADLS_ACUITY_SCORE: 47

## 2025-06-01 NOTE — PROGRESS NOTES
Admission/Transfer from: ED  2 RN skin assessment completed. Yes  Significant findings include: Abrasion on left side of penis  LDA added (if applicable)? NO  Requested WOC Nurse Consult from MD? YES:

## 2025-06-01 NOTE — H&P
[facility]    History and Physical - Hospitalist Service       Date of Admission:  5/31/2025    Assessment & Plan      Ronnie Lagos is a 69 year old male admitted on 5/31/2025 with concern for a ascending UTI in the setting of immunosuppression. He has history of prostate cancer for which he had surgical prostatectomy and is now on chemotherapy withcabazitaxel and denosumab, prescribed by [facility] oncology.  His last dose of chemotherapy was 4 days ago and was cabazitaxel only (denosumab on hold pending some dental work?).  He also has history of hypertension, GERD, stroke, common bile duct stone, and laparoscopic cholecystectomy.  He presented to urgent care earlier today with lower abdominal pain.  He also reported some intermittent hematuria.  His symptoms seem to be a little bit worse during the day.  He has had some symptoms for about a week but they have been worse in the last couple of days.  [place] and they actually came back early because of his symptoms.  He denied fever and chills.  He was sent to the ER from urgent care.    Emergency department evaluation showed stable vital signs without fever or tachycardia.  Laboratory evaluation showed hemoglobin of 12.3 but otherwise unremarkable CBC and basic metabolic panel.  Urine analysis showed 19 white blood cells, small leukocyte esterase, and moderate blood.  CT of abdomen and pelvis showed inflammatory changes of bilateral renal pelvis ease and proximal ureters worrisome for ascending urinary tract infection.  There were no stones or hydronephrosis.  Bony metastatic disease was noted and was unchanged.    Problem list:    Concern for ascending urinary tract infection  Immunosuppression related to chemotherapy for metastatic prostate cancer  Intermittent hematuria  -Admit to observation  -Continue Rocephin 2 g every 24 hours  -Follow urine and blood cultures  -Reassess tomorrow.  If feeling quite a  "bit better he can potentially discharge home.  If not feeling better or specially if he develops fever or other signs of sepsis consider changing to inpatient and keeping in the hospital a bit longer, pending urine and blood culture results.    Metastatic prostate cancer  -Had chemo 4 days ago  -Follows with [facility] oncology    Hypertension  -Resume prior to admission amlodipine and atenolol    GERD  -Prior to admission omeprazole     Observation Goals: -diagnostic tests and consults completed and resulted, -vital signs normal or at patient baseline, Nurse to notify provider when observation goals have been met and patient is ready for discharge.  Diet: Regular Diet Adult    DVT Prophylaxis: Enoxaparin (Lovenox) SQ  Mendoza Catheter: Not present  Lines: PRESENT      Port a Cath 04/11/25 Single Lumen Right Chest wall-Site Assessment: WDL      Cardiac Monitoring: None  Code Status: Full Code      Clinically Significant Risk Factors Present on Admission                   # Hypertension: Noted on problem list           # Obesity: Estimated body mass index is 34.53 kg/m  as calculated from the following:    Height as of this encounter: 1.727 m (5' 8\").    Weight as of this encounter: 103 kg (227 lb 1.2 oz).              Disposition Plan     Medically Ready for Discharge: Anticipated Tomorrow           Miah Cid MD  Hospitalist Service  [facility]  Securely message with [employer] (more info)  Text page via [employer] Paging/Directory     ______________________________________________________________________    Chief Complaint   Lower abdominal pain    History is obtained from the patient, Dr. Arriaga, and the medical record    History of Present Illness   Ronnie Lagos is a 69 year old male admitted on 5/31/2025 with concern for a ascending UTI in the setting of immunosuppression. He has history of prostate cancer for which he had surgical prostatectomy and is now on chemotherapy withcabazitaxel " and denosumab, prescribed by [facility] oncology.  His last dose of chemotherapy was 4 days ago and was cabazitaxel only (denosumab on hold pending some dental work?).  He also has history of hypertension, GERD, stroke, common bile duct stone, and laparoscopic cholecystectomy.  He presented to urgent care earlier today with lower abdominal pain.  He also reported some intermittent hematuria.  His symptoms seem to be a little bit worse during the day.  He has had some symptoms for about a week but they have been worse in the last couple of days.  [place] was [place] and they actually came back early because of his symptoms.  He denied fever and chills.  He was sent to the ER from urgent care.    Emergency department evaluation showed stable vital signs without fever or tachycardia.  Laboratory evaluation showed hemoglobin of 12.3 but otherwise unremarkable CBC and basic metabolic panel.  Urine analysis showed 19 white blood cells, small leukocyte esterase, and moderate blood.  CT of abdomen and pelvis showed inflammatory changes of bilateral renal pelvis ease and proximal ureters worrisome for ascending urinary tract infection.  There were no stones or hydronephrosis.  Bony metastatic disease was noted and was unchanged.      Past Medical History    Past Medical History:   Diagnosis Date    Abdominal pain     Actinic keratosis     Basal cell carcinoma     Calculus of bile duct     Elevated liver enzymes     Essential hypertension, benign     abstracted 6/19/02    Gastro-oesophageal reflux disease     GERD (gastroesophageal reflux disease) 07/28/2008    Liver disease     elevated liver enzymes    Prostate cancer (H)     bone and lymph nodes 2024    Squamous cell carcinoma     Unspecified cerebral artery occlusion with cerebral infarction     Unspecified condition of brain     abstracted 6/19/02       Past Surgical History   Past Surgical History:   Procedure Laterality Date    ARTHRODESIS FOOT   2/5/2013    Procedure: ARTHRODESIS FOOT;  LEFT FIRST METATARSOPHALANGEAL JOINT ARTHRODESIS ;  Surgeon: Burton Loera DPM;  Location: [facility]    COLONOSCOPY N/A 12/7/2018    Procedure: COMBINED COLONOSCOPY, SINGLE OR MULTIPLE BIOPSY/POLYPECTOMY BY BIOPSY;  Surgeon: Blayne Mora MD;  Location: [facility]    COLONOSCOPY N/A 12/13/2024    Procedure: Colonoscopy;  Surgeon: Arti Mercedes MD;  Location: [facility]    DAVINCI PROSTATECTOMY, LYMPHADENECTOMY N/A 11/3/2023    Procedure: PROSTATECTOMY, ROBOT-ASSISTED, WITH PELVIC LYMPHADENECTOMY;  Surgeon: Sophia Ramirez MD;  Location:  OR    ENDOSCOPIC RETROGRADE CHOLANGIOPANCREATOGRAM N/A 1/18/2018    Procedure: COMBINED ENDOSCOPIC RETROGRADE CHOLANGIOPANCREATOGRAPHY, SPHINCTEROTOMY;  ENDOSCOPIC RETROGRADE CHOLANGIOPANCREATOGRAM (ERCP), SPHINCTEROTOMY, STONE REMOVAL, STENT PLACEMENT;  Surgeon: Christiano Sorenson MD;  Location:  OR    ENDOSCOPIC RETROGRADE CHOLANGIOPANCREATOGRAM N/A 4/12/2018    Procedure: ENDOSCOPIC RETROGRADE CHOLANGIOPANCREATOGRAM;  ENDOSCOPIC RETROGRADE CHOLANIOPANCREATOGRAPHY AND ATTEMPTED STENT REMOVAL.;  Surgeon: Christiano Sorenson MD;  Location:  OR    ENDOSCOPIC RETROGRADE CHOLANGIOPANCREATOGRAM N/A 5/10/2018    Procedure: COMBINED ENDOSCOPIC RETROGRADE CHOLANGIOPANCREATOGRAPHY, REMOVE FOREIGN BODY OR STENT/TUBE;  ENDOSCOPIC RETROGRADE CHOLANGIOPANCREATOGRAPHY AND STENT REMOVAL;  Surgeon: Christiano Sorenson MD;  Location:  OR    ESOPHAGOSCOPY, GASTROSCOPY, DUODENOSCOPY (EGD), COMBINED N/A 4/12/2018    Procedure: COMBINED ESOPHAGOSCOPY, GASTROSCOPY, DUODENOSCOPY (EGD);  EGD with stent removal;  Surgeon: Christiano Sorenson MD;  Location: [facility]    IR CHEST PORT PLACEMENT > 5 YRS OF AGE  4/11/2025    LAPAROSCOPIC CHOLECYSTECTOMY N/A 5/1/2015    Procedure: LAPAROSCOPIC CHOLECYSTECTOMY;  Surgeon: Damien Galindo MD;  Location: [facility]    ORTHOPEDIC SURGERY      left elbow, right shoulder       Prior to Admission  Medications   Prior to Admission Medications   Prescriptions Last Dose Informant Patient Reported? Taking?   Vitamin D, Cholecalciferol, 10 MCG (400 UNIT) TABS   Yes No   amLODIPine (NORVASC) 5 MG tablet   No No   Sig: Take 1 tablet (5 mg) by mouth daily   atenolol (TENORMIN) 50 MG tablet   No No   Sig: Take 1 tablet (50 mg) by mouth daily   calcium citrate (CITRACAL) 950 (200 Ca) MG tablet   Yes No   Sig: Take 1 tablet by mouth 2 times daily   omeprazole (PRILOSEC) 20 MG DR capsule   No No   Sig: Take 1 capsule (20 mg) by mouth daily   predniSONE (DELTASONE) 10 MG tablet   No No   Sig: Take 1 tablet (10 mg) by mouth daily.   prochlorperazine (COMPAZINE) 10 MG tablet   No No   Sig: Take 1 tablet (10 mg) by mouth every 6 hours as needed for nausea or vomiting.   traZODone (DESYREL) 100 MG tablet   No No   Sig: Take 0.5 tablets (50 mg) by mouth at bedtime.   venlafaxine (EFFEXOR XR) 37.5 MG 24 hr capsule   No No   Sig: Take 1 capsule (37.5 mg) by mouth daily.      Facility-Administered Medications: None        Review of Systems    The 10 point Review of Systems is negative other than noted in the HPI or here.     Social History   I have reviewed this patient's social history and updated it with pertinent information if needed.  Social History     Tobacco Use    Smoking status: Never    Smokeless tobacco: Never   Vaping Use    Vaping status: Never Used   Substance Use Topics    Alcohol use: Not Currently     Alcohol/week: 2.0 standard drinks of alcohol     Types: 2 Standard drinks or equivalent per week    Drug use: Never         Family History   I have reviewed this patient's family history and updated it with pertinent information if needed.  Family History   Problem Relation Age of Onset    Hypertension Mother     Cancer - colorectal Mother     Skin Cancer Mother     Colon Cancer Mother     Hypertension Father     Prostate Cancer Father     Melanoma No family hx of     Anesthesia Reaction No family hx of     Venous  thrombosis No family hx of     Bleeding Disorder No family hx of          Allergies   Allergies   Allergen Reactions    Taxotere [Docetaxel]         Physical Exam   Vital Signs: Temp: 97  F (36.1  C) Temp src: Temporal BP: (!) 164/77 Pulse: 71   Resp: 18 SpO2: 97 % O2 Device: None (Room air)    Weight: 227 lbs 1.18 oz    GENERAL: Pleasant and cooperative. No acute distress.  EYES: Pupils equal and round. No scleral erythema or icterus.  ENT: External ears are normal without deformity. Posterior oropharynx is without erythem, swelling, or exudate.  NECK: Supple. No masses or swelling. No tenderness. Thyroid is normal without mass or tenderness.  CHEST: Clear to auscultation. Normal breath sounds. No retractions.   CV: Regular rate and rhythm. No JVD. Pulses normal.  ABDOMEN: Bowel sounds present.  Mild lower abdominal tenderness in the middle of the abdomen. No masses or hernia.  EXTREMETIES: No clubbing, cyanosis, or ischemia.  SKIN: Warm and dry to touch. No wounds or rashes.  NEUROLOGIC: Strength and sensation are normal. Deep tendon reflexes are normal. Cranial nerves are normal.      Medical Decision Making       65 MINUTES SPENT BY ME on the date of service doing chart review, history, exam, documentation & further activities per the note.      Data     I have personally reviewed the following data over the past 24 hrs:    6.7  \   12.3 (L)   / 197     136 100 14.4 /  123 (H)   4.7 25 1.05 \     Procal: N/A CRP: N/A Lactic Acid: 0.5 (L)       INR:  0.96 PTT:  N/A   D-dimer:  N/A Fibrinogen:  N/A       Imaging results reviewed over the past 24 hrs:   Recent Results (from the past 24 hours)   Abd/pelvis CT,  IV  contrast only TRAUMA / AAA    Narrative    EXAM: CT ABDOMEN PELVIS W CONTRAST  LOCATION: [facility]  DATE: 5/31/2025    INDICATION: Hematuria, suprapubic pain, metastatic prostate cancer  COMPARISON: CT 2/26/2025  TECHNIQUE: CT scan of the abdomen and pelvis was performed following  injection of IV contrast. Multiplanar reformats were obtained. Dose reduction techniques were used.  CONTRAST: 65mL Isovue 370    FINDINGS:   LOWER CHEST: Normal.    HEPATOBILIARY: Cholecystectomy. Stable central hepatic atrophy. Normal bile ducts.    PANCREAS: Normal.    SPLEEN: Normal.    ADRENAL GLANDS: Normal.    KIDNEYS/BLADDER: Bilateral renal pelvis and proximal ureteral urothelial thickening and hyperenhancement. Associated bilateral peripelvic and perinephric edema, left greater than right. No hydronephrosis or obstructing ureteral stone. Normal bladder.    BOWEL: Duodenal diverticulum. No bowel obstruction or inflammatory process. Normal appendix. Colonic diverticulosis without evidence of acute diverticulitis. Mild to moderate colonic stool burden. No free air or free fluid.    LYMPH NODES: Normal.    VASCULATURE: Moderate aortoiliac atherosclerosis. Patent central SMA and SMV.    PELVIC ORGANS: Prostatectomy. No pelvic free fluid.    MUSCULOSKELETAL: Right gluteal intramuscular lipoma. Stable sclerotic osseous metastases involving the lower right scapula, multiple ribs and thoracolumbar spine. Old healed right rib fractures. Small fat-containing umbilical hernia.      Impression    IMPRESSION:   1.  Inflammatory changes of the bilateral renal pelves and proximal ureters, left greater than right. This is suspicious for an ascending urinary tract infection. Recommend laboratory correlation. No hydronephrosis or urinary tract stone.  2.  Stable sclerotic osseous metastases.     Recent Labs   Lab 05/31/25  1656 05/27/25  0753   WBC 6.7 7.6   HGB 12.3* 13.2*   MCV 87 90    169   INR 0.96  --     137   POTASSIUM 4.7 3.9   CHLORIDE 100 99   CO2 25 25   BUN 14.4 16.0   CR 1.05 0.95   ANIONGAP 11 13   AV 8.8 9.3   * 198*   ALBUMIN  --  4.2   PROTTOTAL  --  6.5   BILITOTAL  --  1.2   ALKPHOS  --  39*   ALT  --  22   AST  --  24

## 2025-06-01 NOTE — PLAN OF CARE
"Pertinent assessments: A&Ox4, Elevated B/P. Scheduled 5mg of Amlodipine & Atenolol given. MD aware. Additional 5 mg Amlodipine given. Denied Or SOB. B/P down to 128/78. PRN Tylenol given for back pain. Port flushed, heparinized & de-accessed.     Major Shift Events Discharging home    Goal Outcome Evaluation:  Plan of Care Reviewed With: patient  Overall Patient Progress: improvingOverall Patient Progress: improving  Outcome Evaluation: Discharging home.  Problem: Adult Inpatient Plan of Care  Goal: Plan of Care Review  Description: The Plan of Care Review/Shift note should be completed every shift.  The Outcome Evaluation is a brief statement about your assessment that the patient is improving, declining, or no change.  This information will be displayed automatically on your shift  note.  Outcome: Met  Flowsheets (Taken 6/1/2025 1447)  Outcome Evaluation: Discharging home.  Plan of Care Reviewed With: patient  Overall Patient Progress: improving  Goal: Patient-Specific Goal (Individualized)  Description: You can add care plan individualizations to a care plan. Examples of Individualization might be:  \"Parent requests to be called daily at 9am for status\", \"I have a hard time hearing out of my right ear\", or \"Do not touch me to wake me up as it startles  me\".  Outcome: Met  Goal: Absence of Hospital-Acquired Illness or Injury  Outcome: Met  Intervention: Identify and Manage Fall Risk  Recent Flowsheet Documentation  Taken 6/1/2025 0830 by Gauri Mixon RN  Safety Promotion/Fall Prevention:   activity supervised   nonskid shoes/slippers when out of bed   room organization consistent   safety round/check completed  Intervention: Prevent Skin Injury  Recent Flowsheet Documentation  Taken 6/1/2025 0830 by Gauri Mixon RN  Body Position: position changed independently  Intervention: Prevent and Manage VTE (Venous Thromboembolism) Risk  Recent Flowsheet Documentation  Taken 6/1/2025 0830 by Gauri Mixon RN  VTE " Patient returned call and form faxed back to her at the number she faxed from   Prevention/Management: compression stockings off  Intervention: Prevent Infection  Recent Flowsheet Documentation  Taken 6/1/2025 0830 by Gauri Mixon RN  Infection Prevention:   equipment surfaces disinfected   environmental surveillance performed   hand hygiene promoted  Goal: Optimal Comfort and Wellbeing  Outcome: Met  Intervention: Monitor Pain and Promote Comfort  Recent Flowsheet Documentation  Taken 6/1/2025 0830 by Gauri Mixon RN  Pain Management Interventions: declines  Goal: Readiness for Transition of Care  Outcome: Met     Problem: Delirium  Goal: Optimal Coping  Outcome: Met  Goal: Improved Behavioral Control  Outcome: Met  Intervention: Minimize Safety Risk  Recent Flowsheet Documentation  Taken 6/1/2025 0830 by Gauri Mixon RN  Enhanced Safety Measures: review medications for side effects with activity  Goal: Improved Attention and Thought Clarity  Outcome: Met  Goal: Improved Sleep  Outcome: Met

## 2025-06-01 NOTE — TELEPHONE ENCOUNTER
Reason for Call:  Appointment Request    Patient requesting this type of appt:  Hospital/ED Follow-Up     Requested provider: Abdiel Jones    Reason patient unable to be scheduled: Not within requested timeframe    When does patient want to be seen/preferred time: 3-5 days    Comments:   ED/ Hosp Follow Up   Which ED/Hosp: [facility] [place]  Why seen: Complicated UTI  Discharge Date: 06/01/2025    Please call the patient to schedule the ED follow-up.  Based on the appointment, nothing is available within the timeframe requested.  Please call tomorrow, 06/02, anytime.    Could we send this information to you in [email or web address] or would you prefer to receive a phone call?:   Patient would prefer a phone call   Okay to leave a detailed message?: Yes at Cell number on file:    Telephone Information:   Mobile [phone]       Call taken on 6/1/2025 at 6:31 PM by Mya oLomis

## 2025-06-01 NOTE — PROGRESS NOTES
[facility]  Hospitalist Progress Note  Admit 5/31/2025  4:14 PM    Name: Ronnie Lagos    MRN: [id number]  Provider:  Carlos Buchanan MD, [facility]    Date of Service: 06/01/2025     Reason for Stay (Diagnosis): Ascending UTI         Summary of hospital stay & Assessment/Plan:   Summary of Stay: Ronnie Lagos is a 69 year old male who was admitted on 5/31/2025    69-year-old male with metastatic prostate cancer on cabazitaxel (last dose 4 days ago) presented on 5/31/2025 with lower abdominal pain and intermittent hematuria. He is immunosuppressed due to chemotherapy. UA was suggestive of infection and CT abdomen/pelvis showed bilateral renal pelvis and proximal ureteral inflammation concerning for ascending UTI without hydronephrosis or stones. He remains afebrile with stable vitals and normal labs apart from hematuria. Admitted for observation and IV Rocephin, pending culture data. History includes hypertension, GERD, prior cholecystectomy, and stroke. He follows with [facility] oncology.     Ascending urinary tract infection, probable pyelonephritis  Continue ceftriaxone 2 g IV q24h  Monitor urine and blood culture results  Reassess clinically daily; consider inpatient conversion if worsening or febrile otherwise can discharge in next 24 hours  Immunosuppression secondary to chemotherapy (cabazitaxel for metastatic prostate cancer)  Monitor closely for signs of sepsis  Oncology follows at [facility]  Intermittent hematuria  Likely related to infection versus cancer; monitor for resolution  Repeat UA post-treatment if persistent  Metastatic prostate cancer  Bony mets stable on CT  Denosumab on hold due to dental issues  Hypertension  Resume amlodipine and atenolol  Pressure is elevated increase dose of amlodipine to 10 mg  GERD  Resume omeprazole      Clinically Significant Risk Factors Present on Admission                   # Hypertension: Noted on problem list           # Obesity: Estimated body mass  "index is 33.62 kg/m  as calculated from the following:    Height as of this encounter: 1.727 m (5' 8\").    Weight as of this encounter: 100.3 kg (221 lb 1.9 oz).                DVT Prophylaxis: Low Risk/Ambulatory with no VTE prophylaxis indicated  Code Status:  Full Code    Disposition Plan   Medically Ready for Discharge: Anticipated Today   prior living arrangement once urine culture result is back.     Entered: Carlos Buchanan MD 06/01/2025, 7:47 AM                 Interval History:       Denies new symptoms  Today's plan detailed above discussed with nursing               Physical Exam:   Physical Exam   Temp: 98.1  F (36.7  C) Temp src: Oral BP: (!) 173/81 Pulse: 66   Resp: 18 SpO2: 96 % O2 Device: None (Room air)    Vitals:    05/31/25 1612 06/01/25 0557   Weight: 103 kg (227 lb 1.2 oz) 100.3 kg (221 lb 1.9 oz)     No intake/output data recorded.          GENERAL:  Comfortable.   PSYCH: pleasant, oriented, No acute distress.  EYES: PERRLA, Normal conjunctiva.  HEART:  Normal S1, S2 with no edema.  LUNGS:  Clear to auscultation, normal Respiratory effort.  ABDOMEN:  Soft, no hepatosplenomegaly, normal bowel sounds.  SKIN:  Dry to touch, No rash.    Medications   Current Facility-Administered Medications   Medication Dose Route Frequency Provider Last Rate Last Admin     Current Facility-Administered Medications   Medication Dose Route Frequency Provider Last Rate Last Admin    amLODIPine (NORVASC) tablet 5 mg  5 mg Oral Daily Miah Cid MD        atenolol (TENORMIN) tablet 50 mg  50 mg Oral Daily Miah Cid MD        calcium citrate (CITRACAL) tablet 950 mg  950 mg Oral BID Miah Cid MD   950 mg at 05/31/25 2235    cefTRIAXone (ROCEPHIN) 2 g vial to attach to  ml bag for ADULTS or NS 50 ml bag for PEDS  2 g Intravenous Q24H Miah Cid MD        pantoprazole (PROTONIX) EC tablet 40 mg  40 mg Oral QAM AC Miah Cid MD   40 mg at 06/01/25 0651    predniSONE " (DELTASONE) tablet 10 mg  10 mg Oral Daily Miah Cid MD        traZODone (DESYREL) tablet 50 mg  50 mg Oral At Bedtime Miah Cid MD   50 mg at 05/31/25 2235    venlafaxine (EFFEXOR XR) 24 hr capsule 37.5 mg  37.5 mg Oral Daily Miah Cid MD         Data     -Data reviewed today:  I personally reviewed  all new labs and imaging results over the last 24 hours.    Recent Labs   Lab 06/01/25 0615 05/31/25 1656 05/27/25  0753   WBC 4.5 6.7 7.6   HGB 12.5* 12.3* 13.2*   HCT 36.0* 35.1* 36.6*   MCV 88 87 90    197 169     Recent Labs   Lab 06/01/25 0615 05/31/25 1656 05/27/25  0753    136 137   POTASSIUM 4.0 4.7 3.9   CHLORIDE 100 100 99   CO2 25 25 25   ANIONGAP 11 11 13   GLC 96 123* 198*   BUN 12.7 14.4 16.0   CR 0.81 1.05 0.95   GFRESTIMATED >90 77 87   AV 8.9 8.8 9.3       Recent Results (from the past 24 hours)   Abd/pelvis CT,  IV  contrast only TRAUMA / AAA    Narrative    EXAM: CT ABDOMEN PELVIS W CONTRAST  LOCATION: [facility]  DATE: 5/31/2025    INDICATION: Hematuria, suprapubic pain, metastatic prostate cancer  COMPARISON: CT 2/26/2025  TECHNIQUE: CT scan of the abdomen and pelvis was performed following injection of IV contrast. Multiplanar reformats were obtained. Dose reduction techniques were used.  CONTRAST: 65mL Isovue 370    FINDINGS:   LOWER CHEST: Normal.    HEPATOBILIARY: Cholecystectomy. Stable central hepatic atrophy. Normal bile ducts.    PANCREAS: Normal.    SPLEEN: Normal.    ADRENAL GLANDS: Normal.    KIDNEYS/BLADDER: Bilateral renal pelvis and proximal ureteral urothelial thickening and hyperenhancement. Associated bilateral peripelvic and perinephric edema, left greater than right. No hydronephrosis or obstructing ureteral stone. Normal bladder.    BOWEL: Duodenal diverticulum. No bowel obstruction or inflammatory process. Normal appendix. Colonic diverticulosis without evidence of acute diverticulitis. Mild to moderate colonic  stool burden. No free air or free fluid.    LYMPH NODES: Normal.    VASCULATURE: Moderate aortoiliac atherosclerosis. Patent central SMA and SMV.    PELVIC ORGANS: Prostatectomy. No pelvic free fluid.    MUSCULOSKELETAL: Right gluteal intramuscular lipoma. Stable sclerotic osseous metastases involving the lower right scapula, multiple ribs and thoracolumbar spine. Old healed right rib fractures. Small fat-containing umbilical hernia.      Impression    IMPRESSION:   1.  Inflammatory changes of the bilateral renal pelves and proximal ureters, left greater than right. This is suspicious for an ascending urinary tract infection. Recommend laboratory correlation. No hydronephrosis or urinary tract stone.  2.  Stable sclerotic osseous metastases.       This document was produced using voice recognition software

## 2025-06-01 NOTE — PLAN OF CARE
"Goal Outcome Evaluation:    Pertinent assessments: Aox4. BP elevated otherwise VSS on RA. SBA in room. Regular diet. Denies pain. LR @ 100 mL/hr through port. Q4H VS. Small abrasion to left side of penis. Sticky note placed requesting MD to consult WOC.   Major Shift Events Admitted to unit  Treatment Plan: Rocephin. Monitor for hematuria. Pain management. IVF.  Bedside Nurse: Tony Connolly RN       Plan of Care Reviewed With: patient    Overall Patient Progress: improvingOverall Patient Progress: improving    Outcome Evaluation: No pain reported. Admit to unit      Problem: Adult Inpatient Plan of Care  Goal: Plan of Care Review  Description: The Plan of Care Review/Shift note should be completed every shift.  The Outcome Evaluation is a brief statement about your assessment that the patient is improving, declining, or no change.  This information will be displayed automatically on your shift  note.  Outcome: Progressing  Flowsheets (Taken 6/1/2025 8838)  Outcome Evaluation: No pain reported. Admit to unit  Plan of Care Reviewed With: patient  Overall Patient Progress: improving  Goal: Patient-Specific Goal (Individualized)  Description: You can add care plan individualizations to a care plan. Examples of Individualization might be:  \"Parent requests to be called daily at 9am for status\", \"I have a hard time hearing out of my right ear\", or \"Do not touch me to wake me up as it startles  me\".  Outcome: Progressing  Goal: Absence of Hospital-Acquired Illness or Injury  Outcome: Progressing  Intervention: Identify and Manage Fall Risk  Recent Flowsheet Documentation  Taken 6/1/2025 0015 by Tony Connolly, RN  Safety Promotion/Fall Prevention:   activity supervised   nonskid shoes/slippers when out of bed   room organization consistent   safety round/check completed  Goal: Optimal Comfort and Wellbeing  Outcome: Progressing  Goal: Readiness for Transition of Care  Outcome: Progressing     Problem: " Delirium  Goal: Optimal Coping  Outcome: Progressing  Goal: Improved Behavioral Control  Outcome: Progressing  Intervention: Minimize Safety Risk  Recent Flowsheet Documentation  Taken 6/1/2025 0015 by Tony Connolly, RN  Enhanced Safety Measures: review medications for side effects with activity  Goal: Improved Attention and Thought Clarity  Outcome: Progressing  Goal: Improved Sleep  Outcome: Progressing

## 2025-06-01 NOTE — DISCHARGE SUMMARY
[facility]  Discharge Summary  Hospitalist      Date of Admission:  5/31/2025  Date of Discharge:  6/1/2025  Provider:  Carlos Buchanan MD. 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)  Intermittent hematuria, likely secondary to UTI or prostate cancer  Metastatic prostate cancer with bony metastases  Hypertension, uncontrolled  Gastroesophageal reflux disease (GERD)  History of cerebrovascular accident  History of cholecystectomy    Other medical issues:  Past Medical History:   Diagnosis Date    Abdominal pain     Actinic keratosis     Basal cell carcinoma     Calculus of bile duct     Elevated liver enzymes     Essential hypertension, benign     abstracted 6/19/02    Gastro-oesophageal reflux disease     GERD (gastroesophageal reflux disease) 07/28/2008    Liver disease     elevated liver enzymes    Prostate cancer (H)     bone and lymph nodes 2024    Squamous cell carcinoma     Unspecified cerebral artery occlusion with cerebral infarction     Unspecified condition of brain     abstracted 6/19/02         Please see the admission history and physical for full details.     Hospital Course     Ronnie Lagos was admitted on 5/31/2025.  The following problems were addressed during his hospitalization:    69-year-old male with metastatic prostate cancer on cabazitaxel (last dose 4 days prior) presented on 5/31/2025 with lower abdominal pain and intermittent hematuria. He was afebrile with stable vital signs and labs, except for hematuria. Urinalysis suggested infection, and CT abdomen/pelvis revealed bilateral renal pelvis and proximal ureteral inflammation, consistent with ascending UTI/presumed pyelonephritis, without hydronephrosis or stones.  Given his immunosuppression from recent chemotherapy, he was admitted for IV ceftriaxone and observation. He remained clinically stable with no signs of sepsis; blood and urine cultures remained negative at the time of discharge planning. The patient expressed interest in discharge, and as he remained afebrile with no systemic inflammatory response, he was discharged on oral ciprofloxacin with close outpatient follow-up instructions.  Discharge Plan:  Ciprofloxacin 500 mg PO BID for 7 days, pending final urine culture  PCP follow-up within 1-2 days to review culture results and adjust antibiotics if needed  Follow up with oncology and urology for ongoing cancer-related hematuria evaluation if urine cultures are negative  Pending Results   Unresulted Labs Ordered in the Past 30 Days of this Admission       Date and Time Order Name Status Description    5/31/2025  7:53 PM Blood Culture Peripheral blood (BC) Arm, Left Preliminary     5/31/2025  7:53 PM Blood Culture Peripheral blood (BC) Hand, Right Preliminary     5/31/2025  5:25 PM Urine Culture In process             Discharge Orders      Reason for your hospital stay    Complicated UTI     Activity    Your activity upon discharge: activity as tolerated     Diet    Follow this diet upon discharge: Current Diet:Orders Placed This Encounter      Regular Diet Adult     Hospital Follow-up with Existing Primary Care Provider (PCP)            Code Status   Full Code       Primary Care Physician   Abdiel Jones    Physical Exam   Temp: 98.1  F (36.7  C) Temp src: Oral BP: (!) 173/81 Pulse: 71   Resp: 18 SpO2: 96 % O2 Device: None (Room air)    Vitals:    05/31/25 1612 06/01/25 0557   Weight: 103 kg (227 lb 1.2 oz) 100.3 kg (221 lb 1.9 oz)     Vital Signs with Ranges  Temp:  [97  F (36.1  C)-98.6  F (37  C)] 98.1  F (36.7  C)  Pulse:  [62-72] 71  Resp:  [16-20] 18  BP: (149-200)/(56-96) 173/81  SpO2:  [95 %-99 %] 96 %  No intake/output data recorded.    Constitutional:   alert, cooperative, no apparent distress  Respiratory: No increased work of breathing, good air exchange, no crackles or wheezing.  Cardiovascular: apical impulse,normal S1 and S2  GI: bowel sounds present, soft, non-distended, non-tender      Discharge Disposition   Discharged to home    Consultations This Hospital Stay   None    Time Spent on this Encounter   ICarlos MD, personally saw the patient today and spent greater than 30 minutes discharging this patient.      Discharge Medications   Current Discharge Medication List        START taking these medications    Details   ciprofloxacin (CIPRO) 250 MG tablet Take 1 tablet (250 mg) by mouth 2 times daily.  Qty: 14 tablet, Refills: 0    Associated Diagnoses: Complicated UTI (urinary tract infection)           CONTINUE these medications which have NOT CHANGED    Details   amLODIPine (NORVASC) 5 MG tablet Take 1 tablet (5 mg) by mouth daily  Qty: 90 tablet, Refills: 3    Comments: Profile Rx: patient will contact pharmacy when needed  Associated Diagnoses: Essential hypertension, benign      atenolol (TENORMIN) 50 MG tablet Take 1 tablet (50 mg) by mouth daily  Qty: 90 tablet, Refills: 3    Comments: Profile Rx: patient will contact pharmacy when needed  Associated Diagnoses: Essential hypertension, benign      calcium citrate (CITRACAL) 950 (200 Ca) MG tablet Take 1 tablet by mouth 2 times daily      omeprazole (PRILOSEC) 20 MG DR capsule Take 1 capsule (20 mg) by mouth daily  Qty: 90 capsule, Refills: 3    Comments: Profile Rx: patient will contact pharmacy when needed  Associated Diagnoses: Gastroesophageal reflux disease without esophagitis      predniSONE (DELTASONE) 10 MG tablet Take 1 tablet (10 mg) by mouth daily.  Qty: 90 tablet, Refills: 0    Associated Diagnoses: Prostate cancer metastatic to multiple sites (H)      prochlorperazine (COMPAZINE) 10 MG tablet Take 1 tablet (10 mg) by mouth every 6 hours as needed for nausea or vomiting.  Qty:  30 tablet, Refills: 2    Associated Diagnoses: Prostate cancer metastatic to multiple sites (H)      traZODone (DESYREL) 100 MG tablet Take 0.5 tablets (50 mg) by mouth at bedtime.  Qty: 30 tablet, Refills: 3    Associated Diagnoses: Insomnia, unspecified type      venlafaxine (EFFEXOR XR) 37.5 MG 24 hr capsule Take 1 capsule (37.5 mg) by mouth daily.  Qty: 90 capsule, Refills: 3    Associated Diagnoses: Persistent insomnia; Hot flash in male      Vitamin D, Cholecalciferol, 10 MCG (400 UNIT) TABS Take 10 mcg by mouth 2 times daily.           Allergies   Allergies   Allergen Reactions    Taxotere [Docetaxel]      Data   Most Recent 3 CBC's:  Recent Labs   Lab Test 06/01/25  0615 05/31/25  1656 05/27/25  0753   WBC 4.5 6.7 7.6   HGB 12.5* 12.3* 13.2*   MCV 88 87 90    197 169      Most Recent 3 BMP's:  Recent Labs   Lab Test 06/01/25  0615 05/31/25  1656 05/27/25  0753    136 137   POTASSIUM 4.0 4.7 3.9   CHLORIDE 100 100 99   CO2 25 25 25   BUN 12.7 14.4 16.0   CR 0.81 1.05 0.95   ANIONGAP 11 11 13   AV 8.9 8.8 9.3   GLC 96 123* 198*     Most Recent 2 LFT's:  Recent Labs   Lab Test 05/27/25  0753 05/05/25  1423   AST 24 29   ALT 22 24   ALKPHOS 39* 52   BILITOTAL 1.2 0.8     Most Recent INR's and Anticoagulation Dosing History:  Anticoagulation Dose History          Latest Ref Rng & Units 12/27/2017 1/16/2018 5/31/2025   Recent Dosing and Labs   INR 0.85 - 1.15 0.93  0.93  0.96      Most Recent 3 Troponin's:  Recent Labs   Lab Test 01/12/18  0350   TROPI <0.015     Most Recent Cholesterol Panel:  Recent Labs   Lab Test 06/12/24  0905   CHOL 175   LDL 88   HDL 57   TRIG 151*     Most Recent 6 Bacteria Isolates From Any Culture (See EPIC Reports for Culture Details):No lab results found.  Most Recent TSH, T4 and A1c Labs:  Recent Labs   Lab Test 06/12/24  0905   A1C 5.3     Results for orders placed or performed during the hospital encounter of 05/31/25   Abd/pelvis CT,  IV  contrast only TRAUMA / AAA     Narrative    EXAM: CT ABDOMEN PELVIS W CONTRAST  LOCATION: [facility]  DATE: 5/31/2025    INDICATION: Hematuria, suprapubic pain, metastatic prostate cancer  COMPARISON: CT 2/26/2025  TECHNIQUE: CT scan of the abdomen and pelvis was performed following injection of IV contrast. Multiplanar reformats were obtained. Dose reduction techniques were used.  CONTRAST: 65mL Isovue 370    FINDINGS:   LOWER CHEST: Normal.    HEPATOBILIARY: Cholecystectomy. Stable central hepatic atrophy. Normal bile ducts.    PANCREAS: Normal.    SPLEEN: Normal.    ADRENAL GLANDS: Normal.    KIDNEYS/BLADDER: Bilateral renal pelvis and proximal ureteral urothelial thickening and hyperenhancement. Associated bilateral peripelvic and perinephric edema, left greater than right. No hydronephrosis or obstructing ureteral stone. Normal bladder.    BOWEL: Duodenal diverticulum. No bowel obstruction or inflammatory process. Normal appendix. Colonic diverticulosis without evidence of acute diverticulitis. Mild to moderate colonic stool burden. No free air or free fluid.    LYMPH NODES: Normal.    VASCULATURE: Moderate aortoiliac atherosclerosis. Patent central SMA and SMV.    PELVIC ORGANS: Prostatectomy. No pelvic free fluid.    MUSCULOSKELETAL: Right gluteal intramuscular lipoma. Stable sclerotic osseous metastases involving the lower right scapula, multiple ribs and thoracolumbar spine. Old healed right rib fractures. Small fat-containing umbilical hernia.      Impression    IMPRESSION:   1.  Inflammatory changes of the bilateral renal pelves and proximal ureters, left greater than right. This is suspicious for an ascending urinary tract infection. Recommend laboratory correlation. No hydronephrosis or urinary tract stone.  2.  Stable sclerotic osseous metastases.           Disclaimer: This note consists of symbols derived from keyboarding, dictation and/or voice recognition software. As a result, there may be errors in the  script that have gone undetected. Please consider this when interpreting information found in this chart.

## 2025-06-01 NOTE — PHARMACY-ADMISSION MEDICATION HISTORY
Pharmacist Admission Medication History    Admission medication history is complete. The information provided in this note is only as accurate as the sources available at the time of the update.    Information Source(s): Patient via in-person, confirmed via [employer] dispense records    Pertinent Information: N/A    Changes made to PTA medication list:  Added: None  Deleted: None  Changed: Added regimen for Vit D    Allergies reviewed with patient and updates made in EHR: yes    Medication History Completed By: García Acevedo [facility] 5/31/2025 8:46 PM    PTA Med List   Medication Sig Last Dose/Taking    amLODIPine (NORVASC) 5 MG tablet Take 1 tablet (5 mg) by mouth daily 5/31/2025 Morning    atenolol (TENORMIN) 50 MG tablet Take 1 tablet (50 mg) by mouth daily 5/31/2025 Morning    calcium citrate (CITRACAL) 950 (200 Ca) MG tablet Take 1 tablet by mouth 2 times daily 5/31/2025 Morning    omeprazole (PRILOSEC) 20 MG DR capsule Take 1 capsule (20 mg) by mouth daily 5/31/2025 Morning    predniSONE (DELTASONE) 10 MG tablet Take 1 tablet (10 mg) by mouth daily. 5/31/2025 Morning    prochlorperazine (COMPAZINE) 10 MG tablet Take 1 tablet (10 mg) by mouth every 6 hours as needed for nausea or vomiting. More than a month    traZODone (DESYREL) 100 MG tablet Take 0.5 tablets (50 mg) by mouth at bedtime. 5/30/2025 Bedtime    venlafaxine (EFFEXOR XR) 37.5 MG 24 hr capsule Take 1 capsule (37.5 mg) by mouth daily. 5/31/2025 Morning    Vitamin D, Cholecalciferol, 10 MCG (400 UNIT) TABS Take 10 mcg by mouth 2 times daily. 5/31/2025 Morning

## 2025-06-01 NOTE — ED NOTES
[facility]  ED Nurse Handoff Report    ED Chief complaint: Abdominal Pain and Hematuria  . ED Diagnosis:   Final diagnoses:   Complicated UTI (urinary tract infection) - Concern for ascending UTI   Prostate cancer metastatic to bone (H)   S/P prostatectomy       Allergies:   Allergies   Allergen Reactions    Taxotere [Docetaxel]        Code Status: Full Code    Activity level - Baseline/Home:  independent.  Activity Level - Current:   standby.   Lift room needed: No.   Bariatric: No   Needed: No   Isolation: No.   Infection: Not Applicable.     Respiratory status: Room air    Vital Signs (within 30 minutes):   Vitals:    05/31/25 1730 05/31/25 1800 05/31/25 1830 05/31/25 1900   BP: (!) 153/73 (!) 156/81 (!) 155/78 (!) 164/77   Pulse: 64 64  71   Resp:       Temp:       TempSrc:       SpO2: 95% 96%  97%   Weight:       Height:           Cardiac Rhythm:  ,      Pain level:    Patient confused: No.   Patient Falls Risk: mobility aid in reach.   Elimination Status: Has voided     Patient Report - Initial Complaint: Pt arrives ambulatory into triage from  for ongoing lower abdominal pain and hematuria. Hx of prostate cancer on chemo. Last treatment done on Tuesday. Denies fevers or urinary retention. Is A&O x 4. .   Focused Assessment: abdominal pain     Abnormal Results:   Labs Ordered and Resulted from Time of ED Arrival to Time of ED Departure   BASIC METABOLIC PANEL - Abnormal       Result Value    Sodium 136      Potassium 4.7      Chloride 100      Carbon Dioxide (CO2) 25      Anion Gap 11      Urea Nitrogen 14.4      Creatinine 1.05      GFR Estimate 77      Calcium 8.8      Glucose 123 (*)    ROUTINE UA WITH MICROSCOPIC REFLEX TO CULTURE - Abnormal    Color Urine Light Yellow      Appearance Urine Clear      Glucose Urine 30 (*)     Bilirubin Urine Negative      Ketones Urine Negative      Specific Gravity Urine 1.017      Blood Urine Moderate (*)     pH Urine 6.5      Protein  Albumin Urine 50 (*)     Urobilinogen Urine Normal      Nitrite Urine Negative      Leukocyte Esterase Urine Small (*)     Mucus Urine Present (*)     RBC Urine >182 (*)     WBC Urine 19 (*)    CBC WITH PLATELETS AND DIFFERENTIAL - Abnormal    WBC Count 6.7      RBC Count 4.02 (*)     Hemoglobin 12.3 (*)     Hematocrit 35.1 (*)     MCV 87      MCH 30.6      MCHC 35.0      RDW 13.2      Platelet Count 197      % Neutrophils 85      % Lymphocytes 11      % Monocytes 3      % Eosinophils 1      % Basophils 0      % Immature Granulocytes 0      NRBCs per 100 WBC 0      Absolute Neutrophils 5.7      Absolute Lymphocytes 0.7 (*)     Absolute Monocytes 0.2      Absolute Eosinophils 0.0      Absolute Basophils 0.0      Absolute Immature Granulocytes 0.0      Absolute NRBCs 0.0     INR - Normal    INR 0.96      PT 12.9     TYPE AND SCREEN, ADULT    ABO/RH(D) B POS      Antibody Screen Negative      SPECIMEN EXPIRATION DATE 6/3/2025 11:59:00 PM CDT     URINE CULTURE   BLOOD CULTURE   BLOOD CULTURE   ABO/RH TYPE AND SCREEN        Abd/pelvis CT,  IV  contrast only TRAUMA / AAA   Final Result   IMPRESSION:    1.  Inflammatory changes of the bilateral renal pelves and proximal ureters, left greater than right. This is suspicious for an ascending urinary tract infection. Recommend laboratory correlation. No hydronephrosis or urinary tract stone.   2.  Stable sclerotic osseous metastases.          Treatments provided: labs, imaging, meds  Family Comments: none  OBS brochure/video discussed/provided to patient:  No  ED Medications:   Medications   cefTRIAXone (ROCEPHIN) 1 g vial to attach to  mL bag for ADULTS or NS 50 mL bag for PEDS (has no administration in time range)   sodium chloride 0.9 % bag for CT scan flush (65 mLs Intravenous $Given 5/31/25 1845)   iopamidol (ISOVUE-370) solution 500 mL (100 mLs Intravenous $Given 5/31/25 1845)       Drips infusing:  No  For the majority of the shift this patient was Green.    Interventions performed were none.    Sepsis treatment initiated: No    Cares/treatment/interventions/medications to be completed following ED care: blood cultures, antibiotics    ED Nurse Name: Roselia Wang RN  8:12 PM     RECEIVING UNIT ED HANDOFF REVIEW    Above ED Nurse Handoff Report was reviewed: Yes  Reviewed by: Leigh Gupta RN on May 31, 2025 at 9:53 PM   I Digna called the ED to inform them the note was read: Yes

## 2025-06-02 ENCOUNTER — OFFICE VISIT (OUTPATIENT)
Dept: INTERNAL MEDICINE | Facility: CLINIC | Age: 70
End: 2025-06-02
Payer: COMMERCIAL

## 2025-06-02 ENCOUNTER — PATIENT OUTREACH (OUTPATIENT)
Dept: CARE COORDINATION | Facility: CLINIC | Age: 70
End: 2025-06-02

## 2025-06-02 VITALS
HEIGHT: 68 IN | HEART RATE: 69 BPM | RESPIRATION RATE: 16 BRPM | OXYGEN SATURATION: 98 % | TEMPERATURE: 97.7 F | DIASTOLIC BLOOD PRESSURE: 86 MMHG | BODY MASS INDEX: 34.1 KG/M2 | WEIGHT: 225 LBS | SYSTOLIC BLOOD PRESSURE: 144 MMHG

## 2025-06-02 DIAGNOSIS — R31.0 GROSS HEMATURIA: ICD-10-CM

## 2025-06-02 DIAGNOSIS — M54.9 DISCOMFORT OF BACK: ICD-10-CM

## 2025-06-02 DIAGNOSIS — R10.31 BILATERAL LOWER ABDOMINAL DISCOMFORT: ICD-10-CM

## 2025-06-02 DIAGNOSIS — R10.32 BILATERAL LOWER ABDOMINAL DISCOMFORT: ICD-10-CM

## 2025-06-02 DIAGNOSIS — Z09 HOSPITAL DISCHARGE FOLLOW-UP: Primary | ICD-10-CM

## 2025-06-02 LAB — BACTERIA UR CULT: NO GROWTH

## 2025-06-02 PROCEDURE — 3077F SYST BP >= 140 MM HG: CPT | Performed by: INTERNAL MEDICINE

## 2025-06-02 PROCEDURE — 99495 TRANSJ CARE MGMT MOD F2F 14D: CPT | Performed by: INTERNAL MEDICINE

## 2025-06-02 PROCEDURE — 3079F DIAST BP 80-89 MM HG: CPT | Performed by: INTERNAL MEDICINE

## 2025-06-02 NOTE — PROGRESS NOTES
"  ASSESSMENT/PLAN      (Z09) Hospital discharge follow-up  (primary encounter diagnosis)  (R31.0) Gross hematuria  (R10.31,  R10.32) Bilateral lower abdominal discomfort  (M54.9) Discomfort of back  Comment: urine and blood cultures have since come back negative; ongoing antibiotic use not indicated; ongoing symptoms with no improvement with recent interventions.  Plan: STOP ciprofloxacin; will touch base with oncology to discuss patient's ongoing symptoms (are they, possibly, due to his chemotherapy and/or progression of prostate cancer?).    Ani pSencer MD   [address] [address]  T: [phone], F: [phone]    SUBJECTIVE     Ronnie Lagos is a very pleasant 69 year old male who presents for hospital follow-up:    Hospital: [facility]  Date of Admission: 5/31/2025  Date of Discharge: 6/1/2025  Reason(s) for Admission: ascending urinary tract infection, possible pyelonephritis    Diagnostic tests, treatments/interventions, and discharge summary reviewed.    Summary of hospitalization:  - presented with lower abdominal pain and intermittent hematuria  - immunosuppressed; on cabazitaxel for metastatic prostate CA (cycle #6 5/27/2025)  - UA suggestive of infection  - CT findings suggestive of ascending UTI/presumed pyelonephritis  - admitted and started on IV antibiotics; discharged on oral antibiotics    Medication changes since discharge: none  Adherent to discharge medications: yes  Problems taking discharge medications: no    Follow-up: n/a     Update since discharge:   - urine and blood cultures have since come back negative  - ongoing lower abdominal and lower back discomfort; no change with recent treatments  - continued hematuria     OBJECTIVE      BP (!) 144/86   Pulse 69   Temp 97.7  F (36.5  C) (Temporal)   Resp 16   Ht 1.727 m (5' 8\")   Wt 102.1 kg (225 lb)   SpO2 98%   BMI 34.21 kg/m    Constitutional: well-appearing  Respiratory: " normal respiratory effort; clear to auscultation bilaterally  Cardiovascular: regular rate and rhythm; no edema  Gastrointestinal: soft, non-tender, and non-distended; no organomegaly or masses   Musculoskeletal: normal gait and station  Psych: normal judgment and insight; normal mood and affect; recent and remote memory intact  ---  (Note was completed, in part, with [employer] voice-recognition software. Documentation was reviewed, but some grammatical, spelling, and word errors may remain.)

## 2025-06-02 NOTE — Clinical Note
Anabell Butler Barry's urine and blood cultures came back as negative, so I don't think we can blame his ongoing symptoms on an infection (wish we could - since that would be easy to treat). I'm stopping his cipro.   Is it possible that his ongoing symptoms and abnormal UA and CT findings are due to progressive prostate CA or his chemotherapy? Any recommendations to provide him some comfort?  Darnell

## 2025-06-02 NOTE — PROGRESS NOTES
[facility] Resource [facility]: [facility] [place]    Background: Transitional Care Management program identified per system criteria and reviewed by [facility] team for possible outreach.    Assessment: Upon chart review, [facility] Team member will not proceed with patient outreach related to this episode of Transitional Care Management program due to reason below:    Patient has a follow up appointment with an appropriate provider today for hospital discharge    Plan: Transitional Care Management episode addressed appropriately per reason noted above.      ISSAC Sullivan  [facility]    *Connected Care Resource Team does NOT follow patient ongoing. Referrals are identified based on internal discharge reports and the outreach is to ensure patient has an understanding of their discharge instructions.

## 2025-06-05 LAB
BACTERIA SPEC CULT: NO GROWTH
BACTERIA SPEC CULT: NO GROWTH

## 2025-06-05 SDOH — HEALTH STABILITY: PHYSICAL HEALTH: ON AVERAGE, HOW MANY DAYS PER WEEK DO YOU ENGAGE IN MODERATE TO STRENUOUS EXERCISE (LIKE A BRISK WALK)?: 3 DAYS

## 2025-06-05 SDOH — HEALTH STABILITY: PHYSICAL HEALTH: ON AVERAGE, HOW MANY MINUTES DO YOU ENGAGE IN EXERCISE AT THIS LEVEL?: 10 MIN

## 2025-06-05 ASSESSMENT — SOCIAL DETERMINANTS OF HEALTH (SDOH): HOW OFTEN DO YOU GET TOGETHER WITH FRIENDS OR RELATIVES?: ONCE A WEEK

## 2025-06-07 DIAGNOSIS — C61 PROSTATE CANCER METASTATIC TO BONE (H): Primary | ICD-10-CM

## 2025-06-07 DIAGNOSIS — C79.51 PROSTATE CANCER METASTATIC TO BONE (H): Primary | ICD-10-CM

## 2025-06-07 RX ORDER — PREDNISONE 5 MG/1
TABLET ORAL
Qty: 9 TABLET | Refills: 0 | Status: ON HOLD | OUTPATIENT
Start: 2025-06-07 | End: 2025-06-11

## 2025-06-10 ENCOUNTER — OFFICE VISIT (OUTPATIENT)
Dept: INTERNAL MEDICINE | Facility: CLINIC | Age: 70
End: 2025-06-10
Payer: COMMERCIAL

## 2025-06-10 VITALS
SYSTOLIC BLOOD PRESSURE: 131 MMHG | WEIGHT: 228 LBS | OXYGEN SATURATION: 98 % | DIASTOLIC BLOOD PRESSURE: 68 MMHG | RESPIRATION RATE: 17 BRPM | HEIGHT: 68 IN | BODY MASS INDEX: 34.56 KG/M2 | TEMPERATURE: 96.8 F | HEART RATE: 54 BPM

## 2025-06-10 DIAGNOSIS — R10.31 BILATERAL LOWER ABDOMINAL DISCOMFORT: ICD-10-CM

## 2025-06-10 DIAGNOSIS — R10.32 BILATERAL LOWER ABDOMINAL DISCOMFORT: ICD-10-CM

## 2025-06-10 DIAGNOSIS — Z13.6 CARDIOVASCULAR SCREENING; LDL GOAL LESS THAN 160: ICD-10-CM

## 2025-06-10 DIAGNOSIS — K21.9 GASTROESOPHAGEAL REFLUX DISEASE WITHOUT ESOPHAGITIS: ICD-10-CM

## 2025-06-10 DIAGNOSIS — I10 ESSENTIAL HYPERTENSION, BENIGN: ICD-10-CM

## 2025-06-10 DIAGNOSIS — Z00.00 ENCOUNTER FOR SUBSEQUENT ANNUAL WELLNESS VISIT IN MEDICARE PATIENT: Primary | ICD-10-CM

## 2025-06-10 DIAGNOSIS — Z12.11 COLON CANCER SCREENING: ICD-10-CM

## 2025-06-10 DIAGNOSIS — C61 PROSTATE CANCER METASTATIC TO MULTIPLE SITES (H): ICD-10-CM

## 2025-06-10 LAB
CHOLEST SERPL-MCNC: 187 MG/DL
FASTING STATUS PATIENT QL REPORTED: YES
HDLC SERPL-MCNC: 52 MG/DL
LDLC SERPL CALC-MCNC: 106 MG/DL
NONHDLC SERPL-MCNC: 135 MG/DL
TRIGL SERPL-MCNC: 146 MG/DL

## 2025-06-10 PROCEDURE — 3075F SYST BP GE 130 - 139MM HG: CPT | Performed by: INTERNAL MEDICINE

## 2025-06-10 PROCEDURE — G0439 PPPS, SUBSEQ VISIT: HCPCS | Performed by: INTERNAL MEDICINE

## 2025-06-10 PROCEDURE — 1125F AMNT PAIN NOTED PAIN PRSNT: CPT | Performed by: INTERNAL MEDICINE

## 2025-06-10 PROCEDURE — 36415 COLL VENOUS BLD VENIPUNCTURE: CPT | Performed by: INTERNAL MEDICINE

## 2025-06-10 PROCEDURE — 3078F DIAST BP <80 MM HG: CPT | Performed by: INTERNAL MEDICINE

## 2025-06-10 PROCEDURE — 99214 OFFICE O/P EST MOD 30 MIN: CPT | Mod: 25 | Performed by: INTERNAL MEDICINE

## 2025-06-10 PROCEDURE — 80061 LIPID PANEL: CPT | Performed by: INTERNAL MEDICINE

## 2025-06-10 PROCEDURE — 1111F DSCHRG MED/CURRENT MED MERGE: CPT | Performed by: INTERNAL MEDICINE

## 2025-06-10 RX ORDER — AMLODIPINE BESYLATE 5 MG/1
5 TABLET ORAL DAILY
Qty: 90 TABLET | Refills: 3 | Status: ON HOLD | OUTPATIENT
Start: 2025-06-10

## 2025-06-10 RX ORDER — ATENOLOL 50 MG/1
50 TABLET ORAL DAILY
Qty: 90 TABLET | Refills: 3 | Status: SHIPPED | OUTPATIENT
Start: 2025-06-10 | End: 2025-06-11

## 2025-06-10 RX ORDER — OMEPRAZOLE 20 MG/1
20 CAPSULE, DELAYED RELEASE ORAL DAILY
Qty: 90 CAPSULE | Refills: 3 | Status: ON HOLD | OUTPATIENT
Start: 2025-06-10

## 2025-06-10 ASSESSMENT — PAIN SCALES - GENERAL: PAINLEVEL_OUTOF10: MODERATE PAIN (5)

## 2025-06-10 NOTE — PATIENT INSTRUCTIONS
Patient Education   Preventive Care Advice   This is general advice given by our system to help you stay healthy. However, your care team may have specific advice just for you. Please talk to your care team about your preventive care needs.  Nutrition  Eat 5 or more servings of fruits and vegetables each day.  Try wheat bread, brown rice and whole grain pasta (instead of white bread, rice, and pasta).  Get enough calcium and vitamin D. Check the label on foods and aim for 100% of the RDA (recommended daily allowance).  Lifestyle  Exercise at least 150 minutes each week  (30 minutes a day, 5 days a week).  Do muscle strengthening activities 2 days a week. These help control your weight and prevent disease.  No smoking.  Wear sunscreen to prevent skin cancer.  Have a dental exam and cleaning every 6 months.  Yearly exams  See your health care team every year to talk about:  Any changes in your health.  Any medicines your care team has prescribed.  Preventive care, family planning, and ways to prevent chronic diseases.  Shots (vaccines)   HPV shots (up to age 26), if you've never had them before.  Hepatitis B shots (up to age 59), if you've never had them before.  COVID-19 shot: Get this shot when it's due.  Flu shot: Get a flu shot every year.  Tetanus shot: Get a tetanus shot every 10 years.  Pneumococcal, hepatitis A, and RSV shots: Ask your care team if you need these based on your risk.  Shingles shot (for age 50 and up)  General health tests  Diabetes screening:  Starting at age 35, Get screened for diabetes at least every 3 years.  If you are younger than age 35, ask your care team if you should be screened for diabetes.  Cholesterol test: At age 39, start having a cholesterol test every 5 years, or more often if advised.  Bone density scan (DEXA): At age 50, ask your care team if you should have this scan for osteoporosis (brittle bones).  Hepatitis C: Get tested at least once in your life.  STIs (sexually  transmitted infections)  Before age 24: Ask your care team if you should be screened for STIs.  After age 24: Get screened for STIs if you're at risk. You are at risk for STIs (including HIV) if:  You are sexually active with more than one person.  You don't use condoms every time.  You or a partner was diagnosed with a sexually transmitted infection.  If you are at risk for HIV, ask about PrEP medicine to prevent HIV.  Get tested for HIV at least once in your life, whether you are at risk for HIV or not.  Cancer screening tests  Cervical cancer screening: If you have a cervix, begin getting regular cervical cancer screening tests starting at age 21.  Breast cancer scan (mammogram): If you've ever had breasts, begin having regular mammograms starting at age 40. This is a scan to check for breast cancer.  Colon cancer screening: It is important to start screening for colon cancer at age 45.  Have a colonoscopy test every 10 years (or more often if you're at risk) Or, ask your provider about stool tests like a FIT test every year or Cologuard test every 3 years.  To learn more about your testing options, visit:   .  For help making a decision, visit:   https://bit.ly/uc24284.  Prostate cancer screening test: If you have a prostate, ask your care team if a prostate cancer screening test (PSA) at age 55 is right for you.  Lung cancer screening: If you are a current or former smoker ages 50 to 80, ask your care team if ongoing lung cancer screenings are right for you.  For informational purposes only. Not to replace the advice of your health care provider. Copyright   2023 [facility] [employer]. All rights reserved. Clinically reviewed by the [facility]. On The Net Yet 006563 - REV 01/24.  Hearing Loss: Care Instructions  Overview     Hearing loss is a sudden or slow decrease in how well you hear. It can range from slight to profound. Permanent hearing loss can occur with aging. It also can  happen when you are exposed long-term to loud noise. Examples include listening to loud music, riding motorcycles, or being around other loud machines.  Hearing loss can affect your work and home life. It can make you feel lonely or depressed. You may feel that you have lost your independence. But hearing aids and other devices can help you hear better and feel connected to others.  Follow-up care is a key part of your treatment and safety. Be sure to make and go to all appointments, and call your doctor if you are having problems. It's also a good idea to know your test results and keep a list of the medicines you take.  How can you care for yourself at home?  Avoid loud noises whenever possible. This helps keep your hearing from getting worse.  Always wear hearing protection around loud noises.  Wear a hearing aid as directed.  A professional can help you pick a hearing aid that will work best for you.  You can also get hearing aids over the counter for mild to moderate hearing loss.  Have hearing tests as your doctor suggests. They can show whether your hearing has changed. Your hearing aid may need to be adjusted.  Use other devices as needed. These may include:  Telephone amplifiers and hearing aids that can connect to a television, stereo, radio, or microphone.  Devices that use lights or vibrations. These alert you to the doorbell, a ringing telephone, or a baby monitor.  Television closed-captioning. This shows the words at the bottom of the screen. Most new TVs can do this.  TTY (text telephone). This lets you type messages back and forth on the telephone instead of talking or listening. These devices are also called TDD. When messages are typed on the keyboard, they are sent over the phone line to a receiving TTY. The message is shown on a monitor.  Use text messaging, social media, and email if it is hard for you to communicate by telephone.  Try to learn a listening technique called speechreading. It is  "not lipreading. You pay attention to people's gestures, expressions, posture, and tone of voice. These clues can help you understand what a person is saying. Face the person you are talking to, and have them face you. Make sure the lighting is good. You need to see the other person's face clearly.  Think about counseling if you need help to adjust to your hearing loss.  When should you call for help?  Watch closely for changes in your health, and be sure to contact your doctor if:    You think your hearing is getting worse.     You have new symptoms, such as dizziness or nausea.   Where can you learn more?  Go to https://www.[employer]/patiented  Enter R798 in the search box to learn more about \"Hearing Loss: Care Instructions.\"  Current as of: October 27, 2024  Content Version: 14.5    1520-0668 [employer].   Care instructions adapted under license by your healthcare professional. If you have questions about a medical condition or this instruction, always ask your healthcare professional. [employer] disclaims any warranty or liability for your use of this information.    Learning About Stress  What is stress?     Stress is your body's response to a hard situation. Your body can have a physical, emotional, or mental response. Stress is a fact of life for most people, and it affects everyone differently. What causes stress for you may not be stressful for someone else.  A lot of things can cause stress. You may feel stress when you go on a job interview, take a test, or run a race. This kind of short-term stress is normal and even useful. It can help you if you need to work hard or react quickly. For example, stress can help you finish an important job on time.  Long-term stress is caused by ongoing stressful situations or events. Examples of long-term stress include long-term health problems, ongoing problems at work, or conflicts in your family. Long-term stress can harm your " health.  How does stress affect your health?  When you are stressed, your body responds as though you are in danger. It makes hormones that speed up your heart, make you breathe faster, and give you a burst of energy. This is called the fight-or-flight stress response. If the stress is over quickly, your body goes back to normal and no harm is done.  But if stress happens too often or lasts too long, it can have bad effects. Long-term stress can make you more likely to get sick, and it can make symptoms of some diseases worse. If you tense up when you are stressed, you may develop neck, shoulder, or low back pain. Stress is linked to high blood pressure and heart disease.  Stress also harms your emotional health. It can make you rodriguez, tense, or depressed. Your relationships may suffer, and you may not do well at work or school.  What can you do to manage stress?  You can try these things to help manage stress:   Do something active. Exercise or activity can help reduce stress. Walking is a great way to get started. Even everyday activities such as housecleaning or yard work can help.  Try yoga or kristin chi. These techniques combine exercise and meditation. You may need some training at first to learn them.  Do something you enjoy. For example, listen to music or go to a movie. Practice your hobby or do volunteer work.  Meditate. This can help you relax, because you are not worrying about what happened before or what may happen in the future.  Do guided imagery. Imagine yourself in any setting that helps you feel calm. You can use online videos, books, or a teacher to guide you.  Do breathing exercises. For example:  From a standing position, bend forward from the waist with your knees slightly bent. Let your arms dangle close to the floor.  Breathe in slowly and deeply as you return to a standing position. Roll up slowly and lift your head last.  Hold your breath for just a few seconds in the standing  "position.  Breathe out slowly and bend forward from the waist.  Let your feelings out. Talk, laugh, cry, and express anger when you need to. Talking with supportive friends or family, a counselor, or a jennifer leader about your feelings is a healthy way to relieve stress. Avoid discussing your feelings with people who make you feel worse.  Write. It may help to write about things that are bothering you. This helps you find out how much stress you feel and what is causing it. When you know this, you can find better ways to cope.  What can you do to prevent stress?  You might try some of these things to help prevent stress:  Manage your time. This helps you find time to do the things you want and need to do.  Get enough sleep. Your body recovers from the stresses of the day while you are sleeping.  Get support. Your family, friends, and community can make a difference in how you experience stress.  Limit your news feed. Avoid or limit time on social media or news that may make you feel stressed.  Do something active. Exercise or activity can help reduce stress. Walking is a great way to get started.  Where can you learn more?  Go to https://www.[employer]etti.net/patiented  Enter N032 in the search box to learn more about \"Learning About Stress.\"  Current as of: October 24, 2024  Content Version: 14.5 2024-2025 [employer].   Care instructions adapted under license by your healthcare professional. If you have questions about a medical condition or this instruction, always ask your healthcare professional. [employer] disclaims any warranty or liability for your use of this information.    Learning About Sleeping Well  What does sleeping well mean?     Sleeping well means getting enough sleep to feel good and stay healthy. How much sleep is enough varies among people.  The number of hours you sleep and how you feel when you wake up are both important. If you do not feel refreshed, you probably need " "more sleep. Another sign of not getting enough sleep is feeling tired during the day.  Experts recommend that adults get at least 7 or more hours of sleep per day. Children and older adults need more sleep.  Why is getting enough sleep important?  Getting enough quality sleep is a basic part of good health. When your sleep suffers, your physical health, mood, and your thoughts can suffer too. You may find yourself feeling more grumpy or stressed. Not getting enough sleep also can lead to serious problems, including injury, accidents, anxiety, and depression.  What might cause poor sleeping?  Many things can cause sleep problems, including:  Changes to your sleep schedule.  Stress. Stress can be caused by fear about a single event, such as giving a speech. Or you may have ongoing stress, such as worry about work or school.  Depression, anxiety, and other mental or emotional conditions.  Changes in your sleep habits or surroundings. This includes changes that happen where you sleep, such as noise, light, or sleeping in a different bed. It also includes changes in your sleep pattern, such as having jet lag or working a late shift.  Health problems, such as pain, breathing problems, and restless legs syndrome.  Lack of regular exercise.  Using alcohol, nicotine, or caffeine before bed.  How can you help yourself?  Here are some tips that may help you sleep more soundly and wake up feeling more refreshed.  Your sleeping area   Use your bedroom only for sleeping and sex. A bit of light reading may help you fall asleep. But if it doesn't, do your reading elsewhere in the house. Try not to use your TV, computer, smartphone, or tablet while you are in bed.  Be sure your bed is big enough to stretch out comfortably, especially if you have a sleep partner.  Keep your bedroom quiet, dark, and cool. Use curtains, blinds, or a sleep mask to block out light. To block out noise, use earplugs, soothing music, or a \"white noise\" " "machine.  Your evening and bedtime routine   Create a relaxing bedtime routine. You might want to take a warm shower or bath, or listen to soothing music.  Go to bed at the same time every night. And get up at the same time every morning, even if you feel tired.  What to avoid   Limit caffeine (coffee, tea, caffeinated sodas) during the day, and don't have any for at least 6 hours before bedtime.  Avoid drinking alcohol before bedtime. Alcohol can cause you to wake up more often during the night.  Try not to smoke or use tobacco, especially in the evening. Nicotine can keep you awake.  Limit naps during the day, especially close to bedtime.  Avoid lying in bed awake for too long. If you can't fall asleep or if you wake up in the middle of the night and can't get back to sleep within about 20 minutes, get out of bed and go to another room until you feel sleepy.  Avoid taking medicine right before bed that may keep you awake or make you feel hyper or energized. Your doctor can tell you if your medicine may do this and if you can take it earlier in the day.  If you can't sleep   Imagine yourself in a peaceful, pleasant scene. Focus on the details and feelings of being in a place that is relaxing.  Get up and do a quiet or boring activity until you feel sleepy.  Avoid drinking any liquids before going to bed to help prevent waking up often to use the bathroom.  Where can you learn more?  Go to https://www.[employer].net/patiented  Enter J942 in the search box to learn more about \"Learning About Sleeping Well.\"  Current as of: July 31, 2024  Content Version: 14.5 2024-2025 [employer].   Care instructions adapted under license by your healthcare professional. If you have questions about a medical condition or this instruction, always ask your healthcare professional. [employer] disclaims any warranty or liability for your use of this information.    Bladder Training: Care Instructions  Your Care " Instructions     Bladder training is used to treat urge incontinence and stress incontinence. Urge incontinence means that the need to urinate comes on so fast that you can't get to a toilet in time. Stress incontinence means that you leak urine because of pressure on your bladder. For example, it may happen when you laugh, cough, or lift something heavy.  Bladder training can increase how long you can wait before you have to urinate. It can also help your bladder hold more urine. And it can give you better control over the urge to urinate.  It is important to remember that bladder training takes a few weeks to a few months to make a difference. You may not see results right away, but don't give up.  Follow-up care is a key part of your treatment and safety. Be sure to make and go to all appointments, and call your doctor if you are having problems. It's also a good idea to know your test results and keep a list of the medicines you take.  How can you care for yourself at home?  Work with your doctor to come up with a bladder training program that is right for you. You may use one or more of the following methods.  Delayed urination  In the beginning, try to keep from urinating for 5 minutes after you first feel the need to go.  While you wait, take deep, slow breaths to relax. Kegel exercises can also help you delay the need to go to the bathroom.  After some practice, when you can easily wait 5 minutes to urinate, try to wait 10 minutes before you urinate.  Slowly increase the waiting period until you are able to control when you have to urinate.  Scheduled urination  Empty your bladder when you first wake up in the morning.  Schedule times throughout the day when you will urinate.  Start by going to the bathroom every hour, even if you don't need to go.  Slowly increase the time between trips to the bathroom.  When you have found a schedule that works well for you, keep doing it.  If you wake up during the night  "and have to urinate, do it. Apply your schedule to waking hours only.  Kegel exercises  These tighten and strengthen pelvic muscles, which can help you control the flow of urine. (If doing these exercises causes pain, stop doing them and talk with your doctor.) To do Kegel exercises:  Squeeze your muscles as if you were trying not to pass gas. Or squeeze your muscles as if you were stopping the flow of urine. Your belly, legs, and buttocks shouldn't move.  Hold the squeeze for 3 seconds, then relax for 5 to 10 seconds.  Start with 3 seconds, then add 1 second each week until you are able to squeeze for 10 seconds.  Repeat the exercise 10 times a session. Do 3 to 8 sessions a day.  When should you call for help?  Watch closely for changes in your health, and be sure to contact your doctor if:    Your incontinence is getting worse.     You do not get better as expected.   Where can you learn more?  Go to https://www.Nativoo.net/patiented  Enter V684 in the search box to learn more about \"Bladder Training: Care Instructions.\"  Current as of: April 30, 2024  Content Version: 14.5    1682-0681 [employer].   Care instructions adapted under license by your healthcare professional. If you have questions about a medical condition or this instruction, always ask your healthcare professional. [employer] disclaims any warranty or liability for your use of this information.    Substance Use Disorder: Care Instructions  Overview     You can improve your life and health by stopping your use of alcohol or drugs. When you don't drink or use drugs, you may feel and sleep better. You may get along better with your family, friends, and coworkers. There are medicines and programs that can help with substance use disorder.  How can you care for yourself at home?  Here are some ways to help you stay sober and prevent relapse.  If you have been given medicine to help keep you sober or reduce your cravings, be " sure to take it exactly as prescribed.  Talk to your doctor about programs that can help you stop using drugs or drinking alcohol.  Do not keep alcohol or drugs in your home.  Plan ahead. Think about what you'll say if other people ask you to drink or use drugs. Try not to spend time with people who drink or use drugs.  Use the time and money spent on drinking or drugs to do something that's important to you.  Preventing a relapse  Have a plan to deal with relapse. Learn to recognize changes in your thinking that lead you to drink or use drugs. Get help before you start to drink or use drugs again.  Try to stay away from situations, friends, or places that may lead you to drink or use drugs.  If you feel the need to drink alcohol or use drugs again, seek help right away. Call a trusted friend or family member. Some people get support from organizations such as Narcotics Anonymous or F[employer] or from treatment facilities.  If you relapse, get help as soon as you can. Some people make a plan with another person that outlines what they want that person to do for them if they relapse. The plan usually includes how to handle the relapse and who to notify in case of relapse.  Don't give up. Remember that a relapse doesn't mean that you have failed. Use the experience to learn the triggers that lead you to drink or use drugs. Then quit again. Recovery is a lifelong process. Many people have several relapses before they are able to quit for good.  Follow-up care is a key part of your treatment and safety. Be sure to make and go to all appointments, and call your doctor if you are having problems. It's also a good idea to know your test results and keep a list of the medicines you take.  When should you call for help?   Call 911  anytime you think you may need emergency care. For example, call if you or someone else:    Has overdosed or has withdrawal signs. Be sure to tell the emergency workers that you are or someone  "else is using or trying to quit using drugs. Overdose or withdrawal signs may include:  Losing consciousness.  Seizure.  Seeing or hearing things that aren't there (hallucinations).     Is thinking or talking about suicide or harming others.   Where to get help 24 hours a day, 7 days a week   If you or someone you know talks about suicide, self-harm, a mental health crisis, a substance use crisis, or any other kind of emotional distress, get help right away. You can:    Call the Suicide and Crisis Lifeline at 738.     Call 7-545-025-SECG (1-952.455.2028).     Text HOME to 996948 to access the Crisis Text Line.   Consider saving these numbers in your phone.  Go to [employer] for more information or to chat online.  Call your doctor now or seek immediate medical care if:    You are having withdrawal symptoms. These may include nausea or vomiting, sweating, shakiness, and anxiety.   Watch closely for changes in your health, and be sure to contact your doctor if:    You have a relapse.     You need more help or support to stop.   Where can you learn more?  Go to https://www.[employer].net/patiented  Enter H573 in the search box to learn more about \"Substance Use Disorder: Care Instructions.\"  Current as of: August 20, 2024  Content Version: 14.5 2024-2025 [employer].   Care instructions adapted under license by your healthcare professional. If you have questions about a medical condition or this instruction, always ask your healthcare professional. [employer] disclaims any warranty or liability for your use of this information.       "

## 2025-06-10 NOTE — PROGRESS NOTES
"Preventive Care Visit  [facility]  Abdiel Jones MD, Internal Medicine  Melvin 10, 2025      Assessment & Plan     Encounter for subsequent annual wellness visit in Medicare patient  Advised patient to update routine vaccinations accordingly.    Essential hypertension, benign  Snoring stable on medical therapy and continue with meds as previously ordered  - amLODIPine (NORVASC) 5 MG tablet; Take 1 tablet (5 mg) by mouth daily.  - atenolol (TENORMIN) 50 MG tablet; Take 1 tablet (50 mg) by mouth daily.    Bilateral lower abdominal discomfort  Nonspecific abdominal symptoms.  Recent lab work reviewed with patient.  Suggest may need repeat CT imaging if symptoms do not improve.  Patient will keep me updated.  Follow-up oncology as directed.    Gastroesophageal reflux disease without esophagitis  Stable on PPI therapy.  - omeprazole (PRILOSEC) 20 MG DR capsule; Take 1 capsule (20 mg) by mouth daily.    CARDIOVASCULAR SCREENING; LDL GOAL LESS THAN 160  Labs ordered as fasting per routine.  - Lipid Profile; Future    Prostate cancer metastatic to multiple sites (H)  Oncology with ongoing therapy per recommendations.  Continuing with prednisone premedication.    Colon cancer screening  Prior colonoscopy reviewed.    Patient has been advised of split billing requirements and indicates understanding: Yes      MED REC REQUIRED  Post Medication Reconciliation Status: discharge medications reconciled, continue medications without change  BMI  Estimated body mass index is 34.67 kg/m  as calculated from the following:    Height as of this encounter: 1.727 m (5' 8\").    Weight as of this encounter: 103.4 kg (228 lb).   Weight management plan: Discussed healthy diet and exercise guidelines    Counseling  Appropriate preventive services were addressed with this patient via screening, questionnaire, or discussion as appropriate for fall prevention, nutrition, physical activity, Tobacco-use cessation, social " engagement, weight loss and cognition.  Checklist reviewing preventive services available has been given to the patient.  Reviewed patient's diet, addressing concerns and/or questions.   He is at risk for lack of exercise and has been provided with information to increase physical activity for the benefit of his well-being.   He is at risk for psychosocial distress and has been provided with information to reduce risk.   Discussed possible causes of fatigue. The patient was provided with written information regarding signs of hearing loss.   Information on urinary incontinence and treatment options given to patient.       Blue Trujillo is a 70 year old, presenting for the following:  Wellness Visit     HPI       Other concerns:  Hematuria, left abdominal and lower back discomfort 5/10 pain today. Patient questioning if there is anything he can use for pain. No relief with tylenol.  This is the same discomfort he had when he was in the hospital for which imaging was done about 7 to 10 days ago.  He states he had no symptoms yesterday.  This morning he started with symptoms again now that are slightly improving on the right side but still present on the left.  He reports ongoing issues with hematuria.  He has follow-up appointment scheduled with his oncologist.      Advance Care Planning  Discussed advance care planning with patient; informed AVS has link to [employer].        6/5/2025   General Health   How would you rate your overall physical health? Good   Feel stress (tense, anxious, or unable to sleep) Rather much   (!) STRESS CONCERN      6/5/2025   Nutrition   Diet: Regular (no restrictions)         6/5/2025   Exercise   Days per week of moderate/strenous exercise 3 days   Average minutes spent exercising at this level 10 min         6/5/2025   Social Factors   Frequency of gathering with friends or relatives Once a week   Worry food won't last until get money to buy more No   Food not last or not  have enough money for food? No   Do you have housing? (Housing is defined as stable permanent housing and does not include staying outside in a car, in a tent, in an abandoned building, in an overnight shelter, or couch-surfing.) Yes   Are you worried about losing your housing? No   Lack of transportation? No   Unable to get utilities (heat,electricity)? No         6/5/2025   Fall Risk   Fallen 2 or more times in the past year? No   Trouble with walking or balance? No          6/5/2025   Activities of Daily Living- Home Safety   Needs help with the following daily activites None of the above   Safety concerns in the home None of the above         6/5/2025   Dental   Dentist two times every year? Yes         6/5/2025   Hearing Screening   Hearing concerns? (!) I NEED TO ASK PEOPLE TO SPEAK UP OR REPEAT THEMSELVES.    (!) IT'S HARD TO FOLLOW A CONVERSATION IN A NOISY RESTAURANT OR CROWDED ROOM.       Multiple values from one day are sorted in reverse-chronological order         6/5/2025   Driving Risk Screening   Patient/family members have concerns about driving No         6/5/2025   General Alertness/Fatigue Screening   Have you been more tired than usual lately? (!) YES         6/5/2025   Urinary Incontinence Screening   Bothered by leaking urine in past 6 months Yes         Today's PHQ-2 Score:       6/9/2025     4:38 PM   PHQ-2 ( 1999 [employer])   Q1: Little interest or pleasure in doing things 0   Q2: Feeling down, depressed or hopeless 0   PHQ-2 Score 0    Q1: Little interest or pleasure in doing things Not at all   Q2: Feeling down, depressed or hopeless Not at all   PHQ-2 Score 0       Patient-reported           6/5/2025   Substance Use   Alcohol more than 3/day or more than 7/wk Not Applicable   Do you have a current opioid prescription? No   How severe/bad is pain from 1 to 10? 5/10   Do you use any other substances recreationally? (!) PRESCRIPTION DRUGS     Social History     Tobacco Use    Smoking status:  Never    Smokeless tobacco: Never   Vaping Use    Vaping status: Never Used   Substance Use Topics    Alcohol use: Yes     Alcohol/week: 2.0 standard drinks of alcohol     Types: 2 Standard drinks or equivalent per week    Drug use: Never         6/5/2025   AAA Screening   Family history of Abdominal Aortic Aneurysm (AAA)? (!) YES    Last PSA:   PSA   Date Value Ref Range Status   04/06/2021 1.51 0 - 4 ug/L Final     Comment:     Assay Method:  Chemiluminescence using Siemens Vista analyzer     Prostate Specific Antigen Screen   Date Value Ref Range Status   05/10/2022 3.44 0.00 - 4.00 ug/L Final     PSA Tumor Marker   Date Value Ref Range Status   05/27/2025 0.26 0.00 - 4.50 ng/mL Final     ASCVD Risk   The ASCVD Risk score (Ángel RYAN, et al., 2019) failed to calculate for the following reasons:    Risk score cannot be calculated because patient has a medical history suggesting prior/existing ASCVD      Reviewed and updated as needed this visit by Provider                    Lab work is in process  Current providers sharing in care for this patient include:  Patient Care Team:  Abdiel Jones MD as PCP - General  Abdiel Jones MD as Assigned PCP  Burton Park MD as MD (Dermatology)  Perico Murrieta MD as MD (Dermatology)  Sophia Ramirez MD as MD (Urology)  Anabell Mercedes MD as MD (Hematology & Oncology)  Anabell Mercedes MD as MD (Hematology & Oncology)  Halina Sprague, RN as Specialty Care Coordinator (Hematology & Oncology)  Anabell Mercedes MD as Assigned Cancer Care Provider  Perico Murrieta MD as Assigned Dermatology Provider    The following health maintenance items are reviewed in Epic and correct as of today:  Health Maintenance   Topic Date Due    ANNUAL REVIEW OF HM ORDERS  05/10/2023    COVID-19 VACCINE (8 - Moderna risk 2024-25 season) 03/07/2025    BMP  06/01/2026    MEDICARE ANNUAL WELLNESS VISIT  06/10/2026    FALL RISK ASSESSMENT  06/10/2026    DIABETES SCREENING  06/01/2028  "   LIPID  06/12/2029    COLORECTAL CANCER SCREENING  12/13/2029    ADVANCE CARE PLANNING  06/10/2030    DTAP/TDAP/TD VACCINE (3 - Td or Tdap) 09/24/2034    HEPATITIS C SCREENING  Completed    PHQ-2 (once per calendar year)  Completed    INFLUENZA VACCINE  Completed    PNEUMOCOCCAL VACCINE 50+ YEARS  Completed    ZOSTER VACCINE  Completed    RSV VACCINE  Completed    HPV VACCINE  Aged Out    MENINGITIS VACCINE  Aged Out       Review of Systems  CONSTITUTIONAL: NEGATIVE for fever, chills, change in weight  EYES: NEGATIVE for vision changes or irritation  ENT/MOUTH: NEGATIVE for ear, mouth and throat problems  RESP: NEGATIVE for significant cough or SOB  CV: NEGATIVE for chest pain, palpitations or peripheral edema  : NEGATIVE for frequency, dysuria, or hematuria  MUSCULOSKELETAL: NEGATIVE for significant arthralgias or myalgia  NEURO: NEGATIVE for weakness, dizziness or paresthesias  ENDOCRINE: NEGATIVE for temperature intolerance, skin/hair changes  HEME: NEGATIVE for bleeding problems  PSYCHIATRIC: NEGATIVE for changes in mood or affect     Objective    Exam  /68   Pulse 54   Temp 96.8  F (36  C) (Temporal)   Resp 17   Ht 1.727 m (5' 8\")   Wt 103.4 kg (228 lb)   SpO2 98%   BMI 34.67 kg/m     Estimated body mass index is 34.67 kg/m  as calculated from the following:    Height as of this encounter: 1.727 m (5' 8\").    Weight as of this encounter: 103.4 kg (228 lb).    Physical Exam  GENERAL: alert and no distress  EYES: Eyes grossly normal to inspection, PERRL and conjunctivae and sclerae normal  HENT: ear canals and TM's normal, nose and mouth without ulcers or lesions  RESP: lungs clear to auscultation - no rales, rhonchi or wheezes  CV: regular rate and rhythm, normal S1 S2, no S3 or S4, no click or rub, no peripheral edema  ABDOMEN: Mild obesity is noted.  The abdomen is without distinct tenderness, rebound or guarding.  MS: no gross musculoskeletal defects noted  NEURO: No focal changes  PSYCH: " mentation appears normal, affect normal/bright        6/10/2025   Mini Cog   Mini-Cog Not Completed (choose reason) Patient declines        Signed Electronically by: Abdiel Jones MD

## 2025-06-11 ENCOUNTER — APPOINTMENT (OUTPATIENT)
Dept: CT IMAGING | Facility: CLINIC | Age: 70
End: 2025-06-11
Attending: SOCIAL WORKER
Payer: COMMERCIAL

## 2025-06-11 ENCOUNTER — TELEPHONE (OUTPATIENT)
Dept: ONCOLOGY | Facility: CLINIC | Age: 70
End: 2025-06-11
Payer: COMMERCIAL

## 2025-06-11 ENCOUNTER — HOSPITAL ENCOUNTER (INPATIENT)
Facility: CLINIC | Age: 70
End: 2025-06-11
Attending: SOCIAL WORKER | Admitting: INTERNAL MEDICINE
Payer: COMMERCIAL

## 2025-06-11 ENCOUNTER — RESULTS FOLLOW-UP (OUTPATIENT)
Dept: INTERNAL MEDICINE | Facility: CLINIC | Age: 70
End: 2025-06-11

## 2025-06-11 ENCOUNTER — TELEPHONE (OUTPATIENT)
Dept: INTERNAL MEDICINE | Facility: CLINIC | Age: 70
End: 2025-06-11
Payer: COMMERCIAL

## 2025-06-11 DIAGNOSIS — Z85.46 HISTORY OF PROSTATE CANCER: ICD-10-CM

## 2025-06-11 DIAGNOSIS — I10 ESSENTIAL HYPERTENSION, BENIGN: ICD-10-CM

## 2025-06-11 DIAGNOSIS — N12 PYELONEPHRITIS: ICD-10-CM

## 2025-06-11 LAB
ALBUMIN UR-MCNC: 70 MG/DL
ANION GAP SERPL CALCULATED.3IONS-SCNC: 12 MMOL/L (ref 7–15)
APPEARANCE UR: ABNORMAL
BACTERIA #/AREA URNS HPF: ABNORMAL /HPF
BASOPHILS # BLD AUTO: 0 10E3/UL (ref 0–0.2)
BASOPHILS NFR BLD AUTO: 0 %
BILIRUB UR QL STRIP: NEGATIVE
BUN SERPL-MCNC: 14.8 MG/DL (ref 8–23)
CALCIUM SERPL-MCNC: 9.6 MG/DL (ref 8.8–10.4)
CHLORIDE SERPL-SCNC: 98 MMOL/L (ref 98–107)
COLOR UR AUTO: ABNORMAL
CREAT SERPL-MCNC: 1.14 MG/DL (ref 0.67–1.17)
EGFRCR SERPLBLD CKD-EPI 2021: 70 ML/MIN/1.73M2
EOSINOPHIL # BLD AUTO: 0 10E3/UL (ref 0–0.7)
EOSINOPHIL NFR BLD AUTO: 0 %
ERYTHROCYTE [DISTWIDTH] IN BLOOD BY AUTOMATED COUNT: 13.8 % (ref 10–15)
GLUCOSE SERPL-MCNC: 147 MG/DL (ref 70–99)
GLUCOSE UR STRIP-MCNC: NEGATIVE MG/DL
HCO3 SERPL-SCNC: 27 MMOL/L (ref 22–29)
HCT VFR BLD AUTO: 35.9 % (ref 40–53)
HGB BLD-MCNC: 12.4 G/DL (ref 13.3–17.7)
HGB UR QL STRIP: ABNORMAL
HOLD SPECIMEN: NORMAL
HOLD SPECIMEN: NORMAL
IMM GRANULOCYTES # BLD: 0.1 10E3/UL
IMM GRANULOCYTES NFR BLD: 1 %
KETONES UR STRIP-MCNC: NEGATIVE MG/DL
LEUKOCYTE ESTERASE UR QL STRIP: ABNORMAL
LYMPHOCYTES # BLD AUTO: 0.9 10E3/UL (ref 0.8–5.3)
LYMPHOCYTES NFR BLD AUTO: 12 %
MCH RBC QN AUTO: 30.5 PG (ref 26.5–33)
MCHC RBC AUTO-ENTMCNC: 34.5 G/DL (ref 31.5–36.5)
MCV RBC AUTO: 88 FL (ref 78–100)
MONOCYTES # BLD AUTO: 0.8 10E3/UL (ref 0–1.3)
MONOCYTES NFR BLD AUTO: 11 %
MUCOUS THREADS #/AREA URNS LPF: PRESENT /LPF
NEUTROPHILS # BLD AUTO: 5.8 10E3/UL (ref 1.6–8.3)
NEUTROPHILS NFR BLD AUTO: 76 %
NITRATE UR QL: NEGATIVE
NRBC # BLD AUTO: 0 10E3/UL
NRBC BLD AUTO-RTO: 0 /100
PH UR STRIP: 6 [PH] (ref 5–7)
PLATELET # BLD AUTO: 250 10E3/UL (ref 150–450)
POTASSIUM SERPL-SCNC: 4.3 MMOL/L (ref 3.4–5.3)
RBC # BLD AUTO: 4.06 10E6/UL (ref 4.4–5.9)
RBC URINE: 80 /HPF
SODIUM SERPL-SCNC: 137 MMOL/L (ref 135–145)
SP GR UR STRIP: 1.01 (ref 1–1.03)
SQUAMOUS EPITHELIAL: <1 /HPF
TRANSITIONAL EPI: <1 /HPF
UROBILINOGEN UR STRIP-MCNC: NORMAL MG/DL
WBC # BLD AUTO: 7.6 10E3/UL (ref 4–11)
WBC URINE: 68 /HPF

## 2025-06-11 PROCEDURE — 36415 COLL VENOUS BLD VENIPUNCTURE: CPT | Performed by: SOCIAL WORKER

## 2025-06-11 PROCEDURE — 81001 URINALYSIS AUTO W/SCOPE: CPT | Performed by: SOCIAL WORKER

## 2025-06-11 PROCEDURE — 120N000001 HC R&B MED SURG/OB

## 2025-06-11 PROCEDURE — 99285 EMERGENCY DEPT VISIT HI MDM: CPT | Mod: 25

## 2025-06-11 PROCEDURE — 74177 CT ABD & PELVIS W/CONTRAST: CPT

## 2025-06-11 PROCEDURE — 250N000011 HC RX IP 250 OP 636: Performed by: SOCIAL WORKER

## 2025-06-11 PROCEDURE — 80048 BASIC METABOLIC PNL TOTAL CA: CPT | Performed by: SOCIAL WORKER

## 2025-06-11 PROCEDURE — 250N000011 HC RX IP 250 OP 636: Performed by: NURSE PRACTITIONER

## 2025-06-11 PROCEDURE — 87086 URINE CULTURE/COLONY COUNT: CPT | Performed by: SOCIAL WORKER

## 2025-06-11 PROCEDURE — 87040 BLOOD CULTURE FOR BACTERIA: CPT | Performed by: SOCIAL WORKER

## 2025-06-11 PROCEDURE — 250N000009 HC RX 250: Performed by: SOCIAL WORKER

## 2025-06-11 PROCEDURE — 258N000003 HC RX IP 258 OP 636: Performed by: NURSE PRACTITIONER

## 2025-06-11 PROCEDURE — 85004 AUTOMATED DIFF WBC COUNT: CPT | Performed by: SOCIAL WORKER

## 2025-06-11 PROCEDURE — 99223 1ST HOSP IP/OBS HIGH 75: CPT | Performed by: NURSE PRACTITIONER

## 2025-06-11 PROCEDURE — 250N000013 HC RX MED GY IP 250 OP 250 PS 637: Performed by: NURSE PRACTITIONER

## 2025-06-11 RX ORDER — PANTOPRAZOLE SODIUM 40 MG/1
40 TABLET, DELAYED RELEASE ORAL DAILY
Status: DISCONTINUED | OUTPATIENT
Start: 2025-06-11 | End: 2025-06-15 | Stop reason: HOSPADM

## 2025-06-11 RX ORDER — AMOXICILLIN 250 MG
1 CAPSULE ORAL 2 TIMES DAILY
Status: DISCONTINUED | OUTPATIENT
Start: 2025-06-11 | End: 2025-06-15 | Stop reason: HOSPADM

## 2025-06-11 RX ORDER — ACETAMINOPHEN 325 MG/1
650 TABLET ORAL EVERY 4 HOURS PRN
Status: DISCONTINUED | OUTPATIENT
Start: 2025-06-11 | End: 2025-06-15 | Stop reason: HOSPADM

## 2025-06-11 RX ORDER — LIDOCAINE 40 MG/G
CREAM TOPICAL
Status: DISCONTINUED | OUTPATIENT
Start: 2025-06-11 | End: 2025-06-15 | Stop reason: HOSPADM

## 2025-06-11 RX ORDER — NALOXONE HYDROCHLORIDE 0.4 MG/ML
0.2 INJECTION, SOLUTION INTRAMUSCULAR; INTRAVENOUS; SUBCUTANEOUS
Status: DISCONTINUED | OUTPATIENT
Start: 2025-06-11 | End: 2025-06-15 | Stop reason: HOSPADM

## 2025-06-11 RX ORDER — ACETAMINOPHEN 650 MG/1
650 SUPPOSITORY RECTAL EVERY 4 HOURS PRN
Status: DISCONTINUED | OUTPATIENT
Start: 2025-06-11 | End: 2025-06-15 | Stop reason: HOSPADM

## 2025-06-11 RX ORDER — POLYETHYLENE GLYCOL 3350 17 G/17G
17 POWDER, FOR SOLUTION ORAL 2 TIMES DAILY PRN
Status: DISCONTINUED | OUTPATIENT
Start: 2025-06-11 | End: 2025-06-15 | Stop reason: HOSPADM

## 2025-06-11 RX ORDER — NALOXONE HYDROCHLORIDE 0.4 MG/ML
0.4 INJECTION, SOLUTION INTRAMUSCULAR; INTRAVENOUS; SUBCUTANEOUS
Status: DISCONTINUED | OUTPATIENT
Start: 2025-06-11 | End: 2025-06-15 | Stop reason: HOSPADM

## 2025-06-11 RX ORDER — PREDNISONE 5 MG/1
10 TABLET ORAL DAILY
Status: DISCONTINUED | OUTPATIENT
Start: 2025-06-12 | End: 2025-06-15 | Stop reason: HOSPADM

## 2025-06-11 RX ORDER — ATENOLOL 25 MG/1
50 TABLET ORAL DAILY
Status: DISCONTINUED | OUTPATIENT
Start: 2025-06-12 | End: 2025-06-15 | Stop reason: HOSPADM

## 2025-06-11 RX ORDER — CEFTRIAXONE 1 G/1
1 INJECTION, POWDER, FOR SOLUTION INTRAMUSCULAR; INTRAVENOUS ONCE
Status: DISCONTINUED | OUTPATIENT
Start: 2025-06-11 | End: 2025-06-11

## 2025-06-11 RX ORDER — HYDROMORPHONE HYDROCHLORIDE 2 MG/1
2 TABLET ORAL EVERY 4 HOURS PRN
Status: DISCONTINUED | OUTPATIENT
Start: 2025-06-11 | End: 2025-06-15 | Stop reason: HOSPADM

## 2025-06-11 RX ORDER — CEFTRIAXONE 2 G/1
2 INJECTION, POWDER, FOR SOLUTION INTRAMUSCULAR; INTRAVENOUS EVERY 24 HOURS
Status: DISCONTINUED | OUTPATIENT
Start: 2025-06-12 | End: 2025-06-15 | Stop reason: HOSPADM

## 2025-06-11 RX ORDER — HYDRALAZINE HYDROCHLORIDE 10 MG/1
10 TABLET, FILM COATED ORAL EVERY 4 HOURS PRN
Status: DISCONTINUED | OUTPATIENT
Start: 2025-06-11 | End: 2025-06-15 | Stop reason: HOSPADM

## 2025-06-11 RX ORDER — CALCIUM CARBONATE 500 MG/1
1000 TABLET, CHEWABLE ORAL 4 TIMES DAILY PRN
Status: DISCONTINUED | OUTPATIENT
Start: 2025-06-11 | End: 2025-06-15 | Stop reason: HOSPADM

## 2025-06-11 RX ORDER — ONDANSETRON 4 MG/1
4 TABLET, ORALLY DISINTEGRATING ORAL EVERY 6 HOURS PRN
Status: DISCONTINUED | OUTPATIENT
Start: 2025-06-11 | End: 2025-06-15 | Stop reason: HOSPADM

## 2025-06-11 RX ORDER — HYDRALAZINE HYDROCHLORIDE 20 MG/ML
10 INJECTION INTRAMUSCULAR; INTRAVENOUS EVERY 4 HOURS PRN
Status: DISCONTINUED | OUTPATIENT
Start: 2025-06-11 | End: 2025-06-15 | Stop reason: HOSPADM

## 2025-06-11 RX ORDER — ONDANSETRON 2 MG/ML
4 INJECTION INTRAMUSCULAR; INTRAVENOUS EVERY 6 HOURS PRN
Status: DISCONTINUED | OUTPATIENT
Start: 2025-06-11 | End: 2025-06-15 | Stop reason: HOSPADM

## 2025-06-11 RX ORDER — ENOXAPARIN SODIUM 100 MG/ML
40 INJECTION SUBCUTANEOUS EVERY 24 HOURS
Status: DISCONTINUED | OUTPATIENT
Start: 2025-06-11 | End: 2025-06-15 | Stop reason: HOSPADM

## 2025-06-11 RX ORDER — IBUPROFEN 200 MG
950 CAPSULE ORAL 2 TIMES DAILY
Status: DISCONTINUED | OUTPATIENT
Start: 2025-06-11 | End: 2025-06-15 | Stop reason: HOSPADM

## 2025-06-11 RX ORDER — HYDROMORPHONE HCL IN WATER/PF 6 MG/30 ML
0.4 PATIENT CONTROLLED ANALGESIA SYRINGE INTRAVENOUS
Status: DISCONTINUED | OUTPATIENT
Start: 2025-06-11 | End: 2025-06-15 | Stop reason: HOSPADM

## 2025-06-11 RX ORDER — SODIUM CHLORIDE 9 MG/ML
INJECTION, SOLUTION INTRAVENOUS CONTINUOUS
Status: DISCONTINUED | OUTPATIENT
Start: 2025-06-11 | End: 2025-06-14

## 2025-06-11 RX ORDER — METHOCARBAMOL 500 MG/1
500 TABLET, FILM COATED ORAL 4 TIMES DAILY PRN
Status: DISCONTINUED | OUTPATIENT
Start: 2025-06-11 | End: 2025-06-15 | Stop reason: HOSPADM

## 2025-06-11 RX ORDER — AMOXICILLIN 250 MG
2 CAPSULE ORAL 2 TIMES DAILY
Status: DISCONTINUED | OUTPATIENT
Start: 2025-06-11 | End: 2025-06-15 | Stop reason: HOSPADM

## 2025-06-11 RX ORDER — ATENOLOL 50 MG/1
50 TABLET ORAL DAILY
Qty: 90 TABLET | Refills: 3 | Status: ON HOLD | OUTPATIENT
Start: 2025-06-11

## 2025-06-11 RX ORDER — PREDNISONE 10 MG/1
10 TABLET ORAL DAILY
COMMUNITY

## 2025-06-11 RX ORDER — CEFTRIAXONE 2 G/1
2 INJECTION, POWDER, FOR SOLUTION INTRAMUSCULAR; INTRAVENOUS ONCE
Status: COMPLETED | OUTPATIENT
Start: 2025-06-11 | End: 2025-06-11

## 2025-06-11 RX ORDER — AMLODIPINE BESYLATE 5 MG/1
5 TABLET ORAL DAILY
Status: DISCONTINUED | OUTPATIENT
Start: 2025-06-12 | End: 2025-06-15 | Stop reason: HOSPADM

## 2025-06-11 RX ORDER — VENLAFAXINE HYDROCHLORIDE 37.5 MG/1
37.5 CAPSULE, EXTENDED RELEASE ORAL DAILY
Status: DISCONTINUED | OUTPATIENT
Start: 2025-06-12 | End: 2025-06-15 | Stop reason: HOSPADM

## 2025-06-11 RX ORDER — HYDROMORPHONE HCL IN WATER/PF 6 MG/30 ML
0.2 PATIENT CONTROLLED ANALGESIA SYRINGE INTRAVENOUS
Status: DISCONTINUED | OUTPATIENT
Start: 2025-06-11 | End: 2025-06-15 | Stop reason: HOSPADM

## 2025-06-11 RX ORDER — IOPAMIDOL 755 MG/ML
500 INJECTION, SOLUTION INTRAVASCULAR ONCE
Status: COMPLETED | OUTPATIENT
Start: 2025-06-11 | End: 2025-06-11

## 2025-06-11 RX ORDER — PROCHLORPERAZINE MALEATE 5 MG/1
5 TABLET ORAL EVERY 6 HOURS PRN
Status: DISCONTINUED | OUTPATIENT
Start: 2025-06-11 | End: 2025-06-15 | Stop reason: HOSPADM

## 2025-06-11 RX ADMIN — SODIUM CHLORIDE 100 ML: 9 INJECTION, SOLUTION INTRAVENOUS at 14:02

## 2025-06-11 RX ADMIN — SODIUM CHLORIDE: 0.9 INJECTION, SOLUTION INTRAVENOUS at 17:41

## 2025-06-11 RX ADMIN — ENOXAPARIN SODIUM 40 MG: 40 INJECTION SUBCUTANEOUS at 17:41

## 2025-06-11 RX ADMIN — Medication 950 MG: at 22:42

## 2025-06-11 RX ADMIN — IOPAMIDOL 100 ML: 755 INJECTION, SOLUTION INTRAVENOUS at 14:02

## 2025-06-11 RX ADMIN — CEFTRIAXONE 2 G: 2 INJECTION, POWDER, FOR SOLUTION INTRAMUSCULAR; INTRAVENOUS at 17:40

## 2025-06-11 RX ADMIN — TRAZODONE HYDROCHLORIDE 50 MG: 50 TABLET ORAL at 22:07

## 2025-06-11 ASSESSMENT — ACTIVITIES OF DAILY LIVING (ADL)
ADLS_ACUITY_SCORE: 52
ADLS_ACUITY_SCORE: 52
ADLS_ACUITY_SCORE: 29
ADLS_ACUITY_SCORE: 52
ADLS_ACUITY_SCORE: 52
ADLS_ACUITY_SCORE: 29
ADLS_ACUITY_SCORE: 52
ADLS_ACUITY_SCORE: 52

## 2025-06-11 ASSESSMENT — COLUMBIA-SUICIDE SEVERITY RATING SCALE - C-SSRS
1. IN THE PAST MONTH, HAVE YOU WISHED YOU WERE DEAD OR WISHED YOU COULD GO TO SLEEP AND NOT WAKE UP?: NO
2. HAVE YOU ACTUALLY HAD ANY THOUGHTS OF KILLING YOURSELF IN THE PAST MONTH?: NO
6. HAVE YOU EVER DONE ANYTHING, STARTED TO DO ANYTHING, OR PREPARED TO DO ANYTHING TO END YOUR LIFE?: NO

## 2025-06-11 NOTE — ED NOTES
RECEIVING UNIT ED HANDOFF REVIEW    Above ED Nurse Handoff Report was reviewed: Yes  Reviewed by: Trey Ackerman RN on June 11, 2025 at 4:59 PM   NALINI Sawyer called the ED to inform them the note was read: Yes   [facility]  ED Nurse Handoff Report    ED Chief complaint: Hematuria, Flank Pain, and Abdominal Pain  . ED Diagnosis:   Final diagnoses:   None       Allergies:   Allergies   Allergen Reactions    Taxotere [Docetaxel]        Code Status: Full Code    Activity level - Baseline/Home:  independent.  Activity Level - Current:   standby.   Lift room needed: No.   Bariatric: No   Needed: No   Isolation: No.   Infection: Not Applicable.     Respiratory status: Room air    Vital Signs (within 30 minutes):   Vitals:    06/11/25 1201 06/11/25 1520   BP: (!) 185/89 (!) 163/86   Pulse: 74 67   Resp: 16 18   Temp: 97.3  F (36.3  C)    TempSrc: Temporal    SpO2: 100% 97%   Weight: 105.1 kg (231 lb 11.3 oz)        Cardiac Rhythm:  ,      Pain level:    Patient confused: No.   Patient Falls Risk: patient and family education.   Elimination Status: Has voided     Patient Report - Initial Complaint: Hematuria, flank pain, and back pain.   Focused Assessment:   1614  Musculoskeletal      MusculoskeletalMusculoskeletal WDL: .WDL except (pt reporting left-sided back and flank pain.)      1614  Genitourinary      Genitourinary (Adult)Genitourinary WDL: .WDL except (pt reporting left back and flank pain that began yesterday as well as hematuria. states he has been on abx for a UTI. hx of prostate cancer.)           Abnormal Results:   Labs Ordered and Resulted from Time of ED Arrival to Time of ED Departure   BASIC METABOLIC PANEL - Abnormal       Result Value    Sodium 137      Potassium 4.3      Chloride 98      Carbon Dioxide (CO2) 27      Anion Gap 12      Urea Nitrogen 14.8      Creatinine 1.14      GFR Estimate 70      Calcium 9.6      Glucose 147 (*)    UA MACROSCOPIC WITH REFLEX TO  MICRO AND CULTURE - Abnormal    Color Urine Straw      Appearance Urine Slightly Cloudy (*)     Glucose Urine Negative      Bilirubin Urine Negative      Ketones Urine Negative      Specific Gravity Urine 1.006      Blood Urine Large (*)     pH Urine 6.0      Protein Albumin Urine 70 (*)     Urobilinogen Urine Normal      Nitrite Urine Negative      Leukocyte Esterase Urine Large (*)     Bacteria Urine Few (*)     Mucus Urine Present (*)     RBC Urine 80 (*)     WBC Urine 68 (*)     Squamous Epithelials Urine <1      Transitional Epithelials Urine <1     CBC WITH PLATELETS AND DIFFERENTIAL - Abnormal    WBC Count 7.6      RBC Count 4.06 (*)     Hemoglobin 12.4 (*)     Hematocrit 35.9 (*)     MCV 88      MCH 30.5      MCHC 34.5      RDW 13.8      Platelet Count 250      % Neutrophils 76      % Lymphocytes 12      % Monocytes 11      % Eosinophils 0      % Basophils 0      % Immature Granulocytes 1      NRBCs per 100 WBC 0      Absolute Neutrophils 5.8      Absolute Lymphocytes 0.9      Absolute Monocytes 0.8      Absolute Eosinophils 0.0      Absolute Basophils 0.0      Absolute Immature Granulocytes 0.1      Absolute NRBCs 0.0     URINE CULTURE        CT Abdomen Pelvis w Contrast   Final Result   IMPRESSION:    1.  Slightly worsening inflammatory changes surrounding the left kidney compared to the prior exam. Inflammatory changes surrounding the right kidney appear unchanged. No significant change in appearance of bilateral renal pelvis and proximal ureteral    urothelial thickening with hyperenhancement. Findings are likely compatible with ascending urinary tract infection. Recommend correlation with urinalysis.   2.  Stable bilateral renal pelviectasis (left greater than right) as well as lower pole caliectasis along the left kidney.   3.  Mild wall thickening is seen involving the rectum and sigmoid colon which may be due to underdistention given lack of surrounding territory changes. Mild proctocolitis is less  likely but cannot be definitively excluded. Suggest correlation with    clinical exam.   4.  Stable multifocal sclerotic osseous metastases.          Treatments provided: see MAR  Family Comments: N/A  OBS brochure/video discussed/provided to patient:  No  ED Medications:   Medications   cefTRIAXone (ROCEPHIN) 2 g vial to attach to  ml bag for ADULTS or NS 50 ml bag for PEDS (has no administration in time range)   iopamidol (ISOVUE-370) solution 500 mL (100 mLs Intravenous $Given 6/11/25 1402)   sodium chloride 0.9 % bag for CT scan flush (100 mLs Intravenous $Given 6/11/25 1402)       Drips infusing:  No  For the majority of the shift this patient was Green.   Interventions performed were N/A.    Sepsis treatment initiated: No    Cares/treatment/interventions/medications to be completed following ED care: N/A    ED Nurse Name: Freddie Parks, RN  4:15 PM

## 2025-06-11 NOTE — PLAN OF CARE
Arrived to room around 1715, up independently, pain minimal, tolerated regular diet well, IV patent and infusing, BP still elevated says he took all his home medications, some scratches on his arms says from gardening and berry collection.

## 2025-06-11 NOTE — ED NOTES
Bed: ED28  Expected date: 6/11/25  Expected time:   Means of arrival:   Comments:  IN07 when pt admitted

## 2025-06-11 NOTE — TELEPHONE ENCOUNTER
E-prescribing error occurred, medication resent to the pharmacy.     Katherine TONEY RN  [facility] Triage Team

## 2025-06-11 NOTE — H&P
[facility]    History and Physical - Hospitalist Service       Date of Admission:  6/11/2025    Assessment & Plan      Ronnie Lagos is a 70 year old male who past medical history of CVA, GERD, hypertension, metastatic prostate cancer with bony mets on cabazitaxel, intermittent hematuria, who was recently admitted to [facility] 5/31/2025 to 6/1/2025 for concern of possible pyelonephritis.  He was treated with Rocephin while hospitalized and then discharged on ciprofloxacin 500 mg p.o. twice daily for 7 days.  He was told that his culture did not grow anything so he subsequently discontinued his antibiotics.  Yesterday evening, on 6/10/2025 he developed left flank abdominal pain.  Imaging in the emergency department demonstrates concern for pyelonephritis.  He denies dysuria and notes some baseline urinary frequency.  Intermittent hematuria.  States the pain ranges from a 1-2 out of 10 up to a 6 out of 10.  No nausea vomiting or fever.  No hematochezia.  He is followed by [facility].  Last chemotherapy was approximately 2 weeks ago.    Acute medical issues:  #Pyelonephritis with concerns for incomplete prior treatment of pyelonephritis and in the setting of metastatic prostate cancer (Cabazitaxel) vs side effect from chemotherapy and disease.  Recently admitted from 5/31-6/1 for similar and was treated with Rocephin and discharged on Cipro but stopped taking Cipro when he was told that the culture was negative.  Imaging today is similar to prior.  Afebrile.  No leukocytosis but clinically appears to have pyelonephritis.   Is on chemotherapy (Cabazitaxel ).  -- Admit to Medicine. [facility] consult placed.    -- Received Rocephin 1 gram in the ED. Will increase to 2 grams for now.  Have asked for another 1 gram to be given today.    -- Blood cultures pending.  UC pending.  Follow cultures and tailor abx based on clinical course and labs.   -- IVF hydration.   -- Repeat CBC and  BMP in the AM.      -------------------------------------------  Chronic medical issues:  #HTN: Continue atenolol and amlodipine. PRN hydralazine.    #GERD: Continue PPI  #Metastatic prostate cancer: Continue Prednisone 10 mg daily. Has not started the Prednisone taper yet.  [facility] consult pending for the AM. Consider Urology consult pending clinical course.         Diet: Combination Diet Regular Diet Adult  DVT Prophylaxis: Enoxaparin (Lovenox) SQ  Mendoza Catheter: Not present  Lines: PRESENT             Cardiac Monitoring: None  Code Status: Full Code      Disposition Plan     Medically Ready for Discharge: Anticipated in 2-4 Days         The patient's care was discussed with the Bedside Nurse, Patient, and EM Team.    Please note that this document was prepared using verbal transcription software. While we strive for accuracy, errors or oversights may occur. We appreciate your understanding and encourage you to contact us if any corrections are needed.    Thank you.    ---  Jim Ghotra Ed.D, APRN, CNP  Hospital Medicine  Via Vocera    _____________________________________________________________________    Chief Complaint   Multiple    History is obtained from the patient, electronic health record, and emergency department physician    History of Present Illness   Ronnie Lagos is a 70 year old male who past medical history of CVA, GERD, hypertension, metastatic prostate cancer with bony mets on cabazitaxel, intermittent hematuria, who was recently admitted to [facility] 5/31/2025 to 6/1/2025 for concern of possible pyelonephritis.  He was treated with Rocephin while hospitalized and then discharged on ciprofloxacin 500 mg p.o. twice daily for 7 days.  He was told that his culture did not grow anything so he subsequently discontinued his antibiotics.  Yesterday evening, on 6/10/2025 he developed left flank abdominal pain.  Imaging in the emergency department demonstrates concern for pyelonephritis.   He denies dysuria and notes some baseline urinary frequency.  Intermittent hematuria.  States the pain ranges from a 1-2 out of 10 up to a 6 out of 10.  No nausea vomiting or fever.  No hematochezia.  He is followed by [facility].  Last chemotherapy was approximately 2 weeks ago.    ED course: IV, labs, imaging, treatment  Findings:  Labs:   CBC: 7.6>12.4<250   BMP/CMP:WNL. Scr 1.14, K 4.3, Glucose 147   ID/Markers: UC pending.   Misc: UA large blood, large leukocyte esterase, few bacteria, 80 RBC and 68 WBC  Imagin2025 CT A/P: Slightely worsening inflammatory changes surrounding the left kidney compared to prior exam.  No significant change in appearance of bilateral renal pelvis and proximal ureteral urethral thickening with hyperenhancement.  Stable bilateral renal pelvicectasis (L > R), as well as lower pole caliectasis along the left kidney.  Mild wall thickening involving the rectum and sigmoid colon.  And stable multifocal sclerotic osseous metastases.  EKG:   NA  Treatments:   Rocephin 1 gram      Oncology history:  1.  2023: PSA elevated at 8.08 (prior 3.44 from 5/10/22).  2. 2023: MRI pelvis -- PI-RADS 4 - 1.4 x 1 x 0.9 cm nodule abutting posterolateral capsule of prostate; no pelvic adenopathy or bone lesions.  3.  2023: Prostate biopsy - Pia 4+3=7 prostate adenocarcinoma.  4. 10/2/2023: NM bone scan negative.  5.  11/3/2023: Radical prostatectomy, pelvic lymph node excision -- Pia 4+5=9 adenocarcinoma, [id number], margins widely positive for malignancy.  6.  2024: PSA = 4.06.  7.  2/15/2024: PSA elevated at 5.26.  8.  2024: PSMA PET showed focal uptake in left side of prostatectomy bed suspicious for recurrent disease; multiple pelvic and few retroperitoneal lymph nodes with uptake; >10 sclerotic osseous lesions with uptake indicative of osseous metastatic disease.  9. 3/20/2024: Started Casodex, Lupron, darolutamide, Xgeva, cycle 1 Taxotere but developed  flushing, chest heaviness, nausea (no dyspnea) reaction 5 minutes into Taxotere infusion. Re-challenge not attempted that day.  10.  3/29/2024: Re-challenged Taxotere but developed repeat hypersensitivity reaction 4 minutes into infusion. Taxotere discontinued.  11. 4/17/2024: PSA 0.53.  12. 7/10/2024: PSA 0.06.  13. 10/8/2024: PSA 0.02.  14. 1/28/2025: PSA 0.13.  15. 2/11/2025: Cabazitaxel C1D1, darolutamide discontinued  16. 4/11/2025: Port placed  17. 5/27/2025: Cabazitaxel given C6D1    Past Medical History    Past Medical History:   Diagnosis Date    Abdominal pain     Actinic keratosis     Basal cell carcinoma     Calculus of bile duct     Elevated liver enzymes     Essential hypertension, benign     abstracted 6/19/02    Gastro-oesophageal reflux disease     GERD (gastroesophageal reflux disease) 07/28/2008    Liver disease     elevated liver enzymes    Prostate cancer (H)     bone and lymph nodes 2024    Squamous cell carcinoma     Unspecified cerebral artery occlusion with cerebral infarction     Unspecified condition of brain     abstracted 6/19/02       Past Surgical History   Past Surgical History:   Procedure Laterality Date    ARTHRODESIS FOOT  2/5/2013    Procedure: ARTHRODESIS FOOT;  LEFT FIRST METATARSOPHALANGEAL JOINT ARTHRODESIS ;  Surgeon: Burton Loera DPM;  Location: [facility]    COLONOSCOPY N/A 12/7/2018    Procedure: COMBINED COLONOSCOPY, SINGLE OR MULTIPLE BIOPSY/POLYPECTOMY BY BIOPSY;  Surgeon: Blayne Mora MD;  Location:  GI    COLONOSCOPY N/A 12/13/2024    Procedure: Colonoscopy;  Surgeon: Arti Mercedes MD;  Location:  GI    DAVINCI PROSTATECTOMY, LYMPHADENECTOMY N/A 11/3/2023    Procedure: PROSTATECTOMY, ROBOT-ASSISTED, WITH PELVIC LYMPHADENECTOMY;  Surgeon: Sophia Ramirez MD;  Location:  OR    ENDOSCOPIC RETROGRADE CHOLANGIOPANCREATOGRAM N/A 1/18/2018    Procedure: COMBINED ENDOSCOPIC RETROGRADE CHOLANGIOPANCREATOGRAPHY, SPHINCTEROTOMY;  ENDOSCOPIC RETROGRADE  CHOLANGIOPANCREATOGRAM (ERCP), SPHINCTEROTOMY, STONE REMOVAL, STENT PLACEMENT;  Surgeon: Christiano Sorenson MD;  Location:  OR    ENDOSCOPIC RETROGRADE CHOLANGIOPANCREATOGRAM N/A 4/12/2018    Procedure: ENDOSCOPIC RETROGRADE CHOLANGIOPANCREATOGRAM;  ENDOSCOPIC RETROGRADE CHOLANIOPANCREATOGRAPHY AND ATTEMPTED STENT REMOVAL.;  Surgeon: Christiano Sorenson MD;  Location:  OR    ENDOSCOPIC RETROGRADE CHOLANGIOPANCREATOGRAM N/A 5/10/2018    Procedure: COMBINED ENDOSCOPIC RETROGRADE CHOLANGIOPANCREATOGRAPHY, REMOVE FOREIGN BODY OR STENT/TUBE;  ENDOSCOPIC RETROGRADE CHOLANGIOPANCREATOGRAPHY AND STENT REMOVAL;  Surgeon: Christiano Sorenson MD;  Location:  OR    ESOPHAGOSCOPY, GASTROSCOPY, DUODENOSCOPY (EGD), COMBINED N/A 4/12/2018    Procedure: COMBINED ESOPHAGOSCOPY, GASTROSCOPY, DUODENOSCOPY (EGD);  EGD with stent removal;  Surgeon: Christiano Sorenson MD;  Location:  GI    IR CHEST PORT PLACEMENT > 5 YRS OF AGE  4/11/2025    LAPAROSCOPIC CHOLECYSTECTOMY N/A 5/1/2015    Procedure: LAPAROSCOPIC CHOLECYSTECTOMY;  Surgeon: Damien Galindo MD;  Location:  [facility]    ORTHOPEDIC SURGERY      left elbow, right shoulder       Prior to Admission Medications   Prior to Admission Medications   Prescriptions Last Dose Informant Patient Reported? Taking?   Vitamin D, Cholecalciferol, 10 MCG (400 UNIT) TABS   Yes No   Sig: Take 10 mcg by mouth 2 times daily.   amLODIPine (NORVASC) 5 MG tablet   No No   Sig: Take 1 tablet (5 mg) by mouth daily.   atenolol (TENORMIN) 50 MG tablet   No No   Sig: Take 1 tablet (50 mg) by mouth daily.   calcium citrate (CITRACAL) 950 (200 Ca) MG tablet   Yes No   Sig: Take 1 tablet by mouth 2 times daily   omeprazole (PRILOSEC) 20 MG DR capsule   No No   Sig: Take 1 capsule (20 mg) by mouth daily.   predniSONE (DELTASONE) 5 MG tablet   No No   Sig: Taper as follows 7.5 mg daily for 3 days then 5 mg daily for 3 days then 2.5 mg daily for 3 days then off   traZODone (DESYREL) 100 MG  tablet   No No   Sig: Take 0.5 tablets (50 mg) by mouth at bedtime.   venlafaxine (EFFEXOR XR) 37.5 MG 24 hr capsule   No No   Sig: Take 1 capsule (37.5 mg) by mouth daily.      Facility-Administered Medications: None        Review of Systems    The 10 point Review of Systems is negative other than noted in the HPI or here.     Social History   I have reviewed this patient's social history and updated it with pertinent information if needed.  Social History     Tobacco Use    Smoking status: Never    Smokeless tobacco: Never   Vaping Use    Vaping status: Never Used   Substance Use Topics    Alcohol use: Yes     Alcohol/week: 2.0 standard drinks of alcohol     Types: 2 Standard drinks or equivalent per week    Drug use: Never         Family History   I have reviewed this patient's family history and updated it with pertinent information if needed.  Family History   Problem Relation Age of Onset    Hypertension Mother     Cancer - colorectal Mother     Skin Cancer Mother     Colon Cancer Mother     Hypertension Father     Prostate Cancer Father     Melanoma No family hx of     Anesthesia Reaction No family hx of     Venous thrombosis No family hx of     Bleeding Disorder No family hx of          Allergies   Allergies   Allergen Reactions    Taxotere [Docetaxel]         Physical Exam   Vital Signs: Temp: 97.6  F (36.4  C) Temp src: Oral BP: (!) 175/99 Pulse: 66   Resp: 20 SpO2: 97 % O2 Device: None (Room air)    Weight: 229 lbs 8 oz    GEN:   Alert, oriented x 3, appears comfortable, NAD.  NECK:   Supple ,no mass or thyromegaly   HEENT:  Normocephalic/atraumatic, no scleral icterus, no nasal discharge, mouth moist.  CV:   Regular rate and rhythm, no murmur or JVD.  S1 + S2 noted, no S3 or S4.  LUNGS:   Clear to auscultation bilaterally without rales/rhonchi/wheezing/retractions.  Symmetric chest rise on inhalation noted.  ABD:   Active bowel sounds, soft, non-tender/non-distended.  No rebound/guarding/rigidity.  EXT:    No edema.  No cyanosis.  No joint synovitis noted.  SKIN:   Dry to touch, no exanthems noted in the visualized areas.  Neurologic: Grossly intact,non focal.   Neuropsychiatric:  General: normal, calm and normal eye contact  Level of consciousness: alert / normal  Affect: normal  Orientation: oriented to self, place, time and situation     Medical Decision Making       75 MINUTES SPENT BY ME on the date of service doing chart review, history, exam, documentation & further activities per the note.      Data   ------------------------- PAST 24 HR DATA REVIEWED -----------------------------------------------    I have personally reviewed the following data over the past 24 hrs:    7.6  \   12.4 (L)   / 250     137 98 14.8 /  147 (H)   4.3 27 1.14 \       Imaging results reviewed over the past 24 hrs:   Recent Results (from the past 24 hours)   CT Abdomen Pelvis w Contrast    Narrative    EXAM: CT ABDOMEN PELVIS W CONTRAST  LOCATION: [facility]  DATE: 6/11/2025    INDICATION: LLQ pain and L flank pain, sent by oncology to have a repeat CT scan  COMPARISON: CT abdomen and pelvis performed on 5/31/2025  TECHNIQUE: CT scan of the abdomen and pelvis was performed following injection of IV contrast. Multiplanar reformats were obtained. Dose reduction techniques were used.  CONTRAST: 100mL Isovue 370    FINDINGS:   LOWER CHEST: Subsegmental atelectasis is seen in the lung bases. Elevation of the left hemidiaphragm is present.    HEPATOBILIARY: Diffuse hepatic steatosis is present. Gallbladder is surgically absent.    PANCREAS: 7 mm fat density lesion is seen along the pancreatic head (series 2, image 27), likely reflecting a locule of interdigitating fat or small lipoma.    SPLEEN: Normal size.    ADRENAL GLANDS: Bilateral nodular thickening seen along the adrenal glands, similar to the prior exam. Subcentimeter adrenal nodules are indeterminant measuring up to 4 mm on the left and 6 mm on the  right.    KIDNEYS/BLADDER: Stable bilateral renal pelviectasis as well as lower pole calyectasis along the left kidney. Bilateral renal pelvis and proximal ureteral urothelial thickening with hyperenhancement appears similar to the prior exam. Surrounding   inflammatory changes appear slightly worsened along the left kidney but unchanged along the right kidney. Urinary bladder is mildly distended.    BOWEL: No evidence of bowel obstruction. Mild wall thickening is seen involving the rectum and sigmoid colon. No surrounding territory changes are seen. Colonic diverticulosis without evidence of diverticulitis. Stable small fat-containing umbilical   hernia. Stable appearance of duodenal diverticulum.    LYMPH NODES: No lymphadenopathy.    VASCULATURE: Moderate to severe calcified and noncalcified atherosclerotic plaque is seen in the abdominal aorta.    PELVIC ORGANS: Prostate gland is surgically absent. Stable small fat-containing left inguinal hernia.    MUSCULOSKELETAL: Stable right gluteal intramuscular lipoma. Stable sclerotic osseous metastases involving the lower right scapula, multiple ribs, pelvis and thoracolumbar spine.      Impression    IMPRESSION:   1.  Slightly worsening inflammatory changes surrounding the left kidney compared to the prior exam. Inflammatory changes surrounding the right kidney appear unchanged. No significant change in appearance of bilateral renal pelvis and proximal ureteral   urothelial thickening with hyperenhancement. Findings are likely compatible with ascending urinary tract infection. Recommend correlation with urinalysis.  2.  Stable bilateral renal pelviectasis (left greater than right) as well as lower pole caliectasis along the left kidney.  3.  Mild wall thickening is seen involving the rectum and sigmoid colon which may be due to underdistention given lack of surrounding territory changes. Mild proctocolitis is less likely but cannot be definitively excluded. Suggest  correlation with   clinical exam.  4.  Stable multifocal sclerotic osseous metastases.

## 2025-06-11 NOTE — ED TRIAGE NOTES
Hx of prostate cancer. Left flank and abdominal pain that started yesterday. Seen in ER 1.5 weeks ago. Diagnosed with UTI, patient later called and told his culture did not grow anything so he discontinued his antibiotics. Hematuria for the past month. Last chemo was 2 weeks ago.

## 2025-06-11 NOTE — LETTER
[facility]  [address]  [facility] [address]  Phone: [phone]  Fax: [phone]    Reyna 15, 2025        Ronnie Lagos  [address] [place] [address]          To whom it may concern:    RE: Ronnie Lagos    Our patient was admitted and treated for medical illness as inpatient last June 11 to Reyna 15, 2025    Please contact me for questions or concerns.      Sincerely,  Michele Garsia MD

## 2025-06-11 NOTE — TELEPHONE ENCOUNTER
Tang Rogers, VERONICA CNP to Me  Anabell Mercedes MD Meagher, Ann K, RN  (Selected Message)      6/11/25 11:16 AM  With this amount of abdominal pain and hematuria I would recommend that  patient goes to the ER he will need stat CT scan and urinalysis.    Pt was notified with recommendations and agrees with plan.   States that he will go to [facility] ED for evaluation.  Kristine Jones RN on 6/11/2025 at 11:22 AM

## 2025-06-11 NOTE — ED PROVIDER NOTES
Emergency Department Note      History of Present Illness     Chief Complaint   Hematuria, Flank Pain, and Abdominal Pain      HPI   Ronnie Lagos is a 70 year old male with history of prostate cancer who presents to the ED for evaluation of hematuria, abdominal pain, and flank pain. Patient reports ongoing left abdominal and left lower back pain for the past couple weeks, unchanged since his hospital admission. He was admitted on 5/31, for possible pyelonephritis, diagnosed with UTI and treated with ciprofloxacin. He stopped taking the antibiotic two days after leaving the hospital. He reports ongoing hematuria and associated blood clots. No dysuria. Notes some baseline urinary frequency. He reports intermittent left abdominal pain and lower left sided back pain, going from a tolerable 1-2/10 pain to a debilitating 6/10 pain. The pain is worse in the morning and he reports waking up with severe pain the last two days. His pain significantly decreased after taking Tylenol this morning at 1100. Denies nausea, vomiting, fever, hematochezia, or swelling in testicles or scrotum. He sees Aileen Agrawal PA-C, for oncology and has an appointment scheduled in one week. Last chemotherapy was approximately one week ago.      Independent Historian   None    Review of External Notes   I reviewed phone call from earlier today with oncology.   I reviewed an office visit from yesterday, for a medicare wellness check, mentions hematuria, left abdominal and lower back pain.   He had an admission on 5/31, when he was admitted for a possible pyelonephritis     Past Medical History     Medical History and Problem List   Hypertension  Lumbago  Cervicalgia  Osteoarthritis  Alcohol use  GERD  Prostate cancer metastatic to multiple sites  UTI   Immunosuppression   Basal cell carcinoma  Cerebral artery occlusion with cerebral infarction     Medications   Amlodipine  Tenormin  Prilosec  Prednisone  Trazadone  Venlafaxine     Surgical  History   Left elbow, right shoulder orthopedic surgery  Laparoscopic cholecystectomy   IR chest port placement   Endoscopic retrograde cholangiopancreatogram, stone removal, stent placement, sphincterotomy  Endoscopic retrograde cholangiopancreatogram, stent removal   Davinci prostatectomy, lymphadenectomy  Arthrodesis foot, left first metatarsophalangeal joint      Physical Exam     Patient Vitals for the past 24 hrs:   BP Temp Temp src Pulse Resp SpO2 Weight   06/11/25 1520 (!) 163/86 -- -- 67 18 97 % --   06/11/25 1201 (!) 185/89 97.3  F (36.3  C) Temporal 74 16 100 % 105.1 kg (231 lb 11.3 oz)     Physical Exam  General: Overall stable and nontoxic appearing  HEENT: Conjunctivae clear, no scleral icterus, mucous membranes moist  Neuro: Alert, moving all extremities equally with intention  CV: Regular rate and rhythm, radial and DP pulses equal  Respiratory: No signs of respiratory distress, lungs clear to auscultation bilaterally   Abdomen: Soft, without rigidity or rebound throughout. No LLQ tenderness   No focal RLQ tenderness   No focal RUQ tenderness   No CVA tenderness bilaterally  MSK: No lower extremity swelling or tenderness    Diagnostics     Lab Results   Labs Ordered and Resulted from Time of ED Arrival to Time of ED Departure   BASIC METABOLIC PANEL - Abnormal       Result Value    Sodium 137      Potassium 4.3      Chloride 98      Carbon Dioxide (CO2) 27      Anion Gap 12      Urea Nitrogen 14.8      Creatinine 1.14      GFR Estimate 70      Calcium 9.6      Glucose 147 (*)    UA MACROSCOPIC WITH REFLEX TO MICRO AND CULTURE - Abnormal    Color Urine Straw      Appearance Urine Slightly Cloudy (*)     Glucose Urine Negative      Bilirubin Urine Negative      Ketones Urine Negative      Specific Gravity Urine 1.006      Blood Urine Large (*)     pH Urine 6.0      Protein Albumin Urine 70 (*)     Urobilinogen Urine Normal      Nitrite Urine Negative      Leukocyte Esterase Urine Large (*)     Bacteria  Urine Few (*)     Mucus Urine Present (*)     RBC Urine 80 (*)     WBC Urine 68 (*)     Squamous Epithelials Urine <1      Transitional Epithelials Urine <1     CBC WITH PLATELETS AND DIFFERENTIAL - Abnormal    WBC Count 7.6      RBC Count 4.06 (*)     Hemoglobin 12.4 (*)     Hematocrit 35.9 (*)     MCV 88      MCH 30.5      MCHC 34.5      RDW 13.8      Platelet Count 250      % Neutrophils 76      % Lymphocytes 12      % Monocytes 11      % Eosinophils 0      % Basophils 0      % Immature Granulocytes 1      NRBCs per 100 WBC 0      Absolute Neutrophils 5.8      Absolute Lymphocytes 0.9      Absolute Monocytes 0.8      Absolute Eosinophils 0.0      Absolute Basophils 0.0      Absolute Immature Granulocytes 0.1      Absolute NRBCs 0.0     URINE CULTURE       Imaging   CT Abdomen Pelvis w Contrast   Final Result   IMPRESSION:    1.  Slightly worsening inflammatory changes surrounding the left kidney compared to the prior exam. Inflammatory changes surrounding the right kidney appear unchanged. No significant change in appearance of bilateral renal pelvis and proximal ureteral    urothelial thickening with hyperenhancement. Findings are likely compatible with ascending urinary tract infection. Recommend correlation with urinalysis.   2.  Stable bilateral renal pelviectasis (left greater than right) as well as lower pole caliectasis along the left kidney.   3.  Mild wall thickening is seen involving the rectum and sigmoid colon which may be due to underdistention given lack of surrounding territory changes. Mild proctocolitis is less likely but cannot be definitively excluded. Suggest correlation with    clinical exam.   4.  Stable multifocal sclerotic osseous metastases.          EKG   None.     Independent Interpretation   None    ED Course      Medications Administered   Medications   iopamidol (ISOVUE-370) solution 500 mL (100 mLs Intravenous $Given 6/11/25 5063)   sodium chloride 0.9 % bag for CT scan flush (100  mLs Intravenous $Given 6/11/25 1402)       Procedures   Procedures     Discussion of Management   Admitting hospitalist, Jim Ghotra APRN CNP    ED Course   ED Course as of 06/11/25 1620   Wed Jun 11, 2025   1306 I obtained history and examined the patient as noted above.    1618 Brandin accepted for Jim Astorga       Additional Documentation  None    Medical Decision Making / Diagnosis     CMS Diagnoses: None    MIPS   None               MDM   Ronnie Lagos is a 70 year old male with a history of prostate cancer currently on oral chemotherapy, recent admission for possible pyelonephritis and continued hematuria who presents to the emergency department the chief complaint of left flank pain and left abdominal pain.  On examination he is overall nontoxic and stable appearing.  He states his pain has improved.  This is same pain as he had when he was hospitalized recently, however over the past 2 days it seems to flare more in the mornings.  UA here is more convincing for an infection than his last UA and CT scan shows concern for ascending infection and worsening inflammatory changes around the left kidney.  Reassuringly, he is not toxic appearing I do not think that he is systemically infected.  Have drawn blood cultures however and will start antibiotics.  Discussed with hospitalist Jim Ghotra NP, who has kindly accepted for admission.    Disposition   The patient was admitted to the hospital.     Diagnosis     ICD-10-CM    1. Pyelonephritis  N12       2. History of prostate cancer  Z85.46            Discharge Medications   New Prescriptions    No medications on file         Scribe Disclosure:  NALINI, Risa Nelson, am serving as a scribe at 2:18 PM on 6/11/2025 to document services personally performed by Lilian Coppola MD based on my observations and the provider's statements to me.        Lilian Coppola MD  06/12/25 0722

## 2025-06-11 NOTE — TELEPHONE ENCOUNTER
Oncology Nurse Triage    Situation:   Ronnie reporting the following symptoms: ongoing pain LLQ of abdomen and lower back on left side; also ongoing hematuria     Background:   Treating Provider:   Dr Mercedes     Date of last office visit: 5/27/2025    Recent Treatments:Jevtana has been discontinued     Assessment:     Pt is calling.  Per review of [employer] message, Dr Mercedes states that pt has hemorrhagic cystitis/ureteritis from the cabazitaxel (Jevtana), so we will need to discontinue Jevtana. Dr Mercedes also states that the pain and blood in the urine should clear up after the drug is out of your system in a few weeks.    Pt states that the pain in the LLQ of his abdomen and lower back on the left side has increased over the past few days. Rates pain at 6/10 now and describes it as a constant ache. He has been taking tylenol 500 mg for the pain, but states this has not been helpful at all. States that they are supposed to be going to [place] tomorrow, but he does not think he will be able to go with the pain.    Also states that hematuria continues. Present for at least the past month. Urine is light red/brown in color. Seems to be worse in the morning and then gets lighter yellow in color as the day goes on. Pt does report that he passed a small clot about 1/8 inch by 1/8 inch this morning also.     Pt states that he has not started the prednisone taper yet. He continues to take prednisone 10 mg daily.     Denies chest pain, shortness of breath, fever/chills, nausea/vomiting, diarrhea/constipation, dizziness, or any other urgent symptoms.   Has been drinking at least 64 ounces of fluid daily.     Recommendations:     Advised to continue with good fluid intake.  Can try taking tylenol 1000 mg every 8 hours for pain relief.    Will route to care team for advice.  Reviewed emergent symptoms that would warrant return call to clinic or evaluation in ER.  Patient verbalized understanding and agreement with plan.  Patient was  instructed to call the clinic with any questions, concerns, or worsening symptoms.  Kristine Jones RN on 6/11/2025 at 11:03 AM

## 2025-06-12 VITALS
DIASTOLIC BLOOD PRESSURE: 98 MMHG | HEIGHT: 68 IN | SYSTOLIC BLOOD PRESSURE: 169 MMHG | TEMPERATURE: 97.7 F | BODY MASS INDEX: 34.78 KG/M2 | HEART RATE: 64 BPM | OXYGEN SATURATION: 96 % | WEIGHT: 229.5 LBS | RESPIRATION RATE: 16 BRPM

## 2025-06-12 LAB
ANION GAP SERPL CALCULATED.3IONS-SCNC: 10 MMOL/L (ref 7–15)
BACTERIA SPEC CULT: NORMAL
BACTERIA SPEC CULT: NORMAL
BACTERIA UR CULT: NORMAL
BUN SERPL-MCNC: 11.7 MG/DL (ref 8–23)
CALCIUM SERPL-MCNC: 9.4 MG/DL (ref 8.8–10.4)
CHLORIDE SERPL-SCNC: 103 MMOL/L (ref 98–107)
CREAT SERPL-MCNC: 0.93 MG/DL (ref 0.67–1.17)
EGFRCR SERPLBLD CKD-EPI 2021: 89 ML/MIN/1.73M2
ERYTHROCYTE [DISTWIDTH] IN BLOOD BY AUTOMATED COUNT: 13.7 % (ref 10–15)
GLUCOSE SERPL-MCNC: 99 MG/DL (ref 70–99)
HCO3 SERPL-SCNC: 26 MMOL/L (ref 22–29)
HCT VFR BLD AUTO: 35 % (ref 40–53)
HGB BLD-MCNC: 12.1 G/DL (ref 13.3–17.7)
MCH RBC QN AUTO: 30.4 PG (ref 26.5–33)
MCHC RBC AUTO-ENTMCNC: 34.6 G/DL (ref 31.5–36.5)
MCV RBC AUTO: 88 FL (ref 78–100)
PLATELET # BLD AUTO: 203 10E3/UL (ref 150–450)
POTASSIUM SERPL-SCNC: 4.7 MMOL/L (ref 3.4–5.3)
RBC # BLD AUTO: 3.98 10E6/UL (ref 4.4–5.9)
SODIUM SERPL-SCNC: 139 MMOL/L (ref 135–145)
WBC # BLD AUTO: 5.2 10E3/UL (ref 4–11)

## 2025-06-12 PROCEDURE — 80048 BASIC METABOLIC PNL TOTAL CA: CPT | Performed by: NURSE PRACTITIONER

## 2025-06-12 PROCEDURE — 250N000012 HC RX MED GY IP 250 OP 636 PS 637: Performed by: NURSE PRACTITIONER

## 2025-06-12 PROCEDURE — 258N000003 HC RX IP 258 OP 636: Performed by: NURSE PRACTITIONER

## 2025-06-12 PROCEDURE — 99232 SBSQ HOSP IP/OBS MODERATE 35: CPT | Performed by: INTERNAL MEDICINE

## 2025-06-12 PROCEDURE — 36415 COLL VENOUS BLD VENIPUNCTURE: CPT | Performed by: NURSE PRACTITIONER

## 2025-06-12 PROCEDURE — 250N000011 HC RX IP 250 OP 636: Performed by: NURSE PRACTITIONER

## 2025-06-12 PROCEDURE — 250N000013 HC RX MED GY IP 250 OP 250 PS 637: Performed by: NURSE PRACTITIONER

## 2025-06-12 PROCEDURE — 120N000001 HC R&B MED SURG/OB

## 2025-06-12 PROCEDURE — 99223 1ST HOSP IP/OBS HIGH 75: CPT | Performed by: INTERNAL MEDICINE

## 2025-06-12 PROCEDURE — 85018 HEMOGLOBIN: CPT | Performed by: NURSE PRACTITIONER

## 2025-06-12 RX ADMIN — SODIUM CHLORIDE: 0.9 INJECTION, SOLUTION INTRAVENOUS at 04:25

## 2025-06-12 RX ADMIN — Medication 950 MG: at 08:30

## 2025-06-12 RX ADMIN — SODIUM CHLORIDE: 0.9 INJECTION, SOLUTION INTRAVENOUS at 14:31

## 2025-06-12 RX ADMIN — TRAZODONE HYDROCHLORIDE 50 MG: 50 TABLET ORAL at 21:05

## 2025-06-12 RX ADMIN — ENOXAPARIN SODIUM 40 MG: 40 INJECTION SUBCUTANEOUS at 17:00

## 2025-06-12 RX ADMIN — PREDNISONE 10 MG: 5 TABLET ORAL at 08:22

## 2025-06-12 RX ADMIN — CEFTRIAXONE 2 G: 2 INJECTION, POWDER, FOR SOLUTION INTRAMUSCULAR; INTRAVENOUS at 17:00

## 2025-06-12 RX ADMIN — ATENOLOL 50 MG: 25 TABLET ORAL at 08:22

## 2025-06-12 RX ADMIN — AMLODIPINE BESYLATE 5 MG: 5 TABLET ORAL at 08:23

## 2025-06-12 RX ADMIN — VENLAFAXINE HYDROCHLORIDE 37.5 MG: 37.5 CAPSULE, EXTENDED RELEASE ORAL at 08:23

## 2025-06-12 RX ADMIN — Medication 950 MG: at 21:05

## 2025-06-12 RX ADMIN — SENNOSIDES AND DOCUSATE SODIUM 2 TABLET: 50; 8.6 TABLET ORAL at 21:05

## 2025-06-12 RX ADMIN — SENNOSIDES AND DOCUSATE SODIUM 1 TABLET: 50; 8.6 TABLET ORAL at 08:22

## 2025-06-12 RX ADMIN — PANTOPRAZOLE SODIUM 40 MG: 40 TABLET, DELAYED RELEASE ORAL at 08:23

## 2025-06-12 ASSESSMENT — ACTIVITIES OF DAILY LIVING (ADL)
ADLS_ACUITY_SCORE: 29

## 2025-06-12 NOTE — CONSULTS
[facility] Cancer Care Consultation      Ronnie Lagos MRN# [id number]   YOB: 1955 Age: 70 year old   Date of Admission: 6/11/2025  Requesting physician: Jim Astorga MD  Reason for consult: Metastatic prostate cancer, pyelonephritis.           Assessment and Plan:   70 year old male with metastatic prostate cancer recently on cabazitaxel, CVA, GERD, hypertension, admitted for pyelonephritis.    1.  Metastatic prostate cancer, castrate resistant  2.  Metastases to bone and lymph nodes  - Previously received Taxotere but developed hypersensitivity reaction.  Due to PSA progression, he was switched to cabazitaxel in 2/2025 but developed hemorrhagic cystitis.  This was discontinued after his last dose given on 5/27/2025.  - Currently on Lupron, Xgeva.  -- He will follow-up with me as scheduled on 7/8/2025 to discuss further therapy depending on subsequent PSA level.    3.  Hematuria  4.  Flank pain  - CT scan findings concerning for pyelonephritis.  Also had clinical symptoms of hemorrhagic cystitis from cabazitaxel that was discontinued on 5/27/2025.  - Anticipate few more weeks before hemorrhagic cystitis from cabazitaxel resolves.  - On ceftriaxone at increased dose.    Thank you for the consult. Will follow.    Anabell Mercedes MD  [facility] Hematology/Oncology      Total time spent today: 80 minutes in chart review, patient evaluation, counseling, documentation, test and/or medication/prescription orders, and coordination of care.              Chief Complaint:   Hematuria, Flank Pain, and Abdominal Pain           History of Present Illness:   This patient is a 70 year old male with metastatic prostate cancer recently on cabazitaxel, CVA, GERD, hypertension, recently hospitalized 5/31 - 6/1/2025 for pyelonephritis.  He was given IV antibiotics and discharged on ciprofloxacin but discontinued his antibiotics after cultures were negative.  On 6/10/2025, he complained of left flank pain,  "no dysuria but has had intermittent hematuria; no increase in urinary frequency.  He denies any fever, chills, nausea or emesis.    Admission labs on 6/11/2025 showed WBC 7.6, hemoglobin 12.4, MCV 88, platelets 250,000.  Creatinine 1.14, calcium 9.6.  Urinalysis showed large blood and urine with 80 RBCs, few bacteria, large leukocyte Estrace    6/11/2025 CT abdomen/pelvis with contrast showed slightly worsening inflammatory changes surrounding left kidney; inflammatory changes surrounding right kidney are unchanged.  No significant change in bilateral renal pelvis and proximal ureteral urothelial thickening with hyperenhancement.  Stable bilateral renal pelviectasis.  Stable multifocal sclerotic osseous metastases.    He was started on ceftriaxone with increased dose.  Blood cultures and urine cultures pending.    Oncologic history:  - 8/30/2023: Diagnosed with [place] 4+3 = 7 prostate adenocarcinoma.  - 11/3/2023: Radical prostatectomy and pelvic lymph node excision -- [place] 4+5 = 9 prostate adenocarcinoma, [id number], margins widely positive for malignancy.   -2/28/2024: PSMA PET showed focal uptake in left side of prostatectomy bed, multiple pelvic and few retroperitoneal lymph nodes with uptake and greater than 10 sclerotic osseous metastatic lesions.  - 3/20/2024: Started bicalutamide, Lupron, darolutamide, Xgeva, Taxotere.  Developed hypersensitivity reaction to cycle 1 Taxotere and cycle 2 Taxotere.  This was discontinued.  - 2/11/2025: Due to PSA progression, started on cabazitaxel; darolutamide discontinued.  - 5/27/2025: Cycle 6 cabazitaxel.  Discontinued thereafter due to gross hematuria, most clinically consistent with hemorrhagic cystitis from cabazitaxel.           Physical Exam:   Vitals were reviewed  Blood pressure (!) 172/94, pulse 71, temperature 97.7  F (36.5  C), temperature source Oral, resp. rate 18, height 1.727 m (5' 8\"), weight 104.1 kg (229 lb 8 oz), SpO2 96%.  Temperatures:  Current - " Temp: 97.7  F (36.5  C); Max - Temp  Av.5  F (36.4  C)  Min: 97.1  F (36.2  C)  Max: 97.8  F (36.6  C)  Respiration range: Resp  Av  Min: 16  Max: 20  Pulse range: Pulse  Av.2  Min: 63  Max: 74  Blood pressure range: Systolic (24hrs), Av , Min:163 , Max:185   ; Diastolic (24hrs), Av, Min:86, Max:99    Pulse oximetry range: SpO2  Av.3 %  Min: 95 %  Max: 100 %    Intake/Output Summary (Last 24 hours) at 2025 0837  Last data filed at 2025 0425  Gross per 24 hour   Intake 1122 ml   Output --   Net 1122 ml       GENERAL: No acute distress.  SKIN: No rashes or jaundice.  HEENT: Normocephalic, atraumatic. Eyes anicteric. Oropharynx is clear.  LYMPH: No palpable lymphadenopathy in the cervical, supraclavicular regions.  LUNGS: No audible cough or wheezing.  ABDOMEN: Soft, nontender, nondistended with no palpable hepatosplenomegaly.  EXTREMITIES: No clubbing, cyanosis, or edema.  MENTAL: Alert and oriented to person, place, and time.  NEURO: No focal motor deficits.              Past Medical History:   I have reviewed this patient's past medical history  Past Medical History:   Diagnosis Date    Abdominal pain     Actinic keratosis     Basal cell carcinoma     Calculus of bile duct     Elevated liver enzymes     Essential hypertension, benign     abstracted 02    Gastro-oesophageal reflux disease     GERD (gastroesophageal reflux disease) 2008    Liver disease     elevated liver enzymes    Prostate cancer (H)     bone and lymph nodes     Squamous cell carcinoma     Unspecified cerebral artery occlusion with cerebral infarction     Unspecified condition of brain     abstracted 02             Past Surgical History:   I have reviewed this patient's past surgical history  Past Surgical History:   Procedure Laterality Date    ARTHRODESIS FOOT  2013    Procedure: ARTHRODESIS FOOT;  LEFT FIRST METATARSOPHALANGEAL JOINT ARTHRODESIS ;  Surgeon: Burton Loera DPM;   Location: [facility]    COLONOSCOPY N/A 12/7/2018    Procedure: COMBINED COLONOSCOPY, SINGLE OR MULTIPLE BIOPSY/POLYPECTOMY BY BIOPSY;  Surgeon: Blayne Mora MD;  Location:  GI    COLONOSCOPY N/A 12/13/2024    Procedure: Colonoscopy;  Surgeon: Arti Mercedes MD;  Location: [facility]    DAVINCI PROSTATECTOMY, LYMPHADENECTOMY N/A 11/3/2023    Procedure: PROSTATECTOMY, ROBOT-ASSISTED, WITH PELVIC LYMPHADENECTOMY;  Surgeon: Sophia Ramirez MD;  Location:  OR    ENDOSCOPIC RETROGRADE CHOLANGIOPANCREATOGRAM N/A 1/18/2018    Procedure: COMBINED ENDOSCOPIC RETROGRADE CHOLANGIOPANCREATOGRAPHY, SPHINCTEROTOMY;  ENDOSCOPIC RETROGRADE CHOLANGIOPANCREATOGRAM (ERCP), SPHINCTEROTOMY, STONE REMOVAL, STENT PLACEMENT;  Surgeon: Christiano Sorenson MD;  Location:  OR    ENDOSCOPIC RETROGRADE CHOLANGIOPANCREATOGRAM N/A 4/12/2018    Procedure: ENDOSCOPIC RETROGRADE CHOLANGIOPANCREATOGRAM;  ENDOSCOPIC RETROGRADE CHOLANIOPANCREATOGRAPHY AND ATTEMPTED STENT REMOVAL.;  Surgeon: Christiano Sorenson MD;  Location:  OR    ENDOSCOPIC RETROGRADE CHOLANGIOPANCREATOGRAM N/A 5/10/2018    Procedure: COMBINED ENDOSCOPIC RETROGRADE CHOLANGIOPANCREATOGRAPHY, REMOVE FOREIGN BODY OR STENT/TUBE;  ENDOSCOPIC RETROGRADE CHOLANGIOPANCREATOGRAPHY AND STENT REMOVAL;  Surgeon: Christiano Sorenson MD;  Location:  OR    ESOPHAGOSCOPY, GASTROSCOPY, DUODENOSCOPY (EGD), COMBINED N/A 4/12/2018    Procedure: COMBINED ESOPHAGOSCOPY, GASTROSCOPY, DUODENOSCOPY (EGD);  EGD with stent removal;  Surgeon: Christiano Sorenson MD;  Location: [facility]    IR CHEST PORT PLACEMENT > 5 YRS OF AGE  4/11/2025    LAPAROSCOPIC CHOLECYSTECTOMY N/A 5/1/2015    Procedure: LAPAROSCOPIC CHOLECYSTECTOMY;  Surgeon: Damien Galindo MD;  Location: [facility]    ORTHOPEDIC SURGERY      left elbow, right shoulder               Social History:   I have reviewed this patient's social history  Social History     Tobacco Use    Smoking status: Never    Smokeless tobacco: Never    Substance Use Topics    Alcohol use: Yes     Alcohol/week: 2.0 standard drinks of alcohol     Types: 2 Standard drinks or equivalent per week             Family History:   I have reviewed this patient's family history  Family History   Problem Relation Age of Onset    Hypertension Mother     Cancer - colorectal Mother     Skin Cancer Mother     Colon Cancer Mother     Hypertension Father     Prostate Cancer Father     Melanoma No family hx of     Anesthesia Reaction No family hx of     Venous thrombosis No family hx of     Bleeding Disorder No family hx of              Allergies:     Allergies   Allergen Reactions    Taxotere [Docetaxel]              Medications:   I have reviewed this patient's current medications  Medications Prior to Admission   Medication Sig Dispense Refill Last Dose/Taking    amLODIPine (NORVASC) 5 MG tablet Take 1 tablet (5 mg) by mouth daily. 90 tablet 3 6/11/2025 Morning    atenolol (TENORMIN) 50 MG tablet Take 1 tablet (50 mg) by mouth daily. 90 tablet 3 6/11/2025 Morning    calcium citrate-vitamin D (CITRACAL) 200-6.25 MG-MCG TABS per tablet Take 1 tablet by mouth 2 times daily.   6/11/2025 Morning    omeprazole (PRILOSEC) 20 MG DR capsule Take 1 capsule (20 mg) by mouth daily. 90 capsule 3 6/11/2025 Morning    predniSONE (DELTASONE) 10 MG tablet Take 10 mg by mouth daily.   6/11/2025 Morning    traZODone (DESYREL) 100 MG tablet Take 0.5 tablets (50 mg) by mouth at bedtime. 30 tablet 3 6/10/2025 Bedtime    venlafaxine (EFFEXOR XR) 37.5 MG 24 hr capsule Take 1 capsule (37.5 mg) by mouth daily. 90 capsule 3 6/11/2025 Morning     Current Facility-Administered Medications   Medication Dose Route Frequency Provider Last Rate Last Admin    acetaminophen (TYLENOL) tablet 650 mg  650 mg Oral Q4H PRN Jim Ghotra APRN CNP        Or    acetaminophen (TYLENOL) Suppository 650 mg  650 mg Rectal Q4H PRN Jim Ghotra APRN CNP        amLODIPine (NORVASC) tablet 5 mg  5 mg Oral Daily  Jim Ghotra APRN CNP   5 mg at 06/12/25 0823    atenolol (TENORMIN) tablet 50 mg  50 mg Oral Daily Jim Ghotra APRN CNP   50 mg at 06/12/25 0822    calcium carbonate (TUMS) chewable tablet 1,000 mg  1,000 mg Oral 4x Daily PRN Jim Ghotra APRN CNP        calcium citrate (CITRACAL) tablet 950 mg  950 mg Oral BID Jim Ghotra APRN CNP   950 mg at 06/12/25 0830    cefTRIAXone (ROCEPHIN) 2 g vial to attach to  ml bag for ADULTS or NS 50 ml bag for PEDS  2 g Intravenous Q24H Jim Ghotra APRN CNP        enoxaparin ANTICOAGULANT (LOVENOX) injection 40 mg  40 mg Subcutaneous Q24H Jim Ghotra APRN CNP   40 mg at 06/11/25 1741    hydrALAZINE (APRESOLINE) tablet 10 mg  10 mg Oral Q4H PRN Jim Ghotra APRN CNP        Or    hydrALAZINE (APRESOLINE) injection 10 mg  10 mg Intravenous Q4H PRN Jim Ghotra APRN CNP        HYDROmorphone (DILAUDID) half-tab 1 mg  1 mg Oral Q4H PRJim Reddy APRN CNP        HYDROmorphone (DILAUDID) injection 0.2 mg  0.2 mg Intravenous Q2H PRN Jim Ghotra APRN CNP        HYDROmorphone (DILAUDID) injection 0.4 mg  0.4 mg Intravenous Q2H PRJim Reddy APRN CNP        HYDROmorphone (DILAUDID) tablet 2 mg  2 mg Oral Q4H PRN Jim Ghotra APRN CNP        lidocaine (LMX4) cream   Topical Q1H PRJim Reddy APRN CNP        lidocaine 1 % 0.1-1 mL  0.1-1 mL Other Q1H PRJim Reddy APRN CNP        melatonin tablet 1 mg  1 mg Oral At Bedtime PRN Jim Ghotra APRN CNP        methocarbamol (ROBAXIN) tablet 500 mg  500 mg Oral 4x Daily PRN Jim Ghotra APRN CNP        naloxone (NARCAN) injection 0.2 mg  0.2 mg Intravenous Q2 Min PRN Jim Astorga MD        Or    naloxone (NARCAN) injection 0.4 mg  0.4 mg Intravenous Q2 Min PRN Jim Astorga MD        Or    naloxone (NARCAN) injection 0.2 mg  0.2 mg Intramuscular Q2 Min PRN Jim Astorga MD        Or    naloxone  (NARCAN) injection 0.4 mg  0.4 mg Intramuscular Q2 Min PRN Jim Astorga MD        ondansetron (ZOFRAN ODT) ODT tab 4 mg  4 mg Oral Q6H PRN Jim Ghotra APRN CNP        Or    ondansetron (ZOFRAN) injection 4 mg  4 mg Intravenous Q6H PRN Jim Ghotra APRN CNP        pantoprazole (PROTONIX) EC tablet 40 mg  40 mg Oral Daily Jim Ghotra APRN CNP   40 mg at 06/12/25 0823    polyethylene glycol (MIRALAX) Packet 17 g  17 g Oral BID PRN Jim Ghotra APRN CNP        predniSONE (DELTASONE) tablet 10 mg  10 mg Oral Daily Jim Ghotra APRN CNP   10 mg at 06/12/25 0822    prochlorperazine (COMPAZINE) injection 5 mg  5 mg Intravenous Q6H PRN Jim Ghotra APRN CNP        Or    prochlorperazine (COMPAZINE) tablet 5 mg  5 mg Oral Q6H PRN Jim Ghotra APRN CNP        senna-docusate (SENOKOT-S/PERICOLACE) 8.6-50 MG per tablet 1 tablet  1 tablet Oral BID Jim Ghotra APRN CNP   1 tablet at 06/12/25 0822    Or    senna-docusate (SENOKOT-S/PERICOLACE) 8.6-50 MG per tablet 2 tablet  2 tablet Oral BID Jim Ghotra APRN CNP        sodium chloride (PF) 0.9% PF flush 3 mL  3 mL Intracatheter Q8H DEBORAH Jim Ghotra APRN CNP   3 mL at 06/11/25 2208    sodium chloride (PF) 0.9% PF flush 3 mL  3 mL Intracatheter q1 min prn Jim Ghotra APRN CNP        sodium chloride 0.9 % infusion   Intravenous Continuous Jim Ghotra APRN  mL/hr at 06/12/25 0823 Rate Verify at 06/12/25 0823    traZODone (DESYREL) half-tab 50 mg  50 mg Oral At Bedtime Jim Ghotra APRN CNP   50 mg at 06/11/25 2207    venlafaxine (EFFEXOR XR) 24 hr capsule 37.5 mg  37.5 mg Oral Daily Jim Ghotra APRN CNP   37.5 mg at 06/12/25 0823             Review of Systems:     The 14 point Review of Systems is negative other than noted in the HPI.            Data:   All laboratory data reviewed  Results for orders placed or performed during the hospital encounter of 06/11/25  (from the past 24 hours)   UA Macroscopic with reflex to Microscopic and Culture    Specimen: Urine, Midstream   Result Value Ref Range    Color Urine Straw Colorless, Straw, Light Yellow, Yellow    Appearance Urine Slightly Cloudy (A) Clear    Glucose Urine Negative Negative mg/dL    Bilirubin Urine Negative Negative    Ketones Urine Negative Negative mg/dL    Specific Gravity Urine 1.006 1.003 - 1.035    Blood Urine Large (A) Negative    pH Urine 6.0 5.0 - 7.0    Protein Albumin Urine 70 (A) Negative mg/dL    Urobilinogen Urine Normal Normal mg/dL    Nitrite Urine Negative Negative    Leukocyte Esterase Urine Large (A) Negative    Bacteria Urine Few (A) None Seen /HPF    Mucus Urine Present (A) None Seen /LPF    RBC Urine 80 (H) <=2 /HPF    WBC Urine 68 (H) <=5 /HPF    Squamous Epithelials Urine <1 <=1 /HPF    Transitional Epithelials Urine <1 <=1 /HPF    Narrative    Urine Culture ordered based on laboratory criteria   CBC with platelets differential    Narrative    The following orders were created for panel order CBC with platelets differential.  Procedure                               Abnormality         Status                     ---------                               -----------         ------                     CBC with platelets and ...[[id number]]  Abnormal            Final result                 Please view results for these tests on the individual orders.   Basic metabolic panel   Result Value Ref Range    Sodium 137 135 - 145 mmol/L    Potassium 4.3 3.4 - 5.3 mmol/L    Chloride 98 98 - 107 mmol/L    Carbon Dioxide (CO2) 27 22 - 29 mmol/L    Anion Gap 12 7 - 15 mmol/L    Urea Nitrogen 14.8 8.0 - 23.0 mg/dL    Creatinine 1.14 0.67 - 1.17 mg/dL    GFR Estimate 70 >60 mL/min/1.73m2    Calcium 9.6 8.8 - 10.4 mg/dL    Glucose 147 (H) 70 - 99 mg/dL   [place] Draw    Narrative    The following orders were created for panel order [place] Draw.  Procedure                               Abnormality          Status                     ---------                               -----------         ------                     Extra Blue Top Tube[[id number]]                             Final result               Extra Red Top Tube[[id number]]                              Final result               Extra Green Top (Lithiu...[[id number]]                                                 Extra Purple Top Tube[[id number]]                                                        Please view results for these tests on the individual orders.   CBC with platelets and differential   Result Value Ref Range    WBC Count 7.6 4.0 - 11.0 10e3/uL    RBC Count 4.06 (L) 4.40 - 5.90 10e6/uL    Hemoglobin 12.4 (L) 13.3 - 17.7 g/dL    Hematocrit 35.9 (L) 40.0 - 53.0 %    MCV 88 78 - 100 fL    MCH 30.5 26.5 - 33.0 pg    MCHC 34.5 31.5 - 36.5 g/dL    RDW 13.8 10.0 - 15.0 %    Platelet Count 250 150 - 450 10e3/uL    % Neutrophils 76 %    % Lymphocytes 12 %    % Monocytes 11 %    % Eosinophils 0 %    % Basophils 0 %    % Immature Granulocytes 1 %    NRBCs per 100 WBC 0 <1 /100    Absolute Neutrophils 5.8 1.6 - 8.3 10e3/uL    Absolute Lymphocytes 0.9 0.8 - 5.3 10e3/uL    Absolute Monocytes 0.8 0.0 - 1.3 10e3/uL    Absolute Eosinophils 0.0 0.0 - 0.7 10e3/uL    Absolute Basophils 0.0 0.0 - 0.2 10e3/uL    Absolute Immature Granulocytes 0.1 <=0.4 10e3/uL    Absolute NRBCs 0.0 10e3/uL   Extra Blue Top Tube   Result Value Ref Range    Hold Specimen JIC    Extra Red Top Tube   Result Value Ref Range    Hold Specimen JIC    CT Abdomen Pelvis w Contrast    Narrative    EXAM: CT ABDOMEN PELVIS W CONTRAST  LOCATION: [facility]  DATE: 6/11/2025    INDICATION: LLQ pain and L flank pain, sent by oncology to have a repeat CT scan  COMPARISON: CT abdomen and pelvis performed on 5/31/2025  TECHNIQUE: CT scan of the abdomen and pelvis was performed following injection of IV contrast. Multiplanar reformats were obtained. Dose reduction techniques were  used.  CONTRAST: 100mL Isovue 370    FINDINGS:   LOWER CHEST: Subsegmental atelectasis is seen in the lung bases. Elevation of the left hemidiaphragm is present.    HEPATOBILIARY: Diffuse hepatic steatosis is present. Gallbladder is surgically absent.    PANCREAS: 7 mm fat density lesion is seen along the pancreatic head (series 2, image 27), likely reflecting a locule of interdigitating fat or small lipoma.    SPLEEN: Normal size.    ADRENAL GLANDS: Bilateral nodular thickening seen along the adrenal glands, similar to the prior exam. Subcentimeter adrenal nodules are indeterminant measuring up to 4 mm on the left and 6 mm on the right.    KIDNEYS/BLADDER: Stable bilateral renal pelviectasis as well as lower pole calyectasis along the left kidney. Bilateral renal pelvis and proximal ureteral urothelial thickening with hyperenhancement appears similar to the prior exam. Surrounding   inflammatory changes appear slightly worsened along the left kidney but unchanged along the right kidney. Urinary bladder is mildly distended.    BOWEL: No evidence of bowel obstruction. Mild wall thickening is seen involving the rectum and sigmoid colon. No surrounding territory changes are seen. Colonic diverticulosis without evidence of diverticulitis. Stable small fat-containing umbilical   hernia. Stable appearance of duodenal diverticulum.    LYMPH NODES: No lymphadenopathy.    VASCULATURE: Moderate to severe calcified and noncalcified atherosclerotic plaque is seen in the abdominal aorta.    PELVIC ORGANS: Prostate gland is surgically absent. Stable small fat-containing left inguinal hernia.    MUSCULOSKELETAL: Stable right gluteal intramuscular lipoma. Stable sclerotic osseous metastases involving the lower right scapula, multiple ribs, pelvis and thoracolumbar spine.      Impression    IMPRESSION:   1.  Slightly worsening inflammatory changes surrounding the left kidney compared to the prior exam. Inflammatory changes  surrounding the right kidney appear unchanged. No significant change in appearance of bilateral renal pelvis and proximal ureteral   urothelial thickening with hyperenhancement. Findings are likely compatible with ascending urinary tract infection. Recommend correlation with urinalysis.  2.  Stable bilateral renal pelviectasis (left greater than right) as well as lower pole caliectasis along the left kidney.  3.  Mild wall thickening is seen involving the rectum and sigmoid colon which may be due to underdistention given lack of surrounding territory changes. Mild proctocolitis is less likely but cannot be definitively excluded. Suggest correlation with   clinical exam.  4.  Stable multifocal sclerotic osseous metastases.   Blood Culture Peripheral blood (BC) Arm, Left    Specimen: Arm, Left; Peripheral blood (BC)   Result Value Ref Range    Culture No growth after 12 hours    Blood Culture Peripheral blood (BC) Hand, Right    Specimen: Hand, Right; Peripheral blood (BC)   Result Value Ref Range    Culture No growth after 12 hours

## 2025-06-12 NOTE — PHARMACY-ADMISSION MEDICATION HISTORY
Pharmacist Admission Medication History    Admission medication history is complete. The information provided in this note is only as accurate as the sources available at the time of the update.    Information Source(s): Patient via in-person    Pertinent Information:     Changes made to PTA medication list:  Added: None  Deleted: Vitamin D  Changed: Prednisone, Caltrate to include vit D    Allergies reviewed with patient and updates made in EHR: no    Medication History Completed By: Andrew Salas RPH 6/11/2025 10:05 PM    PTA Med List   Medication Sig Last Dose/Taking    amLODIPine (NORVASC) 5 MG tablet Take 1 tablet (5 mg) by mouth daily. 6/11/2025 Morning    atenolol (TENORMIN) 50 MG tablet Take 1 tablet (50 mg) by mouth daily. 6/11/2025 Morning    calcium citrate-vitamin D (CITRACAL) 200-6.25 MG-MCG TABS per tablet Take 1 tablet by mouth 2 times daily. 6/11/2025 Morning    omeprazole (PRILOSEC) 20 MG DR capsule Take 1 capsule (20 mg) by mouth daily. 6/11/2025 Morning    predniSONE (DELTASONE) 10 MG tablet Take 10 mg by mouth daily. 6/11/2025 Morning    traZODone (DESYREL) 100 MG tablet Take 0.5 tablets (50 mg) by mouth at bedtime. 6/10/2025 Bedtime    venlafaxine (EFFEXOR XR) 37.5 MG 24 hr capsule Take 1 capsule (37.5 mg) by mouth daily. 6/11/2025 Morning

## 2025-06-12 NOTE — PLAN OF CARE
"  Problem: Adult Inpatient Plan of Care  Goal: Plan of Care Review  Description: The Plan of Care Review/Shift note should be completed every shift.  The Outcome Evaluation is a brief statement about your assessment that the patient is improving, declining, or no change.  This information will be displayed automatically on your shift  note.  Outcome: Progressing  Flowsheets (Taken 6/12/2025 1446)  Outcome Evaluation: vss afebrile. pt c/o flank pain1/10. ivf, ivf abx.  Plan of Care Reviewed With: patient  Overall Patient Progress: improving  Goal: Patient-Specific Goal (Individualized)  Description: You can add care plan individualizations to a care plan. Examples of Individualization might be:  \"Parent requests to be called daily at 9am for status\", \"I have a hard time hearing out of my right ear\", or \"Do not touch me to wake me up as it startles  me\".  Outcome: Progressing  Goal: Absence of Hospital-Acquired Illness or Injury  Outcome: Progressing  Intervention: Identify and Manage Fall Risk  Recent Flowsheet Documentation  Taken 6/12/2025 0820 by Kenzie Bowles RN  Safety Promotion/Fall Prevention: safety round/check completed  Intervention: Prevent Skin Injury  Recent Flowsheet Documentation  Taken 6/12/2025 0820 by Kenzie Bowles RN  Body Position: position changed independently  Intervention: Prevent Infection  Recent Flowsheet Documentation  Taken 6/12/2025 0820 by Kenzie Bowles RN  Infection Prevention:   rest/sleep promoted   single patient room provided  Goal: Optimal Comfort and Wellbeing  Outcome: Progressing  Intervention: Monitor Pain and Promote Comfort  Recent Flowsheet Documentation  Taken 6/12/2025 0815 by Kenzie Bowles RN  Pain Management Interventions: declines  Intervention: Provide Person-Centered Care  Recent Flowsheet Documentation  Taken 6/12/2025 0820 by Kenzie Bowles RN  Trust Relationship/Rapport:   care explained   questions answered   " questions encouraged   thoughts/feelings acknowledged  Goal: Readiness for Transition of Care  Outcome: Progressing     Problem: Delirium  Goal: Optimal Coping  Outcome: Progressing  Goal: Improved Behavioral Control  Outcome: Progressing  Intervention: Minimize Safety Risk  Recent Flowsheet Documentation  Taken 6/12/2025 0820 by Kenzie Bowles RN  Enhanced Safety Measures: review medications for side effects with activity  Trust Relationship/Rapport:   care explained   questions answered   questions encouraged   thoughts/feelings acknowledged  Goal: Improved Attention and Thought Clarity  Outcome: Progressing  Goal: Improved Sleep  Outcome: Progressing     Problem: Knee Arthroplasty  Goal: Optimal Coping  Outcome: Progressing  Goal: Absence of Bleeding  Outcome: Progressing  Goal: Effective Bowel Elimination  Outcome: Progressing  Goal: Fluid and Electrolyte Balance  Outcome: Progressing  Goal: Optimal Functional Ability  Outcome: Progressing  Intervention: Promote Optimal Functional Status  Recent Flowsheet Documentation  Taken 6/12/2025 0820 by Kenzie Bowles RN  Activity Management: up ad zaina  Goal: Absence of Infection Signs and Symptoms  Outcome: Progressing  Goal: Intact Neurovascular Status  Outcome: Progressing  Goal: Anesthesia/Sedation Recovery  Outcome: Progressing  Intervention: Optimize Anesthesia Recovery  Recent Flowsheet Documentation  Taken 6/12/2025 0820 by Kenzie Bowles RN  Safety Promotion/Fall Prevention: safety round/check completed  Goal: Optimal Pain Control and Function  Outcome: Progressing  Intervention: Prevent or Manage Pain  Recent Flowsheet Documentation  Taken 6/12/2025 0815 by Kenzie Bowles RN  Pain Management Interventions: declines  Goal: Nausea and Vomiting Relief  Outcome: Progressing  Goal: Effective Urinary Elimination  Outcome: Progressing  Goal: Effective Oxygenation and Ventilation  Outcome: Progressing  Intervention: Optimize  Oxygenation and Ventilation  Recent Flowsheet Documentation  Taken 6/12/2025 0820 by Kenzie Bowles, RN  Activity Management: up ad zaina  Head of Bed (HOB) Positioning: HOB at 30-45 degrees       Goal Outcome Evaluation:      Plan of Care Reviewed With: patient    Overall Patient Progress: improvingOverall Patient Progress: improving    Outcome Evaluation: vss afebrile. pt c/o flank pain1/10. ivf, ivf abx.

## 2025-06-12 NOTE — PROGRESS NOTES
[facility]    Hospitalist Progress Note  Name: Ronnie Lagos    MRN: [id number]  Provider: Jaimie Veloz MD  Date of Service: 06/12/2025    Assessment & Plan   Summary of Stay: Ronnie Lagos is a 70 year old male who was admitted on 6/11/2025 for acute pyelonephritis.  Patient's past medical history significant for metastic prostate cancer with bony mets, intermittent hematuria, history of CVA GERD hypertension presented with severe left flank abdominal pain and concerns of pyelonephritis. He was recently hospitalized from 5/31/2025 to 6/1/2025 for UTI was treated with IV ceftriaxone and discharged on ciprofloxacin for 7 days.  Imaging in the emergency room was concerning for possible pyelonephritis.    Acute medical issues:  #Pyelonephritis with concerns for incomplete prior treatment of pyelonephritis and in the setting of metastatic prostate cancer (Cabazitaxel) vs side effect from chemotherapy and disease.  Recently admitted from 5/31-6/1 for similar and was treated with Rocephin and discharged on Cipro but stopped taking Cipro when he was told that the culture was negative.  Imaging today is similar to prior.  Afebrile.  No leukocytosis but clinically appears to have pyelonephritis.   Is on chemotherapy (Cabazitaxel ).    -- Received Rocephin 1 gram in the ED. Will increase to 2 grams for now.  Have asked for another 1 gram to be given today.    -- Blood cultures pending.  UC pending.  Follow cultures and tailor abx based on clinical course and labs.   -- IVF hydration.        -------------------------------------------  Chronic medical issues:  #HTN: Continue atenolol and amlodipine. PRN hydralazine.    #GERD: Continue PPI  #Metastatic prostate cancer: Continue Prednisone 10 mg daily. Has not started the Prednisone taper yet.  [facility] consult pending      DVT Prophylaxis: Enoxaparin (Lovenox) SQ  Code Status: Full Code    Disposition:   Medically Ready for Discharge: Anticipated in 2-4  Days        Interval History   Assumed care reviewed chart.  Patient overall is feeling better.  Left flank is somewhat better with treatment.  No fever no chills.  No nausea or vomiting.  More than 10 point review of system was carried out was otherwise negative.  Total time spent in direct patient care and in coordination of care is more than 35 minutes.    -Data reviewed today: I reviewed all new labs and imaging reports over the last 24 hours. I personally reviewed no images or EKG's today.    Physical Exam   Temp: 97.8  F (36.6  C) Temp src: Oral BP: (!) 175/99 Pulse: 66   Resp: 18 SpO2: 95 % O2 Device: None (Room air)    Vitals:    06/11/25 1201 06/11/25 1715   Weight: 105.1 kg (231 lb 11.3 oz) 104.1 kg (229 lb 8 oz)     Vital Signs with Ranges  Temp:  [97.1  F (36.2  C)-97.8  F (36.6  C)] 97.8  F (36.6  C)  Pulse:  [63-74] 66  Resp:  [16-20] 18  BP: (163-185)/(86-99) 175/99  SpO2:  [95 %-100 %] 95 %  I/O last 3 completed shifts:  In: 1122 [I.V.:1122]  Out: -       GEN:  Alert, oriented x 3, appears comfortable, NAD.  HEENT:  Normocephalic/atraumatic, no scleral icterus, no nasal discharge, mouth moist.  CV:  Regular rate and rhythm, no murmur or JVD.  S1 + S2 noted, no S3 or S4.  LUNGS:  Clear to auscultation bilaterally without rales/rhonchi/wheezing/retractions.  Symmetric chest rise on inhalation noted.  ABD:  Active bowel sounds, soft, non-tender/non-distended.  No rebound/guarding/rigidity.  EXT:  No edema.  No cyanosis.  No joint synovitis noted.  SKIN:  Dry to touch, no exanthems noted in the visualized areas.    Medications   Current Facility-Administered Medications   Medication Dose Route Frequency Provider Last Rate Last Admin    sodium chloride 0.9 % infusion   Intravenous Continuous Jim Ghotra APRN  mL/hr at 06/12/25 0425 New Bag at 06/12/25 0425     Current Facility-Administered Medications   Medication Dose Route Frequency Provider Last Rate Last Admin    amLODIPine (NORVASC)  tablet 5 mg  5 mg Oral Daily Jim Ghotra APRN CNP        atenolol (TENORMIN) tablet 50 mg  50 mg Oral Daily Jim Ghotra APRN CNP        calcium citrate (CITRACAL) tablet 950 mg  950 mg Oral BID iJm Ghotra APRN CNP   950 mg at 06/11/25 2242    cefTRIAXone (ROCEPHIN) 2 g vial to attach to  ml bag for ADULTS or NS 50 ml bag for PEDS  2 g Intravenous Q24H Jim Ghotra APRN CNP        enoxaparin ANTICOAGULANT (LOVENOX) injection 40 mg  40 mg Subcutaneous Q24H Jim Ghotra APRN CNP   40 mg at 06/11/25 1741    pantoprazole (PROTONIX) EC tablet 40 mg  40 mg Oral Daily Jim Ghotra APRN CNP        predniSONE (DELTASONE) tablet 10 mg  10 mg Oral Daily Jim Ghotra APRN CNP        senna-docusate (SENOKOT-S/PERICOLACE) 8.6-50 MG per tablet 1 tablet  1 tablet Oral BID Jim Ghotra APRN CNP        Or    senna-docusate (SENOKOT-S/PERICOLACE) 8.6-50 MG per tablet 2 tablet  2 tablet Oral BID Jim Ghotra APRN CNP        sodium chloride (PF) 0.9% PF flush 3 mL  3 mL Intracatheter Q8H DEBORAH Jim Ghotra APRN CNP   3 mL at 06/11/25 2208    traZODone (DESYREL) half-tab 50 mg  50 mg Oral At Bedtime Jim Ghotra APRN CNP   50 mg at 06/11/25 2207    venlafaxine (EFFEXOR XR) 24 hr capsule 37.5 mg  37.5 mg Oral Daily Jim Ghotra APRN CNP         Data     Recent Labs   Lab 06/11/25  1335   WBC 7.6   HGB 12.4*   HCT 35.9*   MCV 88        Recent Labs   Lab 06/11/25  1335      POTASSIUM 4.3   CHLORIDE 98   CO2 27   ANIONGAP 12   *   BUN 14.8   CR 1.14   GFRESTIMATED 70   AV 9.6     7-Day Micro Results       Collected Updated Procedure Result Status      06/11/2025 1657 06/12/2025 0817 Blood Culture Peripheral blood (BC) Hand, Right [[id number]]   Peripheral blood (BC) from Hand, Right    Preliminary result Component Value   Culture No growth after 12 hours  [P]                06/11/2025 1656 06/12/2025 0817 Blood Culture Peripheral  "blood (BC) Arm, Left [[id number]]   Peripheral blood (BC) from Arm, Left    Preliminary result Component Value   Culture No growth after 12 hours  [P]                06/11/2025 1326 06/11/2025 1405 Urine Culture [[id number]]   Urine, Midstream    In process Component Value   No component results                     No results for input(s): \"NTBNPI\", \"NTBNP\" in the last 168 hours.  Recent Labs   Lab 06/11/25  1335   HGB 12.4*     No results for input(s): \"AST\", \"ALT\", \"GGT\", \"ALKPHOS\", \"BILITOTAL\", \"BILICONJ\", \"BILIDIRECT\", \"GRICELDA\" in the last 168 hours.    Invalid input(s): \"BILIRUBININDIRECT\"  No results for input(s): \"INR\" in the last 168 hours.  No results for input(s): \"LACT\" in the last 168 hours.  No results for input(s): \"LIPASE\" in the last 168 hours.  Recent Labs   Lab 06/11/25  1326   COLOR Straw   APPEARANCE Slightly Cloudy*   URINEGLC Negative   URINEBILI Negative   URINEKETONE Negative   SG 1.006   UBLD Large*   URINEPH 6.0   PROTEIN 70*   NITRITE Negative   LEUKEST Large*   RBCU 80*   WBCU 68*       Recent Results (from the past 24 hours)   CT Abdomen Pelvis w Contrast    Narrative    EXAM: CT ABDOMEN PELVIS W CONTRAST  LOCATION: [facility]  DATE: 6/11/2025    INDICATION: LLQ pain and L flank pain, sent by oncology to have a repeat CT scan  COMPARISON: CT abdomen and pelvis performed on 5/31/2025  TECHNIQUE: CT scan of the abdomen and pelvis was performed following injection of IV contrast. Multiplanar reformats were obtained. Dose reduction techniques were used.  CONTRAST: 100mL Isovue 370    FINDINGS:   LOWER CHEST: Subsegmental atelectasis is seen in the lung bases. Elevation of the left hemidiaphragm is present.    HEPATOBILIARY: Diffuse hepatic steatosis is present. Gallbladder is surgically absent.    PANCREAS: 7 mm fat density lesion is seen along the pancreatic head (series 2, image 27), likely reflecting a locule of interdigitating fat or small lipoma.    SPLEEN: " Normal size.    ADRENAL GLANDS: Bilateral nodular thickening seen along the adrenal glands, similar to the prior exam. Subcentimeter adrenal nodules are indeterminant measuring up to 4 mm on the left and 6 mm on the right.    KIDNEYS/BLADDER: Stable bilateral renal pelviectasis as well as lower pole calyectasis along the left kidney. Bilateral renal pelvis and proximal ureteral urothelial thickening with hyperenhancement appears similar to the prior exam. Surrounding   inflammatory changes appear slightly worsened along the left kidney but unchanged along the right kidney. Urinary bladder is mildly distended.    BOWEL: No evidence of bowel obstruction. Mild wall thickening is seen involving the rectum and sigmoid colon. No surrounding territory changes are seen. Colonic diverticulosis without evidence of diverticulitis. Stable small fat-containing umbilical   hernia. Stable appearance of duodenal diverticulum.    LYMPH NODES: No lymphadenopathy.    VASCULATURE: Moderate to severe calcified and noncalcified atherosclerotic plaque is seen in the abdominal aorta.    PELVIC ORGANS: Prostate gland is surgically absent. Stable small fat-containing left inguinal hernia.    MUSCULOSKELETAL: Stable right gluteal intramuscular lipoma. Stable sclerotic osseous metastases involving the lower right scapula, multiple ribs, pelvis and thoracolumbar spine.      Impression    IMPRESSION:   1.  Slightly worsening inflammatory changes surrounding the left kidney compared to the prior exam. Inflammatory changes surrounding the right kidney appear unchanged. No significant change in appearance of bilateral renal pelvis and proximal ureteral   urothelial thickening with hyperenhancement. Findings are likely compatible with ascending urinary tract infection. Recommend correlation with urinalysis.  2.  Stable bilateral renal pelviectasis (left greater than right) as well as lower pole caliectasis along the left kidney.  3.  Mild wall  thickening is seen involving the rectum and sigmoid colon which may be due to underdistention given lack of surrounding territory changes. Mild proctocolitis is less likely but cannot be definitively excluded. Suggest correlation with   clinical exam.  4.  Stable multifocal sclerotic osseous metastases.

## 2025-06-12 NOTE — PLAN OF CARE
"Orientation: AAOx4, VSS  Pain: denies pain  O2: RA  GI/: Ambulates to restroom  Activity: independent  Diet: regular diet  Protocols: none  Major shift events:  Plan: continue with POC  Problem: Adult Inpatient Plan of Care  Goal: Plan of Care Review  Description: The Plan of Care Review/Shift note should be completed every shift.  The Outcome Evaluation is a brief statement about your assessment that the patient is improving, declining, or no change.  This information will be displayed automatically on your shift  note.  Outcome: Progressing  Flowsheets (Taken 6/12/2025 0440)  Plan of Care Reviewed With: patient  Overall Patient Progress: no change  Goal: Patient-Specific Goal (Individualized)  Description: You can add care plan individualizations to a care plan. Examples of Individualization might be:  \"Parent requests to be called daily at 9am for status\", \"I have a hard time hearing out of my right ear\", or \"Do not touch me to wake me up as it startles  me\".  Outcome: Progressing  Goal: Absence of Hospital-Acquired Illness or Injury  Outcome: Progressing  Intervention: Identify and Manage Fall Risk  Recent Flowsheet Documentation  Taken 6/11/2025 2037 by Moody Smith RN  Safety Promotion/Fall Prevention:   clutter free environment maintained   increased rounding and observation   increase visualization of patient   nonskid shoes/slippers when out of bed   patient and family education   room near nurse's station   room organization consistent   safety round/check completed   treat reversible contributory factors  Intervention: Prevent Skin Injury  Recent Flowsheet Documentation  Taken 6/11/2025 2037 by Moody Smith RN  Body Position: position changed independently  Goal: Optimal Comfort and Wellbeing  Outcome: Progressing  Intervention: Provide Person-Centered Care  Recent Flowsheet Documentation  Taken 6/11/2025 2037 by Moody Smith RN  Trust Relationship/Rapport:   care explained   questions " answered   questions encouraged   thoughts/feelings acknowledged  Goal: Readiness for Transition of Care  Outcome: Progressing     Problem: Delirium  Goal: Optimal Coping  Outcome: Progressing  Goal: Improved Behavioral Control  Outcome: Progressing  Intervention: Minimize Safety Risk  Recent Flowsheet Documentation  Taken 6/11/2025 2037 by Moody Smith RN  Trust Relationship/Rapport:   care explained   questions answered   questions encouraged   thoughts/feelings acknowledged  Goal: Improved Attention and Thought Clarity  Outcome: Progressing  Intervention: Maximize Cognitive Function  Recent Flowsheet Documentation  Taken 6/11/2025 2037 by Moody Smith RN  Reorientation Measures:   calendar in view   clock in view  Goal: Improved Sleep  Outcome: Progressing   Goal Outcome Evaluation:      Plan of Care Reviewed With: patient    Overall Patient Progress: no changeOverall Patient Progress: no change

## 2025-06-12 NOTE — PLAN OF CARE
"Goal Outcome Evaluation:      Plan of Care Reviewed With: patient    Overall Patient Progress: improvingOverall Patient Progress: improving    Outcome Evaluation: Pt A&O. Denies pain. Denies N/V. Tolerating diet. IVF infusing. ON IV rocephin.    Problem: Adult Inpatient Plan of Care  Goal: Plan of Care Review  Description: The Plan of Care Review/Shift note should be completed every shift.  The Outcome Evaluation is a brief statement about your assessment that the patient is improving, declining, or no change.  This information will be displayed automatically on your shift  note.  Outcome: Progressing  Flowsheets (Taken 6/12/2025 4280)  Outcome Evaluation: Pt A&O. Denies pain. Denies N/V. Tolerating diet. IVF infusing. ON IV rocephin.  Plan of Care Reviewed With: patient  Overall Patient Progress: improving  Goal: Patient-Specific Goal (Individualized)  Description: You can add care plan individualizations to a care plan. Examples of Individualization might be:  \"Parent requests to be called daily at 9am for status\", \"I have a hard time hearing out of my right ear\", or \"Do not touch me to wake me up as it startles  me\".  Outcome: Progressing  Goal: Absence of Hospital-Acquired Illness or Injury  Outcome: Progressing  Goal: Optimal Comfort and Wellbeing  Outcome: Progressing  Goal: Readiness for Transition of Care  Outcome: Progressing     Problem: Delirium  Goal: Optimal Coping  Outcome: Progressing  Goal: Improved Behavioral Control  Outcome: Progressing  Goal: Improved Attention and Thought Clarity  Outcome: Progressing  Goal: Improved Sleep  Outcome: Progressing     Problem: Knee Arthroplasty  Goal: Optimal Coping  Outcome: Progressing  Goal: Absence of Bleeding  Outcome: Progressing  Goal: Effective Bowel Elimination  Outcome: Progressing  Goal: Fluid and Electrolyte Balance  Outcome: Progressing  Goal: Optimal Functional Ability  Outcome: Progressing  Goal: Absence of Infection Signs and Symptoms  Outcome: " Progressing  Goal: Intact Neurovascular Status  Outcome: Progressing  Goal: Anesthesia/Sedation Recovery  Outcome: Progressing  Goal: Optimal Pain Control and Function  Outcome: Progressing  Goal: Nausea and Vomiting Relief  Outcome: Progressing  Goal: Effective Urinary Elimination  Outcome: Progressing  Goal: Effective Oxygenation and Ventilation  Outcome: Progressing

## 2025-06-13 LAB
ANION GAP SERPL CALCULATED.3IONS-SCNC: 9 MMOL/L (ref 7–15)
BUN SERPL-MCNC: 10.6 MG/DL (ref 8–23)
CALCIUM SERPL-MCNC: 8.7 MG/DL (ref 8.8–10.4)
CHLORIDE SERPL-SCNC: 105 MMOL/L (ref 98–107)
CREAT SERPL-MCNC: 0.95 MG/DL (ref 0.67–1.17)
EGFRCR SERPLBLD CKD-EPI 2021: 87 ML/MIN/1.73M2
ERYTHROCYTE [DISTWIDTH] IN BLOOD BY AUTOMATED COUNT: 13.6 % (ref 10–15)
GLUCOSE SERPL-MCNC: 104 MG/DL (ref 70–99)
HCO3 SERPL-SCNC: 24 MMOL/L (ref 22–29)
HCT VFR BLD AUTO: 31.9 % (ref 40–53)
HGB BLD-MCNC: 11 G/DL (ref 13.3–17.7)
MCH RBC QN AUTO: 30.5 PG (ref 26.5–33)
MCHC RBC AUTO-ENTMCNC: 34.5 G/DL (ref 31.5–36.5)
MCV RBC AUTO: 88 FL (ref 78–100)
PLATELET # BLD AUTO: 156 10E3/UL (ref 150–450)
POTASSIUM SERPL-SCNC: 3.9 MMOL/L (ref 3.4–5.3)
RBC # BLD AUTO: 3.61 10E6/UL (ref 4.4–5.9)
SODIUM SERPL-SCNC: 138 MMOL/L (ref 135–145)
WBC # BLD AUTO: 4.6 10E3/UL (ref 4–11)

## 2025-06-13 PROCEDURE — 85014 HEMATOCRIT: CPT | Performed by: INTERNAL MEDICINE

## 2025-06-13 PROCEDURE — 258N000003 HC RX IP 258 OP 636: Performed by: NURSE PRACTITIONER

## 2025-06-13 PROCEDURE — 36415 COLL VENOUS BLD VENIPUNCTURE: CPT | Performed by: INTERNAL MEDICINE

## 2025-06-13 PROCEDURE — 250N000012 HC RX MED GY IP 250 OP 636 PS 637: Performed by: NURSE PRACTITIONER

## 2025-06-13 PROCEDURE — 99232 SBSQ HOSP IP/OBS MODERATE 35: CPT | Performed by: INTERNAL MEDICINE

## 2025-06-13 PROCEDURE — 80048 BASIC METABOLIC PNL TOTAL CA: CPT | Performed by: INTERNAL MEDICINE

## 2025-06-13 PROCEDURE — 120N000001 HC R&B MED SURG/OB

## 2025-06-13 PROCEDURE — 250N000013 HC RX MED GY IP 250 OP 250 PS 637: Performed by: NURSE PRACTITIONER

## 2025-06-13 PROCEDURE — 250N000011 HC RX IP 250 OP 636: Performed by: NURSE PRACTITIONER

## 2025-06-13 RX ADMIN — Medication 950 MG: at 09:10

## 2025-06-13 RX ADMIN — SODIUM CHLORIDE: 0.9 INJECTION, SOLUTION INTRAVENOUS at 20:27

## 2025-06-13 RX ADMIN — ATENOLOL 50 MG: 25 TABLET ORAL at 09:10

## 2025-06-13 RX ADMIN — HYDROMORPHONE HYDROCHLORIDE 1 MG: 2 TABLET ORAL at 11:03

## 2025-06-13 RX ADMIN — ENOXAPARIN SODIUM 40 MG: 40 INJECTION SUBCUTANEOUS at 17:54

## 2025-06-13 RX ADMIN — PANTOPRAZOLE SODIUM 40 MG: 40 TABLET, DELAYED RELEASE ORAL at 09:10

## 2025-06-13 RX ADMIN — Medication 950 MG: at 20:24

## 2025-06-13 RX ADMIN — CEFTRIAXONE 2 G: 2 INJECTION, POWDER, FOR SOLUTION INTRAMUSCULAR; INTRAVENOUS at 17:54

## 2025-06-13 RX ADMIN — PREDNISONE 10 MG: 5 TABLET ORAL at 09:09

## 2025-06-13 RX ADMIN — TRAZODONE HYDROCHLORIDE 50 MG: 50 TABLET ORAL at 21:32

## 2025-06-13 RX ADMIN — ACETAMINOPHEN 650 MG: 325 TABLET ORAL at 09:18

## 2025-06-13 RX ADMIN — AMLODIPINE BESYLATE 5 MG: 5 TABLET ORAL at 09:10

## 2025-06-13 RX ADMIN — VENLAFAXINE HYDROCHLORIDE 37.5 MG: 37.5 CAPSULE, EXTENDED RELEASE ORAL at 09:10

## 2025-06-13 RX ADMIN — SENNOSIDES AND DOCUSATE SODIUM 1 TABLET: 50; 8.6 TABLET ORAL at 09:10

## 2025-06-13 RX ADMIN — SODIUM CHLORIDE: 0.9 INJECTION, SOLUTION INTRAVENOUS at 10:17

## 2025-06-13 ASSESSMENT — ACTIVITIES OF DAILY LIVING (ADL)
ADLS_ACUITY_SCORE: 29
ADLS_ACUITY_SCORE: 31
ADLS_ACUITY_SCORE: 29
ADLS_ACUITY_SCORE: 31
ADLS_ACUITY_SCORE: 29
ADLS_ACUITY_SCORE: 29
ADLS_ACUITY_SCORE: 31
ADLS_ACUITY_SCORE: 29
ADLS_ACUITY_SCORE: 31
ADLS_ACUITY_SCORE: 31
ADLS_ACUITY_SCORE: 29
ADLS_ACUITY_SCORE: 31

## 2025-06-13 NOTE — PLAN OF CARE
"Pt is A&Ox4, VSS. C/o bilateral flank pain, at times achy and sharp. Good relief with tylenol PO and dialudid PO. NS running at 100ml/hr.   Last BM 6/12. On lovenox subcutaneous. On Rocephin IV q24H. Independent in room, call light in reach.     Problem: Adult Inpatient Plan of Care  Goal: Plan of Care Review  Description: The Plan of Care Review/Shift note should be completed every shift.  The Outcome Evaluation is a brief statement about your assessment that the patient is improving, declining, or no change.  This information will be displayed automatically on your shift  note.  Outcome: Progressing  Flowsheets (Taken 6/13/2025 1418)  Plan of Care Reviewed With: patient  Overall Patient Progress: improving  Goal: Patient-Specific Goal (Individualized)  Description: You can add care plan individualizations to a care plan. Examples of Individualization might be:  \"Parent requests to be called daily at 9am for status\", \"I have a hard time hearing out of my right ear\", or \"Do not touch me to wake me up as it startles  me\".  Outcome: Progressing  Goal: Absence of Hospital-Acquired Illness or Injury  Outcome: Progressing  Intervention: Identify and Manage Fall Risk  Recent Flowsheet Documentation  Taken 6/13/2025 0915 by Dhara Carrillo RN  Safety Promotion/Fall Prevention:   activity supervised   safety round/check completed   supervised activity  Intervention: Prevent Skin Injury  Recent Flowsheet Documentation  Taken 6/13/2025 0915 by Dhara Carrillo, RN  Body Position: position changed independently  Skin Protection: adhesive use limited  Intervention: Prevent Infection  Recent Flowsheet Documentation  Taken 6/13/2025 0915 by Dhara Carrillo, RN  Infection Prevention:   hand hygiene promoted   rest/sleep promoted   single patient room provided  Goal: Optimal Comfort and Wellbeing  Outcome: Progressing  Intervention: Monitor Pain and Promote Comfort  Recent Flowsheet Documentation  Taken 6/13/2025 1145 by Dhara Carrillo, " RN  Pain Management Interventions: rest  Taken 6/13/2025 1059 by Dhara Carrillo RN  Pain Management Interventions: medication (see MAR)  Taken 6/13/2025 1006 by Dhara Carrillo RN  Pain Management Interventions: rest  Taken 6/13/2025 0915 by Dhara Carrillo, RN  Pain Management Interventions: medication (see MAR)  Intervention: Provide Person-Centered Care  Recent Flowsheet Documentation  Taken 6/13/2025 0915 by Dhara Carrillo RN  Trust Relationship/Rapport:   care explained   questions answered   questions encouraged   thoughts/feelings acknowledged  Goal: Readiness for Transition of Care  Outcome: Progressing   Goal Outcome Evaluation:      Plan of Care Reviewed With: patient    Overall Patient Progress: improvingOverall Patient Progress: improving

## 2025-06-13 NOTE — PLAN OF CARE
"Goal Outcome Evaluation:      Plan of Care Reviewed With: patient    Overall Patient Progress: improvingOverall Patient Progress: improving    Outcome Evaluation: A&Ox4. VSS on RA. Denies pain.      Problem: Adult Inpatient Plan of Care  Goal: Plan of Care Review  Description: The Plan of Care Review/Shift note should be completed every shift.  The Outcome Evaluation is a brief statement about your assessment that the patient is improving, declining, or no change.  This information will be displayed automatically on your shift  note.  Outcome: Adequate for Care Transition  Flowsheets (Taken 6/13/2025 0603)  Outcome Evaluation: A&Ox4. VSS on RA. Denies pain.  Plan of Care Reviewed With: patient  Overall Patient Progress: improving  Goal: Patient-Specific Goal (Individualized)  Description: You can add care plan individualizations to a care plan. Examples of Individualization might be:  \"Parent requests to be called daily at 9am for status\", \"I have a hard time hearing out of my right ear\", or \"Do not touch me to wake me up as it startles  me\".  Outcome: Adequate for Care Transition  Goal: Absence of Hospital-Acquired Illness or Injury  Outcome: Adequate for Care Transition  Intervention: Identify and Manage Fall Risk  Recent Flowsheet Documentation  Taken 6/12/2025 2115 by Yenny Chan, RN  Safety Promotion/Fall Prevention:   activity supervised   safety round/check completed   supervised activity  Intervention: Prevent Infection  Recent Flowsheet Documentation  Taken 6/12/2025 2115 by Yenny Chan, RN  Infection Prevention:   hand hygiene promoted   rest/sleep promoted   single patient room provided  Goal: Optimal Comfort and Wellbeing  Outcome: Adequate for Care Transition  Goal: Readiness for Transition of Care  Outcome: Adequate for Care Transition     Problem: Delirium  Goal: Optimal Coping  Outcome: Adequate for Care Transition  Goal: Improved Behavioral Control  Outcome: Adequate for Care " Transition  Intervention: Minimize Safety Risk  Recent Flowsheet Documentation  Taken 6/12/2025 2115 by Yenny Chan RN  Enhanced Safety Measures:   pain management   review medications for side effects with activity  Goal: Improved Attention and Thought Clarity  Outcome: Adequate for Care Transition  Goal: Improved Sleep  Outcome: Adequate for Care Transition     Problem: Knee Arthroplasty  Goal: Optimal Coping  Outcome: Adequate for Care Transition  Goal: Absence of Bleeding  Outcome: Adequate for Care Transition  Goal: Effective Bowel Elimination  Outcome: Adequate for Care Transition  Goal: Fluid and Electrolyte Balance  Outcome: Adequate for Care Transition  Goal: Optimal Functional Ability  Outcome: Adequate for Care Transition  Goal: Absence of Infection Signs and Symptoms  Outcome: Adequate for Care Transition  Goal: Intact Neurovascular Status  Outcome: Adequate for Care Transition  Goal: Anesthesia/Sedation Recovery  Outcome: Adequate for Care Transition  Intervention: Optimize Anesthesia Recovery  Recent Flowsheet Documentation  Taken 6/12/2025 2115 by Yenny Chan RN  Safety Promotion/Fall Prevention:   activity supervised   safety round/check completed   supervised activity  Goal: Optimal Pain Control and Function  Outcome: Adequate for Care Transition  Goal: Nausea and Vomiting Relief  Outcome: Adequate for Care Transition  Goal: Effective Urinary Elimination  Outcome: Adequate for Care Transition  Goal: Effective Oxygenation and Ventilation  Outcome: Adequate for Care Transition  Intervention: Optimize Oxygenation and Ventilation  Recent Flowsheet Documentation  Taken 6/12/2025 2115 by Yenny Chan RN  Cough And Deep Breathing: done independently per patient

## 2025-06-13 NOTE — PROGRESS NOTES
Hematology/Oncology:      CHART CHECK:     Today's Date: 06/13/25  Date of Admission: 6/11/25  Reason for Consult: Metastatic prostate cancer, pyelonephritis    History of metastatic prostate cancer, previously on Taxotere, now on cabazitaxel.  Patient Dr. Mercedes's.    Metastatic prostate cancer  Hemorrhagic cystitis  Pyelonephritis  Dr. Mercedes saw pt yesterday in consultation.  Recently on palliative intent cabazitaxel, but has developed hemorrhagic cystitis and discontinued after last dose on 5/27/2025.  Also, continues on Lupron and Xgeva.  - Hemorrhagic cystitis and pyelonephritis possibly from cabazitaxel, last dose on 5/27/2025.  CT scan concerning for pyelonephritis. Received Rocephin/Cipro in the past  - Receiving Rocephin inpatient again this hospitalization  - Blood cultures x 2 and UCx with NGTD  - CBC and CMP reviewed, stable  - Continue IVF hydration  - Keep follow-up with me on 6/18 as scheduled for hospital follow-up  - Has follow-up with Dr. Mercedes on 7/8/2025 to discuss next steps  - No new oncology recommendations, we will follow peripherally, please call us with any questions        Aileen Agrawal PA-C  Hematology/Oncology  [facility]

## 2025-06-13 NOTE — PROGRESS NOTES
[facility]    Hospitalist Progress Note  Name: Ronnie Lagos    MRN: [id number]  Provider: Jaimie Veloz MD  Date of Service: 06/13/2025    Assessment & Plan   Summary of Stay: Ronnie Lagos is a 70 year old male who was admitted on 6/11/2025 for acute pyelonephritis.  Patient's past medical history significant for metastic prostate cancer with bony mets, intermittent hematuria, history of CVA GERD hypertension presented with severe left flank abdominal pain and concerns of pyelonephritis. He was recently hospitalized from 5/31/2025 to 6/1/2025 for UTI was treated with IV ceftriaxone and discharged on ciprofloxacin for 7 days.  Imaging in the emergency room was concerning for possible pyelonephritis.    Acute medical issues:  #Pyelonephritis  # Immunocompromise status.  Symptoms worsen after stopping antibiotics  # History of hemorrhagic cystitis from chemotherapy   with concerns for incomplete prior treatment of pyelonephritis and in the setting of metastatic prostate cancer (Cabazitaxel) vs side effect from chemotherapy and disease.  Recently admitted from 5/31-6/1 for similar and was treated with Rocephin and discharged on Cipro but stopped taking Cipro when he was told that the culture was negative.  Imaging today is similar to prior.  Afebrile.  No leukocytosis but clinically appears to have pyelonephritis.   Is on chemotherapy (Cabazitaxel ).    -- Received Rocephin 1 gram in the ED. Will increase to 2 grams for now.  Have asked for another 1 gram to be given today.    -- Blood cultures pending.  UC pending.  Follow cultures and tailor abx based on clinical course and labs.   -- IVF hydration.        -------------------------------------------  Chronic medical issues:  #HTN: Continue atenolol and amlodipine. PRN hydralazine.    #GERD: Continue PPI  #Metastatic prostate cancer: Continue Prednisone 10 mg daily. Has not started the Prednisone taper yet.  [facility] consult pending      DVT  Prophylaxis: Enoxaparin (Lovenox) SQ  Code Status: Full Code    Disposition:   Medically Ready for Discharge: Anticipate discharge in 1 to 2 days pending culture results.  If stable can consider discharging after 72 hours of IV antibiotics        Interval History   Reviewed chart.  Left flank pain has improved.  Denies any chest pain or shortness of breath.  More than 10 point review of system was carried out was otherwise negative..  Total time spent in direct patient care and in coordination of care is more than 35 minutes.    -Data reviewed today: I reviewed all new labs and imaging reports over the last 24 hours. I personally reviewed no images or EKG's today.    Physical Exam   Temp: 97.7  F (36.5  C) Temp src: Oral BP: (!) 157/81 Pulse: 60   Resp: 16 SpO2: 96 % O2 Device: None (Room air)    Vitals:    06/11/25 1201 06/11/25 1715 06/13/25 0611   Weight: 105.1 kg (231 lb 11.3 oz) 104.1 kg (229 lb 8 oz) 102.2 kg (225 lb 3.2 oz)     Vital Signs with Ranges  Temp:  [97.7  F (36.5  C)-98.3  F (36.8  C)] 97.7  F (36.5  C)  Pulse:  [60-71] 60  Resp:  [16-18] 16  BP: (147-169)/(78-98) 157/81  SpO2:  [96 %-97 %] 96 %  No intake/output data recorded.      GEN:  Alert, oriented x 3, appears comfortable, NAD.  HEENT:  Normocephalic/atraumatic, no scleral icterus, no nasal discharge, mouth moist.  CV:  Regular rate and rhythm, no murmur or JVD.  S1 + S2 noted, no S3 or S4.  LUNGS:  Clear to auscultation bilaterally without rales/rhonchi/wheezing/retractions.  Symmetric chest rise on inhalation noted.  ABD:  Active bowel sounds, soft, non-tender/non-distended.  No rebound/guarding/rigidity.  EXT:  No edema.  No cyanosis.  No joint synovitis noted.  SKIN:  Dry to touch, no exanthems noted in the visualized areas.    Medications   Current Facility-Administered Medications   Medication Dose Route Frequency Provider Last Rate Last Admin    sodium chloride 0.9 % infusion   Intravenous Continuous Jim Ghotra APRN   mL/hr at 06/12/25 1431 New Bag at 06/12/25 1431     Current Facility-Administered Medications   Medication Dose Route Frequency Provider Last Rate Last Admin    amLODIPine (NORVASC) tablet 5 mg  5 mg Oral Daily Jim Ghotra APRN CNP   5 mg at 06/12/25 0823    atenolol (TENORMIN) tablet 50 mg  50 mg Oral Daily Jim Ghotra APRN CNP   50 mg at 06/12/25 0822    calcium citrate (CITRACAL) tablet 950 mg  950 mg Oral BID Jim Ghotra APRN CNP   950 mg at 06/12/25 2105    cefTRIAXone (ROCEPHIN) 2 g vial to attach to  ml bag for ADULTS or NS 50 ml bag for PEDS  2 g Intravenous Q24H Jim Ghotra APRN CNP   2 g at 06/12/25 1700    enoxaparin ANTICOAGULANT (LOVENOX) injection 40 mg  40 mg Subcutaneous Q24H Jim Ghotra APRN CNP   40 mg at 06/12/25 1700    pantoprazole (PROTONIX) EC tablet 40 mg  40 mg Oral Daily Jim Ghotra APRN CNP   40 mg at 06/12/25 0823    predniSONE (DELTASONE) tablet 10 mg  10 mg Oral Daily Jim Ghotra APRN CNP   10 mg at 06/12/25 0822    senna-docusate (SENOKOT-S/PERICOLACE) 8.6-50 MG per tablet 1 tablet  1 tablet Oral BID Jim Ghotra APRN CNP   1 tablet at 06/12/25 0822    Or    senna-docusate (SENOKOT-S/PERICOLACE) 8.6-50 MG per tablet 2 tablet  2 tablet Oral BID Jim Ghotra APRN CNP   2 tablet at 06/12/25 2105    sodium chloride (PF) 0.9% PF flush 3 mL  3 mL Intracatheter Q8H DEBORAH Jim Ghotra APRN CNP   3 mL at 06/12/25 1700    traZODone (DESYREL) half-tab 50 mg  50 mg Oral At Bedtime Jim Ghotra APRN CNP   50 mg at 06/12/25 2105    venlafaxine (EFFEXOR XR) 24 hr capsule 37.5 mg  37.5 mg Oral Daily Jim Ghotra, VERONICA CNP   37.5 mg at 06/12/25 0823     Data     Recent Labs   Lab 06/13/25  0709 06/12/25  1208 06/11/25  1335   WBC 4.6 5.2 7.6   HGB 11.0* 12.1* 12.4*   HCT 31.9* 35.0* 35.9*   MCV 88 88 88    203 250     Recent Labs   Lab 06/13/25  0709 06/12/25  1208 06/11/25  1335    139 137  "  POTASSIUM 3.9 4.7 4.3   CHLORIDE 105 103 98   CO2 24 26 27   ANIONGAP 9 10 12   * 99 147*   BUN 10.6 11.7 14.8   CR 0.95 0.93 1.14   GFRESTIMATED 87 89 70   AV 8.7* 9.4 9.6     7-Day Micro Results       Collected Updated Procedure Result Status      06/11/2025 1657 06/12/2025 2016 Blood Culture Peripheral blood (BC) Hand, Right [[id number]]   Peripheral blood (BC) from Hand, Right    Preliminary result Component Value   Culture No growth after 1 day  [P]                06/11/2025 1656 06/12/2025 2016 Blood Culture Peripheral blood (BC) Arm, Left [[id number]]   Peripheral blood (BC) from Arm, Left    Preliminary result Component Value   Culture No growth after 1 day  [P]                06/11/2025 1326 06/12/2025 1329 Urine Culture [[id number]]   Urine, Midstream    Final result Component Value   Culture <10,000 CFU/mL Urogenital rayshawn                     No results for input(s): \"NTBNPI\", \"NTBNP\" in the last 168 hours.  Recent Labs   Lab 06/13/25  0709 06/12/25  1208 06/11/25  1335   HGB 11.0* 12.1* 12.4*     No results for input(s): \"AST\", \"ALT\", \"GGT\", \"ALKPHOS\", \"BILITOTAL\", \"BILICONJ\", \"BILIDIRECT\", \"GRICELDA\" in the last 168 hours.    Invalid input(s): \"BILIRUBININDIRECT\"  No results for input(s): \"INR\" in the last 168 hours.  No results for input(s): \"LACT\" in the last 168 hours.  No results for input(s): \"LIPASE\" in the last 168 hours.  Recent Labs   Lab 06/11/25  1326   COLOR Straw   APPEARANCE Slightly Cloudy*   URINEGLC Negative   URINEBILI Negative   URINEKETONE Negative   SG 1.006   UBLD Large*   URINEPH 6.0   PROTEIN 70*   NITRITE Negative   LEUKEST Large*   RBCU 80*   WBCU 68*       No results found for this or any previous visit (from the past 24 hours).         "

## 2025-06-14 LAB
CREAT SERPL-MCNC: 0.88 MG/DL (ref 0.67–1.17)
EGFRCR SERPLBLD CKD-EPI 2021: >90 ML/MIN/1.73M2
MCV RBC AUTO: 89 FL (ref 78–100)
PLATELET # BLD AUTO: 184 10E3/UL (ref 150–450)

## 2025-06-14 PROCEDURE — 250N000012 HC RX MED GY IP 250 OP 636 PS 637: Performed by: NURSE PRACTITIONER

## 2025-06-14 PROCEDURE — 82565 ASSAY OF CREATININE: CPT | Performed by: INTERNAL MEDICINE

## 2025-06-14 PROCEDURE — 120N000001 HC R&B MED SURG/OB

## 2025-06-14 PROCEDURE — 250N000011 HC RX IP 250 OP 636: Performed by: NURSE PRACTITIONER

## 2025-06-14 PROCEDURE — 258N000003 HC RX IP 258 OP 636: Performed by: NURSE PRACTITIONER

## 2025-06-14 PROCEDURE — 99232 SBSQ HOSP IP/OBS MODERATE 35: CPT | Performed by: INTERNAL MEDICINE

## 2025-06-14 PROCEDURE — 250N000013 HC RX MED GY IP 250 OP 250 PS 637: Performed by: NURSE PRACTITIONER

## 2025-06-14 PROCEDURE — 36415 COLL VENOUS BLD VENIPUNCTURE: CPT | Performed by: INTERNAL MEDICINE

## 2025-06-14 PROCEDURE — 85049 AUTOMATED PLATELET COUNT: CPT | Performed by: INTERNAL MEDICINE

## 2025-06-14 RX ADMIN — AMLODIPINE BESYLATE 5 MG: 5 TABLET ORAL at 10:01

## 2025-06-14 RX ADMIN — SODIUM CHLORIDE: 0.9 INJECTION, SOLUTION INTRAVENOUS at 06:02

## 2025-06-14 RX ADMIN — CEFTRIAXONE 2 G: 2 INJECTION, POWDER, FOR SOLUTION INTRAMUSCULAR; INTRAVENOUS at 19:28

## 2025-06-14 RX ADMIN — Medication 950 MG: at 21:11

## 2025-06-14 RX ADMIN — TRAZODONE HYDROCHLORIDE 50 MG: 50 TABLET ORAL at 21:11

## 2025-06-14 RX ADMIN — PANTOPRAZOLE SODIUM 40 MG: 40 TABLET, DELAYED RELEASE ORAL at 10:02

## 2025-06-14 RX ADMIN — PREDNISONE 10 MG: 5 TABLET ORAL at 10:00

## 2025-06-14 RX ADMIN — ATENOLOL 50 MG: 25 TABLET ORAL at 10:01

## 2025-06-14 RX ADMIN — VENLAFAXINE HYDROCHLORIDE 37.5 MG: 37.5 CAPSULE, EXTENDED RELEASE ORAL at 10:01

## 2025-06-14 RX ADMIN — ENOXAPARIN SODIUM 40 MG: 40 INJECTION SUBCUTANEOUS at 19:33

## 2025-06-14 RX ADMIN — Medication 950 MG: at 10:00

## 2025-06-14 ASSESSMENT — ACTIVITIES OF DAILY LIVING (ADL)
ADLS_ACUITY_SCORE: 35
ADLS_ACUITY_SCORE: 31
ADLS_ACUITY_SCORE: 31
ADLS_ACUITY_SCORE: 35
ADLS_ACUITY_SCORE: 31
ADLS_ACUITY_SCORE: 35
ADLS_ACUITY_SCORE: 35
ADLS_ACUITY_SCORE: 31
ADLS_ACUITY_SCORE: 35
ADLS_ACUITY_SCORE: 31
DEPENDENT_IADLS:: INDEPENDENT
ADLS_ACUITY_SCORE: 31

## 2025-06-14 NOTE — CONSULTS
Care Management Initial Consult    General Information  Assessment completed with: Patient, Spouse or significant other, Asif  Type of CM/SW Visit: Initial Assessment    Primary Care Provider verified and updated as needed: Yes   Readmission within the last 30 days: previous discharge plan unsuccessful         Advance Care Planning: Advance Care Planning Reviewed: no concerns identified          Communication Assessment  Patient's communication style: spoken language (English or Bilingual)    Hearing Difficulty or Deaf: no   Wear Glasses or Blind: yes    Cognitive  Cognitive/Neuro/Behavioral: WDL     Arousal Level: opens eyes spontaneously  Orientation: oriented x 4     Best Language: 0 - No aphasia  Speech: clear, logical    Living Environment:   People in home: spouse     Current living Arrangements: house      Able to return to prior arrangements: yes       Family/Social Support:  Care provided by: self  Provides care for: no one  Marital Status:   Support system: Wife          Description of Support System: Supportive, Involved    Support Assessment: Adequate family and caregiver support    Current Resources:   Patient receiving home care services: No        Community Resources: None  Equipment currently used at home: none  Supplies currently used at home:      Employment/Financial:  Employment Status:          Financial Concerns: none           Does the patient's insurance plan have a 3 day qualifying hospital stay waiver?  Yes     Which insurance plan 3 day waiver is available? Alternative insurance waiver    Will the waiver be used for post-acute placement? No    Lifestyle & Psychosocial Needs:  Social Drivers of Health     Food Insecurity: Low Risk  (6/11/2025)    Food Insecurity     Within the past 12 months, did you worry that your food would run out before you got money to buy more?: No     Within the past 12 months, did the food you bought just not last and you didn t have money to  get more?: No   Depression: Not at risk (6/10/2025)    PHQ-2     PHQ-2 Score: 0   Housing Stability: Low Risk  (6/11/2025)    Housing Stability     Do you have housing? : Yes     Are you worried about losing your housing?: No   Tobacco Use: Low Risk  (6/10/2025)    Patient History     Smoking Tobacco Use: Never     Smokeless Tobacco Use: Never     Passive Exposure: Not on file   Financial Resource Strain: Low Risk  (6/11/2025)    Financial Resource Strain     Within the past 12 months, have you or your family members you live with been unable to get utilities (heat, electricity) when it was really needed?: No   Alcohol Use: Not on file   Transportation Needs: Low Risk  (6/11/2025)    Transportation Needs     Within the past 12 months, has lack of transportation kept you from medical appointments, getting your medicines, non-medical meetings or appointments, work, or from getting things that you need?: No   Physical Activity: Insufficiently Active (6/5/2025)    Exercise Vital Sign     Days of Exercise per Week: 3 days     Minutes of Exercise per Session: 10 min   Interpersonal Safety: Low Risk  (6/11/2025)    Interpersonal Safety     Do you feel physically and emotionally safe where you currently live?: Yes     Within the past 12 months, have you been hit, slapped, kicked or otherwise physically hurt by someone?: No     Within the past 12 months, have you been humiliated or emotionally abused in other ways by your partner or ex-partner?: No   Stress: Stress Concern Present (6/5/2025)    [employer] [employer] - Occupational Stress Questionnaire     Feeling of Stress : Rather much   Social Connections: Unknown (6/5/2025)    Social Connection and Isolation Panel [NHANES]     Frequency of Communication with Friends and Family: Not on file     Frequency of Social Gatherings with Friends and Family: Once a week     Attends Latter day Services: Not on file     Active Member of Clubs or Organizations: Not  on file     Attends Club or Organization Meetings: Not on file     Marital Status: Not on file   Health Literacy: Not on file       Functional Status:  Prior to admission patient needed assistance:   Dependent ADLs:: Independent  Dependent IADLs:: Independent       Mental Health Status:  Mental Health Status: No Current Concerns       Chemical Dependency Status:  Chemical Dependency Status: No Current Concerns             Values/Beliefs:  Spiritual, Cultural Beliefs, Spiritism Practices, Values that affect care:                 Discussed  Partnership in Safe Discharge Planning  document with patient/family: Yes:     Additional Information:  Pt admitted for acute pyelonephritis , noted to have unplanned readmission risk of 22%. Met with pt at bedside to assess for discharge needs. Pt reports living at home with his wife and is independent at baseline. Denied any discharge planning needs or barriers to care. Encouraged to reach out to SW if needs arise.    Next Steps: SW signing off.    KARENA Orellana, Franklin Memorial HospitalSW   Care Coordinator-Casual  [email or web address]

## 2025-06-14 NOTE — PLAN OF CARE
"Pt is A&Ox4,  bilateral flank pain at worst is 2/10, declined pain medication. IVF's discontinued per , tolerating regular diet.  C/o of intermittent blood in urine, reports it is draell/orange in color, unwitnessed, informed MD.  Paged MD, if hgb is needed, no new orders,  continue to monitor urine color and frequency.  Uses call light appropriately. Independent in room.      Problem: Adult Inpatient Plan of Care  Goal: Plan of Care Review  Description: The Plan of Care Review/Shift note should be completed every shift.  The Outcome Evaluation is a brief statement about your assessment that the patient is improving, declining, or no change.  This information will be displayed automatically on your shift  note.  Outcome: Progressing  Flowsheets (Taken 6/14/2025 1212)  Plan of Care Reviewed With: patient  Overall Patient Progress: improving  Goal: Patient-Specific Goal (Individualized)  Description: You can add care plan individualizations to a care plan. Examples of Individualization might be:  \"Parent requests to be called daily at 9am for status\", \"I have a hard time hearing out of my right ear\", or \"Do not touch me to wake me up as it startles  me\".  Outcome: Progressing  Goal: Absence of Hospital-Acquired Illness or Injury  Outcome: Progressing  Intervention: Identify and Manage Fall Risk  Recent Flowsheet Documentation  Taken 6/14/2025 0844 by Dhara Carrillo RN  Safety Promotion/Fall Prevention:   clutter free environment maintained   safety round/check completed  Intervention: Prevent Skin Injury  Recent Flowsheet Documentation  Taken 6/14/2025 0844 by Dhara Carrillo, RN  Body Position: position changed independently  Skin Protection: adhesive use limited  Goal: Optimal Comfort and Wellbeing  Outcome: Progressing  Intervention: Provide Person-Centered Care  Recent Flowsheet Documentation  Taken 6/14/2025 0844 by Dhara Carrillo, RN  Trust Relationship/Rapport:   care explained   questions answered   questions " encouraged   thoughts/feelings acknowledged  Goal: Readiness for Transition of Care  Outcome: Progressing   Goal Outcome Evaluation:      Plan of Care Reviewed With: patient    Overall Patient Progress: improvingOverall Patient Progress: improving

## 2025-06-14 NOTE — PLAN OF CARE
Goal Outcome Evaluation:      Plan of Care Reviewed With: patient    Overall Patient Progress: improvingOverall Patient Progress: improving    Alert. Denies pain. VSS, on room air. PIV infusing NS@100. Ind in the room.   Problem: Adult Inpatient Plan of Care  Goal: Plan of Care Review  Description: The Plan of Care Review/Shift note should be completed every shift.  The Outcome Evaluation is a brief statement about your assessment that the patient is improving, declining, or no change.  This information will be displayed automatically on your shift  note.  Recent Flowsheet Documentation  Taken 6/14/2025 0150 by Agnes Guthrie, RN  Plan of Care Reviewed With: patient  Overall Patient Progress: improving  Goal: Absence of Hospital-Acquired Illness or Injury  Intervention: Identify and Manage Fall Risk  Recent Flowsheet Documentation  Taken 6/13/2025 2027 by Agnes Guthrie, RN  Safety Promotion/Fall Prevention:   clutter free environment maintained   safety round/check completed  Intervention: Prevent Infection  Recent Flowsheet Documentation  Taken 6/13/2025 2027 by Agnes Guthrie, RN  Infection Prevention:   single patient room provided   rest/sleep promoted   hand hygiene promoted

## 2025-06-14 NOTE — PROGRESS NOTES
[facility]    Medicine Progress Note - Hospitalist Service    Date of Admission:  6/11/2025    Assessment & Plan   Summary of Stay: Ronnie Lagos is a 70 year old male who was admitted on 6/11/2025 for acute pyelonephritis.  Patient's past medical history significant for metastic prostate cancer with bony mets, intermittent hematuria, history of CVA GERD hypertension presented with severe left flank abdominal pain and concerns of pyelonephritis. He was recently hospitalized from 5/31/2025 to 6/1/2025 for UTI was treated with IV ceftriaxone and discharged on ciprofloxacin for 7 days.  Imaging in the emergency room was concerning for possible pyelonephritis.    Acute medical issues:  #Pyelonephritis  # Immunocompromise status.  Symptoms worsen after stopping antibiotics  # History of hemorrhagic cystitis from chemotherapy   with concerns for incomplete prior treatment of pyelonephritis and in the setting of metastatic prostate cancer (Cabazitaxel) vs side effect from chemotherapy and disease.    -Recently admitted from 5/31-6/1 for similar and was treated with Rocephin and discharged on Cipro but stopped taking Cipro when he was told that the culture was negative.  -  Imaging earlier showed  similar findings to prior.  Afebrile.  No leukocytosis but clinically appears to have pyelonephritis.   Is on chemotherapy (Cabazitaxel ).  - Cultures showed mixed urogenital rayshawn  - However given recurrent issues with clinical appearance we will elect to continue current antibiotics treatment as patient is showing improvement  - Antibiotics remained on ceftriaxone as initiated earlier  - Blood cultures remain no growth to date  -- Tolerating better oral diet, may discontinue current IV fluids       -------------------------------------------  Chronic medical issues:  #HTN: Continue atenolol and amlodipine. PRN hydralazine.    #GERD: Continue PPI  #Metastatic prostate cancer: Continue Prednisone 10 mg  "daily. Has not started the Prednisone taper yet.   - Appreciate continuous input from [facility].  No immediate inpatient plans                Diet: Combination Diet Regular Diet Adult    DVT Prophylaxis: Enoxaparin (Lovenox) SQ  Mendoza Catheter: Not present  Lines: PRESENT             Cardiac Monitoring: None  Code Status: Full Code      Clinically Significant Risk Factors                   # Hypertension: Noted on problem list            # Obesity: Estimated body mass index is 34.24 kg/m  as calculated from the following:    Height as of this encounter: 1.727 m (5' 8\").    Weight as of this encounter: 102.2 kg (225 lb 3.2 oz)., PRESENT ON ADMISSION            Social Drivers of Health    Physical Activity: Insufficiently Active (6/5/2025)    Exercise Vital Sign     Days of Exercise per Week: 3 days     Minutes of Exercise per Session: 10 min   Stress: Stress Concern Present (6/5/2025)    [employer] - Occupational Stress Questionnaire     Feeling of Stress : Rather much   Social Connections: Unknown (6/5/2025)    Social Connection and Isolation Panel [NHANES]     Frequency of Social Gatherings with Friends and Family: Once a week          Disposition Plan     Medically Ready for Discharge: Anticipated Tomorrow             Michele Garsia MD, MD  Hospitalist Service  [facility]  Securely message with [employer] (more info)  Text page via [employer] Paging/Directory   ______________________________________________________________________    Interval History     I assume medicine service care today  Chart reviewed  Stable hemodynamics overnight  Afebrile.  He is not requiring any oxygen support.  Reportedly able to tolerate oral diet, ambulating independently at his room.  No significant nursing events noted  Fortunately no recurrent flank pain      Physical Exam   Vital Signs: Temp: 97.8  F (36.6  C) Temp src: Oral BP: (!) 164/80 Pulse: 57   Resp: 16 SpO2: 100 % " O2 Device: None (Room air)    Weight: 225 lbs 3.2 oz    HEENT; Atraumatic, normocephalic, pinkish conjuctiva, pupils bilateral reactive   Skin: warm and moist, no rashes  Lymphatics: no cervical or axillary lymphandenopathy  Lungs: equal chest expansion, clear to auscultation, no wheezes, no stridor, no crackles,   Heart: normal rate, normal rhythm, no rubs or gallops.   Abdomen: normal bowel sounds, no tenderness, no peritoneal signs, no guarding  Extremities: no deformities, no edema   Neuro; follow commands, alert and oriented x3, spontaneous speech, coherent, moves all extremities spontaneously  Psych; no hallucination, euthymic mood, not agitated      Medical Decision Making       50 MINUTES SPENT BY ME on the date of service doing chart review, history, exam, documentation & further activities per the note.  MANAGEMENT DISCUSSED with the following over the past 24 hours: Yes   NOTE(S)/MEDICAL RECORDS REVIEWED over the past 24 hours: Yes      Data         Imaging results reviewed over the past 24 hrs:   No results found for this or any previous visit (from the past 24 hours).

## 2025-06-15 VITALS
RESPIRATION RATE: 14 BRPM | DIASTOLIC BLOOD PRESSURE: 91 MMHG | WEIGHT: 223.33 LBS | SYSTOLIC BLOOD PRESSURE: 155 MMHG | HEART RATE: 64 BPM | TEMPERATURE: 97.9 F | HEIGHT: 68 IN | BODY MASS INDEX: 33.85 KG/M2 | OXYGEN SATURATION: 97 %

## 2025-06-15 PROCEDURE — 250N000013 HC RX MED GY IP 250 OP 250 PS 637: Performed by: NURSE PRACTITIONER

## 2025-06-15 PROCEDURE — 99239 HOSP IP/OBS DSCHRG MGMT >30: CPT | Performed by: INTERNAL MEDICINE

## 2025-06-15 PROCEDURE — 250N000012 HC RX MED GY IP 250 OP 636 PS 637: Performed by: NURSE PRACTITIONER

## 2025-06-15 RX ADMIN — ATENOLOL 50 MG: 25 TABLET ORAL at 08:58

## 2025-06-15 RX ADMIN — VENLAFAXINE HYDROCHLORIDE 37.5 MG: 37.5 CAPSULE, EXTENDED RELEASE ORAL at 08:58

## 2025-06-15 RX ADMIN — PANTOPRAZOLE SODIUM 40 MG: 40 TABLET, DELAYED RELEASE ORAL at 08:58

## 2025-06-15 RX ADMIN — Medication 950 MG: at 09:02

## 2025-06-15 RX ADMIN — AMLODIPINE BESYLATE 5 MG: 5 TABLET ORAL at 08:58

## 2025-06-15 RX ADMIN — PREDNISONE 10 MG: 5 TABLET ORAL at 08:58

## 2025-06-15 ASSESSMENT — ACTIVITIES OF DAILY LIVING (ADL)
ADLS_ACUITY_SCORE: 35

## 2025-06-15 NOTE — PLAN OF CARE
"Goal Outcome Evaluation:      Plan of Care Reviewed With: patient    Overall Patient Progress: improvingOverall Patient Progress: improving    Outcome Evaluation: Discharging      Reviewed discharge instructions with patient. Questions answered. Patient discharged to home via private ride with discharge instructions, medications (augmentin) and belongings.    Patient given doctor's note at discharge.       Problem: Adult Inpatient Plan of Care  Goal: Plan of Care Review  Description: The Plan of Care Review/Shift note should be completed every shift.  The Outcome Evaluation is a brief statement about your assessment that the patient is improving, declining, or no change.  This information will be displayed automatically on your shift  note.  Recent Flowsheet Documentation  Taken 6/15/2025 1029 by Krystle Espinoza RN  Outcome Evaluation: Discharging  Plan of Care Reviewed With: patient  Overall Patient Progress: improving     Problem: Adult Inpatient Plan of Care  Goal: Plan of Care Review  Description: The Plan of Care Review/Shift note should be completed every shift.  The Outcome Evaluation is a brief statement about your assessment that the patient is improving, declining, or no change.  This information will be displayed automatically on your shift  note.  6/15/2025 1029 by Krystle Espinoza RN  Outcome: Adequate for Care Transition  Flowsheets (Taken 6/15/2025 1029)  Outcome Evaluation: Discharging  Plan of Care Reviewed With: patient  Overall Patient Progress: improving  6/15/2025 1029 by Krystle Espinoza RN  Outcome: Progressing  Flowsheets (Taken 6/15/2025 1029)  Outcome Evaluation: Discharging  Plan of Care Reviewed With: patient  Overall Patient Progress: improving  Goal: Patient-Specific Goal (Individualized)  Description: You can add care plan individualizations to a care plan. Examples of Individualization might be:  \"Parent requests to be called daily at 9am for status\", \"I have a hard " "time hearing out of my right ear\", or \"Do not touch me to wake me up as it startles  me\".  6/15/2025 1029 by Krystle Espinoza RN  Outcome: Adequate for Care Transition  6/15/2025 1029 by Krystle Espinoza RN  Outcome: Progressing  Goal: Absence of Hospital-Acquired Illness or Injury  6/15/2025 1029 by Krystle Espinoza, BEAN  Outcome: Adequate for Care Transition  6/15/2025 1029 by Krystle Espinoza, RN  Outcome: Progressing  Goal: Optimal Comfort and Wellbeing  6/15/2025 1029 by Krystle Espinoza RN  Outcome: Adequate for Care Transition  6/15/2025 1029 by Krystle Espinoza RN  Outcome: Progressing  Goal: Readiness for Transition of Care  6/15/2025 1029 by Krystle Espinoza, BEAN  Outcome: Adequate for Care Transition  6/15/2025 1029 by Krystle Espinoza, RN  Outcome: Progressing     "

## 2025-06-15 NOTE — PLAN OF CARE
/81  Pulse 58   Temp 97.5  F  Resp 18  SpO2 97%      Patient is alert and oriented x4, pain 1/10, denied any pain medication, independent in the room, continent of bowel and bladder, plan is to go home in the morning.      Goal Outcome Evaluation:      Plan of Care Reviewed With: patient    Overall Patient Progress: improvingOverall Patient Progress: improving    Outcome Evaluation: Patient is alert and oriented x4, pain 1/10, denied any pain medication, independent in the room, continent of bowel and bladder, plan is to go home in the morning.      Problem: Adult Inpatient Plan of Care  Goal: Plan of Care Review  Description: The Plan of Care Review/Shift note should be completed every shift.  The Outcome Evaluation is a brief statement about your assessment that the patient is improving, declining, or no change.  This information will be displayed automatically on your shift  note.  Recent Flowsheet Documentation  Taken 6/14/2025 2133 by Autumn Tapia RN  Outcome Evaluation: Patient is alert and oriented x4, pain 1/10, denied any pain medication, independent in the room, continent of bowel and bladder, plan is to go home in the morning.  Plan of Care Reviewed With: patient  Overall Patient Progress: improving  Goal: Absence of Hospital-Acquired Illness or Injury  Intervention: Identify and Manage Fall Risk  Recent Flowsheet Documentation  Taken 6/14/2025 1939 by Autumn Tapia RN  Safety Promotion/Fall Prevention:   clutter free environment maintained   lighting adjusted   nonskid shoes/slippers when out of bed   patient and family education   room near nurse's station   room organization consistent   safety round/check completed  Intervention: Prevent Skin Injury  Recent Flowsheet Documentation  Taken 6/14/2025 1939 by Autumn Tapia, RN  Body Position: position changed independently  Intervention: Prevent Infection  Recent Flowsheet Documentation  Taken 6/14/2025 1939 by Autumn Tapia, RN  Infection  Prevention:   cohorting utilized   hand hygiene promoted   rest/sleep promoted   single patient room provided  Goal: Optimal Comfort and Wellbeing  Intervention: Monitor Pain and Promote Comfort  Recent Flowsheet Documentation  Taken 6/14/2025 1939 by Autumn Tapia, RN  Pain Management Interventions: declines

## 2025-06-15 NOTE — PLAN OF CARE
"Pt is alert and oriented. Pt denies any pain or shortness of breath and on room air.    Pt ambulates independently to the bathroom, voiding and denies any hematuria. Ongoing monitoring.    Plan: Discharge home today.    BP (!) 155/85 (BP Location: Left arm, Patient Position: Semi-Plummer's, Cuff Size: Adult Regular)   Pulse 57   Temp 97.7  F (36.5  C) (Oral)   Resp 16   Ht 1.727 m (5' 8\")   Wt 101.3 kg (223 lb 5.2 oz)   SpO2 100%   BMI 33.96 kg/m       Problem: Adult Inpatient Plan of Care  Goal: Plan of Care Review  Description: The Plan of Care Review/Shift note should be completed every shift.  The Outcome Evaluation is a brief statement about your assessment that the patient is improving, declining, or no change.  This information will be displayed automatically on your shift  note.  Outcome: Progressing  Flowsheets (Taken 6/15/2025 0017)  Outcome Evaluation: Pt denies any pain.  Plan of Care Reviewed With: patient  Overall Patient Progress: improving  Goal: Patient-Specific Goal (Individualized)  Description: You can add care plan individualizations to a care plan. Examples of Individualization might be:  \"Parent requests to be called daily at 9am for status\", \"I have a hard time hearing out of my right ear\", or \"Do not touch me to wake me up as it startles  me\".  Outcome: Progressing  Goal: Absence of Hospital-Acquired Illness or Injury  Outcome: Progressing  Intervention: Identify and Manage Fall Risk  Recent Flowsheet Documentation  Taken 6/14/2025 2330 by Joel Alva RN  Safety Promotion/Fall Prevention:   safety round/check completed   patient and family education   nonskid shoes/slippers when out of bed   lighting adjusted   clutter free environment maintained  Intervention: Prevent Infection  Recent Flowsheet Documentation  Taken 6/14/2025 2330 by Joel Alva RN  Infection Prevention:   single patient room provided   rest/sleep promoted   hand hygiene promoted   cohorting " utilized  Goal: Optimal Comfort and Wellbeing  Outcome: Progressing  Goal: Readiness for Transition of Care  Outcome: Progressing   Goal Outcome Evaluation:      Plan of Care Reviewed With: patient    Overall Patient Progress: improvingOverall Patient Progress: improving    Outcome Evaluation: Pt denies any pain.

## 2025-06-15 NOTE — DISCHARGE SUMMARY
"[facility]  Hospitalist Discharge Summary      Date of Admission:  6/11/2025  Date of Discharge:  6/15/2025  Discharging Provider: Michele Garsia MD, MD  Discharge Service: Hospitalist Service    Discharge Diagnoses   #Suspected acute, recurrent pyelonephritis  #History of hemorrhagic cystitis from chemotherapy  #History of metastatic prostate cancer  #History of hypertension  #History of GERD    Clinically Significant Risk Factors     # Obesity: Estimated body mass index is 33.96 kg/m  as calculated from the following:    Height as of this encounter: 1.727 m (5' 8\").    Weight as of this encounter: 101.3 kg (223 lb 5.2 oz).       Follow-ups Needed After Discharge   Follow-up Appointments       Hospital Follow-up with Existing Primary Care Provider (PCP)          Schedule Primary Care visit within: 7 Days               Unresulted Labs Ordered in the Past 30 Days of this Admission       Date and Time Order Name Status Description    6/11/2025  4:20 PM Blood Culture Peripheral blood (BC) Hand, Right Preliminary     6/11/2025  4:20 PM Blood Culture Peripheral blood (BC) Arm, Left Preliminary         These results will be followed up by PCP    Discharge Disposition   Discharged to home  Condition at discharge: Stable    Hospital Course     Ronnie is a pleasant 70-year-old gentleman who lives at home with a background history of metastatic prostate cancer with bony metastatic lesions, prior CVA, GERD, hypertension who presented due to severe left-sided flank and abdominal discomfort and pain concerning for acute pyelonephritis.  Previously was treated for UTI and was discharged on oral ciprofloxacin that he subsequently discontinued due to findings of no growth on his cultures.  Upon presentation concerns for pyelonephritis given findings with his abnormal UA and imaging study showing inflammation and consistent with possible pyelonephritis.  Urine cultures eventually grew mixed urogenital " rayshawn but likely will also have a low yield given recent use of antibiotics.  He was treated empirically with ceftriaxone.  Blood cultures remain no growth to date.  No significant leukocytosis.  Remained afebrile.  Fortunately earlier symptoms of left-sided flank pain has continuously subsided and eventually resolved.  He is urinating with no symptomatology.  No evidence of bleeding tendencies.  Tolerating oral diet.  No complaints of any nausea or vomiting or diarrhea.  Stable hemodynamics.  He is hoping and wanting for hospital discharge and will proceed with that today.  I elected to continue Augmentin antibiotic coverage upon discharge to finish a 7-day course given this issue of likely a recurrent pyelonephritis.      Summary of Stay: Ronnie Lagos is a 70 year old male who was admitted on 6/11/2025 for acute pyelonephritis.  Patient's past medical history significant for metastic prostate cancer with bony mets, intermittent hematuria, history of CVA GERD hypertension presented with severe left flank abdominal pain and concerns of pyelonephritis. He was recently hospitalized from 5/31/2025 to 6/1/2025 for UTI was treated with IV ceftriaxone and discharged on ciprofloxacin for 7 days.  Imaging in the emergency room was concerning for possible pyelonephritis.    Acute medical issues:  #Pyelonephritis  # Immunocompromise status.  Symptoms worsen after stopping antibiotics  # History of hemorrhagic cystitis from chemotherapy   with concerns for incomplete prior treatment of pyelonephritis and in the setting of metastatic prostate cancer (Cabazitaxel) vs side effect from chemotherapy and disease.    -Recently admitted from 5/31-6/1 for similar and was treated with Rocephin and discharged on Cipro but stopped taking Cipro when he was told that the culture was negative.  -  Imaging earlier showed  similar findings to prior.  Afebrile.  No leukocytosis but clinically appears to have pyelonephritis.   Is on  chemotherapy (Cabazitaxel ).  - Cultures showed mixed urogenital rayshawn  - However given recurrent issues with clinical appearance we will elect to continue current antibiotics treatment as patient is showing improvement  - Antibiotics remained on ceftriaxone as initiated earlier  - Blood cultures remain no growth to date  -- Tolerating better oral diet, may discontinue current IV fluids       -------------------------------------------  Chronic medical issues:  #HTN: Continue atenolol and amlodipine. PRN hydralazine.    #GERD: Continue PPI  #Metastatic prostate cancer: Continue Prednisone 10 mg daily. Has not started the Prednisone taper yet.   - Appreciate continuous input from [place] oncology.  No immediate inpatient plans        Consultations This Hospital Stay   UROLOGY IP CONSULT  HEMATOLOGY & ONCOLOGY IP CONSULT  CARE MANAGEMENT / SOCIAL WORK IP CONSULT    Code Status   Full Code    Time Spent on this Encounter   I, Michele Garsia MD, MD, personally saw the patient today and spent greater than 30 minutes discharging this patient.       Michele Garsia MD, MD  [facility]  [address] [place] [address]  Phone: [phone]  Fax: [phone]  ______________________________________________________________________    Physical Exam   Vital Signs: Temp: 97.9  F (36.6  C) Temp src: Oral BP: (!) 155/91 Pulse: 64   Resp: 14 SpO2: 97 % O2 Device: None (Room air)    Weight: 223 lbs 5.22 oz  HEENT; Atraumatic, normocephalic, pinkish conjuctiva, pupils bilateral reactive   Skin: warm and moist, no rashes  Lymphatics: no cervical or axillary lymphandenopathy  Lungs: equal chest expansion, clear to auscultation, no wheezes, no stridor, no crackles,   Heart: normal rate, normal rhythm, no rubs or gallops.   Abdomen: normal bowel sounds, no tenderness, no peritoneal signs, no guarding  Extremities: no deformities, no edema   Neuro; follow commands, alert and oriented x3,  spontaneous speech, coherent, moves all extremities spontaneously  Psych; no hallucination, euthymic mood, not agitated         Primary Care Physician   Abdiel Jones    Discharge Orders      Reason for your hospital stay    Ronnie is a pleasant 70-year-old gentleman who lives at home with a background history of metastatic prostate cancer with bony metastatic lesions, prior CVA, GERD, hypertension who presented due to severe left-sided flank and abdominal discomfort and pain concerning for acute pyelonephritis.  Previously was treated for UTI and was discharged on oral ciprofloxacin that he subsequently discontinued due to findings of no growth on his cultures.  Upon presentation concerns for pyelonephritis given findings with his abnormal UA and imaging study showing inflammation and consistent with possible pyelonephritis.  Urine cultures eventually grew mixed urogenital rayshawn but likely will also have a low yield given recent use of antibiotics.  He was treated empirically with ceftriaxone.  Blood cultures remain no growth to date.  No significant leukocytosis.  Remained afebrile.  Fortunately earlier symptoms of left-sided flank pain has continuously subsided and eventually resolved.  He is urinating with no symptomatology.  No evidence of bleeding tendencies.  Tolerating oral diet.  No complaints of any nausea or vomiting or diarrhea.  Stable hemodynamics.  He is hoping and wanting for hospital discharge and will proceed with that today.  I elected to continue Augmentin antibiotic coverage upon discharge to finish a 7-day course given this issue of likely a recurrent pyelonephritis.     Activity    Your activity upon discharge: activity as tolerated     Full Code     Diet    Follow this diet upon discharge: Current Diet:Orders Placed This Encounter      Combination Diet Regular Diet Adult     Hospital Follow-up with Existing Primary Care Provider (PCP)            Significant Results and Procedures   Most  Recent 3 CBC's:  Recent Labs   Lab Test 06/14/25  1057 06/13/25  0709 06/12/25  1208 06/11/25  1335   WBC  --  4.6 5.2 7.6   HGB  --  11.0* 12.1* 12.4*   MCV 89 88 88 88    156 203 250     Most Recent 3 BMP's:  Recent Labs   Lab Test 06/14/25  1057 06/13/25  0709 06/12/25  1208 06/11/25  1335   NA  --  138 139 137   POTASSIUM  --  3.9 4.7 4.3   CHLORIDE  --  105 103 98   CO2  --  24 26 27   BUN  --  10.6 11.7 14.8   CR 0.88 0.95 0.93 1.14   ANIONGAP  --  9 10 12   AV  --  8.7* 9.4 9.6   GLC  --  104* 99 147*     Most Recent 3 Troponin's:  Recent Labs   Lab Test 01/12/18  0350   TROPI <0.015     Most Recent 3 BNP's:No lab results found.  Most Recent D-dimer:No lab results found.  7-Day Micro Results       Collected Updated Procedure Result Status      06/11/2025 1657 06/14/2025 2016 Blood Culture Peripheral blood (BC) Hand, Right [[id number]]   Peripheral blood (BC) from Hand, Right    Preliminary result Component Value   Culture No growth after 3 days  [P]                06/11/2025 1656 06/14/2025 2016 Blood Culture Peripheral blood (BC) Arm, Left [[id number]]   Peripheral blood (BC) from Arm, Left    Preliminary result Component Value   Culture No growth after 3 days  [P]                06/11/2025 1326 06/12/2025 1329 Urine Culture [[id number]]   Urine, Midstream    Final result Component Value   Culture <10,000 CFU/mL Urogenital rayshawn                     Most Recent TSH and T4:No lab results found.  Most Recent Hemoglobin A1c:  Recent Labs   Lab Test 06/12/24  0905   A1C 5.3   ,   Results for orders placed or performed during the hospital encounter of 06/11/25   CT Abdomen Pelvis w Contrast    Narrative    EXAM: CT ABDOMEN PELVIS W CONTRAST  LOCATION: [facility]  DATE: 6/11/2025    INDICATION: LLQ pain and L flank pain, sent by oncology to have a repeat CT scan  COMPARISON: CT abdomen and pelvis performed on 5/31/2025  TECHNIQUE: CT scan of the abdomen and pelvis was performed  following injection of IV contrast. Multiplanar reformats were obtained. Dose reduction techniques were used.  CONTRAST: 100mL Isovue 370    FINDINGS:   LOWER CHEST: Subsegmental atelectasis is seen in the lung bases. Elevation of the left hemidiaphragm is present.    HEPATOBILIARY: Diffuse hepatic steatosis is present. Gallbladder is surgically absent.    PANCREAS: 7 mm fat density lesion is seen along the pancreatic head (series 2, image 27), likely reflecting a locule of interdigitating fat or small lipoma.    SPLEEN: Normal size.    ADRENAL GLANDS: Bilateral nodular thickening seen along the adrenal glands, similar to the prior exam. Subcentimeter adrenal nodules are indeterminant measuring up to 4 mm on the left and 6 mm on the right.    KIDNEYS/BLADDER: Stable bilateral renal pelviectasis as well as lower pole calyectasis along the left kidney. Bilateral renal pelvis and proximal ureteral urothelial thickening with hyperenhancement appears similar to the prior exam. Surrounding   inflammatory changes appear slightly worsened along the left kidney but unchanged along the right kidney. Urinary bladder is mildly distended.    BOWEL: No evidence of bowel obstruction. Mild wall thickening is seen involving the rectum and sigmoid colon. No surrounding territory changes are seen. Colonic diverticulosis without evidence of diverticulitis. Stable small fat-containing umbilical   hernia. Stable appearance of duodenal diverticulum.    LYMPH NODES: No lymphadenopathy.    VASCULATURE: Moderate to severe calcified and noncalcified atherosclerotic plaque is seen in the abdominal aorta.    PELVIC ORGANS: Prostate gland is surgically absent. Stable small fat-containing left inguinal hernia.    MUSCULOSKELETAL: Stable right gluteal intramuscular lipoma. Stable sclerotic osseous metastases involving the lower right scapula, multiple ribs, pelvis and thoracolumbar spine.      Impression    IMPRESSION:   1.  Slightly worsening  inflammatory changes surrounding the left kidney compared to the prior exam. Inflammatory changes surrounding the right kidney appear unchanged. No significant change in appearance of bilateral renal pelvis and proximal ureteral   urothelial thickening with hyperenhancement. Findings are likely compatible with ascending urinary tract infection. Recommend correlation with urinalysis.  2.  Stable bilateral renal pelviectasis (left greater than right) as well as lower pole caliectasis along the left kidney.  3.  Mild wall thickening is seen involving the rectum and sigmoid colon which may be due to underdistention given lack of surrounding territory changes. Mild proctocolitis is less likely but cannot be definitively excluded. Suggest correlation with   clinical exam.  4.  Stable multifocal sclerotic osseous metastases.       Discharge Medications   Current Discharge Medication List        START taking these medications    Details   amoxicillin-clavulanate (AUGMENTIN) 875-125 MG tablet Take 1 tablet by mouth 2 times daily for 3 days.  Qty: 6 tablet, Refills: 0    Associated Diagnoses: Pyelonephritis           CONTINUE these medications which have NOT CHANGED    Details   amLODIPine (NORVASC) 5 MG tablet Take 1 tablet (5 mg) by mouth daily.  Qty: 90 tablet, Refills: 3    Associated Diagnoses: Essential hypertension, benign      atenolol (TENORMIN) 50 MG tablet Take 1 tablet (50 mg) by mouth daily.  Qty: 90 tablet, Refills: 3    Associated Diagnoses: Essential hypertension, benign      calcium citrate-vitamin D (CITRACAL) 200-6.25 MG-MCG TABS per tablet Take 1 tablet by mouth 2 times daily.      omeprazole (PRILOSEC) 20 MG DR capsule Take 1 capsule (20 mg) by mouth daily.  Qty: 90 capsule, Refills: 3    Associated Diagnoses: Gastroesophageal reflux disease without esophagitis      predniSONE (DELTASONE) 10 MG tablet Take 10 mg by mouth daily.      traZODone (DESYREL) 100 MG tablet Take 0.5 tablets (50 mg) by mouth at  bedtime.  Qty: 30 tablet, Refills: 3    Associated Diagnoses: Insomnia, unspecified type      venlafaxine (EFFEXOR XR) 37.5 MG 24 hr capsule Take 1 capsule (37.5 mg) by mouth daily.  Qty: 90 capsule, Refills: 3    Associated Diagnoses: Persistent insomnia; Hot flash in male           STOP taking these medications       calcium citrate (CITRACAL) 950 (200 Ca) MG tablet Comments:   Reason for Stopping:             Allergies   Allergies   Allergen Reactions    Taxotere [Docetaxel]

## 2025-06-16 LAB
BACTERIA SPEC CULT: NO GROWTH
BACTERIA SPEC CULT: NO GROWTH

## 2025-06-17 ENCOUNTER — PATIENT OUTREACH (OUTPATIENT)
Dept: CARE COORDINATION | Facility: CLINIC | Age: 70
End: 2025-06-17
Payer: COMMERCIAL

## 2025-06-17 NOTE — PROGRESS NOTES
Oncology/Hematology Visit Note    Jun 18, 2025    Reason for visit: Follow up prostate cancer    Oncology HPI: Ronnie Lagos is a 70 year old male with hypertension, GERD who presents with the following oncologic history:  1.  5/11/2023: PSA elevated at 8.08 (prior 3.44 from 5/10/22).  2. 6/29/2023: MRI pelvis -- PI-RADS 4 - 1.4 x 1 x 0.9 cm nodule abutting posterolateral capsule of prostate; no pelvic adenopathy or bone lesions.  3.  8/30/2023: Prostate biopsy - [place] 4+3=7 prostate adenocarcinoma.  4. 10/2/2023: NM bone scan negative.  5.  11/3/2023: Radical prostatectomy, pelvic lymph node excision -- [place] 4+5=9 adenocarcinoma, [id number], margins widely positive for malignancy.  6.  2/7/2024: PSA = 4.06.  7.  2/15/2024: PSA elevated at 5.26.  8.  2/28/2024: PSMA PET showed focal uptake in left side of prostatectomy bed suspicious for recurrent disease; multiple pelvic and few retroperitoneal lymph nodes with uptake; >10 sclerotic osseous lesions with uptake indicative of osseous metastatic disease.  9. 3/20/2024: Started Casodex, Lupron, darolutamide, Xgeva, cycle 1 Taxotere but developed flushing, chest heaviness, nausea (no dyspnea) reaction 5 minutes into Taxotere infusion. Re-challenge not attempted that day.  10.  3/29/2024: Re-challenged Taxotere but developed repeat hypersensitivity reaction 4 minutes into infusion. Taxotere discontinued.  11. 4/17/2024: PSA 0.53.  12. 7/10/2024: PSA 0.06.  13. 10/8/2024: PSA 0.02.  14. 1/28/2025: PSA 0.13.  15. 2/11/2025: Cabazitaxel C1D1, darolutamide discontinued  16. 4/11/2025: Port placed    Patient was seen by Dr. Mercedes and repeat CT CAP and bone scan with possible new disease vs treatment response. Cabazitaxel C1D1 completed on 2/11/25 and he did have a reaction, tolerated at a slower rate he did complete 6 cycles of cabazitaxel, final cycle lengths 5/27/2025.    He was admitted on 6/11/2025 for recurrent pyelonephritis, hemorrhagic cystitis, suspected likely  "from cabazitaxel.  He was given Rocephin and blood cultures with no growth. Finished Augmentin 6/17/25.    He is here today for hospital follow-up.    Interval History: Ronnie is here unaccompanied today.  He is feeling much better today.  Since discharge, he continues to have some intermittent back pain, radiating to the left flank, but this is overall improved as well.  He finished up his Augmentin with last dose yesterday.  He is urinating without any dysuria, but he will still occasionally see some hematuria.  He has continued on the prednisone 10 mg daily and will be starting the taper soon.  No fever, abdominal pain, vomiting, diarrhea.    Review of Systems: See interval hx. Denies fevers, chills, HA, changes in vision, CP, abdominal pain, N/V, diarrhea, bruising.     PMHx and Social Hx reviewed per EPIC.      Medications:  Current Outpatient Medications   Medication Sig Dispense Refill    amLODIPine (NORVASC) 5 MG tablet Take 1 tablet (5 mg) by mouth daily. 90 tablet 3    amoxicillin-clavulanate (AUGMENTIN) 875-125 MG tablet Take 1 tablet by mouth 2 times daily for 3 days. 6 tablet 0    atenolol (TENORMIN) 50 MG tablet Take 1 tablet (50 mg) by mouth daily. 90 tablet 3    calcium citrate-vitamin D (CITRACAL) 200-6.25 MG-MCG TABS per tablet Take 1 tablet by mouth 2 times daily.      omeprazole (PRILOSEC) 20 MG DR capsule Take 1 capsule (20 mg) by mouth daily. 90 capsule 3    predniSONE (DELTASONE) 10 MG tablet Take 10 mg by mouth daily.      traZODone (DESYREL) 100 MG tablet Take 0.5 tablets (50 mg) by mouth at bedtime. 30 tablet 3    venlafaxine (EFFEXOR XR) 37.5 MG 24 hr capsule Take 1 capsule (37.5 mg) by mouth daily. 90 capsule 3       Allergies   Allergen Reactions    Taxotere [Docetaxel]        EXAM:    BP (!) 149/77   Pulse 60   Temp 97  F (36.1  C) (Temporal)   Resp 16   Ht 1.727 m (5' 8\")   Wt 103.4 kg (228 lb)   SpO2 96%   BMI 34.67 kg/m      GENERAL:  Male, in no acute distress.  Alert and " oriented x3. Well groomed.   HEENT:  Normocephalic, atraumatic. No conjunctival injection or eye swelling.   LUNGS:  Nonlabored breathing, no cough or audible wheezing, able to speak full sentences.  SKIN: No rash  NEURO: CN grossly intact, speech normal  PSYCH: Mentation appears normal, insight and judgement intact    Labs:     06/18/25 11:10   Sodium 137   Potassium 4.8   Chloride 103   Carbon Dioxide (CO2) 26   Urea Nitrogen 13.0   Creatinine 0.95   GFR Estimate 87   Calcium 9.2   Anion Gap 8   Albumin 4.4   Protein Total 6.8   Alkaline Phosphatase 43   ALT 27   AST 34   Bilirubin Total 0.7   Glucose 86   WBC 13.2 (H)   Hemoglobin 12.6 (L)   Hematocrit 36.2 (L)   Platelet Count 237   RBC Count 4.06 (L)   MCV 89   MCH 31.0   MCHC 34.8   RDW 14.0   % Neutrophils 83   % Lymphocytes 8   % Monocytes 7   % Eosinophils 0   % Basophils 1   % Immature Granulocytes 1   NRBC/W 0   Absolute Neutrophil 10.9 (H)   Absolute Lymphocytes 1.1   Absolute Monocytes 0.9   Absolute Eosinophils 0.1   Absolute Basophils 0.1   Absolute Immature Granulocytes 0.1   Absolute NRBCs 0.0     Imaging: n/a    Impression/Plan: Ronnie Lagos is a 70 year old male with metastatic prostate cancer, previously on Casodex, Lupron, darolutamide, Taxotere and now on cabazitaxel.      1. Metastatic non-castrate prostate adenocarcinoma, [place] 4+5=9  2. 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 Hypertension  5. Hypersensitivity reaction to Taxotere  --2/28/2024 PSMA PET showed metastatic disease to the pelvic and retroperitoneal lymph nodes as well as greater than 10 sites of bony metastatic disease.   --Given high volume disease, he started Casodex 50 mg daily for first month, Lupron every 3 months, darolutamide 600 mg orally BID, and tried Taxotere twice but developed hypersensitivity reaction only 4-5 minutes into infusion, therefore permanently discontinued.  --Casodex and darolutamide were also  discontinued  --PSA increased further to 0.13 consistent with biochemical progression.  He was having increase in bone pain.   --2/26/25 CT CAP and bone scan with no significant progression, but PSA continues to rise and 0.19 on 2/27/2025  --Started cabazitaxel with C1 D1 on 2/11/2025, had a reaction in infusion, given meds and restarted at a slower rate, tolerated well  --Continued cabazitaxel at a slower rate, he was tolerating this fine, but then started having hemorrhagic cystitis and possible pyelonephritis  -- Recently admitted on 6/11/2025 with  concerns for pyelonephritis induced by cabazitaxel, s/p Rocephin and Augmentin, improving  --Cabazitaxel now discontinued, symptoms are slowly improving, doing well today  --He will meet with Dr. Mercedes in a few weeks to discuss next steps, enzalutamide has been discussed in the past  -- Lupron was given on 5/27/2025, next dose would be due late August 2025, if Dr. Mercedes continues this therapy  -- Labs reviewed today and CBC/CMP look good  -- Mild leukocytosis likely from prednisone, he will start to taper this with 7.5 mg daily x 3 days, 5 mg daily x 3 days, 2.5 mg x 3 days, then done  -- Previously was on denosumab (Xgeva) every 4 weeks, but this was changed to every 6 weeks to coordinate with cabazitaxel infusions.  He was hoping to have dental work in the next few months, but he has spoken to his dentist, they will hold off for now.   -- Received Xgeva on 5/27/2025, he will be due when he follows up with Dr. Mercedes in a few weeks     5. 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     6. Left shoulder pain  --Related to post-arthroplasty pain.  Continue Tylenol as needed.     7. Left upper molar pain  --He may need bone implant/grafting in the future but would need to hold Xgeva for 3 months if needed.    --See above.  We will continue to give Xgeva every 6 months until he updates us with the  next plan with dental work. Patient aware that we will likely need to hold Xgeva for 3 months prior and 2 months after any dental procedures.     8. Right knee osteoarthritis  --He will discuss options with Ortho when needed     9. Right rib pain related to multiple right-sided rib fractures.  --Has known metastatic disease to ribs/bones.  --8/7/2024 Rib x-rays showed multiple indeterminate chronicity right rib fractures.  --Continue conservative management.  --Reviewed CT CAP and bone scan from 2/26/2025   --Briefly discussed radiation, but suspect likely from previous fractures and will hold on Rad Onc referral for now.   --Continue Tylenol PRN     10. Insomnia  11. Male hot flashes  --Multifactorially related to joint pain and hot flashes.  --Not alleviated with CBD, THC gummies, or melatonin.  --Suggested acupuncture in the past, but does not like needles.  --Hot flashes are improved with venlafaxine 37.5 mg, he will continue daily  --Started trazodone 50 mg and helped, but still would prefer more sleep.  Candice 100 mg at bedtime, but was groggy.  He will continue 50 mg.    # Hemorrhagic cystitis  # Pyelonephritis  Likely secondary to cabazitaxel and hospitalized on 6/11/2025.    --Received Rocephin IV, completed p.o. Augmentin, last dose 6/17/2025  --Cabazitaxel discontinued, see plan above    Chart documentation with Dragon Voice recognition Software. Although reviewed after completion, some words and grammatical errors may remain.    20 minutes spent on the date of the encounter doing chart review, review of test results, interpretation of tests, patient visit, and documentation     The longitudinal plan of care for the diagnosis(es)/condition(s) as documented were addressed during this visit. Due to the added complexity in care, I will continue to support Ronnie in the subsequent management and with ongoing continuity of care.    Aileen Agrawal PA-C  Hematology/Oncology  [facility]  Physicians

## 2025-06-17 NOTE — PROGRESS NOTES
[facility]:   [facility] Contact  [facility]/Voicemail     Clinical Data: Post-Discharge Outreach     Outreach attempted x 2.  Left message on patient's voicemail, providing [facility]'s central phone number of [phone] ([phone]) for questions/concerns and/or to schedule an appt with an [facility] provider, if they do not have a PCP.      Plan:  [facility] will do no further outreaches at this time.       Christi Alvarez  Community Health Worker  [facility]  [phone]      *Connected Care Resource Team does NOT follow patient ongoing. Referrals are identified based on internal discharge reports and the outreach is to ensure patient has an understanding of their discharge instructions.

## 2025-06-18 ENCOUNTER — ONCOLOGY VISIT (OUTPATIENT)
Dept: ONCOLOGY | Facility: CLINIC | Age: 70
End: 2025-06-18
Attending: PHYSICIAN ASSISTANT
Payer: COMMERCIAL

## 2025-06-18 ENCOUNTER — INFUSION THERAPY VISIT (OUTPATIENT)
Dept: INFUSION THERAPY | Facility: CLINIC | Age: 70
End: 2025-06-18
Attending: PHYSICIAN ASSISTANT
Payer: COMMERCIAL

## 2025-06-18 VITALS
TEMPERATURE: 97 F | RESPIRATION RATE: 16 BRPM | WEIGHT: 228 LBS | HEART RATE: 60 BPM | BODY MASS INDEX: 34.56 KG/M2 | OXYGEN SATURATION: 96 % | SYSTOLIC BLOOD PRESSURE: 149 MMHG | HEIGHT: 68 IN | DIASTOLIC BLOOD PRESSURE: 77 MMHG

## 2025-06-18 DIAGNOSIS — R23.2 HOT FLASH IN MALE: ICD-10-CM

## 2025-06-18 DIAGNOSIS — C61 PROSTATE CANCER METASTATIC TO MULTIPLE SITES (H): ICD-10-CM

## 2025-06-18 DIAGNOSIS — C61 PROSTATE CANCER METASTATIC TO BONE (H): Primary | ICD-10-CM

## 2025-06-18 DIAGNOSIS — N30.91 HEMORRHAGIC CYSTITIS: ICD-10-CM

## 2025-06-18 DIAGNOSIS — C79.51 PROSTATE CANCER METASTATIC TO BONE (H): Primary | ICD-10-CM

## 2025-06-18 LAB
ALBUMIN SERPL BCG-MCNC: 4.4 G/DL (ref 3.5–5.2)
ALP SERPL-CCNC: 43 U/L (ref 40–150)
ALT SERPL W P-5'-P-CCNC: 27 U/L (ref 0–70)
ANION GAP SERPL CALCULATED.3IONS-SCNC: 8 MMOL/L (ref 7–15)
AST SERPL W P-5'-P-CCNC: 34 U/L (ref 0–45)
BASOPHILS # BLD AUTO: 0.1 10E3/UL (ref 0–0.2)
BASOPHILS NFR BLD AUTO: 1 %
BILIRUB SERPL-MCNC: 0.7 MG/DL
BUN SERPL-MCNC: 13 MG/DL (ref 8–23)
CALCIUM SERPL-MCNC: 9.2 MG/DL (ref 8.8–10.4)
CHLORIDE SERPL-SCNC: 103 MMOL/L (ref 98–107)
CREAT SERPL-MCNC: 0.95 MG/DL (ref 0.67–1.17)
EGFRCR SERPLBLD CKD-EPI 2021: 87 ML/MIN/1.73M2
EOSINOPHIL # BLD AUTO: 0.1 10E3/UL (ref 0–0.7)
EOSINOPHIL NFR BLD AUTO: 0 %
ERYTHROCYTE [DISTWIDTH] IN BLOOD BY AUTOMATED COUNT: 14 % (ref 10–15)
GLUCOSE SERPL-MCNC: 86 MG/DL (ref 70–99)
HCO3 SERPL-SCNC: 26 MMOL/L (ref 22–29)
HCT VFR BLD AUTO: 36.2 % (ref 40–53)
HGB BLD-MCNC: 12.6 G/DL (ref 13.3–17.7)
IMM GRANULOCYTES # BLD: 0.1 10E3/UL
IMM GRANULOCYTES NFR BLD: 1 %
LYMPHOCYTES # BLD AUTO: 1.1 10E3/UL (ref 0.8–5.3)
LYMPHOCYTES NFR BLD AUTO: 8 %
MCH RBC QN AUTO: 31 PG (ref 26.5–33)
MCHC RBC AUTO-ENTMCNC: 34.8 G/DL (ref 31.5–36.5)
MCV RBC AUTO: 89 FL (ref 78–100)
MONOCYTES # BLD AUTO: 0.9 10E3/UL (ref 0–1.3)
MONOCYTES NFR BLD AUTO: 7 %
NEUTROPHILS # BLD AUTO: 10.9 10E3/UL (ref 1.6–8.3)
NEUTROPHILS NFR BLD AUTO: 83 %
NRBC # BLD AUTO: 0 10E3/UL
NRBC BLD AUTO-RTO: 0 /100
PLATELET # BLD AUTO: 237 10E3/UL (ref 150–450)
POTASSIUM SERPL-SCNC: 4.8 MMOL/L (ref 3.4–5.3)
PROT SERPL-MCNC: 6.8 G/DL (ref 6.4–8.3)
PSA SERPL DL<=0.01 NG/ML-MCNC: 0.3 NG/ML (ref 0–4.5)
RBC # BLD AUTO: 4.06 10E6/UL (ref 4.4–5.9)
SODIUM SERPL-SCNC: 137 MMOL/L (ref 135–145)
WBC # BLD AUTO: 13.2 10E3/UL (ref 4–11)

## 2025-06-18 PROCEDURE — 84153 ASSAY OF PSA TOTAL: CPT | Performed by: INTERNAL MEDICINE

## 2025-06-18 PROCEDURE — G0463 HOSPITAL OUTPT CLINIC VISIT: HCPCS | Performed by: PHYSICIAN ASSISTANT

## 2025-06-18 PROCEDURE — 85004 AUTOMATED DIFF WBC COUNT: CPT | Performed by: INTERNAL MEDICINE

## 2025-06-18 PROCEDURE — 36591 DRAW BLOOD OFF VENOUS DEVICE: CPT

## 2025-06-18 PROCEDURE — 250N000011 HC RX IP 250 OP 636: Performed by: PHYSICIAN ASSISTANT

## 2025-06-18 PROCEDURE — 80048 BASIC METABOLIC PNL TOTAL CA: CPT | Performed by: INTERNAL MEDICINE

## 2025-06-18 RX ORDER — HEPARIN SODIUM (PORCINE) LOCK FLUSH IV SOLN 100 UNIT/ML 100 UNIT/ML
5 SOLUTION INTRAVENOUS
Status: COMPLETED | OUTPATIENT
Start: 2025-06-18 | End: 2025-06-18

## 2025-06-18 RX ADMIN — Medication 5 ML: at 11:10

## 2025-06-18 ASSESSMENT — PAIN SCALES - GENERAL: PAINLEVEL_OUTOF10: MILD PAIN (2)

## 2025-06-18 NOTE — PROGRESS NOTES
Nursing Note:  Ronnie Lagos presents today for Port Labs.    Patient to be seen by provider today: Yes: Aileen Lewis PA-C   present during visit today: Not Applicable.    Note: Informed patient that when port is not being used on a regular basis for treatment, it requires port flush every 6 weeks.  He verbalized understanding.    Intravenous Access:  Labs drawn without difficulty.  Implanted Port.    Discharge Plan:   Patient was sent to [facility] for provider appointment.      Eloy Contreras RN

## 2025-06-18 NOTE — LETTER
6/18/2025      Ronnie Lagos  [address]  [place] [facility] [address]      Dear Colleague,    Thank you for referring your patient, Ronnie Lagos, to the [facility]. Please see a copy of my visit note below.    Oncology/Hematology Visit Note    Jun 18, 2025    Reason for visit: Follow up prostate cancer    Oncology HPI: Ronnie Lagos is a 70 year old male with hypertension, GERD who presents with the following oncologic history:  1.  5/11/2023: PSA elevated at 8.08 (prior 3.44 from 5/10/22).  2. 6/29/2023: MRI pelvis -- PI-RADS 4 - 1.4 x 1 x 0.9 cm nodule abutting posterolateral capsule of prostate; no pelvic adenopathy or bone lesions.  3.  8/30/2023: Prostate biopsy - [place] 4+3=7 prostate adenocarcinoma.  4. 10/2/2023: NM bone scan negative.  5.  11/3/2023: Radical prostatectomy, pelvic lymph node excision -- [place] 4+5=9 adenocarcinoma, [id number], margins widely positive for malignancy.  6.  2/7/2024: PSA = 4.06.  7.  2/15/2024: PSA elevated at 5.26.  8.  2/28/2024: PSMA PET showed focal uptake in left side of prostatectomy bed suspicious for recurrent disease; multiple pelvic and few retroperitoneal lymph nodes with uptake; >10 sclerotic osseous lesions with uptake indicative of osseous metastatic disease.  9. 3/20/2024: Started Casodex, Lupron, darolutamide, Xgeva, cycle 1 Taxotere but developed flushing, chest heaviness, nausea (no dyspnea) reaction 5 minutes into Taxotere infusion. Re-challenge not attempted that day.  10.  3/29/2024: Re-challenged Taxotere but developed repeat hypersensitivity reaction 4 minutes into infusion. Taxotere discontinued.  11. 4/17/2024: PSA 0.53.  12. 7/10/2024: PSA 0.06.  13. 10/8/2024: PSA 0.02.  14. 1/28/2025: PSA 0.13.  15. 2/11/2025: Cabazitaxel C1D1, darolutamide discontinued  16. 4/11/2025: Port placed    Patient was seen by Dr. Mercedes and repeat CT CAP and bone scan with possible new disease vs treatment response. Cabazitaxel  C1D1 completed on 2/11/25 and he did have a reaction, tolerated at a slower rate he did complete 6 cycles of cabazitaxel, final cycle lengths 5/27/2025.    He was admitted on 6/11/2025 for recurrent pyelonephritis, hemorrhagic cystitis, suspected likely from cabazitaxel.  He was given Rocephin and blood cultures with no growth. Finished Augmentin 6/17/25.    He is here today for hospital follow-up.    Interval History: Ronnie is here unaccompanied today.  He is feeling much better today.  Since discharge, he continues to have some intermittent back pain, radiating to the left flank, but this is overall improved as well.  He finished up his Augmentin with last dose yesterday.  He is urinating without any dysuria, but he will still occasionally see some hematuria.  He has continued on the prednisone 10 mg daily and will be starting the taper soon.  No fever, abdominal pain, vomiting, diarrhea.    Review of Systems: See interval hx. Denies fevers, chills, HA, changes in vision, CP, abdominal pain, N/V, diarrhea, bruising.     PMHx and Social Hx reviewed per EPIC.      Medications:  Current Outpatient Medications   Medication Sig Dispense Refill     amLODIPine (NORVASC) 5 MG tablet Take 1 tablet (5 mg) by mouth daily. 90 tablet 3     amoxicillin-clavulanate (AUGMENTIN) 875-125 MG tablet Take 1 tablet by mouth 2 times daily for 3 days. 6 tablet 0     atenolol (TENORMIN) 50 MG tablet Take 1 tablet (50 mg) by mouth daily. 90 tablet 3     calcium citrate-vitamin D (CITRACAL) 200-6.25 MG-MCG TABS per tablet Take 1 tablet by mouth 2 times daily.       omeprazole (PRILOSEC) 20 MG DR capsule Take 1 capsule (20 mg) by mouth daily. 90 capsule 3     predniSONE (DELTASONE) 10 MG tablet Take 10 mg by mouth daily.       traZODone (DESYREL) 100 MG tablet Take 0.5 tablets (50 mg) by mouth at bedtime. 30 tablet 3     venlafaxine (EFFEXOR XR) 37.5 MG 24 hr capsule Take 1 capsule (37.5 mg) by mouth daily. 90 capsule 3       Allergies  "  Allergen Reactions     Taxotere [Docetaxel]        EXAM:    BP (!) 149/77   Pulse 60   Temp 97  F (36.1  C) (Temporal)   Resp 16   Ht 1.727 m (5' 8\")   Wt 103.4 kg (228 lb)   SpO2 96%   BMI 34.67 kg/m      GENERAL:  Male, in no acute distress.  Alert and oriented x3. Well groomed.   HEENT:  Normocephalic, atraumatic. No conjunctival injection or eye swelling.   LUNGS:  Nonlabored breathing, no cough or audible wheezing, able to speak full sentences.  SKIN: No rash  NEURO: CN grossly intact, speech normal  PSYCH: Mentation appears normal, insight and judgement intact    Labs:     06/18/25 11:10   Sodium 137   Potassium 4.8   Chloride 103   Carbon Dioxide (CO2) 26   Urea Nitrogen 13.0   Creatinine 0.95   GFR Estimate 87   Calcium 9.2   Anion Gap 8   Albumin 4.4   Protein Total 6.8   Alkaline Phosphatase 43   ALT 27   AST 34   Bilirubin Total 0.7   Glucose 86   WBC 13.2 (H)   Hemoglobin 12.6 (L)   Hematocrit 36.2 (L)   Platelet Count 237   RBC Count 4.06 (L)   MCV 89   MCH 31.0   MCHC 34.8   RDW 14.0   % Neutrophils 83   % Lymphocytes 8   % Monocytes 7   % Eosinophils 0   % Basophils 1   % Immature Granulocytes 1   NRBC/W 0   Absolute Neutrophil 10.9 (H)   Absolute Lymphocytes 1.1   Absolute Monocytes 0.9   Absolute Eosinophils 0.1   Absolute Basophils 0.1   Absolute Immature Granulocytes 0.1   Absolute NRBCs 0.0     Imaging: n/a    Impression/Plan: Ronnie Lagos is a 70 year old male with metastatic prostate cancer, previously on Casodex, Lupron, darolutamide, Taxotere and now on cabazitaxel.      1. Metastatic non-castrate prostate adenocarcinoma, [place] 4+5=9  2. 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 Hypertension  5. Hypersensitivity reaction to Taxotere  --2/28/2024 PSMA PET showed metastatic disease to the pelvic and retroperitoneal lymph nodes as well as greater than 10 sites of bony metastatic disease.   --Given high volume disease, he started Casodex 50 mg " daily for first month, Lupron every 3 months, darolutamide 600 mg orally BID, and tried Taxotere twice but developed hypersensitivity reaction only 4-5 minutes into infusion, therefore permanently discontinued.  --Casodex and darolutamide were also discontinued  --PSA increased further to 0.13 consistent with biochemical progression.  He was having increase in bone pain.   --2/26/25 CT CAP and bone scan with no significant progression, but PSA continues to rise and 0.19 on 2/27/2025  --Started cabazitaxel with C1 D1 on 2/11/2025, had a reaction in infusion, given meds and restarted at a slower rate, tolerated well  --Continued cabazitaxel at a slower rate, he was tolerating this fine, but then started having hemorrhagic cystitis and possible pyelonephritis  -- Recently admitted on 6/11/2025 with  concerns for pyelonephritis induced by cabazitaxel, s/p Rocephin and Augmentin, improving  --Cabazitaxel now discontinued, symptoms are slowly improving, doing well today  --He will meet with Dr. Mercedes in a few weeks to discuss next steps, enzalutamide has been discussed in the past  -- Lupron was given on 5/27/2025, next dose would be due late August 2025, if Dr. Mercedes continues this therapy  -- Labs reviewed today and CBC/CMP look good  -- Mild leukocytosis likely from prednisone, he will start to taper this with 7.5 mg daily x 3 days, 5 mg daily x 3 days, 2.5 mg x 3 days, then done  -- Previously was on denosumab (Xgeva) every 4 weeks, but this was changed to every 6 weeks to coordinate with cabazitaxel infusions.  He was hoping to have dental work in the next few months, but he has spoken to his dentist, they will hold off for now.   -- Received Xgeva on 5/27/2025, he will be due when he follows up with Dr. Mercedes in a few weeks     5. 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     6. Left shoulder pain  --Related to post-arthroplasty pain.   Continue Tylenol as needed.     7. Left upper molar pain  --He may need bone implant/grafting in the future but would need to hold Xgeva for 3 months if needed.    --See above.  We will continue to give Xgeva every 6 months until he updates us with the next plan with dental work. Patient aware that we will likely need to hold Xgeva for 3 months prior and 2 months after any dental procedures.     8. Right knee osteoarthritis  --He will discuss options with Ortho when needed     9. Right rib pain related to multiple right-sided rib fractures.  --Has known metastatic disease to ribs/bones.  --8/7/2024 Rib x-rays showed multiple indeterminate chronicity right rib fractures.  --Continue conservative management.  --Reviewed CT CAP and bone scan from 2/26/2025   --Briefly discussed radiation, but suspect likely from previous fractures and will hold on Rad Onc referral for now.   --Continue Tylenol PRN     10. Insomnia  11. Male hot flashes  --Multifactorially related to joint pain and hot flashes.  --Not alleviated with CBD, THC gummies, or melatonin.  --Suggested acupuncture in the past, but does not like needles.  --Hot flashes are improved with venlafaxine 37.5 mg, he will continue daily  --Started trazodone 50 mg and helped, but still would prefer more sleep.  Candice 100 mg at bedtime, but was groggy.  He will continue 50 mg.    # Hemorrhagic cystitis  # Pyelonephritis  Likely secondary to cabazitaxel and hospitalized on 6/11/2025.    --Received Rocephin IV, completed p.o. Augmentin, last dose 6/17/2025  --Cabazitaxel discontinued, see plan above    Chart documentation with Dragon Voice recognition Software. Although reviewed after completion, some words and grammatical errors may remain.    20 minutes spent on the date of the encounter doing chart review, review of test results, interpretation of tests, patient visit, and documentation     The longitudinal plan of care for the diagnosis(es)/condition(s) as documented  were addressed during this visit. Due to the added complexity in care, I will continue to support Ronnie in the subsequent management and with ongoing continuity of care.    Aileen Agrawal PA-C  Hematology/Oncology  [facility]                  Again, thank you for allowing me to participate in the care of your patient.        Sincerely,        Aileen Agrawal PA-C    Electronically signed

## 2025-06-18 NOTE — NURSING NOTE
"Oncology Rooming Note    June 18, 2025 11:42 AM   Ronnie Lagos is a 70 year old male who presents for:    Chief Complaint   Patient presents with    Oncology Clinic Visit     Prostate cancer metastatic to bone   Prostate cancer metastatic to multiple sites           Initial Vitals: BP (!) 149/77   Pulse 60   Temp 97  F (36.1  C) (Temporal)   Resp 16   Ht 1.727 m (5' 8\")   Wt 103.4 kg (228 lb)   SpO2 96%   BMI 34.67 kg/m   Estimated body mass index is 34.67 kg/m  as calculated from the following:    Height as of this encounter: 1.727 m (5' 8\").    Weight as of this encounter: 103.4 kg (228 lb). Body surface area is 2.23 meters squared.  Mild Pain (2) Comment: Data Unavailable   No LMP for male patient.  Allergies reviewed: Yes  Medications reviewed: Yes    Medications: Medication refills not needed today.  Pharmacy name entered into [employer]:    [id number] - [place], [place] - [address] [facility] - [place], [place] - [address]    Frailty Screening:   Is the patient here for a new oncology consult visit in cancer care? 2. No    PHQ9:  Did this patient require a PHQ9?: No      Clinical concerns: f/u       Sherrie Quinn CMA              "

## 2025-07-04 NOTE — PROGRESS NOTES
[facility]    Hematology/Oncology Established Patient Note      Today's Date: 7/8/2025    Reason for visit: Metastatic prostate cancer.    HISTORY OF PRESENT ILLNESS: Ronnie Lagos is a 70 year old male with hypertension, GERD who presents with the following oncologic history:  1.  5/11/2023: PSA elevated at 8.08 (prior 3.44 from 5/10/22).  2. 6/29/2023: MRI pelvis -- PI-RADS 4 - 1.4 x 1 x 0.9 cm nodule abutting posterolateral capsule of prostate; no pelvic adenopathy or bone lesions.  3.  8/30/2023: Prostate biopsy - [place] 4+3=7 prostate adenocarcinoma.  4. 10/2/2023: NM bone scan negative.  5.  11/3/2023: Radical prostatectomy, pelvic lymph node excision -- [place] 4+5=9 adenocarcinoma, [id number], margins widely positive for malignancy.  6.  2/7/2024: PSA = 4.06.  7.  2/15/2024: PSA elevated at 5.26.  8.  2/28/2024: PSMA PET showed focal uptake in left side of prostatectomy bed suspicious for recurrent disease; multiple pelvic and few retroperitoneal lymph nodes with uptake; >10 sclerotic osseous lesions with uptake indicative of osseous metastatic disease.  9. 3/20/2024: Started Casodex, Lupron, darolutamide, Xgeva, cycle 1 Taxotere but developed flushing, chest heaviness, nausea (no dyspnea) reaction 5 minutes into Taxotere infusion. Re-challenge not attempted that day.  10.  3/29/2024: Re-challenged Taxotere but developed repeat hypersensitivity reaction 4 minutes into infusion. Taxotere discontinued.  11. 4/17/2024: PSA 0.53.  12. 7/10/2024: PSA 0.06.  13. 10/8/2024: PSA 0.02.  14. 1/28/2025: PSA 0.13.  15. 2/26/2025: CT C/A/P showed increased sclerosis of multiple bone metastases consistent with treatment response but several new apparent sclerotic bone lesions may represent new metastases. Decrease size of subcentimeter pelvic lymph nodes. Resolution of focal enhancement within left prostatectomy bed. NM bone scan showed no evidence of bone metastasis.  16. 2/11/2025: Started  cabazitazel/prednisone.  17. 5/27/2025: Cycle 6 cabazitaxel.  Discontinued thereafter due to hemorrhagic cystitis. PSA = 0.26.  18. 6/11/2025: CT A/P showed slightly worsening inflammatory changes surrounding the left kidney compared to the prior exam. No significant change in appearance of bilateral renal pelvis and proximal ureteral urothelial thickening with hyperenhancement. Stable multifocal sclerotic osseous metastases.  19. 6/18/2025: PSA = 0.3.  20. 6/11/2025-6/15/2025: Hospitalized for pyelonephritis and hemorrhagic cystitis.    INTERVAL HISTORY:  Ronnie reports resolution of hematuria and flank pain.  He reports lower back, shoulder, and knee pain has returned after completing course of prednisone.    REVIEW OF SYSTEMS:   14 point ROS was reviewed and is negative other than as noted above in HPI.       HOME MEDICATIONS:  Current Outpatient Medications   Medication Sig Dispense Refill    amLODIPine (NORVASC) 5 MG tablet Take 1 tablet (5 mg) by mouth daily. 90 tablet 3    atenolol (TENORMIN) 50 MG tablet Take 1 tablet (50 mg) by mouth daily. 90 tablet 3    calcium citrate-vitamin D (CITRACAL) 200-6.25 MG-MCG TABS per tablet Take 1 tablet by mouth 2 times daily.      omeprazole (PRILOSEC) 20 MG DR capsule Take 1 capsule (20 mg) by mouth daily. 90 capsule 3    predniSONE (DELTASONE) 10 MG tablet Take 10 mg by mouth daily.      traZODone (DESYREL) 100 MG tablet Take 0.5 tablets (50 mg) by mouth at bedtime. 30 tablet 3    venlafaxine (EFFEXOR XR) 37.5 MG 24 hr capsule Take 1 capsule (37.5 mg) by mouth daily. 90 capsule 3         ALLERGIES:  Allergies   Allergen Reactions    Taxotere [Docetaxel]          PAST MEDICAL HISTORY:  Past Medical History:   Diagnosis Date    Abdominal pain     Actinic keratosis     Basal cell carcinoma     Calculus of bile duct     Elevated liver enzymes     Essential hypertension, benign     abstracted 6/19/02    Gastro-oesophageal reflux disease     GERD (gastroesophageal reflux  disease) 07/28/2008    Liver disease     elevated liver enzymes    Prostate cancer (H)     bone and lymph nodes 2024    Squamous cell carcinoma     Unspecified cerebral artery occlusion with cerebral infarction     Unspecified condition of brain     abstracted 6/19/02         PAST SURGICAL HISTORY:  Past Surgical History:   Procedure Laterality Date    ARTHRODESIS FOOT  2/5/2013    Procedure: ARTHRODESIS FOOT;  LEFT FIRST METATARSOPHALANGEAL JOINT ARTHRODESIS ;  Surgeon: Burton Loera DPM;  [facility]:  [facility]    COLONOSCOPY N/A 12/7/2018    Procedure: COMBINED COLONOSCOPY, SINGLE OR MULTIPLE BIOPSY/POLYPECTOMY BY BIOPSY;  Surgeon: Blayne Mora MD;  Location:  [facility]    COLONOSCOPY N/A 12/13/2024    Procedure: Colonoscopy;  Surgeon: Arti Mercedes MD;  Location:  GI    DAVINCI PROSTATECTOMY, LYMPHADENECTOMY N/A 11/3/2023    Procedure: PROSTATECTOMY, ROBOT-ASSISTED, WITH PELVIC LYMPHADENECTOMY;  Surgeon: Sophia Ramirez MD;  Location:  OR    ENDOSCOPIC RETROGRADE CHOLANGIOPANCREATOGRAM N/A 1/18/2018    Procedure: COMBINED ENDOSCOPIC RETROGRADE CHOLANGIOPANCREATOGRAPHY, SPHINCTEROTOMY;  ENDOSCOPIC RETROGRADE CHOLANGIOPANCREATOGRAM (ERCP), SPHINCTEROTOMY, STONE REMOVAL, STENT PLACEMENT;  Surgeon: Christiano Sorenson MD;  Location:  OR    ENDOSCOPIC RETROGRADE CHOLANGIOPANCREATOGRAM N/A 4/12/2018    Procedure: ENDOSCOPIC RETROGRADE CHOLANGIOPANCREATOGRAM;  ENDOSCOPIC RETROGRADE CHOLANIOPANCREATOGRAPHY AND ATTEMPTED STENT REMOVAL.;  Surgeon: Christiano Sorenson MD;  Location:  OR    ENDOSCOPIC RETROGRADE CHOLANGIOPANCREATOGRAM N/A 5/10/2018    Procedure: COMBINED ENDOSCOPIC RETROGRADE CHOLANGIOPANCREATOGRAPHY, REMOVE FOREIGN BODY OR STENT/TUBE;  ENDOSCOPIC RETROGRADE CHOLANGIOPANCREATOGRAPHY AND STENT REMOVAL;  Surgeon: Christiano Sorenson MD;  Location:  OR    ESOPHAGOSCOPY, GASTROSCOPY, DUODENOSCOPY (EGD), COMBINED N/A 4/12/2018    Procedure: COMBINED ESOPHAGOSCOPY, GASTROSCOPY,  DUODENOSCOPY (EGD);  EGD with stent removal;  Surgeon: Christiano Sorenson MD;  Location:  GI    IR CHEST PORT PLACEMENT > 5 YRS OF AGE  4/11/2025    LAPAROSCOPIC CHOLECYSTECTOMY N/A 5/1/2015    Procedure: LAPAROSCOPIC CHOLECYSTECTOMY;  Surgeon: Damien Galindo MD;  Location:  [facility]    ORTHOPEDIC SURGERY      left elbow, right shoulder         SOCIAL HISTORY:  Social History     Socioeconomic History    Marital status:      Spouse name: Not on file    Number of children: Not on file    Years of education: Not on file    Highest education level: Not on file   Occupational History     Employer: [employer]   Tobacco Use    Smoking status: Never    Smokeless tobacco: Never   Vaping Use    Vaping status: Never Used   Substance and Sexual Activity    Alcohol use: Yes     Alcohol/week: 2.0 standard drinks of alcohol     Types: 2 Standard drinks or equivalent per week    Drug use: Never    Sexual activity: Yes     Partners: Female   Other Topics Concern    Parent/sibling w/ CABG, MI or angioplasty before 65F 55M? No   Social History Narrative    Not on file     Social Drivers of Health     Financial Resource Strain: Low Risk  (6/11/2025)    Financial Resource Strain     Within the past 12 months, have you or your family members you live with been unable to get utilities (heat, electricity) when it was really needed?: No   Food Insecurity: Low Risk  (6/11/2025)    Food Insecurity     Within the past 12 months, did you worry that your food would run out before you got money to buy more?: No     Within the past 12 months, did the food you bought just not last and you didn t have money to get more?: No   Transportation Needs: Low Risk  (6/11/2025)    Transportation Needs     Within the past 12 months, has lack of transportation kept you from medical appointments, getting your medicines, non-medical meetings or appointments, work, or from getting things that you need?: No   Physical  Activity: Insufficiently Active (2025)    Exercise Vital Sign     Days of Exercise per Week: 3 days     Minutes of Exercise per Session: 10 min   Stress: Stress Concern Present (2025)    [place] [employer] - Occupational Stress Questionnaire     Feeling of Stress : Rather much   Social Connections: Unknown (2025)    Social Connection and Isolation Panel [NHANES]     Frequency of Communication with Friends and Family: Not on file     Frequency of Social Gatherings with Friends and Family: Once a week     Attends Sikh Services: Not on file     Active Member of Clubs or Organizations: Not on file     Attends Club or Organization Meetings: Not on file     Marital Status: Not on file   Interpersonal Safety: Low Risk  (2025)    Interpersonal Safety     Do you feel physically and emotionally safe where you currently live?: Yes     Within the past 12 months, have you been hit, slapped, kicked or otherwise physically hurt by someone?: No     Within the past 12 months, have you been humiliated or emotionally abused in other ways by your partner or ex-partner?: No   Housing Stability: Low Risk  (2025)    Housing Stability     Do you have housing? : Yes     Are you worried about losing your housing?: No         FAMILY HISTORY:  Family History   Problem Relation Age of Onset    Hypertension Mother     Cancer - colorectal Mother     Skin Cancer Mother     Colon Cancer Mother     Hypertension Father     Prostate Cancer Father     Melanoma No family hx of     Anesthesia Reaction No family hx of     Venous thrombosis No family hx of     Bleeding Disorder No family hx of    Father had prostate, liver, and skin cancer. Mother with colorectal and skin cancer.      PHYSICAL EXAM:  Vital signs:  BP (!) 178/94   Pulse 63   Temp 98.1  F (36.7  C) (Oral)   Resp 16   Wt 105.9 kg (233 lb 6.4 oz)   SpO2 97%   BMI 35.49 kg/m     ECO  GENERAL: No acute distress.  EYES: No scleral  icterus. No overt erythema.  RESPIRATORY: No audible cough, wheezing, or labored breathing.  MUSCULOSKELETAL: Range of motion in the neck, shoulders, and arms appear normal.  SKIN: Petechial rash over bilateral lower extremities.  NEUROLOGIC: Alert.  No overt tremors.  PSYCHIATRIC: Normal affect and mood.  Does not appear anxious.       LABS:  CBC RESULTS:   Recent Labs   Lab Test 07/08/25  1338   WBC 7.2   RBC 4.05*   HGB 12.3*   HCT 34.7*   MCV 86   MCH 30.4   MCHC 35.4   RDW 13.3           Last Comprehensive Metabolic Panel:  Sodium   Date Value Ref Range Status   06/18/2025 137 135 - 145 mmol/L Final   04/06/2021 133 133 - 144 mmol/L Final     Potassium   Date Value Ref Range Status   06/18/2025 4.8 3.4 - 5.3 mmol/L Final   05/10/2022 4.6 3.4 - 5.3 mmol/L Final   04/06/2021 4.4 3.4 - 5.3 mmol/L Final     Chloride   Date Value Ref Range Status   06/18/2025 103 98 - 107 mmol/L Final   05/10/2022 99 94 - 109 mmol/L Final   04/06/2021 101 94 - 109 mmol/L Final     Carbon Dioxide   Date Value Ref Range Status   04/06/2021 31 20 - 32 mmol/L Final     Carbon Dioxide (CO2)   Date Value Ref Range Status   06/18/2025 26 22 - 29 mmol/L Final   05/10/2022 28 20 - 32 mmol/L Final     Anion Gap   Date Value Ref Range Status   06/18/2025 8 7 - 15 mmol/L Final   05/10/2022 4 3 - 14 mmol/L Final   04/06/2021 1 (L) 3 - 14 mmol/L Final     Glucose   Date Value Ref Range Status   06/18/2025 86 70 - 99 mg/dL Final   05/10/2022 112 (H) 70 - 99 mg/dL Final   04/06/2021 110 (H) 70 - 99 mg/dL Final     Urea Nitrogen   Date Value Ref Range Status   06/18/2025 13.0 8.0 - 23.0 mg/dL Final   05/10/2022 10 7 - 30 mg/dL Final   04/06/2021 8 7 - 30 mg/dL Final     Creatinine   Date Value Ref Range Status   06/18/2025 0.95 0.67 - 1.17 mg/dL Final   04/06/2021 0.92 0.66 - 1.25 mg/dL Final     GFR Estimate   Date Value Ref Range Status   06/18/2025 87 >60 mL/min/1.73m2 Final     Comment:     eGFR calculated using 2021 CKD-EPI equation.    04/06/2021 87 >60 mL/min/[1.73_m2] Final     Comment:     Non  GFR Calc  Starting 12/18/2018, serum creatinine based estimated GFR (eGFR) will be   calculated using the Chronic Kidney Disease Epidemiology Collaboration   (CKD-EPI) equation.       GFR, ESTIMATED POCT   Date Value Ref Range Status   02/28/2024 >60 >60 mL/min/1.73m2 Final     Calcium   Date Value Ref Range Status   06/18/2025 9.2 8.8 - 10.4 mg/dL Final   04/06/2021 9.3 8.5 - 10.1 mg/dL Final     Bilirubin Total   Date Value Ref Range Status   06/18/2025 0.7 <=1.2 mg/dL Final   04/06/2021 2.3 (H) 0.2 - 1.3 mg/dL Final     Alkaline Phosphatase   Date Value Ref Range Status   06/18/2025 43 40 - 150 U/L Final   04/06/2021 60 40 - 150 U/L Final     ALT   Date Value Ref Range Status   06/18/2025 27 0 - 70 U/L Final   04/06/2021 70 0 - 70 U/L Final     AST   Date Value Ref Range Status   06/18/2025 34 0 - 45 U/L Final   04/06/2021 66 (H) 0 - 45 U/L Final     PSA   Date Value Ref Range Status   04/06/2021 1.51 0 - 4 ug/L Final     Comment:     Assay Method:  Chemiluminescence using Siemens Vista analyzer     Prostate Specific Antigen Screen   Date Value Ref Range Status   05/10/2022 3.44 0.00 - 4.00 ug/L Final     PSA Tumor Marker   Date Value Ref Range Status   06/18/2025 0.30 0.00 - 4.50 ng/mL Final        ASSESSMENT/PLAN:  Ronnie Lagos is a 70 year old male with the following issues:  1. Metastatic non-castrate prostate adenocarcinoma, [place] 4+5=9  2. 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 Hypertension  5. Hypersensitivity reaction to Taxotere  --2/28/2024 PSMA PET showed metastatic disease to the pelvic and retroperitoneal lymph nodes as well as greater than 10 sites of bony metastatic disease. I showed him the images today.  --Given high volume disease, he started Casodex 50 mg daily for first month, Lupron every 3 months, darolutamide 600 mg orally BID, and tried Taxotere twice but  developed hypersensitivity reaction only 4-5 minutes into infusion.  Therefore, I had recommended discontinuation of Taxotere.    --His PSA then increased further to 0.13 on 1/28/2025 consistent with biochemical progression with concomitant increase in bone pain.   --2/26/2025 CT chest/abdomen/pelvis showed several new sclerotic bone lesions but NM bone scan negative for bone metastases.  --Started cabazitaxel infusion on 2/11/2025 with 10 mg prednisone daily. Discontinued darolutamide.  --Cabazitaxel discontinued after cycle 6 due to hemorrhagic cystitis.  --PSA now increased slightly from 0.26 to 0.3.  --Discussed continued observation for next 3 months versus consideration for Pluvicto and switch to enzalutamide upon further progression. For now, I recommended observation and return in 1 month as scheduled with repeat PSA.  Discussed his bone pain is likely from arthritis.  Encouraged use of Tylenol and intermittent ibuprofen for pain control.  --Continue denosumab (Xgeva) -- will change to every 4 weeks.  --Will continue to monitor CBC, CMP, and PSA monthly. Will repeat PSMA PET scan after next visit if symptomatic from metastatic prostate cancer with increase in PSA level.    5. 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    6. Left shoulder pain  --Related to post-arthroplasty pain.  Continue Tylenol as needed.    7. Left upper molar pain  --He may need bone implant/grafting in the future and will hold Xgeva for 3 months if needed.  He might be able to do a less invasive option with a flipper.    8. Right knee osteoarthritis  --He will discuss right knee arthroplasty versus shaving the right knee cap with orthopedics.    9. Right rib pain related to multiple right-sided rib fractures.  --Has known metastatic disease to ribs/bones.  --8/7/2024 Rib x-rays showed multiple indeterminate chronicity right rib fractures.  --Continue conservative  management. Is not requiring analgesics for this.    10. Insomnia  11. Male hot flashes  --Multifactorially related to joint pain and hot flashes.  --Not alleviated with CBD, THC gummies, or melatonin.  --Suggested acupuncture but does not like needles.  --Offered venlafaxine -- discussed potential adverse effects and he agrees to try it.  --Also encouraged moderate exercise and plant based diet, eating less sugar.    Anabell Mercedes MD  [facility] Hematology/Oncology     Total time spent today: 40 minutes in chart review, patient evaluation, counseling, documentation, test and/or medication/prescription orders, and coordination of care.      The longitudinal plan of care for the diagnosis(es)/condition(s) as documented were addressed during this visit. Due to the added complexity in care, I will continue to support Ronnie in the subsequent management and with ongoing continuity of care.

## 2025-07-08 ENCOUNTER — ONCOLOGY VISIT (OUTPATIENT)
Dept: ONCOLOGY | Facility: CLINIC | Age: 70
End: 2025-07-08
Attending: INTERNAL MEDICINE
Payer: COMMERCIAL

## 2025-07-08 ENCOUNTER — INFUSION THERAPY VISIT (OUTPATIENT)
Dept: INFUSION THERAPY | Facility: CLINIC | Age: 70
End: 2025-07-08
Attending: INTERNAL MEDICINE
Payer: COMMERCIAL

## 2025-07-08 VITALS
WEIGHT: 233.4 LBS | HEART RATE: 63 BPM | DIASTOLIC BLOOD PRESSURE: 94 MMHG | SYSTOLIC BLOOD PRESSURE: 178 MMHG | TEMPERATURE: 98.1 F | OXYGEN SATURATION: 97 % | BODY MASS INDEX: 35.49 KG/M2 | RESPIRATION RATE: 16 BRPM

## 2025-07-08 DIAGNOSIS — M15.8 OTHER OSTEOARTHRITIS INVOLVING MULTIPLE JOINTS: ICD-10-CM

## 2025-07-08 DIAGNOSIS — C79.51 PROSTATE CANCER METASTATIC TO BONE (H): Primary | ICD-10-CM

## 2025-07-08 DIAGNOSIS — C61 PROSTATE CANCER METASTATIC TO BONE (H): Primary | ICD-10-CM

## 2025-07-08 DIAGNOSIS — N30.91 HEMORRHAGIC CYSTITIS: ICD-10-CM

## 2025-07-08 DIAGNOSIS — C61 PROSTATE CANCER METASTATIC TO MULTIPLE SITES (H): ICD-10-CM

## 2025-07-08 LAB
ALBUMIN SERPL BCG-MCNC: 3.9 G/DL (ref 3.5–5.2)
ALP SERPL-CCNC: 48 U/L (ref 40–150)
ALT SERPL W P-5'-P-CCNC: 18 U/L (ref 0–70)
ANION GAP SERPL CALCULATED.3IONS-SCNC: 8 MMOL/L (ref 7–15)
AST SERPL W P-5'-P-CCNC: 29 U/L (ref 0–45)
BASOPHILS # BLD AUTO: 0 10E3/UL (ref 0–0.2)
BASOPHILS NFR BLD AUTO: 1 %
BILIRUB SERPL-MCNC: 0.9 MG/DL
BUN SERPL-MCNC: 13.9 MG/DL (ref 8–23)
CALCIUM SERPL-MCNC: 9.3 MG/DL (ref 8.8–10.4)
CHLORIDE SERPL-SCNC: 104 MMOL/L (ref 98–107)
CREAT SERPL-MCNC: 1 MG/DL (ref 0.67–1.17)
EGFRCR SERPLBLD CKD-EPI 2021: 81 ML/MIN/1.73M2
EOSINOPHIL # BLD AUTO: 0.4 10E3/UL (ref 0–0.7)
EOSINOPHIL NFR BLD AUTO: 6 %
ERYTHROCYTE [DISTWIDTH] IN BLOOD BY AUTOMATED COUNT: 13.3 % (ref 10–15)
GLUCOSE SERPL-MCNC: 119 MG/DL (ref 70–99)
HCO3 SERPL-SCNC: 26 MMOL/L (ref 22–29)
HCT VFR BLD AUTO: 34.7 % (ref 40–53)
HGB BLD-MCNC: 12.3 G/DL (ref 13.3–17.7)
IMM GRANULOCYTES # BLD: 0 10E3/UL
IMM GRANULOCYTES NFR BLD: 0 %
LYMPHOCYTES # BLD AUTO: 1.3 10E3/UL (ref 0.8–5.3)
LYMPHOCYTES NFR BLD AUTO: 19 %
MCH RBC QN AUTO: 30.4 PG (ref 26.5–33)
MCHC RBC AUTO-ENTMCNC: 35.4 G/DL (ref 31.5–36.5)
MCV RBC AUTO: 86 FL (ref 78–100)
MONOCYTES # BLD AUTO: 0.6 10E3/UL (ref 0–1.3)
MONOCYTES NFR BLD AUTO: 8 %
NEUTROPHILS # BLD AUTO: 4.8 10E3/UL (ref 1.6–8.3)
NEUTROPHILS NFR BLD AUTO: 67 %
NRBC # BLD AUTO: 0 10E3/UL
NRBC BLD AUTO-RTO: 0 /100
PLATELET # BLD AUTO: 212 10E3/UL (ref 150–450)
POTASSIUM SERPL-SCNC: 4.3 MMOL/L (ref 3.4–5.3)
PROT SERPL-MCNC: 6.6 G/DL (ref 6.4–8.3)
PSA SERPL DL<=0.01 NG/ML-MCNC: 0.39 NG/ML (ref 0–4.5)
RBC # BLD AUTO: 4.05 10E6/UL (ref 4.4–5.9)
SODIUM SERPL-SCNC: 138 MMOL/L (ref 135–145)
WBC # BLD AUTO: 7.2 10E3/UL (ref 4–11)

## 2025-07-08 PROCEDURE — 36591 DRAW BLOOD OFF VENOUS DEVICE: CPT

## 2025-07-08 PROCEDURE — 84153 ASSAY OF PSA TOTAL: CPT | Performed by: INTERNAL MEDICINE

## 2025-07-08 PROCEDURE — 85004 AUTOMATED DIFF WBC COUNT: CPT | Performed by: INTERNAL MEDICINE

## 2025-07-08 PROCEDURE — G0463 HOSPITAL OUTPT CLINIC VISIT: HCPCS | Mod: 25 | Performed by: INTERNAL MEDICINE

## 2025-07-08 PROCEDURE — 250N000011 HC RX IP 250 OP 636: Mod: JZ | Performed by: INTERNAL MEDICINE

## 2025-07-08 PROCEDURE — 96372 THER/PROPH/DIAG INJ SC/IM: CPT | Performed by: INTERNAL MEDICINE

## 2025-07-08 PROCEDURE — 82040 ASSAY OF SERUM ALBUMIN: CPT | Performed by: INTERNAL MEDICINE

## 2025-07-08 RX ORDER — METHYLPREDNISOLONE SODIUM SUCCINATE 125 MG/2ML
125 INJECTION INTRAMUSCULAR; INTRAVENOUS
Start: 2025-08-01

## 2025-07-08 RX ORDER — MEPERIDINE HYDROCHLORIDE 25 MG/ML
25 INJECTION INTRAMUSCULAR; INTRAVENOUS; SUBCUTANEOUS EVERY 30 MIN PRN
OUTPATIENT
Start: 2025-08-01

## 2025-07-08 RX ORDER — HEPARIN SODIUM (PORCINE) LOCK FLUSH IV SOLN 100 UNIT/ML 100 UNIT/ML
5 SOLUTION INTRAVENOUS
Status: DISCONTINUED | OUTPATIENT
Start: 2025-07-08 | End: 2025-07-08 | Stop reason: HOSPADM

## 2025-07-08 RX ORDER — HEPARIN SODIUM (PORCINE) LOCK FLUSH IV SOLN 100 UNIT/ML 100 UNIT/ML
5 SOLUTION INTRAVENOUS
OUTPATIENT
Start: 2025-07-08

## 2025-07-08 RX ORDER — EPINEPHRINE 1 MG/ML
0.3 INJECTION, SOLUTION INTRAMUSCULAR; SUBCUTANEOUS EVERY 5 MIN PRN
OUTPATIENT
Start: 2025-08-01

## 2025-07-08 RX ORDER — HEPARIN SODIUM,PORCINE 10 UNIT/ML
5-20 VIAL (ML) INTRAVENOUS DAILY PRN
OUTPATIENT
Start: 2025-07-08

## 2025-07-08 RX ORDER — ALBUTEROL SULFATE 0.83 MG/ML
2.5 SOLUTION RESPIRATORY (INHALATION)
OUTPATIENT
Start: 2025-08-01

## 2025-07-08 RX ORDER — DIPHENHYDRAMINE HYDROCHLORIDE 50 MG/ML
50 INJECTION, SOLUTION INTRAMUSCULAR; INTRAVENOUS
Start: 2025-08-01

## 2025-07-08 RX ORDER — ALBUTEROL SULFATE 90 UG/1
1-2 INHALANT RESPIRATORY (INHALATION)
Start: 2025-08-01

## 2025-07-08 RX ORDER — HEPARIN SODIUM,PORCINE 10 UNIT/ML
5-20 VIAL (ML) INTRAVENOUS DAILY PRN
Status: DISCONTINUED | OUTPATIENT
Start: 2025-07-08 | End: 2025-07-08 | Stop reason: HOSPADM

## 2025-07-08 RX ADMIN — DENOSUMAB 120 MG: 120 INJECTION SUBCUTANEOUS at 14:50

## 2025-07-08 ASSESSMENT — PAIN SCALES - GENERAL: PAINLEVEL_OUTOF10: MILD PAIN (3)

## 2025-07-08 NOTE — PROGRESS NOTES
Nursing Note:  Ronnie Lagos presents today for labs.    Patient seen by provider today: Yes: Daren   present during visit today: Not Applicable.    Note: N/A.    Intravenous Access:  Labs drawn without difficulty.  Implanted Port.    Discharge Plan:   Patient was sent to [facility] for provider appointment.    Alexandra Reyes RN

## 2025-07-08 NOTE — LETTER
7/8/2025      Ronnie Lagos  [address]  [facility] [address]      Dear Colleague,    Thank you for referring your patient, Ronnie Lagos, to the [facility] CANCER [facility]. Please see a copy of my visit note below.    [facility] Cancer Care    Hematology/Oncology Established Patient Note      Today's Date: 7/8/2025    Reason for visit: Metastatic prostate cancer.    HISTORY OF PRESENT ILLNESS: Ronnie Lagos is a 70 year old male with hypertension, GERD who presents with the following oncologic history:  1.  5/11/2023: PSA elevated at 8.08 (prior 3.44 from 5/10/22).  2. 6/29/2023: MRI pelvis -- PI-RADS 4 - 1.4 x 1 x 0.9 cm nodule abutting posterolateral capsule of prostate; no pelvic adenopathy or bone lesions.  3.  8/30/2023: Prostate biopsy - Sullivan 4+3=7 prostate adenocarcinoma.  4. 10/2/2023: NM bone scan negative.  5.  11/3/2023: Radical prostatectomy, pelvic lymph node excision -- Sullivan 4+5=9 adenocarcinoma, [id number], margins widely positive for malignancy.  6.  2/7/2024: PSA = 4.06.  7.  2/15/2024: PSA elevated at 5.26.  8.  2/28/2024: PSMA PET showed focal uptake in left side of prostatectomy bed suspicious for recurrent disease; multiple pelvic and few retroperitoneal lymph nodes with uptake; >10 sclerotic osseous lesions with uptake indicative of osseous metastatic disease.  9. 3/20/2024: Started Casodex, Lupron, darolutamide, Xgeva, cycle 1 Taxotere but developed flushing, chest heaviness, nausea (no dyspnea) reaction 5 minutes into Taxotere infusion. Re-challenge not attempted that day.  10.  3/29/2024: Re-challenged Taxotere but developed repeat hypersensitivity reaction 4 minutes into infusion. Taxotere discontinued.  11. 4/17/2024: PSA 0.53.  12. 7/10/2024: PSA 0.06.  13. 10/8/2024: PSA 0.02.  14. 1/28/2025: PSA 0.13.  15. 2/26/2025: CT C/A/P showed increased sclerosis of multiple bone metastases consistent with treatment response but several new apparent  sclerotic bone lesions may represent new metastases. Decrease size of subcentimeter pelvic lymph nodes. Resolution of focal enhancement within left prostatectomy bed. NM bone scan showed no evidence of bone metastasis.  16. 2/11/2025: Started cabazitazel/prednisone.  17. 5/27/2025: Cycle 6 cabazitaxel.  Discontinued thereafter due to hemorrhagic cystitis. PSA = 0.26.  18. 6/11/2025: CT A/P showed slightly worsening inflammatory changes surrounding the left kidney compared to the prior exam. No significant change in appearance of bilateral renal pelvis and proximal ureteral urothelial thickening with hyperenhancement. Stable multifocal sclerotic osseous metastases.  19. 6/18/2025: PSA = 0.3.  20. 6/11/2025-6/15/2025: Hospitalized for pyelonephritis and hemorrhagic cystitis.    INTERVAL HISTORY:  Ronnie reports resolution of hematuria and flank pain.  He reports lower back, shoulder, and knee pain has returned after completing course of prednisone.    REVIEW OF SYSTEMS:   14 point ROS was reviewed and is negative other than as noted above in HPI.       HOME MEDICATIONS:  Current Outpatient Medications   Medication Sig Dispense Refill     amLODIPine (NORVASC) 5 MG tablet Take 1 tablet (5 mg) by mouth daily. 90 tablet 3     atenolol (TENORMIN) 50 MG tablet Take 1 tablet (50 mg) by mouth daily. 90 tablet 3     calcium citrate-vitamin D (CITRACAL) 200-6.25 MG-MCG TABS per tablet Take 1 tablet by mouth 2 times daily.       omeprazole (PRILOSEC) 20 MG DR capsule Take 1 capsule (20 mg) by mouth daily. 90 capsule 3     predniSONE (DELTASONE) 10 MG tablet Take 10 mg by mouth daily.       traZODone (DESYREL) 100 MG tablet Take 0.5 tablets (50 mg) by mouth at bedtime. 30 tablet 3     venlafaxine (EFFEXOR XR) 37.5 MG 24 hr capsule Take 1 capsule (37.5 mg) by mouth daily. 90 capsule 3         ALLERGIES:  Allergies   Allergen Reactions     Taxotere [Docetaxel]          PAST MEDICAL HISTORY:  Past Medical History:   Diagnosis Date      Abdominal pain      Actinic keratosis      Basal cell carcinoma      Calculus of bile duct      Elevated liver enzymes      Essential hypertension, benign     abstracted 6/19/02     Gastro-oesophageal reflux disease      GERD (gastroesophageal reflux disease) 07/28/2008     Liver disease     elevated liver enzymes     Prostate cancer (H)     bone and lymph nodes 2024     Squamous cell carcinoma      Unspecified cerebral artery occlusion with cerebral infarction      Unspecified condition of brain     abstracted 6/19/02         PAST SURGICAL HISTORY:  Past Surgical History:   Procedure Laterality Date     ARTHRODESIS FOOT  2/5/2013    Procedure: ARTHRODESIS FOOT;  LEFT FIRST METATARSOPHALANGEAL JOINT ARTHRODESIS ;  Surgeon: Burton Loera DPM;  Location:  [facility]     COLONOSCOPY N/A 12/7/2018    Procedure: COMBINED COLONOSCOPY, SINGLE OR MULTIPLE BIOPSY/POLYPECTOMY BY BIOPSY;  Surgeon: Blayne Mora MD;  Location:  GI     COLONOSCOPY N/A 12/13/2024    Procedure: Colonoscopy;  Surgeon: Arti Mercedes MD;  Location:  GI     DAVINCI PROSTATECTOMY, LYMPHADENECTOMY N/A 11/3/2023    Procedure: PROSTATECTOMY, ROBOT-ASSISTED, WITH PELVIC LYMPHADENECTOMY;  Surgeon: Sophia Ramirez MD;  Location:  OR     ENDOSCOPIC RETROGRADE CHOLANGIOPANCREATOGRAM N/A 1/18/2018    Procedure: COMBINED ENDOSCOPIC RETROGRADE CHOLANGIOPANCREATOGRAPHY, SPHINCTEROTOMY;  ENDOSCOPIC RETROGRADE CHOLANGIOPANCREATOGRAM (ERCP), SPHINCTEROTOMY, STONE REMOVAL, STENT PLACEMENT;  Surgeon: Christiano Soresnon MD;  Location:  OR     ENDOSCOPIC RETROGRADE CHOLANGIOPANCREATOGRAM N/A 4/12/2018    Procedure: ENDOSCOPIC RETROGRADE CHOLANGIOPANCREATOGRAM;  ENDOSCOPIC RETROGRADE CHOLANIOPANCREATOGRAPHY AND ATTEMPTED STENT REMOVAL.;  Surgeon: Christiano Sorenson MD;  Location:  OR     ENDOSCOPIC RETROGRADE CHOLANGIOPANCREATOGRAM N/A 5/10/2018    Procedure: COMBINED ENDOSCOPIC RETROGRADE CHOLANGIOPANCREATOGRAPHY, REMOVE FOREIGN BODY OR  STENT/TUBE;  ENDOSCOPIC RETROGRADE CHOLANGIOPANCREATOGRAPHY AND STENT REMOVAL;  Surgeon: Christiano Sorenson MD;  Location:  OR     ESOPHAGOSCOPY, GASTROSCOPY, DUODENOSCOPY (EGD), COMBINED N/A 4/12/2018    Procedure: COMBINED ESOPHAGOSCOPY, GASTROSCOPY, DUODENOSCOPY (EGD);  EGD with stent removal;  Surgeon: Christiano Sorenson MD;  Location:  GI     IR CHEST PORT PLACEMENT > 5 YRS OF AGE  4/11/2025     LAPAROSCOPIC CHOLECYSTECTOMY N/A 5/1/2015    Procedure: LAPAROSCOPIC CHOLECYSTECTOMY;  Surgeon: Damien Galindo MD;  Location:  [facility]     ORTHOPEDIC SURGERY      left elbow, right shoulder         SOCIAL HISTORY:  Social History     Socioeconomic History     Marital status:      Spouse name: Not on file     Number of children: Not on file     Years of education: Not on file     Highest education level: Not on file   Occupational History     Employer: [employer]   Tobacco Use     Smoking status: Never     Smokeless tobacco: Never   Vaping Use     Vaping status: Never Used   Substance and Sexual Activity     Alcohol use: Yes     Alcohol/week: 2.0 standard drinks of alcohol     Types: 2 Standard drinks or equivalent per week     Drug use: Never     Sexual activity: Yes     Partners: Female   Other Topics Concern     Parent/sibling w/ CABG, MI or angioplasty before 65F 55M? No   Social History Narrative     Not on file     Social Drivers of Health     Financial Resource Strain: Low Risk  (6/11/2025)    Financial Resource Strain      Within the past 12 months, have you or your family members you live with been unable to get utilities (heat, electricity) when it was really needed?: No   Food Insecurity: Low Risk  (6/11/2025)    Food Insecurity      Within the past 12 months, did you worry that your food would run out before you got money to buy more?: No      Within the past 12 months, did the food you bought just not last and you didn t have money to get more?: No    Transportation Needs: Low Risk  (6/11/2025)    Transportation Needs      Within the past 12 months, has lack of transportation kept you from medical appointments, getting your medicines, non-medical meetings or appointments, work, or from getting things that you need?: No   Physical Activity: Insufficiently Active (6/5/2025)    Exercise Vital Sign      Days of Exercise per Week: 3 days      Minutes of Exercise per Session: 10 min   Stress: Stress Concern Present (6/5/2025)    [employer] - Occupational Stress Questionnaire      Feeling of Stress : Rather much   Social Connections: Unknown (6/5/2025)    Social Connection and Isolation Panel [NHANES]      Frequency of Communication with Friends and Family: Not on file      Frequency of Social Gatherings with Friends and Family: Once a week      Attends Scientologist Services: Not on file      Active Member of Clubs or Organizations: Not on file      Attends Club or Organization Meetings: Not on file      Marital Status: Not on file   Interpersonal Safety: Low Risk  (6/11/2025)    Interpersonal Safety      Do you feel physically and emotionally safe where you currently live?: Yes      Within the past 12 months, have you been hit, slapped, kicked or otherwise physically hurt by someone?: No      Within the past 12 months, have you been humiliated or emotionally abused in other ways by your partner or ex-partner?: No   Housing Stability: Low Risk  (6/11/2025)    Housing Stability      Do you have housing? : Yes      Are you worried about losing your housing?: No         FAMILY HISTORY:  Family History   Problem Relation Age of Onset     Hypertension Mother      Cancer - colorectal Mother      Skin Cancer Mother      Colon Cancer Mother      Hypertension Father      Prostate Cancer Father      Melanoma No family hx of      Anesthesia Reaction No family hx of      Venous thrombosis No family hx of      Bleeding Disorder No family hx of    Father  had prostate, liver, and skin cancer. Mother with colorectal and skin cancer.      PHYSICAL EXAM:  Vital signs:  BP (!) 178/94   Pulse 63   Temp 98.1  F (36.7  C) (Oral)   Resp 16   Wt 105.9 kg (233 lb 6.4 oz)   SpO2 97%   BMI 35.49 kg/m     ECO  GENERAL: No acute distress.  EYES: No scleral icterus. No overt erythema.  RESPIRATORY: No audible cough, wheezing, or labored breathing.  MUSCULOSKELETAL: Range of motion in the neck, shoulders, and arms appear normal.  SKIN: Petechial rash over bilateral lower extremities.  NEUROLOGIC: Alert.  No overt tremors.  PSYCHIATRIC: Normal affect and mood.  Does not appear anxious.       LABS:  CBC RESULTS:   Recent Labs   Lab Test 25  1338   WBC 7.2   RBC 4.05*   HGB 12.3*   HCT 34.7*   MCV 86   MCH 30.4   MCHC 35.4   RDW 13.3           Last Comprehensive Metabolic Panel:  Sodium   Date Value Ref Range Status   2025 137 135 - 145 mmol/L Final   2021 133 133 - 144 mmol/L Final     Potassium   Date Value Ref Range Status   2025 4.8 3.4 - 5.3 mmol/L Final   05/10/2022 4.6 3.4 - 5.3 mmol/L Final   2021 4.4 3.4 - 5.3 mmol/L Final     Chloride   Date Value Ref Range Status   2025 103 98 - 107 mmol/L Final   05/10/2022 99 94 - 109 mmol/L Final   2021 101 94 - 109 mmol/L Final     Carbon Dioxide   Date Value Ref Range Status   2021 31 20 - 32 mmol/L Final     Carbon Dioxide (CO2)   Date Value Ref Range Status   2025 26 22 - 29 mmol/L Final   05/10/2022 28 20 - 32 mmol/L Final     Anion Gap   Date Value Ref Range Status   2025 8 7 - 15 mmol/L Final   05/10/2022 4 3 - 14 mmol/L Final   2021 1 (L) 3 - 14 mmol/L Final     Glucose   Date Value Ref Range Status   2025 86 70 - 99 mg/dL Final   05/10/2022 112 (H) 70 - 99 mg/dL Final   2021 110 (H) 70 - 99 mg/dL Final     Urea Nitrogen   Date Value Ref Range Status   2025 13.0 8.0 - 23.0 mg/dL Final   05/10/2022 10 7 - 30 mg/dL Final   2021  8 7 - 30 mg/dL Final     Creatinine   Date Value Ref Range Status   06/18/2025 0.95 0.67 - 1.17 mg/dL Final   04/06/2021 0.92 0.66 - 1.25 mg/dL Final     GFR Estimate   Date Value Ref Range Status   06/18/2025 87 >60 mL/min/1.73m2 Final     Comment:     eGFR calculated using 2021 CKD-EPI equation.   04/06/2021 87 >60 mL/min/[1.73_m2] Final     Comment:     Non  GFR Calc  Starting 12/18/2018, serum creatinine based estimated GFR (eGFR) will be   calculated using the Chronic Kidney Disease Epidemiology Collaboration   (CKD-EPI) equation.       GFR, ESTIMATED POCT   Date Value Ref Range Status   02/28/2024 >60 >60 mL/min/1.73m2 Final     Calcium   Date Value Ref Range Status   06/18/2025 9.2 8.8 - 10.4 mg/dL Final   04/06/2021 9.3 8.5 - 10.1 mg/dL Final     Bilirubin Total   Date Value Ref Range Status   06/18/2025 0.7 <=1.2 mg/dL Final   04/06/2021 2.3 (H) 0.2 - 1.3 mg/dL Final     Alkaline Phosphatase   Date Value Ref Range Status   06/18/2025 43 40 - 150 U/L Final   04/06/2021 60 40 - 150 U/L Final     ALT   Date Value Ref Range Status   06/18/2025 27 0 - 70 U/L Final   04/06/2021 70 0 - 70 U/L Final     AST   Date Value Ref Range Status   06/18/2025 34 0 - 45 U/L Final   04/06/2021 66 (H) 0 - 45 U/L Final     PSA   Date Value Ref Range Status   04/06/2021 1.51 0 - 4 ug/L Final     Comment:     Assay Method:  Chemiluminescence using Siemens Vista analyzer     Prostate Specific Antigen Screen   Date Value Ref Range Status   05/10/2022 3.44 0.00 - 4.00 ug/L Final     PSA Tumor Marker   Date Value Ref Range Status   06/18/2025 0.30 0.00 - 4.50 ng/mL Final        ASSESSMENT/PLAN:  Ronnie Lagos is a 70 year old male with the following issues:  1. 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 Hypertension  5. Hypersensitivity reaction to Taxotere  --2/28/2024 PSMA PET showed metastatic disease to the  pelvic and retroperitoneal lymph nodes as well as greater than 10 sites of bony metastatic disease. I showed him the images today.  --Given high volume disease, he started Casodex 50 mg daily for first month, Lupron every 3 months, darolutamide 600 mg orally BID, and tried Taxotere twice but developed hypersensitivity reaction only 4-5 minutes into infusion.  Therefore, I had recommended discontinuation of Taxotere.    --His PSA then increased further to 0.13 on 1/28/2025 consistent with biochemical progression with concomitant increase in bone pain.   --2/26/2025 CT chest/abdomen/pelvis showed several new sclerotic bone lesions but NM bone scan negative for bone metastases.  --Started cabazitaxel infusion on 2/11/2025 with 10 mg prednisone daily. Discontinued darolutamide.  --Cabazitaxel discontinued after cycle 6 due to hemorrhagic cystitis.  --PSA now increased slightly from 0.26 to 0.3.  --Discussed continued observation for next 3 months versus consideration for Pluvicto and switch to enzalutamide upon further progression. For now, I recommended observation and return in 1 month as scheduled with repeat PSA.  Discussed his bone pain is likely from arthritis.  Encouraged use of Tylenol and intermittent ibuprofen for pain control.  --Continue denosumab (Xgeva) -- will change to every 4 weeks.  --Will continue to monitor CBC, CMP, and PSA monthly. Will repeat PSMA PET scan after next visit if symptomatic from metastatic prostate cancer with increase in PSA level.    5. 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    6. Left shoulder pain  --Related to post-arthroplasty pain.  Continue Tylenol as needed.    7. Left upper molar pain  --He may need bone implant/grafting in the future and will hold Xgeva for 3 months if needed.  He might be able to do a less invasive option with a flipper.    8. Right knee osteoarthritis  --He will discuss right  "knee arthroplasty versus shaving the right knee cap with orthopedics.    9. Right rib pain related to multiple right-sided rib fractures.  --Has known metastatic disease to ribs/bones.  --8/7/2024 Rib x-rays showed multiple indeterminate chronicity right rib fractures.  --Continue conservative management. Is not requiring analgesics for this.    10. Insomnia  11. Male hot flashes  --Multifactorially related to joint pain and hot flashes.  --Not alleviated with CBD, THC gummies, or melatonin.  --Suggested acupuncture but does not like needles.  --Offered venlafaxine -- discussed potential adverse effects and he agrees to try it.  --Also encouraged moderate exercise and plant based diet, eating less sugar.    Anabell Mercedes MD  [facility] Hematology/Oncology     Total time spent today: 40 minutes in chart review, patient evaluation, counseling, documentation, test and/or medication/prescription orders, and coordination of care.      The longitudinal plan of care for the diagnosis(es)/condition(s) as documented were addressed during this visit. Due to the added complexity in care, I will continue to support Ronnie in the subsequent management and with ongoing continuity of care.      Oncology Rooming Note    July 8, 2025 2:04 PM   Ronnie Lagos is a 70 year old male who presents for:    Chief Complaint   Patient presents with     Oncology Clinic Visit     Initial Vitals: BP (!) 178/94   Pulse 63   Temp 98.1  F (36.7  C) (Oral)   Resp 16   Wt 105.9 kg (233 lb 6.4 oz)   SpO2 97%   BMI 35.49 kg/m   Estimated body mass index is 35.49 kg/m  as calculated from the following:    Height as of 6/18/25: 1.727 m (5' 8\").    Weight as of this encounter: 105.9 kg (233 lb 6.4 oz). Body surface area is 2.25 meters squared.  Mild Pain (3) Comment: Data Unavailable   No LMP for male patient.  Allergies reviewed:Yes   Medications reviewed: Yes    Medications: Medication refills not needed today.  Pharmacy name entered into " EPIC:    [id number] - [place], [place] - [address] [facility] - [place], [place] - [address]    Frailty Screening:   Is the patient here for a new oncology consult visit in cancer care? 2. No    PHQ9:  Did this patient require a PHQ9?: No      Clinical concerns: rash on lower right leg Dr. Mercedes  was notified.      Shari J. Schoenberger, [facility]                Again, thank you for allowing me to participate in the care of your patient.        Sincerely,        Anabell Mercedes MD    Electronically signed

## 2025-07-08 NOTE — PROGRESS NOTES
"Oncology Rooming Note    July 8, 2025 2:04 PM   Ronnie Lagos is a 70 year old male who presents for:    Chief Complaint   Patient presents with    Oncology Clinic Visit     Initial Vitals: BP (!) 178/94   Pulse 63   Temp 98.1  F (36.7  C) (Oral)   Resp 16   Wt 105.9 kg (233 lb 6.4 oz)   SpO2 97%   BMI 35.49 kg/m   Estimated body mass index is 35.49 kg/m  as calculated from the following:    Height as of 6/18/25: 1.727 m (5' 8\").    Weight as of this encounter: 105.9 kg (233 lb 6.4 oz). Body surface area is 2.25 meters squared.  Mild Pain (3) Comment: Data Unavailable   No LMP for male patient.  Allergies reviewed:Yes   Medications reviewed: Yes    Medications: Medication refills not needed today.  Pharmacy name entered into [employer]:    [facility][id number] - [place], [place] - [address] [facility] - [place], [place] - [address]     Frailty Screening:   Is the patient here for a new oncology consult visit in cancer care? 2. No    PHQ9:  Did this patient require a PHQ9?: No      Clinical concerns: rash on lower right leg Dr. Mercedes  was notified.      Shari J. Schoenberger, [facility]              "

## 2025-07-31 ENCOUNTER — LAB REQUISITION (OUTPATIENT)
Dept: LAB | Facility: CLINIC | Age: 70
End: 2025-07-31
Payer: COMMERCIAL

## 2025-08-04 ENCOUNTER — INFUSION THERAPY VISIT (OUTPATIENT)
Dept: INFUSION THERAPY | Facility: CLINIC | Age: 70
End: 2025-08-04
Attending: INTERNAL MEDICINE
Payer: COMMERCIAL

## 2025-08-04 ENCOUNTER — ONCOLOGY VISIT (OUTPATIENT)
Dept: ONCOLOGY | Facility: CLINIC | Age: 70
End: 2025-08-04
Attending: INTERNAL MEDICINE
Payer: COMMERCIAL

## 2025-08-04 VITALS
WEIGHT: 232 LBS | RESPIRATION RATE: 16 BRPM | HEIGHT: 68 IN | OXYGEN SATURATION: 99 % | DIASTOLIC BLOOD PRESSURE: 81 MMHG | SYSTOLIC BLOOD PRESSURE: 155 MMHG | HEART RATE: 61 BPM | BODY MASS INDEX: 35.16 KG/M2

## 2025-08-04 DIAGNOSIS — C79.51 PROSTATE CANCER METASTATIC TO BONE (H): Primary | ICD-10-CM

## 2025-08-04 DIAGNOSIS — M25.50 POLYARTHRALGIA: ICD-10-CM

## 2025-08-04 DIAGNOSIS — C61 PROSTATE CANCER METASTATIC TO BONE (H): Primary | ICD-10-CM

## 2025-08-04 DIAGNOSIS — R23.2 HOT FLASH IN MALE: ICD-10-CM

## 2025-08-04 DIAGNOSIS — C61 PROSTATE CANCER METASTATIC TO MULTIPLE SITES (H): ICD-10-CM

## 2025-08-04 LAB
ALBUMIN SERPL BCG-MCNC: 4.1 G/DL (ref 3.5–5.2)
ALP SERPL-CCNC: 49 U/L (ref 40–150)
ALT SERPL W P-5'-P-CCNC: 17 U/L (ref 0–70)
ANION GAP SERPL CALCULATED.3IONS-SCNC: 10 MMOL/L (ref 7–15)
AST SERPL W P-5'-P-CCNC: 29 U/L (ref 0–45)
BASOPHILS # BLD AUTO: 0 10E3/UL (ref 0–0.2)
BASOPHILS NFR BLD AUTO: 1 %
BILIRUB SERPL-MCNC: 1.3 MG/DL
BUN SERPL-MCNC: 12.8 MG/DL (ref 8–23)
CALCIUM SERPL-MCNC: 9.1 MG/DL (ref 8.8–10.4)
CHLORIDE SERPL-SCNC: 103 MMOL/L (ref 98–107)
CREAT SERPL-MCNC: 1.1 MG/DL (ref 0.67–1.17)
EGFRCR SERPLBLD CKD-EPI 2021: 73 ML/MIN/1.73M2
EOSINOPHIL # BLD AUTO: 0.4 10E3/UL (ref 0–0.7)
EOSINOPHIL NFR BLD AUTO: 4 %
ERYTHROCYTE [DISTWIDTH] IN BLOOD BY AUTOMATED COUNT: 13.1 % (ref 10–15)
GLUCOSE SERPL-MCNC: 105 MG/DL (ref 70–99)
HCO3 SERPL-SCNC: 26 MMOL/L (ref 22–29)
HCT VFR BLD AUTO: 36.2 % (ref 40–53)
HGB BLD-MCNC: 12.7 G/DL (ref 13.3–17.7)
IMM GRANULOCYTES # BLD: 0 10E3/UL
IMM GRANULOCYTES NFR BLD: 0 %
LYMPHOCYTES # BLD AUTO: 1.4 10E3/UL (ref 0.8–5.3)
LYMPHOCYTES NFR BLD AUTO: 17 %
MCH RBC QN AUTO: 30.8 PG (ref 26.5–33)
MCHC RBC AUTO-ENTMCNC: 35.1 G/DL (ref 31.5–36.5)
MCV RBC AUTO: 88 FL (ref 78–100)
MONOCYTES # BLD AUTO: 0.7 10E3/UL (ref 0–1.3)
MONOCYTES NFR BLD AUTO: 8 %
NEUTROPHILS # BLD AUTO: 5.7 10E3/UL (ref 1.6–8.3)
NEUTROPHILS NFR BLD AUTO: 70 %
NRBC # BLD AUTO: 0 10E3/UL
NRBC BLD AUTO-RTO: 0 /100
PLATELET # BLD AUTO: 234 10E3/UL (ref 150–450)
POTASSIUM SERPL-SCNC: 4.7 MMOL/L (ref 3.4–5.3)
PROT SERPL-MCNC: 6.9 G/DL (ref 6.4–8.3)
PSA SERPL DL<=0.01 NG/ML-MCNC: 0.31 NG/ML (ref 0–4.5)
RBC # BLD AUTO: 4.13 10E6/UL (ref 4.4–5.9)
SODIUM SERPL-SCNC: 139 MMOL/L (ref 135–145)
WBC # BLD AUTO: 8.2 10E3/UL (ref 4–11)

## 2025-08-04 PROCEDURE — 99214 OFFICE O/P EST MOD 30 MIN: CPT | Performed by: INTERNAL MEDICINE

## 2025-08-04 PROCEDURE — 85004 AUTOMATED DIFF WBC COUNT: CPT | Performed by: INTERNAL MEDICINE

## 2025-08-04 PROCEDURE — 36591 DRAW BLOOD OFF VENOUS DEVICE: CPT

## 2025-08-04 PROCEDURE — 84153 ASSAY OF PSA TOTAL: CPT | Performed by: INTERNAL MEDICINE

## 2025-08-04 PROCEDURE — G2211 COMPLEX E/M VISIT ADD ON: HCPCS | Performed by: INTERNAL MEDICINE

## 2025-08-04 PROCEDURE — G0463 HOSPITAL OUTPT CLINIC VISIT: HCPCS | Mod: 25 | Performed by: INTERNAL MEDICINE

## 2025-08-04 PROCEDURE — 84155 ASSAY OF PROTEIN SERUM: CPT | Performed by: INTERNAL MEDICINE

## 2025-08-04 PROCEDURE — 96372 THER/PROPH/DIAG INJ SC/IM: CPT | Performed by: INTERNAL MEDICINE

## 2025-08-04 PROCEDURE — 250N000011 HC RX IP 250 OP 636: Mod: JZ | Performed by: INTERNAL MEDICINE

## 2025-08-04 PROCEDURE — 250N000011 HC RX IP 250 OP 636: Performed by: INTERNAL MEDICINE

## 2025-08-04 RX ORDER — HEPARIN SODIUM (PORCINE) LOCK FLUSH IV SOLN 100 UNIT/ML 100 UNIT/ML
5 SOLUTION INTRAVENOUS
OUTPATIENT
Start: 2025-08-04

## 2025-08-04 RX ORDER — METHYLPREDNISOLONE SODIUM SUCCINATE 125 MG/2ML
125 INJECTION INTRAMUSCULAR; INTRAVENOUS
Start: 2025-08-19

## 2025-08-04 RX ORDER — HEPARIN SODIUM,PORCINE 10 UNIT/ML
5-20 VIAL (ML) INTRAVENOUS DAILY PRN
OUTPATIENT
Start: 2025-08-04

## 2025-08-04 RX ORDER — EPINEPHRINE 1 MG/ML
0.3 INJECTION, SOLUTION INTRAMUSCULAR; SUBCUTANEOUS EVERY 5 MIN PRN
OUTPATIENT
Start: 2025-08-19

## 2025-08-04 RX ORDER — ALBUTEROL SULFATE 0.83 MG/ML
2.5 SOLUTION RESPIRATORY (INHALATION)
OUTPATIENT
Start: 2025-08-19

## 2025-08-04 RX ORDER — MEPERIDINE HYDROCHLORIDE 25 MG/ML
25 INJECTION INTRAMUSCULAR; INTRAVENOUS; SUBCUTANEOUS EVERY 30 MIN PRN
OUTPATIENT
Start: 2025-08-19

## 2025-08-04 RX ORDER — ALBUTEROL SULFATE 90 UG/1
1-2 INHALANT RESPIRATORY (INHALATION)
Start: 2025-08-19

## 2025-08-04 RX ORDER — HEPARIN SODIUM (PORCINE) LOCK FLUSH IV SOLN 100 UNIT/ML 100 UNIT/ML
5 SOLUTION INTRAVENOUS
Status: DISCONTINUED | OUTPATIENT
Start: 2025-08-04 | End: 2025-08-04 | Stop reason: HOSPADM

## 2025-08-04 RX ORDER — DIPHENHYDRAMINE HYDROCHLORIDE 50 MG/ML
50 INJECTION, SOLUTION INTRAMUSCULAR; INTRAVENOUS
Start: 2025-08-19

## 2025-08-04 RX ADMIN — DENOSUMAB 120 MG: 120 INJECTION SUBCUTANEOUS at 16:29

## 2025-08-04 RX ADMIN — Medication 5 ML: at 14:23

## 2025-08-04 ASSESSMENT — PAIN SCALES - GENERAL: PAINLEVEL_OUTOF10: MODERATE PAIN (4)

## 2025-08-16 ENCOUNTER — HEALTH MAINTENANCE LETTER (OUTPATIENT)
Age: 70
End: 2025-08-16

## 2025-08-20 LAB
BKR LAB AP ADD'L TEST STATUS: NORMAL
BKR PATH ADDL TEST FINAL COMMENTS: NORMAL

## 2025-09-02 ENCOUNTER — INFUSION THERAPY VISIT (OUTPATIENT)
Dept: INFUSION THERAPY | Facility: CLINIC | Age: 70
End: 2025-09-02
Attending: INTERNAL MEDICINE
Payer: COMMERCIAL

## 2025-09-02 VITALS
TEMPERATURE: 97.6 F | RESPIRATION RATE: 16 BRPM | DIASTOLIC BLOOD PRESSURE: 82 MMHG | OXYGEN SATURATION: 96 % | SYSTOLIC BLOOD PRESSURE: 163 MMHG | WEIGHT: 232.1 LBS | BODY MASS INDEX: 35.18 KG/M2 | HEIGHT: 68 IN | HEART RATE: 52 BPM

## 2025-09-02 DIAGNOSIS — C79.51 PROSTATE CANCER METASTATIC TO BONE (H): Primary | ICD-10-CM

## 2025-09-02 DIAGNOSIS — C61 PROSTATE CANCER METASTATIC TO BONE (H): Primary | ICD-10-CM

## 2025-09-02 DIAGNOSIS — C61 PROSTATE CANCER METASTATIC TO MULTIPLE SITES (H): ICD-10-CM

## 2025-09-02 LAB
ALBUMIN SERPL BCG-MCNC: 4.1 G/DL (ref 3.5–5.2)
ALP SERPL-CCNC: 49 U/L (ref 40–150)
ALT SERPL W P-5'-P-CCNC: 15 U/L (ref 0–70)
ANION GAP SERPL CALCULATED.3IONS-SCNC: 11 MMOL/L (ref 7–15)
AST SERPL W P-5'-P-CCNC: 25 U/L (ref 0–45)
BASOPHILS # BLD AUTO: 0.04 10E3/UL (ref 0–0.2)
BASOPHILS NFR BLD AUTO: 0.7 %
BILIRUB SERPL-MCNC: 0.8 MG/DL
BUN SERPL-MCNC: 13.5 MG/DL (ref 8–23)
CALCIUM SERPL-MCNC: 9.3 MG/DL (ref 8.8–10.4)
CALCIUM SERPL-MCNC: 9.3 MG/DL (ref 8.8–10.4)
CHLORIDE SERPL-SCNC: 100 MMOL/L (ref 98–107)
CREAT SERPL-MCNC: 0.94 MG/DL (ref 0.67–1.17)
EGFRCR SERPLBLD CKD-EPI 2021: 88 ML/MIN/1.73M2
EOSINOPHIL # BLD AUTO: 0.28 10E3/UL (ref 0–0.7)
EOSINOPHIL NFR BLD AUTO: 5 %
ERYTHROCYTE [DISTWIDTH] IN BLOOD BY AUTOMATED COUNT: 12.6 % (ref 10–15)
GLUCOSE SERPL-MCNC: 169 MG/DL (ref 70–99)
HCO3 SERPL-SCNC: 25 MMOL/L (ref 22–29)
HCT VFR BLD AUTO: 35.7 % (ref 40–53)
HGB BLD-MCNC: 12.6 G/DL (ref 13.3–17.7)
IMM GRANULOCYTES # BLD: <0.03 10E3/UL
IMM GRANULOCYTES NFR BLD: 0.4 %
LYMPHOCYTES # BLD AUTO: 1.09 10E3/UL (ref 0.8–5.3)
LYMPHOCYTES NFR BLD AUTO: 19.5 %
MCH RBC QN AUTO: 29.4 PG (ref 26.5–33)
MCHC RBC AUTO-ENTMCNC: 35.3 G/DL (ref 31.5–36.5)
MCV RBC AUTO: 83.2 FL (ref 78–100)
MONOCYTES # BLD AUTO: 0.35 10E3/UL (ref 0–1.3)
MONOCYTES NFR BLD AUTO: 6.3 %
NEUTROPHILS # BLD AUTO: 3.81 10E3/UL (ref 1.6–8.3)
NEUTROPHILS NFR BLD AUTO: 68.1 %
NRBC # BLD AUTO: <0.03 10E3/UL
NRBC BLD AUTO-RTO: 0 /100
PHOSPHATE SERPL-MCNC: 4.2 MG/DL (ref 2.5–4.5)
PLATELET # BLD AUTO: 168 10E3/UL (ref 150–450)
POTASSIUM SERPL-SCNC: 4.3 MMOL/L (ref 3.4–5.3)
PROT SERPL-MCNC: 6.8 G/DL (ref 6.4–8.3)
PSA SERPL DL<=0.01 NG/ML-MCNC: 0.34 NG/ML (ref 0–4.5)
RBC # BLD AUTO: 4.29 10E6/UL (ref 4.4–5.9)
SODIUM SERPL-SCNC: 136 MMOL/L (ref 135–145)
WBC # BLD AUTO: 5.59 10E3/UL (ref 4–11)

## 2025-09-02 PROCEDURE — 84100 ASSAY OF PHOSPHORUS: CPT | Performed by: INTERNAL MEDICINE

## 2025-09-02 PROCEDURE — 85004 AUTOMATED DIFF WBC COUNT: CPT | Performed by: INTERNAL MEDICINE

## 2025-09-02 PROCEDURE — 82310 ASSAY OF CALCIUM: CPT | Performed by: INTERNAL MEDICINE

## 2025-09-02 PROCEDURE — 96402 CHEMO HORMON ANTINEOPL SQ/IM: CPT

## 2025-09-02 PROCEDURE — 36591 DRAW BLOOD OFF VENOUS DEVICE: CPT

## 2025-09-02 PROCEDURE — 84153 ASSAY OF PSA TOTAL: CPT | Performed by: INTERNAL MEDICINE

## 2025-09-02 PROCEDURE — 250N000011 HC RX IP 250 OP 636: Mod: JZ | Performed by: INTERNAL MEDICINE

## 2025-09-02 PROCEDURE — 96372 THER/PROPH/DIAG INJ SC/IM: CPT | Performed by: INTERNAL MEDICINE

## 2025-09-02 PROCEDURE — 82040 ASSAY OF SERUM ALBUMIN: CPT | Performed by: INTERNAL MEDICINE

## 2025-09-02 RX ORDER — ALBUTEROL SULFATE 90 UG/1
1-2 INHALANT RESPIRATORY (INHALATION)
Start: 2025-10-01

## 2025-09-02 RX ORDER — MEPERIDINE HYDROCHLORIDE 25 MG/ML
25 INJECTION INTRAMUSCULAR; INTRAVENOUS; SUBCUTANEOUS EVERY 30 MIN PRN
OUTPATIENT
Start: 2025-10-01

## 2025-09-02 RX ORDER — DIPHENHYDRAMINE HYDROCHLORIDE 50 MG/ML
50 INJECTION INTRAMUSCULAR; INTRAVENOUS
Start: 2025-10-01

## 2025-09-02 RX ORDER — METHYLPREDNISOLONE SODIUM SUCCINATE 125 MG/2ML
125 INJECTION INTRAMUSCULAR; INTRAVENOUS
Start: 2025-10-01

## 2025-09-02 RX ORDER — METHOCARBAMOL 500 MG/1
500 TABLET, FILM COATED ORAL 4 TIMES DAILY PRN
COMMUNITY

## 2025-09-02 RX ORDER — EPINEPHRINE 1 MG/ML
0.3 INJECTION, SOLUTION INTRAMUSCULAR; SUBCUTANEOUS EVERY 5 MIN PRN
OUTPATIENT
Start: 2025-10-01

## 2025-09-02 RX ORDER — ALBUTEROL SULFATE 0.83 MG/ML
2.5 SOLUTION RESPIRATORY (INHALATION)
OUTPATIENT
Start: 2025-10-01

## 2025-09-02 RX ORDER — HEPARIN SODIUM,PORCINE 10 UNIT/ML
5-20 VIAL (ML) INTRAVENOUS DAILY PRN
OUTPATIENT
Start: 2025-09-02

## 2025-09-02 RX ORDER — HEPARIN SODIUM (PORCINE) LOCK FLUSH IV SOLN 100 UNIT/ML 100 UNIT/ML
5 SOLUTION INTRAVENOUS
Status: DISCONTINUED | OUTPATIENT
Start: 2025-09-02 | End: 2025-09-02 | Stop reason: HOSPADM

## 2025-09-02 RX ORDER — HEPARIN SODIUM (PORCINE) LOCK FLUSH IV SOLN 100 UNIT/ML 100 UNIT/ML
5 SOLUTION INTRAVENOUS
OUTPATIENT
Start: 2025-09-02

## 2025-09-02 RX ADMIN — LEUPROLIDE ACETATE 22.5 MG: KIT at 09:22

## 2025-09-02 RX ADMIN — DENOSUMAB 120 MG: 120 INJECTION SUBCUTANEOUS at 09:22

## 2025-09-02 RX ADMIN — Medication 5 ML: at 08:48

## (undated) DEVICE — DRAIN JACKSON PRATT RESERVOIR 100ML SU130-1305

## (undated) DEVICE — CLIP ENDO HEMO-LOC PURPLE LG 544240

## (undated) DEVICE — SURGICEL HEMOSTAT 2X14" 1951

## (undated) DEVICE — PACK DAVINCI UROLOGY SBA15UDFSG

## (undated) DEVICE — GLOVE BIOGEL PI MICRO INDICATOR UNDERGLOVE SZ 7.5 48975

## (undated) DEVICE — RAD RX ISOVUE 300 (50ML) 61% IOPAMIDOL CHARGE PER ML

## (undated) DEVICE — GLOVE BIOGEL PI MICRO SZ 7.0 48570

## (undated) DEVICE — SOL NACL 0.9% IRRIG 1000ML BOTTLE 07138-09

## (undated) DEVICE — DECANTER BAG 2002S

## (undated) DEVICE — SU ETHILON 2-0 FS 18" 664G

## (undated) DEVICE — SPONGE RAY-TEC 4X8" 7318

## (undated) DEVICE — DRAIN JACKSON PRATT CHANNEL 19FR ROUND HUBLESS SIL JP-2230

## (undated) DEVICE — SU WND CLOSURE V-LOC 3-0 CV-23 6" VLOCM1904

## (undated) DEVICE — SU VICRYL 0 UR-6 27" J603H

## (undated) DEVICE — NDL CANNULA INTERLINK BLUNT 18GA

## (undated) DEVICE — RX SURGIFLO HEMOSTATIC MATRIX W/THROMBIN 8ML 2994

## (undated) DEVICE — SOL NACL 0.9% INJ 1000ML BAG 2B1324X

## (undated) DEVICE — TUBING CONMED AIRSEAL SMOKE EVAC INSUFFLATION ASM-EVAC

## (undated) DEVICE — PROTECTOR ARM ONE-STEP TRENDELENBURG 40418

## (undated) DEVICE — ENDO TROCAR CONMED AIRSEAL BLADELESS 12X120MM IAS12-120LP

## (undated) DEVICE — DAVINCI HOT SHEARS TIP COVER  400180

## (undated) DEVICE — SOL NACL 0.9% IRRIG 1000ML BOTTLE 2F7124

## (undated) DEVICE — DAVINCI XI DRAPE COLUMN 470341

## (undated) DEVICE — SU WND CLOSURE VLOC 90 ABS 3-0 VIOLET 6" CV-23 VLOCM0804

## (undated) DEVICE — DAVINCI XI SEAL UNIVERSAL 5-8MM 470361

## (undated) DEVICE — SU WND CLOSURE VLOC 180 ABS 2-0 12" GS-21 VLOCL0315

## (undated) DEVICE — CATH FOLEY 18FR 5ML LATEX 0165SI18

## (undated) DEVICE — DAVINCI XI DRAPE ARM 470015

## (undated) DEVICE — NDL INSUFFLATION 13GA 120MM C2201

## (undated) DEVICE — LINEN TOWEL PACK X5 5464

## (undated) DEVICE — SU MONOCRYL 4-0 PS-2 18" UND Y496G

## (undated) DEVICE — SYSTEM LAPAROVUE VISIBILITY LAPVUE10

## (undated) DEVICE — TROCAR AIRSEAL BLADELESS 12X120MM IAS12-120

## (undated) DEVICE — ESU GROUND PAD ADULT W/CORD E7507

## (undated) DEVICE — DRSG GAUZE 4X4" 3033

## (undated) DEVICE — SOL WATER IRRIG 1000ML BOTTLE 2F7114

## (undated) DEVICE — KIT PATIENT POSITIONING PIGAZZI LATEX FREE 40580

## (undated) DEVICE — SUCTION IRR STRYKERFLOW II W/TIP 250-070-520

## (undated) DEVICE — ENDO POUCH UNIV RETRIEVAL SYSTEM INZII 10MM CD001

## (undated) DEVICE — SU WND CLOSURE VLOC 180 ABS 2-0 9" GS-21 VLOCL0345

## (undated) RX ORDER — FENTANYL CITRATE 50 UG/ML
INJECTION, SOLUTION INTRAMUSCULAR; INTRAVENOUS
Status: DISPENSED
Start: 2018-04-12

## (undated) RX ORDER — PROPOFOL 10 MG/ML
INJECTION, EMULSION INTRAVENOUS
Status: DISPENSED
Start: 2018-05-10

## (undated) RX ORDER — LIDOCAINE HYDROCHLORIDE 10 MG/ML
INJECTION, SOLUTION EPIDURAL; INFILTRATION; INTRACAUDAL; PERINEURAL
Status: DISPENSED
Start: 2025-04-11

## (undated) RX ORDER — HYDROMORPHONE HYDROCHLORIDE 1 MG/ML
INJECTION, SOLUTION INTRAMUSCULAR; INTRAVENOUS; SUBCUTANEOUS
Status: DISPENSED
Start: 2023-11-03

## (undated) RX ORDER — DEXAMETHASONE SODIUM PHOSPHATE 4 MG/ML
INJECTION, SOLUTION INTRA-ARTICULAR; INTRALESIONAL; INTRAMUSCULAR; INTRAVENOUS; SOFT TISSUE
Status: DISPENSED
Start: 2018-01-18

## (undated) RX ORDER — PROPOFOL 10 MG/ML
INJECTION, EMULSION INTRAVENOUS
Status: DISPENSED
Start: 2018-01-18

## (undated) RX ORDER — FENTANYL CITRATE 50 UG/ML
INJECTION, SOLUTION INTRAMUSCULAR; INTRAVENOUS
Status: DISPENSED
Start: 2023-11-03

## (undated) RX ORDER — FENTANYL CITRATE 50 UG/ML
INJECTION, SOLUTION INTRAMUSCULAR; INTRAVENOUS
Status: DISPENSED
Start: 2025-04-11

## (undated) RX ORDER — LIDOCAINE HYDROCHLORIDE AND EPINEPHRINE 10; 10 MG/ML; UG/ML
INJECTION, SOLUTION INFILTRATION; PERINEURAL
Status: DISPENSED
Start: 2025-04-11

## (undated) RX ORDER — PROPOFOL 10 MG/ML
INJECTION, EMULSION INTRAVENOUS
Status: DISPENSED
Start: 2023-11-03

## (undated) RX ORDER — ONDANSETRON 2 MG/ML
INJECTION INTRAMUSCULAR; INTRAVENOUS
Status: DISPENSED
Start: 2018-01-18

## (undated) RX ORDER — ACETAMINOPHEN 325 MG/1
TABLET ORAL
Status: DISPENSED
Start: 2023-11-03

## (undated) RX ORDER — LIDOCAINE HYDROCHLORIDE 20 MG/ML
INJECTION, SOLUTION EPIDURAL; INFILTRATION; INTRACAUDAL; PERINEURAL
Status: DISPENSED
Start: 2018-01-18

## (undated) RX ORDER — CEFAZOLIN SODIUM/WATER 2 G/20 ML
SYRINGE (ML) INTRAVENOUS
Status: DISPENSED
Start: 2023-11-03

## (undated) RX ORDER — DEXAMETHASONE SODIUM PHOSPHATE 4 MG/ML
INJECTION, SOLUTION INTRA-ARTICULAR; INTRALESIONAL; INTRAMUSCULAR; INTRAVENOUS; SOFT TISSUE
Status: DISPENSED
Start: 2018-05-10

## (undated) RX ORDER — FENTANYL CITRATE 50 UG/ML
INJECTION, SOLUTION INTRAMUSCULAR; INTRAVENOUS
Status: DISPENSED
Start: 2024-12-13

## (undated) RX ORDER — DEXAMETHASONE SODIUM PHOSPHATE 4 MG/ML
INJECTION, SOLUTION INTRA-ARTICULAR; INTRALESIONAL; INTRAMUSCULAR; INTRAVENOUS; SOFT TISSUE
Status: DISPENSED
Start: 2023-11-03

## (undated) RX ORDER — GENTAMICIN 40 MG/ML
INJECTION, SOLUTION INTRAMUSCULAR; INTRAVENOUS
Status: DISPENSED
Start: 2023-08-30

## (undated) RX ORDER — HYDROMORPHONE HCL IN WATER/PF 6 MG/30 ML
PATIENT CONTROLLED ANALGESIA SYRINGE INTRAVENOUS
Status: DISPENSED
Start: 2023-11-03

## (undated) RX ORDER — FENTANYL CITRATE 50 UG/ML
INJECTION, SOLUTION INTRAMUSCULAR; INTRAVENOUS
Status: DISPENSED
Start: 2018-01-18

## (undated) RX ORDER — ACETAMINOPHEN 500 MG
TABLET ORAL
Status: DISPENSED
Start: 2023-11-03

## (undated) RX ORDER — LIDOCAINE HYDROCHLORIDE 10 MG/ML
INJECTION, SOLUTION EPIDURAL; INFILTRATION; INTRACAUDAL; PERINEURAL
Status: DISPENSED
Start: 2023-08-30

## (undated) RX ORDER — CEFAZOLIN SODIUM 2 G/50ML
SOLUTION INTRAVENOUS
Status: DISPENSED
Start: 2025-04-11

## (undated) RX ORDER — BUPIVACAINE HYDROCHLORIDE 2.5 MG/ML
INJECTION, SOLUTION EPIDURAL; INFILTRATION; INTRACAUDAL
Status: DISPENSED
Start: 2023-11-03

## (undated) RX ORDER — FENTANYL CITRATE 50 UG/ML
INJECTION, SOLUTION INTRAMUSCULAR; INTRAVENOUS
Status: DISPENSED
Start: 2018-12-07

## (undated) RX ORDER — ONDANSETRON 2 MG/ML
INJECTION INTRAMUSCULAR; INTRAVENOUS
Status: DISPENSED
Start: 2018-05-10

## (undated) RX ORDER — ONDANSETRON 2 MG/ML
INJECTION INTRAMUSCULAR; INTRAVENOUS
Status: DISPENSED
Start: 2023-11-03

## (undated) RX ORDER — HEPARIN SODIUM (PORCINE) LOCK FLUSH IV SOLN 100 UNIT/ML 100 UNIT/ML
SOLUTION INTRAVENOUS
Status: DISPENSED
Start: 2025-04-11

## (undated) RX ORDER — FENTANYL CITRATE 50 UG/ML
INJECTION, SOLUTION INTRAMUSCULAR; INTRAVENOUS
Status: DISPENSED
Start: 2018-05-10

## (undated) RX ORDER — HEPARIN SODIUM 5000 [USP'U]/.5ML
INJECTION, SOLUTION INTRAVENOUS; SUBCUTANEOUS
Status: DISPENSED
Start: 2023-11-03

## (undated) RX ORDER — FENTANYL CITRATE 0.05 MG/ML
INJECTION, SOLUTION INTRAMUSCULAR; INTRAVENOUS
Status: DISPENSED
Start: 2023-11-03

## (undated) RX ORDER — LIDOCAINE HYDROCHLORIDE 20 MG/ML
INJECTION, SOLUTION EPIDURAL; INFILTRATION; INTRACAUDAL; PERINEURAL
Status: DISPENSED
Start: 2018-05-10

## (undated) RX ORDER — EPHEDRINE SULFATE 50 MG/ML
INJECTION, SOLUTION INTRAMUSCULAR; INTRAVENOUS; SUBCUTANEOUS
Status: DISPENSED
Start: 2023-11-03